# Patient Record
Sex: FEMALE | Race: WHITE | Employment: OTHER | URBAN - NONMETROPOLITAN AREA
[De-identification: names, ages, dates, MRNs, and addresses within clinical notes are randomized per-mention and may not be internally consistent; named-entity substitution may affect disease eponyms.]

---

## 2018-04-20 ENCOUNTER — HOSPITAL ENCOUNTER (OUTPATIENT)
Dept: INTERVENTIONAL RADIOLOGY/VASCULAR | Age: 78
Discharge: HOME | End: 2018-04-20
Payer: MEDICARE

## 2018-04-20 DIAGNOSIS — R09.89 OTHER SPECIFIED SYMPTOMS AND SIGNS INVOLVING THE CIRCULATORY AND RESPIRATORY SYSTEMS: ICD-10-CM

## 2018-04-20 DIAGNOSIS — R09.89 BRUIT: ICD-10-CM

## 2018-04-20 PROCEDURE — 93880 EXTRACRANIAL BILAT STUDY: CPT

## 2018-05-24 RX ORDER — VIT A/VIT C/VIT E/ZINC/COPPER 4296-226
1 CAPSULE ORAL 2 TIMES DAILY
Status: ON HOLD | COMMUNITY
End: 2022-04-22

## 2018-05-24 RX ORDER — FLUTICASONE PROPIONATE 50 MCG
1-2 SPRAY, SUSPENSION (ML) NASAL DAILY
COMMUNITY

## 2018-05-31 ENCOUNTER — HOSPITAL ENCOUNTER (OUTPATIENT)
Dept: INPATIENT UNIT | Age: 78
Discharge: HOME | End: 2018-05-31
Attending: INTERNAL MEDICINE | Admitting: INTERNAL MEDICINE
Payer: MEDICARE

## 2018-05-31 VITALS
HEART RATE: 73 BPM | OXYGEN SATURATION: 96 % | SYSTOLIC BLOOD PRESSURE: 113 MMHG | DIASTOLIC BLOOD PRESSURE: 52 MMHG | BODY MASS INDEX: 35 KG/M2 | WEIGHT: 205 LBS | HEIGHT: 64 IN | TEMPERATURE: 98.5 F | RESPIRATION RATE: 18 BRPM

## 2018-05-31 LAB
ABO: NORMAL
ALBUMIN SERPL-MCNC: 3.8 G/DL (ref 3.5–5.1)
ALP BLD-CCNC: 66 U/L (ref 38–126)
ALT SERPL-CCNC: 11 U/L (ref 11–66)
ANION GAP SERPL CALCULATED.3IONS-SCNC: 11 MEQ/L (ref 8–16)
ANTIBODY SCREEN: NORMAL
AST SERPL-CCNC: 20 U/L (ref 5–40)
BILIRUB SERPL-MCNC: 0.4 MG/DL (ref 0.3–1.2)
BUN BLDV-MCNC: 21 MG/DL (ref 7–22)
CALCIUM SERPL-MCNC: 9 MG/DL (ref 8.5–10.5)
CHLORIDE BLD-SCNC: 103 MEQ/L (ref 98–111)
CHOLESTEROL, TOTAL: 120 MG/DL (ref 100–199)
CO2: 28 MEQ/L (ref 23–33)
COLLECTED BY:: ABNORMAL
COLLECTED BY:: NORMAL
CREAT SERPL-MCNC: 0.7 MG/DL (ref 0.4–1.2)
EKG ATRIAL RATE: 267 BPM
EKG Q-T INTERVAL: 420 MS
EKG QRS DURATION: 90 MS
EKG QTC CALCULATION (BAZETT): 469 MS
EKG R AXIS: 1 DEGREES
EKG T AXIS: 42 DEGREES
EKG VENTRICULAR RATE: 75 BPM
GFR SERPL CREATININE-BSD FRML MDRD: 81 ML/MIN/1.73M2
GLUCOSE BLD-MCNC: 101 MG/DL (ref 70–108)
HCT VFR BLD CALC: 34.5 % (ref 37–47)
HDLC SERPL-MCNC: 47 MG/DL
HEMOGLOBIN: 11.3 GM/DL (ref 12–16)
INR BLD: 1.23 (ref 0.85–1.13)
LDL CHOLESTEROL CALCULATED: 54 MG/DL
MCH RBC QN AUTO: 29.3 PG (ref 27–31)
MCHC RBC AUTO-ENTMCNC: 32.8 GM/DL (ref 33–37)
MCV RBC AUTO: 89.2 FL (ref 81–99)
PDW BLD-RTO: 15.2 % (ref 11.5–14.5)
PLATELET # BLD: 187 THOU/MM3 (ref 130–400)
PMV BLD AUTO: 8.3 FL (ref 7.4–10.4)
POC O2 SATURATION: 66 % (ref 94–97)
POC O2 SATURATION: 67 % (ref 94–97)
POC O2 SATURATION: 76 % (ref 94–97)
POC O2 SATURATION: 97 % (ref 94–97)
POTASSIUM REFLEX MAGNESIUM: 4.6 MEQ/L (ref 3.5–5.2)
RBC # BLD: 3.87 MILL/MM3 (ref 4.2–5.4)
RH FACTOR: NORMAL
SODIUM BLD-SCNC: 142 MEQ/L (ref 135–145)
SOURCE, BLOOD GAS: ABNORMAL
SOURCE, BLOOD GAS: NORMAL
TOTAL PROTEIN: 6.4 G/DL (ref 6.1–8)
TRIGL SERPL-MCNC: 95 MG/DL (ref 0–199)
WBC # BLD: 5 THOU/MM3 (ref 4.8–10.8)

## 2018-05-31 PROCEDURE — C1894 INTRO/SHEATH, NON-LASER: HCPCS

## 2018-05-31 PROCEDURE — 2720000010 HC SURG SUPPLY STERILE

## 2018-05-31 PROCEDURE — 93460 R&L HRT ART/VENTRICLE ANGIO: CPT | Performed by: INTERNAL MEDICINE

## 2018-05-31 PROCEDURE — 93567 NJX CAR CTH SPRVLV AORTGRPHY: CPT | Performed by: INTERNAL MEDICINE

## 2018-05-31 PROCEDURE — 6370000000 HC RX 637 (ALT 250 FOR IP): Performed by: INTERNAL MEDICINE

## 2018-05-31 PROCEDURE — 80061 LIPID PANEL: CPT

## 2018-05-31 PROCEDURE — 86900 BLOOD TYPING SEROLOGIC ABO: CPT

## 2018-05-31 PROCEDURE — 2580000003 HC RX 258: Performed by: INTERNAL MEDICINE

## 2018-05-31 PROCEDURE — 86901 BLOOD TYPING SEROLOGIC RH(D): CPT

## 2018-05-31 PROCEDURE — 86850 RBC ANTIBODY SCREEN: CPT

## 2018-05-31 PROCEDURE — C1760 CLOSURE DEV, VASC: HCPCS

## 2018-05-31 PROCEDURE — 82810 BLOOD GASES O2 SAT ONLY: CPT

## 2018-05-31 PROCEDURE — 85610 PROTHROMBIN TIME: CPT

## 2018-05-31 PROCEDURE — 80053 COMPREHEN METABOLIC PANEL: CPT

## 2018-05-31 PROCEDURE — C1751 CATH, INF, PER/CENT/MIDLINE: HCPCS

## 2018-05-31 PROCEDURE — 6360000002 HC RX W HCPCS

## 2018-05-31 PROCEDURE — 2780000010 HC IMPLANT OTHER

## 2018-05-31 PROCEDURE — 85027 COMPLETE CBC AUTOMATED: CPT

## 2018-05-31 PROCEDURE — C1769 GUIDE WIRE: HCPCS

## 2018-05-31 PROCEDURE — 93005 ELECTROCARDIOGRAM TRACING: CPT | Performed by: INTERNAL MEDICINE

## 2018-05-31 PROCEDURE — C1725 CATH, TRANSLUMIN NON-LASER: HCPCS

## 2018-05-31 PROCEDURE — 2500000003 HC RX 250 WO HCPCS

## 2018-05-31 PROCEDURE — 36415 COLL VENOUS BLD VENIPUNCTURE: CPT

## 2018-05-31 RX ORDER — SODIUM CHLORIDE 9 MG/ML
100 INJECTION, SOLUTION INTRAVENOUS CONTINUOUS
Status: DISCONTINUED | OUTPATIENT
Start: 2018-05-31 | End: 2018-06-01 | Stop reason: HOSPADM

## 2018-05-31 RX ORDER — ONDANSETRON 2 MG/ML
4 INJECTION INTRAMUSCULAR; INTRAVENOUS EVERY 6 HOURS PRN
Status: DISCONTINUED | OUTPATIENT
Start: 2018-05-31 | End: 2018-06-01 | Stop reason: HOSPADM

## 2018-05-31 RX ORDER — SODIUM CHLORIDE 0.9 % (FLUSH) 0.9 %
10 SYRINGE (ML) INJECTION EVERY 12 HOURS SCHEDULED
Status: DISCONTINUED | OUTPATIENT
Start: 2018-05-31 | End: 2018-05-31 | Stop reason: SDUPTHER

## 2018-05-31 RX ORDER — SODIUM CHLORIDE 9 MG/ML
INJECTION, SOLUTION INTRAVENOUS CONTINUOUS
Status: DISCONTINUED | OUTPATIENT
Start: 2018-05-31 | End: 2018-05-31 | Stop reason: SDUPTHER

## 2018-05-31 RX ORDER — SODIUM CHLORIDE 0.9 % (FLUSH) 0.9 %
10 SYRINGE (ML) INJECTION PRN
Status: DISCONTINUED | OUTPATIENT
Start: 2018-05-31 | End: 2018-06-01 | Stop reason: HOSPADM

## 2018-05-31 RX ORDER — ASPIRIN 325 MG
325 TABLET ORAL ONCE
Status: COMPLETED | OUTPATIENT
Start: 2018-05-31 | End: 2018-05-31

## 2018-05-31 RX ORDER — SODIUM CHLORIDE 0.9 % (FLUSH) 0.9 %
10 SYRINGE (ML) INJECTION PRN
Status: DISCONTINUED | OUTPATIENT
Start: 2018-05-31 | End: 2018-05-31 | Stop reason: SDUPTHER

## 2018-05-31 RX ORDER — ATROPINE SULFATE 0.4 MG/ML
0.5 AMPUL (ML) INJECTION
Status: DISCONTINUED | OUTPATIENT
Start: 2018-05-31 | End: 2018-06-01 | Stop reason: HOSPADM

## 2018-05-31 RX ORDER — ACETAMINOPHEN 325 MG/1
650 TABLET ORAL EVERY 4 HOURS PRN
Status: DISCONTINUED | OUTPATIENT
Start: 2018-05-31 | End: 2018-06-01 | Stop reason: HOSPADM

## 2018-05-31 RX ORDER — SODIUM CHLORIDE 0.9 % (FLUSH) 0.9 %
10 SYRINGE (ML) INJECTION EVERY 12 HOURS SCHEDULED
Status: DISCONTINUED | OUTPATIENT
Start: 2018-05-31 | End: 2018-06-01 | Stop reason: HOSPADM

## 2018-05-31 RX ADMIN — SODIUM CHLORIDE: 9 INJECTION, SOLUTION INTRAVENOUS at 13:10

## 2018-05-31 RX ADMIN — ASPIRIN 325 MG: 325 TABLET, COATED ORAL at 13:50

## 2018-06-20 ENCOUNTER — OFFICE VISIT (OUTPATIENT)
Dept: CARDIOTHORACIC SURGERY | Age: 78
End: 2018-06-20
Payer: MEDICARE

## 2018-06-20 ENCOUNTER — PREP FOR PROCEDURE (OUTPATIENT)
Dept: CARDIOTHORACIC SURGERY | Age: 78
End: 2018-06-20

## 2018-06-20 VITALS
DIASTOLIC BLOOD PRESSURE: 79 MMHG | BODY MASS INDEX: 38.85 KG/M2 | WEIGHT: 233.2 LBS | SYSTOLIC BLOOD PRESSURE: 137 MMHG | HEIGHT: 65 IN | HEART RATE: 66 BPM

## 2018-06-20 DIAGNOSIS — R79.1 ABNORMAL COAGULATION PROFILE: ICD-10-CM

## 2018-06-20 DIAGNOSIS — I35.0 NONRHEUMATIC AORTIC VALVE STENOSIS: ICD-10-CM

## 2018-06-20 DIAGNOSIS — R79.9 ABNORMAL FINDING OF BLOOD CHEMISTRY: ICD-10-CM

## 2018-06-20 DIAGNOSIS — Z01.818 PREOP EXAMINATION: Primary | ICD-10-CM

## 2018-06-20 DIAGNOSIS — I48.20 CHRONIC ATRIAL FIBRILLATION (HCC): Primary | ICD-10-CM

## 2018-06-20 PROCEDURE — 99205 OFFICE O/P NEW HI 60 MIN: CPT | Performed by: THORACIC SURGERY (CARDIOTHORACIC VASCULAR SURGERY)

## 2018-06-20 PROCEDURE — G8400 PT W/DXA NO RESULTS DOC: HCPCS | Performed by: THORACIC SURGERY (CARDIOTHORACIC VASCULAR SURGERY)

## 2018-06-20 PROCEDURE — 4040F PNEUMOC VAC/ADMIN/RCVD: CPT | Performed by: THORACIC SURGERY (CARDIOTHORACIC VASCULAR SURGERY)

## 2018-06-20 PROCEDURE — 1123F ACP DISCUSS/DSCN MKR DOCD: CPT | Performed by: THORACIC SURGERY (CARDIOTHORACIC VASCULAR SURGERY)

## 2018-06-20 PROCEDURE — 1036F TOBACCO NON-USER: CPT | Performed by: THORACIC SURGERY (CARDIOTHORACIC VASCULAR SURGERY)

## 2018-06-20 PROCEDURE — G8417 CALC BMI ABV UP PARAM F/U: HCPCS | Performed by: THORACIC SURGERY (CARDIOTHORACIC VASCULAR SURGERY)

## 2018-06-20 PROCEDURE — 1090F PRES/ABSN URINE INCON ASSESS: CPT | Performed by: THORACIC SURGERY (CARDIOTHORACIC VASCULAR SURGERY)

## 2018-06-20 PROCEDURE — G8427 DOCREV CUR MEDS BY ELIG CLIN: HCPCS | Performed by: THORACIC SURGERY (CARDIOTHORACIC VASCULAR SURGERY)

## 2018-06-20 RX ORDER — SODIUM CHLORIDE 0.9 % (FLUSH) 0.9 %
10 SYRINGE (ML) INJECTION PRN
Status: CANCELLED | OUTPATIENT
Start: 2018-06-20

## 2018-06-20 RX ORDER — AMIODARONE HYDROCHLORIDE 200 MG/1
200 TABLET ORAL ONCE
Status: CANCELLED | OUTPATIENT
Start: 2018-06-20 | End: 2018-06-20

## 2018-06-20 RX ORDER — CHLORHEXIDINE GLUCONATE 4 G/100ML
SOLUTION TOPICAL ONCE
Status: CANCELLED | OUTPATIENT
Start: 2018-06-20 | End: 2018-06-20

## 2018-06-20 RX ORDER — SODIUM CHLORIDE 0.9 % (FLUSH) 0.9 %
10 SYRINGE (ML) INJECTION EVERY 12 HOURS SCHEDULED
Status: CANCELLED | OUTPATIENT
Start: 2018-06-20

## 2018-06-20 RX ORDER — CHLORHEXIDINE GLUCONATE 0.12 MG/ML
15 RINSE ORAL ONCE
Status: CANCELLED | OUTPATIENT
Start: 2018-06-20 | End: 2018-06-20

## 2018-06-20 ASSESSMENT — ENCOUNTER SYMPTOMS
ABDOMINAL PAIN: 0
EYE DISCHARGE: 0
SHORTNESS OF BREATH: 1

## 2018-06-21 ENCOUNTER — HOSPITAL ENCOUNTER (OUTPATIENT)
Dept: GENERAL RADIOLOGY | Age: 78
Discharge: HOME | End: 2018-06-21
Payer: MEDICARE

## 2018-06-21 ENCOUNTER — HOSPITAL ENCOUNTER (OUTPATIENT)
Dept: PREADMISSION TESTING | Age: 78
Discharge: HOME | End: 2018-06-25
Payer: MEDICARE

## 2018-06-21 VITALS
DIASTOLIC BLOOD PRESSURE: 82 MMHG | SYSTOLIC BLOOD PRESSURE: 168 MMHG | HEIGHT: 65 IN | OXYGEN SATURATION: 98 % | BODY MASS INDEX: 38.57 KG/M2 | WEIGHT: 231.48 LBS | TEMPERATURE: 96.6 F | HEART RATE: 60 BPM

## 2018-06-21 DIAGNOSIS — R79.9 ABNORMAL FINDING OF BLOOD CHEMISTRY: ICD-10-CM

## 2018-06-21 DIAGNOSIS — Z01.818 PREOP EXAMINATION: ICD-10-CM

## 2018-06-21 DIAGNOSIS — R79.1 ABNORMAL COAGULATION PROFILE: ICD-10-CM

## 2018-06-21 LAB
ABO: NORMAL
ANTIBODY SCREEN: NORMAL
AVERAGE GLUCOSE: 99 MG/DL (ref 70–126)
BACTERIA: ABNORMAL
BILIRUBIN URINE: NEGATIVE
BLOOD, URINE: NEGATIVE
CASTS: ABNORMAL /LPF
CASTS: ABNORMAL /LPF
CHARACTER, URINE: CLEAR
COLOR: YELLOW
CRYSTALS: ABNORMAL
EPITHELIAL CELLS, UA: ABNORMAL /HPF
GLUCOSE, URINE: NEGATIVE MG/DL
HBA1C MFR BLD: 5.3 % (ref 4.4–6.4)
HCT VFR BLD CALC: 36.3 % (ref 37–47)
HEMOGLOBIN: 11.8 GM/DL (ref 12–16)
INR BLD: 1.97 (ref 0.85–1.13)
KETONES, URINE: NEGATIVE
LEUKOCYTE EST, POC: ABNORMAL
MCH RBC QN AUTO: 29.4 PG (ref 27–31)
MCHC RBC AUTO-ENTMCNC: 32.6 GM/DL (ref 33–37)
MCV RBC AUTO: 90.2 FL (ref 81–99)
MISCELLANEOUS LAB TEST RESULT: ABNORMAL
NITRITE, URINE: NEGATIVE
PDW BLD-RTO: 15.6 % (ref 11.5–14.5)
PH UA: 6.5
PLATELET # BLD: 152 THOU/MM3 (ref 130–400)
PMV BLD AUTO: 8.9 FL (ref 7.4–10.4)
PROTEIN UA: NEGATIVE MG/DL
RBC # BLD: 4.02 MILL/MM3 (ref 4.2–5.4)
RBC URINE: ABNORMAL /HPF
RENAL EPITHELIAL, UA: ABNORMAL
RH FACTOR: NORMAL
SPECIFIC GRAVITY UA: 1.02 (ref 1–1.03)
UROBILINOGEN, URINE: 1 EU/DL
WBC # BLD: 4.7 THOU/MM3 (ref 4.8–10.8)
WBC UA: ABNORMAL /HPF
YEAST: ABNORMAL

## 2018-06-21 PROCEDURE — 85027 COMPLETE CBC AUTOMATED: CPT

## 2018-06-21 PROCEDURE — 86900 BLOOD TYPING SEROLOGIC ABO: CPT

## 2018-06-21 PROCEDURE — 36415 COLL VENOUS BLD VENIPUNCTURE: CPT

## 2018-06-21 PROCEDURE — 83036 HEMOGLOBIN GLYCOSYLATED A1C: CPT

## 2018-06-21 PROCEDURE — 86850 RBC ANTIBODY SCREEN: CPT

## 2018-06-21 PROCEDURE — 86923 COMPATIBILITY TEST ELECTRIC: CPT

## 2018-06-21 PROCEDURE — 71046 X-RAY EXAM CHEST 2 VIEWS: CPT

## 2018-06-21 PROCEDURE — 87081 CULTURE SCREEN ONLY: CPT

## 2018-06-21 PROCEDURE — 86901 BLOOD TYPING SEROLOGIC RH(D): CPT

## 2018-06-21 PROCEDURE — 85610 PROTHROMBIN TIME: CPT

## 2018-06-21 PROCEDURE — 81001 URINALYSIS AUTO W/SCOPE: CPT

## 2018-06-22 LAB — MRSA SCREEN: NORMAL

## 2018-06-23 LAB
MRSA SCREEN: NORMAL
VRE CULTURE: NORMAL

## 2018-06-27 ENCOUNTER — ANESTHESIA EVENT (OUTPATIENT)
Dept: OPERATING ROOM | Age: 78
DRG: 220 | End: 2018-06-27
Payer: MEDICARE

## 2018-06-28 ENCOUNTER — APPOINTMENT (OUTPATIENT)
Dept: GENERAL RADIOLOGY | Age: 78
DRG: 220 | End: 2018-06-28
Attending: THORACIC SURGERY (CARDIOTHORACIC VASCULAR SURGERY)
Payer: MEDICARE

## 2018-06-28 ENCOUNTER — HOSPITAL ENCOUNTER (INPATIENT)
Age: 78
LOS: 11 days | Discharge: NURSING, REHAB OR LONG-TERM CARE | DRG: 220 | End: 2018-07-09
Attending: THORACIC SURGERY (CARDIOTHORACIC VASCULAR SURGERY) | Admitting: THORACIC SURGERY (CARDIOTHORACIC VASCULAR SURGERY)
Payer: MEDICARE

## 2018-06-28 ENCOUNTER — ANESTHESIA (OUTPATIENT)
Dept: OPERATING ROOM | Age: 78
DRG: 220 | End: 2018-06-28
Payer: MEDICARE

## 2018-06-28 VITALS — OXYGEN SATURATION: 100 % | RESPIRATION RATE: 14 BRPM | TEMPERATURE: 97.5 F

## 2018-06-28 DIAGNOSIS — I35.0 AORTIC STENOSIS, SEVERE: Primary | ICD-10-CM

## 2018-06-28 LAB
ACTIVATED CLOTTING TIME: 113 SECONDS (ref 99–130)
ACTIVATED CLOTTING TIME: 135 SECONDS (ref 99–130)
ACTIVATED CLOTTING TIME: 493 SECONDS (ref 99–130)
ACTIVATED CLOTTING TIME: 523 SECONDS (ref 99–130)
ACTIVATED CLOTTING TIME: 543 SECONDS (ref 99–130)
ACTIVATED CLOTTING TIME: 683 SECONDS (ref 99–130)
ALLEN TEST: ABNORMAL
ALLEN TEST: NORMAL
ANION GAP SERPL CALCULATED.3IONS-SCNC: 10 MEQ/L (ref 8–16)
BASE EXCESS (CALCULATED): 0 MMOL/L (ref -2.5–2.5)
BASE EXCESS (CALCULATED): 0.5 MMOL/L (ref -2.5–2.5)
BASE EXCESS (CALCULATED): 0.5 MMOL/L (ref -2.5–2.5)
BASE EXCESS (CALCULATED): 1.2 MMOL/L (ref -2.5–2.5)
BASE EXCESS (CALCULATED): 1.9 MMOL/L (ref -2.5–2.5)
BASE EXCESS (CALCULATED): 2.3 MMOL/L (ref -2.5–2.5)
BASE EXCESS (CALCULATED): 5 MMOL/L (ref -2.5–2.5)
BASE EXCESS (CALCULATED): 7.9 MMOL/L (ref -2.5–2.5)
BASE EXCESS (CALCULATED): 8.2 MMOL/L (ref -2.5–2.5)
BASE EXCESS MIXED: 6.1 MMOL/L (ref -2–3)
BASOPHILS # BLD: 0.1 %
BASOPHILS ABSOLUTE: 0 THOU/MM3 (ref 0–0.1)
BUN BLDV-MCNC: 19 MG/DL (ref 7–22)
CALCIUM IONIZED SERUM: 0.99 MMOL/L (ref 1.12–1.32)
CALCIUM IONIZED SERUM: 1.02 MMOL/L (ref 1.12–1.32)
CALCIUM IONIZED SERUM: 1.02 MMOL/L (ref 1.12–1.32)
CALCIUM IONIZED SERUM: 1.08 MMOL/L (ref 1.12–1.32)
CALCIUM IONIZED SERUM: 1.2 MMOL/L (ref 1.12–1.32)
CALCIUM IONIZED: 1.07 MMOL/L (ref 1.12–1.32)
CALCIUM SERPL-MCNC: 7.6 MG/DL (ref 8.5–10.5)
CARTRIDGE COLOR: NORMAL
CHLORIDE BLD-SCNC: 111 MEQ/L (ref 98–111)
CO2: 24 MEQ/L (ref 23–33)
COLLECTED BY:: ABNORMAL
COLLECTED BY:: NORMAL
COMMENT: ABNORMAL
COMMENT: ABNORMAL
CREAT SERPL-MCNC: 0.7 MG/DL (ref 0.4–1.2)
DEVICE: ABNORMAL
DEVICE: NORMAL
EOSINOPHIL # BLD: 0.1 %
EOSINOPHILS ABSOLUTE: 0 THOU/MM3 (ref 0–0.4)
ERYTHROCYTE [DISTWIDTH] IN BLOOD BY AUTOMATED COUNT: 15.5 % (ref 11.5–14.5)
ERYTHROCYTE [DISTWIDTH] IN BLOOD BY AUTOMATED COUNT: 16 % (ref 11.5–14.5)
ERYTHROCYTE [DISTWIDTH] IN BLOOD BY AUTOMATED COUNT: 50.5 FL (ref 35–45)
ERYTHROCYTE [DISTWIDTH] IN BLOOD BY AUTOMATED COUNT: 54.3 FL (ref 35–45)
GFR SERPL CREATININE-BSD FRML MDRD: 81 ML/MIN/1.73M2
GLUCOSE BLD-MCNC: 107 MG/DL (ref 70–108)
GLUCOSE BLD-MCNC: 108 MG/DL (ref 70–108)
GLUCOSE BLD-MCNC: 111 MG/DL (ref 70–108)
GLUCOSE BLD-MCNC: 115 MG/DL (ref 70–108)
GLUCOSE BLD-MCNC: 125 MG/DL (ref 70–108)
GLUCOSE BLD-MCNC: 131 MG/DL (ref 70–108)
GLUCOSE BLD-MCNC: 86 MG/DL (ref 70–108)
GLUCOSE, WHOLE BLOOD: 112 MG/DL (ref 70–108)
GLUCOSE, WHOLE BLOOD: 114 MG/DL (ref 70–108)
GLUCOSE, WHOLE BLOOD: 128 MG/DL (ref 70–108)
GLUCOSE, WHOLE BLOOD: 129 MG/DL (ref 70–108)
GLUCOSE, WHOLE BLOOD: 137 MG/DL (ref 70–108)
GLUCOSE, WHOLE BLOOD: 140 MG/DL (ref 70–108)
GLUCOSE, WHOLE BLOOD: 160 MG/DL (ref 70–108)
GLUCOSE, WHOLE BLOOD: 179 MG/DL (ref 70–108)
GLUCOSE, WHOLE BLOOD: 85 MG/DL (ref 70–108)
HCO3, MIXED: 30 MMOL/L (ref 23–28)
HCO3: 26 MMOL/L (ref 23–28)
HCO3: 26 MMOL/L (ref 23–28)
HCO3: 27 MMOL/L (ref 23–28)
HCO3: 28 MMOL/L (ref 23–28)
HCO3: 31 MMOL/L (ref 23–28)
HCO3: 32 MMOL/L (ref 23–28)
HCO3: 32 MMOL/L (ref 23–28)
HCT VFR BLD CALC: 20.6 % (ref 37–47)
HCT VFR BLD CALC: 30 % (ref 37–47)
HCT VFR BLD CALC: 32.1 % (ref 37–47)
HCT VFR BLD CALC: 32.6 % (ref 37–47)
HEMOGLOBIN FINGERSTICK, POC: 6.6 G/DL (ref 12–16)
HEMOGLOBIN FINGERSTICK, POC: 7.4 G/DL (ref 12–16)
HEMOGLOBIN: 10.4 GM/DL (ref 12–16)
HEMOGLOBIN: 10.6 GM/DL (ref 12–16)
HEMOGLOBIN: 6.8 GM/DL (ref 12–16)
HEMOGLOBIN: 9.6 GM/DL (ref 12–16)
IFIO2: 30
IFIO2: 30
IFIO2: 35
IFIO2: 40
IFIO2: 60
IMMATURE GRANS (ABS): 0.09 THOU/MM3 (ref 0–0.07)
IMMATURE GRANULOCYTES: 0.6 %
LYMPHOCYTES # BLD: 10.8 %
LYMPHOCYTES ABSOLUTE: 1.5 THOU/MM3 (ref 1–4.8)
MAGNESIUM: 2.5 MG/DL (ref 1.6–2.4)
MCH RBC QN AUTO: 29.1 PG (ref 26–33)
MCH RBC QN AUTO: 29.3 PG (ref 26–33)
MCHC RBC AUTO-ENTMCNC: 32 GM/DL (ref 32.2–35.5)
MCHC RBC AUTO-ENTMCNC: 32.4 GM/DL (ref 32.2–35.5)
MCV RBC AUTO: 89.7 FL (ref 81–99)
MCV RBC AUTO: 91.5 FL (ref 81–99)
MODE: ABNORMAL
MODE: NORMAL
MONOCYTES # BLD: 5.4 %
MONOCYTES ABSOLUTE: 0.8 THOU/MM3 (ref 0.4–1.3)
NUCLEATED RED BLOOD CELLS: 0 /100 WBC
O2 SAT, MIXED: 77 %
O2 SATURATION: 100 %
O2 SATURATION: 91 %
O2 SATURATION: 92 %
O2 SATURATION: 93 %
O2 SATURATION: 97 %
O2 SATURATION: 98 %
O2 SATURATION: 99 %
PATIENT BOLUS: NORMAL
PATIENT HEPARIN CONCENTRATION: 0
PATIENT HEPARIN CONCENTRATION: 2
PATIENT HEPARIN CONCENTRATION: 3
PCO2, MIXED VENOUS: 40 MMHG (ref 41–51)
PCO2: 35 MMHG (ref 35–45)
PCO2: 39 MMHG (ref 35–45)
PCO2: 40 MMHG (ref 35–45)
PCO2: 42 MMHG (ref 35–45)
PCO2: 44 MMHG (ref 35–45)
PCO2: 50 MMHG (ref 35–45)
PCO2: 51 MMHG (ref 35–45)
PCO2: 52 MMHG (ref 35–45)
PCO2: 56 MMHG (ref 35–45)
PH BLOOD GAS: 7.29 (ref 7.35–7.45)
PH BLOOD GAS: 7.32 (ref 7.35–7.45)
PH BLOOD GAS: 7.34 (ref 7.35–7.45)
PH BLOOD GAS: 7.39 (ref 7.35–7.45)
PH BLOOD GAS: 7.4 (ref 7.35–7.45)
PH BLOOD GAS: 7.4 (ref 7.35–7.45)
PH BLOOD GAS: 7.47 (ref 7.35–7.45)
PH BLOOD GAS: 7.51 (ref 7.35–7.45)
PH BLOOD GAS: 7.52 (ref 7.35–7.45)
PH, MIXED: 7.48 (ref 7.31–7.41)
PLATELET # BLD: 112 THOU/MM3 (ref 130–400)
PLATELET # BLD: 118 THOU/MM3 (ref 130–400)
PLATELET # BLD: 74 THOU/MM3 (ref 130–400)
PLATELET # BLD: 89 THOU/MM3 (ref 130–400)
PMV BLD AUTO: 10 FL (ref 9.4–12.4)
PMV BLD AUTO: 10.8 FL (ref 9.4–12.4)
PO2 MIXED: 39 MMHG (ref 25–40)
PO2: 106 MMHG (ref 71–104)
PO2: 114 MMHG (ref 71–104)
PO2: 252 MMHG (ref 71–104)
PO2: 290 MMHG (ref 71–104)
PO2: 327 MMHG (ref 71–104)
PO2: 66 MMHG (ref 71–104)
PO2: 72 MMHG (ref 71–104)
PO2: 73 MMHG (ref 71–104)
PO2: 90 MMHG (ref 71–104)
POTASSIUM SERPL-SCNC: 4.1 MEQ/L (ref 3.5–5.2)
POTASSIUM SERPL-SCNC: 4.2 MEQ/L (ref 3.5–5.2)
POTASSIUM, WHOLE BLOOD: 3.4 MEQ/L (ref 3.5–4.9)
POTASSIUM, WHOLE BLOOD: 3.8 MEQ/L (ref 3.5–4.9)
POTASSIUM, WHOLE BLOOD: 4 MEQ/L (ref 3.5–4.9)
POTASSIUM, WHOLE BLOOD: 4.3 MEQ/L (ref 3.5–4.9)
POTASSIUM, WHOLE BLOOD: 4.3 MEQ/L (ref 3.5–4.9)
PROJECTED HEPARIN CONCENTATION: 2
RANGE: NORMAL
RBC # BLD: 3.28 MILL/MM3 (ref 4.2–5.4)
RBC # BLD: 3.58 MILL/MM3 (ref 4.2–5.4)
SEG NEUTROPHILS: 83 %
SEGMENTED NEUTROPHILS ABSOLUTE COUNT: 11.5 THOU/MM3 (ref 1.8–7.7)
SET PEEP: 5 MMHG
SET PRESS SUPP: 10 CMH2O
SET RESPIRATORY RATE: 16 BPM
SET RESPIRATORY RATE: 16 BPM
SET TIDAL VOLUME: 460 ML
SET TIDAL VOLUME: 460 ML
SODIUM BLD-SCNC: 145 MEQ/L (ref 135–145)
SODIUM, WHOLE BLOOD: 140 MEQ/L (ref 138–146)
SODIUM, WHOLE BLOOD: 144 MEQ/L (ref 138–146)
SODIUM, WHOLE BLOOD: 145 MEQ/L (ref 138–146)
SOURCE, BLOOD GAS: ABNORMAL
SOURCE, BLOOD GAS: NORMAL
WBC # BLD: 13.9 THOU/MM3 (ref 4.8–10.8)
WBC # BLD: 8.4 THOU/MM3 (ref 4.8–10.8)

## 2018-06-28 PROCEDURE — 6360000002 HC RX W HCPCS: Performed by: THORACIC SURGERY (CARDIOTHORACIC VASCULAR SURGERY)

## 2018-06-28 PROCEDURE — 85025 COMPLETE CBC W/AUTO DIFF WBC: CPT

## 2018-06-28 PROCEDURE — 02RF08Z REPLACEMENT OF AORTIC VALVE WITH ZOOPLASTIC TISSUE, OPEN APPROACH: ICD-10-PCS | Performed by: THORACIC SURGERY (CARDIOTHORACIC VASCULAR SURGERY)

## 2018-06-28 PROCEDURE — 94002 VENT MGMT INPAT INIT DAY: CPT

## 2018-06-28 PROCEDURE — 2700000000 HC OXYGEN THERAPY PER DAY

## 2018-06-28 PROCEDURE — 88311 DECALCIFY TISSUE: CPT

## 2018-06-28 PROCEDURE — 5A1221Z PERFORMANCE OF CARDIAC OUTPUT, CONTINUOUS: ICD-10-PCS | Performed by: THORACIC SURGERY (CARDIOTHORACIC VASCULAR SURGERY)

## 2018-06-28 PROCEDURE — 85520 HEPARIN ASSAY: CPT

## 2018-06-28 PROCEDURE — P9016 RBC LEUKOCYTES REDUCED: HCPCS

## 2018-06-28 PROCEDURE — 36592 COLLECT BLOOD FROM PICC: CPT

## 2018-06-28 PROCEDURE — 84295 ASSAY OF SERUM SODIUM: CPT

## 2018-06-28 PROCEDURE — 2580000003 HC RX 258: Performed by: NURSE ANESTHETIST, CERTIFIED REGISTERED

## 2018-06-28 PROCEDURE — 3600000008 HC SURGERY OHS BASE: Performed by: THORACIC SURGERY (CARDIOTHORACIC VASCULAR SURGERY)

## 2018-06-28 PROCEDURE — 37799 UNLISTED PX VASCULAR SURGERY: CPT

## 2018-06-28 PROCEDURE — 6360000002 HC RX W HCPCS: Performed by: NURSE ANESTHETIST, CERTIFIED REGISTERED

## 2018-06-28 PROCEDURE — 36415 COLL VENOUS BLD VENIPUNCTURE: CPT

## 2018-06-28 PROCEDURE — 2000000000 HC ICU R&B

## 2018-06-28 PROCEDURE — 83735 ASSAY OF MAGNESIUM: CPT

## 2018-06-28 PROCEDURE — 2780000010 HC IMPLANT OTHER: Performed by: THORACIC SURGERY (CARDIOTHORACIC VASCULAR SURGERY)

## 2018-06-28 PROCEDURE — 33405 REPLACEMENT AORTIC VALVE OPN: CPT | Performed by: THORACIC SURGERY (CARDIOTHORACIC VASCULAR SURGERY)

## 2018-06-28 PROCEDURE — 3600000018 HC SURGERY OHS ADDTL 15MIN: Performed by: THORACIC SURGERY (CARDIOTHORACIC VASCULAR SURGERY)

## 2018-06-28 PROCEDURE — 71045 X-RAY EXAM CHEST 1 VIEW: CPT

## 2018-06-28 PROCEDURE — 33259 ABLATE ATRIA W/BYPASS ADD-ON: CPT | Performed by: THORACIC SURGERY (CARDIOTHORACIC VASCULAR SURGERY)

## 2018-06-28 PROCEDURE — 2580000003 HC RX 258: Performed by: THORACIC SURGERY (CARDIOTHORACIC VASCULAR SURGERY)

## 2018-06-28 PROCEDURE — 84132 ASSAY OF SERUM POTASSIUM: CPT

## 2018-06-28 PROCEDURE — 2780000006 HC MISC HEART VALVE: Performed by: THORACIC SURGERY (CARDIOTHORACIC VASCULAR SURGERY)

## 2018-06-28 PROCEDURE — 82948 REAGENT STRIP/BLOOD GLUCOSE: CPT

## 2018-06-28 PROCEDURE — 6370000000 HC RX 637 (ALT 250 FOR IP): Performed by: THORACIC SURGERY (CARDIOTHORACIC VASCULAR SURGERY)

## 2018-06-28 PROCEDURE — 2500000003 HC RX 250 WO HCPCS: Performed by: NURSE ANESTHETIST, CERTIFIED REGISTERED

## 2018-06-28 PROCEDURE — 6360000002 HC RX W HCPCS: Performed by: NURSE PRACTITIONER

## 2018-06-28 PROCEDURE — 36620 INSERTION CATHETER ARTERY: CPT

## 2018-06-28 PROCEDURE — A4452 WATERPROOF TAPE: HCPCS

## 2018-06-28 PROCEDURE — 02580ZZ DESTRUCTION OF CONDUCTION MECHANISM, OPEN APPROACH: ICD-10-PCS | Performed by: THORACIC SURGERY (CARDIOTHORACIC VASCULAR SURGERY)

## 2018-06-28 PROCEDURE — 88305 TISSUE EXAM BY PATHOLOGIST: CPT

## 2018-06-28 PROCEDURE — P9045 ALBUMIN (HUMAN), 5%, 250 ML: HCPCS | Performed by: THORACIC SURGERY (CARDIOTHORACIC VASCULAR SURGERY)

## 2018-06-28 PROCEDURE — 2720000010 HC SURG SUPPLY STERILE: Performed by: THORACIC SURGERY (CARDIOTHORACIC VASCULAR SURGERY)

## 2018-06-28 PROCEDURE — 6370000000 HC RX 637 (ALT 250 FOR IP): Performed by: NURSE ANESTHETIST, CERTIFIED REGISTERED

## 2018-06-28 PROCEDURE — 02L70CK OCCLUSION OF LEFT ATRIAL APPENDAGE WITH EXTRALUMINAL DEVICE, OPEN APPROACH: ICD-10-PCS | Performed by: THORACIC SURGERY (CARDIOTHORACIC VASCULAR SURGERY)

## 2018-06-28 PROCEDURE — 82803 BLOOD GASES ANY COMBINATION: CPT

## 2018-06-28 PROCEDURE — 80048 BASIC METABOLIC PNL TOTAL CA: CPT

## 2018-06-28 PROCEDURE — 85018 HEMOGLOBIN: CPT

## 2018-06-28 PROCEDURE — 3700000001 HC ADD 15 MINUTES (ANESTHESIA): Performed by: THORACIC SURGERY (CARDIOTHORACIC VASCULAR SURGERY)

## 2018-06-28 PROCEDURE — C1773 RET DEV, INSERTABLE: HCPCS | Performed by: THORACIC SURGERY (CARDIOTHORACIC VASCULAR SURGERY)

## 2018-06-28 PROCEDURE — 3700000000 HC ANESTHESIA ATTENDED CARE: Performed by: THORACIC SURGERY (CARDIOTHORACIC VASCULAR SURGERY)

## 2018-06-28 PROCEDURE — 82330 ASSAY OF CALCIUM: CPT

## 2018-06-28 PROCEDURE — 85027 COMPLETE CBC AUTOMATED: CPT

## 2018-06-28 PROCEDURE — 82947 ASSAY GLUCOSE BLOOD QUANT: CPT

## 2018-06-28 PROCEDURE — 6370000000 HC RX 637 (ALT 250 FOR IP): Performed by: NURSE PRACTITIONER

## 2018-06-28 DEVICE — IMPLANTABLE DEVICE: Type: IMPLANTABLE DEVICE | Site: HEART | Status: FUNCTIONAL

## 2018-06-28 DEVICE — ZINACTIVE USE 2540329 DEVICE OCCL CLP L40MM PLUNG GRP FLX SHFT FOR GILLINOV: Type: IMPLANTABLE DEVICE | Site: HEART | Status: FUNCTIONAL

## 2018-06-28 RX ORDER — CETIRIZINE HYDROCHLORIDE 10 MG/1
5 TABLET ORAL DAILY
Status: DISCONTINUED | OUTPATIENT
Start: 2018-06-29 | End: 2018-07-09 | Stop reason: HOSPADM

## 2018-06-28 RX ORDER — SODIUM CHLORIDE 0.9 % (FLUSH) 0.9 %
10 SYRINGE (ML) INJECTION EVERY 12 HOURS SCHEDULED
Status: DISCONTINUED | OUTPATIENT
Start: 2018-06-28 | End: 2018-06-28 | Stop reason: HOSPADM

## 2018-06-28 RX ORDER — FENTANYL CITRATE 50 UG/ML
INJECTION, SOLUTION INTRAMUSCULAR; INTRAVENOUS PRN
Status: DISCONTINUED | OUTPATIENT
Start: 2018-06-28 | End: 2018-06-28 | Stop reason: SDUPTHER

## 2018-06-28 RX ORDER — OXYCODONE HYDROCHLORIDE 5 MG/1
10 TABLET ORAL EVERY 4 HOURS PRN
Status: DISCONTINUED | OUTPATIENT
Start: 2018-06-28 | End: 2018-07-09 | Stop reason: HOSPADM

## 2018-06-28 RX ORDER — CHLORHEXIDINE GLUCONATE 4 G/100ML
SOLUTION TOPICAL ONCE
Status: DISCONTINUED | OUTPATIENT
Start: 2018-06-28 | End: 2018-06-28 | Stop reason: HOSPADM

## 2018-06-28 RX ORDER — ONDANSETRON 2 MG/ML
4 INJECTION INTRAMUSCULAR; INTRAVENOUS EVERY 6 HOURS PRN
Status: DISCONTINUED | OUTPATIENT
Start: 2018-06-28 | End: 2018-07-09 | Stop reason: HOSPADM

## 2018-06-28 RX ORDER — ALBUMIN, HUMAN INJ 5% 5 %
SOLUTION INTRAVENOUS
Status: DISPENSED
Start: 2018-06-28 | End: 2018-06-29

## 2018-06-28 RX ORDER — SODIUM CHLORIDE 9 MG/ML
INJECTION, SOLUTION INTRAVENOUS CONTINUOUS
Status: DISCONTINUED | OUTPATIENT
Start: 2018-06-28 | End: 2018-06-28

## 2018-06-28 RX ORDER — FLUTICASONE PROPIONATE 50 MCG
1 SPRAY, SUSPENSION (ML) NASAL DAILY
Status: DISCONTINUED | OUTPATIENT
Start: 2018-06-29 | End: 2018-07-09 | Stop reason: HOSPADM

## 2018-06-28 RX ORDER — PAROXETINE 30 MG/1
30 TABLET, FILM COATED ORAL EVERY MORNING
Status: DISCONTINUED | OUTPATIENT
Start: 2018-06-28 | End: 2018-07-09 | Stop reason: HOSPADM

## 2018-06-28 RX ORDER — GLYCOPYRROLATE 1 MG/5 ML
SYRINGE (ML) INTRAVENOUS PRN
Status: DISCONTINUED | OUTPATIENT
Start: 2018-06-28 | End: 2018-06-28 | Stop reason: SDUPTHER

## 2018-06-28 RX ORDER — ENALAPRILAT 2.5 MG/2ML
0.62 INJECTION INTRAVENOUS
Status: DISCONTINUED | OUTPATIENT
Start: 2018-06-28 | End: 2018-07-09 | Stop reason: HOSPADM

## 2018-06-28 RX ORDER — SODIUM CHLORIDE 9 MG/ML
INJECTION, SOLUTION INTRAVENOUS CONTINUOUS
Status: DISCONTINUED | OUTPATIENT
Start: 2018-06-28 | End: 2018-07-02

## 2018-06-28 RX ORDER — FAMOTIDINE 20 MG/1
20 TABLET, FILM COATED ORAL 2 TIMES DAILY
Status: DISCONTINUED | OUTPATIENT
Start: 2018-06-30 | End: 2018-07-09

## 2018-06-28 RX ORDER — METOPROLOL TARTRATE 5 MG/5ML
2.5 INJECTION INTRAVENOUS EVERY 10 MIN PRN
Status: DISCONTINUED | OUTPATIENT
Start: 2018-06-28 | End: 2018-07-01

## 2018-06-28 RX ORDER — DEXTROSE MONOHYDRATE 50 MG/ML
100 INJECTION, SOLUTION INTRAVENOUS PRN
Status: DISCONTINUED | OUTPATIENT
Start: 2018-06-28 | End: 2018-07-09 | Stop reason: HOSPADM

## 2018-06-28 RX ORDER — SENNOSIDES 8.8 MG/5ML
10 LIQUID ORAL DAILY PRN
Status: DISCONTINUED | OUTPATIENT
Start: 2018-06-28 | End: 2018-07-09 | Stop reason: HOSPADM

## 2018-06-28 RX ORDER — OXYCODONE HYDROCHLORIDE 5 MG/1
5 TABLET ORAL EVERY 4 HOURS PRN
Status: DISCONTINUED | OUTPATIENT
Start: 2018-06-28 | End: 2018-07-09 | Stop reason: HOSPADM

## 2018-06-28 RX ORDER — DEXTROSE MONOHYDRATE 25 G/50ML
12.5 INJECTION, SOLUTION INTRAVENOUS PRN
Status: DISCONTINUED | OUTPATIENT
Start: 2018-06-28 | End: 2018-07-09 | Stop reason: HOSPADM

## 2018-06-28 RX ORDER — POTASSIUM CHLORIDE 29.8 MG/ML
20 INJECTION INTRAVENOUS PRN
Status: DISCONTINUED | OUTPATIENT
Start: 2018-06-29 | End: 2018-07-09 | Stop reason: HOSPADM

## 2018-06-28 RX ORDER — KETAMINE HYDROCHLORIDE 50 MG/ML
INJECTION, SOLUTION, CONCENTRATE INTRAMUSCULAR; INTRAVENOUS PRN
Status: DISCONTINUED | OUTPATIENT
Start: 2018-06-28 | End: 2018-06-28 | Stop reason: SDUPTHER

## 2018-06-28 RX ORDER — SODIUM CHLORIDE 0.9 % (FLUSH) 0.9 %
10 SYRINGE (ML) INJECTION PRN
Status: DISCONTINUED | OUTPATIENT
Start: 2018-06-28 | End: 2018-07-09 | Stop reason: HOSPADM

## 2018-06-28 RX ORDER — TRAZODONE HYDROCHLORIDE 50 MG/1
50 TABLET ORAL NIGHTLY
Status: DISCONTINUED | OUTPATIENT
Start: 2018-06-28 | End: 2018-07-09 | Stop reason: HOSPADM

## 2018-06-28 RX ORDER — MIDAZOLAM HYDROCHLORIDE 1 MG/ML
INJECTION INTRAMUSCULAR; INTRAVENOUS PRN
Status: DISCONTINUED | OUTPATIENT
Start: 2018-06-28 | End: 2018-06-28 | Stop reason: SDUPTHER

## 2018-06-28 RX ORDER — DEXTROSE MONOHYDRATE 25 G/50ML
12.5 INJECTION, SOLUTION INTRAVENOUS PRN
Status: DISCONTINUED | OUTPATIENT
Start: 2018-06-28 | End: 2018-06-28 | Stop reason: SDUPTHER

## 2018-06-28 RX ORDER — SODIUM CHLORIDE 9 MG/ML
INJECTION, SOLUTION INTRAVENOUS CONTINUOUS PRN
Status: DISCONTINUED | OUTPATIENT
Start: 2018-06-28 | End: 2018-06-28 | Stop reason: SDUPTHER

## 2018-06-28 RX ORDER — AMIODARONE HYDROCHLORIDE 200 MG/1
200 TABLET ORAL ONCE
Status: COMPLETED | OUTPATIENT
Start: 2018-06-28 | End: 2018-06-28

## 2018-06-28 RX ORDER — MORPHINE SULFATE 2 MG/ML
2 INJECTION, SOLUTION INTRAMUSCULAR; INTRAVENOUS
Status: DISCONTINUED | OUTPATIENT
Start: 2018-06-28 | End: 2018-07-01

## 2018-06-28 RX ORDER — HEPARIN SODIUM 1000 [USP'U]/ML
INJECTION, SOLUTION INTRAVENOUS; SUBCUTANEOUS PRN
Status: DISCONTINUED | OUTPATIENT
Start: 2018-06-28 | End: 2018-06-28 | Stop reason: SDUPTHER

## 2018-06-28 RX ORDER — ATORVASTATIN CALCIUM 20 MG/1
20 TABLET, FILM COATED ORAL NIGHTLY
Status: DISCONTINUED | OUTPATIENT
Start: 2018-06-29 | End: 2018-07-09 | Stop reason: HOSPADM

## 2018-06-28 RX ORDER — AMIODARONE HYDROCHLORIDE 200 MG/1
200 TABLET ORAL 2 TIMES DAILY
Status: DISCONTINUED | OUTPATIENT
Start: 2018-06-28 | End: 2018-06-29

## 2018-06-28 RX ORDER — NICOTINE POLACRILEX 4 MG
15 LOZENGE BUCCAL PRN
Status: DISCONTINUED | OUTPATIENT
Start: 2018-06-28 | End: 2018-07-09 | Stop reason: HOSPADM

## 2018-06-28 RX ORDER — PROTAMINE SULFATE 10 MG/ML
INJECTION, SOLUTION INTRAVENOUS PRN
Status: DISCONTINUED | OUTPATIENT
Start: 2018-06-28 | End: 2018-06-28 | Stop reason: SDUPTHER

## 2018-06-28 RX ORDER — CHLORHEXIDINE GLUCONATE 0.12 MG/ML
15 RINSE ORAL ONCE
Status: COMPLETED | OUTPATIENT
Start: 2018-06-28 | End: 2018-06-28

## 2018-06-28 RX ORDER — M-VIT,TX,IRON,MINS/CALC/FOLIC 27MG-0.4MG
1 TABLET ORAL
Status: DISCONTINUED | OUTPATIENT
Start: 2018-06-29 | End: 2018-07-09 | Stop reason: HOSPADM

## 2018-06-28 RX ORDER — ACETAMINOPHEN 325 MG/1
650 TABLET ORAL EVERY 4 HOURS PRN
Status: DISCONTINUED | OUTPATIENT
Start: 2018-06-28 | End: 2018-07-09 | Stop reason: HOSPADM

## 2018-06-28 RX ORDER — SODIUM CHLORIDE 0.9 % (FLUSH) 0.9 %
10 SYRINGE (ML) INJECTION PRN
Status: DISCONTINUED | OUTPATIENT
Start: 2018-06-28 | End: 2018-06-28 | Stop reason: HOSPADM

## 2018-06-28 RX ORDER — MORPHINE SULFATE 2 MG/ML
INJECTION, SOLUTION INTRAMUSCULAR; INTRAVENOUS
Status: DISPENSED
Start: 2018-06-28 | End: 2018-06-29

## 2018-06-28 RX ORDER — DOCUSATE SODIUM 100 MG/1
100 CAPSULE, LIQUID FILLED ORAL 2 TIMES DAILY
Status: DISCONTINUED | OUTPATIENT
Start: 2018-06-29 | End: 2018-07-09 | Stop reason: HOSPADM

## 2018-06-28 RX ORDER — ALBUTEROL SULFATE 90 UG/1
2 AEROSOL, METERED RESPIRATORY (INHALATION) EVERY 6 HOURS PRN
Status: DISCONTINUED | OUTPATIENT
Start: 2018-06-28 | End: 2018-07-09 | Stop reason: HOSPADM

## 2018-06-28 RX ORDER — ALBUMIN, HUMAN INJ 5% 5 %
25 SOLUTION INTRAVENOUS PRN
Status: DISCONTINUED | OUTPATIENT
Start: 2018-06-28 | End: 2018-07-09 | Stop reason: HOSPADM

## 2018-06-28 RX ORDER — ACETAMINOPHEN 650 MG/1
650 SUPPOSITORY RECTAL EVERY 4 HOURS PRN
Status: DISCONTINUED | OUTPATIENT
Start: 2018-06-28 | End: 2018-07-09 | Stop reason: HOSPADM

## 2018-06-28 RX ORDER — ROCURONIUM BROMIDE 10 MG/ML
INJECTION, SOLUTION INTRAVENOUS PRN
Status: DISCONTINUED | OUTPATIENT
Start: 2018-06-28 | End: 2018-06-28 | Stop reason: SDUPTHER

## 2018-06-28 RX ORDER — BISACODYL 10 MG
10 SUPPOSITORY, RECTAL RECTAL DAILY PRN
Status: DISCONTINUED | OUTPATIENT
Start: 2018-06-28 | End: 2018-07-09 | Stop reason: HOSPADM

## 2018-06-28 RX ORDER — SODIUM CHLORIDE 0.9 % (FLUSH) 0.9 %
10 SYRINGE (ML) INJECTION EVERY 12 HOURS SCHEDULED
Status: DISCONTINUED | OUTPATIENT
Start: 2018-06-28 | End: 2018-07-09 | Stop reason: HOSPADM

## 2018-06-28 RX ORDER — PROTAMINE SULFATE 10 MG/ML
50 INJECTION, SOLUTION INTRAVENOUS
Status: ACTIVE | OUTPATIENT
Start: 2018-06-28 | End: 2018-06-28

## 2018-06-28 RX ADMIN — Medication 15 ML: at 06:53

## 2018-06-28 RX ADMIN — ROCURONIUM BROMIDE 25 MG: 10 INJECTION, SOLUTION INTRAVENOUS at 12:30

## 2018-06-28 RX ADMIN — ACETAMINOPHEN 650 MG: 325 TABLET ORAL at 22:54

## 2018-06-28 RX ADMIN — SODIUM CHLORIDE: 9 INJECTION, SOLUTION INTRAVENOUS at 07:52

## 2018-06-28 RX ADMIN — VANCOMYCIN HYDROCHLORIDE 1500 MG: 5 INJECTION, POWDER, LYOPHILIZED, FOR SOLUTION INTRAVENOUS at 14:03

## 2018-06-28 RX ADMIN — PROTAMINE SULFATE 250 MG: 10 INJECTION, SOLUTION INTRAVENOUS at 11:18

## 2018-06-28 RX ADMIN — Medication 2 UNITS/HR: at 09:20

## 2018-06-28 RX ADMIN — MIDAZOLAM HYDROCHLORIDE 2 MG: 1 INJECTION, SOLUTION INTRAMUSCULAR; INTRAVENOUS at 11:00

## 2018-06-28 RX ADMIN — AMINOCAPROIC ACID 5 G: 250 INJECTION, SOLUTION INTRAVENOUS at 09:41

## 2018-06-28 RX ADMIN — Medication 10 ML: at 20:25

## 2018-06-28 RX ADMIN — Medication 0.2 MG: at 08:55

## 2018-06-28 RX ADMIN — MORPHINE SULFATE 2 MG: 2 INJECTION, SOLUTION INTRAMUSCULAR; INTRAVENOUS at 13:19

## 2018-06-28 RX ADMIN — HEPARIN SODIUM 35000 UNITS: 1000 INJECTION, SOLUTION INTRAVENOUS; SUBCUTANEOUS at 08:44

## 2018-06-28 RX ADMIN — TRAZODONE HYDROCHLORIDE 50 MG: 50 TABLET ORAL at 22:53

## 2018-06-28 RX ADMIN — AMIODARONE HYDROCHLORIDE 200 MG: 200 TABLET ORAL at 06:52

## 2018-06-28 RX ADMIN — FENTANYL CITRATE 100 MCG: 50 INJECTION INTRAMUSCULAR; INTRAVENOUS at 08:55

## 2018-06-28 RX ADMIN — AMIODARONE HYDROCHLORIDE 200 MG: 200 TABLET ORAL at 22:54

## 2018-06-28 RX ADMIN — SODIUM CHLORIDE: 9 INJECTION, SOLUTION INTRAVENOUS at 06:53

## 2018-06-28 RX ADMIN — ROCURONIUM BROMIDE 50 MG: 10 INJECTION, SOLUTION INTRAVENOUS at 11:40

## 2018-06-28 RX ADMIN — EPINEPHRINE 3 MCG/MIN: 1 INJECTION, SOLUTION INTRAMUSCULAR; INTRAVENOUS; SUBCUTANEOUS at 10:50

## 2018-06-28 RX ADMIN — Medication 2 G: at 19:03

## 2018-06-28 RX ADMIN — ALBUMIN (HUMAN) 25 G: 12.5 INJECTION, SOLUTION INTRAVENOUS at 13:38

## 2018-06-28 RX ADMIN — ROCURONIUM BROMIDE 100 MG: 10 INJECTION, SOLUTION INTRAVENOUS at 08:05

## 2018-06-28 RX ADMIN — FENTANYL CITRATE 100 MCG: 50 INJECTION INTRAMUSCULAR; INTRAVENOUS at 09:00

## 2018-06-28 RX ADMIN — Medication 1 G: at 11:42

## 2018-06-28 RX ADMIN — ROCURONIUM BROMIDE 50 MG: 10 INJECTION, SOLUTION INTRAVENOUS at 09:16

## 2018-06-28 RX ADMIN — KETAMINE HYDROCHLORIDE 100 MG: 50 INJECTION, SOLUTION INTRAMUSCULAR; INTRAVENOUS at 07:52

## 2018-06-28 RX ADMIN — AMIODARONE HYDROCHLORIDE 150 MG: 50 INJECTION, SOLUTION INTRAVENOUS at 13:38

## 2018-06-28 RX ADMIN — MIDAZOLAM HYDROCHLORIDE 1 MG: 1 INJECTION, SOLUTION INTRAMUSCULAR; INTRAVENOUS at 08:43

## 2018-06-28 RX ADMIN — AMINOCAPROIC ACID 5 G: 250 INJECTION, SOLUTION INTRAVENOUS at 11:25

## 2018-06-28 RX ADMIN — DESMOPRESSIN ACETATE 24 MCG: 4 INJECTION, SOLUTION INTRAVENOUS; SUBCUTANEOUS at 11:57

## 2018-06-28 RX ADMIN — Medication 2 G: at 08:02

## 2018-06-28 RX ADMIN — MIDAZOLAM HYDROCHLORIDE 2 MG: 1 INJECTION, SOLUTION INTRAMUSCULAR; INTRAVENOUS at 07:52

## 2018-06-28 RX ADMIN — FENTANYL CITRATE 100 MCG: 50 INJECTION INTRAMUSCULAR; INTRAVENOUS at 08:43

## 2018-06-28 RX ADMIN — OXYCODONE HYDROCHLORIDE 10 MG: 5 TABLET ORAL at 20:11

## 2018-06-28 RX ADMIN — SODIUM CHLORIDE 0.12 MCG/KG/MIN: 9 INJECTION, SOLUTION INTRAVENOUS at 13:37

## 2018-06-28 RX ADMIN — FENTANYL CITRATE 50 MCG: 50 INJECTION INTRAMUSCULAR; INTRAVENOUS at 07:52

## 2018-06-28 ASSESSMENT — PULMONARY FUNCTION TESTS
PIF_VALUE: 20
PIF_VALUE: 22
PIF_VALUE: 16
PIF_VALUE: 23
PIF_VALUE: 24
PIF_VALUE: 22
PIF_VALUE: 1
PIF_VALUE: 1
PIF_VALUE: 21
PIF_VALUE: 21
PIF_VALUE: 20
PIF_VALUE: 23
PIF_VALUE: 23
PIF_VALUE: 27
PIF_VALUE: 1
PIF_VALUE: 1
PIF_VALUE: 22
PIF_VALUE: 22
PIF_VALUE: 1
PIF_VALUE: 20
PIF_VALUE: 1
PIF_VALUE: 23
PIF_VALUE: 22
PIF_VALUE: 1
PIF_VALUE: 23
PIF_VALUE: 22
PIF_VALUE: 23
PIF_VALUE: 1
PIF_VALUE: 29
PIF_VALUE: 1
PIF_VALUE: 1
PIF_VALUE: 22
PIF_VALUE: 1
PIF_VALUE: 22
PIF_VALUE: 22
PIF_VALUE: 1
PIF_VALUE: 1
PIF_VALUE: 2
PIF_VALUE: 29
PIF_VALUE: 1
PIF_VALUE: 21
PIF_VALUE: 23
PIF_VALUE: 22
PIF_VALUE: 24
PIF_VALUE: 27
PIF_VALUE: 1
PIF_VALUE: 1
PIF_VALUE: 23
PIF_VALUE: 23
PIF_VALUE: 27
PIF_VALUE: 1
PIF_VALUE: 22
PIF_VALUE: 23
PIF_VALUE: 22
PIF_VALUE: 1
PIF_VALUE: 23
PIF_VALUE: 23
PIF_VALUE: 21
PIF_VALUE: 24
PIF_VALUE: 1
PIF_VALUE: 22
PIF_VALUE: 22
PIF_VALUE: 1
PIF_VALUE: 23
PIF_VALUE: 22
PIF_VALUE: 26
PIF_VALUE: 1
PIF_VALUE: 1
PIF_VALUE: 21
PIF_VALUE: 1
PIF_VALUE: 21
PIF_VALUE: 1
PIF_VALUE: 21
PIF_VALUE: 26
PIF_VALUE: 1
PIF_VALUE: 22
PIF_VALUE: 25
PIF_VALUE: 1
PIF_VALUE: 3
PIF_VALUE: 24
PIF_VALUE: 23
PIF_VALUE: 23
PIF_VALUE: 24
PIF_VALUE: 1
PIF_VALUE: 23
PIF_VALUE: 1
PIF_VALUE: 1
PIF_VALUE: 22
PIF_VALUE: 23
PIF_VALUE: 23
PIF_VALUE: 1
PIF_VALUE: 22
PIF_VALUE: 20
PIF_VALUE: 1
PIF_VALUE: 22
PIF_VALUE: 26
PIF_VALUE: 24
PIF_VALUE: 24
PIF_VALUE: 1
PIF_VALUE: 25
PIF_VALUE: 24
PIF_VALUE: 1
PIF_VALUE: 23
PIF_VALUE: 20
PIF_VALUE: 1
PIF_VALUE: 23
PIF_VALUE: 1
PIF_VALUE: 25
PIF_VALUE: 1
PIF_VALUE: 0
PIF_VALUE: 1
PIF_VALUE: 1
PIF_VALUE: 24
PIF_VALUE: 1
PIF_VALUE: 24
PIF_VALUE: 1
PIF_VALUE: 5
PIF_VALUE: 25
PIF_VALUE: 19
PIF_VALUE: 23
PIF_VALUE: 20
PIF_VALUE: 22
PIF_VALUE: 1
PIF_VALUE: 23
PIF_VALUE: 23
PIF_VALUE: 1
PIF_VALUE: 23
PIF_VALUE: 1
PIF_VALUE: 1
PIF_VALUE: 2
PIF_VALUE: 1
PIF_VALUE: 27
PIF_VALUE: 21
PIF_VALUE: 1
PIF_VALUE: 22
PIF_VALUE: 23
PIF_VALUE: 1
PIF_VALUE: 20
PIF_VALUE: 24
PIF_VALUE: 23
PIF_VALUE: 23
PIF_VALUE: 1
PIF_VALUE: 24
PIF_VALUE: 22
PIF_VALUE: 24
PIF_VALUE: 25
PIF_VALUE: 21
PIF_VALUE: 23
PIF_VALUE: 26
PIF_VALUE: 1
PIF_VALUE: 25
PIF_VALUE: 1
PIF_VALUE: 23
PIF_VALUE: 1
PIF_VALUE: 23
PIF_VALUE: 24
PIF_VALUE: 23
PIF_VALUE: 23
PIF_VALUE: 22
PIF_VALUE: 1
PIF_VALUE: 23
PIF_VALUE: 1
PIF_VALUE: 27
PIF_VALUE: 23
PIF_VALUE: 23
PIF_VALUE: 22
PIF_VALUE: 1
PIF_VALUE: 1
PIF_VALUE: 24
PIF_VALUE: 24
PIF_VALUE: 21
PIF_VALUE: 1
PIF_VALUE: 1
PIF_VALUE: 2
PIF_VALUE: 24
PIF_VALUE: 20
PIF_VALUE: 1
PIF_VALUE: 21
PIF_VALUE: 24
PIF_VALUE: 1
PIF_VALUE: 1
PIF_VALUE: 23
PIF_VALUE: 1
PIF_VALUE: 21
PIF_VALUE: 19
PIF_VALUE: 21
PIF_VALUE: 21
PIF_VALUE: 1
PIF_VALUE: 25
PIF_VALUE: 25
PIF_VALUE: 23
PIF_VALUE: 1
PIF_VALUE: 25
PIF_VALUE: 20
PIF_VALUE: 22
PIF_VALUE: 1
PIF_VALUE: 25
PIF_VALUE: 1
PIF_VALUE: 21
PIF_VALUE: 1
PIF_VALUE: 23
PIF_VALUE: 1
PIF_VALUE: 21
PIF_VALUE: 22
PIF_VALUE: 1
PIF_VALUE: 19
PIF_VALUE: 23
PIF_VALUE: 19
PIF_VALUE: 1
PIF_VALUE: 1
PIF_VALUE: 22
PIF_VALUE: 25
PIF_VALUE: 22
PIF_VALUE: 21
PIF_VALUE: 1
PIF_VALUE: 22
PIF_VALUE: 23
PIF_VALUE: 21
PIF_VALUE: 1
PIF_VALUE: 21
PIF_VALUE: 23
PIF_VALUE: 1
PIF_VALUE: 23
PIF_VALUE: 1
PIF_VALUE: 1
PIF_VALUE: 23
PIF_VALUE: 24
PIF_VALUE: 1
PIF_VALUE: 1
PIF_VALUE: 2
PIF_VALUE: 14
PIF_VALUE: 24
PIF_VALUE: 23
PIF_VALUE: 1
PIF_VALUE: 24
PIF_VALUE: 1
PIF_VALUE: 26
PIF_VALUE: 24
PIF_VALUE: 25
PIF_VALUE: 21
PIF_VALUE: 24
PIF_VALUE: 22
PIF_VALUE: 20
PIF_VALUE: 1
PIF_VALUE: 1
PIF_VALUE: 23
PIF_VALUE: 24
PIF_VALUE: 1
PIF_VALUE: 24
PIF_VALUE: 18
PIF_VALUE: 1
PIF_VALUE: 24
PIF_VALUE: 1
PIF_VALUE: 23
PIF_VALUE: 1
PIF_VALUE: 24
PIF_VALUE: 1
PIF_VALUE: 1
PIF_VALUE: 22
PIF_VALUE: 25
PIF_VALUE: 27
PIF_VALUE: 22
PIF_VALUE: 21
PIF_VALUE: 22
PIF_VALUE: 22
PIF_VALUE: 1
PIF_VALUE: 1

## 2018-06-28 ASSESSMENT — PAIN - FUNCTIONAL ASSESSMENT: PAIN_FUNCTIONAL_ASSESSMENT: 0-10

## 2018-06-28 ASSESSMENT — PAIN SCALES - GENERAL
PAINLEVEL_OUTOF10: 3
PAINLEVEL_OUTOF10: 0
PAINLEVEL_OUTOF10: 10

## 2018-06-28 ASSESSMENT — PAIN DESCRIPTION - LOCATION: LOCATION: BACK

## 2018-06-28 ASSESSMENT — PAIN DESCRIPTION - PAIN TYPE: TYPE: ACUTE PAIN

## 2018-06-28 NOTE — PLAN OF CARE
Problem: Pain:  Goal: Pain level will decrease  Pain level will decrease   Outcome: Ongoing  Pt having post op pain, treated as needed    Problem: Discharge Planning:  Goal: Discharged to appropriate level of care  Discharged to appropriate level of care  Outcome: Ongoing  In icu, post open heart     Problem: Anxiety:  Goal: Level of anxiety will decrease  Level of anxiety will decrease  Outcome: Ongoing  No sign of anxiety     Problem: Cardiac Output - Decreased:  Goal: Cardiac output within specified parameters  Cardiac output within specified parameters  Outcome: Ongoing  Cardiac index increased with albumin and milirinone    Problem: Gas Exchange - Impaired:  Goal: Levels of oxygenation will improve  Levels of oxygenation will improve  Outcome: Ongoing  Pt 95 percent per vent support    Comments: Satinder Gutiérrez Care plan reviewed with patient and family. Family verbalize understanding of the plan of care and contribute to goal setting.

## 2018-06-28 NOTE — PROGRESS NOTES
support needs)    See Adult Nutrition Doc Flowsheet for more detail.      Electronically signed by Yolanda Connor RD, LD on 6/28/18 at 1:52 PM    Contact Number: [phone]

## 2018-06-28 NOTE — PROGRESS NOTES
1245 Patient arrived from [facility], Dr. Rose Cano at bedside. Assessment completed see flowsheet. Amicar @ 50 ml/hr, Insulin 4 units/hr. 1256 1st Albumin admin IV.  1300 Labs drawn and sent stat. 1318 Patient follows commands x 4.  1319 2 mg Morphine admin IV.  1338 Albumin # 2 admin IV.   1340 Labs reviewed. 1345 Epi started at 2 mcg 98/54 (72).  1406 Epi decreased to 1 mcg.

## 2018-06-28 NOTE — PROGRESS NOTES
92%        [] Home oxygen   [] Severe trauma        [] Acute MI / chest pain  [] Significant clinical signs of hypoxemia     [] Post anesthesia short term  [] Hgb disorder        [] No indications    THERAPIES SELECTED BASED ON ALGORITHMS  Bronchial Hygiene  []Vest  []PD&P []Suction or Newberry cough   []Acapella []Metaneb [x]No therapy recommended  Volume expansion  [x]Incentive Spirometry  []EZPAP    []Metaneb  []CPAP   []No therapy recommended  Inhaled Medication  []HHN  []MDI  []Two hour continuous  []Metaneb   [x] No therapy recommended  Oxygenation  [x]Nasal cannula  []Mask    []CPAP  []High flow   [] No therapy recommended      BRONCHODILATOR ASSESSMENT SCORE  Score 0 1 2 3 4   Breath Sounds [x]  Normal or no wheezing with diminished bases []  End expiratory wheeze or slightly diminished []  Pronounced exp. wheeze []  Insp. & Exp. wheeze []  Absent or near absent   Dyspnea and level of distress   [x]  None, respiratory rate   12-20, regular pattern []  Only on exertion or increased respiratory rate 21-25 []  Mild dyspnea at rest, irregular breathing pattern respiratory rate 26-30 []  Moderate  use of accessory muscles, prolonged expiration respiratory rate 31-35 []  Severe    use of accessory muscles, respiratory rate   > 35   Peak flow []  > 80% []  70-80% []  60-69% []  50-59% []  <50%  or unable to perform   Total Score [x]  0-1 []  2-5 []  6-8 []  9-10 []  11-12   Frequency  Every 4 hours PRN for wheezing or increased respiratory distress Three times a day and Q4 PRN for wheezing or increased   respiratory distress Four times a day and Q 4 PRN  for wheezing or increased   respiratory distress Q 4 hours  Contact physician for a continuous treatment and  Iprotropium Bromide if indicated   Reassess Score No need  Every day Every day Every day  After treatment

## 2018-06-28 NOTE — OP NOTE
DATE: 06/28/18    PATIENT: Ilsa Bradley    MRN: [id number]     Acct: [de-identified]    PREOP DIAGNOSIS:   1) Aortic stenosis  2) Longstanding persistent atrial fibrillation    Postop diagnosis:  1) Aortic stenosis  2) Longstanding persistent atrial fibrillation    Procedure:  1) Aortic valve replacement with a 21 mm [employer] bioprosthesis  2) Left atrial maze procedure, with ablations of both the anterior and posterior mitral trigones  3) Ligation of left atrial appendage    SURGEON:  Mindy Jensen MD    ASSISTANT: JUNIOR Tang    INDICATION:  The patient is a 69y.o. year-old female who presents with severe symptomatic aortic stenosis, as well as longstanding persistent atrial fibrillation. FINDNGS: There were no intrapericardial adhesions. Severe tricuspid calcific aortic stenosis was encountered. The mean post-cardiopulmonary bypass prosthetic valve gradient was 11 mmHg. The right heart was normal in size; thus the right atrial maze lesions were omitted. CARDIOPULMONARY BYPASS TIME: 107    CROSSCLAMP TIME: 76    DESCRIPTION:  The patient was prepped and draped in sterile fashion in the supine position. A formal time-out was performed. Heparin was administered. A median sternotomy was performed. A pericardial well was created. The proximal arch was cannulated with a 21 mm arterial cannula. A triple-stage venous cannula was inserted via a pursestring in the superior vena cava. A retrograde cardioplegia cannula was inserted via a right atrial pursestring into the coronary sinus. An antegrade cardioplegia cannula was placed in the mid distal ascending aorta. Cardiopulmonary bypass was initiated. The oblique and transverse sinuses were dissected completely, including mobilization of the entire dome of left atrium, with takedown of the ligament of Mario. The right pulmonary vein isolation was performed with the usual 5 applications of the bipolar radiofrequency clamp.   A placed circumferentially around the annulus. The outflow tract was irrigated copiously. A 5-minute cryoablation was performed from the left atriotomy traversing the P2-P3 interface of the posterior mitral annulus. While this was being performed, the stitches were passed through the sewing ring of the valve, which seated easily. The stitches were tied down. The root vent was turned off to minimize entrainment of air into the left heart. The left atriotomy was closed with running 4-0 Prolene. A 3-minute cryoablation connecting the coronary sinus to the floor line was performed. The aortotomy was closed with running 4-0 Prolene. The patient was placed in steep Trendelenburg position with the aortic root vent turned on full. Retrograde cardioplegia was infused and Valsalva maneuvers were administered with the right heart full to assist in de-airing the left heart. The crossclamp was removed. Atrial and ventricular pacing wires were placed. The patient weaned fairly readily from cardiopulmonary bypass. WESTON confirmed complete de-airing of the left heart, as well as normal prosthetic valve function with no paravalvular leak. Protamine was administered. The patient was de-cannulated. All sites were verified as hemostatic. Chest tubes were placed in the mediastinum. The chest was closed using #7 wire for the sternum, followed by the usual 3-layer soft tissue closure. The patient transferred to the ICU in stable condition.

## 2018-06-28 NOTE — FLOWSHEET NOTE
06/28/18 0725   Encounter Summary   Services provided to: Patient and family together   Referral/Consult From: [address] [facility] No   Continue Visiting Yes  (6/28 open heart)   Complexity of Encounter Moderate   Length of Encounter 15 minutes   Routine   Type Pre-procedure   Assessment Approachable  (trusting God)   Intervention Prayer;Nurtured hope; Active listening   Outcome Encouraged; Hopeful   Pre surgery contact with prayer. Met her son. Pt stated she is ready to go be with the [facility] if her work on this earth is done. Her son disagreed and said she is not done yet. Pt is [employer] and would like to receive anointing and communion. We prayed together before her open heart. She is trusting her life over to God's will.

## 2018-06-28 NOTE — H&P
Cardiovascular Surgery History and Physical    Kusum Browne is a 69y.o. year-old female who presents for bioprosthetic aortic valve replacement and maze procedure. Please refer to the complete history and physical from 6/20/18. No significant changes in history or the physical exam.    Patient has no further questions, and is ready to proceed.

## 2018-06-29 ENCOUNTER — APPOINTMENT (OUTPATIENT)
Dept: GENERAL RADIOLOGY | Age: 78
DRG: 220 | End: 2018-06-29
Attending: THORACIC SURGERY (CARDIOTHORACIC VASCULAR SURGERY)
Payer: MEDICARE

## 2018-06-29 LAB
ANION GAP SERPL CALCULATED.3IONS-SCNC: 11 MEQ/L (ref 8–16)
BUN BLDV-MCNC: 24 MG/DL (ref 7–22)
CALCIUM IONIZED: 1.08 MMOL/L (ref 1.12–1.32)
CALCIUM SERPL-MCNC: 8.1 MG/DL (ref 8.5–10.5)
CHLORIDE BLD-SCNC: 110 MEQ/L (ref 98–111)
CO2: 23 MEQ/L (ref 23–33)
CREAT SERPL-MCNC: 0.8 MG/DL (ref 0.4–1.2)
EKG ATRIAL RATE: 170 BPM
EKG P AXIS: 88 DEGREES
EKG Q-T INTERVAL: 438 MS
EKG QRS DURATION: 94 MS
EKG QTC CALCULATION (BAZETT): 440 MS
EKG R AXIS: -12 DEGREES
EKG T AXIS: -25 DEGREES
EKG VENTRICULAR RATE: 61 BPM
ERYTHROCYTE [DISTWIDTH] IN BLOOD BY AUTOMATED COUNT: 16.3 % (ref 11.5–14.5)
ERYTHROCYTE [DISTWIDTH] IN BLOOD BY AUTOMATED COUNT: 54.4 FL (ref 35–45)
GFR SERPL CREATININE-BSD FRML MDRD: 69 ML/MIN/1.73M2
GLUCOSE BLD-MCNC: 101 MG/DL (ref 70–108)
GLUCOSE BLD-MCNC: 106 MG/DL (ref 70–108)
GLUCOSE BLD-MCNC: 113 MG/DL (ref 70–108)
GLUCOSE BLD-MCNC: 134 MG/DL (ref 70–108)
GLUCOSE BLD-MCNC: 146 MG/DL (ref 70–108)
GLUCOSE BLD-MCNC: 151 MG/DL (ref 70–108)
GLUCOSE BLD-MCNC: 159 MG/DL (ref 70–108)
GLUCOSE BLD-MCNC: 90 MG/DL (ref 70–108)
GLUCOSE BLD-MCNC: 92 MG/DL (ref 70–108)
GLUCOSE BLD-MCNC: 92 MG/DL (ref 70–108)
GLUCOSE BLD-MCNC: 93 MG/DL (ref 70–108)
GLUCOSE BLD-MCNC: 97 MG/DL (ref 70–108)
GLUCOSE BLD-MCNC: 98 MG/DL (ref 70–108)
HCT VFR BLD CALC: 29.3 % (ref 37–47)
HEMOGLOBIN: 9.4 GM/DL (ref 12–16)
MAGNESIUM: 2.4 MG/DL (ref 1.6–2.4)
MCH RBC QN AUTO: 29.1 PG (ref 26–33)
MCHC RBC AUTO-ENTMCNC: 32.1 GM/DL (ref 32.2–35.5)
MCV RBC AUTO: 90.7 FL (ref 81–99)
PLATELET # BLD: 65 THOU/MM3 (ref 130–400)
PMV BLD AUTO: 10.7 FL (ref 9.4–12.4)
POTASSIUM SERPL-SCNC: 4.4 MEQ/L (ref 3.5–5.2)
POTASSIUM SERPL-SCNC: 4.5 MEQ/L (ref 3.5–5.2)
RBC # BLD: 3.23 MILL/MM3 (ref 4.2–5.4)
SODIUM BLD-SCNC: 144 MEQ/L (ref 135–145)
WBC # BLD: 8.2 THOU/MM3 (ref 4.8–10.8)

## 2018-06-29 PROCEDURE — G8979 MOBILITY GOAL STATUS: HCPCS

## 2018-06-29 PROCEDURE — 2500000003 HC RX 250 WO HCPCS: Performed by: THORACIC SURGERY (CARDIOTHORACIC VASCULAR SURGERY)

## 2018-06-29 PROCEDURE — 36415 COLL VENOUS BLD VENIPUNCTURE: CPT

## 2018-06-29 PROCEDURE — G8978 MOBILITY CURRENT STATUS: HCPCS

## 2018-06-29 PROCEDURE — 2580000003 HC RX 258: Performed by: THORACIC SURGERY (CARDIOTHORACIC VASCULAR SURGERY)

## 2018-06-29 PROCEDURE — S0028 INJECTION, FAMOTIDINE, 20 MG: HCPCS | Performed by: THORACIC SURGERY (CARDIOTHORACIC VASCULAR SURGERY)

## 2018-06-29 PROCEDURE — 6360000002 HC RX W HCPCS: Performed by: THORACIC SURGERY (CARDIOTHORACIC VASCULAR SURGERY)

## 2018-06-29 PROCEDURE — G8988 SELF CARE GOAL STATUS: HCPCS

## 2018-06-29 PROCEDURE — 82948 REAGENT STRIP/BLOOD GLUCOSE: CPT

## 2018-06-29 PROCEDURE — G8987 SELF CARE CURRENT STATUS: HCPCS

## 2018-06-29 PROCEDURE — 36592 COLLECT BLOOD FROM PICC: CPT

## 2018-06-29 PROCEDURE — 6370000000 HC RX 637 (ALT 250 FOR IP): Performed by: THORACIC SURGERY (CARDIOTHORACIC VASCULAR SURGERY)

## 2018-06-29 PROCEDURE — 71045 X-RAY EXAM CHEST 1 VIEW: CPT

## 2018-06-29 PROCEDURE — 97530 THERAPEUTIC ACTIVITIES: CPT

## 2018-06-29 PROCEDURE — 6360000002 HC RX W HCPCS: Performed by: NURSE PRACTITIONER

## 2018-06-29 PROCEDURE — 97163 PT EVAL HIGH COMPLEX 45 MIN: CPT

## 2018-06-29 PROCEDURE — 2060000000 HC ICU INTERMEDIATE R&B

## 2018-06-29 PROCEDURE — 82330 ASSAY OF CALCIUM: CPT

## 2018-06-29 PROCEDURE — 83735 ASSAY OF MAGNESIUM: CPT

## 2018-06-29 PROCEDURE — 97165 OT EVAL LOW COMPLEX 30 MIN: CPT

## 2018-06-29 PROCEDURE — 80048 BASIC METABOLIC PNL TOTAL CA: CPT

## 2018-06-29 PROCEDURE — 85027 COMPLETE CBC AUTOMATED: CPT

## 2018-06-29 PROCEDURE — P9045 ALBUMIN (HUMAN), 5%, 250 ML: HCPCS | Performed by: THORACIC SURGERY (CARDIOTHORACIC VASCULAR SURGERY)

## 2018-06-29 PROCEDURE — 84132 ASSAY OF SERUM POTASSIUM: CPT

## 2018-06-29 PROCEDURE — 93005 ELECTROCARDIOGRAM TRACING: CPT | Performed by: THORACIC SURGERY (CARDIOTHORACIC VASCULAR SURGERY)

## 2018-06-29 PROCEDURE — 93010 ELECTROCARDIOGRAM REPORT: CPT | Performed by: NUCLEAR MEDICINE

## 2018-06-29 RX ORDER — FUROSEMIDE 10 MG/ML
20 INJECTION INTRAMUSCULAR; INTRAVENOUS 2 TIMES DAILY
Status: DISCONTINUED | OUTPATIENT
Start: 2018-06-29 | End: 2018-06-30

## 2018-06-29 RX ORDER — FUROSEMIDE 10 MG/ML
20 INJECTION INTRAMUSCULAR; INTRAVENOUS ONCE
Status: COMPLETED | OUTPATIENT
Start: 2018-06-29 | End: 2018-06-29

## 2018-06-29 RX ORDER — AMLODIPINE BESYLATE 2.5 MG/1
2.5 TABLET ORAL DAILY
Status: DISCONTINUED | OUTPATIENT
Start: 2018-06-29 | End: 2018-06-29

## 2018-06-29 RX ORDER — AMIODARONE HYDROCHLORIDE 200 MG/1
200 TABLET ORAL DAILY
Status: DISCONTINUED | OUTPATIENT
Start: 2018-06-29 | End: 2018-06-29

## 2018-06-29 RX ORDER — AMIODARONE HYDROCHLORIDE 200 MG/1
200 TABLET ORAL 2 TIMES DAILY
Status: DISCONTINUED | OUTPATIENT
Start: 2018-06-29 | End: 2018-07-02

## 2018-06-29 RX ADMIN — DOCUSATE SODIUM 100 MG: 100 CAPSULE, LIQUID FILLED ORAL at 10:13

## 2018-06-29 RX ADMIN — Medication 2 G: at 04:20

## 2018-06-29 RX ADMIN — ALBUMIN (HUMAN) 12.5 G: 12.5 INJECTION, SOLUTION INTRAVENOUS at 00:11

## 2018-06-29 RX ADMIN — ACETAMINOPHEN 650 MG: 325 TABLET ORAL at 17:11

## 2018-06-29 RX ADMIN — OXYCODONE HYDROCHLORIDE 5 MG: 5 TABLET ORAL at 10:06

## 2018-06-29 RX ADMIN — MULTIPLE VITAMINS W/ MINERALS TAB 1 TABLET: TAB at 10:09

## 2018-06-29 RX ADMIN — Medication 2 G: at 11:27

## 2018-06-29 RX ADMIN — Medication 2 G: at 21:35

## 2018-06-29 RX ADMIN — AMIODARONE HYDROCHLORIDE 150 MG: 1.5 INJECTION, SOLUTION INTRAVENOUS at 03:28

## 2018-06-29 RX ADMIN — VANCOMYCIN HYDROCHLORIDE 1500 MG: 5 INJECTION, POWDER, LYOPHILIZED, FOR SOLUTION INTRAVENOUS at 17:10

## 2018-06-29 RX ADMIN — MAGNESIUM SULFATE HEPTAHYDRATE 1.5 G: 500 INJECTION, SOLUTION INTRAMUSCULAR; INTRAVENOUS at 22:45

## 2018-06-29 RX ADMIN — PAROXETINE HYDROCHLORIDE 30 MG: 30 TABLET, FILM COATED ORAL at 10:08

## 2018-06-29 RX ADMIN — FAMOTIDINE 20 MG: 10 INJECTION, SOLUTION INTRAVENOUS at 10:13

## 2018-06-29 RX ADMIN — FLUTICASONE PROPIONATE 1 SPRAY: 50 SPRAY, METERED NASAL at 10:08

## 2018-06-29 RX ADMIN — Medication 2 G: at 03:33

## 2018-06-29 RX ADMIN — TRAZODONE HYDROCHLORIDE 50 MG: 50 TABLET ORAL at 21:45

## 2018-06-29 RX ADMIN — CETIRIZINE HYDROCHLORIDE 5 MG: 10 TABLET ORAL at 10:08

## 2018-06-29 RX ADMIN — ENALAPRILAT 0.62 MG: 2.5 INJECTION INTRAVENOUS at 06:04

## 2018-06-29 RX ADMIN — FUROSEMIDE 20 MG: 10 INJECTION, SOLUTION INTRAMUSCULAR; INTRAVENOUS at 17:10

## 2018-06-29 RX ADMIN — AMIODARONE HYDROCHLORIDE 200 MG: 200 TABLET ORAL at 21:35

## 2018-06-29 RX ADMIN — VANCOMYCIN HYDROCHLORIDE 1500 MG: 5 INJECTION, POWDER, LYOPHILIZED, FOR SOLUTION INTRAVENOUS at 03:20

## 2018-06-29 RX ADMIN — Medication 10 ML: at 10:09

## 2018-06-29 RX ADMIN — ONDANSETRON 4 MG: 2 INJECTION INTRAMUSCULAR; INTRAVENOUS at 06:02

## 2018-06-29 RX ADMIN — ALBUMIN (HUMAN) 12.5 G: 12.5 INJECTION, SOLUTION INTRAVENOUS at 01:35

## 2018-06-29 RX ADMIN — FUROSEMIDE 20 MG: 10 INJECTION, SOLUTION INTRAMUSCULAR; INTRAVENOUS at 03:23

## 2018-06-29 RX ADMIN — FAMOTIDINE 20 MG: 10 INJECTION, SOLUTION INTRAVENOUS at 21:34

## 2018-06-29 RX ADMIN — FUROSEMIDE 20 MG: 10 INJECTION, SOLUTION INTRAMUSCULAR; INTRAVENOUS at 10:13

## 2018-06-29 RX ADMIN — OXYCODONE HYDROCHLORIDE 5 MG: 5 TABLET ORAL at 05:21

## 2018-06-29 RX ADMIN — DOCUSATE SODIUM 100 MG: 100 CAPSULE, LIQUID FILLED ORAL at 21:35

## 2018-06-29 RX ADMIN — ATORVASTATIN CALCIUM 20 MG: 20 TABLET, FILM COATED ORAL at 21:35

## 2018-06-29 ASSESSMENT — PAIN SCALES - GENERAL
PAINLEVEL_OUTOF10: 0
PAINLEVEL_OUTOF10: 7
PAINLEVEL_OUTOF10: 8
PAINLEVEL_OUTOF10: 0
PAINLEVEL_OUTOF10: 2
PAINLEVEL_OUTOF10: 8
PAINLEVEL_OUTOF10: 6
PAINLEVEL_OUTOF10: 2

## 2018-06-29 NOTE — PROGRESS NOTES
Nutrition Assessment    Type and Reason for Visit: Reassess (OHS FU)    Nutrition Recommendations: Continue diet as ordered. Will educate as pt. Status allows.      Malnutrition Assessment:  · Malnutrition Status: No malnutrition    Nutrition Diagnosis:   · Problem: Increased nutrient needs  · Etiology: related to Increased demand for energy/nutrients due to     Signs and symptoms:  as evidenced by Presence of wounds (OHS 6/28)    Nutrition Assessment:  · Subjective Assessment: Pt. extubated post OHS; starting diet for lunch; swallowing well; chemstick 90; meds include lasix,ATB,zofran, insulin gtt; added nutrition illeus protocol; BM 0; pt. not up to education today (POD 1)  · Wound Type: Surgical Wound (OHS 6/28 - AVR - MAZE)  · Current Nutrition Therapies:  · Oral Diet Orders: Cardiac, No Added Salt (3-4gm)   · Oral Diet intake: Unable to assess (starting today)  · Anthropometric Measures:  · Ht: 5' 5\" (165.1 cm)   · Current Body Wt: 234 lb (106.1 kg) (6/28 with no edema preop)  · Admission Body Wt: 234 lb (106.1 kg) (6/28 with no edema)  · Usual Body Wt: 222 lb (100.7 kg) (6/11/16 per EMR)  · Ideal Body Wt: 125 lb (56.7 kg),   · Adjusted Body Wt: 152 lb (68.9 kg), body weight adjusted for Obesity  · BMI Classification: BMI 35.0 - 39.9 Obese Class II (39.1)  · Comparative Standards (Estimated Nutrition Needs):  · Estimated Daily Total Kcal: 1908 (18/kgm actual wt. of 106kgm or 28/kgm adj. wt.)  · Estimated Daily Protein (g):  grams (1.5-2/kgm IBW of 57kgm)    Estimated Intake vs Estimated Needs: Intake Improving    Nutrition Risk Level: High    Nutrition Interventions:   Start oral diet (monitoring need for ONS)  Continued Inpatient Monitoring, Education Needed    Nutrition Evaluation:   · Evaluation: Progressing toward goals   · Goals: Pt. to consume greater than 75% of meals during LOS    · Monitoring: Meal Intake, Diet Tolerance, Skin Integrity, Wound Healing, Weight, Pertinent Labs, Patient/Family Education    See Adult Nutrition Doc Flowsheet for more detail.      Electronically signed by Kostas Christianson RD, LD on 6/29/18 at 11:38 AM    Contact Number: [phone]

## 2018-06-29 NOTE — CARE COORDINATION
18, 7:39 AM      [facility] Labs       Admitted from: Procedure 2018/ James Todd 6961 day: 1   [facility] Reason for admit: Aortic stenosis, severe [I35.0] Status:   Admit order signed?: yes  PMH:  has a past medical history of ISIDRA (acute kidney injury) ([address] Atrial fibrillation ([facility] [address] Bleeding stomach ulcer; History of blood transfusion; Hypertension; and PONV (postoperative nausea and vomiting). Procedure:     1) Aortic valve replacement with a 21 mm [employer] Trifecta bioprosthesis  2) Left atrial maze procedure, with ablations of both the anterior and posterior mitral trigones  3) Ligation of left atrial appendage     extubated  Pertinent abnormal Imagin/29 CXR  Interval removal of the endotracheal and nasogastric tubes. New left basilar opacity which may represent a combination of volume loss and pleural effusion. Cannot exclude pneumonia.    Right basilar atelectasis and mid right atelectasis versus a small effusion in an incomplete minor fissure.           Medications:  Scheduled Meds:   magnesium sulfate  1.5 g Intravenous Once    PARoxetine  30 mg Oral QAM    traZODone  50 mg Oral Nightly    fluticasone  1 spray Nasal Daily    cetirizine  5 mg Oral Daily    sodium chloride flush  10 mL Intravenous 2 times per day    calcium replacement protocol   Other RX Placeholder    ceFAZolin (ANCEF) IVPB  2 g Intravenous Q8H    docusate sodium  100 mg Oral BID    [START ON 2018] famotidine  20 mg Oral BID    famotidine (PEPCID) injection  20 mg Intravenous BID    metoprolol tartrate  25 mg Oral BID    amiodarone  200 mg Oral BID    mupirocin   Topical Once    therapeutic multivitamin-minerals  1 tablet Oral Daily with breakfast    atorvastatin  20 mg Oral Nightly    enoxaparin  40 mg Subcutaneous Daily    aspirin  325 mg Oral Daily    vancomycin (VANCOCIN) IV  1,500 mg Intravenous Q12H     Continuous Infusions:   sodium chloride 20 mL/hr at 18

## 2018-06-29 NOTE — PROGRESS NOTES
1930-  Report received from previous nurse, IVs checked, 1 liter albumin given already. 1945-  PM atrial wires sensing appropriately, more p waves than desired rate. PM paused, underlying HR in 60's, wandering atrial pacemaker. PM changed to VVI rate 80, 10 ma output, sensitivity at 2 mv.  0005-  Dr. Kathrine Jhaveri paged, low urine output, CI low, epi gtt. Restarted, milrinone continues. Immediate call back. Discussed current hemodynamic numbers, and the above. Orders to give 250 albumin, if no response give an additional 250 of albumin. No changes to current drips. Hugh Jhaveri paged. Updated on continued low urine output, 17 beats of v tach, electrolytes. New orders.

## 2018-06-29 NOTE — PROGRESS NOTES
[address] [address]  INPATIENT PHYSICAL THERAPY  EVALUATION  [facility] CVICU 4B - 4B-07/007-A    Time In: 36  Time Out: 1343  Timed Code Treatment Minutes: 8 Minutes  Minutes: 23          Date: 2018  Patient Name: Darrion Alfredo,  Gender:  female        MRN: [id number]  : 1940  (69 y.o.)      Referring Practitioner: Dr Christiana Gupta  Diagnosis: Aortic stenosis severe  Additional Pertinent Hx: s/p scheduled Left atrial maze procedure, with ablations of both the anterior and posterior mitral trigones & AV valve replacement on 18. Past Medical History:   Diagnosis Date    ISIDRA (acute kidney injury) ([facility] [address] 2016    Atrial fibrillation (HCC)     Bleeding stomach ulcer     History of blood transfusion     Hypertension     PONV (postoperative nausea and vomiting)      Past Surgical History:   Procedure Laterality Date    DILATION AND CURETTAGE OF UTERUS      JOINT REPLACEMENT Right     knee    NE OFFICE/OUTPT VISIT,PROCEDURE ONLY N/A 2018    BIOPROSTHETIC AORTIC VALVE REPLACEMENT WITH MAZE PROCEDURE performed by Alyssia Culp MD at [facility]       Restrictions/Precautions:  Surgical Protocols, General Precautions, Fall Risk      Sternal Precautions: No Pushing, No Pulling, 5# Lifting Restrictions  Other position/activity restrictions: s/p AVR, MAZE on 18, arterial line in LUE, external pacemaker, 2 chest tubes, trevino, IV lines       Subjective:  Chart Reviewed: Yes  Patient assessed for rehabilitation services?: Yes  Subjective: Pt in bed agreeable to PT     General:  Overall Orientation Status: Within Functional Limits  Follows Commands: Within Functional Limits    Vision: Within Functional Limits    Hearing: Within functional limits         Pain:  No (at rest prior to therapy).           Social/Functional History:    Lives With: Alone  Type of Home: House  Home Layout: One level  Home Access: Stairs to enter with rails  Entrance Stairs - Number of Steps: 3  Entrance Stairs - Rails: Both  Home Equipment: 4 wheeled walker     Bathroom Shower/Tub: Tub/Shower unit  Bathroom Toilet: Standard  Bathroom Equipment: Grab bars in shower, Shower chair       ADL Assistance: [address]: Independent  Homemaking Responsibilities: Yes  Ambulation Assistance: Independent  Transfer Assistance: Independent    Active : Yes     Additional Comments: Pt was fully indep PLOF without AD. Objective:       RLE AROM: WFL         LLE AROM : WFL                 Strength RLE: Exception  Comment: grossly 4-/5    Strength LLE: Exception  Comment: grossly 4-/5    Sensation  Overall Sensation Status: WNL       Balance  Sitting - Static: Good  Sitting - Dynamic: Good, -  Standing - Static: Fair, +  Standing - Dynamic: Fair    Rolling to Right: Moderate assistance (of 1 with sternal precautions)  Supine to Sit: Moderate assistance (of 1 from right sidelying, pt holding bear for sternal precautions)  Scooting: Minimal assistance    Transfers  Sit to Stand: Minimal Assistance (of 1 from bed , cues on rocking technique and sternal precautions)  Stand to sit: Minimal Assistance (of 1 to chair )       Ambulation 1  Surface: level tile  Device: Rolling Walker  Other Apparatus: O2  Assistance: Minimal assistance (of 1)  Quality of Gait: mild unsteadiness, limited heel strike, slow pace, no LOB, cues on technique with the RW  Distance: 3 feet  Comments: pt with chest tubes to  suction , pacer , IV's and catheter so very idfficult to go very far. Exercises:  Comments: none this date, as pts lunch came once up in chair and very groggy           Activity Tolerance:  Activity Tolerance: Patient Tolerated treatment well;Patient limited by endurance    Treatment Initiated:    Pt sat edge of bed  5 minutes in prep for transfer with SBA, static stand with walker CGA. Rolling right with sternal precautions min A second trial      Assessment:   Body structures, Functions, Activity limitations: Decreased functional mobility , Decreased endurance, Decreased balance, Decreased strength  Assessment: Pt with weakness and limited endurance and pain in chest from the open heart sx . Pt needs skilled PT to return pt to prior functional level. Prognosis: Good    Clinical Presentation: High - Unstable with Unpredictable Characteristics: Pt with weakness and limited endurance and pain in chest from the open heart sx . Pt needs skilled PT to return pt to prior functional level.:    Decision Making: High Complexitybased on patient assessment and decision making process of determining plan of care and establishing reasonable expectations for measurable functional outcomes    REQUIRES PT FOLLOW UP: Yes  Discharge Recommendations: Continue to assess pending progress, ECF with PT, Subacute/Skilled Nursing Facility, Patient would benefit from continued therapy after discharge    Patient Education:  Patient Education: POC, sternal precautions     Equipment Recommendations:  Equipment Needed: No    Safety:  Type of devices: Call light within reach, Gait belt, Nurse notified, All fall risk precautions in place, Chair alarm in place, Left in chair  Restraints  Initially in place: No    Plan:  Times per week: 6 X  CABG  Times per day: Daily  Specific instructions for Next Treatment: CABG step II ex, mobility, gait, balance,endurance   Current Treatment Recommendations: Strengthening, Balance Training, Functional Mobility Training, Transfer Training, Gait Training, Endurance Training    Goals:  Patient goals : Go to the [facility] term goals  Time Frame for Short term goals: 1 week  Short term goal 1: supine to sit and return with CGA to get in and out of bed   Short term goal 2: sit to stand with CGA to get on and off various surfaces  Short term goal 3: ambulate 80 feet with RW and CGA to walk household distances   Short term goal 4: car transfer with CGA to leave hospital at discharge  Long term goals  Time Frame for Long

## 2018-06-29 NOTE — PROGRESS NOTES
OUTCOMES   Bronchial Hygiene   []Sputum production > 25 ml/day       [] Improved chest x-ray  []Patients subjective response (easier clearance of secretions)      [] Improved breath sounds  []Improvement in PaO2 or oxygen saturation       [] Return of patient to home regime   []Improvement in ventilator variables    [x]Other: na.    Volume Expansion  []Decrease respiratory rate   [x]Resolution of fever    [x]Normal pulse rate    []Improved breath sounds   []Normal chest x-ray     [] Improved arterial oxygen tension (PaO2) and p(A-a)O2  []Return of IC to 75% of preop/predicted goal or an increase to 15 ml/kg of ideal body weight   []Other: na .    Inhaled Medication  []Improved assessment score   []Return of patient to home regime  [x]Other: na.    Oxygenation  []SpO2 greater than or equal to 92%   []Reversed signs of hypoxemia and improvement in WOB  []Correction of Hgb disorder    []Return of patient to home regime  [x]Other: na .      Action Plan Based on Assessment:   [x]Continue plan as ordered   []Change therapy based on algorithm   []Consult with physician  []Discontinue therapy and RAP (indications no longer present)  []Not enough information available, reassess in 24 hours  []Other: na.    Comments: will assess daily

## 2018-06-30 ENCOUNTER — APPOINTMENT (OUTPATIENT)
Dept: GENERAL RADIOLOGY | Age: 78
DRG: 220 | End: 2018-06-30
Attending: THORACIC SURGERY (CARDIOTHORACIC VASCULAR SURGERY)
Payer: MEDICARE

## 2018-06-30 LAB
ANION GAP SERPL CALCULATED.3IONS-SCNC: 16 MEQ/L (ref 8–16)
BUN BLDV-MCNC: 34 MG/DL (ref 7–22)
CALCIUM SERPL-MCNC: 8.6 MG/DL (ref 8.5–10.5)
CHLORIDE BLD-SCNC: 102 MEQ/L (ref 98–111)
CO2: 21 MEQ/L (ref 23–33)
CREAT SERPL-MCNC: 1.8 MG/DL (ref 0.4–1.2)
EKG ATRIAL RATE: 47 BPM
EKG P AXIS: 45 DEGREES
EKG P-R INTERVAL: 394 MS
EKG Q-T INTERVAL: 434 MS
EKG QRS DURATION: 102 MS
EKG QTC CALCULATION (BAZETT): 384 MS
EKG R AXIS: 1 DEGREES
EKG T AXIS: -30 DEGREES
EKG VENTRICULAR RATE: 47 BPM
ERYTHROCYTE [DISTWIDTH] IN BLOOD BY AUTOMATED COUNT: 15.9 % (ref 11.5–14.5)
ERYTHROCYTE [DISTWIDTH] IN BLOOD BY AUTOMATED COUNT: 50.9 FL (ref 35–45)
GFR SERPL CREATININE-BSD FRML MDRD: 27 ML/MIN/1.73M2
GLUCOSE BLD-MCNC: 103 MG/DL (ref 70–108)
GLUCOSE BLD-MCNC: 105 MG/DL (ref 70–108)
GLUCOSE BLD-MCNC: 111 MG/DL (ref 70–108)
GLUCOSE BLD-MCNC: 112 MG/DL (ref 70–108)
GLUCOSE BLD-MCNC: 123 MG/DL (ref 70–108)
GLUCOSE BLD-MCNC: 127 MG/DL (ref 70–108)
GLUCOSE BLD-MCNC: 173 MG/DL (ref 70–108)
GLUCOSE BLD-MCNC: 173 MG/DL (ref 70–108)
GLUCOSE BLD-MCNC: 224 MG/DL (ref 70–108)
HCT VFR BLD CALC: 29 % (ref 37–47)
HEMOGLOBIN: 9.7 GM/DL (ref 12–16)
MAGNESIUM: 2.6 MG/DL (ref 1.6–2.4)
MCH RBC QN AUTO: 29.2 PG (ref 26–33)
MCHC RBC AUTO-ENTMCNC: 33.4 GM/DL (ref 32.2–35.5)
MCV RBC AUTO: 87.3 FL (ref 81–99)
PLATELET # BLD: 67 THOU/MM3 (ref 130–400)
PMV BLD AUTO: 11.7 FL (ref 9.4–12.4)
POTASSIUM SERPL-SCNC: 4.8 MEQ/L (ref 3.5–5.2)
RBC # BLD: 3.32 MILL/MM3 (ref 4.2–5.4)
SODIUM BLD-SCNC: 139 MEQ/L (ref 135–145)
WBC # BLD: 12.2 THOU/MM3 (ref 4.8–10.8)

## 2018-06-30 PROCEDURE — 97116 GAIT TRAINING THERAPY: CPT

## 2018-06-30 PROCEDURE — 93010 ELECTROCARDIOGRAM REPORT: CPT | Performed by: NUCLEAR MEDICINE

## 2018-06-30 PROCEDURE — 93005 ELECTROCARDIOGRAM TRACING: CPT | Performed by: THORACIC SURGERY (CARDIOTHORACIC VASCULAR SURGERY)

## 2018-06-30 PROCEDURE — 97110 THERAPEUTIC EXERCISES: CPT

## 2018-06-30 PROCEDURE — 80048 BASIC METABOLIC PNL TOTAL CA: CPT

## 2018-06-30 PROCEDURE — 6360000002 HC RX W HCPCS: Performed by: THORACIC SURGERY (CARDIOTHORACIC VASCULAR SURGERY)

## 2018-06-30 PROCEDURE — 6370000000 HC RX 637 (ALT 250 FOR IP): Performed by: THORACIC SURGERY (CARDIOTHORACIC VASCULAR SURGERY)

## 2018-06-30 PROCEDURE — 2700000000 HC OXYGEN THERAPY PER DAY

## 2018-06-30 PROCEDURE — 2580000003 HC RX 258: Performed by: THORACIC SURGERY (CARDIOTHORACIC VASCULAR SURGERY)

## 2018-06-30 PROCEDURE — 83735 ASSAY OF MAGNESIUM: CPT

## 2018-06-30 PROCEDURE — 6360000002 HC RX W HCPCS: Performed by: NURSE PRACTITIONER

## 2018-06-30 PROCEDURE — 36415 COLL VENOUS BLD VENIPUNCTURE: CPT

## 2018-06-30 PROCEDURE — 99024 POSTOP FOLLOW-UP VISIT: CPT | Performed by: THORACIC SURGERY (CARDIOTHORACIC VASCULAR SURGERY)

## 2018-06-30 PROCEDURE — 85027 COMPLETE CBC AUTOMATED: CPT

## 2018-06-30 PROCEDURE — 82948 REAGENT STRIP/BLOOD GLUCOSE: CPT

## 2018-06-30 PROCEDURE — 71045 X-RAY EXAM CHEST 1 VIEW: CPT

## 2018-06-30 PROCEDURE — 2060000000 HC ICU INTERMEDIATE R&B

## 2018-06-30 RX ADMIN — SODIUM CHLORIDE 1.6 UNITS/HR: 9 INJECTION, SOLUTION INTRAVENOUS at 05:12

## 2018-06-30 RX ADMIN — CETIRIZINE HYDROCHLORIDE 5 MG: 10 TABLET ORAL at 09:19

## 2018-06-30 RX ADMIN — TRAZODONE HYDROCHLORIDE 50 MG: 50 TABLET ORAL at 21:37

## 2018-06-30 RX ADMIN — PAROXETINE HYDROCHLORIDE 30 MG: 30 TABLET, FILM COATED ORAL at 09:20

## 2018-06-30 RX ADMIN — AMIODARONE HYDROCHLORIDE 200 MG: 200 TABLET ORAL at 21:37

## 2018-06-30 RX ADMIN — SODIUM CHLORIDE 0.12 MCG/KG/MIN: 9 INJECTION, SOLUTION INTRAVENOUS at 21:37

## 2018-06-30 RX ADMIN — MULTIPLE VITAMINS W/ MINERALS TAB 1 TABLET: TAB at 09:20

## 2018-06-30 RX ADMIN — FUROSEMIDE 20 MG: 10 INJECTION, SOLUTION INTRAMUSCULAR; INTRAVENOUS at 09:16

## 2018-06-30 RX ADMIN — ACETAMINOPHEN 650 MG: 325 TABLET ORAL at 09:32

## 2018-06-30 RX ADMIN — AMIODARONE HYDROCHLORIDE 200 MG: 200 TABLET ORAL at 09:20

## 2018-06-30 RX ADMIN — Medication 1 UNITS: at 12:42

## 2018-06-30 RX ADMIN — OXYCODONE HYDROCHLORIDE 5 MG: 5 TABLET ORAL at 21:37

## 2018-06-30 RX ADMIN — FAMOTIDINE 20 MG: 20 TABLET ORAL at 21:37

## 2018-06-30 RX ADMIN — ATORVASTATIN CALCIUM 20 MG: 20 TABLET, FILM COATED ORAL at 21:37

## 2018-06-30 RX ADMIN — Medication 2 G: at 04:06

## 2018-06-30 RX ADMIN — ASPIRIN 325 MG: 325 TABLET, DELAYED RELEASE ORAL at 09:20

## 2018-06-30 RX ADMIN — ACETAMINOPHEN 650 MG: 325 TABLET ORAL at 04:18

## 2018-06-30 RX ADMIN — DOCUSATE SODIUM 100 MG: 100 CAPSULE, LIQUID FILLED ORAL at 21:46

## 2018-06-30 RX ADMIN — FAMOTIDINE 20 MG: 20 TABLET ORAL at 09:20

## 2018-06-30 RX ADMIN — DOCUSATE SODIUM 100 MG: 100 CAPSULE, LIQUID FILLED ORAL at 09:20

## 2018-06-30 RX ADMIN — Medication 1 UNITS: at 17:01

## 2018-06-30 RX ADMIN — Medication 1 UNITS: at 21:38

## 2018-06-30 ASSESSMENT — PAIN SCALES - GENERAL
PAINLEVEL_OUTOF10: 0
PAINLEVEL_OUTOF10: 6
PAINLEVEL_OUTOF10: 0
PAINLEVEL_OUTOF10: 9
PAINLEVEL_OUTOF10: 2
PAINLEVEL_OUTOF10: 2

## 2018-06-30 ASSESSMENT — PAIN DESCRIPTION - FREQUENCY
FREQUENCY: INTERMITTENT
FREQUENCY: CONTINUOUS

## 2018-06-30 ASSESSMENT — PAIN DESCRIPTION - DESCRIPTORS: DESCRIPTORS: ACHING

## 2018-06-30 ASSESSMENT — PAIN DESCRIPTION - ORIENTATION
ORIENTATION: LOWER
ORIENTATION: MID

## 2018-06-30 ASSESSMENT — PAIN DESCRIPTION - PAIN TYPE
TYPE: SURGICAL PAIN
TYPE: ACUTE PAIN

## 2018-06-30 ASSESSMENT — PAIN DESCRIPTION - LOCATION
LOCATION: CHEST
LOCATION: BACK

## 2018-06-30 NOTE — PLAN OF CARE
Problem: Nutrition  Goal: Optimal nutrition therapy  Outcome: Met This Shift  Patient ate all of breakfast, denies nausea or any other issues. Bowel sounds active. Problem: Pain:  Goal: Pain level will decrease  Pain level will decrease   Outcome: Met This Shift  Patient earlier stated her pain was 2/10 and the discomfort was in her sternum. During hourly rounding patient is asleep and has to be woken. After medication patient states her pain is a 0/10 and falls back asleep. Will continue to monitor. Problem: Discharge Planning:  Goal: Discharged to appropriate level of care  Discharged to appropriate level of care   Outcome: Met This Shift  Patient states that she is going to the [address] [place] [address] at discharge because she has no one to stay with her. Will continue to monitor and will get /case manage involved on Monday when they return. Problem: Risk for Impaired Skin Integrity  Goal: Tissue integrity - skin and mucous membranes  Structural intactness and normal physiological function of skin and  mucous membranes. Outcome: Met This Shift  No new areas of skin breakdown noted. Midsternal incision clean, dry, intact. No drainage noted. Mucous membranes dry, patient given a drink and chapstick. Problem: Falls - Risk of:  Goal: Will remain free from falls  Will remain free from falls   Outcome: Met This Shift  No falls noted this shift. Bed in lowest position with wheels locked in lowest height. Call light in reach. Patient oriented to own ability and calls for assistance before ambulating. Chair alarm on for patient safety. Comments: Care plan reviewed with patient. Patient verbalizes understanding of the plan of care and contribute to goal setting.

## 2018-06-30 NOTE — PROGRESS NOTES
[facility] RESPIRATORY ASSESSMENT PROGRAM (RAP)  30 kg and over      Patient Name: Valeria Salcido Room#: 2W-96/272-K : 1940     Admitting diagnosis: Aortic stenosis, severe [I35.0]      ASSESSMENT     Vitals  Pulse: 79   Resp: 16  BP: (!) 122/57  SpO2: 94 %  Temp: 98 °F (36.7 °C)  Breath Sounds:clear and diminished   Comment:     PEF   Predicted: na  Personal Best: na     Inspiratory Capacity:   Preoperative/predictive value: 1500 ml   33% of value: 495 ml      75% of value: 1125 ml   10 ml/kg of IBW: 570 ml   Patient's Actual Inspiratory Capacity: 500-750 ml    CARE PLAN  All Care Plan selections must be based on the approved algorithms located on line in the Policy and Procedures under Respiratory Assessment Adult Protocol Handbook    INDICATIONS FOR THERAPY BASED ON HISTORY AND ASSESSMENT  Bronchial Hygiene Goal: Improvement in sputum mobilization in patients with ineffective airway clearance. Reverse the atelectasis. [] Atelectasis caused by mucus plugging     [] Chronic mucucillary clearance disorder  [] Improve cough effectiveness. Copious secretions unable to clear with cough or suctioning.    [x] No indications  Volume Expansion Goal: Prevent atelectasis, Absence or improvement in signs of atelectasis, improve respiratory muscle performance  [x] Post Thoracic or upper abdominal surgery    [] Surgery in COPD patients  [] Atelectasis, reduced lung volume on X-ray    [] CPAP indications are seen  [] Restrictive pulmonary or neuromuscular disorder   [] No indications  Inhaled Medications Goal: Improve respiratory functions in patients with airway disease and decrease WOB  [] Wheezing, diminished, or absent breath sounds associated with a pulmonary disorder  [] Known COPD  [] Peak flow < 80% predicted or personal best for known asthma patients  [] Documented obstructive defect on pulmonary function testing which is reversible   [] With a mucolytic to prevent bronchospasm  [] Home regimen  [x] No > 25 ml/day       [] Improved chest x-ray  []Patients subjective response (easier clearance of secretions)      [] Improved breath sounds  []Improvement in PaO2 or oxygen saturation       [] Return of patient to home regime   []Improvement in ventilator variables    []Other: Sadaf Miguel Volume Expansion  []Decrease respiratory rate   []Resolution of fever    []Normal pulse rate    []Improved breath sounds   []Normal chest x-ray     [] Improved arterial oxygen tension (PaO2) and p(A-a)O2  [x]Return of IC to 75% of preop/predicted goal or an increase to 15 ml/kg of ideal body weight   []Other:  . Inhaled Medication  []Improved assessment score   []Return of patient to home regime  []Other: . Oxygenation  []SpO2 greater than or equal to 92%   []Reversed signs of hypoxemia and improvement in WOB  []Correction of Hgb disorder    []Return of patient to home regime  []Other:  . Action Plan Based on Assessment:   []Continue plan as ordered   []Change therapy based on algorithm   []Consult with physician  []Discontinue therapy and RAP (indications no longer present)  []Not enough information available, reassess in 24 hours  []Other: .     Comments:

## 2018-06-30 NOTE — PROGRESS NOTES
Postop day 2    S/P AVR MAZE    Subjective:     Ms. Louis Colvin admits to fatigue, however was able to transfer to the chair this morning. No other major complaints. Pacemaker paused and she is junctional in the 40's below and pacemaker reste at [de-identified].       Scheduled Meds:   insulin lispro  0-6 Units Subcutaneous TID WC    insulin lispro  0-3 Units Subcutaneous Nightly    amiodarone  200 mg Oral BID    PARoxetine  30 mg Oral QAM    traZODone  50 mg Oral Nightly    fluticasone  1 spray Nasal Daily    cetirizine  5 mg Oral Daily    sodium chloride flush  10 mL Intravenous 2 times per day    calcium replacement protocol   Other RX Placeholder    docusate sodium  100 mg Oral BID    famotidine  20 mg Oral BID    mupirocin   Topical Once    therapeutic multivitamin-minerals  1 tablet Oral Daily with breakfast    atorvastatin  20 mg Oral Nightly    enoxaparin  40 mg Subcutaneous Daily    aspirin  325 mg Oral Daily     Continuous Infusions:   sodium chloride 20 mL/hr at 06/28/18 1245    dextrose      milrinone 0.125 mcg/kg/min (06/28/18 1627)     PRN Meds:sodium chloride flush, potassium chloride, magnesium sulfate, acetaminophen, acetaminophen, oxyCODONE **OR** oxyCODONE, morphine, magnesium hydroxide, bisacodyl, senna, ondansetron, enalaprilat, metoprolol, albuterol sulfate HFA, albumin human, glucose, dextrose, glucagon (rDNA), dextrose, insulin regular      Objective:      Physical Exam:   BP (!) 122/57   Pulse 79   Temp 98 °F (36.7 °C) (Oral)   Resp 16   Ht 5' 5\" (1.651 m)   Wt 246 lb 12.8 oz (111.9 kg)   SpO2 94%   BMI 41.07 kg/m²     General Appearance:  Alert, cooperative, no distress, appears stated age   Head:  Normocephalic, without obvious abnormality, atraumatic   Eyes:  PERRL, conjunctiva/corneas clear, EOM's intact, fundi benign, both eyes   Ears:  Normal TM's and external ear canals, both ears   Nose: Nares normal, septum midline,mucosa normal, no drainage or sinus tenderness   Throat:

## 2018-07-01 ENCOUNTER — APPOINTMENT (OUTPATIENT)
Dept: GENERAL RADIOLOGY | Age: 78
DRG: 220 | End: 2018-07-01
Attending: THORACIC SURGERY (CARDIOTHORACIC VASCULAR SURGERY)
Payer: MEDICARE

## 2018-07-01 LAB
ANION GAP SERPL CALCULATED.3IONS-SCNC: 14 MEQ/L (ref 8–16)
BUN BLDV-MCNC: 43 MG/DL (ref 7–22)
CALCIUM SERPL-MCNC: 8.5 MG/DL (ref 8.5–10.5)
CHLORIDE BLD-SCNC: 102 MEQ/L (ref 98–111)
CO2: 22 MEQ/L (ref 23–33)
CREAT SERPL-MCNC: 1.7 MG/DL (ref 0.4–1.2)
ERYTHROCYTE [DISTWIDTH] IN BLOOD BY AUTOMATED COUNT: 15.6 % (ref 11.5–14.5)
ERYTHROCYTE [DISTWIDTH] IN BLOOD BY AUTOMATED COUNT: 15.6 % (ref 11.5–14.5)
ERYTHROCYTE [DISTWIDTH] IN BLOOD BY AUTOMATED COUNT: 52.7 FL (ref 35–45)
ERYTHROCYTE [DISTWIDTH] IN BLOOD BY AUTOMATED COUNT: 53.3 FL (ref 35–45)
GFR SERPL CREATININE-BSD FRML MDRD: 29 ML/MIN/1.73M2
GLUCOSE BLD-MCNC: 131 MG/DL (ref 70–108)
GLUCOSE BLD-MCNC: 135 MG/DL (ref 70–108)
GLUCOSE BLD-MCNC: 139 MG/DL (ref 70–108)
GLUCOSE BLD-MCNC: 182 MG/DL (ref 70–108)
GLUCOSE BLD-MCNC: 211 MG/DL (ref 70–108)
HCT VFR BLD CALC: 27.7 % (ref 37–47)
HCT VFR BLD CALC: 30.5 % (ref 37–47)
HEMOGLOBIN: 8.8 GM/DL (ref 12–16)
HEMOGLOBIN: 9.6 GM/DL (ref 12–16)
INR BLD: 1.08 (ref 0.85–1.13)
MAGNESIUM: 2.5 MG/DL (ref 1.6–2.4)
MCH RBC QN AUTO: 29.3 PG (ref 26–33)
MCH RBC QN AUTO: 29.7 PG (ref 26–33)
MCHC RBC AUTO-ENTMCNC: 31.5 GM/DL (ref 32.2–35.5)
MCHC RBC AUTO-ENTMCNC: 31.8 GM/DL (ref 32.2–35.5)
MCV RBC AUTO: 93 FL (ref 81–99)
MCV RBC AUTO: 93.6 FL (ref 81–99)
PLATELET # BLD: 55 THOU/MM3 (ref 130–400)
PLATELET # BLD: 69 THOU/MM3 (ref 130–400)
PMV BLD AUTO: 10.8 FL (ref 9.4–12.4)
PMV BLD AUTO: 10.8 FL (ref 9.4–12.4)
POTASSIUM SERPL-SCNC: 4.4 MEQ/L (ref 3.5–5.2)
RBC # BLD: 2.96 MILL/MM3 (ref 4.2–5.4)
RBC # BLD: 3.28 MILL/MM3 (ref 4.2–5.4)
SODIUM BLD-SCNC: 138 MEQ/L (ref 135–145)
WBC # BLD: 7.2 THOU/MM3 (ref 4.8–10.8)
WBC # BLD: 8 THOU/MM3 (ref 4.8–10.8)

## 2018-07-01 PROCEDURE — 80048 BASIC METABOLIC PNL TOTAL CA: CPT

## 2018-07-01 PROCEDURE — 83735 ASSAY OF MAGNESIUM: CPT

## 2018-07-01 PROCEDURE — 2060000000 HC ICU INTERMEDIATE R&B

## 2018-07-01 PROCEDURE — 82948 REAGENT STRIP/BLOOD GLUCOSE: CPT

## 2018-07-01 PROCEDURE — 36415 COLL VENOUS BLD VENIPUNCTURE: CPT

## 2018-07-01 PROCEDURE — 97110 THERAPEUTIC EXERCISES: CPT

## 2018-07-01 PROCEDURE — 85027 COMPLETE CBC AUTOMATED: CPT

## 2018-07-01 PROCEDURE — 6370000000 HC RX 637 (ALT 250 FOR IP): Performed by: THORACIC SURGERY (CARDIOTHORACIC VASCULAR SURGERY)

## 2018-07-01 PROCEDURE — 99024 POSTOP FOLLOW-UP VISIT: CPT | Performed by: THORACIC SURGERY (CARDIOTHORACIC VASCULAR SURGERY)

## 2018-07-01 PROCEDURE — 85610 PROTHROMBIN TIME: CPT

## 2018-07-01 PROCEDURE — 71045 X-RAY EXAM CHEST 1 VIEW: CPT

## 2018-07-01 RX ADMIN — ACETAMINOPHEN 650 MG: 325 TABLET ORAL at 08:42

## 2018-07-01 RX ADMIN — AMIODARONE HYDROCHLORIDE 200 MG: 200 TABLET ORAL at 20:04

## 2018-07-01 RX ADMIN — DOCUSATE SODIUM 100 MG: 100 CAPSULE, LIQUID FILLED ORAL at 20:04

## 2018-07-01 RX ADMIN — FAMOTIDINE 20 MG: 20 TABLET ORAL at 08:43

## 2018-07-01 RX ADMIN — FAMOTIDINE 20 MG: 20 TABLET ORAL at 20:04

## 2018-07-01 RX ADMIN — TRAZODONE HYDROCHLORIDE 50 MG: 50 TABLET ORAL at 22:00

## 2018-07-01 RX ADMIN — PAROXETINE HYDROCHLORIDE 30 MG: 30 TABLET, FILM COATED ORAL at 08:43

## 2018-07-01 RX ADMIN — CETIRIZINE HYDROCHLORIDE 5 MG: 10 TABLET ORAL at 08:43

## 2018-07-01 RX ADMIN — Medication 1 UNITS: at 20:07

## 2018-07-01 RX ADMIN — ATORVASTATIN CALCIUM 20 MG: 20 TABLET, FILM COATED ORAL at 20:04

## 2018-07-01 RX ADMIN — ACETAMINOPHEN 650 MG: 325 TABLET ORAL at 20:04

## 2018-07-01 RX ADMIN — MULTIPLE VITAMINS W/ MINERALS TAB 1 TABLET: TAB at 08:43

## 2018-07-01 RX ADMIN — AMIODARONE HYDROCHLORIDE 200 MG: 200 TABLET ORAL at 08:43

## 2018-07-01 RX ADMIN — DOCUSATE SODIUM 100 MG: 100 CAPSULE, LIQUID FILLED ORAL at 08:43

## 2018-07-01 ASSESSMENT — PAIN DESCRIPTION - ORIENTATION: ORIENTATION: LOWER

## 2018-07-01 ASSESSMENT — PAIN SCALES - GENERAL
PAINLEVEL_OUTOF10: 3
PAINLEVEL_OUTOF10: 2

## 2018-07-01 ASSESSMENT — PAIN DESCRIPTION - FREQUENCY: FREQUENCY: CONTINUOUS

## 2018-07-01 ASSESSMENT — PAIN DESCRIPTION - LOCATION: LOCATION: STERNUM

## 2018-07-01 ASSESSMENT — PAIN DESCRIPTION - PAIN TYPE: TYPE: SURGICAL PAIN

## 2018-07-01 ASSESSMENT — PAIN DESCRIPTION - DESCRIPTORS: DESCRIPTORS: ACHING

## 2018-07-01 NOTE — PROGRESS NOTES
sternal precautions  Short term goal 3: Tolerate standing 2-3 min with CGA for increased ease of sinkside grooming  Short term goal 4: Complete mobility to/from bathroom with RW, CGA, & min vcs for walker safety  Short term goal 5: Complete LE dressing with mod A & LH AE prn  Long term goals  Time Frame for Long term goals : No LTG set d/t short ELOS

## 2018-07-01 NOTE — PLAN OF CARE
Problem: Nutrition  Goal: Optimal nutrition therapy  Outcome: Met This Shift  Patient eats a majority of her meals, no nausea or other complications noted. Bowel sounds active, patient passing gas. Problem: Pain:  Goal: Pain level will decrease  Pain level will decrease   Outcome: Met This Shift  Patient denies needing any pain medication this shift, when asked patient rates her pain a 3/10, but just wants repositioned. Pain goal is 3/10-will continue to monitor with hourly rounding. Problem: Discharge Planning:  Goal: Discharged to appropriate level of care  Discharged to appropriate level of care   Outcome: Ongoing  Plan is to get discharged to [facility] when appropriate. Problem: Cardiac Output - Decreased:  Goal: Hemodynamic stability will improve  Hemodynamic stability will improve   Outcome: Met This Shift  Vitals assessed q4 hours and PRN. Patient NSR on continuous telemetry. Problem: Risk for Impaired Skin Integrity  Goal: Tissue integrity - skin and mucous membranes  Structural intactness and normal physiological function of skin and  mucous membranes. Outcome: Met This Shift  No new areas of skin breakdown noted . Roger hose on BLE, assessed for pressure sores. Allevyn patch on patient, no new breakdown on buttocks. Mucous membranes moist and intact. Will continue to monitor. Problem: Falls - Risk of:  Goal: Will remain free from falls  Will remain free from falls   Outcome: Met This Shift  No falls noted this shift. Bed in lowest position with wheels locked in lowest height. Call light in reach. Patient oriented to own ability and calls for assistance before ambulating. Patient up with 2 assist just because of all her tubes/lines. Patient gait strong and steady once standing. Comments: Care plan reviewed with patient. Patient verbalizes understanding of the plan of care and contribute to goal setting.

## 2018-07-01 NOTE — PLAN OF CARE
Problem: Nutrition  Goal: Optimal nutrition therapy  Outcome: Ongoing  Patient is eating well. Continue to encourage well balanced nutrition for healing. Will continue to monitor. Problem: Pain:  Goal: Pain level will decrease  Pain level will decrease   Outcome: Ongoing  Patient complains of pain in back and where chest tubes are. Patients current pain level is a 9/10. Patients pain goal is a 3/10. Patient is receiving PO medications for pain at this time. Will continue to monitor and educate on non pharmalogical methods of pain relief. Problem: Discharge Planning:  Goal: Discharged to appropriate level of care  Discharged to appropriate level of care   Outcome: Ongoing  At discharge patient plans to go to the [facility]. Social work and case management is on the case. Will continue to monitor. Problem: Cardiac Output - Decreased:  Goal: Hemodynamic stability will improve  Hemodynamic stability will improve   Outcome: Ongoing  Vital signs have been within normal limits for me so far this shift. Will continue to assess every 4 hours. Problem: Risk for Impaired Skin Integrity  Goal: Tissue integrity - skin and mucous membranes  Structural intactness and normal physiological function of skin and  mucous membranes. Outcome: Ongoing  Patient is being turned q2hrs. Patient is tolerating well. Will continue to monitor skin integrity. Problem: Falls - Risk of:  Goal: Will remain free from falls  Will remain free from falls   Outcome: Ongoing  Patient alert and oriented x4. Bed alarmed armed. Bed wheels locked. Bedside table in reach. Patient up with assistance when ambulating. Patient verbalizes and demonstrates the use of the call light. Hourly rounding being completed. Comments: Care plan reviewed with patient and son. Patient and son verbalize understanding of the plan of care and contribute to goal setting.

## 2018-07-01 NOTE — PROGRESS NOTES
and gums normal   Neck: Supple, symmetrical, trachea midline, no adenopathy;  thyroid: not enlarged, symmetric, no tenderness/mass/nodules; no carotid bruit or JVD   Back:   Symmetric, no curvature, ROM normal, no CVA tenderness   Lungs:   Clear to auscultation bilaterally, respirations unlabored   Breasts:  No masses or tenderness   Heart:  Regular rate and rhythm, S1 and S2 normal, no murmur, rub, or gallop   Abdomen:   Soft, non-tender, bowel sounds active all four quadrants,  no masses, no organomegaly   Pelvic: Deferred   Extremities: Extremities normal, atraumatic, no cyanosis or edema   Pulses: 2+ and symmetric   Skin: Skin color, texture, turgor normal, no rashes or lesions   Lymph nodes: Cervical, supraclavicular, and axillary nodes normal   Neurologic: Normal         Cardiographics  ECG: paced at 80, junctional underlying in 50's    Imaging  Chest X-Ray: normal     Lab Review   Lab Results   Component Value Date     07/01/2018    K 4.4 07/01/2018    K 4.6 05/31/2018     07/01/2018    CO2 22 07/01/2018    BUN 43 07/01/2018    CREATININE 1.7 07/01/2018    GLUCOSE 131 07/01/2018    GLUCOSE 93 08/22/2011    CALCIUM 8.5 07/01/2018     Lab Results   Component Value Date    WBC 8.0 07/01/2018    HGB 9.6 07/01/2018    HCT 30.5 07/01/2018    MCV 93.0 07/01/2018    PLT 55 07/01/2018       Assessment:      none  Patient Active Problem List    Diagnosis Date Noted    Aortic stenosis, severe 06/28/2018    Neutropenia (HCC)     Chronic atrial fibrillation (HCC) 06/11/2016    Ascending cholangitis, possible 06/11/2016    Elevated LFTs 06/11/2016    Thrombocytopenia ([facility] [address] 06/11/2016    Leukopenia 06/11/2016    ISIDRA (acute kidney injury) ([facility] [address] 06/11/2016    SIRS (systemic inflammatory response syndrome) (HCC)     Cholecystitis, acute        Plan:     1. S/P AVR / MAZE. Stable over night with persistent pleural drainage from Chest tubes. Will remove the mediastinals today    2.  Plan to continue to

## 2018-07-02 ENCOUNTER — APPOINTMENT (OUTPATIENT)
Dept: GENERAL RADIOLOGY | Age: 78
DRG: 220 | End: 2018-07-02
Attending: THORACIC SURGERY (CARDIOTHORACIC VASCULAR SURGERY)
Payer: MEDICARE

## 2018-07-02 LAB
ANION GAP SERPL CALCULATED.3IONS-SCNC: 13 MEQ/L (ref 8–16)
BUN BLDV-MCNC: 40 MG/DL (ref 7–22)
CALCIUM SERPL-MCNC: 9.1 MG/DL (ref 8.5–10.5)
CHLORIDE BLD-SCNC: 104 MEQ/L (ref 98–111)
CO2: 23 MEQ/L (ref 23–33)
CREAT SERPL-MCNC: 1.2 MG/DL (ref 0.4–1.2)
EKG ATRIAL RATE: 40 BPM
EKG Q-T INTERVAL: 454 MS
EKG QRS DURATION: 102 MS
EKG QTC CALCULATION (BAZETT): 523 MS
EKG R AXIS: 11 DEGREES
EKG T AXIS: -63 DEGREES
EKG VENTRICULAR RATE: 80 BPM
ERYTHROCYTE [DISTWIDTH] IN BLOOD BY AUTOMATED COUNT: 15.6 % (ref 11.5–14.5)
ERYTHROCYTE [DISTWIDTH] IN BLOOD BY AUTOMATED COUNT: 50.7 FL (ref 35–45)
GFR SERPL CREATININE-BSD FRML MDRD: 44 ML/MIN/1.73M2
GLUCOSE BLD-MCNC: 112 MG/DL (ref 70–108)
GLUCOSE BLD-MCNC: 120 MG/DL (ref 70–108)
GLUCOSE BLD-MCNC: 126 MG/DL (ref 70–108)
GLUCOSE BLD-MCNC: 135 MG/DL (ref 70–108)
GLUCOSE BLD-MCNC: 148 MG/DL (ref 70–108)
HCT VFR BLD CALC: 30.8 % (ref 37–47)
HEMOGLOBIN: 10.2 GM/DL (ref 12–16)
MAGNESIUM: 2.2 MG/DL (ref 1.6–2.4)
MCH RBC QN AUTO: 29.7 PG (ref 26–33)
MCHC RBC AUTO-ENTMCNC: 33.1 GM/DL (ref 32.2–35.5)
MCV RBC AUTO: 89.8 FL (ref 81–99)
PLATELET # BLD: 82 THOU/MM3 (ref 130–400)
PMV BLD AUTO: 10.9 FL (ref 9.4–12.4)
POTASSIUM SERPL-SCNC: 4.2 MEQ/L (ref 3.5–5.2)
RBC # BLD: 3.43 MILL/MM3 (ref 4.2–5.4)
SODIUM BLD-SCNC: 140 MEQ/L (ref 135–145)
WBC # BLD: 7.2 THOU/MM3 (ref 4.8–10.8)

## 2018-07-02 PROCEDURE — 99024 POSTOP FOLLOW-UP VISIT: CPT | Performed by: PHYSICIAN ASSISTANT

## 2018-07-02 PROCEDURE — 97530 THERAPEUTIC ACTIVITIES: CPT

## 2018-07-02 PROCEDURE — 71045 X-RAY EXAM CHEST 1 VIEW: CPT

## 2018-07-02 PROCEDURE — 85027 COMPLETE CBC AUTOMATED: CPT

## 2018-07-02 PROCEDURE — 82948 REAGENT STRIP/BLOOD GLUCOSE: CPT

## 2018-07-02 PROCEDURE — 83735 ASSAY OF MAGNESIUM: CPT

## 2018-07-02 PROCEDURE — 6370000000 HC RX 637 (ALT 250 FOR IP): Performed by: THORACIC SURGERY (CARDIOTHORACIC VASCULAR SURGERY)

## 2018-07-02 PROCEDURE — 6360000002 HC RX W HCPCS: Performed by: THORACIC SURGERY (CARDIOTHORACIC VASCULAR SURGERY)

## 2018-07-02 PROCEDURE — 97110 THERAPEUTIC EXERCISES: CPT

## 2018-07-02 PROCEDURE — 2700000000 HC OXYGEN THERAPY PER DAY

## 2018-07-02 PROCEDURE — 93005 ELECTROCARDIOGRAM TRACING: CPT | Performed by: THORACIC SURGERY (CARDIOTHORACIC VASCULAR SURGERY)

## 2018-07-02 PROCEDURE — 80048 BASIC METABOLIC PNL TOTAL CA: CPT

## 2018-07-02 PROCEDURE — 93010 ELECTROCARDIOGRAM REPORT: CPT | Performed by: INTERNAL MEDICINE

## 2018-07-02 PROCEDURE — 36415 COLL VENOUS BLD VENIPUNCTURE: CPT

## 2018-07-02 PROCEDURE — 2060000000 HC ICU INTERMEDIATE R&B

## 2018-07-02 PROCEDURE — 2580000003 HC RX 258: Performed by: THORACIC SURGERY (CARDIOTHORACIC VASCULAR SURGERY)

## 2018-07-02 PROCEDURE — 97116 GAIT TRAINING THERAPY: CPT

## 2018-07-02 RX ORDER — AMIODARONE HYDROCHLORIDE 200 MG/1
200 TABLET ORAL DAILY
Status: DISCONTINUED | OUTPATIENT
Start: 2018-07-02 | End: 2018-07-09 | Stop reason: HOSPADM

## 2018-07-02 RX ORDER — FUROSEMIDE 10 MG/ML
20 INJECTION INTRAMUSCULAR; INTRAVENOUS 2 TIMES DAILY
Status: DISCONTINUED | OUTPATIENT
Start: 2018-07-02 | End: 2018-07-03

## 2018-07-02 RX ORDER — POTASSIUM CHLORIDE 20 MEQ/1
20 TABLET, EXTENDED RELEASE ORAL 2 TIMES DAILY WITH MEALS
Status: DISCONTINUED | OUTPATIENT
Start: 2018-07-02 | End: 2018-07-03

## 2018-07-02 RX ORDER — CARVEDILOL 3.12 MG/1
3.12 TABLET ORAL 2 TIMES DAILY WITH MEALS
Status: DISCONTINUED | OUTPATIENT
Start: 2018-07-02 | End: 2018-07-03

## 2018-07-02 RX ADMIN — Medication 10 ML: at 20:29

## 2018-07-02 RX ADMIN — CARVEDILOL 3.12 MG: 3.12 TABLET, FILM COATED ORAL at 09:44

## 2018-07-02 RX ADMIN — CARVEDILOL 3.12 MG: 3.12 TABLET, FILM COATED ORAL at 17:53

## 2018-07-02 RX ADMIN — DOCUSATE SODIUM 100 MG: 100 CAPSULE, LIQUID FILLED ORAL at 08:43

## 2018-07-02 RX ADMIN — ASPIRIN 325 MG: 325 TABLET, DELAYED RELEASE ORAL at 08:43

## 2018-07-02 RX ADMIN — Medication 1 UNITS: at 17:55

## 2018-07-02 RX ADMIN — ATORVASTATIN CALCIUM 20 MG: 20 TABLET, FILM COATED ORAL at 20:29

## 2018-07-02 RX ADMIN — PAROXETINE HYDROCHLORIDE 30 MG: 30 TABLET, FILM COATED ORAL at 08:39

## 2018-07-02 RX ADMIN — POTASSIUM CHLORIDE 20 MEQ: 20 TABLET, EXTENDED RELEASE ORAL at 08:43

## 2018-07-02 RX ADMIN — FAMOTIDINE 20 MG: 20 TABLET ORAL at 20:29

## 2018-07-02 RX ADMIN — ACETAMINOPHEN 650 MG: 325 TABLET ORAL at 05:35

## 2018-07-02 RX ADMIN — FAMOTIDINE 20 MG: 20 TABLET ORAL at 08:43

## 2018-07-02 RX ADMIN — MULTIPLE VITAMINS W/ MINERALS TAB 1 TABLET: TAB at 08:44

## 2018-07-02 RX ADMIN — FUROSEMIDE 20 MG: 10 INJECTION, SOLUTION INTRAMUSCULAR; INTRAVENOUS at 08:45

## 2018-07-02 RX ADMIN — CETIRIZINE HYDROCHLORIDE 5 MG: 10 TABLET ORAL at 08:38

## 2018-07-02 RX ADMIN — FUROSEMIDE 20 MG: 10 INJECTION, SOLUTION INTRAMUSCULAR; INTRAVENOUS at 17:53

## 2018-07-02 RX ADMIN — MAGNESIUM HYDROXIDE 30 ML: 400 SUSPENSION ORAL at 12:41

## 2018-07-02 RX ADMIN — Medication 10 ML: at 08:33

## 2018-07-02 RX ADMIN — ACETAMINOPHEN 650 MG: 325 TABLET ORAL at 09:44

## 2018-07-02 RX ADMIN — DOCUSATE SODIUM 100 MG: 100 CAPSULE, LIQUID FILLED ORAL at 20:29

## 2018-07-02 RX ADMIN — ENOXAPARIN SODIUM 40 MG: 40 INJECTION, SOLUTION INTRAVENOUS; SUBCUTANEOUS at 08:46

## 2018-07-02 RX ADMIN — TRAZODONE HYDROCHLORIDE 50 MG: 50 TABLET ORAL at 21:55

## 2018-07-02 RX ADMIN — POTASSIUM CHLORIDE 20 MEQ: 20 TABLET, EXTENDED RELEASE ORAL at 17:53

## 2018-07-02 RX ADMIN — AMIODARONE HYDROCHLORIDE 200 MG: 200 TABLET ORAL at 08:44

## 2018-07-02 ASSESSMENT — PAIN DESCRIPTION - ORIENTATION
ORIENTATION: LOWER
ORIENTATION: LOWER

## 2018-07-02 ASSESSMENT — PAIN DESCRIPTION - DESCRIPTORS
DESCRIPTORS: SORE
DESCRIPTORS: SORE

## 2018-07-02 ASSESSMENT — PAIN DESCRIPTION - PROGRESSION: CLINICAL_PROGRESSION: NOT CHANGED

## 2018-07-02 ASSESSMENT — PAIN DESCRIPTION - LOCATION
LOCATION: STERNUM
LOCATION: STERNUM

## 2018-07-02 ASSESSMENT — PAIN SCALES - GENERAL
PAINLEVEL_OUTOF10: 3
PAINLEVEL_OUTOF10: 5
PAINLEVEL_OUTOF10: 3
PAINLEVEL_OUTOF10: 2
PAINLEVEL_OUTOF10: 0
PAINLEVEL_OUTOF10: 0
PAINLEVEL_OUTOF10: 2

## 2018-07-02 ASSESSMENT — PAIN DESCRIPTION - PAIN TYPE
TYPE: SURGICAL PAIN
TYPE: SURGICAL PAIN

## 2018-07-02 ASSESSMENT — PAIN DESCRIPTION - FREQUENCY
FREQUENCY: INTERMITTENT
FREQUENCY: INTERMITTENT

## 2018-07-02 NOTE — PROGRESS NOTES
[facility] RESPIRATORY ASSESSMENT PROGRAM (RAP)  30 kg and over      Patient Name: [facility] Room#: [id number]-[id number]-O : 1940     Admitting diagnosis: Aortic stenosis, severe [I35.0]      ASSESSMENT     Vitals  Pulse: 81   Resp: 20  BP: (!) 183/72  SpO2: 92 %  Temp: 98.2 °F (36.8 °C)  Breath Sounds:clear    Inspiratory Capacity:   Preoperative/predictive value: 1500 ml   33% of value: 495 ml      75% of value: 1125 ml   10 ml/kg of IBW: 570 ml   Patient's Actual Inspiratory Capacity: 500-750 ml    CARE PLAN  All Care Plan selections must be based on the approved algorithms located on line in the Policy and Procedures under Respiratory Assessment Adult Protocol Handbook    INDICATIONS FOR THERAPY BASED ON HISTORY AND ASSESSMENT  Bronchial Hygiene Goal: Improvement in sputum mobilization in patients with ineffective airway clearance. Reverse the atelectasis. [] Atelectasis caused by mucus plugging     [] Chronic mucucillary clearance disorder  [] Improve cough effectiveness. Copious secretions unable to clear with cough or suctioning.    [x] No indications  Volume Expansion Goal: Prevent atelectasis, Absence or improvement in signs of atelectasis, improve respiratory muscle performance  [x] Post Thoracic or upper abdominal surgery    [] Surgery in COPD patients  [] Atelectasis, reduced lung volume on X-ray    [] CPAP indications are seen  [] Restrictive pulmonary or neuromuscular disorder   [] No indications  Inhaled Medications Goal: Improve respiratory functions in patients with airway disease and decrease WOB  [] Wheezing, diminished, or absent breath sounds associated with a pulmonary disorder  [] Known COPD  [] Peak flow < 80% predicted or personal best for known asthma patients  [] Documented obstructive defect on pulmonary function testing which is reversible   [] With a mucolytic to prevent bronchospasm  [] Home regimen  [x] No indications  Oxygenation  Goal: Reverse hypoxemia and improve tissue oxygenation  [x] SpO2 < 92%        [] Home oxygen   [] Severe trauma        [] Acute MI / chest pain  [] Significant clinical signs of hypoxemia     [] Post anesthesia short term  [] Hgb disorder        [] No indications    THERAPIES SELECTED BASED ON ALGORITHMS  Bronchial Hygiene  []Vest  []PD&P []Suction or [place] cough   []Acapella []Metaneb [x]No therapy recommended  Volume expansion  [x]Incentive Spirometry  []EZPAP    []Metaneb  []CPAP   []No therapy recommended  Inhaled Medication  []HHN  []MDI  []Two hour continuous  []Metaneb   [x] No therapy recommended  Oxygenation  [x]Nasal cannula  []Mask    []CPAP  []High flow   [] No therapy recommended      BRONCHODILATOR ASSESSMENT SCORE  Score 0 1 2 3 4   Breath Sounds []  Normal or no wheezing with diminished bases []  End expiratory wheeze or slightly diminished []  Pronounced exp. wheeze []  Insp. & Exp. wheeze []  Absent or near absent   Dyspnea and level of distress   []  None, respiratory rate   12-20, regular pattern []  Only on exertion or increased respiratory rate 21-25 []  Mild dyspnea at rest, irregular breathing pattern respiratory rate 26-30 []  Moderate  use of accessory muscles, prolonged expiration respiratory rate 31-35 []  Severe    use of accessory muscles, respiratory rate   > 35   Peak flow []  > 80% []  70-80% []  60-69% []  50-59% []  <50%  or unable to perform   Total Score []  0-1 []  2-5 []  6-8 []  9-10 []  11-12   Frequency  Every 4 hours PRN for wheezing or increased respiratory distress Three times a day and Q4 PRN for wheezing or increased   respiratory distress Four times a day and Q 4 PRN  for wheezing or increased   respiratory distress Q 4 hours  Contact physician for a continuous treatment and  Iprotropium Bromide if indicated   Reassess Score No need  Every day Every day Every day  After treatment     ASSESSMENT OUTCOMES   Bronchial Hygiene   []Sputum production > 25 ml/day       [] Improved chest x-ray  []Patients subjective

## 2018-07-02 NOTE — PROGRESS NOTES
Yamil Aquino 61  INPATIENT OCCUPATIONAL THERAPY  [facility] CVICU 4B  DAILY NOTE    Time:  Time In: 1400  Time Out: 1430  Timed Code Treatment Minutes: 30 Minutes  Minutes: 30          Date: 2018  Patient Name: Edna Nelson,   Gender: female      Room: [facility]07/007-A  MRN: [id number]  : 1940  (69 y.o.)  Referring Practitioner: Dr. Arlene Hernandez  Diagnosis: aotic stenosis  Additional Pertinent Hx: s/p scheduled Left atrial maze procedure, with ablations of both the anterior and posterior mitral trigones & AV valve replacement on 18. Past Medical History:   Diagnosis Date    ISIDRA (acute kidney injury) ([facility] [address] 2016    Atrial fibrillation (HCC)     Bleeding stomach ulcer     History of blood transfusion     Hypertension     PONV (postoperative nausea and vomiting)      Past Surgical History:   Procedure Laterality Date    DILATION AND CURETTAGE OF UTERUS      JOINT REPLACEMENT Right     knee    [place] OFFICE/OUTPT VISIT,PROCEDURE ONLY N/A 2018    BIOPROSTHETIC AORTIC VALVE REPLACEMENT WITH MAZE PROCEDURE performed by Ricky Hogan MD at [place] [facility]       Restrictions/Precautions:  Surgical Protocols, General Precautions, Fall Risk                    Sternal Precautions: No Pushing, No Pulling, 5# Lifting Restrictions  Other position/activity restrictions: s/p AVR, MAZE on 18, external pacemaker, 2 chest tubes to suction, trevino, IV lines       Prior Level of Function:  ADL Assistance: Independent  Homemaking Assistance: Independent  Ambulation Assistance: Independent  Transfer Assistance: Independent  Additional Comments: Pt was fully indep PLOF without AD. Subjective  Chart Reviewed: Yes  Patient assessed for rehabilitation services?: Yes  Response to previous treatment: Patient with no complaints from previous session  Family / Caregiver Present: No    Subjective: Pleasant and cooperative  Comments: Pt was asleep.  She agreed to get up and to go for a walk after she was Activity Tolerance:  Activity Tolerance: Patient limited by fatigue  Activity Tolerance: Pt need to place her O2 back on following the walk. She was SOB with the walk but could resume exercises after a few minutes of rest and having been placed back on 2L of O2. Pt demonstrated 10 reps with the incentive spirometer with no cues for proper technique. Assessment:     Performance deficits / Impairments: Decreased functional mobility , Decreased ADL status, Decreased endurance, Decreased balance  Prognosis: Good  Discharge Recommendations: Continue to assess pending progress, Patient would benefit from continued therapy after discharge, ECF with OT    Patient Education:  Patient Education: Sternal precautions; walking program; importance of doing activities which are a \"happy medium\". Barriers to Learning: none    Equipment Recommendations:   Other: Continue to assess    Safety:  Safety Devices in place: Yes  Type of devices: Gait belt, Call light within reach, Left in bed, Patient at risk for falls, Nurse notified, Bed alarm in place    Plan:  Times per week: 6x  Current Treatment Recommendations: Balance Training, Self-Care / ADL, Safety Education & Training, Functional Mobility Training, Endurance Training  Plan Comment: Pt would benefit from continued OT while at the [facility]  Specific instructions for Next Treatment: Functional mobility; ADLs and adaptations; CABG exercises    Goals:  Patient goals : get back to PLOF    Short term goals  Time Frame for Short term goals: 2 weeks  Short term goal 1: Complete step 2 cardiac exercises x 12 reps with min RBs to increase endurance for BADL  Short term goal 2: Complete various t/fs including toilet with min A & min vcs for sternal precautions  Short term goal 3: Tolerate standing 2-3 min with CGA for increased ease of sinkside grooming  Short term goal 4: Complete mobility to/from bathroom with RW, CGA, & min vcs for walker safety  Short term goal 5: Complete LE dressing with mod A & LH AE prn  Long term goals  Time Frame for Long term goals : No LTG set d/t short ELOS

## 2018-07-02 NOTE — PROGRESS NOTES
[address] CVU 4B - 4B-07/007-A    Time In: 5385  Time Out: 0815  Timed Code Treatment Minutes: 36 Minutes  Minutes: 40          Date: 2018  Patient Name: Charles Ang,  Gender:  female        MRN: [id number]  : 1940  (69 y.o.)     Referring Practitioner: Dr Peggy Dale  Diagnosis: Aortic stenosis severe  Additional Pertinent Hx: s/p scheduled Left atrial maze procedure, with ablations of both the anterior and posterior mitral trigones & AV valve replacement on 18. Past Medical History:   Diagnosis Date    ISIDRA (acute kidney injury) ([address] 2016    Atrial fibrillation (HCC)     Bleeding stomach ulcer     History of blood transfusion     Hypertension     PONV (postoperative nausea and vomiting)      Past Surgical History:   Procedure Laterality Date    DILATION AND CURETTAGE OF UTERUS      JOINT REPLACEMENT Right 2011    knee    [place] OFFICE/OUTPT VISIT,PROCEDURE ONLY N/A 2018    BIOPROSTHETIC AORTIC VALVE REPLACEMENT WITH MAZE PROCEDURE performed by Jyoti Mata MD at [place] [facility]       Restrictions/Precautions:  Surgical Protocols, General Precautions, Fall Risk         Sternal Precautions: No Pushing, No Pulling, 5# Lifting Restrictions  Other position/activity restrictions: s/p AVR, MAZE on 18, external pacemaker, 2 chest tubes to suction, trevino, IV lines       Prior Level of Function:  ADL Assistance: Independent  Homemaking Assistance: Independent  Ambulation Assistance: Independent  Transfer Assistance: Independent  Additional Comments: Pt was fully indep PLOF without AD.      Subjective:     Subjective: pt up in chair and agreeable for therapy, MD came in during session, pt cont to have multi lines and took extra time to arrange lines     Pain:   .  Pain Assessment  Pain Level: 5 (surgical site with activity)       Social/Functional:  Lives With: Alone  Type of Home: House  Home Layout: One level  Home Access:

## 2018-07-02 NOTE — PROGRESS NOTES
CT/CV Surgery Progress Note    2018 8:17 AM  Surgeon:  Dr. Chang Coelho     Subjective:  Ms. Marcie Roper  Is resting comfortably in bedside chair on 2 L/min, alert, and in no acute distress. Pt denies chest pressure, SOB, fever, chills, N/V/D. Vital Signs: BP (!) 146/67   Pulse 80   Temp 98.6 °F (37 °C) (Oral)   Resp 18   Ht 5' 5\" (1.651 m)   Wt 244 lb 4.8 oz (110.8 kg)   SpO2 95%   BMI 40.65 kg/m²    Temp (24hrs), Av.5 °F (36.9 °C), Min:98.1 °F (36.7 °C), Max:98.9 °F (37.2 °C)      Weight: 244 lb 4.8 oz (110.8 kg)       PULSE OXIMETRY RANGE: SpO2  Av.2 %  Min: 93 %  Max: 95 %  SUPPLEMENTAL O2: O2 Flow Rate (L/min): 2 L/min     Labs:   CBC:     Recent Labs      18   0601  18   1500  18   0610   WBC  8.0  7.2  7.2   HGB  9.6*  8.8*  10.2*   HCT  30.5*  27.7*  30.8*   MCV  93.0  93.6  89.8   PLT  55*  69*  82*     BMP: Recent Labs      18   0423  18   0601  18   0610   NA  139  138  140   K  4.8  4.4  4.2   CL  102  102  104   CO2  21*  22*  23   BUN  34*  43*  40*   CREATININE  1.8*  1.7*  1.2   MG  2.6*  2.5*  2.2     Last HgA1C:    Lab Results   Component Value Date    LABA1C 5.3 2018     PT/INR:   Recent Labs      18   1500   INR  1.08     APTT: No results for input(s): APTT in the last 72 hours. Imaging:  CXR: I have reviewed the CXR image. Improved mild interstitial pulmonary edema.    Otherwise stable appearance of the chest.       Intake/Output Summary (Last 24 hours) at 18  Last data filed at 07/02/18 0817   Gross per 24 hour   Intake             1226 ml   Output             1920 ml   Net             -694 ml       Scheduled Meds:    furosemide  20 mg Intravenous BID    potassium chloride  20 mEq Oral BID WC    amiodarone  200 mg Oral Daily    carvedilol  3.125 mg Oral BID WC    insulin lispro  0-6 Units Subcutaneous TID     insulin lispro  0-3 Units Subcutaneous Nightly    PARoxetine  30 mg Oral QAM    traZODone  50 mg

## 2018-07-03 ENCOUNTER — APPOINTMENT (OUTPATIENT)
Dept: GENERAL RADIOLOGY | Age: 78
DRG: 220 | End: 2018-07-03
Attending: THORACIC SURGERY (CARDIOTHORACIC VASCULAR SURGERY)
Payer: MEDICARE

## 2018-07-03 LAB
ANION GAP SERPL CALCULATED.3IONS-SCNC: 13 MEQ/L (ref 8–16)
BUN BLDV-MCNC: 34 MG/DL (ref 7–22)
CALCIUM SERPL-MCNC: 8.7 MG/DL (ref 8.5–10.5)
CHLORIDE BLD-SCNC: 103 MEQ/L (ref 98–111)
CO2: 24 MEQ/L (ref 23–33)
CREAT SERPL-MCNC: 0.9 MG/DL (ref 0.4–1.2)
EKG ATRIAL RATE: 78 BPM
EKG P AXIS: 92 DEGREES
EKG P-R INTERVAL: 366 MS
EKG Q-T INTERVAL: 472 MS
EKG QRS DURATION: 106 MS
EKG QTC CALCULATION (BAZETT): 538 MS
EKG R AXIS: 7 DEGREES
EKG T AXIS: -67 DEGREES
EKG VENTRICULAR RATE: 78 BPM
ERYTHROCYTE [DISTWIDTH] IN BLOOD BY AUTOMATED COUNT: 15.3 % (ref 11.5–14.5)
ERYTHROCYTE [DISTWIDTH] IN BLOOD BY AUTOMATED COUNT: 51.2 FL (ref 35–45)
GFR SERPL CREATININE-BSD FRML MDRD: 61 ML/MIN/1.73M2
GLUCOSE BLD-MCNC: 106 MG/DL (ref 70–108)
GLUCOSE BLD-MCNC: 120 MG/DL (ref 70–108)
GLUCOSE BLD-MCNC: 124 MG/DL (ref 70–108)
GLUCOSE BLD-MCNC: 141 MG/DL (ref 70–108)
HCT VFR BLD CALC: 29.9 % (ref 37–47)
HEMOGLOBIN: 9.8 GM/DL (ref 12–16)
MAGNESIUM: 1.9 MG/DL (ref 1.6–2.4)
MCH RBC QN AUTO: 30.2 PG (ref 26–33)
MCHC RBC AUTO-ENTMCNC: 32.8 GM/DL (ref 32.2–35.5)
MCV RBC AUTO: 92 FL (ref 81–99)
PLATELET # BLD: 114 THOU/MM3 (ref 130–400)
PMV BLD AUTO: 11.3 FL (ref 9.4–12.4)
POTASSIUM SERPL-SCNC: 5.3 MEQ/L (ref 3.5–5.2)
RBC # BLD: 3.25 MILL/MM3 (ref 4.2–5.4)
SODIUM BLD-SCNC: 140 MEQ/L (ref 135–145)
WBC # BLD: 6.8 THOU/MM3 (ref 4.8–10.8)

## 2018-07-03 PROCEDURE — 2580000003 HC RX 258: Performed by: THORACIC SURGERY (CARDIOTHORACIC VASCULAR SURGERY)

## 2018-07-03 PROCEDURE — 93010 ELECTROCARDIOGRAM REPORT: CPT | Performed by: INTERNAL MEDICINE

## 2018-07-03 PROCEDURE — 6360000002 HC RX W HCPCS: Performed by: THORACIC SURGERY (CARDIOTHORACIC VASCULAR SURGERY)

## 2018-07-03 PROCEDURE — 83735 ASSAY OF MAGNESIUM: CPT

## 2018-07-03 PROCEDURE — 97110 THERAPEUTIC EXERCISES: CPT

## 2018-07-03 PROCEDURE — 93005 ELECTROCARDIOGRAM TRACING: CPT | Performed by: THORACIC SURGERY (CARDIOTHORACIC VASCULAR SURGERY)

## 2018-07-03 PROCEDURE — 6370000000 HC RX 637 (ALT 250 FOR IP): Performed by: THORACIC SURGERY (CARDIOTHORACIC VASCULAR SURGERY)

## 2018-07-03 PROCEDURE — 82948 REAGENT STRIP/BLOOD GLUCOSE: CPT

## 2018-07-03 PROCEDURE — APPSS30 APP SPLIT SHARED TIME 16-30 MINUTES: Performed by: PHYSICIAN ASSISTANT

## 2018-07-03 PROCEDURE — 97116 GAIT TRAINING THERAPY: CPT

## 2018-07-03 PROCEDURE — 80048 BASIC METABOLIC PNL TOTAL CA: CPT

## 2018-07-03 PROCEDURE — 36415 COLL VENOUS BLD VENIPUNCTURE: CPT

## 2018-07-03 PROCEDURE — 2060000000 HC ICU INTERMEDIATE R&B

## 2018-07-03 PROCEDURE — 71045 X-RAY EXAM CHEST 1 VIEW: CPT

## 2018-07-03 PROCEDURE — 85027 COMPLETE CBC AUTOMATED: CPT

## 2018-07-03 PROCEDURE — 97535 SELF CARE MNGMENT TRAINING: CPT

## 2018-07-03 RX ORDER — CARVEDILOL 6.25 MG/1
6.25 TABLET ORAL 2 TIMES DAILY WITH MEALS
Status: DISCONTINUED | OUTPATIENT
Start: 2018-07-03 | End: 2018-07-04

## 2018-07-03 RX ORDER — POTASSIUM CHLORIDE 20 MEQ/1
20 TABLET, EXTENDED RELEASE ORAL
Status: DISCONTINUED | OUTPATIENT
Start: 2018-07-04 | End: 2018-07-06

## 2018-07-03 RX ORDER — FUROSEMIDE 10 MG/ML
40 INJECTION INTRAMUSCULAR; INTRAVENOUS 2 TIMES DAILY
Status: DISCONTINUED | OUTPATIENT
Start: 2018-07-03 | End: 2018-07-05

## 2018-07-03 RX ADMIN — Medication 10 ML: at 07:47

## 2018-07-03 RX ADMIN — ENOXAPARIN SODIUM 40 MG: 40 INJECTION, SOLUTION INTRAVENOUS; SUBCUTANEOUS at 07:54

## 2018-07-03 RX ADMIN — AMIODARONE HYDROCHLORIDE 200 MG: 200 TABLET ORAL at 07:48

## 2018-07-03 RX ADMIN — ASPIRIN 325 MG: 325 TABLET, DELAYED RELEASE ORAL at 07:53

## 2018-07-03 RX ADMIN — ATORVASTATIN CALCIUM 20 MG: 20 TABLET, FILM COATED ORAL at 20:09

## 2018-07-03 RX ADMIN — CARVEDILOL 6.25 MG: 6.25 TABLET, FILM COATED ORAL at 17:10

## 2018-07-03 RX ADMIN — MULTIPLE VITAMINS W/ MINERALS TAB 1 TABLET: TAB at 07:48

## 2018-07-03 RX ADMIN — FUROSEMIDE 20 MG: 10 INJECTION, SOLUTION INTRAMUSCULAR; INTRAVENOUS at 07:53

## 2018-07-03 RX ADMIN — CETIRIZINE HYDROCHLORIDE 5 MG: 10 TABLET ORAL at 07:48

## 2018-07-03 RX ADMIN — ACETAMINOPHEN 650 MG: 325 TABLET ORAL at 07:53

## 2018-07-03 RX ADMIN — FLUTICASONE PROPIONATE 1 SPRAY: 50 SPRAY, METERED NASAL at 07:48

## 2018-07-03 RX ADMIN — CARVEDILOL 3.12 MG: 3.12 TABLET, FILM COATED ORAL at 07:48

## 2018-07-03 RX ADMIN — FUROSEMIDE 40 MG: 10 INJECTION, SOLUTION INTRAMUSCULAR; INTRAVENOUS at 17:10

## 2018-07-03 RX ADMIN — PAROXETINE HYDROCHLORIDE 30 MG: 30 TABLET, FILM COATED ORAL at 07:48

## 2018-07-03 RX ADMIN — TRAZODONE HYDROCHLORIDE 50 MG: 50 TABLET ORAL at 23:27

## 2018-07-03 RX ADMIN — Medication 10 ML: at 20:09

## 2018-07-03 RX ADMIN — FAMOTIDINE 20 MG: 20 TABLET ORAL at 20:09

## 2018-07-03 RX ADMIN — FAMOTIDINE 20 MG: 20 TABLET ORAL at 07:53

## 2018-07-03 ASSESSMENT — PAIN DESCRIPTION - DESCRIPTORS: DESCRIPTORS: SORE

## 2018-07-03 ASSESSMENT — PAIN SCALES - GENERAL
PAINLEVEL_OUTOF10: 1
PAINLEVEL_OUTOF10: 0
PAINLEVEL_OUTOF10: 3

## 2018-07-03 ASSESSMENT — PAIN DESCRIPTION - FREQUENCY: FREQUENCY: INTERMITTENT

## 2018-07-03 ASSESSMENT — PAIN DESCRIPTION - LOCATION: LOCATION: STERNUM

## 2018-07-03 ASSESSMENT — PAIN DESCRIPTION - PAIN TYPE: TYPE: SURGICAL PAIN

## 2018-07-03 ASSESSMENT — PAIN DESCRIPTION - ORIENTATION: ORIENTATION: MID

## 2018-07-03 NOTE — PLAN OF CARE
Problem: Nutrition  Goal: Optimal nutrition therapy  Outcome: Ongoing  Nutrition Problem: Increased nutrient needs  Intervention: Food and/or Nutrient Delivery: Continue current diet, Start ONS, Vitamin Supplement (Started Ensure Enlive TID.  Continue Multivitamin w/minerals daily.)  Nutritional Goals: Pt. to consume greater than 75% of meals during LOS

## 2018-07-03 NOTE — PROGRESS NOTES
for hygiene in standing)     Transfers  Sit to stand: Minimal assistance  Stand to sit: Contact guard assistance  Transfer Comments: cues for proper hand placement approx 50% of time  Toilet Transfers  Toilet - Technique: Ambulating  Equipment Used: Standard toilet  Toilet Transfer: Maximum assistance  Toilet Transfers Comments: cues for proper hand placement     Balance  Sitting Balance: Supervision  Standing Balance: Contact guard assistance     Time: 30 seconds  Activity: preparing to walk and toileting hygiene     Functional Mobility  Functional - Mobility Device: Rolling Walker  Activity: To/from bathroom  Assist Level: Contact guard assistance  Functional Mobility Comments: Pt ambulated to/from BR with o2 this date, no LOB and steady pace  Apparatus Needs: O2     Comment: Completed step 2 cardiac exercises x 12 reps while seated in recliner. Pt tolerated fair with RBs between exercises, completed to increase UB endurance needed for ADLs and functional t/f's     Activity Tolerance:  Activity Tolerance: Patient Tolerated treatment well;Patient limited by fatigue    Assessment:     Performance deficits / Impairments: Decreased functional mobility , Decreased ADL status, Decreased endurance, Decreased balance  Prognosis: Good  Discharge Recommendations: Continue to assess pending progress, Patient would benefit from continued therapy after discharge, ECF with OT    Patient Education:  Patient Education: t/f safety, sternal precautions, ADLs  Barriers to Learning: none    Equipment Recommendations: Other: Continue to assess    Safety:  Safety Devices in place: Yes  Type of devices:  All fall risk precautions in place, Call light within reach, Chair alarm in place, Left in chair, Gait belt    Plan:  Times per week: 6x  Current Treatment Recommendations: Balance Training, Self-Care / ADL, Safety Education & Training, Functional Mobility Training, Endurance Training  Plan Comment: Pt would benefit from continued OT while at the [facility]  Specific instructions for Next Treatment: Functional mobility; ADLs and adaptations; CABG exercises    Goals:  Patient goals : get back to PLOF    Short term goals  Time Frame for Short term goals: 2 weeks  Short term goal 1: Complete step 2 cardiac exercises x 12 reps with min RBs to increase endurance for BADL  Short term goal 2: Complete various t/fs including toilet with min A & min vcs for sternal precautions  Short term goal 3: Tolerate standing 2-3 min with CGA for increased ease of sinkside grooming  Short term goal 4: Complete mobility to/from bathroom with RW, CGA, & min vcs for walker safety  Short term goal 5: Complete LE dressing with mod A & LH AE prn  Long term goals  Time Frame for Long term goals : No LTG set d/t short ELOS

## 2018-07-03 NOTE — PROGRESS NOTES
CT/CV Surgery Progress Note    7/3/2018 7:19 AM  Surgeon:  Dr. Davy Chris:  Ms. Rosalina Crow  Is resting comfortably in bedside chair on 1 L/min, alert, and in no acute distress. Pt denies chest pressure, SOB, fever, chills, N/V/D. Vital Signs: BP (!) 166/67   Pulse 80   Temp 97.7 °F (36.5 °C) (Oral)   Resp 16   Ht 5' 5\" (1.651 m)   Wt 227 lb 6.4 oz (103.1 kg)   SpO2 94%   BMI 37.84 kg/m²    Temp (24hrs), Av.9 °F (36.6 °C), Min:97.6 °F (36.4 °C), Max:98.3 °F (36.8 °C)      Weight: 227 lb 6.4 oz (103.1 kg)       PULSE OXIMETRY RANGE: SpO2  Av.8 %  Min: 92 %  Max: 98 %  SUPPLEMENTAL O2: O2 Flow Rate (L/min): 1 L/min     Labs:   CBC:     Recent Labs      18   0601  18   1500  18   0610   WBC  8.0  7.2  7.2   HGB  9.6*  8.8*  10.2*   HCT  30.5*  27.7*  30.8*   MCV  93.0  93.6  89.8   PLT  55*  69*  82*     BMP:   Recent Labs      18   0601  18   0610  18   0611   NA  138  140  140   K  4.4  4.2  5.3*   CL  102  104  103   CO2  22*  23  24   BUN  43*  40*  34*   CREATININE  1.7*  1.2  0.9   MG  2.5*  2.2  1.9     Imaging:  CXR: I have reviewed the CXR image. \"Improving aeration of the lung bases with residual mild congestive failure, small effusions and lower lobe atelectasis. \"    Intake/Output Summary (Last 24 hours) at 18 0719  Last data filed at 18 0432   Gross per 24 hour   Intake              970 ml   Output             4250 ml   Net            -3280 ml       Scheduled Meds:    furosemide  20 mg Intravenous BID    potassium chloride  20 mEq Oral BID WC    amiodarone  200 mg Oral Daily    carvedilol  3.125 mg Oral BID WC    insulin lispro  0-6 Units Subcutaneous TID WC    insulin lispro  0-3 Units Subcutaneous Nightly    PARoxetine  30 mg Oral QAM    traZODone  50 mg Oral Nightly    fluticasone  1 spray Nasal Daily    cetirizine  5 mg Oral Daily    sodium chloride flush  10 mL Intravenous 2 times per day    calcium replacement

## 2018-07-03 NOTE — PROGRESS NOTES
[facility] RESPIRATORY ASSESSMENT PROGRAM (RAP)  30 kg and over      Patient Name: Kusum Browne Room#: 1S-18/[id number] : 1940     Admitting diagnosis: Aortic stenosis, severe [I35.0]      ASSESSMENT     Vitals  Pulse: 65   Resp: 18  BP: 132/63  SpO2: 94 %  Temp: 97.6 °F (36.4 °C)  Breath Sounds:Clear lungs t/o   Comment: sitting in chair during assessment with no complaints     Inspiratory Capacity:   Preoperative/predictive value: 1500 ml   33% of value: 495 ml      75% of value: 1125 ml   10 ml/kg of IBW: 570 ml   Patient's Actual Inspiratory Capacity: 500-600 ml    CARE PLAN  All Care Plan selections must be based on the approved algorithms located on line in the Policy and Procedures under Respiratory Assessment Adult Protocol Handbook    INDICATIONS FOR THERAPY BASED ON HISTORY AND ASSESSMENT  Bronchial Hygiene Goal: Improvement in sputum mobilization in patients with ineffective airway clearance. Reverse the atelectasis. [] Atelectasis caused by mucus plugging     [] Chronic mucucillary clearance disorder  [] Improve cough effectiveness. Copious secretions unable to clear with cough or suctioning.    [x] No indications  Volume Expansion Goal: Prevent atelectasis, Absence or improvement in signs of atelectasis, improve respiratory muscle performance  [x] Post Thoracic or upper abdominal surgery    [] Surgery in COPD patients  [] Atelectasis, reduced lung volume on X-ray    [] CPAP indications are seen  [] Restrictive pulmonary or neuromuscular disorder   [] No indications  Inhaled Medications Goal: Improve respiratory functions in patients with airway disease and decrease WOB  [] Wheezing, diminished, or absent breath sounds associated with a pulmonary disorder  [] Known COPD  [] Peak flow < 80% predicted or personal best for known asthma patients  [] Documented obstructive defect on pulmonary function testing which is reversible   [] With a mucolytic to prevent bronchospasm  [] Home regimen  [x] No

## 2018-07-03 NOTE — PROGRESS NOTES
[address] CVU 4B - 4B-07/007-A    Time In: 1330  Time Out: 1400  Timed Code Treatment Minutes: 30 Minutes  Minutes: 30          Date: 7/3/2018  Patient Name: Genevive Severance,  Gender:  female        MRN: [id number]  : 1940  (69 y.o.)     Referring Practitioner: Dr Herminia Sharp  Diagnosis: Aortic stenosis severe  Additional Pertinent Hx: s/p scheduled Left atrial maze procedure, with ablations of both the anterior and posterior mitral trigones & AV valve replacement on 18. Past Medical History:   Diagnosis Date    ISIDRA (acute kidney injury) ([address] 2016    Atrial fibrillation (HCC)     Bleeding stomach ulcer     History of blood transfusion     Hypertension     PONV (postoperative nausea and vomiting)      Past Surgical History:   Procedure Laterality Date    DILATION AND CURETTAGE OF UTERUS      JOINT REPLACEMENT Right 2011    knee    OR OFFICE/OUTPT VISIT,PROCEDURE ONLY N/A 2018    BIOPROSTHETIC AORTIC VALVE REPLACEMENT WITH MAZE PROCEDURE performed by Gunjan Valenzuela MD at [place] [facility]       Restrictions/Precautions:  Surgical Protocols, General Precautions, Fall Risk      Sternal Precautions: No Pushing, No Pulling, 5# Lifting Restrictions  Other position/activity restrictions: s/p AVR, MAZE on 18, external pacemaker, 2 chest tubes to suction, trevino, IV lines       Prior Level of Function:  ADL Assistance: Independent  Homemaking Assistance: Independent  Ambulation Assistance: Independent  Transfer Assistance: Independent  Additional Comments: Pt was fully indep PLOF without AD.      Subjective:     Subjective: pt up in chair on arrival and agreeable for therapy she was ready to return to bed following session pt no longer had pacer wires or IV still has 2L O2 and 2 chest tubes    Pain:   .  Pain Assessment  Pain Level: 1 (surgical pain)       Social/Functional:  Lives With: Alone  Type of Home: House  Home Layout: One Current Treatment Recommendations: Strengthening, Balance Training, Functional Mobility Training, Transfer Training, Gait Training, Endurance Training    Goals:  [facility]  Time Frame for Short term goals: 1 week  Short term goal 1: supine to sit and return with CGA to get in and out of bed   Short term goal 2: sit to stand with CGA to get on and off various surfaces  Short term goal 3: ambulate 80 feet with RW and CGA to walk household distances   Short term goal 4: car transfer with CGA to leave hospital at discharge    Long term goals  Time Frame for Long term goals : NA due to short ELOS            AM-PAC Inpatient Mobility without Stair Climbing Raw Score : 15  AM-PAC Inpatient without Stair Climbing T-Scale Score : 43.03  Mobility Inpatient CMS 0-100% Score: 47.43  Mobility Inpatient without Stair CMS G-Code Modifier : KARLA

## 2018-07-03 NOTE — PLAN OF CARE
Problem: Nutrition  Goal: Optimal nutrition therapy  Outcome: Ongoing  Patient eating three meals a day. Appetite intact. Patient tolerating PO fluids. Problem: Pain:  Goal: Pain level will decrease  Pain level will decrease   Outcome: Ongoing  Patient has no complaints of pain at this time. Patients current pain level is a 0/10. Patients pain goal is a 0/10. Patient is receiving tylenol for pain at this time. Problem: Discharge Planning:  Goal: Discharged to appropriate level of care  Discharged to appropriate level of care   Outcome: Ongoing  Patient plans to go to [facility] at discharge and no needs voiced at this time. Patient working with social work for discharge planning. Problem: Cardiac Output - Decreased:  Goal: Cardiac output within specified parameters  Cardiac output within specified parameters   Outcome: Ongoing  Blood pressure within normal limits this shift. Patient connected to PM and being Vpaced at [de-identified]. Patient displays no bleeding abnormalities. Monitoring labs frequently for any abnormalities. Capillary refill less than 3 seconds. Skin turgor less than 3 seconds. Pulses palpable. Problem: Risk for Impaired Skin Integrity  Goal: Tissue integrity - skin and mucous membranes  Structural intactness and normal physiological function of skin and  mucous membranes. Outcome: Ongoing  Patient on a turn q2 hours and PRN turning schedule. Patient taught to change position frequently to prevent breakdown. No redness noted on pressure points such as elbows, back of head, heels, shoulder blades, or coccyx at this time. Will continue to monitor. Problem: Falls - Risk of:  Goal: Will remain free from falls  Will remain free from falls   Outcome: Ongoing  Patient alert and oriented x4. Bed alarmed armed. Bed wheels locked. Bedside table in reach. Patient up with assistance when ambulating. Patient verbalizes and demonstrates the use of the call light.  Hourly rounding being completed. Comments: Care plan reviewed with patient. Patient verbalizes understanding of the plan of care and contribute to goal setting.

## 2018-07-04 ENCOUNTER — APPOINTMENT (OUTPATIENT)
Dept: GENERAL RADIOLOGY | Age: 78
DRG: 220 | End: 2018-07-04
Attending: THORACIC SURGERY (CARDIOTHORACIC VASCULAR SURGERY)
Payer: MEDICARE

## 2018-07-04 LAB
ANION GAP SERPL CALCULATED.3IONS-SCNC: 15 MEQ/L (ref 8–16)
BUN BLDV-MCNC: 34 MG/DL (ref 7–22)
CALCIUM SERPL-MCNC: 8.6 MG/DL (ref 8.5–10.5)
CHLORIDE BLD-SCNC: 100 MEQ/L (ref 98–111)
CO2: 27 MEQ/L (ref 23–33)
CREAT SERPL-MCNC: 1.1 MG/DL (ref 0.4–1.2)
ERYTHROCYTE [DISTWIDTH] IN BLOOD BY AUTOMATED COUNT: 15.2 % (ref 11.5–14.5)
ERYTHROCYTE [DISTWIDTH] IN BLOOD BY AUTOMATED COUNT: 52.4 FL (ref 35–45)
GFR SERPL CREATININE-BSD FRML MDRD: 48 ML/MIN/1.73M2
GLUCOSE BLD-MCNC: 108 MG/DL (ref 70–108)
HCT VFR BLD CALC: 31.6 % (ref 37–47)
HEMOGLOBIN: 9.8 GM/DL (ref 12–16)
MAGNESIUM: 1.9 MG/DL (ref 1.6–2.4)
MCH RBC QN AUTO: 29.4 PG (ref 26–33)
MCHC RBC AUTO-ENTMCNC: 31 GM/DL (ref 32.2–35.5)
MCV RBC AUTO: 94.9 FL (ref 81–99)
PLATELET # BLD: 115 THOU/MM3 (ref 130–400)
PMV BLD AUTO: 10.7 FL (ref 9.4–12.4)
POTASSIUM SERPL-SCNC: 4.1 MEQ/L (ref 3.5–5.2)
RBC # BLD: 3.33 MILL/MM3 (ref 4.2–5.4)
SODIUM BLD-SCNC: 142 MEQ/L (ref 135–145)
WBC # BLD: 7.5 THOU/MM3 (ref 4.8–10.8)

## 2018-07-04 PROCEDURE — 6360000002 HC RX W HCPCS: Performed by: THORACIC SURGERY (CARDIOTHORACIC VASCULAR SURGERY)

## 2018-07-04 PROCEDURE — 80048 BASIC METABOLIC PNL TOTAL CA: CPT

## 2018-07-04 PROCEDURE — 2060000000 HC ICU INTERMEDIATE R&B

## 2018-07-04 PROCEDURE — 85027 COMPLETE CBC AUTOMATED: CPT

## 2018-07-04 PROCEDURE — 6370000000 HC RX 637 (ALT 250 FOR IP): Performed by: THORACIC SURGERY (CARDIOTHORACIC VASCULAR SURGERY)

## 2018-07-04 PROCEDURE — 71045 X-RAY EXAM CHEST 1 VIEW: CPT

## 2018-07-04 PROCEDURE — 2580000003 HC RX 258: Performed by: THORACIC SURGERY (CARDIOTHORACIC VASCULAR SURGERY)

## 2018-07-04 PROCEDURE — 36415 COLL VENOUS BLD VENIPUNCTURE: CPT

## 2018-07-04 PROCEDURE — 99024 POSTOP FOLLOW-UP VISIT: CPT | Performed by: THORACIC SURGERY (CARDIOTHORACIC VASCULAR SURGERY)

## 2018-07-04 PROCEDURE — 83735 ASSAY OF MAGNESIUM: CPT

## 2018-07-04 RX ORDER — CARVEDILOL 6.25 MG/1
12.5 TABLET ORAL 2 TIMES DAILY WITH MEALS
Status: DISCONTINUED | OUTPATIENT
Start: 2018-07-04 | End: 2018-07-09 | Stop reason: HOSPADM

## 2018-07-04 RX ADMIN — POTASSIUM CHLORIDE 20 MEQ: 20 TABLET, EXTENDED RELEASE ORAL at 18:25

## 2018-07-04 RX ADMIN — ATORVASTATIN CALCIUM 20 MG: 20 TABLET, FILM COATED ORAL at 19:35

## 2018-07-04 RX ADMIN — Medication 10 ML: at 09:00

## 2018-07-04 RX ADMIN — CARVEDILOL 12.5 MG: 6.25 TABLET, FILM COATED ORAL at 18:24

## 2018-07-04 RX ADMIN — FAMOTIDINE 20 MG: 20 TABLET ORAL at 09:52

## 2018-07-04 RX ADMIN — FAMOTIDINE 20 MG: 20 TABLET ORAL at 19:35

## 2018-07-04 RX ADMIN — PAROXETINE HYDROCHLORIDE 30 MG: 30 TABLET, FILM COATED ORAL at 09:58

## 2018-07-04 RX ADMIN — FUROSEMIDE 40 MG: 10 INJECTION, SOLUTION INTRAMUSCULAR; INTRAVENOUS at 09:53

## 2018-07-04 RX ADMIN — FUROSEMIDE 40 MG: 10 INJECTION, SOLUTION INTRAMUSCULAR; INTRAVENOUS at 18:26

## 2018-07-04 RX ADMIN — Medication 10 ML: at 19:35

## 2018-07-04 RX ADMIN — AMIODARONE HYDROCHLORIDE 200 MG: 200 TABLET ORAL at 09:58

## 2018-07-04 RX ADMIN — DOCUSATE SODIUM 100 MG: 100 CAPSULE, LIQUID FILLED ORAL at 09:53

## 2018-07-04 RX ADMIN — TRAZODONE HYDROCHLORIDE 50 MG: 50 TABLET ORAL at 21:51

## 2018-07-04 RX ADMIN — ENOXAPARIN SODIUM 40 MG: 40 INJECTION, SOLUTION INTRAVENOUS; SUBCUTANEOUS at 09:53

## 2018-07-04 RX ADMIN — MULTIPLE VITAMINS W/ MINERALS TAB 1 TABLET: TAB at 09:58

## 2018-07-04 RX ADMIN — CARVEDILOL 6.25 MG: 6.25 TABLET, FILM COATED ORAL at 09:58

## 2018-07-04 RX ADMIN — ASPIRIN 325 MG: 325 TABLET, DELAYED RELEASE ORAL at 09:52

## 2018-07-04 RX ADMIN — FLUTICASONE PROPIONATE 1 SPRAY: 50 SPRAY, METERED NASAL at 09:59

## 2018-07-04 RX ADMIN — ONDANSETRON 4 MG: 2 INJECTION INTRAMUSCULAR; INTRAVENOUS at 12:53

## 2018-07-04 RX ADMIN — CETIRIZINE HYDROCHLORIDE 5 MG: 10 TABLET ORAL at 09:58

## 2018-07-04 ASSESSMENT — PAIN SCALES - GENERAL
PAINLEVEL_OUTOF10: 0

## 2018-07-04 NOTE — PROGRESS NOTES
Bronchial Hygiene   []Sputum production > 25 ml/day       [] Improved chest x-ray  []Patients subjective response (easier clearance of secretions)      [] Improved breath sounds  []Improvement in PaO2 or oxygen saturation       [] Return of patient to home regime   []Improvement in ventilator variables    [x]Other: no reccommendations. Volume Expansion  []Decrease respiratory rate   []Resolution of fever    []Normal pulse rate    []Improved breath sounds   []Normal chest x-ray     [] Improved arterial oxygen tension (PaO2) and p(A-a)O2  []Return of IC to 75% of preop/predicted goal or an increase to 15 ml/kg of ideal body weight   [x]Other: continue with I.S., pt's volume continues to improve . Inhaled Medication  []Improved assessment score   []Return of patient to home regime  [x]Other: no indications. Oxygenation  []SpO2 greater than or equal to 92%   []Reversed signs of hypoxemia and improvement in WOB  []Correction of Hgb disorder    []Return of patient to home regime  [x]Other: continue with PRN oxygen as needed . Action Plan Based on Assessment:   [x]Continue plan as ordered   []Change therapy based on algorithm   []Consult with physician  []Discontinue therapy and RAP (indications no longer present)  []Not enough information available, reassess in 24 hours  []Other:     Comments: Pt reaching 750ml on I.S. With nares pinched. Doing well, sitting up in chair, will continue with current plan and reassess tomorrow.

## 2018-07-04 NOTE — PLAN OF CARE
Problem: Nutrition  Goal: Optimal nutrition therapy  Outcome: Ongoing      Comments: Patient eating three meals a day. Appetite intact. Patient tolerating PO fluids.         Problem: Pain:  Goal: Pain level will decrease  Pain level will decrease   Outcome: Ongoing  Patient has no complaints of pain at this time. Patients current pain level is a 0/10. Patients pain goal is a 0/10. Patient is receiving tylenol for pain at this time.         Problem: Discharge Planning:  Goal: Discharged to appropriate level of care  Discharged to appropriate level of care   Outcome: Ongoing  Patient plans to go to [facility] at discharge and no needs voiced at this time. Patient working with social work for discharge planning.         Problem: Cardiac Output - Decreased:  Goal: Cardiac output within specified parameters  Cardiac output within specified parameters   Outcome: Ongoing  Blood pressure within normal limits this shift. Patient connected to PM and being Vpaced at [de-identified]. Patient displays no bleeding abnormalities. Monitoring labs frequently for any abnormalities. Capillary refill less than 3 seconds. Skin turgor less than 3 seconds. Pulses palpable.         Problem: Risk for Impaired Skin Integrity  Goal: Tissue integrity - skin and mucous membranes  Structural intactness and normal physiological function of skin and  mucous membranes. Outcome: Ongoing  Patient on a turn q2 hours and PRN turning schedule. Patient taught to change position frequently to prevent breakdown. No redness noted on pressure points such as elbows, back of head, heels, shoulder blades, or coccyx at this time. Will continue to monitor.         Problem: Falls - Risk of:  Goal: Will remain free from falls  Will remain free from falls   Outcome: Ongoing  Patient alert and oriented x4. Bed alarmed armed. Bed wheels locked. Bedside table in reach. Patient up with assistance when ambulating.  Patient verbalizes and demonstrates the use of the call

## 2018-07-05 ENCOUNTER — APPOINTMENT (OUTPATIENT)
Dept: GENERAL RADIOLOGY | Age: 78
DRG: 220 | End: 2018-07-05
Attending: THORACIC SURGERY (CARDIOTHORACIC VASCULAR SURGERY)
Payer: MEDICARE

## 2018-07-05 LAB
ANION GAP SERPL CALCULATED.3IONS-SCNC: 12 MEQ/L (ref 8–16)
BUN BLDV-MCNC: 38 MG/DL (ref 7–22)
CALCIUM SERPL-MCNC: 8.3 MG/DL (ref 8.5–10.5)
CHLORIDE BLD-SCNC: 97 MEQ/L (ref 98–111)
CO2: 30 MEQ/L (ref 23–33)
CREAT SERPL-MCNC: 1.3 MG/DL (ref 0.4–1.2)
ERYTHROCYTE [DISTWIDTH] IN BLOOD BY AUTOMATED COUNT: 15.1 % (ref 11.5–14.5)
ERYTHROCYTE [DISTWIDTH] IN BLOOD BY AUTOMATED COUNT: 49.1 FL (ref 35–45)
GFR SERPL CREATININE-BSD FRML MDRD: 40 ML/MIN/1.73M2
GLUCOSE BLD-MCNC: 109 MG/DL (ref 70–108)
HCT VFR BLD CALC: 28.3 % (ref 37–47)
HEMOGLOBIN: 9.3 GM/DL (ref 12–16)
MAGNESIUM: 1.8 MG/DL (ref 1.6–2.4)
MCH RBC QN AUTO: 29.3 PG (ref 26–33)
MCHC RBC AUTO-ENTMCNC: 32.9 GM/DL (ref 32.2–35.5)
MCV RBC AUTO: 89.3 FL (ref 81–99)
PLATELET # BLD: 115 THOU/MM3 (ref 130–400)
PMV BLD AUTO: 10.3 FL (ref 9.4–12.4)
POTASSIUM SERPL-SCNC: 3.8 MEQ/L (ref 3.5–5.2)
RBC # BLD: 3.17 MILL/MM3 (ref 4.2–5.4)
SODIUM BLD-SCNC: 139 MEQ/L (ref 135–145)
WBC # BLD: 7.3 THOU/MM3 (ref 4.8–10.8)

## 2018-07-05 PROCEDURE — 6360000002 HC RX W HCPCS: Performed by: PHYSICIAN ASSISTANT

## 2018-07-05 PROCEDURE — 6370000000 HC RX 637 (ALT 250 FOR IP): Performed by: THORACIC SURGERY (CARDIOTHORACIC VASCULAR SURGERY)

## 2018-07-05 PROCEDURE — 85027 COMPLETE CBC AUTOMATED: CPT

## 2018-07-05 PROCEDURE — 36415 COLL VENOUS BLD VENIPUNCTURE: CPT

## 2018-07-05 PROCEDURE — 80048 BASIC METABOLIC PNL TOTAL CA: CPT

## 2018-07-05 PROCEDURE — 97116 GAIT TRAINING THERAPY: CPT

## 2018-07-05 PROCEDURE — 6360000002 HC RX W HCPCS: Performed by: THORACIC SURGERY (CARDIOTHORACIC VASCULAR SURGERY)

## 2018-07-05 PROCEDURE — 71045 X-RAY EXAM CHEST 1 VIEW: CPT

## 2018-07-05 PROCEDURE — 2700000000 HC OXYGEN THERAPY PER DAY

## 2018-07-05 PROCEDURE — 97110 THERAPEUTIC EXERCISES: CPT

## 2018-07-05 PROCEDURE — APPSS30 APP SPLIT SHARED TIME 16-30 MINUTES: Performed by: PHYSICIAN ASSISTANT

## 2018-07-05 PROCEDURE — 83735 ASSAY OF MAGNESIUM: CPT

## 2018-07-05 PROCEDURE — 97530 THERAPEUTIC ACTIVITIES: CPT

## 2018-07-05 PROCEDURE — 2060000000 HC ICU INTERMEDIATE R&B

## 2018-07-05 PROCEDURE — 2580000003 HC RX 258: Performed by: THORACIC SURGERY (CARDIOTHORACIC VASCULAR SURGERY)

## 2018-07-05 RX ORDER — FUROSEMIDE 10 MG/ML
20 INJECTION INTRAMUSCULAR; INTRAVENOUS EVERY 12 HOURS
Status: DISCONTINUED | OUTPATIENT
Start: 2018-07-05 | End: 2018-07-09 | Stop reason: HOSPADM

## 2018-07-05 RX ADMIN — Medication 10 ML: at 09:21

## 2018-07-05 RX ADMIN — CARVEDILOL 12.5 MG: 6.25 TABLET, FILM COATED ORAL at 09:21

## 2018-07-05 RX ADMIN — ASPIRIN 325 MG: 325 TABLET, DELAYED RELEASE ORAL at 09:22

## 2018-07-05 RX ADMIN — PAROXETINE HYDROCHLORIDE 30 MG: 30 TABLET, FILM COATED ORAL at 09:21

## 2018-07-05 RX ADMIN — AMIODARONE HYDROCHLORIDE 200 MG: 200 TABLET ORAL at 09:22

## 2018-07-05 RX ADMIN — CETIRIZINE HYDROCHLORIDE 5 MG: 10 TABLET ORAL at 09:21

## 2018-07-05 RX ADMIN — FAMOTIDINE 20 MG: 20 TABLET ORAL at 20:50

## 2018-07-05 RX ADMIN — DOCUSATE SODIUM 100 MG: 100 CAPSULE, LIQUID FILLED ORAL at 20:50

## 2018-07-05 RX ADMIN — Medication 10 ML: at 20:54

## 2018-07-05 RX ADMIN — POTASSIUM CHLORIDE 20 MEQ: 20 TABLET, EXTENDED RELEASE ORAL at 09:22

## 2018-07-05 RX ADMIN — FUROSEMIDE 20 MG: 10 INJECTION, SOLUTION INTRAMUSCULAR; INTRAVENOUS at 20:50

## 2018-07-05 RX ADMIN — FAMOTIDINE 20 MG: 20 TABLET ORAL at 09:22

## 2018-07-05 RX ADMIN — FLUTICASONE PROPIONATE 1 SPRAY: 50 SPRAY, METERED NASAL at 09:21

## 2018-07-05 RX ADMIN — DOCUSATE SODIUM 100 MG: 100 CAPSULE, LIQUID FILLED ORAL at 09:22

## 2018-07-05 RX ADMIN — ATORVASTATIN CALCIUM 20 MG: 20 TABLET, FILM COATED ORAL at 20:50

## 2018-07-05 RX ADMIN — ENOXAPARIN SODIUM 40 MG: 40 INJECTION, SOLUTION INTRAVENOUS; SUBCUTANEOUS at 09:21

## 2018-07-05 RX ADMIN — MULTIPLE VITAMINS W/ MINERALS TAB 1 TABLET: TAB at 09:21

## 2018-07-05 RX ADMIN — CARVEDILOL 12.5 MG: 6.25 TABLET, FILM COATED ORAL at 17:39

## 2018-07-05 RX ADMIN — TRAZODONE HYDROCHLORIDE 50 MG: 50 TABLET ORAL at 20:50

## 2018-07-05 ASSESSMENT — PAIN SCALES - GENERAL
PAINLEVEL_OUTOF10: 0
PAINLEVEL_OUTOF10: 0

## 2018-07-05 NOTE — PROGRESS NOTES
CT/CV Surgery Progress Note    2018 7:47 AM  Surgeon:  Dr. Cullen Benitez     Subjective:  Ms. Azalea Lopez  Is resting comfortably in bedside chair on 1 L/min, alert, and in no acute distress. Mediastinal chest tubes d/c'ed yesterday. Pt denies chest pressure, SOB, fever, chills, N/V/D. Vital Signs: /60   Pulse 63   Temp 97.7 °F (36.5 °C) (Oral)   Resp 16   Ht 5' 5\" (1.651 m)   Wt 229 lb 4.8 oz (104 kg)   SpO2 92%   BMI 38.16 kg/m²    Temp (24hrs), Av.2 °F (36.8 °C), Min:97.7 °F (36.5 °C), Max:98.7 °F (37.1 °C)      Weight: 229 lb 4.8 oz (104 kg)       PULSE OXIMETRY RANGE: SpO2  Av.2 %  Min: 90 %  Max: 94 %  SUPPLEMENTAL O2: O2 Flow Rate (L/min): 1 L/min     Labs:   CBC:     Recent Labs      18   0853  18   0630  18   0606   WBC  6.8  7.5  7.3   HGB  9.8*  9.8*  9.3*   HCT  29.9*  31.6*  28.3*   MCV  92.0  94.9  89.3   PLT  114*  115*  115*     BMP: Recent Labs      18   0611  18   0630  18   0606   NA  140  142  139   K  5.3*  4.1  3.8   CL  103  100  97*   CO2  24  27  30   BUN  34*  34*  38*   CREATININE  0.9  1.1  1.3*   MG  1.9  1.9  1.8     Last HgA1C:    Lab Results   Component Value Date    LABA1C 5.3 2018       Imaging:  Awaiting CXR results.        Intake/Output Summary (Last 24 hours) at 18 0747  Last data filed at 18 4849   Gross per 24 hour   Intake              510 ml   Output              290 ml   Net              220 ml       Scheduled Meds:    carvedilol  12.5 mg Oral BID WC    potassium chloride  20 mEq Oral Daily with breakfast    furosemide  40 mg Intravenous BID    amiodarone  200 mg Oral Daily    insulin lispro  0-6 Units Subcutaneous TID WC    insulin lispro  0-3 Units Subcutaneous Nightly    PARoxetine  30 mg Oral QAM    traZODone  50 mg Oral Nightly    fluticasone  1 spray Nasal Daily    cetirizine  5 mg Oral Daily    sodium chloride flush  10 mL Intravenous 2 times per day    calcium replacement protocol

## 2018-07-05 NOTE — PROGRESS NOTES
[facility] RESPIRATORY ASSESSMENT PROGRAM (RAP)  30 kg and over      Patient Name: Serge Angulo Room#: 9Q-72/781-[id number] : 1940     Admitting diagnosis: Aortic stenosis, severe [I35.0]      ASSESSMENT     Vitals  Pulse: 63   Resp: 16  BP: 134/60  SpO2: 92 %  Temp: 97.7 °F (36.5 °C)  Breath Sounds:clr dim  Comment:     PEF   Predicted:   Personal Best:      Inspiratory Capacity:   Preoperative/predictive value: 1500 ml   33% of value: 495 ml      75% of value: 1125 ml   10 ml/kg of IBW: 570 ml   Patient's Actual Inspiratory Capacity: 500-750 ml    CARE PLAN  All Care Plan selections must be based on the approved algorithms located on line in the Policy and Procedures under Respiratory Assessment Adult Protocol Handbook    INDICATIONS FOR THERAPY BASED ON HISTORY AND ASSESSMENT  Bronchial Hygiene Goal: Improvement in sputum mobilization in patients with ineffective airway clearance. Reverse the atelectasis. [] Atelectasis caused by mucus plugging     [] Chronic mucucillary clearance disorder  [] Improve cough effectiveness. Copious secretions unable to clear with cough or suctioning.    [x] No indications  Volume Expansion Goal: Prevent atelectasis, Absence or improvement in signs of atelectasis, improve respiratory muscle performance  [x] Post Thoracic or upper abdominal surgery    [] Surgery in COPD patients  [x] Atelectasis, reduced lung volume on X-ray    [] CPAP indications are seen  [] Restrictive pulmonary or neuromuscular disorder   [] No indications  Inhaled Medications Goal: Improve respiratory functions in patients with airway disease and decrease WOB  [] Wheezing, diminished, or absent breath sounds associated with a pulmonary disorder  [] Known COPD  [] Peak flow < 80% predicted or personal best for known asthma patients  [] Documented obstructive defect on pulmonary function testing which is reversible   [] With a mucolytic to prevent bronchospasm  [] Home regimen  [x] No indications  Oxygenation  Goal: Reverse hypoxemia and improve tissue oxygenation  [x] SpO2 < 92%        [] Home oxygen   [] Severe trauma        [] Acute MI / chest pain  [] Significant clinical signs of hypoxemia     [] Post anesthesia short term  [] Hgb disorder        [] No indications    THERAPIES SELECTED BASED ON ALGORITHMS  Bronchial Hygiene  []Vest  []PD&P []Suction or [place] cough   []Acapella []Metaneb [x]No therapy recommended  Volume expansion  [x]Incentive Spirometry  []EZPAP    []Metaneb  []CPAP   []No therapy recommended  Inhaled Medication  []HHN  []MDI  []Two hour continuous  []Metaneb   [x] No therapy recommended  Oxygenation  [x]Nasal cannula  []Mask    []CPAP  []High flow   [] No therapy recommended      BRONCHODILATOR ASSESSMENT SCORE  Score 0 1 2 3 4   Breath Sounds []  Normal or no wheezing with diminished bases []  End expiratory wheeze or slightly diminished []  Pronounced exp. wheeze []  Insp. & Exp. wheeze []  Absent or near absent   Dyspnea and level of distress   []  None, respiratory rate   12-20, regular pattern []  Only on exertion or increased respiratory rate 21-25 []  Mild dyspnea at rest, irregular breathing pattern respiratory rate 26-30 []  Moderate  use of accessory muscles, prolonged expiration respiratory rate 31-35 []  Severe    use of accessory muscles, respiratory rate   > 35   Peak flow []  > 80% []  70-80% []  60-69% []  50-59% []  <50%  or unable to perform   Total Score []  0-1 []  2-5 []  6-8 []  9-10 []  11-12   Frequency  Every 4 hours PRN for wheezing or increased respiratory distress Three times a day and Q4 PRN for wheezing or increased   respiratory distress Four times a day and Q 4 PRN  for wheezing or increased   respiratory distress Q 4 hours  Contact physician for a continuous treatment and  Iprotropium Bromide if indicated   Reassess Score No need  Every day Every day Every day  After treatment     ASSESSMENT OUTCOMES   Bronchial Hygiene   []Sputum production

## 2018-07-05 NOTE — PLAN OF CARE
Problem: Nutrition  Goal: Optimal nutrition therapy  Outcome: Ongoing   Patient eating three meals a day. Appetite intact. Patient tolerating PO fluids.         Problem: Pain:  Goal: Pain level will decrease  Pain level will decrease   Outcome: Ongoing  Patient has no complaints of pain at this time. Patients current pain level is a 0/10. Patients pain goal is a 0/10. Patient is receiving tylenol for pain at this time.         Problem: Discharge Planning:  Goal: Discharged to appropriate level of care  Discharged to appropriate level of care   Outcome: Ongoing  Patient plans to go to [facility] at discharge and no needs voiced at this time. Patient working with social work for discharge planning.         Problem: Cardiac Output - Decreased:  Goal: Cardiac output within specified parameters  Cardiac output within specified parameters   Outcome: Ongoing  Blood pressure within normal limits this shift. Patient connected to PM and being Vpaced at [de-identified]. Patient displays no bleeding abnormalities. Monitoring labs frequently for any abnormalities. Capillary refill less than 3 seconds. Skin turgor less than 3 seconds. Pulses palpable.         Problem: Risk for Impaired Skin Integrity  Goal: Tissue integrity - skin and mucous membranes  Structural intactness and normal physiological function of skin and  mucous membranes. Outcome: Ongoing  Patient on a turn q2 hours and PRN turning schedule. Patient taught to change position frequently to prevent breakdown. No redness noted on pressure points such as elbows, back of head, heels, shoulder blades, or coccyx at this time. Will continue to monitor.         Problem: Falls - Risk of:  Goal: Will remain free from falls  Will remain free from falls   Outcome: Ongoing  Patient alert and oriented x4. Bed alarmed armed. Bed wheels locked. Bedside table in reach. Patient up with assistance when ambulating. Patient verbalizes and demonstrates the use of the call light.  Hourly

## 2018-07-05 NOTE — CARE COORDINATION
7/5/18, 9:19 AM      [facility] [facility] day: 7  Location: [facility]07/007-A Reason for admit: Aortic stenosis, severe [I35.0]   Procedure:   6/28 AVR, Left atrial Maze, Ligation of left atrial appendage  6/28 Intubated - 6/28 Extubated  7/3 CXR: Improving aeration of the lung bases with residual mild congestive failure, small effusions and lower lobe atelectasis  7/5 CXR: 5-20% right apical pneumothorax. Bibasilar atelectasis. Small pleural effusion. Treatment Plan of Care: CVS following. Pleural CT remains in place: 210 mls/24hrs, 20mls/8hrs. Hgb 9.3. Creatinine bumped to 1.3 from 1.1. Bun 38 from 34. Coreg. Amiodarone. ASA. Lipitor. IV lasix stopped. PCP: ARTHUR Jenkins CNP  Readmission Risk Score: 16%  Discharge Plan: Plans [address], no precert required.

## 2018-07-05 NOTE — PROGRESS NOTES
[facility]    Time In: 1325  Time Out: 1350  Timed Code Treatment Minutes: 25 Minutes  Minutes: 25          Date: 2018  Patient Name: Khadijah Amador,  Gender:  female        MRN: [id number]  : 1940  (69 y.o.)     Referring Practitioner: Dr Kay Magallanes  Diagnosis: Aortic stenosis severe  Additional Pertinent Hx: s/p scheduled Left atrial maze procedure, with ablations of both the anterior and posterior mitral trigones & AV valve replacement on 18. Past Medical History:   Diagnosis Date    ISIDRA (acute kidney injury) ([facility] [place] [address] 2016    Atrial fibrillation (HCC)     Bleeding stomach ulcer     History of blood transfusion     Hypertension     PONV (postoperative nausea and vomiting)      Past Surgical History:   Procedure Laterality Date    DILATION AND CURETTAGE OF UTERUS      JOINT REPLACEMENT Right 2011    knee    NM OFFICE/OUTPT VISIT,PROCEDURE ONLY N/A 2018    BIOPROSTHETIC AORTIC VALVE REPLACEMENT WITH MAZE PROCEDURE performed by Jillian Urias MD at [place] [facility]       Restrictions/Precautions:  Surgical Protocols, General Precautions, Fall Risk                    Sternal Precautions: No Pushing, No Pulling, 5# Lifting Restrictions  Other position/activity restrictions: s/p AVR, MAZE on 18, external pacemaker, 2 chest tubes to suction, trevino, IV lines       Prior Level of Function:  ADL Assistance: Independent  Homemaking Assistance: Independent  Ambulation Assistance: Independent  Transfer Assistance: Independent  Additional Comments: Pt was fully indep PLOF without AD. Subjective:     Subjective: Pt very pleasant and cooeprative. Motivated. Left in bed for rest during quiet time at end of session.      Pain:   .      denies    Social/Functional:  Lives With: Alone  Type of Home: House  Home Layout: One level  Home Access: Stairs to enter with rails  Entrance Stairs - Number of Steps: 3  Entrance term goal 1: supine to sit and return with CGA to get in and out of bed   Short term goal 2: sit to stand with CGA to get on and off various surfaces  Short term goal 3: ambulate 80 feet with RW and CGA to walk household distances   Short term goal 4: car transfer with CGA to leave hospital at discharge    Long term goals  Time Frame for Long term goals : NA due to short ELOS            AM-PAC Inpatient Mobility without Stair Climbing Raw Score : 15  AM-PAC Inpatient without Stair Climbing T-Scale Score : 43.03  Mobility Inpatient CMS 0-100% Score: 47.43  Mobility Inpatient without Stair CMS G-Code Modifier : CK

## 2018-07-05 NOTE — PROGRESS NOTES
Matt Aquino 61  INPATIENT OCCUPATIONAL THERAPY  [facility] CVICU 4B  DAILY NOTE    Time:  Time In:   Time Out: 828  Timed Code Treatment Minutes: 34 Minutes  Minutes: 29          Date: 2018  Patient Name: Khadijah Amador,   Gender: female      Room: [facility]  MRN: [id number]  : 1940  (69 y.o.)  Referring Practitioner: Dr. Alex Gonzales  Diagnosis: aotic stenosis  Additional Pertinent Hx: s/p scheduled Left atrial maze procedure, with ablations of both the anterior and posterior mitral trigones & AV valve replacement on 18. Past Medical History:   Diagnosis Date    ISIDRA (acute kidney injury) ([address] [address] 2016    Atrial fibrillation (HCC)     Bleeding stomach ulcer     History of blood transfusion     Hypertension     PONV (postoperative nausea and vomiting)      Past Surgical History:   Procedure Laterality Date    DILATION AND CURETTAGE OF UTERUS      JOINT REPLACEMENT Right     knee    OR OFFICE/OUTPT VISIT,PROCEDURE ONLY N/A 2018    BIOPROSTHETIC AORTIC VALVE REPLACEMENT WITH MAZE PROCEDURE performed by Jillian Urias MD at [place] [facility]       Restrictions/Precautions:  Surgical Protocols, General Precautions, Fall Risk                    Sternal Precautions: No Pushing, No Pulling, 5# Lifting Restrictions  Other position/activity restrictions: s/p AVR, MAZE on 18, external pacemaker, 2 chest tubes to suction, trevino, IV lines       Prior Level of Function:  ADL Assistance: Independent  Homemaking Assistance: Independent  Ambulation Assistance: Independent  Transfer Assistance: Independent  Additional Comments: Pt was fully indep PLOF without AD. Subjective       Subjective: RN okayed OT session.  Pt agreeable to OT, sitting up in recliner chair     Overall Orientation Status: Within Normal Limits         Pain:  Pain Assessment  Patient Currently in Pain: Denies       Objective  Overall Cognitive Status: WFL      ADL  LE Dressing: Stand by assistance (kimmy Training, Endurance Training  Plan Comment: Pt would benefit from continued OT while at the [facility]  Specific instructions for Next Treatment: Functional mobility; ADLs and adaptations; CABG exercises    Goals:  Patient goals : get back to PLOF    Short term goals  Time Frame for Short term goals: 2 weeks  Short term goal 1: Complete step 2 cardiac exercises x 12 reps with min RBs to increase endurance for BADL  Short term goal 2: Complete various t/fs including toilet with min A & min vcs for sternal precautions  Short term goal 3: Tolerate standing 2-3 min with CGA for increased ease of sinkside grooming  Short term goal 4: Complete mobility to/from bathroom with RW, CGA, & min vcs for walker safety  Short term goal 5: Complete LE dressing with mod A & LH AE prn  Long term goals  Time Frame for Long term goals : No LTG set d/t short ELOS

## 2018-07-06 ENCOUNTER — APPOINTMENT (OUTPATIENT)
Dept: GENERAL RADIOLOGY | Age: 78
DRG: 220 | End: 2018-07-06
Attending: THORACIC SURGERY (CARDIOTHORACIC VASCULAR SURGERY)
Payer: MEDICARE

## 2018-07-06 LAB
ANION GAP SERPL CALCULATED.3IONS-SCNC: 13 MEQ/L (ref 8–16)
BUN BLDV-MCNC: 38 MG/DL (ref 7–22)
CALCIUM SERPL-MCNC: 8.2 MG/DL (ref 8.5–10.5)
CHLORIDE BLD-SCNC: 99 MEQ/L (ref 98–111)
CO2: 29 MEQ/L (ref 23–33)
CREAT SERPL-MCNC: 1.2 MG/DL (ref 0.4–1.2)
ERYTHROCYTE [DISTWIDTH] IN BLOOD BY AUTOMATED COUNT: 14.9 % (ref 11.5–14.5)
ERYTHROCYTE [DISTWIDTH] IN BLOOD BY AUTOMATED COUNT: 51 FL (ref 35–45)
GFR SERPL CREATININE-BSD FRML MDRD: 44 ML/MIN/1.73M2
GLUCOSE BLD-MCNC: 99 MG/DL (ref 70–108)
HCT VFR BLD CALC: 28.1 % (ref 37–47)
HEMOGLOBIN: 8.8 GM/DL (ref 12–16)
MAGNESIUM: 1.9 MG/DL (ref 1.6–2.4)
MCH RBC QN AUTO: 29.5 PG (ref 26–33)
MCHC RBC AUTO-ENTMCNC: 31.3 GM/DL (ref 32.2–35.5)
MCV RBC AUTO: 94.3 FL (ref 81–99)
PLATELET # BLD: 110 THOU/MM3 (ref 130–400)
PMV BLD AUTO: 10.8 FL (ref 9.4–12.4)
POTASSIUM SERPL-SCNC: 3.7 MEQ/L (ref 3.5–5.2)
RBC # BLD: 2.98 MILL/MM3 (ref 4.2–5.4)
SODIUM BLD-SCNC: 141 MEQ/L (ref 135–145)
WBC # BLD: 6.9 THOU/MM3 (ref 4.8–10.8)

## 2018-07-06 PROCEDURE — 2580000003 HC RX 258: Performed by: THORACIC SURGERY (CARDIOTHORACIC VASCULAR SURGERY)

## 2018-07-06 PROCEDURE — 2700000000 HC OXYGEN THERAPY PER DAY

## 2018-07-06 PROCEDURE — 97110 THERAPEUTIC EXERCISES: CPT

## 2018-07-06 PROCEDURE — 6370000000 HC RX 637 (ALT 250 FOR IP): Performed by: THORACIC SURGERY (CARDIOTHORACIC VASCULAR SURGERY)

## 2018-07-06 PROCEDURE — 80048 BASIC METABOLIC PNL TOTAL CA: CPT

## 2018-07-06 PROCEDURE — 97116 GAIT TRAINING THERAPY: CPT

## 2018-07-06 PROCEDURE — 97530 THERAPEUTIC ACTIVITIES: CPT

## 2018-07-06 PROCEDURE — 36415 COLL VENOUS BLD VENIPUNCTURE: CPT

## 2018-07-06 PROCEDURE — 71046 X-RAY EXAM CHEST 2 VIEWS: CPT

## 2018-07-06 PROCEDURE — 83735 ASSAY OF MAGNESIUM: CPT

## 2018-07-06 PROCEDURE — 85027 COMPLETE CBC AUTOMATED: CPT

## 2018-07-06 PROCEDURE — 6360000002 HC RX W HCPCS: Performed by: THORACIC SURGERY (CARDIOTHORACIC VASCULAR SURGERY)

## 2018-07-06 PROCEDURE — 6360000002 HC RX W HCPCS: Performed by: PHYSICIAN ASSISTANT

## 2018-07-06 PROCEDURE — APPSS30 APP SPLIT SHARED TIME 16-30 MINUTES: Performed by: PHYSICIAN ASSISTANT

## 2018-07-06 PROCEDURE — 2060000000 HC ICU INTERMEDIATE R&B

## 2018-07-06 RX ORDER — POTASSIUM CHLORIDE 20 MEQ/1
20 TABLET, EXTENDED RELEASE ORAL
Status: DISCONTINUED | OUTPATIENT
Start: 2018-07-06 | End: 2018-07-09 | Stop reason: HOSPADM

## 2018-07-06 RX ADMIN — TRAZODONE HYDROCHLORIDE 50 MG: 50 TABLET ORAL at 20:24

## 2018-07-06 RX ADMIN — CARVEDILOL 12.5 MG: 6.25 TABLET, FILM COATED ORAL at 07:50

## 2018-07-06 RX ADMIN — PAROXETINE HYDROCHLORIDE 30 MG: 30 TABLET, FILM COATED ORAL at 07:50

## 2018-07-06 RX ADMIN — POTASSIUM CHLORIDE 20 MEQ: 20 TABLET, EXTENDED RELEASE ORAL at 17:58

## 2018-07-06 RX ADMIN — ASPIRIN 325 MG: 325 TABLET, DELAYED RELEASE ORAL at 07:50

## 2018-07-06 RX ADMIN — CETIRIZINE HYDROCHLORIDE 5 MG: 10 TABLET ORAL at 07:50

## 2018-07-06 RX ADMIN — FLUTICASONE PROPIONATE 1 SPRAY: 50 SPRAY, METERED NASAL at 07:48

## 2018-07-06 RX ADMIN — ATORVASTATIN CALCIUM 20 MG: 20 TABLET, FILM COATED ORAL at 20:24

## 2018-07-06 RX ADMIN — DOCUSATE SODIUM 100 MG: 100 CAPSULE, LIQUID FILLED ORAL at 20:24

## 2018-07-06 RX ADMIN — FUROSEMIDE 20 MG: 10 INJECTION, SOLUTION INTRAMUSCULAR; INTRAVENOUS at 20:24

## 2018-07-06 RX ADMIN — Medication 10 ML: at 07:49

## 2018-07-06 RX ADMIN — POTASSIUM CHLORIDE 20 MEQ: 20 TABLET, EXTENDED RELEASE ORAL at 14:08

## 2018-07-06 RX ADMIN — FAMOTIDINE 20 MG: 20 TABLET ORAL at 20:24

## 2018-07-06 RX ADMIN — POTASSIUM CHLORIDE 20 MEQ: 20 TABLET, EXTENDED RELEASE ORAL at 07:50

## 2018-07-06 RX ADMIN — AMIODARONE HYDROCHLORIDE 200 MG: 200 TABLET ORAL at 07:50

## 2018-07-06 RX ADMIN — FAMOTIDINE 20 MG: 20 TABLET ORAL at 07:49

## 2018-07-06 RX ADMIN — DOCUSATE SODIUM 100 MG: 100 CAPSULE, LIQUID FILLED ORAL at 07:49

## 2018-07-06 RX ADMIN — FUROSEMIDE 20 MG: 10 INJECTION, SOLUTION INTRAMUSCULAR; INTRAVENOUS at 07:48

## 2018-07-06 RX ADMIN — Medication 10 ML: at 20:25

## 2018-07-06 RX ADMIN — CARVEDILOL 12.5 MG: 6.25 TABLET, FILM COATED ORAL at 17:58

## 2018-07-06 RX ADMIN — MULTIPLE VITAMINS W/ MINERALS TAB 1 TABLET: TAB at 07:50

## 2018-07-06 RX ADMIN — ENOXAPARIN SODIUM 40 MG: 40 INJECTION, SOLUTION INTRAVENOUS; SUBCUTANEOUS at 07:49

## 2018-07-06 ASSESSMENT — PAIN SCALES - GENERAL
PAINLEVEL_OUTOF10: 0

## 2018-07-06 NOTE — PROGRESS NOTES
[address] CVICU 4B - 4B-07/007-A    Time In: 1300  Time Out: 1338  Timed Code Treatment Minutes: 45 Minutes  Minutes: 38          Date: 2018  Patient Name: Adelaide Machuca,  Gender:  female        MRN: [id number]  : 1940  (69 y.o.)     Referring Practitioner: Dr Yaneth Daniel  Diagnosis: Aortic stenosis severe  Additional Pertinent Hx: s/p scheduled Left atrial maze procedure, with ablations of both the anterior and posterior mitral trigones & AV valve replacement on 18. Past Medical History:   Diagnosis Date    ISIDRA (acute kidney injury) ([address] 2016    Atrial fibrillation (HCC)     Bleeding stomach ulcer     History of blood transfusion     Hypertension     PONV (postoperative nausea and vomiting)      Past Surgical History:   Procedure Laterality Date    DILATION AND CURETTAGE OF UTERUS      JOINT REPLACEMENT Right     knee    FL OFFICE/OUTPT VISIT,PROCEDURE ONLY N/A 2018    BIOPROSTHETIC AORTIC VALVE REPLACEMENT WITH MAZE PROCEDURE performed by Andres Yee MD at [facility]       Restrictions/Precautions:  Surgical Protocols, General Precautions, Fall Risk         Sternal Precautions: No Pushing, No Pulling, 5# Lifting Restrictions  Other position/activity restrictions: s/p AVR, MAZE on 18, external pacemaker, 2 chest tubes to suction, trevino, IV lines       Prior Level of Function:  ADL Assistance: Independent  Homemaking Assistance: Independent  Ambulation Assistance: Independent  Transfer Assistance: Independent  Additional Comments: Pt was fully indep PLOF without AD.      Subjective:     Subjective: pt up in chair on arrival agreeable for therapy, pt still on 2L O2 and has 1 chest tube in place yet pt requested to use bathroom during session     Pain:   .  Pain Assessment  Pain Level: 0       Social/Functional:  Lives With: Alone  Type of Home: House  Home Layout: One level  Home Access: Stairs to enter with rails  Entrance Stairs - Number of Steps: 3  Entrance Stairs - Rails: Both  Home Equipment: 4 wheeled walker     Objective:  Sit to Supine: Minimal assistance (at left LE and guided at trunk, once in bed she required min assist to reposition shoulder and CGA at hips )    Transfers  Sit to Stand: Minimal Assistance (from bedside chair took 2 attempts to come to standing, from toilet with min assist )  Stand to sit: Contact guard assistance (to bed and chair and to toilet min assist due to lower ht)       Ambulation 1  Device: Rolling Walker  Assistance: Stand by assistance  Quality of Gait: slow robert on 2L O2 cues for posture noted good step length slgight decreased heel strike   Distance: 140x1  Comments: pts gait speed . 823 feet/sec which puts her at a fall risk normal speed for her age is 4.36 feet/sec         Balance  Comments: standing balance in bathroom no UE at support pt reaching below waist and in base of support wtih CGA noted wide base of support        Exercises:  Exercises  Comments: pt completed 12 reps of step 2 CABG ex upper trunk rotations, horz abd/add, shoulder rolls with max verbal and tactile cues, marches, ankle pumps also completed long arc quads and ex reclined in chair heelslides, hip abd/add, straight leg raises with min assist x 10-12 reps          Activity Tolerance:  Activity Tolerance: Patient Tolerated treatment well    Assessment:   Body structures, Functions, Activity limitations: Decreased functional mobility , Decreased endurance, Decreased balance, Decreased strength  Assessment: pt tolerated session fair she cont to require assist with transfers and is a fall risk,  but able to tolerate increased activity this date, she would benefit from cont skilled therapy to work on strength, balance, endurance and independence with functional mobility   Prognosis: Good  REQUIRES PT FOLLOW UP: Yes  Discharge Recommendations: Continue to assess pending progress, Subacute/Skilled Nursing Facility    Patient Education:  Patient Education: exercises and gait with RPE scale     Equipment Recommendations:  Equipment Needed: No    Safety:  Type of devices:  All fall risk precautions in place, Bed alarm in place, Call light within reach, Left in bed  Restraints  Initially in place: No    Plan:  Times per week: 6 X  CABG  Times per day: Daily  Specific instructions for Next Treatment: CABG step II ex, mobility, gait, balance,endurance   Current Treatment Recommendations: Strengthening, Balance Training, Functional Mobility Training, Transfer Training, Gait Training, Endurance Training    Goals:  Patient goals : Go to the [facility] term goals  Time Frame for Short term goals: 1 week  Short term goal 1: supine to sit and return with CGA to get in and out of bed   Short term goal 2: sit to stand with CGA to get on and off various surfaces  Short term goal 3: ambulate 80 feet with RW and CGA to walk household distances   Short term goal 4: car transfer with CGA to leave hospital at discharge    Long term goals  Time Frame for Long term goals : NA due to short ELOS

## 2018-07-06 NOTE — CARE COORDINATION
7/6/18, 1:44 PM      [facility] day: 8  Location: -07/007-A Reason for admit: Aortic stenosis, severe [I35.0]   Procedure:   6/28 AVR, Left atrial Maze, Ligation of left atrial appendage  6/28 Intubated - 6/28 Extubated  7/3 CXR: Improving aeration of the lung bases with residual mild congestive failure, small effusions and lower lobe atelectasis  7/5 CXR: 5-20% right apical pneumothorax. Bibasilar atelectasis. Small pleural effusion. Treatment Plan of Care: CVS following. Pleural chest tubes put out 350 cc's in the last 24 hours (increase of 140mls from yesterday) and 100 cc's in the last 8 hours. hgb 8.8. Hct 28.1. IV lasix bid. PCP: ARTHUR Mejias CNP  Readmission Risk Score: 18%  Discharge Plan: Plans ecf when medically stable. SW following.

## 2018-07-06 NOTE — PROGRESS NOTES
asked      ADL  Additional Comments: pt declined all ADLS stated that they were completed this am. Pt stated she did not have to use restroom upon sitting in recliner       Transfers  Sit to stand: Minimal assistance  Stand to sit: Minimal assistance  Transfer Comments: encouraged to use LE for push off from recliner        Balance  Sitting Balance: Supervision  Standing Balance: Contact guard assistance     Time: 1 min   Activity: preparing to walk     Functional Mobility  Functional - Mobility Device: Rolling Walker  Activity: Other  Assist Level: Contact guard assistance  Functional Mobility Comments: pt ambulated into hallway of unit with RW to nurses station and back to recliner in room. Pt demo no LOB and slow pace. Pt required x1 standing RB at RW during ambulation. Apparatus Needs:  (no O2 needed for amublation at 92%  as RN stated is on 1 L in room due to dropping in to 80's when sitting in recliner )          Comment: Completed step 2 cardiac exercises x 12 reps while seated in recliner. Pt tolerated fair with very brief  RB between exercises, completed to increase UB endurance needed for ADLs and functional t/f's              Activity Tolerance:  Activity Tolerance: Patient Tolerated treatment well;Patient limited by fatigue  Activity Tolerance: pt was able to tolerate ambualtion without O2 this am and was at 91% when returned to chair.  pt encouraged to use incentive spirometer today and did use with therapy this am x5 reps     Assessment:     Performance deficits / Impairments: Decreased functional mobility , Decreased ADL status, Decreased endurance, Decreased balance  Prognosis: Good  Discharge Recommendations: Continue to assess pending progress, Patient would benefit from continued therapy after discharge, ECF with OT    Patient Education:  Patient Education: t/f safety, sternal precaution and need to continue to increase daily with walking and breathing exercises  Barriers to Learning: none    Equipment Recommendations: Other: Continue to assess    Safety:  Safety Devices in place: Yes  Type of devices:  All fall risk precautions in place, Gait belt (left with transport to take to xray )    Plan:  Times per week: 6x  Current Treatment Recommendations: Balance Training, Self-Care / ADL, Safety Education & Training, Functional Mobility Training, Endurance Training  Plan Comment: Pt would benefit from continued OT while at the [facility]  Specific instructions for Next Treatment: Functional mobility; ADLs and adaptations; CABG exercises    Goals:  Patient goals : get back to PLOF    Short term goals  Time Frame for Short term goals: 2 weeks  Short term goal 1: Complete step 2 cardiac exercises x 12 reps with min RBs to increase endurance for BADL  Short term goal 2: Complete various t/fs including toilet with min A & min vcs for sternal precautions  Short term goal 3: Tolerate standing 2-3 min with CGA for increased ease of sinkside grooming  Short term goal 4: Complete mobility to/from bathroom with RW, CGA, & min vcs for walker safety  Short term goal 5: Complete LE dressing with mod A & LH AE prn  Long term goals  Time Frame for Long term goals : No LTG set d/t short ELOS

## 2018-07-06 NOTE — PROGRESS NOTES
CT/CV Surgery Progress Note    2018 7:32 AM  Surgeon:  Dr. Toya Shrestha     Subjective:  Ms. Stevie Rodriguez  Is resting comfortably in bedside chair on RA, alert, and in no acute distress. Pt denies chest pressure, SOB, fever, chills, N/V/D. Vital Signs: /65   Pulse 54   Temp 98.2 °F (36.8 °C)   Resp 18   Ht 5' 5\" (1.651 m)   Wt 228 lb 14.4 oz (103.8 kg)   SpO2 97%   BMI 38.09 kg/m²    Temp (24hrs), Av.1 °F (36.7 °C), Min:97.9 °F (36.6 °C), Max:98.2 °F (36.8 °C)      Weight: 228 lb 14.4 oz (103.8 kg)       PULSE OXIMETRY RANGE: SpO2  Av.8 %  Min: 5 %  Max: 97 %     Labs:   CBC:   Recent Labs      18   0618   0606  18   0541   WBC  7.5  7.3  6.9   HGB  9.8*  9.3*  8.8*   HCT  31.6*  28.3*  28.1*   MCV  94.9  89.3  94.3   PLT  115*  115*  110*     BMP:   Recent Labs      18   0606  18   0541   NA  142  139  141   K  4.1  3.8  3.7   CL  100  97*  99   CO2  27  30  29   BUN  34*  38*  38*   CREATININE  1.1  1.3*  1.2   MG  1.9  1.8  1.9     Last HgA1C:    Lab Results   Component Value Date    LABA1C 5.3 2018       Imaging:  Awaiting CXR results.        Intake/Output Summary (Last 24 hours) at 18 0732  Last data filed at 18 0631   Gross per 24 hour   Intake                0 ml   Output              270 ml   Net             -270 ml       Scheduled Meds:    furosemide  20 mg Intravenous Q12H    carvedilol  12.5 mg Oral BID WC    potassium chloride  20 mEq Oral Daily with breakfast    amiodarone  200 mg Oral Daily    PARoxetine  30 mg Oral QAM    traZODone  50 mg Oral Nightly    fluticasone  1 spray Nasal Daily    cetirizine  5 mg Oral Daily    sodium chloride flush  10 mL Intravenous 2 times per day    calcium replacement protocol   Other RX Placeholder    docusate sodium  100 mg Oral BID    famotidine  20 mg Oral BID    mupirocin   Topical Once    therapeutic multivitamin-minerals  1 tablet Oral Daily with breakfast    atorvastatin  20 mg Oral Nightly    enoxaparin  40 mg Subcutaneous Daily    aspirin  325 mg Oral Daily       ROS: All neg unless specifically mentioned in subjective section. Assessment:     Patient Active Problem List   Diagnosis    Chronic atrial fibrillation (HCC)    Ascending cholangitis, possible    Elevated LFTs    Thrombocytopenia (HCC)    Leukopenia    [facility] [address]    SIRS (systemic inflammatory response syndrome) ([facility] [address]    Cholecystitis, acute    Neutropenia ([facility] [address]    Aortic stenosis, severe         Healthcare Directive: Yes, patient has an advance directive for healthcare treatment       Plan: 7/5/18  1. Clinically stable- VSS/NSR  2. Pleural chest tubes put out 350 cc's in the last 24 hours and 100 cc's in the last 8 hours. 3. Cr down to 1.2  4. Will obtain CXR 2 view this AM.     The plan of care was discussed in detail with Dr. Demetrius Lal.     Solo Lozano PA-C

## 2018-07-06 NOTE — PROGRESS NOTES
[facility] RESPIRATORY ASSESSMENT PROGRAM (RAP)  30 kg and over      Patient Name: Khadijah Amador Room#: 5W-91/487-C : 1940     Admitting diagnosis: Aortic stenosis, severe [I35.0]      ASSESSMENT     Vitals  Pulse: 63   Resp: 16  BP: (!) 132/57  SpO2: 97 %  Temp: 97.6 °F (36.4 °C)  Breath Sounds:clear, diminished with fine crackles in both lower lobes  Comment: 1 lpm nc, pt sitting up in chair, encouraged pt to work on I.s. On her own        Inspiratory Capacity:   Preoperative/predictive value: 1500 ml   33% of value: 495 ml      75% of value: 1125 ml   10 ml/kg of IBW: 570 ml   Patient's Actual Inspiratory Capacity: 500-750 ml    CARE PLAN  All Care Plan selections must be based on the approved algorithms located on line in the Policy and Procedures under Respiratory Assessment Adult Protocol Handbook    INDICATIONS FOR THERAPY BASED ON HISTORY AND ASSESSMENT  Bronchial Hygiene Goal: Improvement in sputum mobilization in patients with ineffective airway clearance. Reverse the atelectasis. [] Atelectasis caused by mucus plugging     [] Chronic mucucillary clearance disorder  [] Improve cough effectiveness. Copious secretions unable to clear with cough or suctioning. [x] No indications  Volume Expansion Goal: Prevent atelectasis, Absence or improvement in signs of atelectasis, improve respiratory muscle performance  [x] Post Thoracic or upper abdominal surgery    [] Surgery in COPD patients  [x] Atelectasis, reduced lung volume on X-ray--continue I.s.    [] CPAP indications are seen  [] Restrictive pulmonary or neuromuscular disorder   [] No indications  Inhaled Medications Goal: Improve respiratory functions in patients with airway disease and decrease WOB  [] Wheezing, diminished, or absent breath sounds associated with a pulmonary disorder  [] Known COPD  [] Peak flow < 80% predicted or personal best for known asthma patients  [] Documented obstructive defect on pulmonary function testing which is reversible   [] With a mucolytic to prevent bronchospasm  [] Home regimen  [x] No indications  Oxygenation  Goal: Reverse hypoxemia and improve tissue oxygenation  [x] SpO2 < 92%        [] Home oxygen   [] Severe trauma        [] Acute MI / chest pain  [] Significant clinical signs of hypoxemia     [] Post anesthesia short term  [] Hgb disorder        [] No indications    THERAPIES SELECTED BASED ON ALGORITHMS  Bronchial Hygiene  []Vest  []PD&P []Suction or [place] cough   []Acapella []Metaneb [x]No therapy recommended  Volume expansion  [x]Incentive Spirometry  []EZPAP    []Metaneb  []CPAP   []No therapy recommended  Inhaled Medication  []HHN  []MDI  []Two hour continuous  []Metaneb   [x] No therapy recommended  Oxygenation  [x]Nasal cannula  []Mask    []CPAP  []High flow   [] No therapy recommended      BRONCHODILATOR ASSESSMENT SCORE  Score 0 1 2 3 4   Breath Sounds []  Normal or no wheezing with diminished bases []  End expiratory wheeze or slightly diminished []  Pronounced exp. wheeze []  Insp. & Exp. wheeze []  Absent or near absent   Dyspnea and level of distress   []  None, respiratory rate   12-20, regular pattern []  Only on exertion or increased respiratory rate 21-25 []  Mild dyspnea at rest, irregular breathing pattern respiratory rate 26-30 []  Moderate  use of accessory muscles, prolonged expiration respiratory rate 31-35 []  Severe    use of accessory muscles, respiratory rate   > 35   Peak flow []  > 80% []  70-80% []  60-69% []  50-59% []  <50%  or unable to perform   Total Score []  0-1 []  2-5 []  6-8 []  9-10 []  11-12   Frequency  Every 4 hours PRN for wheezing or increased respiratory distress Three times a day and Q4 PRN for wheezing or increased   respiratory distress Four times a day and Q 4 PRN  for wheezing or increased   respiratory distress Q 4 hours  Contact physician for a continuous treatment and  Iprotropium Bromide if indicated   Reassess Score No need  Every day Every day Every day

## 2018-07-06 NOTE — CARE COORDINATION
7/6/18, 10:48 AM    Discharge plan discussed by  and . Discharge plan reviewed with patient/ family. Patient/ family verbalize understanding of discharge plan and are in agreement with plan. Understanding was demonstrated using the teach back method. IMM Letter  IMM Letter given to Patient/Family/Significant other/Guardian/POA/by[de-identified] CM  IMM Letter date given[de-identified] 07/06/18  IMM Letter time given[de-identified] 6385    Сергей Shaw is a potential discharge to the [address] [place] [address] over the weekend. She will be skilled at the facility under her Medicare benefit. ANNAMARIA called Nilam Brown at the [facility] and they are able to accept at anytime. ANNAMARIA will leave discharge instructions and the blue discharge envelope with patients chart.

## 2018-07-07 LAB
ANION GAP SERPL CALCULATED.3IONS-SCNC: 12 MEQ/L (ref 8–16)
BUN BLDV-MCNC: 37 MG/DL (ref 7–22)
CALCIUM SERPL-MCNC: 8.5 MG/DL (ref 8.5–10.5)
CHLORIDE BLD-SCNC: 99 MEQ/L (ref 98–111)
CO2: 30 MEQ/L (ref 23–33)
CREAT SERPL-MCNC: 1.1 MG/DL (ref 0.4–1.2)
ERYTHROCYTE [DISTWIDTH] IN BLOOD BY AUTOMATED COUNT: 14.7 % (ref 11.5–14.5)
ERYTHROCYTE [DISTWIDTH] IN BLOOD BY AUTOMATED COUNT: 49.1 FL (ref 35–45)
GFR SERPL CREATININE-BSD FRML MDRD: 48 ML/MIN/1.73M2
GLUCOSE BLD-MCNC: 107 MG/DL (ref 70–108)
HCT VFR BLD CALC: 30.1 % (ref 37–47)
HEMOGLOBIN: 9.6 GM/DL (ref 12–16)
MAGNESIUM: 1.9 MG/DL (ref 1.6–2.4)
MCH RBC QN AUTO: 29.2 PG (ref 26–33)
MCHC RBC AUTO-ENTMCNC: 31.9 GM/DL (ref 32.2–35.5)
MCV RBC AUTO: 91.5 FL (ref 81–99)
PLATELET # BLD: 117 THOU/MM3 (ref 130–400)
PMV BLD AUTO: 10.4 FL (ref 9.4–12.4)
POTASSIUM SERPL-SCNC: 4.1 MEQ/L (ref 3.5–5.2)
RBC # BLD: 3.29 MILL/MM3 (ref 4.2–5.4)
SODIUM BLD-SCNC: 141 MEQ/L (ref 135–145)
WBC # BLD: 6.9 THOU/MM3 (ref 4.8–10.8)

## 2018-07-07 PROCEDURE — 97110 THERAPEUTIC EXERCISES: CPT

## 2018-07-07 PROCEDURE — 2060000000 HC ICU INTERMEDIATE R&B

## 2018-07-07 PROCEDURE — 6360000002 HC RX W HCPCS: Performed by: THORACIC SURGERY (CARDIOTHORACIC VASCULAR SURGERY)

## 2018-07-07 PROCEDURE — 6370000000 HC RX 637 (ALT 250 FOR IP): Performed by: THORACIC SURGERY (CARDIOTHORACIC VASCULAR SURGERY)

## 2018-07-07 PROCEDURE — 83735 ASSAY OF MAGNESIUM: CPT

## 2018-07-07 PROCEDURE — 36415 COLL VENOUS BLD VENIPUNCTURE: CPT

## 2018-07-07 PROCEDURE — 6360000002 HC RX W HCPCS: Performed by: PHYSICIAN ASSISTANT

## 2018-07-07 PROCEDURE — 2580000003 HC RX 258: Performed by: THORACIC SURGERY (CARDIOTHORACIC VASCULAR SURGERY)

## 2018-07-07 PROCEDURE — 80048 BASIC METABOLIC PNL TOTAL CA: CPT

## 2018-07-07 PROCEDURE — 97535 SELF CARE MNGMENT TRAINING: CPT

## 2018-07-07 PROCEDURE — 85027 COMPLETE CBC AUTOMATED: CPT

## 2018-07-07 RX ADMIN — ASPIRIN 325 MG: 325 TABLET, DELAYED RELEASE ORAL at 08:10

## 2018-07-07 RX ADMIN — FLUTICASONE PROPIONATE 1 SPRAY: 50 SPRAY, METERED NASAL at 08:11

## 2018-07-07 RX ADMIN — CETIRIZINE HYDROCHLORIDE 5 MG: 10 TABLET ORAL at 08:09

## 2018-07-07 RX ADMIN — POTASSIUM CHLORIDE 20 MEQ: 20 TABLET, EXTENDED RELEASE ORAL at 08:18

## 2018-07-07 RX ADMIN — PAROXETINE HYDROCHLORIDE 30 MG: 30 TABLET, FILM COATED ORAL at 08:09

## 2018-07-07 RX ADMIN — ATORVASTATIN CALCIUM 20 MG: 20 TABLET, FILM COATED ORAL at 20:59

## 2018-07-07 RX ADMIN — DOCUSATE SODIUM 100 MG: 100 CAPSULE, LIQUID FILLED ORAL at 08:10

## 2018-07-07 RX ADMIN — ENOXAPARIN SODIUM 40 MG: 40 INJECTION, SOLUTION INTRAVENOUS; SUBCUTANEOUS at 08:10

## 2018-07-07 RX ADMIN — FUROSEMIDE 20 MG: 10 INJECTION, SOLUTION INTRAMUSCULAR; INTRAVENOUS at 21:03

## 2018-07-07 RX ADMIN — TRAZODONE HYDROCHLORIDE 50 MG: 50 TABLET ORAL at 20:59

## 2018-07-07 RX ADMIN — CARVEDILOL 12.5 MG: 6.25 TABLET, FILM COATED ORAL at 17:34

## 2018-07-07 RX ADMIN — POTASSIUM CHLORIDE 20 MEQ: 20 TABLET, EXTENDED RELEASE ORAL at 17:34

## 2018-07-07 RX ADMIN — AMIODARONE HYDROCHLORIDE 200 MG: 200 TABLET ORAL at 08:10

## 2018-07-07 RX ADMIN — FAMOTIDINE 20 MG: 20 TABLET ORAL at 08:10

## 2018-07-07 RX ADMIN — CARVEDILOL 12.5 MG: 6.25 TABLET, FILM COATED ORAL at 08:10

## 2018-07-07 RX ADMIN — Medication 10 ML: at 20:59

## 2018-07-07 RX ADMIN — MAGNESIUM HYDROXIDE 30 ML: 400 SUSPENSION ORAL at 08:10

## 2018-07-07 RX ADMIN — FAMOTIDINE 20 MG: 20 TABLET ORAL at 20:59

## 2018-07-07 RX ADMIN — POTASSIUM CHLORIDE 20 MEQ: 20 TABLET, EXTENDED RELEASE ORAL at 12:02

## 2018-07-07 RX ADMIN — DOCUSATE SODIUM 100 MG: 100 CAPSULE, LIQUID FILLED ORAL at 20:59

## 2018-07-07 RX ADMIN — MULTIPLE VITAMINS W/ MINERALS TAB 1 TABLET: TAB at 08:09

## 2018-07-07 RX ADMIN — FUROSEMIDE 20 MG: 10 INJECTION, SOLUTION INTRAMUSCULAR; INTRAVENOUS at 08:10

## 2018-07-07 RX ADMIN — Medication 10 ML: at 08:11

## 2018-07-07 ASSESSMENT — PAIN SCALES - GENERAL
PAINLEVEL_OUTOF10: 0

## 2018-07-07 NOTE — PROGRESS NOTES
[facility] RESPIRATORY ASSESSMENT PROGRAM (RAP)  30 kg and over      Patient Name: Jill Severance Room#: 5I-51/199-G : 1940     Admitting diagnosis: Aortic stenosis, severe [I35.0]      ASSESSMENT     Vitals  Pulse: 63   Resp: 16  BP: 138/66  SpO2: 95 %  Temp: 98.2 °F (36.8 °C)  Breath Sounds:clear/diminished   Comment: pt encouraged to do IS on her own q1wa      Inspiratory Capacity:   Preoperative/predictive value: 1500 ml   33% of value: 495 ml      75% of value: 1125 ml   10 ml/kg of IBW: 570 ml   Patient's Actual Inspiratory Capacity: 500-700 ml    CARE PLAN  All Care Plan selections must be based on the approved algorithms located on line in the Policy and Procedures under Respiratory Assessment Adult Protocol Handbook    INDICATIONS FOR THERAPY BASED ON HISTORY AND ASSESSMENT  Bronchial Hygiene Goal: Improvement in sputum mobilization in patients with ineffective airway clearance. Reverse the atelectasis. [] Atelectasis caused by mucus plugging     [] Chronic mucucillary clearance disorder  [] Improve cough effectiveness. Copious secretions unable to clear with cough or suctioning.    [x] No indications  Volume Expansion Goal: Prevent atelectasis, Absence or improvement in signs of atelectasis, improve respiratory muscle performance  [x] Post Thoracic or upper abdominal surgery    [] Surgery in COPD patients  [] Atelectasis, reduced lung volume on X-ray    [] CPAP indications are seen  [] Restrictive pulmonary or neuromuscular disorder   [] No indications  Inhaled Medications Goal: Improve respiratory functions in patients with airway disease and decrease WOB  [] Wheezing, diminished, or absent breath sounds associated with a pulmonary disorder  [] Known COPD  [] Peak flow < 80% predicted or personal best for known asthma patients  [] Documented obstructive defect on pulmonary function testing which is reversible   [] With a mucolytic to prevent bronchospasm  [] Home regimen  [x] No > 25 ml/day       [] Improved chest x-ray  []Patients subjective response (easier clearance of secretions)      [] Improved breath sounds  []Improvement in PaO2 or oxygen saturation       [] Return of patient to home regime   []Improvement in ventilator variables    [x]Other: na.    Volume Expansion  []Decrease respiratory rate   []Resolution of fever    []Normal pulse rate    []Improved breath sounds   []Normal chest x-ray     [] Improved arterial oxygen tension (PaO2) and p(A-a)O2  [x]Return of IC to 75% of preop/predicted goal or an increase to 15 ml/kg of ideal body weight   []Other:     Inhaled Medication  []Improved assessment score   []Return of patient to home regime  [x]Other: na.    Oxygenation  []SpO2 greater than or equal to 92%   []Reversed signs of hypoxemia and improvement in WOB  []Correction of Hgb disorder    []Return of patient to home regime  [x]Other: na .      Action Plan Based on Assessment:   [x]Continue plan as ordered   []Change therapy based on algorithm   []Consult with physician  []Discontinue therapy and RAP (indications no longer present)  []Not enough information available, reassess in 24 hours  []Other:

## 2018-07-07 NOTE — PROGRESS NOTES
Yamil Aquino 61  INPATIENT OCCUPATIONAL THERAPY  [facility] CVICU 4B  DAILY NOTE    Time:  Time In: 215  Time Out: 1409  Timed Code Treatment Minutes: 26 Minutes  Minutes: 26        Date: 2018  Patient Name: Ziyad Santillan,   Gender: female      Room: -07/007-A  MRN: [id number]  : 1940  (69 y.o.)  Referring Practitioner: Dr. Patsy Thompson  Diagnosis: aotic stenosis  Additional Pertinent Hx: s/p scheduled Left atrial maze procedure, with ablations of both the anterior and posterior mitral trigones & AV valve replacement on 18. Past Medical History:   Diagnosis Date    ISIDRA (acute kidney injury) ([facility] [address] 2016    Atrial fibrillation (HCC)     Bleeding stomach ulcer     History of blood transfusion     Hypertension     PONV (postoperative nausea and vomiting)      Past Surgical History:   Procedure Laterality Date    DILATION AND CURETTAGE OF UTERUS      JOINT REPLACEMENT Right     knee    MI OFFICE/OUTPT VISIT,PROCEDURE ONLY N/A 2018    BIOPROSTHETIC AORTIC VALVE REPLACEMENT WITH MAZE PROCEDURE performed by Lita Brady MD at [facility]       Restrictions/Precautions:  Surgical Protocols, General Precautions, Fall Risk                    Sternal Precautions: No Pushing, No Pulling, 5# Lifting Restrictions  Other position/activity restrictions: s/p AVR, MAZE on 18, external pacemaker, 2 chest tubes to suction, trevino, IV lines       Prior Level of Function:  ADL Assistance: Independent  Homemaking Assistance: Independent  Ambulation Assistance: Independent  Transfer Assistance: Independent  Additional Comments: Pt was fully indep PLOF without AD. Subjective       Subjective: RN ok'd session. Pt seated in bedside chair upon arrival and agreeable to OT session stating 'I don't know if I can do much but I'll try. I'm really tired. \"     Overall Orientation Status: Within Normal Limits         Pain:  Pain Assessment  Patient Currently in Pain: Denies       Objective  Overall Cognitive Status: Exceptions  Safety Judgement: Decreased awareness of need for safety  Cognition Comment: Unable to recall sternal precautions. Decreased safety noted      ADL  Toileting: Minimal assistance (for thoroughness of hygiene; clothing management with CGA (BUE release from walker))        Bed mobility  Sit to Supine: Minimal assistance  Comment: Cueing provided to follow sternal precautions during bed mobility    Transfers  Sit to stand: Minimal assistance (from bedside chair, STS)  Stand to sit: Contact guard assistance (to EOB, STS)  Transfer Comments: Constant cueing to maintain sternal precautions during transfers  Toilet Transfers  Equipment Used: Standard toilet  Toilet Transfer: Minimal assistance  Toilet Transfers Comments: cues for proper hand placement     Balance  Sitting Balance: Supervision  Standing Balance: Contact guard assistance           Functional Mobility  Functional - Mobility Device: Rolling Walker  Activity: Other; To/from bathroom  Assist Level: Contact guard assistance  Functional Mobility Comments: Pt refusing mobility in hallway d/t increased level of fatigue however agreeable to mobility within room. Completed functional mobility to/from doorway x3 trials at fair pace. No LOB noted. Seated rest break required after mobility       Type of ROM/Therapeutic Exercise: AROM  Comment: Completed CABG step II exercises x12 reps x1 set seated EOB. Required brief rest break after each exercise.  Completed to help increase activity tolerance required for ADLs        Activity Tolerance:  Activity Tolerance: Patient limited by fatigue    Assessment:     Performance deficits / Impairments: Decreased functional mobility , Decreased ADL status, Decreased endurance, Decreased balance  Prognosis: Good  Discharge Recommendations: Continue to assess pending progress, Patient would benefit from continued therapy after discharge, ECF with OT    Patient Education:  Patient Education: safety with transfers while maintaining sternal precautions, CABG exercises, ADL strategies  Barriers to Learning: none    Equipment Recommendations:   Other: Continue to assess    Safety:  Safety Devices in place: Yes  Type of devices: Call light within reach, Chair alarm in place, Left in chair, Gait belt    Plan:  Times per week: 6x  Current Treatment Recommendations: Balance Training, Self-Care / ADL, Safety Education & Training, Functional Mobility Training, Endurance Training  Plan Comment: Pt would benefit from continued OT while at the [facility]  Specific instructions for Next Treatment: Functional mobility; ADLs and adaptations; CABG exercises    Goals:  Patient goals : get back to PLOF    Short term goals  Time Frame for Short term goals: 2 weeks  Short term goal 1: Complete step 2 cardiac exercises x 12 reps with min RBs to increase endurance for BADL  Short term goal 2: Complete various t/fs including toilet with min A & min vcs for sternal precautions  Short term goal 3: Tolerate standing 2-3 min with CGA for increased ease of sinkside grooming  Short term goal 4: Complete mobility to/from bathroom with RW, CGA, & min vcs for walker safety  Short term goal 5: Complete LE dressing with mod A & LH AE prn  Long term goals  Time Frame for Long term goals : No LTG set d/t short ELOS

## 2018-07-08 LAB
ANION GAP SERPL CALCULATED.3IONS-SCNC: 12 MEQ/L (ref 8–16)
BUN BLDV-MCNC: 36 MG/DL (ref 7–22)
CALCIUM SERPL-MCNC: 8.6 MG/DL (ref 8.5–10.5)
CHLORIDE BLD-SCNC: 98 MEQ/L (ref 98–111)
CO2: 29 MEQ/L (ref 23–33)
CREAT SERPL-MCNC: 1.3 MG/DL (ref 0.4–1.2)
ERYTHROCYTE [DISTWIDTH] IN BLOOD BY AUTOMATED COUNT: 15.1 % (ref 11.5–14.5)
ERYTHROCYTE [DISTWIDTH] IN BLOOD BY AUTOMATED COUNT: 50.6 FL (ref 35–45)
GFR SERPL CREATININE-BSD FRML MDRD: 40 ML/MIN/1.73M2
GLUCOSE BLD-MCNC: 105 MG/DL (ref 70–108)
HCT VFR BLD CALC: 32.4 % (ref 37–47)
HEMOGLOBIN: 10.3 GM/DL (ref 12–16)
MAGNESIUM: 2.1 MG/DL (ref 1.6–2.4)
MCH RBC QN AUTO: 29.8 PG (ref 26–33)
MCHC RBC AUTO-ENTMCNC: 31.8 GM/DL (ref 32.2–35.5)
MCV RBC AUTO: 93.6 FL (ref 81–99)
PLATELET # BLD: 125 THOU/MM3 (ref 130–400)
PMV BLD AUTO: 10.2 FL (ref 9.4–12.4)
POTASSIUM SERPL-SCNC: 4.4 MEQ/L (ref 3.5–5.2)
RBC # BLD: 3.46 MILL/MM3 (ref 4.2–5.4)
SODIUM BLD-SCNC: 139 MEQ/L (ref 135–145)
WBC # BLD: 7.3 THOU/MM3 (ref 4.8–10.8)

## 2018-07-08 PROCEDURE — 36415 COLL VENOUS BLD VENIPUNCTURE: CPT

## 2018-07-08 PROCEDURE — 6360000002 HC RX W HCPCS: Performed by: PHYSICIAN ASSISTANT

## 2018-07-08 PROCEDURE — 6370000000 HC RX 637 (ALT 250 FOR IP): Performed by: THORACIC SURGERY (CARDIOTHORACIC VASCULAR SURGERY)

## 2018-07-08 PROCEDURE — 2580000003 HC RX 258: Performed by: THORACIC SURGERY (CARDIOTHORACIC VASCULAR SURGERY)

## 2018-07-08 PROCEDURE — 97116 GAIT TRAINING THERAPY: CPT

## 2018-07-08 PROCEDURE — 6360000002 HC RX W HCPCS: Performed by: THORACIC SURGERY (CARDIOTHORACIC VASCULAR SURGERY)

## 2018-07-08 PROCEDURE — 80048 BASIC METABOLIC PNL TOTAL CA: CPT

## 2018-07-08 PROCEDURE — 85027 COMPLETE CBC AUTOMATED: CPT

## 2018-07-08 PROCEDURE — 83735 ASSAY OF MAGNESIUM: CPT

## 2018-07-08 PROCEDURE — 2060000000 HC ICU INTERMEDIATE R&B

## 2018-07-08 RX ADMIN — POTASSIUM CHLORIDE 20 MEQ: 20 TABLET, EXTENDED RELEASE ORAL at 17:14

## 2018-07-08 RX ADMIN — FAMOTIDINE 20 MG: 20 TABLET ORAL at 09:48

## 2018-07-08 RX ADMIN — CARVEDILOL 12.5 MG: 6.25 TABLET, FILM COATED ORAL at 09:43

## 2018-07-08 RX ADMIN — DOCUSATE SODIUM 100 MG: 100 CAPSULE, LIQUID FILLED ORAL at 09:43

## 2018-07-08 RX ADMIN — POTASSIUM CHLORIDE 20 MEQ: 20 TABLET, EXTENDED RELEASE ORAL at 09:49

## 2018-07-08 RX ADMIN — ATORVASTATIN CALCIUM 20 MG: 20 TABLET, FILM COATED ORAL at 20:26

## 2018-07-08 RX ADMIN — Medication 10 ML: at 20:29

## 2018-07-08 RX ADMIN — ASPIRIN 325 MG: 325 TABLET, DELAYED RELEASE ORAL at 09:43

## 2018-07-08 RX ADMIN — POTASSIUM CHLORIDE 20 MEQ: 20 TABLET, EXTENDED RELEASE ORAL at 12:51

## 2018-07-08 RX ADMIN — FUROSEMIDE 20 MG: 10 INJECTION, SOLUTION INTRAMUSCULAR; INTRAVENOUS at 20:26

## 2018-07-08 RX ADMIN — PAROXETINE HYDROCHLORIDE 30 MG: 30 TABLET, FILM COATED ORAL at 09:44

## 2018-07-08 RX ADMIN — Medication 10 ML: at 09:41

## 2018-07-08 RX ADMIN — FAMOTIDINE 20 MG: 20 TABLET ORAL at 20:26

## 2018-07-08 RX ADMIN — AMIODARONE HYDROCHLORIDE 200 MG: 200 TABLET ORAL at 09:44

## 2018-07-08 RX ADMIN — DOCUSATE SODIUM 100 MG: 100 CAPSULE, LIQUID FILLED ORAL at 20:26

## 2018-07-08 RX ADMIN — TRAZODONE HYDROCHLORIDE 50 MG: 50 TABLET ORAL at 22:52

## 2018-07-08 RX ADMIN — FLUTICASONE PROPIONATE 1 SPRAY: 50 SPRAY, METERED NASAL at 09:46

## 2018-07-08 RX ADMIN — ENOXAPARIN SODIUM 40 MG: 40 INJECTION, SOLUTION INTRAVENOUS; SUBCUTANEOUS at 09:41

## 2018-07-08 RX ADMIN — MULTIPLE VITAMINS W/ MINERALS TAB 1 TABLET: TAB at 09:44

## 2018-07-08 RX ADMIN — FUROSEMIDE 20 MG: 10 INJECTION, SOLUTION INTRAMUSCULAR; INTRAVENOUS at 09:42

## 2018-07-08 RX ADMIN — CETIRIZINE HYDROCHLORIDE 5 MG: 10 TABLET ORAL at 09:44

## 2018-07-08 RX ADMIN — CARVEDILOL 12.5 MG: 6.25 TABLET, FILM COATED ORAL at 17:14

## 2018-07-08 ASSESSMENT — PAIN SCALES - GENERAL
PAINLEVEL_OUTOF10: 0

## 2018-07-08 NOTE — PROGRESS NOTES
[address] CVICU 4B - 4B-07/007-A    Time In: 4514  Time Out: 8674  Timed Code Treatment Minutes: 18 Minutes  Minutes: 18          Date: 2018  Patient Name: Farzana Pope,  Gender:  female        MRN: [id number]  : 1940  (69 y.o.)     Referring Practitioner: Dr Morena Marvin  Diagnosis: Aortic stenosis severe  Additional Pertinent Hx: s/p scheduled Left atrial maze procedure, with ablations of both the anterior and posterior mitral trigones & AV valve replacement on 18. Past Medical History:   Diagnosis Date    ISIDRA (acute kidney injury) ([address] 2016    Atrial fibrillation (HCC)     Bleeding stomach ulcer     History of blood transfusion     Hypertension     PONV (postoperative nausea and vomiting)      Past Surgical History:   Procedure Laterality Date    DILATION AND CURETTAGE OF UTERUS      JOINT REPLACEMENT Right     knee    ME OFFICE/OUTPT VISIT,PROCEDURE ONLY N/A 2018    BIOPROSTHETIC AORTIC VALVE REPLACEMENT WITH MAZE PROCEDURE performed by Teodora Frye MD at [place] [facility]       Restrictions/Precautions:  Surgical Protocols, General Precautions, Fall Risk                    Sternal Precautions: No Pushing, No Pulling, 5# Lifting Restrictions  Other position/activity restrictions: s/p AVR, MAZE on 18, 1 chest tubes to suction       Prior Level of Function:  ADL Assistance: Independent  Homemaking Assistance: Independent  Ambulation Assistance: Independent  Transfer Assistance: Independent  Additional Comments: Pt was fully indep PLOF without AD. Subjective:  Chart Reviewed: Yes  Family / Caregiver Present: No  Subjective: pt up in chair on arrival agreeable for therapy, pt still on 2L O2 and has 1 chest tube in place yet pt requested to use bathroom during session     Pain:  Denies.           Social/Functional:  Lives With: Alone  Type of Home: House  Home Layout: One level  Home Access: Stairs to enter with

## 2018-07-08 NOTE — PROGRESS NOTES
Patient's family member requested patient not be woken up in the middle of the night for vitals. Family member and patient informed of the reason we do vitals every 4 hours and both family and patient still requested she not be woken up for vitals. Will assess as patient wakes up throughout the night.

## 2018-07-09 ENCOUNTER — APPOINTMENT (OUTPATIENT)
Dept: GENERAL RADIOLOGY | Age: 78
DRG: 220 | End: 2018-07-09
Attending: THORACIC SURGERY (CARDIOTHORACIC VASCULAR SURGERY)
Payer: MEDICARE

## 2018-07-09 VITALS
TEMPERATURE: 97.4 F | HEART RATE: 64 BPM | WEIGHT: 223.4 LBS | OXYGEN SATURATION: 92 % | DIASTOLIC BLOOD PRESSURE: 60 MMHG | HEIGHT: 65 IN | BODY MASS INDEX: 37.22 KG/M2 | RESPIRATION RATE: 16 BRPM | SYSTOLIC BLOOD PRESSURE: 132 MMHG

## 2018-07-09 LAB
ANION GAP SERPL CALCULATED.3IONS-SCNC: 11 MEQ/L (ref 8–16)
BUN BLDV-MCNC: 36 MG/DL (ref 7–22)
CALCIUM SERPL-MCNC: 8.5 MG/DL (ref 8.5–10.5)
CHLORIDE BLD-SCNC: 99 MEQ/L (ref 98–111)
CO2: 30 MEQ/L (ref 23–33)
CREAT SERPL-MCNC: 1.4 MG/DL (ref 0.4–1.2)
ERYTHROCYTE [DISTWIDTH] IN BLOOD BY AUTOMATED COUNT: 15.1 % (ref 11.5–14.5)
ERYTHROCYTE [DISTWIDTH] IN BLOOD BY AUTOMATED COUNT: 50.8 FL (ref 35–45)
GFR SERPL CREATININE-BSD FRML MDRD: 36 ML/MIN/1.73M2
GLUCOSE BLD-MCNC: 97 MG/DL (ref 70–108)
HCT VFR BLD CALC: 31.3 % (ref 37–47)
HEMOGLOBIN: 9.9 GM/DL (ref 12–16)
MAGNESIUM: 2.1 MG/DL (ref 1.6–2.4)
MCH RBC QN AUTO: 29.6 PG (ref 26–33)
MCHC RBC AUTO-ENTMCNC: 31.6 GM/DL (ref 32.2–35.5)
MCV RBC AUTO: 93.4 FL (ref 81–99)
PLATELET # BLD: 123 THOU/MM3 (ref 130–400)
PMV BLD AUTO: 10.3 FL (ref 9.4–12.4)
POTASSIUM SERPL-SCNC: 4.3 MEQ/L (ref 3.5–5.2)
RBC # BLD: 3.35 MILL/MM3 (ref 4.2–5.4)
SODIUM BLD-SCNC: 140 MEQ/L (ref 135–145)
WBC # BLD: 7.6 THOU/MM3 (ref 4.8–10.8)

## 2018-07-09 PROCEDURE — 6360000002 HC RX W HCPCS: Performed by: THORACIC SURGERY (CARDIOTHORACIC VASCULAR SURGERY)

## 2018-07-09 PROCEDURE — 85027 COMPLETE CBC AUTOMATED: CPT

## 2018-07-09 PROCEDURE — 71046 X-RAY EXAM CHEST 2 VIEWS: CPT

## 2018-07-09 PROCEDURE — 6360000002 HC RX W HCPCS: Performed by: PHYSICIAN ASSISTANT

## 2018-07-09 PROCEDURE — 36415 COLL VENOUS BLD VENIPUNCTURE: CPT

## 2018-07-09 PROCEDURE — 97110 THERAPEUTIC EXERCISES: CPT

## 2018-07-09 PROCEDURE — 83735 ASSAY OF MAGNESIUM: CPT

## 2018-07-09 PROCEDURE — 2580000003 HC RX 258: Performed by: THORACIC SURGERY (CARDIOTHORACIC VASCULAR SURGERY)

## 2018-07-09 PROCEDURE — 80048 BASIC METABOLIC PNL TOTAL CA: CPT

## 2018-07-09 PROCEDURE — 6370000000 HC RX 637 (ALT 250 FOR IP): Performed by: THORACIC SURGERY (CARDIOTHORACIC VASCULAR SURGERY)

## 2018-07-09 PROCEDURE — 97535 SELF CARE MNGMENT TRAINING: CPT

## 2018-07-09 PROCEDURE — APPSS60 APP SPLIT SHARED TIME 46-60 MINUTES: Performed by: PHYSICIAN ASSISTANT

## 2018-07-09 RX ORDER — POTASSIUM CHLORIDE 20 MEQ/1
10 TABLET, EXTENDED RELEASE ORAL DAILY
Qty: 60 TABLET | Refills: 3 | Status: ON HOLD | OUTPATIENT
Start: 2018-07-09 | End: 2019-03-15 | Stop reason: HOSPADM

## 2018-07-09 RX ORDER — AMIODARONE HYDROCHLORIDE 200 MG/1
200 TABLET ORAL DAILY
Qty: 30 TABLET | Refills: 3 | Status: SHIPPED | OUTPATIENT
Start: 2018-07-10 | End: 2018-08-01 | Stop reason: ALTCHOICE

## 2018-07-09 RX ORDER — FUROSEMIDE 20 MG/1
20 TABLET ORAL DAILY
Qty: 60 TABLET | Refills: 3 | Status: ON HOLD | OUTPATIENT
Start: 2018-07-09 | End: 2019-03-15 | Stop reason: HOSPADM

## 2018-07-09 RX ORDER — ATORVASTATIN CALCIUM 20 MG/1
20 TABLET, FILM COATED ORAL NIGHTLY
Qty: 30 TABLET | Refills: 3 | Status: ON HOLD | OUTPATIENT
Start: 2018-07-09 | End: 2019-02-28 | Stop reason: CLARIF

## 2018-07-09 RX ORDER — CARVEDILOL 12.5 MG/1
12.5 TABLET ORAL 2 TIMES DAILY WITH MEALS
Qty: 60 TABLET | Refills: 3 | Status: ON HOLD | OUTPATIENT
Start: 2018-07-09 | End: 2018-08-26 | Stop reason: HOSPADM

## 2018-07-09 RX ORDER — FAMOTIDINE 20 MG/1
20 TABLET, FILM COATED ORAL DAILY
Status: DISCONTINUED | OUTPATIENT
Start: 2018-07-09 | End: 2018-07-09 | Stop reason: HOSPADM

## 2018-07-09 RX ORDER — AMLODIPINE BESYLATE 5 MG/1
2.5 TABLET ORAL NIGHTLY
Qty: 30 TABLET | Refills: 3 | Status: SHIPPED | OUTPATIENT
Start: 2018-07-09 | End: 2018-08-01 | Stop reason: SINTOL

## 2018-07-09 RX ADMIN — CETIRIZINE HYDROCHLORIDE 5 MG: 10 TABLET ORAL at 08:25

## 2018-07-09 RX ADMIN — POTASSIUM CHLORIDE 20 MEQ: 20 TABLET, EXTENDED RELEASE ORAL at 08:24

## 2018-07-09 RX ADMIN — ASPIRIN 325 MG: 325 TABLET, DELAYED RELEASE ORAL at 08:24

## 2018-07-09 RX ADMIN — DOCUSATE SODIUM 100 MG: 100 CAPSULE, LIQUID FILLED ORAL at 08:24

## 2018-07-09 RX ADMIN — CARVEDILOL 12.5 MG: 6.25 TABLET, FILM COATED ORAL at 08:25

## 2018-07-09 RX ADMIN — ENOXAPARIN SODIUM 40 MG: 40 INJECTION, SOLUTION INTRAVENOUS; SUBCUTANEOUS at 08:25

## 2018-07-09 RX ADMIN — PAROXETINE HYDROCHLORIDE 30 MG: 30 TABLET, FILM COATED ORAL at 08:24

## 2018-07-09 RX ADMIN — Medication 10 ML: at 08:25

## 2018-07-09 RX ADMIN — AMIODARONE HYDROCHLORIDE 200 MG: 200 TABLET ORAL at 08:25

## 2018-07-09 RX ADMIN — MULTIPLE VITAMINS W/ MINERALS TAB 1 TABLET: TAB at 08:25

## 2018-07-09 RX ADMIN — FUROSEMIDE 20 MG: 10 INJECTION, SOLUTION INTRAMUSCULAR; INTRAVENOUS at 08:30

## 2018-07-09 RX ADMIN — FAMOTIDINE 20 MG: 20 TABLET ORAL at 08:30

## 2018-07-09 ASSESSMENT — PAIN SCALES - GENERAL: PAINLEVEL_OUTOF10: 0

## 2018-07-09 NOTE — PROGRESS NOTES
stood X 5 minutes)  Toileting: Minimal assistance (to pull up depends)               Transfers  Sit to stand: Minimal assistance (from lower surface, CGA from recliner)  Stand to sit: Contact guard assistance (to recliner)  Toilet Transfers  Equipment Used: Standard toilet  Toilet Transfer: Minimal assistance  Toilet Transfers Comments: cues for proper hand placement     Balance  Sitting Balance: Supervision  Standing Balance: Stand by assistance     Time: 5 minutes during ADLs     Functional Mobility  Functional - Mobility Device: Rolling Walker  Activity: To/from bathroom; Other  Assist Level: Contact guard assistance (into hallway, slow steady pace)  Functional Mobility Comments: slightly unsteady with increased distance, vc for safety and taking standing rest breaks. Type of ROM/Therapeutic Exercise: AROM  Comment: Completed CABG step II exercises x12 reps x1 set seated EOB. Required brief rest break after each exercise. Completed to help increase activity tolerance required for ADLs                       Activity Tolerance:  Activity Tolerance: Patient Tolerated treatment well    Assessment:     Performance deficits / Impairments: Decreased functional mobility , Decreased ADL status, Decreased endurance, Decreased balance  Prognosis: Good  Discharge Recommendations: Continue to assess pending progress, Patient would benefit from continued therapy after discharge, ECF with OT    Patient Education:  Patient Education: safety with transfers while maintaining sternal precautions, CABG exercises, ADL strategies  Barriers to Learning: none    Equipment Recommendations:   Other: Continue to assess    Safety:  Safety Devices in place: Yes  Type of devices: Call light within reach, Chair alarm in place, Left in chair, Gait belt    Plan:  Times per week: 6x  Current Treatment Recommendations: Balance Training, Self-Care / ADL, Safety Education & Training, Functional Mobility Training, Endurance Training  Plan Comment: Pt would benefit from continued OT while at the [facility]  Specific instructions for Next Treatment: Functional mobility; ADLs and adaptations; CABG exercises    Goals:  Patient goals : get back to PLOF    Short term goals  Time Frame for Short term goals: 2 weeks  Short term goal 1: Complete step 2 cardiac exercises x 12 reps with min RBs to increase endurance for BADL  Short term goal 2: Complete various t/fs including toilet with min A & min vcs for sternal precautions  Short term goal 3: Tolerate standing 2-3 min with CGA for increased ease of sinkside grooming  Short term goal 4: Complete mobility to/from bathroom with RW, CGA, & min vcs for walker safety  Short term goal 5: Complete LE dressing with mod A & LH AE prn  Long term goals  Time Frame for Long term goals : No LTG set d/t short ELOS

## 2018-07-09 NOTE — FLOWSHEET NOTE
Patient is a 69year old female. Patient was visited during rounding. Patient said she ad Afib for a long time. Stated that she felt funny and went to see her cardiologist who discovered several blockages and recommended her for open heart. Patent also said she is coming along well and feeling much better since her surgery. Patient also told me she is a member of [employer] in [place]. Patient asked for prayer for continue healing. Prayer was offered in respond. I also offered emotional support and nurtured hope as patient continue to heal.     SC will continue with attentive listening, and provide emotional and spiritual support including prayer. SC service remain available to this patient at anytime.

## 2018-07-09 NOTE — CARE COORDINATION
7/9/18, 8:30 AM    Discharge plan discussed by  and . Discharge plan reviewed with patient/ family. Patient/ family verbalize understanding of discharge plan and are in agreement with plan. Understanding was demonstrated using the teach back method. IMM Letter  IMM Letter given to Patient/Family/Significant other/Guardian/POA/by[de-identified] CM  IMM Letter date given[de-identified] 07/06/18  IMM Letter time given[de-identified] 0528     Nurse states discharge today, rosamaria called [facility] and spoke with darby Pineda for transfer today. Nurse to call report [phone] and fax AVS [phone]. ROSAMARIA spoke with patient, states friend Vero De Jesuskamla will transport to [facility]. Patient going under skilled Medicare benefits.

## 2018-07-09 NOTE — PLAN OF CARE
Problem: Nutrition  Goal: Optimal nutrition therapy  Outcome: Ongoing  Patient eating food brought in from outside by friends and family. Patient states she has a good appetite    Problem: Pain:  Goal: Pain level will decrease  Pain level will decrease   Outcome: Ongoing  No complaints of pain this shift. No medications were given for pain relief. Will continue to monitor    Problem: Discharge Planning:  Goal: Discharged to appropriate level of care  Discharged to appropriate level of care   Outcome: Ongoing  Patient plans to be discharged to [facility] for further care. Problem: Cardiac Output - Decreased:  Goal: Cardiac output within specified parameters  Cardiac output within specified parameters   Outcome: Ongoing  All vitals within normal limits this shift. Will continue to asses. Problem: Risk for Impaired Skin Integrity  Goal: Tissue integrity - skin and mucous membranes  Structural intactness and normal physiological function of skin and  mucous membranes. Outcome: Ongoing  Patient educated on importance of turning every two hours. Patient verbalized understanding of education. Skin assessed every four hours and documented. Will continue to assess    Problem: Falls - Risk of:  Goal: Will remain free from falls  Will remain free from falls   Outcome: Ongoing  Patient remains free from falls this shift. Patient bed in lowest position, call light within reach, purposeful hourly rounding; use of assistive devices to ambulate and with proper assistance; use of non slip socks when ambulating. Will continue to monitor    Problem: ABCDS Injury Assessment  Goal: Absence of physical injury  Outcome: Ongoing  Patient remains free from injury this shift. Will continue to monitor. Comments: Careplan reviewed with patient.  Patient verbalizes understanding of the plan of care and contributes to goal setting

## 2018-07-09 NOTE — CARE COORDINATION
7/9/18, 12:17 PM   IMM Letter  IMM Letter given to Patient/Family/Significant other/Guardian/POA/by[de-identified] CM  IMM Letter date given[de-identified] 07/09/18  IMM Letter time given[de-identified] 0622   Patient does not wish to appeal discharge

## 2018-07-09 NOTE — PROGRESS NOTES
0780 Patient friend Susan Kin here to take patient to nursing home. AVS/last dose MAR faxed to [facility]. IV taken out by Yazmin Whitney, all belongings packed, patient dressed. Home care instructions regarding incision care and sternal precautions discussed. Signs of infections discussed with patient and RN at [facility]. Called report to Deven Bejarano RN who was updated that patient will be on her way shortly. No other questions at this time.

## 2018-07-09 NOTE — PROGRESS NOTES
CT/CV Surgery Progress Note    2018 7:34 AM  Surgeon:  Dr. Chang Coelho     Subjective:  Ms. Marcie Roper  Is resting comfortably in bedside chair on RA, alert, and in no acute distress. No new events over the weekend. Pt denies chest pressure, SOB, fever, chills, N/V/D. Vital Signs: /68   Pulse 82   Temp 98.5 °F (36.9 °C) (Oral)   Resp 20   Ht 5' 5\" (1.651 m)   Wt 223 lb 6.4 oz (101.3 kg)   SpO2 95%   BMI 37.18 kg/m²     Temp (24hrs), Av °F (36.7 °C), Min:97.6 °F (36.4 °C), Max:98.5 °F (36.9 °C)      Weight: 223 lb 6.4 oz (101.3 kg)       PULSE OXIMETRY RANGE: SpO2  Av.8 %  Min: 93 %  Max: 96 %     Labs:   CBC:   Recent Labs      18   0610  18   0552  18   0540   WBC  6.9  7.3  7.6   HGB  9.6*  10.3*  9.9*   HCT  30.1*  32.4*  31.3*   MCV  91.5  93.6  93.4   PLT  117*  125*  123*     BMP:   Recent Labs      18   0610  18   0552  18   0540   NA  141  139  140   K  4.1  4.4  4.3   CL  99  98  99   CO2  30  29  30   BUN  37*  36*  36*   CREATININE  1.1  1.3*  1.4*   MG  1.9  2.1  2.1     Last HgA1C:    Lab Results   Component Value Date    LABA1C 5.3 2018       Imaging:  CXR: Reviewed CXR  1. Stable 5% right apical pneumothorax. 2. Small bilateral pleural effusions with mild bibasilar atelectasis.        Intake/Output Summary (Last 24 hours) at 18 0734  Last data filed at 18 1357   Gross per 24 hour   Intake              300 ml   Output              110 ml   Net              190 ml       Scheduled Meds:    potassium chloride  20 mEq Oral TID WC    potassium (CARDIAC) replacement protocol   Other RX Placeholder    furosemide  20 mg Intravenous Q12H    carvedilol  12.5 mg Oral BID WC    amiodarone  200 mg Oral Daily    PARoxetine  30 mg Oral QAM    traZODone  50 mg Oral Nightly    fluticasone  1 spray Nasal Daily    cetirizine  5 mg Oral Daily    sodium chloride flush  10 mL Intravenous 2 times per day    calcium replacement protocol Other RX Placeholder    docusate sodium  100 mg Oral BID    famotidine  20 mg Oral BID    mupirocin   Topical Once    therapeutic multivitamin-minerals  1 tablet Oral Daily with breakfast    atorvastatin  20 mg Oral Nightly    enoxaparin  40 mg Subcutaneous Daily    aspirin  325 mg Oral Daily       ROS: All neg unless specifically mentioned in subjective section. Assessment:     Patient Active Problem List   Diagnosis    Chronic atrial fibrillation (HCC)    Ascending cholangitis, possible    Elevated LFTs    Thrombocytopenia (HCC)    Leukopenia    [facility] [address]    SIRS (systemic inflammatory response syndrome) ([facility] [address]    Cholecystitis, acute    Neutropenia ([facility] [address]    Aortic stenosis, severe         Healthcare Directive: Yes, patient has an advance directive for healthcare treatment       Plan: 7/5/18  1. Clinically stable- VSS/NSR  2. Cr 1.4  3. Discuss d/c to nursing home today. The plan of care was discussed in detail with Dr. Neil Rausch.     Anatoliy Browne PA-C

## 2018-07-09 NOTE — DISCHARGE SUMMARY
alert, tolerating a regular diet, having bowel movements,ambulating on her own accord and had adequate analgesia on oral pain medications, and had no signs of symptoms of complications.     DISCHARGE INSTRUCTIONS:  Discharge Medications:         Medication List      START taking these medications    amiodarone 200 MG tablet  Commonly known as:  CORDARONE  Take 1 tablet by mouth daily     aspirin 325 MG EC tablet  Take 1 tablet by mouth daily     atorvastatin 20 MG tablet  Commonly known as:  LIPITOR  Take 1 tablet by mouth nightly     carvedilol 12.5 MG tablet  Commonly known as:  COREG  Take 1 tablet by mouth 2 times daily (with meals)     furosemide 20 MG tablet  Commonly known as:  LASIX  Take 1 tablet by mouth daily     potassium chloride 20 MEQ extended release tablet  Commonly known as:  KLOR-CON M  Take 0.5 tablets by mouth daily        CHANGE how you take these medications    amLODIPine 5 MG tablet  Commonly known as:  NORVASC  Take 0.5 tablets by mouth nightly  What changed:  how much to take        CONTINUE taking these medications    Acetaminophen 650 MG Tabs  Take 650 mg by mouth every 4 hours as needed     CALTRATE PLUS PO     fluticasone 50 MCG/ACT nasal spray  Commonly known as:  FLONASE     loratadine 10 MG tablet  Commonly known as:  CLARITIN     omeprazole 20 MG delayed release capsule  Commonly known as:  PRILOSEC     OSTEO BI-FLEX TRIPLE STRENGTH PO     PARoxetine 30 MG tablet  Commonly known as:  PAXIL     PRESERVISION AREDS Caps     traZODone 50 MG tablet  Commonly known as:  DESYREL     vitamin B-6 100 MG tablet  Commonly known as:  PYRIDOXINE     vitamin C 250 MG tablet        STOP taking these medications    metoprolol tartrate 25 MG tablet  Commonly known as:  LOPRESSOR     warfarin 5 MG tablet  Commonly known as:  COUMADIN           Where to Get Your Medications      These medications were sent to [address] [phone] - F [phone] Eliseo Fortune, [address]    Phone:  [phone]   · amiodarone 200 MG tablet  · amLODIPine 5 MG tablet  · aspirin 325 MG EC tablet  · atorvastatin 20 MG tablet  · carvedilol 12.5 MG tablet  · furosemide 20 MG tablet  · potassium chloride 20 MEQ extended release tablet       Diet: AHA diet as tolerated  Activity: As instructed on discharge, no lifting more than 10 lbs for one month, no driving while on narcotics. Aerobic activity (walking, climbing stairs, etc.) is encouraged. Wound Care: Clean wounds as instructed on discharge    Discharge Medications for AVR (performed or attempted):    ASA:                            Yes                      Statin:                          Yes  ACE/ARB:                    No, CCB        P2Y12 Inhibitor:            No      Beta Blocker:               Yes  Nitro SL:                       No, no CAD        Cardiac Rehab:            Yes  Dietary Consult: Yes            Healthcare Directive: Yes, patient has an advance directive for healthcare treatment       Follow-up:    1   Follow up with ARTHUR Flores CNP 2-3 weeks  2. Follow up with Dr. Lorelei Riley  in 3-4 weeks, with PA and Lateral CXR  3. Pt fully agreeable to d/c plans. All questions were thoroughly answered.        Camelia Snider   Electronincally signed 7/9/2018 at 10:39 AM    CC: ARTHUR Flores CNP

## 2018-07-09 NOTE — PROGRESS NOTES
[facility] RESPIRATORY ASSESSMENT PROGRAM (RAP)  30 kg and over      Patient Name: Cory Godwin Room#: [id number] : 1940     Admitting diagnosis: Aortic stenosis, severe [I35.0]      ASSESSMENT     Vitals  Pulse: 67   Resp: 18  BP: 137/63  SpO2: 93 %  Temp: 98.9 °F (37.2 °C)  Breath Sounds:clear    Inspiratory Capacity:   Preoperative/predictive value: 1500   33% of value: 495 ml      75% of value: 1125 ml   10 ml/kg of IBW: 570 ml   Patient's Actual Inspiratory Capacity: 500-600 ml    CARE PLAN  All Care Plan selections must be based on the approved algorithms located on line in the Policy and Procedures under Respiratory Assessment Adult Protocol Handbook    INDICATIONS FOR THERAPY BASED ON HISTORY AND ASSESSMENT  Bronchial Hygiene Goal: Improvement in sputum mobilization in patients with ineffective airway clearance. Reverse the atelectasis. [] Atelectasis caused by mucus plugging     [] Chronic mucucillary clearance disorder  [] Improve cough effectiveness. Copious secretions unable to clear with cough or suctioning.    [x] No indications  Volume Expansion Goal: Prevent atelectasis, Absence or improvement in signs of atelectasis, improve respiratory muscle performance  [x] Post Thoracic or upper abdominal surgery    [] Surgery in COPD patients  [] Atelectasis, reduced lung volume on X-ray    [] CPAP indications are seen  [] Restrictive pulmonary or neuromuscular disorder   [] No indications  Inhaled Medications Goal: Improve respiratory functions in patients with airway disease and decrease WOB  [] Wheezing, diminished, or absent breath sounds associated with a pulmonary disorder  [] Known COPD  [] Peak flow < 80% predicted or personal best for known asthma patients  [] Documented obstructive defect on pulmonary function testing which is reversible   [] With a mucolytic to prevent bronchospasm  [] Home regimen  [x] No indications  Oxygenation  Goal: Reverse hypoxemia and improve tissue oxygenation  []

## 2018-07-17 ENCOUNTER — TELEPHONE (OUTPATIENT)
Dept: CARDIOTHORACIC SURGERY | Age: 78
End: 2018-07-17

## 2018-07-17 NOTE — TELEPHONE ENCOUNTER
Mack Kang from the [address] [place] [address] called with a concern of a transparent dressing that was left on the patient. Wants to know if that could come of? Please advise. Thank You.

## 2018-07-17 NOTE — TELEPHONE ENCOUNTER
Called and spoke with Joan Crook at the [facility] about removing the transparent dressing.      Nurse voiced understanding

## 2018-08-01 ENCOUNTER — OFFICE VISIT (OUTPATIENT)
Dept: CARDIOTHORACIC SURGERY | Age: 78
End: 2018-08-01

## 2018-08-01 ENCOUNTER — HOSPITAL ENCOUNTER (OUTPATIENT)
Age: 78
Discharge: HOME | End: 2018-08-01
Payer: MEDICARE

## 2018-08-01 VITALS
BODY MASS INDEX: 38.69 KG/M2 | DIASTOLIC BLOOD PRESSURE: 70 MMHG | HEIGHT: 65 IN | WEIGHT: 232.2 LBS | SYSTOLIC BLOOD PRESSURE: 138 MMHG | HEART RATE: 75 BPM

## 2018-08-01 DIAGNOSIS — Z86.79 S/P MAZE OPERATION FOR ATRIAL FIBRILLATION: ICD-10-CM

## 2018-08-01 DIAGNOSIS — Z98.890 S/P MAZE OPERATION FOR ATRIAL FIBRILLATION: ICD-10-CM

## 2018-08-01 DIAGNOSIS — Z95.2 S/P AVR: ICD-10-CM

## 2018-08-01 DIAGNOSIS — I48.20 CHRONIC ATRIAL FIBRILLATION (HCC): Primary | ICD-10-CM

## 2018-08-01 LAB
EKG ATRIAL RATE: 76 BPM
EKG P AXIS: 77 DEGREES
EKG P-R INTERVAL: 180 MS
EKG Q-T INTERVAL: 404 MS
EKG QRS DURATION: 108 MS
EKG QTC CALCULATION (BAZETT): 454 MS
EKG R AXIS: 42 DEGREES
EKG T AXIS: 164 DEGREES
EKG VENTRICULAR RATE: 76 BPM

## 2018-08-01 PROCEDURE — 93005 ELECTROCARDIOGRAM TRACING: CPT | Performed by: THORACIC SURGERY (CARDIOTHORACIC VASCULAR SURGERY)

## 2018-08-01 PROCEDURE — 93010 ELECTROCARDIOGRAM REPORT: CPT | Performed by: INTERNAL MEDICINE

## 2018-08-01 PROCEDURE — 99024 POSTOP FOLLOW-UP VISIT: CPT | Performed by: THORACIC SURGERY (CARDIOTHORACIC VASCULAR SURGERY)

## 2018-08-01 RX ORDER — LISINOPRIL 2.5 MG/1
2.5 TABLET ORAL DAILY
Qty: 30 TABLET | Refills: 3
Start: 2018-08-01 | End: 2018-12-05

## 2018-08-21 ENCOUNTER — HOSPITAL ENCOUNTER (EMERGENCY)
Age: 78
Discharge: HOME | DRG: 432 | End: 2018-08-21
Attending: EMERGENCY MEDICINE
Payer: MEDICARE

## 2018-08-21 VITALS
RESPIRATION RATE: 16 BRPM | WEIGHT: 230 LBS | OXYGEN SATURATION: 95 % | HEART RATE: 80 BPM | HEIGHT: 64 IN | DIASTOLIC BLOOD PRESSURE: 74 MMHG | BODY MASS INDEX: 39.27 KG/M2 | SYSTOLIC BLOOD PRESSURE: 115 MMHG | TEMPERATURE: 97.6 F

## 2018-08-21 DIAGNOSIS — K52.9 ENTERITIS: Primary | ICD-10-CM

## 2018-08-21 LAB
ALBUMIN SERPL-MCNC: 2.8 GM/DL (ref 3.4–5)
ALP BLD-CCNC: 79 U/L (ref 46–116)
ALT SERPL-CCNC: 13 U/L (ref 14–63)
ANION GAP: 6 MEQ/L (ref 8–16)
AST SERPL-CCNC: 22 U/L (ref 15–37)
BASOPHILS # BLD: 0.3 % (ref 0–3)
BILIRUB SERPL-MCNC: 0.8 MG/DL (ref 0.2–1)
BUN BLDV-MCNC: 21 MG/DL (ref 7–18)
CHLORIDE BLD-SCNC: 102 MEQ/L (ref 98–107)
CO2: 33 MEQ/L (ref 21–32)
CREAT SERPL-MCNC: 1.3 MG/DL (ref 0.6–1.3)
EOSINOPHILS RELATIVE PERCENT: 1.8 % (ref 0–4)
GFR, ESTIMATED: 42 ML/MIN/1.73M2
GLUCOSE BLD-MCNC: 91 MG/DL (ref 74–106)
HCT VFR BLD CALC: 33.2 % (ref 37–47)
HEMOGLOBIN: 10.8 GM/DL (ref 12–16)
INR BLD: 1.14 (ref 0.85–1.13)
LIPASE: 77 U/L (ref 73–393)
LYMPHOCYTES # BLD: 19.3 % (ref 15–47)
MCH RBC QN AUTO: 29.1 PG (ref 27–31)
MCHC RBC AUTO-ENTMCNC: 32.5 GM/DL (ref 33–37)
MCV RBC AUTO: 89.3 FL (ref 81–99)
MONOCYTES: 7.4 % (ref 0–12)
PDW BLD-RTO: 15.8 % (ref 11.5–14.5)
PLATELET # BLD: 149 THOU/MM3 (ref 130–400)
PMV BLD AUTO: 7.7 FL (ref 7.4–10.4)
POC CALCIUM: 7.9 MG/DL (ref 8.5–10.1)
POTASSIUM SERPL-SCNC: 3.7 MEQ/L (ref 3.5–5.1)
RBC # BLD: 3.72 MILL/MM3 (ref 4.2–5.4)
SEGS: 71.2 % (ref 43–75)
SODIUM BLD-SCNC: 141 MEQ/L (ref 136–145)
TOTAL PROTEIN: 5.9 GM/DL (ref 6.4–8.2)
WBC # BLD: 4.9 THOU/MM3 (ref 4.8–10.8)

## 2018-08-21 PROCEDURE — 2580000003 HC RX 258: Performed by: EMERGENCY MEDICINE

## 2018-08-21 PROCEDURE — 99284 EMERGENCY DEPT VISIT MOD MDM: CPT

## 2018-08-21 PROCEDURE — 36415 COLL VENOUS BLD VENIPUNCTURE: CPT

## 2018-08-21 PROCEDURE — 80053 COMPREHEN METABOLIC PANEL: CPT

## 2018-08-21 PROCEDURE — 85025 COMPLETE CBC W/AUTO DIFF WBC: CPT

## 2018-08-21 PROCEDURE — 2709999900 HC NON-CHARGEABLE SUPPLY

## 2018-08-21 PROCEDURE — 85610 PROTHROMBIN TIME: CPT

## 2018-08-21 PROCEDURE — 83690 ASSAY OF LIPASE: CPT

## 2018-08-21 RX ORDER — DIPHENOXYLATE HYDROCHLORIDE AND ATROPINE SULFATE 2.5; .025 MG/1; MG/1
1 TABLET ORAL 4 TIMES DAILY PRN
Qty: 20 TABLET | Refills: 0 | Status: ON HOLD | OUTPATIENT
Start: 2018-08-21 | End: 2018-08-26 | Stop reason: HOSPADM

## 2018-08-21 RX ORDER — 0.9 % SODIUM CHLORIDE 0.9 %
1000 INTRAVENOUS SOLUTION INTRAVENOUS ONCE
Status: COMPLETED | OUTPATIENT
Start: 2018-08-21 | End: 2018-08-21

## 2018-08-21 RX ORDER — ONDANSETRON 4 MG/1
4 TABLET, FILM COATED ORAL EVERY 8 HOURS PRN
Qty: 20 TABLET | Refills: 0 | Status: ON HOLD | OUTPATIENT
Start: 2018-08-21 | End: 2022-02-09

## 2018-08-21 RX ORDER — ONDANSETRON 4 MG/1
4 TABLET, FILM COATED ORAL EVERY 8 HOURS PRN
COMMUNITY
End: 2018-08-21

## 2018-08-21 RX ADMIN — SODIUM CHLORIDE 1000 ML: 9 INJECTION, SOLUTION INTRAVENOUS at 13:59

## 2018-08-21 NOTE — ED PROVIDER NOTES
Thania Ginnarocio  [address] [address] [place] [address]  Phone: [phone]    Emergency Department encounter      CHIEF COMPLAINT    Chief Complaint   Patient presents with    Diarrhea    Nausea       HPI    Judge Amaya is a 69 y.o. female who presents  Above-noted complaint. Patient presents with diarrhea. She hasn't been feeling good. She is recently been hospitalized in nursing home and now has been home for about 2 and half weeks. She is developed some diarrhea during this period. Mostly water at times. Denies melena or hematochezia. PAST MEDICAL HISTORY    Past Medical History:   Diagnosis Date    ISIDRA (acute kidney injury) ([facility] [address] 6/11/2016    Atrial fibrillation (HCC)     Bleeding stomach ulcer 2011    History of blood transfusion     Hypertension     PONV (postoperative nausea and vomiting)        SURGICAL HISTORY    Past Surgical History:   Procedure Laterality Date    DILATION AND CURETTAGE OF UTERUS      JOINT REPLACEMENT Right 2011    knee    OK OFFICE/OUTPT VISIT,PROCEDURE ONLY N/A 6/28/2018    BIOPROSTHETIC AORTIC VALVE REPLACEMENT WITH MAZE PROCEDURE performed by Cheryle Rhein, MD at [address] [facility]    Current Outpatient Rx   Medication Sig Dispense Refill    diphenoxylate-atropine (LOMOTIL) 2.5-0.025 MG per tablet Take 1 tablet by mouth 4 times daily as needed for Diarrhea for up to 10 days. . 20 tablet 0    ondansetron (ZOFRAN) 4 MG tablet Take 1 tablet by mouth every 8 hours as needed for Nausea or Vomiting 20 tablet 0    lisinopril (PRINIVIL;ZESTRIL) 2.5 MG tablet Take 1 tablet by mouth daily 30 tablet 3    atorvastatin (LIPITOR) 20 MG tablet Take 1 tablet by mouth nightly 30 tablet 3    carvedilol (COREG) 12.5 MG tablet Take 1 tablet by mouth 2 times daily (with meals) 60 tablet 3    furosemide (LASIX) 20 MG tablet Take 1 tablet by mouth daily 60 tablet 3    fluticasone (FLONASE) 50 MCG/ACT nasal spray 1-2 sprays by Nasal route

## 2018-08-21 NOTE — ED NOTES
PT. Presents ambulatory to [facility] with walker, pt. Declines w/c. C/o watery diarrhea since she has been home from [facility] home approx. 2 week, c/o nausea, denies any abdominal pain at this time. Pt. Taken to exam 2 and placed on continuous cardiac monitor. Pt. Pale and wam, states \"last Thursday in June had open heart surgery for valve replacement\",  \"haven't felt good since I've been home\" . Abdomen soft with hyperactive BS x 4, denies tenderness with palpation.   Carolyn Chase, RN  08/21/18 5380 Marivel Whiteside RN  08/21/18 2460

## 2018-08-21 NOTE — PROGRESS NOTES
Pt. Resting quietly, denies any needs at this time, friend at bedside, conversing with pt. IV infusing without signs or symptoms of redness or infiltration.

## 2018-08-23 ENCOUNTER — APPOINTMENT (OUTPATIENT)
Dept: CT IMAGING | Age: 78
DRG: 432 | End: 2018-08-23
Payer: MEDICARE

## 2018-08-23 ENCOUNTER — HOSPITAL ENCOUNTER (INPATIENT)
Age: 78
LOS: 2 days | Discharge: HOME HEALTH | DRG: 432 | End: 2018-08-26
Attending: EMERGENCY MEDICINE | Admitting: FAMILY MEDICINE
Payer: MEDICARE

## 2018-08-23 DIAGNOSIS — R77.8 ELEVATED TROPONIN: Primary | ICD-10-CM

## 2018-08-23 DIAGNOSIS — K52.9 COLITIS: ICD-10-CM

## 2018-08-23 LAB
ALBUMIN SERPL-MCNC: 3.2 G/DL (ref 3.5–5.1)
ALP BLD-CCNC: 85 U/L (ref 38–126)
ALT SERPL-CCNC: 9 U/L (ref 11–66)
ANION GAP SERPL CALCULATED.3IONS-SCNC: 11 MEQ/L (ref 8–16)
AST SERPL-CCNC: 21 U/L (ref 5–40)
BASOPHILS # BLD: 0.8 %
BASOPHILS ABSOLUTE: 0 THOU/MM3 (ref 0–0.1)
BILIRUB SERPL-MCNC: 0.7 MG/DL (ref 0.3–1.2)
BUN BLDV-MCNC: 18 MG/DL (ref 7–22)
CALCIUM SERPL-MCNC: 8.4 MG/DL (ref 8.5–10.5)
CHLORIDE BLD-SCNC: 102 MEQ/L (ref 98–111)
CO2: 28 MEQ/L (ref 23–33)
CREAT SERPL-MCNC: 1.2 MG/DL (ref 0.4–1.2)
EOSINOPHIL # BLD: 1.6 %
EOSINOPHILS ABSOLUTE: 0.1 THOU/MM3 (ref 0–0.4)
ERYTHROCYTE [DISTWIDTH] IN BLOOD BY AUTOMATED COUNT: 15.1 % (ref 11.5–14.5)
ERYTHROCYTE [DISTWIDTH] IN BLOOD BY AUTOMATED COUNT: 52.3 FL (ref 35–45)
GFR SERPL CREATININE-BSD FRML MDRD: 43 ML/MIN/1.73M2
GLUCOSE BLD-MCNC: 110 MG/DL (ref 70–108)
HCT VFR BLD CALC: 34.6 % (ref 37–47)
HEMOGLOBIN: 10.5 GM/DL (ref 12–16)
IMMATURE GRANS (ABS): 0.02 THOU/MM3 (ref 0–0.07)
IMMATURE GRANULOCYTES: 0.4 %
LIPASE: 14.2 U/L (ref 5.6–51.3)
LYMPHOCYTES # BLD: 16.9 %
LYMPHOCYTES ABSOLUTE: 0.9 THOU/MM3 (ref 1–4.8)
MCH RBC QN AUTO: 28.7 PG (ref 26–33)
MCHC RBC AUTO-ENTMCNC: 30.3 GM/DL (ref 32.2–35.5)
MCV RBC AUTO: 94.5 FL (ref 81–99)
MONOCYTES # BLD: 10.5 %
MONOCYTES ABSOLUTE: 0.5 THOU/MM3 (ref 0.4–1.3)
NUCLEATED RED BLOOD CELLS: 0 /100 WBC
OSMOLALITY CALCULATION: 283.8 MOSMOL/KG (ref 275–300)
PLATELET # BLD: 147 THOU/MM3 (ref 130–400)
PMV BLD AUTO: 9.7 FL (ref 9.4–12.4)
POTASSIUM SERPL-SCNC: 3.9 MEQ/L (ref 3.5–5.2)
RBC # BLD: 3.66 MILL/MM3 (ref 4.2–5.4)
SEG NEUTROPHILS: 69.8 %
SEGMENTED NEUTROPHILS ABSOLUTE COUNT: 3.6 THOU/MM3 (ref 1.8–7.7)
SODIUM BLD-SCNC: 141 MEQ/L (ref 135–145)
TOTAL PROTEIN: 6.1 G/DL (ref 6.1–8)
TROPONIN T: 0.01 NG/ML
WBC # BLD: 5.1 THOU/MM3 (ref 4.8–10.8)

## 2018-08-23 PROCEDURE — 6360000002 HC RX W HCPCS: Performed by: EMERGENCY MEDICINE

## 2018-08-23 PROCEDURE — 80053 COMPREHEN METABOLIC PANEL: CPT

## 2018-08-23 PROCEDURE — 74176 CT ABD & PELVIS W/O CONTRAST: CPT

## 2018-08-23 PROCEDURE — 84484 ASSAY OF TROPONIN QUANT: CPT

## 2018-08-23 PROCEDURE — 96374 THER/PROPH/DIAG INJ IV PUSH: CPT

## 2018-08-23 PROCEDURE — 36415 COLL VENOUS BLD VENIPUNCTURE: CPT

## 2018-08-23 PROCEDURE — 2580000003 HC RX 258: Performed by: EMERGENCY MEDICINE

## 2018-08-23 PROCEDURE — 83690 ASSAY OF LIPASE: CPT

## 2018-08-23 PROCEDURE — 99285 EMERGENCY DEPT VISIT HI MDM: CPT

## 2018-08-23 PROCEDURE — 85025 COMPLETE CBC W/AUTO DIFF WBC: CPT

## 2018-08-23 RX ORDER — ASPIRIN 81 MG/1
324 TABLET, CHEWABLE ORAL ONCE
Status: COMPLETED | OUTPATIENT
Start: 2018-08-24 | End: 2018-08-24

## 2018-08-23 RX ORDER — METOCLOPRAMIDE HYDROCHLORIDE 5 MG/ML
10 INJECTION INTRAMUSCULAR; INTRAVENOUS ONCE
Status: COMPLETED | OUTPATIENT
Start: 2018-08-23 | End: 2018-08-23

## 2018-08-23 RX ORDER — 0.9 % SODIUM CHLORIDE 0.9 %
1000 INTRAVENOUS SOLUTION INTRAVENOUS ONCE
Status: COMPLETED | OUTPATIENT
Start: 2018-08-23 | End: 2018-08-24

## 2018-08-23 RX ORDER — ONDANSETRON 2 MG/ML
4 INJECTION INTRAMUSCULAR; INTRAVENOUS ONCE
Status: DISCONTINUED | OUTPATIENT
Start: 2018-08-23 | End: 2018-08-23

## 2018-08-23 RX ADMIN — SODIUM CHLORIDE 1000 ML: 9 INJECTION, SOLUTION INTRAVENOUS at 23:24

## 2018-08-23 RX ADMIN — METOCLOPRAMIDE 10 MG: 5 INJECTION, SOLUTION INTRAMUSCULAR; INTRAVENOUS at 23:24

## 2018-08-23 ASSESSMENT — ENCOUNTER SYMPTOMS
NAUSEA: 1
SORE THROAT: 0
EYE PAIN: 0
BACK PAIN: 0
COUGH: 0
DIARRHEA: 1
VOMITING: 0
EYE DISCHARGE: 0
SHORTNESS OF BREATH: 0
RHINORRHEA: 0
WHEEZING: 0
ABDOMINAL PAIN: 0

## 2018-08-24 ENCOUNTER — APPOINTMENT (OUTPATIENT)
Dept: ULTRASOUND IMAGING | Age: 78
DRG: 432 | End: 2018-08-24
Payer: MEDICARE

## 2018-08-24 ENCOUNTER — APPOINTMENT (OUTPATIENT)
Dept: GENERAL RADIOLOGY | Age: 78
DRG: 432 | End: 2018-08-24
Payer: MEDICARE

## 2018-08-24 ENCOUNTER — APPOINTMENT (OUTPATIENT)
Dept: MRI IMAGING | Age: 78
DRG: 432 | End: 2018-08-24
Payer: MEDICARE

## 2018-08-24 PROBLEM — R11.0 NAUSEA: Status: ACTIVE | Noted: 2018-08-24

## 2018-08-24 PROBLEM — D64.9 ANEMIA, NORMOCYTIC NORMOCHROMIC: Status: ACTIVE | Noted: 2018-08-24

## 2018-08-24 PROBLEM — Z95.2 S/P AVR (AORTIC VALVE REPLACEMENT): Status: ACTIVE | Noted: 2018-08-24

## 2018-08-24 PROBLEM — I10 ESSENTIAL HYPERTENSION: Status: ACTIVE | Noted: 2018-08-24

## 2018-08-24 PROBLEM — R19.7 DIARRHEA: Status: ACTIVE | Noted: 2018-08-24

## 2018-08-24 PROBLEM — K52.9 COLITIS: Status: ACTIVE | Noted: 2018-08-24

## 2018-08-24 PROBLEM — E83.51 HYPOCALCEMIA: Status: ACTIVE | Noted: 2018-08-24

## 2018-08-24 PROBLEM — R77.8 ELEVATED TROPONIN: Status: ACTIVE | Noted: 2018-08-24

## 2018-08-24 PROBLEM — K74.60 CIRRHOSIS (HCC): Status: ACTIVE | Noted: 2018-08-24

## 2018-08-24 LAB
ABSOLUTE RETIC #: 79 THOU/MM3 (ref 20–115)
BACTERIA: ABNORMAL /HPF
BILIRUBIN URINE: NEGATIVE
BLOOD, URINE: NEGATIVE
CALCIUM IONIZED: 1.09 MMOL/L (ref 1.12–1.32)
CASTS 2: ABNORMAL /LPF
CASTS UA: ABNORMAL /LPF
CHARACTER, URINE: CLEAR
COLOR: YELLOW
CRYSTALS, UA: ABNORMAL
D-DIMER QUANTITATIVE: 3901 NG/ML FEU (ref 0–500)
EKG ATRIAL RATE: 416 BPM
EKG ATRIAL RATE: 87 BPM
EKG Q-T INTERVAL: 402 MS
EKG Q-T INTERVAL: 416 MS
EKG QRS DURATION: 108 MS
EKG QRS DURATION: 98 MS
EKG QTC CALCULATION (BAZETT): 466 MS
EKG QTC CALCULATION (BAZETT): 479 MS
EKG R AXIS: 26 DEGREES
EKG R AXIS: 27 DEGREES
EKG T AXIS: 106 DEGREES
EKG T AXIS: 91 DEGREES
EKG VENTRICULAR RATE: 80 BPM
EKG VENTRICULAR RATE: 81 BPM
EPITHELIAL CELLS, UA: ABNORMAL /HPF
FERRITIN: 56 NG/ML (ref 10–291)
GLUCOSE URINE: NEGATIVE MG/DL
HBV SURFACE AB TITR SER: POSITIVE {TITER}
HEPATITIS B SURFACE ANTIGEN: NEGATIVE
HEPATITIS C ANTIBODY: NEGATIVE
IMMATURE RETIC FRACT: 23.2 % (ref 3–15.9)
INR BLD: 1.2 (ref 0.85–1.13)
IRON SATURATION: 11 % (ref 20–50)
IRON: 29 UG/DL (ref 50–170)
KETONES, URINE: NEGATIVE
LEUKOCYTE ESTERASE, URINE: ABNORMAL
LV EF: 55 %
LVEF MODALITY: NORMAL
MISCELLANEOUS 2: ABNORMAL
NITRITE, URINE: NEGATIVE
PH UA: 5.5
PRO-BNP: 3804 PG/ML (ref 0–1800)
PROCALCITONIN: 0.05 NG/ML (ref 0.01–0.09)
PROTEIN UA: NEGATIVE
RBC URINE: ABNORMAL /HPF
RENAL EPITHELIAL, UA: ABNORMAL
RETIC HEMOGLOBIN: 29.6 PG (ref 28.2–35.7)
RETICULOCYTE ABSOLUTE COUNT: 2.3 % (ref 0.5–2)
SPECIFIC GRAVITY, URINE: 1.01 (ref 1–1.03)
TOTAL IRON BINDING CAPACITY: 255 UG/DL (ref 171–450)
TROPONIN T: 0.01 NG/ML
UROBILINOGEN, URINE: 0.2 EU/DL
VITAMIN B-12: 1019 PG/ML (ref 211–911)
WBC UA: ABNORMAL /HPF
YEAST: ABNORMAL

## 2018-08-24 PROCEDURE — 87449 NOS EACH ORGANISM AG IA: CPT

## 2018-08-24 PROCEDURE — 87340 HEPATITIS B SURFACE AG IA: CPT

## 2018-08-24 PROCEDURE — 86706 HEP B SURFACE ANTIBODY: CPT

## 2018-08-24 PROCEDURE — 71045 X-RAY EXAM CHEST 1 VIEW: CPT

## 2018-08-24 PROCEDURE — 2500000003 HC RX 250 WO HCPCS: Performed by: FAMILY MEDICINE

## 2018-08-24 PROCEDURE — 84145 PROCALCITONIN (PCT): CPT

## 2018-08-24 PROCEDURE — 99223 1ST HOSP IP/OBS HIGH 75: CPT | Performed by: FAMILY MEDICINE

## 2018-08-24 PROCEDURE — 83516 IMMUNOASSAY NONANTIBODY: CPT

## 2018-08-24 PROCEDURE — 93306 TTE W/DOPPLER COMPLETE: CPT

## 2018-08-24 PROCEDURE — 85046 RETICYTE/HGB CONCENTRATE: CPT

## 2018-08-24 PROCEDURE — 87899 AGENT NOS ASSAY W/OPTIC: CPT

## 2018-08-24 PROCEDURE — 83550 IRON BINDING TEST: CPT

## 2018-08-24 PROCEDURE — 2700000000 HC OXYGEN THERAPY PER DAY

## 2018-08-24 PROCEDURE — A9579 GAD-BASE MR CONTRAST NOS,1ML: HCPCS | Performed by: FAMILY MEDICINE

## 2018-08-24 PROCEDURE — 83540 ASSAY OF IRON: CPT

## 2018-08-24 PROCEDURE — 86039 ANTINUCLEAR ANTIBODIES (ANA): CPT

## 2018-08-24 PROCEDURE — 86038 ANTINUCLEAR ANTIBODIES: CPT

## 2018-08-24 PROCEDURE — 85610 PROTHROMBIN TIME: CPT

## 2018-08-24 PROCEDURE — 87086 URINE CULTURE/COLONY COUNT: CPT

## 2018-08-24 PROCEDURE — 93005 ELECTROCARDIOGRAM TRACING: CPT | Performed by: EMERGENCY MEDICINE

## 2018-08-24 PROCEDURE — 82728 ASSAY OF FERRITIN: CPT

## 2018-08-24 PROCEDURE — 6370000000 HC RX 637 (ALT 250 FOR IP): Performed by: EMERGENCY MEDICINE

## 2018-08-24 PROCEDURE — 93010 ELECTROCARDIOGRAM REPORT: CPT | Performed by: INTERNAL MEDICINE

## 2018-08-24 PROCEDURE — 36415 COLL VENOUS BLD VENIPUNCTURE: CPT

## 2018-08-24 PROCEDURE — 76705 ECHO EXAM OF ABDOMEN: CPT

## 2018-08-24 PROCEDURE — S0028 INJECTION, FAMOTIDINE, 20 MG: HCPCS | Performed by: FAMILY MEDICINE

## 2018-08-24 PROCEDURE — 87040 BLOOD CULTURE FOR BACTERIA: CPT

## 2018-08-24 PROCEDURE — 86708 HEPATITIS A ANTIBODY: CPT

## 2018-08-24 PROCEDURE — 81001 URINALYSIS AUTO W/SCOPE: CPT

## 2018-08-24 PROCEDURE — 6360000002 HC RX W HCPCS: Performed by: FAMILY MEDICINE

## 2018-08-24 PROCEDURE — 2140000000 HC CCU INTERMEDIATE R&B

## 2018-08-24 PROCEDURE — 86803 HEPATITIS C AB TEST: CPT

## 2018-08-24 PROCEDURE — 74183 MRI ABD W/O CNTR FLWD CNTR: CPT

## 2018-08-24 PROCEDURE — 2709999900 HC NON-CHARGEABLE SUPPLY

## 2018-08-24 PROCEDURE — 82747 ASSAY OF FOLIC ACID RBC: CPT

## 2018-08-24 PROCEDURE — 6370000000 HC RX 637 (ALT 250 FOR IP): Performed by: FAMILY MEDICINE

## 2018-08-24 PROCEDURE — 82607 VITAMIN B-12: CPT

## 2018-08-24 PROCEDURE — 82330 ASSAY OF CALCIUM: CPT

## 2018-08-24 PROCEDURE — 82103 ALPHA-1-ANTITRYPSIN TOTAL: CPT

## 2018-08-24 PROCEDURE — 6360000004 HC RX CONTRAST MEDICATION: Performed by: FAMILY MEDICINE

## 2018-08-24 PROCEDURE — 84484 ASSAY OF TROPONIN QUANT: CPT

## 2018-08-24 PROCEDURE — 85379 FIBRIN DEGRADATION QUANT: CPT

## 2018-08-24 PROCEDURE — 2580000003 HC RX 258: Performed by: FAMILY MEDICINE

## 2018-08-24 PROCEDURE — 83880 ASSAY OF NATRIURETIC PEPTIDE: CPT

## 2018-08-24 PROCEDURE — 86256 FLUORESCENT ANTIBODY TITER: CPT

## 2018-08-24 PROCEDURE — 93005 ELECTROCARDIOGRAM TRACING: CPT | Performed by: FAMILY MEDICINE

## 2018-08-24 PROCEDURE — 86704 HEP B CORE ANTIBODY TOTAL: CPT

## 2018-08-24 PROCEDURE — 82105 ALPHA-FETOPROTEIN SERUM: CPT

## 2018-08-24 PROCEDURE — 6370000000 HC RX 637 (ALT 250 FOR IP): Performed by: NURSE PRACTITIONER

## 2018-08-24 RX ORDER — PANTOPRAZOLE SODIUM 40 MG/1
40 TABLET, DELAYED RELEASE ORAL
Status: DISCONTINUED | OUTPATIENT
Start: 2018-08-24 | End: 2018-08-26 | Stop reason: HOSPADM

## 2018-08-24 RX ORDER — NITROGLYCERIN 0.4 MG/1
0.4 TABLET SUBLINGUAL EVERY 5 MIN PRN
Status: DISCONTINUED | OUTPATIENT
Start: 2018-08-24 | End: 2018-08-26 | Stop reason: HOSPADM

## 2018-08-24 RX ORDER — PAROXETINE 30 MG/1
30 TABLET, FILM COATED ORAL EVERY MORNING
Status: DISCONTINUED | OUTPATIENT
Start: 2018-08-24 | End: 2018-08-26 | Stop reason: HOSPADM

## 2018-08-24 RX ORDER — METRONIDAZOLE 500 MG/1
500 TABLET ORAL EVERY 8 HOURS SCHEDULED
Status: DISCONTINUED | OUTPATIENT
Start: 2018-08-24 | End: 2018-08-26 | Stop reason: HOSPADM

## 2018-08-24 RX ORDER — LEVOFLOXACIN 5 MG/ML
500 INJECTION, SOLUTION INTRAVENOUS EVERY 24 HOURS
Status: DISCONTINUED | OUTPATIENT
Start: 2018-08-24 | End: 2018-08-26 | Stop reason: HOSPADM

## 2018-08-24 RX ORDER — LISINOPRIL 2.5 MG/1
2.5 TABLET ORAL DAILY
Status: DISCONTINUED | OUTPATIENT
Start: 2018-08-24 | End: 2018-08-26 | Stop reason: HOSPADM

## 2018-08-24 RX ORDER — SODIUM CHLORIDE 9 MG/ML
INJECTION, SOLUTION INTRAVENOUS CONTINUOUS
Status: ACTIVE | OUTPATIENT
Start: 2018-08-24 | End: 2018-08-24

## 2018-08-24 RX ORDER — FUROSEMIDE 20 MG/1
20 TABLET ORAL DAILY
Status: DISCONTINUED | OUTPATIENT
Start: 2018-08-24 | End: 2018-08-26 | Stop reason: HOSPADM

## 2018-08-24 RX ORDER — SODIUM CHLORIDE 0.9 % (FLUSH) 0.9 %
10 SYRINGE (ML) INJECTION EVERY 12 HOURS SCHEDULED
Status: DISCONTINUED | OUTPATIENT
Start: 2018-08-24 | End: 2018-08-26 | Stop reason: HOSPADM

## 2018-08-24 RX ORDER — ONDANSETRON 2 MG/ML
4 INJECTION INTRAMUSCULAR; INTRAVENOUS EVERY 6 HOURS PRN
Status: DISCONTINUED | OUTPATIENT
Start: 2018-08-24 | End: 2018-08-26 | Stop reason: HOSPADM

## 2018-08-24 RX ORDER — POTASSIUM CHLORIDE 750 MG/1
10 TABLET, FILM COATED, EXTENDED RELEASE ORAL DAILY
Status: DISCONTINUED | OUTPATIENT
Start: 2018-08-24 | End: 2018-08-26

## 2018-08-24 RX ORDER — LANOLIN ALCOHOL/MO/W.PET/CERES
100 CREAM (GRAM) TOPICAL DAILY
Status: DISCONTINUED | OUTPATIENT
Start: 2018-08-24 | End: 2018-08-26 | Stop reason: HOSPADM

## 2018-08-24 RX ORDER — TRAZODONE HYDROCHLORIDE 50 MG/1
50 TABLET ORAL NIGHTLY
Status: DISCONTINUED | OUTPATIENT
Start: 2018-08-24 | End: 2018-08-26 | Stop reason: HOSPADM

## 2018-08-24 RX ORDER — SODIUM CHLORIDE 0.9 % (FLUSH) 0.9 %
10 SYRINGE (ML) INJECTION PRN
Status: DISCONTINUED | OUTPATIENT
Start: 2018-08-24 | End: 2018-08-26 | Stop reason: HOSPADM

## 2018-08-24 RX ORDER — ASCORBIC ACID 500 MG
250 TABLET ORAL DAILY
Status: DISCONTINUED | OUTPATIENT
Start: 2018-08-24 | End: 2018-08-26 | Stop reason: HOSPADM

## 2018-08-24 RX ORDER — ACETAMINOPHEN 325 MG/1
650 TABLET ORAL EVERY 4 HOURS PRN
Status: DISCONTINUED | OUTPATIENT
Start: 2018-08-24 | End: 2018-08-26 | Stop reason: HOSPADM

## 2018-08-24 RX ORDER — SUCRALFATE 1 G/1
1 TABLET ORAL
Status: DISCONTINUED | OUTPATIENT
Start: 2018-08-24 | End: 2018-08-26 | Stop reason: HOSPADM

## 2018-08-24 RX ORDER — FLUTICASONE PROPIONATE 50 MCG
1 SPRAY, SUSPENSION (ML) NASAL DAILY
Status: DISCONTINUED | OUTPATIENT
Start: 2018-08-24 | End: 2018-08-26 | Stop reason: HOSPADM

## 2018-08-24 RX ORDER — ATORVASTATIN CALCIUM 20 MG/1
20 TABLET, FILM COATED ORAL NIGHTLY
Status: DISCONTINUED | OUTPATIENT
Start: 2018-08-24 | End: 2018-08-26 | Stop reason: HOSPADM

## 2018-08-24 RX ORDER — M-VIT,TX,IRON,MINS/CALC/FOLIC 27MG-0.4MG
1 TABLET ORAL 2 TIMES DAILY
Status: DISCONTINUED | OUTPATIENT
Start: 2018-08-24 | End: 2018-08-26 | Stop reason: HOSPADM

## 2018-08-24 RX ORDER — CETIRIZINE HYDROCHLORIDE 10 MG/1
10 TABLET ORAL NIGHTLY
Status: DISCONTINUED | OUTPATIENT
Start: 2018-08-24 | End: 2018-08-26 | Stop reason: HOSPADM

## 2018-08-24 RX ORDER — CARVEDILOL 6.25 MG/1
12.5 TABLET ORAL 2 TIMES DAILY WITH MEALS
Status: DISCONTINUED | OUTPATIENT
Start: 2018-08-24 | End: 2018-08-25

## 2018-08-24 RX ORDER — LORATADINE 10 MG/1
10 TABLET ORAL NIGHTLY
Status: DISCONTINUED | OUTPATIENT
Start: 2018-08-24 | End: 2018-08-24 | Stop reason: CLARIF

## 2018-08-24 RX ADMIN — SUCRALFATE 1 G: 1 TABLET ORAL at 16:45

## 2018-08-24 RX ADMIN — MULTIPLE VITAMINS W/ MINERALS TAB 1 TABLET: TAB at 20:30

## 2018-08-24 RX ADMIN — METRONIDAZOLE 500 MG: 500 TABLET, FILM COATED ORAL at 20:30

## 2018-08-24 RX ADMIN — METRONIDAZOLE 500 MG: 500 TABLET, FILM COATED ORAL at 15:26

## 2018-08-24 RX ADMIN — LEVOFLOXACIN 500 MG: 5 INJECTION, SOLUTION INTRAVENOUS at 06:04

## 2018-08-24 RX ADMIN — PAROXETINE HYDROCHLORIDE 30 MG: 30 TABLET, FILM COATED ORAL at 11:36

## 2018-08-24 RX ADMIN — PANTOPRAZOLE SODIUM 40 MG: 40 TABLET, DELAYED RELEASE ORAL at 16:45

## 2018-08-24 RX ADMIN — Medication 10 ML: at 08:43

## 2018-08-24 RX ADMIN — CETIRIZINE HYDROCHLORIDE 10 MG: 10 TABLET, FILM COATED ORAL at 20:30

## 2018-08-24 RX ADMIN — TRAZODONE HYDROCHLORIDE 50 MG: 50 TABLET ORAL at 22:06

## 2018-08-24 RX ADMIN — GADOTERIDOL 20 ML: 279.3 INJECTION, SOLUTION INTRAVENOUS at 07:50

## 2018-08-24 RX ADMIN — SODIUM CHLORIDE: 9 INJECTION, SOLUTION INTRAVENOUS at 03:41

## 2018-08-24 RX ADMIN — CARVEDILOL 12.5 MG: 6.25 TABLET, FILM COATED ORAL at 16:43

## 2018-08-24 RX ADMIN — SUCRALFATE 1 G: 1 TABLET ORAL at 20:30

## 2018-08-24 RX ADMIN — LISINOPRIL 2.5 MG: 2.5 TABLET ORAL at 11:37

## 2018-08-24 RX ADMIN — ASPIRIN 81 MG CHEWABLE TABLET 324 MG: 81 TABLET CHEWABLE at 00:11

## 2018-08-24 RX ADMIN — ATORVASTATIN CALCIUM 20 MG: 20 TABLET, FILM COATED ORAL at 20:30

## 2018-08-24 RX ADMIN — FAMOTIDINE 20 MG: 10 INJECTION, SOLUTION INTRAVENOUS at 08:42

## 2018-08-24 RX ADMIN — METRONIDAZOLE 500 MG: 500 TABLET, FILM COATED ORAL at 08:41

## 2018-08-24 RX ADMIN — FUROSEMIDE 20 MG: 20 TABLET ORAL at 11:37

## 2018-08-24 RX ADMIN — CARVEDILOL 12.5 MG: 6.25 TABLET, FILM COATED ORAL at 11:37

## 2018-08-24 ASSESSMENT — PAIN SCALES - GENERAL
PAINLEVEL_OUTOF10: 0

## 2018-08-24 NOTE — ED PROVIDER NOTES
accelerated junctional rhythm. Compared to old EKG on 1-Aug-2018. RADIOLOGY: non-plain film images(s) such as CT, Ultrasound and MRI are read by the radiologist.    MRI Eldon Bee   Final Result   1. The hepatic parenchymal echogenicity is within normal limits however there is nodularity of the hepatic contour consistent with the clinical history of cirrhosis. There is no hepatic mass. 2. The gallbladder is dilated with mild wall thickening. There are no filling defects. No gallstones are identified. There is no biliary dilatation. 3. The pancreas is mildly atrophic and somewhat irregular. No pancreatic mass is identified. There is normal pancreatic enhancement. The pancreatic duct is normal size measuring 0.2 cm. 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 There is no peripancreatic    inflammation. 4. There are several scattered Bosniak 1 cysts measuring up to 2.9 cm on the right and 1.1 cm on the left. 5. There is a 0.9 cm nodule which is either within the right adrenal gland or adjacent to the right adrenal gland (series 13 images 21 and 22). 6. The bowel gas pattern is nonobstructive. There are colonic diverticula. Please refer to the CT abdomen/pelvis examination dated 8/23/2018 for further characterization of the colon and colonic wall thickening. 7. There are bilateral small pleural effusions. The right pleural effusion may be partially loculated. 8. There is a 1.0 cm nodule at the right lung base. **This report has been created using voice recognition software. It may contain minor errors which are inherent in voice recognition technology. **      Final report electronically signed by Dr. Yolanda Lopez on 8/24/2018 8:24 AM      US GALLBLADDER RUQ   Final Result      Enlarged echogenic cirrhotic liver. Bidirectional flow in the main portal vein. Distended gallbladder with findings suggestive of acute acalculous cholecystitis. No biliary dilatation.    Minimal components within normal limits   CBC WITH AUTO DIFFERENTIAL - Abnormal; Notable for the following:     RBC 3.66 (*)     Hemoglobin 10.5 (*)     Hematocrit 34.6 (*)     MCHC 30.3 (*)     RDW-CV 15.1 (*)     RDW-SD 52.3 (*)     Lymphocytes # 0.9 (*)     All other components within normal limits   TROPONIN - Abnormal; Notable for the following:     Troponin T 0.012 (*)     All other components within normal limits   GLOMERULAR FILTRATION RATE, ESTIMATED - Abnormal; Notable for the following:     Est, Glom Filt Rate 43 (*)     All other components within normal limits   TROPONIN - Abnormal; Notable for the following:     Troponin T 0.012 (*)     All other components within normal limits   TROPONIN - Abnormal; Notable for the following:     Troponin T 0.014 (*)     All other components within normal limits   PROTIME-INR - Abnormal; Notable for the following:     INR 1.20 (*)     All other components within normal limits   BRAIN NATRIURETIC PEPTIDE - Abnormal; Notable for the following:     Pro-BNP 3804.0 (*)     All other components within normal limits   CALCIUM, IONIZED - Abnormal; Notable for the following:     Calcium, Ion 1.09 (*)     All other components within normal limits   D-DIMER, QUANTITATIVE - Abnormal; Notable for the following:     D-Dimer, Quant 3901.00 (*)     All other components within normal limits   TROPONIN - Abnormal; Notable for the following:     Troponin T 0.015 (*)     All other components within normal limits   IRON - Abnormal; Notable for the following:     Iron 29 (*)     All other components within normal limits   VITAMIN B12 - Abnormal; Notable for the following:     Vitamin B-12 1019 (*)     All other components within normal limits   RETICULOCYTES - Abnormal; Notable for the following:     Retic Ct Abs 2.3 (*)     Immature Retic Fract 23.2 (*)     All other components within normal limits   IRON SATURATION - Abnormal; Notable for the following:     Iron Saturation 11 (*)     All other REFLEXED    Narrative:     Source: urine, clean catch       Site:           Current Antibiotics: not stated   LIPASE   ANION GAP   OSMOLALITY   PROCALCITONIN   BLOOD OCCULT STOOL SCREEN #1   IRON BINDING CAPACITY   FERRITIN   HEPATITIS C ANTIBODY   HEPATITIS B SURFACE ANTIGEN   HEPATITIS B SURFACE ANTIBODY   PHOSPHORUS   ANION GAP   URINE RT REFLEX TO CULTURE   FOLATE RBC   SAW SCREEN WITH REFLEX   F-ACTIN (SMOOTH MUSCLE) ANTIBODY W/ REFLEX TO TITER   MITOCHONDRIAL ANTIBODIES, M2   HEPATITIS B CORE ANTIBODY, TOTAL   HEPATITIS A ANTIBODY, TOTAL   ALPHA-1-ANTITRYPSIN   AFP TUMOR MARKER   BODY FLUID CELL COUNT WITH DIFFERENTIAL   ALBUMIN, FLUID   PROTEIN, BODY FLUID   CYTOLOGY, NON-GYN       EMERGENCY DEPARTMENT COURSE:   Vitals:    Vitals:    08/24/18 2000 08/24/18 2315 08/25/18 0430 08/25/18 0630   BP: (!) 117/58 (!) 109/53 107/60    Pulse: 70 78 80    Resp: 16 16 16    Temp: 98.3 °F (36.8 °C) 98.6 °F (37 °C) 98.5 °F (36.9 °C)    TempSrc: Oral Oral Oral    SpO2: 96% 98% 92%    Weight:    237 lb 9.6 oz (107.8 kg)   Height:           10:54 PM: The patient was seen and evaluated. Labs and imaging were ordered and completed. The patient was given IV fluids and Zofran while in the ED. MDM:  Patient presented with complaint of nausea, persistent over 4 weeks. Patient abdomen is nonacute, nontender. She is afebrile. Patient also reports diarrhea, nonbloody. She has no history of gastroparesis, not a diabetic. Patient symptoms seemed to improve with Reglan. However, patient has a troponin leak, denies chest pain. Patient given aspirin, admitted to the hospitalist for further treatment. CRITICAL CARE:   None     CONSULTS:  None    PROCEDURES:  None     FINAL IMPRESSION      1. Elevated troponin    2.  Colitis          DISPOSITION/PLAN       PATIENT REFERRED TO:  Nyasia Vieira, APRN - CNP  [address] [facility] [address]  [address]

## 2018-08-24 NOTE — ED NOTES
Pt resting quietly in room no needs expressed. Side rails up x2 with call light in reach. Will continue to monitor. Pt denies any further dry heaves.        Vernell Hernandez, [address] [facility]  08/24/18 2274

## 2018-08-24 NOTE — PLAN OF CARE
Problem: Falls - Risk of:  Goal: Will remain free from falls  Will remain free from falls   Outcome: Met This Shift  Continue fall precautions, up with assist.  Goal: Absence of physical injury  Absence of physical injury   Outcome: Met This Shift      Problem: DISCHARGE BARRIERS  Goal: Patient's continuum of care needs are met  Outcome: Ongoing  Continue discharge planning. Comments: Care plan reviewed with patient . Patient  verbalize understanding of the plan of care and contribute to goal setting.

## 2018-08-24 NOTE — H&P
History & Physical    Patient:  Samra Simmons  YOB: 1940  Date of Service: 8/24/2018  MRN: [id number]   Acct:  [de-identified]   Primary Care Physician: ARTHUR Rodriguez CNP    Chief Complaint: Nausea, Diarrhea    History of Present Illness:   History obtained from chart review and the patient. The patient is a 69 y.o. female with h/o Atrial Fibrillation, s/p AVR, HTN, who presents to the Emergency Department for the evaluation of nausea. Patient states that she had AVR in June 31, 2018 and went to the NH for rehab. She states that nausea started with rehab, about 30 mins into rehab and lasts all day. She states she was being given anti emetics in the NH to be able to tolerate rehab but nausea never completely went away. She states she always woke up without nausea. She denies association with food. Patient returned home from NH 2 weeks ago and has continued with nausea. Additionally, she notes diarrhea, dry heaves for the past 1.5 weeks. Patient denies abdominal pain, bloody stools or mucus in stool. She denies cough, runny nose, sore throat, fever, chills, emesis, dizziness, headache, constipation, dysuria, paresthesias, chest pain, shortness of breath, recent falls or head injury. Patient has had sick contacts in the NH. She denies prior CVA, MI, bleeding or clotting disorders. She denies smoking or alcohol use. Lab tests were normal except for slightly elevated troponin. CT abdomen pelvis shows Probable colitis from the distal ascending colon to the proximal sigmoid colon. Colonic diverticulosis without diverticulitis. Mild ascites. Hepatomegaly with possibly cirrhotic liver. Ultrasound of gallbladder is pending. Patient is being admitted for further care.       Past Medical History:        Diagnosis Date    ISIDRA (acute kidney injury) ([facility] [address] 6/11/2016    Atrial fibrillation (HCC)     Bleeding stomach ulcer 2011    History of blood transfusion     Hypertension     PONV daily       Pyridoxine HCl (VITAMIN B-6) 100 MG tablet Take 100 mg by mouth daily      loratadine (CLARITIN) 10 MG tablet Take 10 mg by mouth nightly       PARoxetine (PAXIL) 30 MG tablet Take 30 mg by mouth every morning      omeprazole (PRILOSEC) 20 MG capsule Take 20 mg by mouth daily      traZODone (DESYREL) 50 MG tablet Take 50 mg by mouth nightly         Allergies:  Patient has no known allergies. Social History:    reports that she has never smoked. She has never used smokeless tobacco. She reports that she does not drink alcohol or use drugs. Family History:   Family History   Problem Relation Age of Onset    Diabetes Father     Stroke Father     High Blood Pressure Mother     Cancer Sister         blood    Diabetes Brother     Cancer Brother         lymphoma    High Blood Pressure Maternal Grandmother     High Blood Pressure Maternal Grandfather     Diabetes Paternal Grandmother     Diabetes Paternal Grandfather     Diabetes Brother     Heart Disease Neg Hx        Review of systems:  Constitutional: no fever, no night sweats, no fatigue  Head: no headache, no head injury, no migranes. Eye: no blurring of vision, no double vision.   Ears: no hearing difficulty, no tinnitus  Mouth/throat: no ulceration, dental caries, dysphagia  Lungs: no cough, no shortness of breath, no wheeze  CVS: no palpitation, no chest pain, no shortness of breath  GI: no abdominal pain, + nausea , no vomiting, no constipation  SHARONA: no dysuria, frequency and urgency, no hematuria, no kidney stones  Musculoskeletal: no joint pain, swelling , stiffness  Endocrine: no polyuria, polydypsia, no cold or heat intolerence  Hematology: no anemia, no easy brusing or bleeding, no hx of clotting disorder  Dermatology: no skin rash, no eczema, no prurities,  Psychiatry: no depression, no anxiety,no panic attacks, no suicide ideation  Neurology: no syncope, no seizures, no numbness or tingling of hands, no numbness or tingling of feet, no paresis    10 point review of systems completed, all other than noted above are negative. Vitals:   Vitals:    08/24/18 0129   BP: (!) 149/67   Pulse: 81   Resp:    Temp:    SpO2: 99%      BMI: Body mass index is 34.33 kg/m².                 Exam:  Physical Examination: General appearance - alert, well appearing, and in no distress  Mental status - alert, oriented to person, place, and time  Neck - supple, no significant adenopathy, no JVD, or carotid bruits  Chest - clear to auscultation, no wheezes, rales or rhonchi, symmetric air entry  Heart - normal rate, regular rhythm, normal S1, S2, no murmurs, rubs, clicks or gallops  Abdomen - soft, nontender, nondistended, obese abdomen, no masses or organomegaly  Neurological - alert, oriented, normal speech, no focal findings or movement disorder noted  Musculoskeletal - no joint tenderness, deformity or swelling  Extremities - peripheral pulses normal, + bilat LE edema, no clubbing or cyanosis  Skin - normal coloration and turgor, no rashes, no suspicious skin lesions noted      Review of Labs and Diagnostic Testing:    Recent Results (from the past 24 hour(s))   Comprehensive Metabolic Panel    Collection Time: 08/23/18 10:42 PM   Result Value Ref Range    Glucose 110 (H) 70 - 108 mg/dL    CREATININE 1.2 0.4 - 1.2 mg/dL    BUN 18 7 - 22 mg/dL    Sodium 141 135 - 145 meq/L    Potassium 3.9 3.5 - 5.2 meq/L    Chloride 102 98 - 111 meq/L    CO2 28 23 - 33 meq/L    Calcium 8.4 (L) 8.5 - 10.5 mg/dL    AST 21 5 - 40 U/L    Alkaline Phosphatase 85 38 - 126 U/L    Total Protein 6.1 6.1 - 8.0 g/dL    Alb 3.2 (L) 3.5 - 5.1 g/dL    Total Bilirubin 0.7 0.3 - 1.2 mg/dL    ALT 9 (L) 11 - 66 U/L   Lipase    Collection Time: 08/23/18 10:42 PM   Result Value Ref Range    Lipase 14.2 5.6 - 51.3 U/L   CBC Auto Differential    Collection Time: 08/23/18 10:42 PM   Result Value Ref Range    WBC 5.1 4.8 - 10.8 thou/mm3    RBC 3.66 (L) 4.20 - 5.40 mill/mm3    Hemoglobin 10.5 leads are noted. Liver: No obvious mass or intrahepatic biliary dilatation. There is hepatomegaly. Liver surface appears mildly lobulated suggestive of cirrhosis. Correlate clinically. Gallbladder/biliary tree: The gallbladder is mildly distended with questionable wall thickening. There are no calcified gallstones. Spleen: Unremarkable Adrenals: Unremarkable. No obvious mass. Kidneys/ureters: No hydroureteronephrosis. No renal or ureteral calculi. There are again bilateral renal cysts in the upper poles and right lower pole. Pancreas: There are tiny calcifications in the posterior pancreatic head and uncinate process which may represent diastases of chronic pancreatitis. Cannot exclude a tiny distal CBD calculus. If there is concern for this, recommend MRCP correlation. There is no intra or extrahepatic biliary dilatation. GI tract: There is thickening of the wall the stomach which may be due to incomplete distention but cannot exclude gastritis, neoplasm felt to be less likely. Correlate clinically. There is colonic diverticulosis without diverticulitis. There is thickening of the wall of the colon from the distal ascending colon to the proximal sigmoid colon very suspicious for acute colitis. Similar wall  Small bowel is unremarkable. No bowel obstruction. Appendix: appendix is visualized and appears normal without evidence of acute appendicitis. Intraperitoneal/retroperitoneal Space: There is mild ascites in the abdomen and pelvis. No obvious abscess, pathologically enlarged lymph nodes, or mass. No pneumoperitoneum. Vascular: No abdominal aortic aneurysm. No significant vascular abnormality. Reproductive: Unremarkable. Bladder: Unremarkable. No stones. Body wall soft tissues: There is mild body wall edema consistent with anasarca. There is a tiny fat-containing umbilical hernia. Musculoskeletal: There is multilevel lumbar degenerative disc disease and facet joint DJD with endplate spondylosis.  There is mild lumbar dextroscoliosis. There is a sclerotic lesion in the left posterior lateral L3 vertebral body, stable compared to the prior study. Given the stability this is likely a benign lesion. Probable colitis from the distal ascending colon to the proximal sigmoid colon. Colonic diverticulosis without diverticulitis. Mild ascites. Hepatomegaly with possibly cirrhotic liver. Mildly distended gallbladder without calcified stones. Possible gallbladder wall thickening. Consider ultrasound for further evaluation. Punctate calcifications in the head of the pancreas likely sequela of prior pancreatitis. No acute pancreatitis. Cannot exclude a tiny calculus in the distal CBD. Correlate clinically and if necessary recommend MRCP Gastric wall thickening, see above. **This report has been created using voice recognition software. It may contain minor errors which are inherent in voice recognition technology. ** Final report electronically signed by Dr. Marybel Aguilar on 8/23/2018 11:55 PM    Xr Chest Portable    Result Date: 8/24/2018  PROCEDURE: XR CHEST PORTABLE CLINICAL INFORMATION: positive trop, . COMPARISON: Chest x-ray dated 7/9/2018 TECHNIQUE: AP Portable upright chest xray FINDINGS: Lungs/pleura: There are bibasilar infiltrates and/or atelectasis. There are small bilateral pleural effusions, larger on the left. No pneumothorax. Heart: There is again mild to moderate cardiomegaly. Patient status post sternotomy for valve replacement. An additional medical device projects caudal to the left mainstem bronchus which may represent a left atrial appendage closure device. Mediastinum/janice: Unremarkable. No obvious mass or adenopathy. Skeleton: Unremarkable. No significant bone or joint abnormality. Bibasilar infiltrates versus atelectasis with small bilateral pleural effusions. **This report has been created using voice recognition software.  It may contain minor errors which are inherent in voice recognition technology. ** Final report electronically signed by Dr. Celena Pillai on 8/24/2018 12:47 AM        EKG: pending    Assessment / Plan:    1. Colitis- gentle hydration, Flagyl, Levaquin, blood cultures, UA,lipase, lactic acid, CT bdomen pelvis  2. S/p AVR 6/2018  3. Nausea, r/o GI vs acs- trend troponin, EKG, 2 d echo, CXR, BNP, D Dimer, LFTs, UA, CT abdomen pelvis, RUQ abd ultrasound, NTG sl prn  4. Diarrhea, poss d/t #1 vs antibiotics induced - stool culture for GI pathogens, Flagyl  5. Elevated troponin, r/o acs- trend troponin, EKG, 2 d echo, BNP, D Dimer, CXR  6. Anemia, normocytic- stool for occult blood, monitor CBC  7. Hypocalcemia- ionized calcium, replete per protocol  8. Essential hypertension- resume home med with parameters to hold  9. Bilat pleural effusion, poss d/t post op cardiac surg. Vs cirrhosis, r/o pneumonia- blood cultures, procalcitonin, Lactic acid, urine legionella and strep antigens, UA        Dispo: Admit, gentle hydration, await pending labs.  Hospitalist service will follow        DVT prophylaxis: [x] Lovenox                                 [] SCDs                                 [] SQ Heparin                                 [] Encourage ambulation, low risk for DVT, no chemical or mechanical prophylaxis necessary              [] Already on Anticoagulation                Anticipated Disposition upon discharge: [x] Home                                                                         [] Home with Home Health                                                                         [] [facility]                                                                         [] [address]          Electronically signed by Dee Dee Erickson DO on 8/24/2018 at 2:13 AM

## 2018-08-24 NOTE — CONSULTS
Pt Name: Roland Maria  MRN: [id number]  [id number]  YOB: 1940  Admit Date: 8/23/2018 10:04 PM  Date of evaluation: 8/24/2018  Primary Care Physician: ARTHUR Lopez - CNP   [id number]   Dictating for Dr Thompson Him    Requesting Provider:   Brian Cespedes MD    GREG Consult    Indication:  Acute colitis    Pt has upcoming appointment with Dayron Steiner -55. She wants to follow with GREG not [facility] Ok per her request as this was clarified before I came to floor. Louis Jones History:  The patient is a 69 y.o. female admitted 8-23-18 for colitis. She had AVR June 31, 2018. After that she went to [facility] for rehab. She went home from [facility] about 2 weeks ago. She states for past 3 weeks or so she has had nausea, poor appetite, diarrhea. She has more recently started to have dry heaves without vomiting. She has SOB with laying flat only. She denies abdominal pain, vomiting, melena, hematochezia, constipation, weight loss, dysphagia, GERD, fevers, chills, chest pain, and lightheadedness. She states she uses tylenol 500 mg 2 tablets daily for pain. She denies NSAIDS and even denies asa but home med list has it listed. She denies PPI use but home med list also has Prilosec 20 mg daily. She has hx gastric bleeding ulcer in 2017. She denies known liver disease, hepatitis, alcohol use more than occasional drink. She denies following up with [facility] Ok after liver enzyme elevation 2016. PROCEDURE: CT ABDOMEN PELVIS WO CONTRAST 8-24-18       CLINICAL INFORMATION: persistent nausea .       COMPARISON: Abdomen pelvis CT with contrast dated 6/11/2016       TECHNIQUE: 5 mm images were obtained through the abdomen and pelvis. Sagittal and coronal reconstructions were obtained and reviewed.  No oral or IV contrast was administered.       All CT scans at this facility use dose modulation, iterative reconstruction, and/or weight-based dosing when appropriate to reduce radiation dose to as low as using voice recognition software.  It may contain minor errors which are inherent in voice recognition technology. **       Final report electronically signed by Dr. Zakia Bone on 8/24/2018 8:24 AM         PROCEDURE: US GALLBLADDER RUQ 8-24-18       CLINICAL INFORMATION: persistent nausea.       COMPARISON: Gallbladder ultrasound dated 6/11/2016 and the abdomen pelvis CT without contrast dated 8/23/2018       TECHNIQUE:Real-time transabdominal ultrasound was performed.       FINDINGS:       Pancreas: Normal size and echogenicity. Lance Annawan is an 8 x 7 x 6 mm cystic structure in the anterior pancreatic head. No solid mass. No ductal dilatation. Liver: The liver surface is mildly nodular/lobulated consistent with cirrhosis. Liver is mildly echogenic consistent with fatty infiltration versus nonspecific hepatocellular disease. No mass or intrahepatic biliary dilatation. Size: Hepatomegaly, right lobe measures 18.9 cm   There is bidirectional flow within the main portal vein. Gallbladder: The gallbladder is distended and there is wall thickening with possible mild gallbladder wall edema suggestive of acute acalculous cholecystitis. There is a small amount of sludge in the gallbladder. There are no gallstones. Sonographic Cifuentes's Sign: Negative   CBD: normal, 3.6 mm   Right Kidney: Normal size, shape, and echogenicity. No hydronephrosis. There is a 3 x 2.3 x 2.3 cm simple cyst of the lower pole of the right kidney. Ascites: There is minimal ascites adjacent to the liver.           Impression       Enlarged echogenic cirrhotic liver. Bidirectional flow in the main portal vein. Distended gallbladder with findings suggestive of acute acalculous cholecystitis. No biliary dilatation. Minimal ascites adjacent to the liver. 8 x 7 x 6 mm cystic structure in the inferior pancreatic head.  Recommend follow-up triple phase abdomen CT versus MRI without and with gadolinium.               **This report has been daily. Will stop this and add protonix 40 mg daily  8. UA  9. Paracentesis U/S guided. Labs ordered. Limit 1 liter. There may not be enough fluid for this. 10. Follow.  Dr Gilberto Kent is rounding this weekend      Assessment and Plan discussed with Dr Stuart Sierra, APRN, CNP, 8/24/2018, 1:59 PM

## 2018-08-24 NOTE — ED NOTES
Pt resting quietly in room no needs expressed. Side rails up x2 with call light in reach. Will continue to monitor. Pt informed of decision to admit. No questions or concerns at this point.         [facility]  08/24/18 [id number]

## 2018-08-24 NOTE — ED NOTES
Pt resting quietly in room no needs expressed. Side rails up x2 with call light in reach. Will continue to monitor.        [facility]  08/23/18 0146

## 2018-08-25 ENCOUNTER — APPOINTMENT (OUTPATIENT)
Dept: ULTRASOUND IMAGING | Age: 78
DRG: 432 | End: 2018-08-25
Payer: MEDICARE

## 2018-08-25 LAB
ADENOVIRUS F 40 41 PCR: NOT DETECTED
AFP-TUMOR MARKER: 4.2 UG/L
ANION GAP SERPL CALCULATED.3IONS-SCNC: 12 MEQ/L (ref 8–16)
ASTROVIRUS PCR: NOT DETECTED
BASOPHILS # BLD: 0.5 %
BASOPHILS ABSOLUTE: 0 THOU/MM3 (ref 0–0.1)
BUN BLDV-MCNC: 14 MG/DL (ref 7–22)
CALCIUM IONIZED: 0.98 MMOL/L (ref 1.12–1.32)
CALCIUM SERPL-MCNC: 7.9 MG/DL (ref 8.5–10.5)
CAMPYLOBACTER PCR: NOT DETECTED
CHLORIDE BLD-SCNC: 106 MEQ/L (ref 98–111)
CLOSTRIDIUM DIFFICILE, PCR: NOT DETECTED
CO2: 28 MEQ/L (ref 23–33)
CREAT SERPL-MCNC: 1 MG/DL (ref 0.4–1.2)
CRYPTOSPORIDIUM PCR: NOT DETECTED
CYCLOSPORA CAYETANENSIS PCR: NOT DETECTED
E COLI 0157 PCR: NORMAL
E COLI ENTEROAGGREGATIVE PCR: NOT DETECTED
E COLI ENTEROPATHOGENIC PCR: NOT DETECTED
E COLI ENTEROTOXIGENIC PCR: NOT DETECTED
E COLI SHIGA LIKE TOXIN PCR: NOT DETECTED
E COLI SHIGELLA/ENTEROINVASIVE PCR: NOT DETECTED
E HISTOLYTICA GI FILM ARRAY: NOT DETECTED
EKG ATRIAL RATE: 441 BPM
EKG Q-T INTERVAL: 412 MS
EKG QRS DURATION: 102 MS
EKG QTC CALCULATION (BAZETT): 469 MS
EKG R AXIS: 41 DEGREES
EKG T AXIS: 80 DEGREES
EKG VENTRICULAR RATE: 78 BPM
EOSINOPHIL # BLD: 3.3 %
EOSINOPHILS ABSOLUTE: 0.1 THOU/MM3 (ref 0–0.4)
ERYTHROCYTE [DISTWIDTH] IN BLOOD BY AUTOMATED COUNT: 15.2 % (ref 11.5–14.5)
ERYTHROCYTE [DISTWIDTH] IN BLOOD BY AUTOMATED COUNT: 52.3 FL (ref 35–45)
GFR SERPL CREATININE-BSD FRML MDRD: 54 ML/MIN/1.73M2
GIARDIA LAMBLIA PCR: NOT DETECTED
GLUCOSE BLD-MCNC: 89 MG/DL (ref 70–108)
HCT VFR BLD CALC: 31.8 % (ref 37–47)
HEMOCCULT STL QL: NEGATIVE
HEMOGLOBIN: 9.4 GM/DL (ref 12–16)
IMMATURE GRANS (ABS): 0.02 THOU/MM3 (ref 0–0.07)
IMMATURE GRANULOCYTES: 0.5 %
INR BLD: 1.24 (ref 0.85–1.13)
LYMPHOCYTES # BLD: 19.2 %
LYMPHOCYTES ABSOLUTE: 0.7 THOU/MM3 (ref 1–4.8)
MAGNESIUM: 1.4 MG/DL (ref 1.6–2.4)
MAGNESIUM: 1.5 MG/DL (ref 1.6–2.4)
MCH RBC QN AUTO: 28.1 PG (ref 26–33)
MCHC RBC AUTO-ENTMCNC: 29.6 GM/DL (ref 32.2–35.5)
MCV RBC AUTO: 94.9 FL (ref 81–99)
MONOCYTES # BLD: 10.4 %
MONOCYTES ABSOLUTE: 0.4 THOU/MM3 (ref 0.4–1.3)
NOROVIRUS GI GII PCR: NOT DETECTED
NUCLEATED RED BLOOD CELLS: 0 /100 WBC
ORGANISM: ABNORMAL
PHOSPHORUS: 3.3 MG/DL (ref 2.4–4.7)
PLATELET # BLD: 114 THOU/MM3 (ref 130–400)
PLESIOMONAS SHIGELLOIDES PCR: NOT DETECTED
PMV BLD AUTO: 11 FL (ref 9.4–12.4)
POTASSIUM REFLEX MAGNESIUM: 3.3 MEQ/L (ref 3.5–5.2)
RBC # BLD: 3.35 MILL/MM3 (ref 4.2–5.4)
ROTAVIRUS A PCR: NOT DETECTED
SALMONELLA PCR: NOT DETECTED
SAPOVIRUS PCR: NOT DETECTED
SEG NEUTROPHILS: 66.1 %
SEGMENTED NEUTROPHILS ABSOLUTE COUNT: 2.4 THOU/MM3 (ref 1.8–7.7)
SODIUM BLD-SCNC: 146 MEQ/L (ref 135–145)
TROPONIN T: 0.01 NG/ML
URINE CULTURE REFLEX: ABNORMAL
VIBRIO CHOLERAE PCR: NOT DETECTED
VIBRIO PCR: NOT DETECTED
WBC # BLD: 3.7 THOU/MM3 (ref 4.8–10.8)
YERSINIA ENTEROCOLITICA PCR: NOT DETECTED

## 2018-08-25 PROCEDURE — 2140000000 HC CCU INTERMEDIATE R&B

## 2018-08-25 PROCEDURE — 6360000002 HC RX W HCPCS: Performed by: FAMILY MEDICINE

## 2018-08-25 PROCEDURE — 2709999900 HC NON-CHARGEABLE SUPPLY

## 2018-08-25 PROCEDURE — 87507 IADNA-DNA/RNA PROBE TQ 12-25: CPT

## 2018-08-25 PROCEDURE — 85025 COMPLETE CBC W/AUTO DIFF WBC: CPT

## 2018-08-25 PROCEDURE — 99233 SBSQ HOSP IP/OBS HIGH 50: CPT | Performed by: INTERNAL MEDICINE

## 2018-08-25 PROCEDURE — 85610 PROTHROMBIN TIME: CPT

## 2018-08-25 PROCEDURE — 36415 COLL VENOUS BLD VENIPUNCTURE: CPT

## 2018-08-25 PROCEDURE — 83735 ASSAY OF MAGNESIUM: CPT

## 2018-08-25 PROCEDURE — 6370000000 HC RX 637 (ALT 250 FOR IP): Performed by: NURSE PRACTITIONER

## 2018-08-25 PROCEDURE — 82330 ASSAY OF CALCIUM: CPT

## 2018-08-25 PROCEDURE — 84484 ASSAY OF TROPONIN QUANT: CPT

## 2018-08-25 PROCEDURE — 84100 ASSAY OF PHOSPHORUS: CPT

## 2018-08-25 PROCEDURE — 83520 IMMUNOASSAY QUANT NOS NONAB: CPT

## 2018-08-25 PROCEDURE — 80048 BASIC METABOLIC PNL TOTAL CA: CPT

## 2018-08-25 PROCEDURE — 82272 OCCULT BLD FECES 1-3 TESTS: CPT

## 2018-08-25 PROCEDURE — 6370000000 HC RX 637 (ALT 250 FOR IP): Performed by: INTERNAL MEDICINE

## 2018-08-25 PROCEDURE — 2580000003 HC RX 258: Performed by: FAMILY MEDICINE

## 2018-08-25 PROCEDURE — 2580000003 HC RX 258: Performed by: INTERNAL MEDICINE

## 2018-08-25 PROCEDURE — 6370000000 HC RX 637 (ALT 250 FOR IP): Performed by: FAMILY MEDICINE

## 2018-08-25 PROCEDURE — 93010 ELECTROCARDIOGRAM REPORT: CPT | Performed by: INTERNAL MEDICINE

## 2018-08-25 RX ORDER — POTASSIUM CHLORIDE 750 MG/1
40 TABLET, FILM COATED, EXTENDED RELEASE ORAL ONCE
Status: COMPLETED | OUTPATIENT
Start: 2018-08-25 | End: 2018-08-25

## 2018-08-25 RX ORDER — MAGNESIUM SULFATE IN WATER 40 MG/ML
2 INJECTION, SOLUTION INTRAVENOUS ONCE
Status: COMPLETED | OUTPATIENT
Start: 2018-08-25 | End: 2018-08-25

## 2018-08-25 RX ORDER — SODIUM CHLORIDE 450 MG/100ML
INJECTION, SOLUTION INTRAVENOUS CONTINUOUS
Status: DISCONTINUED | OUTPATIENT
Start: 2018-08-25 | End: 2018-08-26 | Stop reason: HOSPADM

## 2018-08-25 RX ORDER — CARVEDILOL 6.25 MG/1
6.25 TABLET ORAL 2 TIMES DAILY WITH MEALS
Status: DISCONTINUED | OUTPATIENT
Start: 2018-08-25 | End: 2018-08-26 | Stop reason: HOSPADM

## 2018-08-25 RX ADMIN — FUROSEMIDE 20 MG: 20 TABLET ORAL at 08:36

## 2018-08-25 RX ADMIN — METRONIDAZOLE 500 MG: 500 TABLET, FILM COATED ORAL at 13:38

## 2018-08-25 RX ADMIN — SUCRALFATE 1 G: 1 TABLET ORAL at 16:54

## 2018-08-25 RX ADMIN — TRAZODONE HYDROCHLORIDE 50 MG: 50 TABLET ORAL at 22:24

## 2018-08-25 RX ADMIN — CARVEDILOL 12.5 MG: 6.25 TABLET, FILM COATED ORAL at 08:36

## 2018-08-25 RX ADMIN — POTASSIUM CHLORIDE 40 MEQ: 750 TABLET, EXTENDED RELEASE ORAL at 15:42

## 2018-08-25 RX ADMIN — CETIRIZINE HYDROCHLORIDE 10 MG: 10 TABLET, FILM COATED ORAL at 22:24

## 2018-08-25 RX ADMIN — PYRIDOXINE HCL TAB 50 MG 100 MG: 50 TAB at 08:36

## 2018-08-25 RX ADMIN — MAGNESIUM SULFATE HEPTAHYDRATE 2 G: 40 INJECTION, SOLUTION INTRAVENOUS at 06:39

## 2018-08-25 RX ADMIN — SUCRALFATE 1 G: 1 TABLET ORAL at 22:23

## 2018-08-25 RX ADMIN — METRONIDAZOLE 500 MG: 500 TABLET, FILM COATED ORAL at 06:30

## 2018-08-25 RX ADMIN — SODIUM CHLORIDE: 4.5 INJECTION, SOLUTION INTRAVENOUS at 15:42

## 2018-08-25 RX ADMIN — Medication 250 MG: at 08:36

## 2018-08-25 RX ADMIN — ATORVASTATIN CALCIUM 20 MG: 20 TABLET, FILM COATED ORAL at 22:24

## 2018-08-25 RX ADMIN — Medication 10 ML: at 08:37

## 2018-08-25 RX ADMIN — MULTIPLE VITAMINS W/ MINERALS TAB 1 TABLET: TAB at 08:36

## 2018-08-25 RX ADMIN — CARVEDILOL 6.25 MG: 6.25 TABLET, FILM COATED ORAL at 16:54

## 2018-08-25 RX ADMIN — LEVOFLOXACIN 500 MG: 5 INJECTION, SOLUTION INTRAVENOUS at 11:07

## 2018-08-25 RX ADMIN — PAROXETINE HYDROCHLORIDE 30 MG: 30 TABLET, FILM COATED ORAL at 08:36

## 2018-08-25 RX ADMIN — PANTOPRAZOLE SODIUM 40 MG: 40 TABLET, DELAYED RELEASE ORAL at 06:30

## 2018-08-25 RX ADMIN — METRONIDAZOLE 500 MG: 500 TABLET, FILM COATED ORAL at 22:24

## 2018-08-25 RX ADMIN — MULTIPLE VITAMINS W/ MINERALS TAB 1 TABLET: TAB at 22:23

## 2018-08-25 RX ADMIN — SUCRALFATE 1 G: 1 TABLET ORAL at 13:38

## 2018-08-25 RX ADMIN — SUCRALFATE 1 G: 1 TABLET ORAL at 06:30

## 2018-08-25 RX ADMIN — POTASSIUM CHLORIDE 10 MEQ: 750 TABLET, EXTENDED RELEASE ORAL at 08:36

## 2018-08-25 ASSESSMENT — PAIN SCALES - GENERAL
PAINLEVEL_OUTOF10: 0

## 2018-08-25 NOTE — PLAN OF CARE
Problem: Falls - Risk of:  Goal: Will remain free from falls  Will remain free from falls   Outcome: Ongoing  Assessment & interventions provided throughout shift. Bed locked & in low position, call light in reach, side-rails up x2, non-slip socks on when ambulating, reminded patient to use call light to call for assistance. Goal: Absence of physical injury  Absence of physical injury   Outcome: Ongoing  Assessment & interventions provided throughout shift. Bed locked & in low position, call light in reach, side-rails up x2, non-slip socks on when ambulating, reminded patient to use call light to call for assistance. Problem: DISCHARGE BARRIERS  Goal: Patient's continuum of care needs are met  Outcome: Ongoing  Patient plans to be discharged home    Problem: Nausea/Vomiting:  Goal: Absence of nausea/vomiting  Absence of nausea/vomiting   Outcome: Ongoing  Patient denies nausea at this time  Goal: Able to drink  Able to drink   Outcome: Ongoing  Patient able to drink at this time  Goal: Able to eat  Able to eat   Outcome: Ongoing  Patient continues on clear liquid diet at this time. Goal: Ability to achieve adequate nutritional intake will improve  Ability to achieve adequate nutritional intake will improve   Outcome: Ongoing      Problem: Bowel Function - Altered:  Goal: Bowel elimination is within specified parameters  Bowel elimination is within specified parameters   Outcome: Ongoing  Patient has not had a bowel movement this shift at this time, bowel sounds present    Problem: Fluid Volume - Imbalance:  Goal: Absence of imbalanced fluid volume signs and symptoms  Absence of imbalanced fluid volume signs and symptoms   Outcome: Ongoing  Ongoing assessment & interventions provided throughout shift. Patient on continuous telemetry monitoring, vitals & pulses checked at least 3 times per shift & PRN. Vitals within acceptable limits. Peripheral pulses palpable.       Problem: Gas Exchange - Impaired:  Goal: Levels of oxygenation will improve  Levels of oxygenation will improve   Outcome: Ongoing  Patient o2 stat 96     Comments: Care plan reviewed with patient. Patient verbalized understanding of the care plan and contributed to goal setting.

## 2018-08-25 NOTE — PROGRESS NOTES
Gastroenterology Progress Note:   Patient: Ilsa Bradley  : 1940  Acct#: [de-identified]    Patient Name:  Ilsa Bradley , 69 y.o. , female with acute colitis    ASSESSMENT     1. Diarrhea, 5 to 6 times a day. Improving. GI PCR negative. 2. Nausea with dry heaves, lipase normal. No more nausea. 3. Poor appetite  4. Colitis. Ct shows colitis from the distal ascending colon to the proximal sigmoid colon  5. Cirrhosis of liver per imaging. No known hx per pt. Liver enzymes normal.    6. Mild ascites- Paracentesis ordered yesterday. 7. Hepatomegaly  8. Anemia, normocytic. Hgb 9.4.   9. Elevated BNP 3804  10. Elevate INR 1.20  11. Hypoalbuminemia  12. A-fib   13. HTN  14. Bilateral pleural effusions  15. S/p AVR  - Bovine tissue valve. 16. Pancreatic divisum, calcifications on MRI/MRCP. Pt denies hx pancreatitis. 17. Gastric wall thickening on CT scan - Had EGD one year ago, gastric ulcer. 18.  mg daily  19. Hx bleeding gastric ulcer in 2017 in [address] [place] [address] and  8 years ago  21. Imaging suggest possible acalculous cholecystitis, but changes noted are likely due to cirrhosis and this is unlikely as pt has normal WBC, no abd pain, and no fevers. Principal Problem:    Colitis  Active Problems:    Nausea    Diarrhea    Elevated troponin    Essential hypertension    Anemia, normocytic normochromic    Hypocalcemia    Cirrhosis (HCC)    S/P AVR (aortic valve replacement)  Resolved Problems:    * No resolved hospital problems.  *     Patient Active Problem List   Diagnosis    Chronic atrial fibrillation (HCC)    Ascending cholangitis, possible    Elevated LFTs    Thrombocytopenia (HCC)    Leukopenia    [facility]RA [facility]) ([facility] [address]    SIRS (systemic inflammatory response syndrome) (HCC)    Cholecystitis, acute    Neutropenia (HCC)    Aortic stenosis, severe    Colitis    Nausea    Diarrhea    Elevated troponin    Essential hypertension    Anemia, normocytic normochromic    Hypocalcemia    Cirrhosis (HCC)    S/P AVR (aortic valve replacement)       PLAN       1. GI panel - negative. 2. Anemia labs  3. Hepatopathy labs and AFP in progress. 4. Continue Carafate 1 gm 4 times daily  5. Levaquin and Flagyl TID  6. Zofran as needed   7. Continue  Protonix 40 mg daily  8. UA - completed. 9. Paracentesis U/S guided. Labs ordered. Limit 1 liter. There may not be enough fluid for this. 10. Advance to GI soft diet  11. Pancreatic elastase, fecal ordered. SUBJECTIVE      Patient seen and examined. 24 hours events and chart reviewed. (  x )Medications Reviewed ;(   )Old records reviewed    Day 1 of stay. Feeling: ( []  )Better  ([]   ) Worse      ([x]   )Same       Diarrhea has let up a bit. Abdominal pain has improved. Tolerating clear liquid diet. ROS:    Skin: No rashes, jaundice, no psoriasis  Endocrine:  No polyuria, polydipsia, No Diabetes. PHYSICAL EXAMINATION:    In: 521.6   Out: 550 [Urine:550]  I/O last 3 completed shifts: In: 1109.1 [P.O.:420;  I.V.:689.1]  Out: 850 [Urine:850]   DIET LOW FIBER;    Vital Signs  BP (!) 114/55   Pulse 77   Temp 98.3 °F (36.8 °C) (Oral)   Resp 18   Ht 5' 4\" (1.626 m)   Wt 237 lb 9.6 oz (107.8 kg)   SpO2 94%   BMI 40.78 kg/m²     General:   ([x]   )Comfortable      ([x]   )Obese          (  [x] )chronically sick looking      other:    HEENT: ( [x]  )NoPalour(  [x] )No Icterus (   )ET tube in place                      Mucosa: ( x  )moist(   )dry : Other:    CV: ( []  )RRR(   [x])Irregular/arrhythmias : Murmur: ([x]  ) No   ([]  ) Yes:    Resp: ([x]  )CTA Bilaterally,[x] No added sounds ( []  )Rhonci([]   )Wheeze ([]   )Rales    Abd: ([x]   )Soft([x]   )Non tender (  [] )No palpable masses    Ext: ([x]   )No edema ( [x]  )Edema ( [x]  )No cyanosis    Skin: ( [x]  )Warm  ( []  )Cold   (   )Dry   ( x  )No rash    Neuro:    ( [x]  )Oriented X 3      ([]  )Confused      ( X  )  Other:  Moves all 4

## 2018-08-25 NOTE — FLOWSHEET NOTE
08/24/18 2234   Provider Notification   Reason for Communication Evaluate   Provider Name Dr Sagar Taylor    Provider Notification Physician   Method of Communication Secure Message   Response See orders   Notification Time 21    patient had aortic valve replacement 2 months ago. patient is currently having episodes of 2nd degree av block wenckebach hr 80 bp 117/58. cardiology has not been consulted. ORDER received to consult cardiology and for ekg.

## 2018-08-25 NOTE — PROGRESS NOTES
Progress    Patient:  Tara Muse  YOB: 1940  Date of Service: 8/25/2018  MRN: [id number]   Acct:  [de-identified]   Primary Care Physician: ARTHUR Mancera CNP    Chief Complaint: Nausea, Diarrhea    History of Present Illness:   History obtained from chart review and the patient. The patient is a 69 y.o. female with h/o Atrial Fibrillation, s/p AVR, HTN, who presents to the Emergency Department for the evaluation of nausea. Patient states that she had AVR in June 31, 2018 and went to the NH for rehab. She states that nausea started with rehab, about 30 mins into rehab and lasts all day. She states she was being given anti emetics in the NH to be able to tolerate rehab but nausea never completely went away. She states she always woke up without nausea. She denies association with food. Patient returned home from NH 2 weeks ago and has continued with nausea. Additionally, she notes diarrhea, dry heaves for the past 1.5 weeks. Patient denies abdominal pain, bloody stools or mucus in stool. She denies cough, runny nose, sore throat, fever, chills, emesis, dizziness, headache, constipation, dysuria, paresthesias, chest pain, shortness of breath, recent falls or head injury. Patient has had sick contacts in the NH. She denies prior CVA, MI, bleeding or clotting disorders. She denies smoking or alcohol use. Lab tests were normal except for slightly elevated troponin. CT abdomen pelvis shows Probable colitis from the distal ascending colon to the proximal sigmoid colon. Colonic diverticulosis without diverticulitis. Mild ascites. Hepatomegaly with possibly cirrhotic liver. Ultrasound of gallbladder is pending. Patient is being admitted for further care.       Subjective-    Nausea is better   Trying to eat   Hungry     Scheduled Meds:   magnesium replacement protocol   Other RX Placeholder    potassium (CARDIAC) replacement protocol   Other RX Placeholder    carvedilol  6.25 mg Oral BID WC    metroNIDAZOLE  500 mg Oral 3 times per day    levofloxacin  500 mg Intravenous Q24H    calcium replacement protocol   Other RX Placeholder    sodium chloride flush  10 mL Intravenous 2 times per day    vitamin C  250 mg Oral Daily    atorvastatin  20 mg Oral Nightly    fluticasone  1 spray Nasal Daily    furosemide  20 mg Oral Daily    lisinopril  2.5 mg Oral Daily    therapeutic multivitamin-minerals  1 tablet Oral BID    PARoxetine  30 mg Oral QAM    potassium chloride  10 mEq Oral Daily    vitamin B-6  100 mg Oral Daily    traZODone  50 mg Oral Nightly    cetirizine  10 mg Oral Nightly    pantoprazole  40 mg Oral QAM AC    sucralfate  1 g Oral 4x Daily AC & HS     Continuous Infusions:   sodium chloride 50 mL/hr at 08/25/18 1542     PRN Meds:.sodium chloride flush, magnesium hydroxide, ondansetron, nitroGLYCERIN, acetaminophen  10 point review of systems completed, all other than noted above are negative. Vitals:   Vitals:    08/25/18 1554   BP: 128/60   Pulse: 73   Resp: 18   Temp: 98.4 °F (36.9 °C)   SpO2: 94%      BMI: Body mass index is 40.78 kg/m².                 Exam:  Physical Examination: General appearance - alert, well appearing, and in no distress  Mental status - alert, oriented to person, place, and time  Neck - supple, no significant adenopathy, no JVD, or carotid bruits  Chest - clear to auscultation, no wheezes, rales or rhonchi, symmetric air entry  Heart - normal rate, regular rhythm, normal S1, S2, no murmurs, rubs, clicks or gallops  Abdomen - soft, nontender, nondistended, obese abdomen, no masses or organomegaly  Neurological - alert, oriented, normal speech, no focal findings or movement disorder noted  Musculoskeletal - no joint tenderness, deformity or swelling  Extremities - peripheral pulses normal, + bilat LE edema, no clubbing or cyanosis  Skin - normal coloration and turgor, no rashes, no suspicious skin lesions noted      Review of Labs and Detected    Basic Metabolic Panel w/ Reflex to MG    Collection Time: 08/25/18  5:54 AM   Result Value Ref Range    Sodium 146 (H) 135 - 145 meq/L    Potassium reflex Magnesium 3.3 (L) 3.5 - 5.2 meq/L    Chloride 106 98 - 111 meq/L    CO2 28 23 - 33 meq/L    Glucose 89 70 - 108 mg/dL    BUN 14 7 - 22 mg/dL    CREATININE 1.0 0.4 - 1.2 mg/dL    Calcium 7.9 (L) 8.5 - 10.5 mg/dL   Anion Gap    Collection Time: 08/25/18  5:54 AM   Result Value Ref Range    Anion Gap 12.0 8.0 - 16.0 meq/L   Glomerular Filtration Rate, Estimated    Collection Time: 08/25/18  5:54 AM   Result Value Ref Range    Est, Glom Filt Rate 54 (A) ml/min/1.73m2   Magnesium    Collection Time: 08/25/18  5:54 AM   Result Value Ref Range    Magnesium 1.4 (L) 1.6 - 2.4 mg/dL   Protime-INR    Collection Time: 08/25/18  5:55 AM   Result Value Ref Range    INR 1.24 (H) 0.85 - 1.13   CBC auto differential    Collection Time: 08/25/18  5:55 AM   Result Value Ref Range    WBC 3.7 (L) 4.8 - 10.8 thou/mm3    RBC 3.35 (L) 4.20 - 5.40 mill/mm3    Hemoglobin 9.4 (L) 12.0 - 16.0 gm/dl    Hematocrit 31.8 (L) 37.0 - 47.0 %    MCV 94.9 81.0 - 99.0 fL    MCH 28.1 26.0 - 33.0 pg    MCHC 29.6 (L) 32.2 - 35.5 gm/dl    RDW-CV 15.2 (H) 11.5 - 14.5 %    RDW-SD 52.3 (H) 35.0 - 45.0 fL    Platelets 710 (L) 422 - 400 thou/mm3    MPV 11.0 9.4 - 12.4 fL    Seg Neutrophils 66.1 %    Lymphocytes 19.2 %    Monocytes 10.4 %    Eosinophils 3.3 %    Basophils 0.5 %    Immature Granulocytes 0.5 %    Segs Absolute 2.4 1.8 - 7.7 thou/mm3    Lymphocytes # 0.7 (L) 1.0 - 4.8 thou/mm3    Monocytes # 0.4 0.4 - 1.3 thou/mm3    Eosinophils # 0.1 0.0 - 0.4 thou/mm3    Basophils # 0.0 0.0 - 0.1 thou/mm3    Immature Grans (Abs) 0.02 0.00 - 0.07 thou/mm3    nRBC 0 /100 wbc       Radiology:     Ct Abdomen Pelvis Wo Contrast Additional Contrast? None    Result Date: 8/23/2018  PROCEDURE: CT ABDOMEN PELVIS WO CONTRAST CLINICAL INFORMATION: persistent nausea .  COMPARISON: Abdomen pelvis CT with contrast dated 6/11/2016 TECHNIQUE: 5 mm images were obtained through the abdomen and pelvis. Sagittal and coronal reconstructions were obtained and reviewed. No oral or IV contrast was administered. All CT scans at this facility use dose modulation, iterative reconstruction, and/or weight-based dosing when appropriate to reduce radiation dose to as low as reasonably achievable. FINDINGS: Limitations: Evaluation of the solid and hollow viscera is limited due to the lack of IV contrast. Lower chest: There is mild cardiomegaly. Detached epicardial pacer leads are noted. Liver: No obvious mass or intrahepatic biliary dilatation. There is hepatomegaly. Liver surface appears mildly lobulated suggestive of cirrhosis. Correlate clinically. Gallbladder/biliary tree: The gallbladder is mildly distended with questionable wall thickening. There are no calcified gallstones. Spleen: Unremarkable Adrenals: Unremarkable. No obvious mass. Kidneys/ureters: No hydroureteronephrosis. No renal or ureteral calculi. There are again bilateral renal cysts in the upper poles and right lower pole. Pancreas: There are tiny calcifications in the posterior pancreatic head and uncinate process which may represent diastases of chronic pancreatitis. Cannot exclude a tiny distal CBD calculus. If there is concern for this, recommend MRCP correlation. There is no intra or extrahepatic biliary dilatation. GI tract: There is thickening of the wall the stomach which may be due to incomplete distention but cannot exclude gastritis, neoplasm felt to be less likely. Correlate clinically. There is colonic diverticulosis without diverticulitis. There is thickening of the wall of the colon from the distal ascending colon to the proximal sigmoid colon very suspicious for acute colitis. Similar wall  Small bowel is unremarkable. No bowel obstruction. Appendix: appendix is visualized and appears normal without evidence of acute appendicitis.

## 2018-08-26 ENCOUNTER — APPOINTMENT (OUTPATIENT)
Dept: ULTRASOUND IMAGING | Age: 78
DRG: 432 | End: 2018-08-26
Payer: MEDICARE

## 2018-08-26 VITALS
HEIGHT: 64 IN | HEART RATE: 81 BPM | BODY MASS INDEX: 40.9 KG/M2 | RESPIRATION RATE: 18 BRPM | DIASTOLIC BLOOD PRESSURE: 63 MMHG | TEMPERATURE: 98.3 F | SYSTOLIC BLOOD PRESSURE: 140 MMHG | OXYGEN SATURATION: 91 % | WEIGHT: 239.6 LBS

## 2018-08-26 LAB
ANION GAP SERPL CALCULATED.3IONS-SCNC: 12 MEQ/L (ref 8–16)
BUN BLDV-MCNC: 12 MG/DL (ref 7–22)
CALCIUM SERPL-MCNC: 7.8 MG/DL (ref 8.5–10.5)
CHLORIDE BLD-SCNC: 102 MEQ/L (ref 98–111)
CO2: 26 MEQ/L (ref 23–33)
CREAT SERPL-MCNC: 1.2 MG/DL (ref 0.4–1.2)
GFR SERPL CREATININE-BSD FRML MDRD: 43 ML/MIN/1.73M2
GLUCOSE BLD-MCNC: 118 MG/DL (ref 70–108)
INR BLD: 1.33 (ref 0.85–1.13)
MAGNESIUM: 1.7 MG/DL (ref 1.6–2.4)
POTASSIUM SERPL-SCNC: 3.1 MEQ/L (ref 3.5–5.2)
POTASSIUM SERPL-SCNC: 3.8 MEQ/L (ref 3.5–5.2)
POTASSIUM SERPL-SCNC: 3.9 MEQ/L (ref 3.5–5.2)
SODIUM BLD-SCNC: 140 MEQ/L (ref 135–145)

## 2018-08-26 PROCEDURE — 94760 N-INVAS EAR/PLS OXIMETRY 1: CPT

## 2018-08-26 PROCEDURE — 6360000002 HC RX W HCPCS: Performed by: FAMILY MEDICINE

## 2018-08-26 PROCEDURE — 6370000000 HC RX 637 (ALT 250 FOR IP): Performed by: FAMILY MEDICINE

## 2018-08-26 PROCEDURE — 36415 COLL VENOUS BLD VENIPUNCTURE: CPT

## 2018-08-26 PROCEDURE — 80048 BASIC METABOLIC PNL TOTAL CA: CPT

## 2018-08-26 PROCEDURE — 76705 ECHO EXAM OF ABDOMEN: CPT

## 2018-08-26 PROCEDURE — 94761 N-INVAS EAR/PLS OXIMETRY MLT: CPT

## 2018-08-26 PROCEDURE — 6370000000 HC RX 637 (ALT 250 FOR IP): Performed by: NURSE PRACTITIONER

## 2018-08-26 PROCEDURE — 85610 PROTHROMBIN TIME: CPT

## 2018-08-26 PROCEDURE — 6370000000 HC RX 637 (ALT 250 FOR IP): Performed by: INTERNAL MEDICINE

## 2018-08-26 PROCEDURE — 2580000003 HC RX 258: Performed by: FAMILY MEDICINE

## 2018-08-26 PROCEDURE — 2709999900 HC NON-CHARGEABLE SUPPLY

## 2018-08-26 PROCEDURE — 84132 ASSAY OF SERUM POTASSIUM: CPT

## 2018-08-26 PROCEDURE — 99239 HOSP IP/OBS DSCHRG MGMT >30: CPT | Performed by: INTERNAL MEDICINE

## 2018-08-26 PROCEDURE — 83735 ASSAY OF MAGNESIUM: CPT

## 2018-08-26 RX ORDER — POTASSIUM CHLORIDE 750 MG/1
10 TABLET, FILM COATED, EXTENDED RELEASE ORAL 2 TIMES DAILY
Status: DISCONTINUED | OUTPATIENT
Start: 2018-08-26 | End: 2018-08-26 | Stop reason: HOSPADM

## 2018-08-26 RX ORDER — CARVEDILOL 6.25 MG/1
6.25 TABLET ORAL 2 TIMES DAILY WITH MEALS
Qty: 60 TABLET | Refills: 3 | Status: SHIPPED | OUTPATIENT
Start: 2018-08-26 | End: 2018-08-26

## 2018-08-26 RX ORDER — CIPROFLOXACIN 500 MG/1
500 TABLET, FILM COATED ORAL 2 TIMES DAILY
Qty: 20 TABLET | Refills: 0 | Status: SHIPPED | OUTPATIENT
Start: 2018-08-26 | End: 2018-08-26

## 2018-08-26 RX ORDER — SUCRALFATE 1 G/1
1 TABLET ORAL
Qty: 120 TABLET | Refills: 3 | Status: SHIPPED | OUTPATIENT
Start: 2018-08-26

## 2018-08-26 RX ORDER — POTASSIUM CHLORIDE 20 MEQ/1
20 TABLET, EXTENDED RELEASE ORAL ONCE
Status: COMPLETED | OUTPATIENT
Start: 2018-08-26 | End: 2018-08-26

## 2018-08-26 RX ORDER — POTASSIUM CHLORIDE 20 MEQ/1
40 TABLET, EXTENDED RELEASE ORAL ONCE
Status: COMPLETED | OUTPATIENT
Start: 2018-08-26 | End: 2018-08-26

## 2018-08-26 RX ORDER — CARVEDILOL 6.25 MG/1
6.25 TABLET ORAL 2 TIMES DAILY WITH MEALS
Qty: 60 TABLET | Refills: 3 | Status: ON HOLD | OUTPATIENT
Start: 2018-08-26 | End: 2022-04-05 | Stop reason: HOSPADM

## 2018-08-26 RX ORDER — GREEN TEA/HOODIA GORDONII 315-12.5MG
1 CAPSULE ORAL DAILY
Qty: 30 TABLET | Refills: 0 | Status: ON HOLD | OUTPATIENT
Start: 2018-08-26 | End: 2022-02-09

## 2018-08-26 RX ORDER — GREEN TEA/HOODIA GORDONII 315-12.5MG
1 CAPSULE ORAL DAILY
Qty: 30 TABLET | Refills: 0 | Status: SHIPPED | OUTPATIENT
Start: 2018-08-26 | End: 2018-08-26

## 2018-08-26 RX ORDER — METRONIDAZOLE 500 MG/1
500 TABLET ORAL EVERY 8 HOURS SCHEDULED
Qty: 30 TABLET | Refills: 0 | Status: ON HOLD | OUTPATIENT
Start: 2018-08-26 | End: 2018-09-03 | Stop reason: HOSPADM

## 2018-08-26 RX ORDER — CIPROFLOXACIN 500 MG/1
500 TABLET, FILM COATED ORAL 2 TIMES DAILY
Qty: 20 TABLET | Refills: 0 | Status: ON HOLD | OUTPATIENT
Start: 2018-08-26 | End: 2018-09-03 | Stop reason: HOSPADM

## 2018-08-26 RX ORDER — METRONIDAZOLE 500 MG/1
500 TABLET ORAL EVERY 8 HOURS SCHEDULED
Qty: 30 TABLET | Refills: 0 | Status: SHIPPED | OUTPATIENT
Start: 2018-08-26 | End: 2018-08-26

## 2018-08-26 RX ADMIN — POTASSIUM CHLORIDE 10 MEQ: 750 TABLET, EXTENDED RELEASE ORAL at 08:32

## 2018-08-26 RX ADMIN — FUROSEMIDE 20 MG: 20 TABLET ORAL at 08:32

## 2018-08-26 RX ADMIN — PAROXETINE HYDROCHLORIDE 30 MG: 30 TABLET, FILM COATED ORAL at 08:32

## 2018-08-26 RX ADMIN — LISINOPRIL 2.5 MG: 2.5 TABLET ORAL at 12:24

## 2018-08-26 RX ADMIN — METRONIDAZOLE 500 MG: 500 TABLET, FILM COATED ORAL at 14:25

## 2018-08-26 RX ADMIN — POTASSIUM CHLORIDE 40 MEQ: 20 TABLET, EXTENDED RELEASE ORAL at 05:50

## 2018-08-26 RX ADMIN — SUCRALFATE 1 G: 1 TABLET ORAL at 17:42

## 2018-08-26 RX ADMIN — CARVEDILOL 6.25 MG: 6.25 TABLET, FILM COATED ORAL at 08:32

## 2018-08-26 RX ADMIN — PYRIDOXINE HCL TAB 50 MG 100 MG: 50 TAB at 08:32

## 2018-08-26 RX ADMIN — LEVOFLOXACIN 500 MG: 5 INJECTION, SOLUTION INTRAVENOUS at 05:50

## 2018-08-26 RX ADMIN — METRONIDAZOLE 500 MG: 500 TABLET, FILM COATED ORAL at 05:50

## 2018-08-26 RX ADMIN — SUCRALFATE 1 G: 1 TABLET ORAL at 05:50

## 2018-08-26 RX ADMIN — SUCRALFATE 1 G: 1 TABLET ORAL at 11:27

## 2018-08-26 RX ADMIN — MULTIPLE VITAMINS W/ MINERALS TAB 1 TABLET: TAB at 08:32

## 2018-08-26 RX ADMIN — Medication 250 MG: at 08:32

## 2018-08-26 RX ADMIN — PANTOPRAZOLE SODIUM 40 MG: 40 TABLET, DELAYED RELEASE ORAL at 05:50

## 2018-08-26 RX ADMIN — POTASSIUM CHLORIDE 20 MEQ: 20 TABLET, EXTENDED RELEASE ORAL at 17:42

## 2018-08-26 RX ADMIN — Medication 10 ML: at 08:32

## 2018-08-26 RX ADMIN — CARVEDILOL 6.25 MG: 6.25 TABLET, FILM COATED ORAL at 17:42

## 2018-08-26 ASSESSMENT — PAIN SCALES - GENERAL: PAINLEVEL_OUTOF10: 0

## 2018-08-26 NOTE — PLAN OF CARE
oxygenation will improve   Outcome: Ongoing  Patient is stating wnl on 1liter 02 nasal canula    Comments: Care plan reviewed with patient. Patient verbalizes understanding of the care plan and contributed to goal setting.

## 2018-08-26 NOTE — PROGRESS NOTES
Dr. Bruno Bingham in to see patient, he stated to continue the antibiotics for 10days and okay for the patient to go home with a follow up with Dr. Leyla Haley in 3 weeks.

## 2018-08-26 NOTE — PROGRESS NOTES
Patient and son given discharge instructions via teachback method. Patient verbalizes understanding of new medications and side effects. Patient verbalizes understanding office will be calling them with an appointment. Patient taken to the discharge lobby in stable condition. AVS and new PT/OT order faxed to [facility] and nurse notified of d/c.

## 2018-08-26 NOTE — PROGRESS NOTES
 Aortic stenosis, severe    Colitis    Nausea    Diarrhea    Elevated troponin    Essential hypertension    Anemia, normocytic normochromic    Hypocalcemia    Cirrhosis (HCC)    S/P AVR (aortic valve replacement)       PLAN       1. GI panel - negative. 2. Anemia labs  3. Hepatopathy labs in progress. and AFP (normal)    4. Levaquin and Flagyl TID for a total of 10 day course. 5. Zofran as needed   6. Continue  Protonix 40 mg daily    7. Paracentesis U/S guided. Labs ordered. Limit 1 liter. (Unable to be completed 8/25/18 due to emergent case). Done today, not enough fluid to tap. 8. Tolerating GI soft diet  9. Pancreatic elastase, fecal- collected; in process   10. Follow up with Dr. Rl Dale in 3 weeks    SUBJECTIVE      Patient seen and examined. 24 hours events and chart reviewed. (  x )Medications Reviewed ;(   )Old records reviewed    Day 2 of stay. Feeling: ( [x]  )Better  ([]   ) Worse      ([x]   )Same       2 BM's yesterday. Tolerating soft diet. No abdominal pain. Wants to go home. ROS:    Skin: No rashes, jaundice, no psoriasis  Endocrine:  No polyuria, polydipsia, No Diabetes. PHYSICAL EXAMINATION:    In: 1030.2   Out: 200 [Urine:200]  I/O last 3 completed shifts: In: 2419.1 [P.O.:830;  I.V.:1589.1]  Out: 900 [Urine:900]   DIET LOW FIBER;    Vital Signs  BP (!) 119/53   Pulse 79   Temp 98.2 °F (36.8 °C) (Oral)   Resp 18   Ht 5' 4\" (1.626 m)   Wt 239 lb 9.6 oz (108.7 kg)   SpO2 92%   BMI 41.13 kg/m²     General:   ([x]   )Comfortable      ([x]   )Obese          (  [x] )chronically sick looking      other:    HEENT: ( [x]  )NoPalour(  [x] )No Icterus (   )ET tube in place                      Mucosa: ( x  )moist(   )dry : Other:    CV: ( []  )RRR(   [x])Irregular/arrhythmias : Murmur: ([x]  ) No   ([]  ) Yes:    Resp: ([x]  )CTA Bilaterally,[x] No added sounds ( []  )Rhonci([]   )Wheeze ([]   )Rales    Abd: ([x]   )Soft([x]   )Non tender (  [] )No palpable masses    Ext: ([x]   )No edema ( [x]  )Edema ( [x]  )No cyanosis    Skin: ( [x]  )Warm  ( []  )Cold   (   )Dry   ( x  )No rash    Neuro:    ( [x]  )Oriented X 3      ([]  )Confused      ( X  )  Other:  Moves all 4 extremities. REVIEW OF DIAGNOSTIC TESTING AND LABS:      Significant Diagnostic Studies:     RADIOLOGY:      CT abdomen/ pelvis 8/24/18-   Colonic diverticulosis without diverticulitis. Mild ascites. Hepatomegaly with possibly cirrhotic liver. Mildly distended gallbladder without calcified stones. Possible gallbladder wall thickening. Consider ultrasound for further evaluation. Punctate calcifications in the head of the pancreas likely sequela of prior pancreatitis. No acute pancreatitis. Cannot exclude a tiny calculus in the distal CBD. Correlate clinically and if necessary recommend MRCP   Gastric wall thickening, see above. MRI abdomen W WO contrast 8/24/18:    1. The hepatic parenchymal echogenicity is within normal limits however there is nodularity of the hepatic contour consistent with the clinical history of cirrhosis. There is no hepatic mass. 2. The gallbladder is dilated with mild wall thickening. There are no filling defects. No gallstones are identified. There is no biliary dilatation. 3. The pancreas is mildly atrophic and somewhat irregular. No pancreatic mass is identified. There is normal pancreatic enhancement. The pancreatic duct is normal size measuring 0.2 cm. 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 There is no peripancreatic    inflammation. 4. There are several scattered Bosniak 1 cysts measuring up to 2.9 cm on the right and 1.1 cm on the left. 5. There is a 0.9 cm nodule which is either within the right adrenal gland or adjacent to the right adrenal gland (series 13 images 21 and 22). 6. The bowel gas pattern is nonobstructive. There are colonic diverticula.  Please refer to the CT abdomen/pelvis examination dated 8/23/2018 for further characterization of the colon and colonic wall thickening. 7. There are bilateral small pleural effusions. The right pleural effusion may be partially loculated. 8. There is a 1.0 cm nodule at the right lung base. US GB RUQ 8/24/18:    Enlarged echogenic cirrhotic liver. Bidirectional flow in the main portal vein. Distended gallbladder with findings suggestive of acute acalculous cholecystitis. No biliary dilatation. Minimal ascites adjacent to the liver. 8 x 7 x 6 mm cystic structure in the inferior pancreatic head. Recommend follow-up triple phase abdomen CT versus MRI without and with gadolinium         LABS:      CBC:   Recent Labs      08/23/18 2242 08/25/18   0555   WBC  5.1  3.7*   HGB  10.5*  9.4*   PLT  147  114*       BMP:    Recent Labs      08/23/18 2242 08/25/18   0554  08/26/18   0356   NA  141  146*   --    K  3.9  3.3*  3.1*   CL  102  106   --    CO2  28  28   --    BUN  18  14   --    CREATININE  1.2  1.0   --    GLUCOSE  110*  89   --        Hepatic Function Panel:    Recent Labs      08/23/18 2242   ALKPHOS  85   ALT  9*   AST  21   PROT  6.1   BILITOT  0.7   LABALBU  3.2*     Amylase and Lipase:No results for input(s): LACTA, AMYLASE in the last 72 hours. INR:   Recent Labs      08/24/18   0817  08/25/18   0555  08/26/18   0356   INR  1.20*  1.24*  1.33*     Lactic Acid: No results for input(s): LACTA in the last 72 hours. Calcium:  Recent Labs      08/25/18   0554   CALCIUM  7.9*     Magnesium:  Recent Labs      08/26/18   0356   MG  1.7     Phosphorus:  Recent Labs      08/25/18   0007   PHOS  3.3     Glucose:No results for input(s): POCGLU in the last 72 hours. Troponin: No results for input(s): CKTOTAL, CKMB, TROPONINI in the last 72 hours.           MEDICATIONS    Scheduled Meds:   magnesium replacement protocol   Other RX Placeholder    potassium (CARDIAC) replacement protocol   Other RX Placeholder    carvedilol  6.25 mg Oral BID WC    metroNIDAZOLE  500 mg Oral 3 times per day    levofloxacin  500 mg Intravenous Q24H    calcium replacement protocol   Other RX Placeholder    sodium chloride flush  10 mL Intravenous 2 times per day    vitamin C  250 mg Oral Daily    atorvastatin  20 mg Oral Nightly    fluticasone  1 spray Nasal Daily    furosemide  20 mg Oral Daily    lisinopril  2.5 mg Oral Daily    therapeutic multivitamin-minerals  1 tablet Oral BID    PARoxetine  30 mg Oral QAM    potassium chloride  10 mEq Oral Daily    vitamin B-6  100 mg Oral Daily    traZODone  50 mg Oral Nightly    cetirizine  10 mg Oral Nightly    pantoprazole  40 mg Oral QAM AC    sucralfate  1 g Oral 4x Daily AC & HS     Continuous Infusions:   sodium chloride 50 mL/hr at 08/25/18 1542     PRN Meds:.sodium chloride flush, magnesium hydroxide, ondansetron, nitroGLYCERIN, acetaminophen        Prepared by Jalyn Hansen RN  Patient seen and examined by Jennifer Gallagher MD   Note reviewed, updated, and signed by Jennifer Gallagher MD  8/26/2018   9:02 AM

## 2018-08-26 NOTE — DISCHARGE SUMMARY
Progress    Patient:  Reed Rodriguez  YOB: 1940  Date of Service: 8/26/2018  MRN: [id number]   Acct:  [de-identified]   Primary Care Physician: ARTHUR Walter CNP    Chief Complaint: Nausea, Diarrhea    History of Present Illness:   History obtained from chart review and the patient. The patient is a 69 y.o. female with h/o Atrial Fibrillation, s/p AVR, HTN, who presents to the Emergency Department for the evaluation of nausea. Patient states that she had AVR in June 31, 2018 and went to the NH for rehab. She states that nausea started with rehab, about 30 mins into rehab and lasts all day. She states she was being given anti emetics in the NH to be able to tolerate rehab but nausea never completely went away. She states she always woke up without nausea. She denies association with food. Patient returned home from NH 2 weeks ago and has continued with nausea. Additionally, she notes diarrhea, dry heaves for the past 1.5 weeks. Patient denies abdominal pain, bloody stools or mucus in stool. She denies cough, runny nose, sore throat, fever, chills, emesis, dizziness, headache, constipation, dysuria, paresthesias, chest pain, shortness of breath, recent falls or head injury. Patient has had sick contacts in the NH. She denies prior CVA, MI, bleeding or clotting disorders. She denies smoking or alcohol use. Lab tests were normal except for slightly elevated troponin. CT abdomen pelvis shows Probable colitis from the distal ascending colon to the proximal sigmoid colon. Colonic diverticulosis without diverticulitis. Mild ascites. Hepatomegaly with possibly cirrhotic liver. Ultrasound of gallbladder is pending. Patient is being admitted for further care.       Subjective-    Doing well  Eating full diet       Medication List      START taking these medications    ciprofloxacin 500 MG tablet  Commonly known as:  CIPRO  Take 1 tablet by mouth 2 times daily for 10 days     metroNIDAZOLE 500 MG tablet  Commonly known as:  FLAGYL  Take 1 tablet by mouth every 8 hours for 10 days     PROBIOTIC ACIDOPHILUS Tabs  Take 1 tablet by mouth daily     sucralfate 1 GM tablet  Commonly known as:  CARAFATE  Take 1 tablet by mouth 4 times daily (before meals and nightly)        CHANGE how you take these medications    carvedilol 6.25 MG tablet  Commonly known as:  COREG  Take 1 tablet by mouth 2 times daily (with meals)  What changed:  · medication strength  · how much to take        CONTINUE taking these medications    Acetaminophen 650 MG Tabs  Take 650 mg by mouth every 4 hours as needed     atorvastatin 20 MG tablet  Commonly known as:  LIPITOR  Take 1 tablet by mouth nightly     CALTRATE PLUS PO     fluticasone 50 MCG/ACT nasal spray  Commonly known as:  FLONASE     furosemide 20 MG tablet  Commonly known as:  LASIX  Take 1 tablet by mouth daily     lisinopril 2.5 MG tablet  Commonly known as:  PRINIVIL;ZESTRIL  Take 1 tablet by mouth daily     loratadine 10 MG tablet  Commonly known as:  CLARITIN     omeprazole 20 MG delayed release capsule  Commonly known as:  PRILOSEC     ondansetron 4 MG tablet  Commonly known as:  ZOFRAN  Take 1 tablet by mouth every 8 hours as needed for Nausea or Vomiting     * OSTEO BI-FLEX TRIPLE STRENGTH PO     * OSTEO BI-FLEX TRIPLE STRENGTH PO     PARoxetine 30 MG tablet  Commonly known as:  PAXIL     potassium chloride 20 MEQ extended release tablet  Commonly known as:  KLOR-CON M  Take 0.5 tablets by mouth daily     PRESERVISION AREDS Caps     traZODone 50 MG tablet  Commonly known as:  DESYREL     vitamin B-6 100 MG tablet  Commonly known as:  PYRIDOXINE     vitamin C 250 MG tablet        * This list has 2 medication(s) that are the same as other medications prescribed for you. Read the directions carefully, and ask your doctor or other care provider to review them with you.             STOP taking these medications    aspirin 325 MG EC tablet     diphenoxylate-atropine 2.5-0.025 MG per tablet  Commonly known as:  LOMOTIL           Where to Get Your Medications      These medications were sent to [facility], [address]  [address] [place] [address] [address] [place] [address]    Phone:  [phone]   · carvedilol 6.25 MG tablet  · ciprofloxacin 500 MG tablet  · metroNIDAZOLE 500 MG tablet  · PROBIOTIC ACIDOPHILUS Tabs  · sucralfate 1 GM tablet       10 point review of systems completed, all other than noted above are negative. Vitals:   Vitals:    08/26/18 1141   BP: 133/62   Pulse: 81   Resp: 18   Temp: 97.3 °F (36.3 °C)   SpO2: 90%      BMI: Body mass index is 41.13 kg/m².                 Exam:  Physical Examination: General appearance - alert, well appearing, and in no distress  Mental status - alert, oriented to person, place, and time  Neck - supple, no significant adenopathy, no JVD, or carotid bruits  Chest - clear to auscultation, no wheezes, rales or rhonchi, symmetric air entry  Heart - normal rate, regular rhythm, normal S1, S2, no murmurs, rubs, clicks or gallops  Abdomen - soft, nontender, nondistended, obese abdomen, no masses or organomegaly  Neurological - alert, oriented, normal speech, no focal findings or movement disorder noted  Musculoskeletal - no joint tenderness, deformity or swelling  Extremities - peripheral pulses normal, + bilat LE edema, no clubbing or cyanosis  Skin - normal coloration and turgor, no rashes, no suspicious skin lesions noted      Review of Labs and Diagnostic Testing:    Recent Results (from the past 24 hour(s))   Protime-INR    Collection Time: 08/26/18  3:56 AM   Result Value Ref Range    INR 1.33 (H) 0.85 - 1.13   Potassium    Collection Time: 08/26/18  3:56 AM   Result Value Ref Range    Potassium 3.1 (L) 3.5 - 5.2 meq/L   Magnesium    Collection Time: 08/26/18  3:56 AM   Result Value Ref Range    Magnesium 1.7 1.6 - 2.4 mg/dL       Radiology:     Ct Abdomen Lungs/pleura: There are bibasilar infiltrates and/or atelectasis. There are small bilateral pleural effusions, larger on the left. No pneumothorax. Heart: There is again mild to moderate cardiomegaly. Patient status post sternotomy for valve replacement. An additional medical device projects caudal to the left mainstem bronchus which may represent a left atrial appendage closure device. Mediastinum/janice: Unremarkable. No obvious mass or adenopathy. Skeleton: Unremarkable. No significant bone or joint abnormality. Bibasilar infiltrates versus atelectasis with small bilateral pleural effusions. **This report has been created using voice recognition software. It may contain minor errors which are inherent in voice recognition technology. ** Final report electronically signed by Dr. Keanu Gray on 8/24/2018 12:47 AM        EKG: pending    Assessment / Plan:    1. Acute Colitis/ gastroenteritis- likely viral- empirically on antibiotics- GI following  2. Severe aortic stenosis-  S/p AVR 6/2018  3. Nausea, r/o GI vs acs- trend troponin, EKG, 2 d echo, CXR, BNP, D Dimer, LFTs, UA, CT abdomen pelvis, RUQ abd ultrasound, NTG sl prn  4. Diarrhea, poss d/t #1 vs antibiotics induced - stool cultures so far negative  5. ? Cirrhosis on radiology- likely cardiac related- paracentesis per GI, liver enzymes normal  6. Elevated troponin, had normal coronaries in 2018 cardiac Had severe aortic stenosis and had an aortic valve replacement after that  7. Anemia, normocytic- stool for occult blood, monitor CBC  8. Hypocalcemia- ionized calcium, replete per protocol  9. Essential hypertension- resume home med with parameters to hold  10. Bilat pleural effusion, poss d/t post op cardiac surg.  Vs cirrhosis- is not look like and fluid overload  Acute hypoxic respiratory failure-resolved , likely secondary to pleural effusions from ascites likely related to cardiac cirrhosis she did have moderate pulmonary hypertension in her latest cardiac And also severe aortic valve disease  Advanced diet- tolerated    Okay for home with Cipro and Flagyl oral tablets for 10 days will follow GI in 2 weeks  PCP in one week  Cardiologist as scheduled    Did not qualify for home oxygen    Stable for home with home health  Needs PTOT at discharge  Incentive spirometer    Discharge time 35 minutes              DVT prophylaxis: [x] Lovenox                                 [] SCDs                                 [] SQ Heparin                                 [] Encourage ambulation, low risk for DVT, no chemical or mechanical prophylaxis necessary              [] Already on Anticoagulation                Anticipated Disposition upon discharge: [x] Home                                                                         [] Home with Home Health                                                                         [] [facility]                                                                         [] [address]          Electronically signed by Shelby Alba MD on 8/26/2018 at 1:58 PM

## 2018-08-26 NOTE — CONSULTS
[address] [id number]                                   CONSULTATION    PATIENT NAME: Hang Esposito                    :        1940  MED REC NO:   [id number]                           ROOM:       [id number]  ACCOUNT NO:   [de-identified]                           ADMIT DATE: 2018  PROVIDER:     CIARA Chrisa:  2018    REASON FOR CONSULTATION:  Bradycardia. HISTORY OF PRESENT ILLNESS:  This the patient who is a 25-year-old lady  with known history of aortic valve replacement. This patient has a TAVR. The patient has no coronary artery disease. She had a preserved systolic  function of the left ventricle. Initially, the patient was admitted to the  hospital with the diarrhea and abdominal pain. The patient denies chest pain. The patient denied dizziness. The  patient's EKG showed sinus bradycardia with marked RI interval and has  anterior hemiblock. The patient's EKG was read as junctional rhythm which  is not the case. REVIEW OF SYSTEMS:  She had no history of CVA. The patient had a history  of aortic valve replacement, and she had a history of _____ cholelithiasis. She had a history of anemia, history of blood transfusion, history of  paroxysmal atrial fib in the past.  She had a history of peptic ulcer  disease, GI bleed. This patient had a history of joint replacement. The  patient is somewhat overweight. No change in her weight. She denied  fever, chills or rigors. The patient had a history of hypoalbuminemia, history of hypertension. The  patient does have history of right pleural effusion. The patient had  history of acalculous cholecystitis. SOCIAL HISTORY:  She denies smoking. Denied alcohol abuse. ALLERGIES:  She has no known allergy. CODE STATUS:  She is a full code. PHYSICAL EXAMINATION:  VITAL SIGNS:  Showed her blood pressure is 120/80.   She is

## 2018-08-26 NOTE — PROGRESS NOTES
[facility]  Daily Progress note  Valeria Salcido  [id number]  Admit Date: 8/23/2018  Day: Hospital Day: 4    Subjective:     Patient no physical complaints. .   Medication side effects: none    Scheduled Meds:   magnesium replacement protocol   Other RX Placeholder    potassium (CARDIAC) replacement protocol   Other RX Placeholder    carvedilol  6.25 mg Oral BID WC    metroNIDAZOLE  500 mg Oral 3 times per day    levofloxacin  500 mg Intravenous Q24H    calcium replacement protocol   Other RX Placeholder    sodium chloride flush  10 mL Intravenous 2 times per day    vitamin C  250 mg Oral Daily    atorvastatin  20 mg Oral Nightly    fluticasone  1 spray Nasal Daily    furosemide  20 mg Oral Daily    lisinopril  2.5 mg Oral Daily    therapeutic multivitamin-minerals  1 tablet Oral BID    PARoxetine  30 mg Oral QAM    potassium chloride  10 mEq Oral Daily    vitamin B-6  100 mg Oral Daily    traZODone  50 mg Oral Nightly    cetirizine  10 mg Oral Nightly    pantoprazole  40 mg Oral QAM AC    sucralfate  1 g Oral 4x Daily AC & HS     Continuous Infusions:   sodium chloride 50 mL/hr at 08/25/18 1542     PRN Meds:sodium chloride flush, magnesium hydroxide, ondansetron, nitroGLYCERIN, acetaminophen    Review of Systems  Ears, nose, mouth, throat, and face: negative    Objective:     Patient Vitals for the past 8 hrs:   BP Temp Temp src Pulse Resp SpO2   08/26/18 1141 133/62 97.3 °F (36.3 °C) Oral 81 18 90 %   08/26/18 1030 - - - - - 94 %   08/26/18 0805 (!) 119/53 98.2 °F (36.8 °C) Oral 79 18 92 %     I/O last 3 completed shifts: In: 2419.1 [P.O.:830;  I.V.:1589.1]  Out: 900 [Urine:900]    No change     ECG: sr.   Data Review:   CBC:  Lab Results   Component Value Date    WBC 3.7 08/25/2018    RBC 3.35 08/25/2018    RBC 2.95 08/22/2011    HGB 9.4 08/25/2018    HCT 31.8 08/25/2018    MCV 94.9 08/25/2018    MCH 28.1 08/25/2018    MCHC 29.6 08/25/2018    RDW 15.8 08/21/2018

## 2018-08-27 LAB
ALPHA-1 ANTITRYPSIN: 183 MG/DL (ref 90–200)
ANA SCREEN: DETECTED
F-ACTIN AB IGG: 35 UNITS (ref 0–19)
FOLATE: NORMAL
HAV AB SERPL IA-ACNC: NEGATIVE
HEPATITIS B CORE TOTAL ANTIBODY: NEGATIVE
LEGIONELLA URINARY AG: NEGATIVE
MITOCHONDRIAL ANTIBODY: 5.3 UNITS (ref 0–20)
STREP PNEUMO AG, UR: NEGATIVE

## 2018-08-28 LAB
ANA SCREEN, REFLEXED: ABNORMAL
SMOOTH MUSCLE AB IGG TITER: < 1

## 2018-08-29 LAB
BLOOD CULTURE, ROUTINE: NORMAL
BLOOD CULTURE, ROUTINE: NORMAL

## 2018-08-30 LAB — PANCREATIC ELASTASE, FECAL: > 500 UG/G

## 2018-08-31 ENCOUNTER — HOSPITAL ENCOUNTER (INPATIENT)
Age: 78
LOS: 7 days | Discharge: NURSING, REHAB OR LONG-TERM CARE | DRG: 392 | End: 2018-09-07
Attending: FAMILY MEDICINE | Admitting: INTERNAL MEDICINE
Payer: MEDICARE

## 2018-08-31 DIAGNOSIS — E86.0 DEHYDRATION: ICD-10-CM

## 2018-08-31 DIAGNOSIS — K52.9 COLITIS: ICD-10-CM

## 2018-08-31 DIAGNOSIS — R11.2 INTRACTABLE VOMITING WITH NAUSEA, UNSPECIFIED VOMITING TYPE: Primary | ICD-10-CM

## 2018-08-31 PROBLEM — E87.6 HYPOKALEMIA: Status: ACTIVE | Noted: 2018-08-31

## 2018-08-31 LAB
ALBUMIN SERPL-MCNC: 3.1 G/DL (ref 3.5–5.1)
ALP BLD-CCNC: 62 U/L (ref 38–126)
ALT SERPL-CCNC: 8 U/L (ref 11–66)
ANION GAP SERPL CALCULATED.3IONS-SCNC: 14 MEQ/L (ref 8–16)
AST SERPL-CCNC: 20 U/L (ref 5–40)
BASOPHILS # BLD: 0.6 %
BASOPHILS ABSOLUTE: 0 THOU/MM3 (ref 0–0.1)
BILIRUB SERPL-MCNC: 0.6 MG/DL (ref 0.3–1.2)
BILIRUBIN DIRECT: 0.3 MG/DL (ref 0–0.3)
BUN BLDV-MCNC: 11 MG/DL (ref 7–22)
CALCIUM SERPL-MCNC: 8 MG/DL (ref 8.5–10.5)
CHLORIDE BLD-SCNC: 103 MEQ/L (ref 98–111)
CO2: 24 MEQ/L (ref 23–33)
CREAT SERPL-MCNC: 1 MG/DL (ref 0.4–1.2)
EKG ATRIAL RATE: 91 BPM
EKG P AXIS: 71 DEGREES
EKG Q-T INTERVAL: 392 MS
EKG QRS DURATION: 102 MS
EKG QTC CALCULATION (BAZETT): 482 MS
EKG R AXIS: 7 DEGREES
EKG T AXIS: 118 DEGREES
EKG VENTRICULAR RATE: 91 BPM
EOSINOPHIL # BLD: 0.4 %
EOSINOPHILS ABSOLUTE: 0 THOU/MM3 (ref 0–0.4)
ERYTHROCYTE [DISTWIDTH] IN BLOOD BY AUTOMATED COUNT: 15.8 % (ref 11.5–14.5)
ERYTHROCYTE [DISTWIDTH] IN BLOOD BY AUTOMATED COUNT: 51.4 FL (ref 35–45)
FOLATE: > 20 NG/ML (ref 4.8–24.2)
GFR SERPL CREATININE-BSD FRML MDRD: 54 ML/MIN/1.73M2
GLUCOSE BLD-MCNC: 111 MG/DL (ref 70–108)
HCT VFR BLD CALC: 35 % (ref 37–47)
HEMOGLOBIN: 11 GM/DL (ref 12–16)
IMMATURE GRANS (ABS): 0.03 THOU/MM3 (ref 0–0.07)
IMMATURE GRANULOCYTES: 0.6 %
IRON: 47 UG/DL (ref 50–170)
LACTIC ACID: 0.8 MMOL/L (ref 0.5–2.2)
LIPASE: 16.3 U/L (ref 5.6–51.3)
LYMPHOCYTES # BLD: 13.4 %
LYMPHOCYTES ABSOLUTE: 0.6 THOU/MM3 (ref 1–4.8)
MCH RBC QN AUTO: 28 PG (ref 26–33)
MCHC RBC AUTO-ENTMCNC: 31.4 GM/DL (ref 32.2–35.5)
MCV RBC AUTO: 89.1 FL (ref 81–99)
MONOCYTES # BLD: 8.3 %
MONOCYTES ABSOLUTE: 0.4 THOU/MM3 (ref 0.4–1.3)
NUCLEATED RED BLOOD CELLS: 0 /100 WBC
OSMOLALITY CALCULATION: 281.4 MOSMOL/KG (ref 275–300)
PLATELET # BLD: 127 THOU/MM3 (ref 130–400)
PMV BLD AUTO: 10.5 FL (ref 9.4–12.4)
POTASSIUM SERPL-SCNC: 3.1 MEQ/L (ref 3.5–5.2)
RBC # BLD: 3.93 MILL/MM3 (ref 4.2–5.4)
SEG NEUTROPHILS: 76.7 %
SEGMENTED NEUTROPHILS ABSOLUTE COUNT: 3.6 THOU/MM3 (ref 1.8–7.7)
SODIUM BLD-SCNC: 141 MEQ/L (ref 135–145)
TOTAL PROTEIN: 5.5 G/DL (ref 6.1–8)
TROPONIN T: < 0.01 NG/ML
VITAMIN B-12: 877 PG/ML (ref 211–911)
WBC # BLD: 4.7 THOU/MM3 (ref 4.8–10.8)

## 2018-08-31 PROCEDURE — 93005 ELECTROCARDIOGRAM TRACING: CPT | Performed by: FAMILY MEDICINE

## 2018-08-31 PROCEDURE — 2580000003 HC RX 258: Performed by: INTERNAL MEDICINE

## 2018-08-31 PROCEDURE — 83605 ASSAY OF LACTIC ACID: CPT

## 2018-08-31 PROCEDURE — 83690 ASSAY OF LIPASE: CPT

## 2018-08-31 PROCEDURE — 82607 VITAMIN B-12: CPT

## 2018-08-31 PROCEDURE — 1200000003 HC TELEMETRY R&B

## 2018-08-31 PROCEDURE — 6370000000 HC RX 637 (ALT 250 FOR IP): Performed by: FAMILY MEDICINE

## 2018-08-31 PROCEDURE — 2709999900 HC NON-CHARGEABLE SUPPLY

## 2018-08-31 PROCEDURE — 2500000003 HC RX 250 WO HCPCS: Performed by: INTERNAL MEDICINE

## 2018-08-31 PROCEDURE — 99285 EMERGENCY DEPT VISIT HI MDM: CPT

## 2018-08-31 PROCEDURE — 93010 ELECTROCARDIOGRAM REPORT: CPT | Performed by: INTERNAL MEDICINE

## 2018-08-31 PROCEDURE — 6370000000 HC RX 637 (ALT 250 FOR IP): Performed by: INTERNAL MEDICINE

## 2018-08-31 PROCEDURE — 6360000002 HC RX W HCPCS: Performed by: FAMILY MEDICINE

## 2018-08-31 PROCEDURE — 2580000003 HC RX 258: Performed by: FAMILY MEDICINE

## 2018-08-31 PROCEDURE — 87186 SC STD MICRODIL/AGAR DIL: CPT

## 2018-08-31 PROCEDURE — 99223 1ST HOSP IP/OBS HIGH 75: CPT | Performed by: INTERNAL MEDICINE

## 2018-08-31 PROCEDURE — 36415 COLL VENOUS BLD VENIPUNCTURE: CPT

## 2018-08-31 PROCEDURE — 82248 BILIRUBIN DIRECT: CPT

## 2018-08-31 PROCEDURE — 82746 ASSAY OF FOLIC ACID SERUM: CPT

## 2018-08-31 PROCEDURE — 85025 COMPLETE CBC W/AUTO DIFF WBC: CPT

## 2018-08-31 PROCEDURE — 83540 ASSAY OF IRON: CPT

## 2018-08-31 PROCEDURE — 87086 URINE CULTURE/COLONY COUNT: CPT

## 2018-08-31 PROCEDURE — 6360000002 HC RX W HCPCS: Performed by: INTERNAL MEDICINE

## 2018-08-31 PROCEDURE — 84484 ASSAY OF TROPONIN QUANT: CPT

## 2018-08-31 PROCEDURE — 80053 COMPREHEN METABOLIC PANEL: CPT

## 2018-08-31 PROCEDURE — 96361 HYDRATE IV INFUSION ADD-ON: CPT

## 2018-08-31 PROCEDURE — A6250 SKIN SEAL PROTECT MOISTURIZR: HCPCS

## 2018-08-31 PROCEDURE — 87077 CULTURE AEROBIC IDENTIFY: CPT

## 2018-08-31 PROCEDURE — 96374 THER/PROPH/DIAG INJ IV PUSH: CPT

## 2018-08-31 RX ORDER — POTASSIUM CHLORIDE 20 MEQ/1
10 TABLET, EXTENDED RELEASE ORAL DAILY
Status: DISCONTINUED | OUTPATIENT
Start: 2018-08-31 | End: 2018-09-03

## 2018-08-31 RX ORDER — POTASSIUM CHLORIDE 7.45 MG/ML
10 INJECTION INTRAVENOUS ONCE
Status: COMPLETED | OUTPATIENT
Start: 2018-08-31 | End: 2018-08-31

## 2018-08-31 RX ORDER — POTASSIUM CHLORIDE 20 MEQ/1
40 TABLET, EXTENDED RELEASE ORAL ONCE
Status: COMPLETED | OUTPATIENT
Start: 2018-08-31 | End: 2018-08-31

## 2018-08-31 RX ORDER — ONDANSETRON 2 MG/ML
4 INJECTION INTRAMUSCULAR; INTRAVENOUS EVERY 6 HOURS PRN
Status: DISCONTINUED | OUTPATIENT
Start: 2018-08-31 | End: 2018-09-07 | Stop reason: HOSPADM

## 2018-08-31 RX ORDER — CIPROFLOXACIN 2 MG/ML
400 INJECTION, SOLUTION INTRAVENOUS EVERY 12 HOURS
Status: DISCONTINUED | OUTPATIENT
Start: 2018-08-31 | End: 2018-09-01

## 2018-08-31 RX ORDER — FUROSEMIDE 20 MG/1
20 TABLET ORAL DAILY
Status: DISCONTINUED | OUTPATIENT
Start: 2018-08-31 | End: 2018-09-03

## 2018-08-31 RX ORDER — LACTOBACILLUS RHAMNOSUS GG 10B CELL
1 CAPSULE ORAL DAILY
Status: DISCONTINUED | OUTPATIENT
Start: 2018-08-31 | End: 2018-09-07 | Stop reason: HOSPADM

## 2018-08-31 RX ORDER — SODIUM CHLORIDE 9 MG/ML
INJECTION, SOLUTION INTRAVENOUS CONTINUOUS
Status: DISCONTINUED | OUTPATIENT
Start: 2018-09-01 | End: 2018-09-02

## 2018-08-31 RX ORDER — ACETAMINOPHEN 325 MG/1
650 TABLET ORAL EVERY 4 HOURS PRN
Status: DISCONTINUED | OUTPATIENT
Start: 2018-08-31 | End: 2018-09-07 | Stop reason: HOSPADM

## 2018-08-31 RX ORDER — TRAZODONE HYDROCHLORIDE 50 MG/1
50 TABLET ORAL NIGHTLY
Status: DISCONTINUED | OUTPATIENT
Start: 2018-08-31 | End: 2018-09-07 | Stop reason: HOSPADM

## 2018-08-31 RX ORDER — PANTOPRAZOLE SODIUM 40 MG/1
40 TABLET, DELAYED RELEASE ORAL
Status: DISCONTINUED | OUTPATIENT
Start: 2018-09-01 | End: 2018-09-07 | Stop reason: HOSPADM

## 2018-08-31 RX ORDER — 0.9 % SODIUM CHLORIDE 0.9 %
1000 INTRAVENOUS SOLUTION INTRAVENOUS ONCE
Status: COMPLETED | OUTPATIENT
Start: 2018-08-31 | End: 2018-08-31

## 2018-08-31 RX ORDER — LISINOPRIL 2.5 MG/1
2.5 TABLET ORAL DAILY
Status: DISCONTINUED | OUTPATIENT
Start: 2018-08-31 | End: 2018-09-03

## 2018-08-31 RX ORDER — CARVEDILOL 6.25 MG/1
6.25 TABLET ORAL 2 TIMES DAILY WITH MEALS
Status: DISCONTINUED | OUTPATIENT
Start: 2018-08-31 | End: 2018-09-01

## 2018-08-31 RX ORDER — METOCLOPRAMIDE HYDROCHLORIDE 5 MG/ML
10 INJECTION INTRAMUSCULAR; INTRAVENOUS ONCE
Status: COMPLETED | OUTPATIENT
Start: 2018-08-31 | End: 2018-08-31

## 2018-08-31 RX ORDER — PAROXETINE 30 MG/1
30 TABLET, FILM COATED ORAL EVERY MORNING
Status: DISCONTINUED | OUTPATIENT
Start: 2018-09-01 | End: 2018-09-07 | Stop reason: HOSPADM

## 2018-08-31 RX ORDER — ATORVASTATIN CALCIUM 20 MG/1
20 TABLET, FILM COATED ORAL NIGHTLY
Status: DISCONTINUED | OUTPATIENT
Start: 2018-08-31 | End: 2018-09-07 | Stop reason: HOSPADM

## 2018-08-31 RX ORDER — CETIRIZINE HYDROCHLORIDE 10 MG/1
5 TABLET ORAL DAILY
Status: DISCONTINUED | OUTPATIENT
Start: 2018-08-31 | End: 2018-09-07 | Stop reason: HOSPADM

## 2018-08-31 RX ORDER — LANOLIN ALCOHOL/MO/W.PET/CERES
100 CREAM (GRAM) TOPICAL DAILY
Status: DISCONTINUED | OUTPATIENT
Start: 2018-08-31 | End: 2018-09-07 | Stop reason: HOSPADM

## 2018-08-31 RX ADMIN — METOCLOPRAMIDE 10 MG: 5 INJECTION, SOLUTION INTRAMUSCULAR; INTRAVENOUS at 14:27

## 2018-08-31 RX ADMIN — POTASSIUM CHLORIDE 10 MEQ: 10 INJECTION, SOLUTION INTRAVENOUS at 16:56

## 2018-08-31 RX ADMIN — METRONIDAZOLE 500 MG: 500 INJECTION, SOLUTION INTRAVENOUS at 23:53

## 2018-08-31 RX ADMIN — TRAZODONE HYDROCHLORIDE 50 MG: 50 TABLET ORAL at 22:23

## 2018-08-31 RX ADMIN — LISINOPRIL 2.5 MG: 2.5 TABLET ORAL at 21:31

## 2018-08-31 RX ADMIN — CIPROFLOXACIN 400 MG: 2 INJECTION, SOLUTION INTRAVENOUS at 22:23

## 2018-08-31 RX ADMIN — SODIUM CHLORIDE: 9 INJECTION, SOLUTION INTRAVENOUS at 23:51

## 2018-08-31 RX ADMIN — CARVEDILOL 6.25 MG: 6.25 TABLET, FILM COATED ORAL at 21:31

## 2018-08-31 RX ADMIN — SODIUM CHLORIDE 1000 ML: 9 INJECTION, SOLUTION INTRAVENOUS at 13:53

## 2018-08-31 RX ADMIN — POTASSIUM CHLORIDE 40 MEQ: 1500 TABLET, EXTENDED RELEASE ORAL at 16:56

## 2018-08-31 ASSESSMENT — ENCOUNTER SYMPTOMS
NAUSEA: 1
WHEEZING: 0
BACK PAIN: 0
ABDOMINAL PAIN: 0
COUGH: 0
EYE DISCHARGE: 0
SHORTNESS OF BREATH: 0
DIARRHEA: 1
EYE PAIN: 0
SORE THROAT: 0
RHINORRHEA: 0
VOMITING: 0

## 2018-08-31 NOTE — ED PROVIDER NOTES
[facility]  eMERGENCY dEPARTMENT eNCOUnter          [address]       Chief Complaint   Patient presents with    Emesis    Nausea    Diarrhea       Nurses Notes reviewed and I agree except as noted in the HPI. HISTORY OF PRESENT ILLNESS    Alexi Rowell is a 69 y.o. female who presents to the Emergency Department for the evaluation of intractble nausea, vomiting and diarrhea. The patient states that she was admitted last week for colitis and was discharged home with cipro and flagyl on Sunday. The patient states that she has continued to have nausea and diarrhea since her discharge. The patient admits a decreased appetite and poor oral intake. The patient denies any abdominal pain, fever, chills, urinary complaints, or any other signs or symptoms at this time. The patient reports that her previous colitis was infectious. The HPI was provided by the patient. REVIEW OF SYSTEMS     Review of Systems   Constitutional: Positive for appetite change (decreased). Negative for chills, fatigue and fever. HENT: Negative for congestion, ear pain, rhinorrhea and sore throat. Eyes: Negative for pain, discharge and visual disturbance. Respiratory: Negative for cough, shortness of breath and wheezing. Cardiovascular: Negative for chest pain, palpitations and leg swelling. Gastrointestinal: Positive for diarrhea and nausea. Negative for abdominal pain and vomiting. Genitourinary: Negative for difficulty urinating, dysuria, hematuria and vaginal discharge. Musculoskeletal: Negative for arthralgias, back pain, joint swelling and neck pain. Skin: Negative for pallor and rash. Neurological: Negative for dizziness, syncope, weakness, light-headedness, numbness and headaches. Hematological: Negative for adenopathy. Psychiatric/Behavioral: Negative for confusion and suicidal ideas. The patient is not nervous/anxious.         PAST MEDICAL HISTORY    has a past medical history of Pyridoxine HCl (VITAMIN B-6) 100 MG tablet Take 100 mg by mouth daily      loratadine (CLARITIN) 10 MG tablet Take 10 mg by mouth nightly       PARoxetine (PAXIL) 30 MG tablet Take 30 mg by mouth every morning      omeprazole (PRILOSEC) 20 MG capsule Take 20 mg by mouth daily      traZODone (DESYREL) 50 MG tablet Take 50 mg by mouth nightly      ondansetron (ZOFRAN) 4 MG tablet Take 1 tablet by mouth every 8 hours as needed for Nausea or Vomiting  Qty: 20 tablet, Refills: 0      acetaminophen 650 MG TABS Take 650 mg by mouth every 4 hours as needed  Qty: 120 tablet, Refills: 3             ALLERGIES     has No Known Allergies. FAMILY HISTORY     indicated that her mother is . She indicated that her father is . She indicated that her sister is alive. She indicated that both of her brothers are . She indicated that the status of her maternal grandmother is unknown. She indicated that the status of her maternal grandfather is unknown. She indicated that the status of her paternal grandmother is unknown. She indicated that the status of her paternal grandfather is unknown. She indicated that the status of her neg hx is unknown.    family history includes Cancer in her brother and sister; Diabetes in her brother, brother, father, paternal grandfather, and paternal grandmother; High Blood Pressure in her maternal grandfather, maternal grandmother, and mother; Stroke in her father. SOCIAL HISTORY      reports that she has never smoked. She has never used smokeless tobacco. She reports that she does not drink alcohol or use drugs. PHYSICAL EXAM     INITIAL VITALS:  height is 5' 4\" (1.626 m) and weight is 230 lb (104.3 kg). Her oral temperature is 97.9 °F (36.6 °C). Her blood pressure is 154/77 (abnormal) and her pulse is 100. Her respiration is 16 and oxygen saturation is 95%. Physical Exam   Constitutional: She is oriented to person, place, and time.  She appears well-developed and anterior infarct (cited on or before 01-Aug-2018)  No STEMI  Compared to old EKG on 08-  Sinus rhythm has replaced junctional rhythm    RADIOLOGY: non-plain film images(s) such as CT, Ultrasound and MRI are read by the radiologist.    No orders to display       LABS:   Labs Reviewed   CBC WITH AUTO DIFFERENTIAL - Abnormal; Notable for the following:        Result Value    WBC 4.7 (*)     RBC 3.93 (*)     Hemoglobin 11.0 (*)     Hematocrit 35.0 (*)     MCHC 31.4 (*)     RDW-CV 15.8 (*)     RDW-SD 51.4 (*)     Platelets 852 (*)     Lymphocytes # 0.6 (*)     All other components within normal limits   BASIC METABOLIC PANEL - Abnormal; Notable for the following:     Potassium 3.1 (*)     Glucose 111 (*)     Calcium 8.0 (*)     All other components within normal limits   HEPATIC FUNCTION PANEL - Abnormal; Notable for the following:     Alb 3.1 (*)     ALT 8 (*)     Total Protein 5.5 (*)     All other components within normal limits   GLOMERULAR FILTRATION RATE, ESTIMATED - Abnormal; Notable for the following:     Est, Glom Filt Rate 54 (*)     All other components within normal limits   IRON - Abnormal; Notable for the following:     Iron 47 (*)     All other components within normal limits   GASTROINTESTINAL PANEL BY DNA   C DIFF TOXIN B BY RT PCR   LIPASE   TROPONIN   LACTIC ACID, PLASMA   ANION GAP   OSMOLALITY   VITAMIN B12 & FOLATE   URINE RT REFLEX TO CULTURE       EMERGENCY DEPARTMENT COURSE:   Vitals:    Vitals:    08/31/18 1551 08/31/18 1652 08/31/18 1822 08/31/18 2015   BP: (!) 168/105 (!) 164/81 (!) 181/79 (!) 154/77   Pulse: 104 94 103 100   Resp: 24 24 16 16   Temp:   97.9 °F (36.6 °C) 97.9 °F (36.6 °C)   TempSrc:   Oral Oral   SpO2: 92% 92% 93% 95%   Weight:       Height:           1:40 PM: The patient was seen and evaluated. 4:17 PM: I discussed the case with Dr. Eduardo Fregoso, hospitalist, who graciously agreed to admit the patient.      MDM:  The patient was seen and evaluated in a timely manner for continued nausea and diarrhea. Her vital signs were stable with some hypertension noted. The patient's blood pressure was elevated while in the ED and at the time of discharge. The patient has a medical history of hypertension and is compliant with their antihypertensive medications. The patient was informed of their elevated blood pressure while in the ED and was advised to follow up with their PCP for blood pressure recheck and further management of their antihypertensive medications. During the physical exam I noted a soft non-tender abdomen and dry oropharyngeal mucosa. I ordered appropriate labs. The patient's potassium, calcium, albumin, ALT, protein, and WBC levels were decreased. Laboratory results were reviewed and discussed with the patient. Within the department, the patient was treated with potassium chloride, IV fluids, and reglan. Although the patient responded well to treatment, I decided that the patient could benefit from admission to the hospital for furhter evaluation and observation. I discussed the case with Dr. Precious Hunt, hospitalist, who graciously agreed to accept the patient. I explained my proposed course of admission to the patient, and she verbalized understanding and agreement with my proposed plan. All her questions were addressed at bedside. The patient was admitted in stable condition under Dr. Precious Hunt, hospitalist.      CRITICAL CARE:   None     CONSULTS:  I discussed the case with Dr. Precious Hunt, hospitalist, who graciously agreed to admit the patient. PROCEDURES:  None     FINAL IMPRESSION      1. Intractable vomiting with nausea, unspecified vomiting type    2. Dehydration    3. Colitis          DISPOSITION/PLAN   Discharge home in stable condition.      PATIENT REFERRED TO:  ARTHUR Martinez - RAINA  [address] [facility]  [phone]            DISCHARGE MEDICATIONS:  Current Discharge Medication List          (Please note that portions of this note

## 2018-08-31 NOTE — ED NOTES
Pt transported to [facility] by cart in stable condition. Called [facility] and told Reese Cabral that the patient was on their way to the unit.        Del Washington, RUPA  22/85/06 [id number]

## 2018-08-31 NOTE — ED TRIAGE NOTES
Pt to room with complaints of N/V/D for the last three weeks. Pt was hospitalized last week with colitis. Pt sent home with Cipro and Flagyl. Pt has continued to be nauseated and have diarrhea. Pt appears very dry. Reports increased weakness. Denies pain. Respirations easy and even. Skin warm and dry. Son at bedside.

## 2018-08-31 NOTE — H&P
History & Physical        Patient:  Nanci Cortez  YOB: 1940    MRN: [id number]     Acct: [de-identified]    PCP: ARTHUR Post CNP    Date of Admission: 8/31/2018    Date of Service: Pt seen/examined on 8/31/2018   and Admitted to Inpatient with expected LOS greater than two midnights due to medical therapy. Chief Complaint:   Chief Complaint   Patient presents with    Emesis    Nausea    Diarrhea           History Of Present Illness:      69 y.o. female who presented to [address] [place] [address] with complaints of nausea, vomiting and diarrhea. Patient is historian and son is at the bedside. Patient does not understand why she feels so weak. Son states that she has continued diarrhea and would like to know why she isn't getting better. Patient was hospitalized last week and discharged 6 days ago on oral flagyl and cipro. Patient has not shown improvement. She states her appetite is poor. She denies CP or shortness of breath. Denies abdominal pain. Son states she is supposed to have colonoscopy in 3 weeks after she \"gets over infection\". Patient to be admitted at this time for colitis. Past Medical History:          Diagnosis Date    ISIDRA (acute kidney injury) ([facility] [address] 6/11/2016    Atrial fibrillation (HCC)     Bleeding stomach ulcer 2011    History of blood transfusion     Hypertension     PONV (postoperative nausea and vomiting)        Past Surgical History:          Procedure Laterality Date    AORTIC VALVE REPLACEMENT      DILATION AND CURETTAGE OF UTERUS      JOINT REPLACEMENT Right 2011    knee    [facility] OFFICE/OUTPT VISIT,PROCEDURE ONLY N/A 6/28/2018    BIOPROSTHETIC AORTIC VALVE REPLACEMENT WITH MAZE PROCEDURE performed by Donald Thomas MD at [facility]       Medications Prior to Admission:      Prior to Admission medications    Medication Sig Start Date End Date Taking?  Authorizing Provider   sucralfate (CARAFATE) 1 GM tablet Take 1 tablet by mouth 4 times daily (before meals and nightly) 8/26/18  Yes Татьяна Hanson MD   Lactobacillus (PROBIOTIC ACIDOPHILUS) TABS Take 1 tablet by mouth daily 8/26/18  Yes Татьяна Hanson MD   carvedilol (COREG) 6.25 MG tablet Take 1 tablet by mouth 2 times daily (with meals) 8/26/18  Yes Татьяна Hanson MD   ciprofloxacin (CIPRO) 500 MG tablet Take 1 tablet by mouth 2 times daily for 10 days 8/26/18 9/5/18 Yes Татьяна Mc MD   metroNIDAZOLE (FLAGYL) 500 MG tablet Take 1 tablet by mouth every 8 hours for 10 days 8/26/18 9/5/18 Yes Татьяна Mc MD   Misc Natural Products (OSTEO BI-FLEX TRIPLE STRENGTH PO) Take 750 mg by mouth daily   Yes Historical Provider, MD   lisinopril (PRINIVIL;ZESTRIL) 2.5 MG tablet Take 1 tablet by mouth daily 8/1/18  Yes Bianka Valentine MD   atorvastatin (LIPITOR) 20 MG tablet Take 1 tablet by mouth nightly 7/9/18  Yes Molly Alcocer PA-C   furosemide (LASIX) 20 MG tablet Take 1 tablet by mouth daily 7/9/18  Yes Molly Alcocer PA-C   potassium chloride (KLOR-CON M) 20 MEQ extended release tablet Take 0.5 tablets by mouth daily 7/9/18  Yes Molly Alcocer PA-C   Multiple Vitamins-Minerals (PRESERVISION AREDS) CAPS Take by mouth 2 times daily   Yes Historical Provider, MD   fluticasone (FLONASE) 50 MCG/ACT nasal spray 1-2 sprays by Nasal route daily   Yes Historical Provider, MD   Ascorbic Acid (VITAMIN C) 250 MG tablet Take 250 mg by mouth daily    Yes Historical Provider, MD   Calcium Carbonate-Vit D-Min (CALTRATE PLUS PO) Take 1 tablet by mouth daily    Yes Historical Provider, MD   Pyridoxine HCl (VITAMIN B-6) 100 MG tablet Take 100 mg by mouth daily   Yes Historical Provider, MD   loratadine (CLARITIN) 10 MG tablet Take 10 mg by mouth nightly    Yes Historical Provider, MD   PARoxetine (PAXIL) 30 MG tablet Take 30 mg by mouth every morning   Yes Historical Provider, MD   omeprazole (PRILOSEC) 20 MG capsule Take 20 mg by mouth daily   Yes Historical Provider, MD   traZODone (DESYREL) 50 MG tablet Take 50 mg by mouth nightly   Yes Historical Provider, MD   ondansetron (ZOFRAN) 4 MG tablet Take 1 tablet by mouth every 8 hours as needed for Nausea or Vomiting 8/21/18   Nadir Lee MD   acetaminophen 650 MG TABS Take 650 mg by mouth every 4 hours as needed 6/13/16   Herrera Phan MD       Allergies:  Patient has no known allergies. Social History:      The patient currently lives at home    TOBACCO:   reports that she has never smoked. She has never used smokeless tobacco.  ETOH:   reports that she does not drink alcohol. Family History:      Positive as follows:    Family History   Problem Relation Age of Onset    Diabetes Father     Stroke Father     High Blood Pressure Mother     Cancer Sister         blood    Diabetes Brother     Cancer Brother         lymphoma    High Blood Pressure Maternal Grandmother     High Blood Pressure Maternal Grandfather     Diabetes Paternal Grandmother     Diabetes Paternal Grandfather     Diabetes Brother     Heart Disease Neg Hx        Diet: NPO after midnight  Diet NPO, After Midnight    REVIEW OF SYSTEMS:   Pertinent positives as noted in the HPI. All other systems reviewed and negative. PHYSICAL EXAM:    BP (!) 154/77   Pulse 100   Temp 97.9 °F (36.6 °C) (Oral)   Resp 16   Ht 5' 4\" (1.626 m)   Wt 230 lb (104.3 kg)   SpO2 95%   BMI 39.48 kg/m²     General appearance:  No apparent distress, appears stated age and cooperative. Generalized weakness  HEENT:  Normal cephalic, atraumatic without obvious deformity. Pupils equal, round, and reactive to light. Extra ocular muscles intact. Conjunctivae/corneas clear. Neck: Supple, with full range of motion. No jugular venous distention. Trachea midline. Respiratory:  Normal respiratory effort. Clear to auscultation, bilaterally without Rales/Wheezes/Rhonchi.   Cardiovascular: Regular rate and rhythm with normal S1/S2 without rubs or gallops. Abdomen: Soft, non-tender, non-distended with normal bowel sounds. Musculoskeletal:  No clubbing, cyanosis Full range of motion without deformity. Noted edema bilateral extremities  Skin: Skin color, texture, turgor normal.  No rashes or lesions. Neurologic:  Neurovascularly intact without any focal sensory/motor deficits. Cranial nerves: II-XII intact, grossly non-focal.  Psychiatric:  Alert and oriented, thought content appropriate, normal insight  Capillary Refill: Brisk,< 3 seconds   Peripheral Pulses: +2 palpable, equal bilaterally       Labs:     Recent Labs      08/31/18   1342   WBC  4.7*   HGB  11.0*   HCT  35.0*   PLT  127*     Recent Labs      08/31/18   1418   NA  141   K  3.1*   CL  103   CO2  24   BUN  11   CREATININE  1.0   CALCIUM  8.0*     Recent Labs      08/31/18   1418   AST  20   ALT  8*   BILIDIR  0.3   BILITOT  0.6   ALKPHOS  62     No results for input(s): INR in the last 72 hours. No results for input(s): Lindsey Bibber in the last 72 hours.     Urinalysis:      Lab Results   Component Value Date    NITRU NEGATIVE 08/24/2018    WBCUA 0-2 08/24/2018    BACTERIA NONE 08/24/2018    RBCUA NONE SEEN 08/24/2018    BLOODU NEGATIVE 08/24/2018    SPECGRAV 1.021 06/21/2018    GLUCOSEU NEGATIVE 08/24/2018       Radiology:         No orders to display            DVT prophylaxis: [] Lovenox                                 [] SCDs                                 [] SQ Heparin                                 [] Encourage ambulation           [] Already on Anticoagulation    Code Status: DNR-CCA      PT/OT Eval Status: Pending    Disposition:    [x] Home       [] TCU       [] Rehab       [] Psych       [x] SNF       [] [place]       [] Other-    ASSESSMENT:    Active Hospital Problems    Diagnosis Date Noted    Hypokalemia [E87.6] 08/31/2018    Colitis [K52.9] 08/24/2018    Diarrhea [R19.7] 08/24/2018    Essential hypertension [I10] 08/24/2018    [facility] [I48.2] 06/11/2016    Thrombocytopenia ([facility] [D69.6] 06/11/2016       PLAN:    Colitis   Admit to Telemetry Unit   Empiric antibiotics   GI consult, apprec chart recs    Diarrhea   Replace fluids conservatively   History of c diff in past, stool PCR pending   Monitoring closely    Thrombocytopenia   Will trend    Anemia    B12/folate and iron studies pending    Hypokalemia   Replaced potassium          Thank you ARTHUR Perdomo - RAINA for the opportunity to be involved in this patient's care.     Electronically signed by Radha Simmons DO on 8/31/2018 at 11:53 PM

## 2018-09-01 PROBLEM — Z86.79 HISTORY OF ATRIAL FIBRILLATION: Status: ACTIVE | Noted: 2018-09-01

## 2018-09-01 PROBLEM — E66.9 OBESITY (BMI 30-39.9): Status: ACTIVE | Noted: 2018-09-01

## 2018-09-01 LAB
ADENOVIRUS F 40 41 PCR: NOT DETECTED
ALBUMIN SERPL-MCNC: 3.2 G/DL (ref 3.5–5.1)
ANION GAP SERPL CALCULATED.3IONS-SCNC: 16 MEQ/L (ref 8–16)
ASTROVIRUS PCR: NOT DETECTED
BACTERIA: ABNORMAL /HPF
BILIRUBIN URINE: NEGATIVE
BLOOD, URINE: NEGATIVE
BUN BLDV-MCNC: 11 MG/DL (ref 7–22)
CALCIUM SERPL-MCNC: 8.3 MG/DL (ref 8.5–10.5)
CAMPYLOBACTER PCR: NOT DETECTED
CASTS 2: ABNORMAL /LPF
CASTS UA: ABNORMAL /LPF
CHARACTER, URINE: CLEAR
CHLORIDE BLD-SCNC: 104 MEQ/L (ref 98–111)
CLOSTRIDIUM DIFFICILE, PCR: NOT DETECTED
CO2: 24 MEQ/L (ref 23–33)
COLOR: YELLOW
CREAT SERPL-MCNC: 0.9 MG/DL (ref 0.4–1.2)
CRYPTOSPORIDIUM PCR: NOT DETECTED
CRYSTALS, UA: ABNORMAL
CYCLOSPORA CAYETANENSIS PCR: NOT DETECTED
E COLI 0157 PCR: NORMAL
E COLI ENTEROAGGREGATIVE PCR: NOT DETECTED
E COLI ENTEROPATHOGENIC PCR: NOT DETECTED
E COLI ENTEROTOXIGENIC PCR: NOT DETECTED
E COLI SHIGA LIKE TOXIN PCR: NOT DETECTED
E COLI SHIGELLA/ENTEROINVASIVE PCR: NOT DETECTED
E HISTOLYTICA GI FILM ARRAY: NOT DETECTED
EPITHELIAL CELLS, UA: ABNORMAL /HPF
ERYTHROCYTE [DISTWIDTH] IN BLOOD BY AUTOMATED COUNT: 16.1 % (ref 11.5–14.5)
ERYTHROCYTE [DISTWIDTH] IN BLOOD BY AUTOMATED COUNT: 52.9 FL (ref 35–45)
GFR SERPL CREATININE-BSD FRML MDRD: 61 ML/MIN/1.73M2
GIARDIA LAMBLIA PCR: NOT DETECTED
GLUCOSE BLD-MCNC: 82 MG/DL (ref 70–108)
GLUCOSE URINE: NEGATIVE MG/DL
HCT VFR BLD CALC: 35 % (ref 37–47)
HEMOCCULT STL QL: NEGATIVE
HEMOGLOBIN: 10.8 GM/DL (ref 12–16)
KETONES, URINE: NEGATIVE
LEUKOCYTE ESTERASE, URINE: ABNORMAL
MCH RBC QN AUTO: 28.1 PG (ref 26–33)
MCHC RBC AUTO-ENTMCNC: 30.9 GM/DL (ref 32.2–35.5)
MCV RBC AUTO: 91.1 FL (ref 81–99)
MISCELLANEOUS 2: ABNORMAL
NITRITE, URINE: NEGATIVE
NOROVIRUS GI GII PCR: NOT DETECTED
PH UA: 5.5
PHOSPHORUS: 3.2 MG/DL (ref 2.4–4.7)
PLATELET # BLD: 124 THOU/MM3 (ref 130–400)
PLESIOMONAS SHIGELLOIDES PCR: NOT DETECTED
PMV BLD AUTO: 10 FL (ref 9.4–12.4)
POTASSIUM SERPL-SCNC: 3.3 MEQ/L (ref 3.5–5.2)
PROTEIN UA: NEGATIVE
RBC # BLD: 3.84 MILL/MM3 (ref 4.2–5.4)
RBC URINE: ABNORMAL /HPF
RENAL EPITHELIAL, UA: ABNORMAL
ROTAVIRUS A PCR: NOT DETECTED
SALMONELLA PCR: NOT DETECTED
SAPOVIRUS PCR: NOT DETECTED
SODIUM BLD-SCNC: 144 MEQ/L (ref 135–145)
SPECIFIC GRAVITY, URINE: 1.01 (ref 1–1.03)
UROBILINOGEN, URINE: 0.2 EU/DL
VIBRIO CHOLERAE PCR: NOT DETECTED
VIBRIO PCR: NOT DETECTED
WBC # BLD: 5.7 THOU/MM3 (ref 4.8–10.8)
WBC UA: ABNORMAL /HPF
YEAST: ABNORMAL
YERSINIA ENTEROCOLITICA PCR: NOT DETECTED

## 2018-09-01 PROCEDURE — 6360000002 HC RX W HCPCS: Performed by: INTERNAL MEDICINE

## 2018-09-01 PROCEDURE — 87507 IADNA-DNA/RNA PROBE TQ 12-25: CPT

## 2018-09-01 PROCEDURE — 6370000000 HC RX 637 (ALT 250 FOR IP): Performed by: INTERNAL MEDICINE

## 2018-09-01 PROCEDURE — 99232 SBSQ HOSP IP/OBS MODERATE 35: CPT | Performed by: INTERNAL MEDICINE

## 2018-09-01 PROCEDURE — 2580000003 HC RX 258: Performed by: INTERNAL MEDICINE

## 2018-09-01 PROCEDURE — 2500000003 HC RX 250 WO HCPCS: Performed by: INTERNAL MEDICINE

## 2018-09-01 PROCEDURE — 82272 OCCULT BLD FECES 1-3 TESTS: CPT

## 2018-09-01 PROCEDURE — 85027 COMPLETE CBC AUTOMATED: CPT

## 2018-09-01 PROCEDURE — 1200000000 HC SEMI PRIVATE

## 2018-09-01 PROCEDURE — 81001 URINALYSIS AUTO W/SCOPE: CPT

## 2018-09-01 PROCEDURE — 36415 COLL VENOUS BLD VENIPUNCTURE: CPT

## 2018-09-01 PROCEDURE — 80069 RENAL FUNCTION PANEL: CPT

## 2018-09-01 PROCEDURE — 2709999900 HC NON-CHARGEABLE SUPPLY

## 2018-09-01 RX ORDER — CLONIDINE HYDROCHLORIDE 0.1 MG/1
0.1 TABLET ORAL EVERY 4 HOURS PRN
Status: DISCONTINUED | OUTPATIENT
Start: 2018-09-01 | End: 2018-09-07 | Stop reason: HOSPADM

## 2018-09-01 RX ORDER — LOPERAMIDE HYDROCHLORIDE 2 MG/1
2 CAPSULE ORAL 4 TIMES DAILY PRN
Status: DISCONTINUED | OUTPATIENT
Start: 2018-09-01 | End: 2018-09-07 | Stop reason: HOSPADM

## 2018-09-01 RX ORDER — CARVEDILOL 6.25 MG/1
12.5 TABLET ORAL 2 TIMES DAILY WITH MEALS
Status: DISCONTINUED | OUTPATIENT
Start: 2018-09-01 | End: 2018-09-07 | Stop reason: HOSPADM

## 2018-09-01 RX ORDER — ONDANSETRON 4 MG/1
4 TABLET, ORALLY DISINTEGRATING ORAL EVERY 8 HOURS PRN
Status: DISCONTINUED | OUTPATIENT
Start: 2018-09-01 | End: 2018-09-07 | Stop reason: HOSPADM

## 2018-09-01 RX ORDER — CIPROFLOXACIN 500 MG/1
500 TABLET, FILM COATED ORAL EVERY 12 HOURS SCHEDULED
Status: COMPLETED | OUTPATIENT
Start: 2018-09-01 | End: 2018-09-03

## 2018-09-01 RX ORDER — POTASSIUM CHLORIDE 20 MEQ/1
40 TABLET, EXTENDED RELEASE ORAL ONCE
Status: COMPLETED | OUTPATIENT
Start: 2018-09-01 | End: 2018-09-01

## 2018-09-01 RX ORDER — METRONIDAZOLE 500 MG/1
500 TABLET ORAL EVERY 8 HOURS SCHEDULED
Status: COMPLETED | OUTPATIENT
Start: 2018-09-01 | End: 2018-09-03

## 2018-09-01 RX ORDER — PROMETHAZINE HYDROCHLORIDE 25 MG/1
25 SUPPOSITORY RECTAL EVERY 6 HOURS PRN
Status: DISCONTINUED | OUTPATIENT
Start: 2018-09-01 | End: 2018-09-07 | Stop reason: HOSPADM

## 2018-09-01 RX ADMIN — CIPROFLOXACIN 400 MG: 2 INJECTION, SOLUTION INTRAVENOUS at 10:11

## 2018-09-01 RX ADMIN — CARVEDILOL 12.5 MG: 6.25 TABLET, FILM COATED ORAL at 17:47

## 2018-09-01 RX ADMIN — POTASSIUM CHLORIDE 40 MEQ: 1500 TABLET, EXTENDED RELEASE ORAL at 15:15

## 2018-09-01 RX ADMIN — Medication 1 CAPSULE: at 08:39

## 2018-09-01 RX ADMIN — MAGNESIUM SULFATE HEPTAHYDRATE 1 G: 500 INJECTION, SOLUTION INTRAMUSCULAR; INTRAVENOUS at 15:10

## 2018-09-01 RX ADMIN — FUROSEMIDE 20 MG: 20 TABLET ORAL at 08:39

## 2018-09-01 RX ADMIN — PANTOPRAZOLE SODIUM 40 MG: 40 TABLET, DELAYED RELEASE ORAL at 08:42

## 2018-09-01 RX ADMIN — CETIRIZINE HYDROCHLORIDE 5 MG: 10 TABLET, FILM COATED ORAL at 08:39

## 2018-09-01 RX ADMIN — CIPROFLOXACIN 500 MG: 500 TABLET, FILM COATED ORAL at 22:07

## 2018-09-01 RX ADMIN — POTASSIUM CHLORIDE 10 MEQ: 20 TABLET, EXTENDED RELEASE ORAL at 08:42

## 2018-09-01 RX ADMIN — LISINOPRIL 2.5 MG: 2.5 TABLET ORAL at 08:39

## 2018-09-01 RX ADMIN — ATORVASTATIN CALCIUM 20 MG: 20 TABLET, FILM COATED ORAL at 22:07

## 2018-09-01 RX ADMIN — PYRIDOXINE HCL TAB 50 MG 100 MG: 50 TAB at 08:41

## 2018-09-01 RX ADMIN — TRAZODONE HYDROCHLORIDE 50 MG: 50 TABLET ORAL at 22:06

## 2018-09-01 RX ADMIN — METRONIDAZOLE 500 MG: 500 INJECTION, SOLUTION INTRAVENOUS at 08:38

## 2018-09-01 RX ADMIN — PAROXETINE HYDROCHLORIDE 30 MG: 30 TABLET, FILM COATED ORAL at 08:39

## 2018-09-01 RX ADMIN — METRONIDAZOLE 500 MG: 500 TABLET, FILM COATED ORAL at 22:07

## 2018-09-01 RX ADMIN — ONDANSETRON 4 MG: 2 INJECTION INTRAMUSCULAR; INTRAVENOUS at 11:37

## 2018-09-01 RX ADMIN — SODIUM CHLORIDE: 9 INJECTION, SOLUTION INTRAVENOUS at 13:53

## 2018-09-01 RX ADMIN — METRONIDAZOLE 500 MG: 500 TABLET, FILM COATED ORAL at 15:13

## 2018-09-01 ASSESSMENT — PAIN SCALES - GENERAL
PAINLEVEL_OUTOF10: 0
PAINLEVEL_OUTOF10: 0

## 2018-09-01 ASSESSMENT — PAIN DESCRIPTION - PAIN TYPE: TYPE: ACUTE PAIN

## 2018-09-01 NOTE — PROGRESS NOTES
Hospital Medicine Progress Note     Date:  9/1/2018    PCP: ARTHUR Knight CNP (Tel: [phone])    Date of Admission: 8/31/2018    Chief complaint:   Chief Complaint   Patient presents with    Emesis    Nausea    Diarrhea       Brief hospital course: 78F with hx of atrial fibrillation (not on anticoagulation - per Dr. Nick Villanueva note from 8/26, has had anemia; to follow up with Dr. Lindsey Kendall as outpatient), who was recently discharged on 8/26 following admission with colitis, had unremarkable stool study and discharged on cipro and flagyl. She presents to ER with complaints of still having nausea, vomiting and diarrhea. Stool happens to be semi-formed. She does not have fever. On presentation, she was continued on cipro and flagyl. She denies abdominal pain. PCR stool study obtained and it was unremarkable for infectious processes. Patient was started on PRN imodium and antiemetics. She was hypokalemic on presentation, replaced. Assessment:  Principal Problem:    Colitis  Active Problems:    Diarrhea    Hypokalemia    Chronic atrial fibrillation (HCC)    Thrombocytopenia (HCC)    Essential hypertension    Obesity (BMI 30-39. 9)    History of atrial fibrillation  Resolved Problems:    * No resolved hospital problems. *        Plan:  1. Acute colitis. Stool study negative for infectious process. Continue cipro and flagyl. Continue antiemetics. Gentle fluid OK. PRN imodium ordered. If tolerates full liquid diet, we'll advance to regular and plan discharge in the morning. 2. Hypokalemia. Replacement ordered. Will give mag sulfate 1 gram as well. Recheck levels in AM.      Diet: DIET FULL LIQUID;    Code status: DNR-CCA     ----------  Subjective  Reports diarrhea, nausea, vomiting. Objective  Physical exam:  Vitals: /81   Pulse 93   Temp 98.2 °F (36.8 °C) (Oral)   Resp 16   Ht 5' 4\" (1.626 m)   Wt 230 lb (104.3 kg)   SpO2 93%   BMI 39.48 kg/m²   Gen: Not in distress. CREATININE  1.0  0.9   GLUCOSE  111*  82         Hepatic: Recent Labs      08/31/18   1418   AST  20   ALT  8*   BILITOT  0.6   ALKPHOS  62       Dayton Lambert MD  -------------------------------  Rounding hospitalist

## 2018-09-02 PROBLEM — E83.42 HYPOMAGNESEMIA: Status: ACTIVE | Noted: 2018-09-02

## 2018-09-02 PROBLEM — R82.71 ASYMPTOMATIC BACTERIURIA: Status: ACTIVE | Noted: 2018-09-02

## 2018-09-02 LAB
ALBUMIN SERPL-MCNC: 3 G/DL (ref 3.5–5.1)
ANION GAP SERPL CALCULATED.3IONS-SCNC: 12 MEQ/L (ref 8–16)
BASOPHILS # BLD: 0.7 %
BASOPHILS ABSOLUTE: 0 THOU/MM3 (ref 0–0.1)
BUN BLDV-MCNC: 12 MG/DL (ref 7–22)
CALCIUM SERPL-MCNC: 8 MG/DL (ref 8.5–10.5)
CHLORIDE BLD-SCNC: 106 MEQ/L (ref 98–111)
CO2: 26 MEQ/L (ref 23–33)
CREAT SERPL-MCNC: 1.2 MG/DL (ref 0.4–1.2)
EOSINOPHIL # BLD: 3.1 %
EOSINOPHILS ABSOLUTE: 0.1 THOU/MM3 (ref 0–0.4)
ERYTHROCYTE [DISTWIDTH] IN BLOOD BY AUTOMATED COUNT: 16.3 % (ref 11.5–14.5)
ERYTHROCYTE [DISTWIDTH] IN BLOOD BY AUTOMATED COUNT: 53.9 FL (ref 35–45)
GFR SERPL CREATININE-BSD FRML MDRD: 43 ML/MIN/1.73M2
GLUCOSE BLD-MCNC: 93 MG/DL (ref 70–108)
HCT VFR BLD CALC: 34 % (ref 37–47)
HEMOGLOBIN: 10.4 GM/DL (ref 12–16)
IMMATURE GRANS (ABS): 0.03 THOU/MM3 (ref 0–0.07)
IMMATURE GRANULOCYTES: 0.7 %
LYMPHOCYTES # BLD: 19.2 %
LYMPHOCYTES ABSOLUTE: 0.9 THOU/MM3 (ref 1–4.8)
MAGNESIUM: 1.5 MG/DL (ref 1.6–2.4)
MCH RBC QN AUTO: 28 PG (ref 26–33)
MCHC RBC AUTO-ENTMCNC: 30.6 GM/DL (ref 32.2–35.5)
MCV RBC AUTO: 91.4 FL (ref 81–99)
MONOCYTES # BLD: 16.1 %
MONOCYTES ABSOLUTE: 0.7 THOU/MM3 (ref 0.4–1.3)
NUCLEATED RED BLOOD CELLS: 0 /100 WBC
PHOSPHORUS: 3.5 MG/DL (ref 2.4–4.7)
PLATELET # BLD: 116 THOU/MM3 (ref 130–400)
PMV BLD AUTO: 10.3 FL (ref 9.4–12.4)
POTASSIUM SERPL-SCNC: 3.4 MEQ/L (ref 3.5–5.2)
RBC # BLD: 3.72 MILL/MM3 (ref 4.2–5.4)
SEG NEUTROPHILS: 60.2 %
SEGMENTED NEUTROPHILS ABSOLUTE COUNT: 2.8 THOU/MM3 (ref 1.8–7.7)
SODIUM BLD-SCNC: 144 MEQ/L (ref 135–145)
WBC # BLD: 4.6 THOU/MM3 (ref 4.8–10.8)

## 2018-09-02 PROCEDURE — 85025 COMPLETE CBC W/AUTO DIFF WBC: CPT

## 2018-09-02 PROCEDURE — 80069 RENAL FUNCTION PANEL: CPT

## 2018-09-02 PROCEDURE — 6370000000 HC RX 637 (ALT 250 FOR IP): Performed by: INTERNAL MEDICINE

## 2018-09-02 PROCEDURE — 99232 SBSQ HOSP IP/OBS MODERATE 35: CPT | Performed by: INTERNAL MEDICINE

## 2018-09-02 PROCEDURE — 83735 ASSAY OF MAGNESIUM: CPT

## 2018-09-02 PROCEDURE — 36415 COLL VENOUS BLD VENIPUNCTURE: CPT

## 2018-09-02 PROCEDURE — 1200000000 HC SEMI PRIVATE

## 2018-09-02 PROCEDURE — 2709999900 HC NON-CHARGEABLE SUPPLY

## 2018-09-02 PROCEDURE — 6360000002 HC RX W HCPCS: Performed by: INTERNAL MEDICINE

## 2018-09-02 RX ORDER — POTASSIUM CHLORIDE 20 MEQ/1
40 TABLET, EXTENDED RELEASE ORAL PRN
Status: DISCONTINUED | OUTPATIENT
Start: 2018-09-02 | End: 2018-09-07 | Stop reason: HOSPADM

## 2018-09-02 RX ORDER — FUROSEMIDE 10 MG/ML
20 INJECTION INTRAMUSCULAR; INTRAVENOUS ONCE
Status: COMPLETED | OUTPATIENT
Start: 2018-09-02 | End: 2018-09-02

## 2018-09-02 RX ORDER — MAGNESIUM SULFATE IN WATER 40 MG/ML
2 INJECTION, SOLUTION INTRAVENOUS ONCE
Status: COMPLETED | OUTPATIENT
Start: 2018-09-02 | End: 2018-09-02

## 2018-09-02 RX ORDER — POTASSIUM CHLORIDE 20 MEQ/1
40 TABLET, EXTENDED RELEASE ORAL ONCE
Status: COMPLETED | OUTPATIENT
Start: 2018-09-02 | End: 2018-09-02

## 2018-09-02 RX ORDER — POTASSIUM CHLORIDE 20MEQ/15ML
40 LIQUID (ML) ORAL PRN
Status: DISCONTINUED | OUTPATIENT
Start: 2018-09-02 | End: 2018-09-07 | Stop reason: HOSPADM

## 2018-09-02 RX ORDER — POTASSIUM CHLORIDE 7.45 MG/ML
10 INJECTION INTRAVENOUS PRN
Status: DISCONTINUED | OUTPATIENT
Start: 2018-09-02 | End: 2018-09-07 | Stop reason: HOSPADM

## 2018-09-02 RX ADMIN — TRAZODONE HYDROCHLORIDE 50 MG: 50 TABLET ORAL at 22:02

## 2018-09-02 RX ADMIN — CARVEDILOL 12.5 MG: 6.25 TABLET, FILM COATED ORAL at 08:53

## 2018-09-02 RX ADMIN — FUROSEMIDE 20 MG: 10 INJECTION, SOLUTION INTRAMUSCULAR; INTRAVENOUS at 12:54

## 2018-09-02 RX ADMIN — CIPROFLOXACIN 500 MG: 500 TABLET, FILM COATED ORAL at 20:42

## 2018-09-02 RX ADMIN — ATORVASTATIN CALCIUM 20 MG: 20 TABLET, FILM COATED ORAL at 20:42

## 2018-09-02 RX ADMIN — CARVEDILOL 12.5 MG: 6.25 TABLET, FILM COATED ORAL at 16:32

## 2018-09-02 RX ADMIN — METRONIDAZOLE 500 MG: 500 TABLET, FILM COATED ORAL at 22:02

## 2018-09-02 RX ADMIN — PYRIDOXINE HCL TAB 50 MG 100 MG: 50 TAB at 08:52

## 2018-09-02 RX ADMIN — METRONIDAZOLE 500 MG: 500 TABLET, FILM COATED ORAL at 14:46

## 2018-09-02 RX ADMIN — CIPROFLOXACIN 500 MG: 500 TABLET, FILM COATED ORAL at 08:52

## 2018-09-02 RX ADMIN — LOPERAMIDE HYDROCHLORIDE 2 MG: 2 CAPSULE ORAL at 12:55

## 2018-09-02 RX ADMIN — FUROSEMIDE 20 MG: 20 TABLET ORAL at 08:52

## 2018-09-02 RX ADMIN — MAGNESIUM SULFATE HEPTAHYDRATE 2 G: 40 INJECTION, SOLUTION INTRAVENOUS at 12:32

## 2018-09-02 RX ADMIN — LISINOPRIL 2.5 MG: 2.5 TABLET ORAL at 08:52

## 2018-09-02 RX ADMIN — CETIRIZINE HYDROCHLORIDE 10 MG: 10 TABLET, FILM COATED ORAL at 08:52

## 2018-09-02 RX ADMIN — POTASSIUM CHLORIDE 40 MEQ: 20 TABLET, EXTENDED RELEASE ORAL at 12:57

## 2018-09-02 RX ADMIN — METRONIDAZOLE 500 MG: 500 TABLET, FILM COATED ORAL at 06:32

## 2018-09-02 RX ADMIN — PAROXETINE HYDROCHLORIDE 30 MG: 30 TABLET, FILM COATED ORAL at 08:52

## 2018-09-02 RX ADMIN — PANTOPRAZOLE SODIUM 40 MG: 40 TABLET, DELAYED RELEASE ORAL at 06:32

## 2018-09-02 RX ADMIN — Medication 1 CAPSULE: at 08:52

## 2018-09-02 RX ADMIN — POTASSIUM CHLORIDE 20 MEQ: 20 TABLET, EXTENDED RELEASE ORAL at 08:58

## 2018-09-02 ASSESSMENT — PAIN SCALES - GENERAL
PAINLEVEL_OUTOF10: 0
PAINLEVEL_OUTOF10: 0

## 2018-09-02 NOTE — PROGRESS NOTES
Recent Labs      08/31/18   1418  09/01/18   0653  09/02/18   0730   NA  141  144  144   K  3.1*  3.3*  3.4*   CL  103  104  106   CO2  24  24  26   BUN  11  11  12   CREATININE  1.0  0.9  1.2   GLUCOSE  111*  82  93         Hepatic:   Recent Labs      08/31/18   1418   AST  20   ALT  8*   BILITOT  0.6   ALKPHOS  62       Jayme Combs MD  -------------------------------  [facility]

## 2018-09-03 PROBLEM — N18.30 ACUTE RENAL FAILURE SUPERIMPOSED ON STAGE 3 CHRONIC KIDNEY DISEASE (HCC): Status: ACTIVE | Noted: 2018-09-03

## 2018-09-03 PROBLEM — E87.6 HYPOKALEMIA: Status: RESOLVED | Noted: 2018-08-31 | Resolved: 2018-09-03

## 2018-09-03 PROBLEM — N17.9 ACUTE RENAL FAILURE SUPERIMPOSED ON STAGE 3 CHRONIC KIDNEY DISEASE (HCC): Status: ACTIVE | Noted: 2018-09-03

## 2018-09-03 PROBLEM — N18.30 CKD (CHRONIC KIDNEY DISEASE) STAGE 3, GFR 30-59 ML/MIN (HCC): Status: ACTIVE | Noted: 2018-09-03

## 2018-09-03 LAB
ALBUMIN SERPL-MCNC: 3.1 G/DL (ref 3.5–5.1)
ANION GAP SERPL CALCULATED.3IONS-SCNC: 13 MEQ/L (ref 8–16)
BUN BLDV-MCNC: 17 MG/DL (ref 7–22)
CALCIUM SERPL-MCNC: 7.9 MG/DL (ref 8.5–10.5)
CHLORIDE BLD-SCNC: 104 MEQ/L (ref 98–111)
CO2: 25 MEQ/L (ref 23–33)
CREAT SERPL-MCNC: 1.6 MG/DL (ref 0.4–1.2)
GFR SERPL CREATININE-BSD FRML MDRD: 31 ML/MIN/1.73M2
GLUCOSE BLD-MCNC: 104 MG/DL (ref 70–108)
MAGNESIUM: 1.6 MG/DL (ref 1.6–2.4)
PHOSPHORUS: 3.7 MG/DL (ref 2.4–4.7)
POTASSIUM SERPL-SCNC: 4 MEQ/L (ref 3.5–5.2)
SODIUM BLD-SCNC: 142 MEQ/L (ref 135–145)

## 2018-09-03 PROCEDURE — 83735 ASSAY OF MAGNESIUM: CPT

## 2018-09-03 PROCEDURE — 6370000000 HC RX 637 (ALT 250 FOR IP): Performed by: INTERNAL MEDICINE

## 2018-09-03 PROCEDURE — 2709999900 HC NON-CHARGEABLE SUPPLY

## 2018-09-03 PROCEDURE — 6360000002 HC RX W HCPCS: Performed by: INTERNAL MEDICINE

## 2018-09-03 PROCEDURE — 1200000000 HC SEMI PRIVATE

## 2018-09-03 PROCEDURE — 36415 COLL VENOUS BLD VENIPUNCTURE: CPT

## 2018-09-03 PROCEDURE — 99232 SBSQ HOSP IP/OBS MODERATE 35: CPT | Performed by: INTERNAL MEDICINE

## 2018-09-03 PROCEDURE — 2580000003 HC RX 258: Performed by: INTERNAL MEDICINE

## 2018-09-03 PROCEDURE — 80069 RENAL FUNCTION PANEL: CPT

## 2018-09-03 RX ORDER — SODIUM CHLORIDE 450 MG/100ML
INJECTION, SOLUTION INTRAVENOUS CONTINUOUS
Status: DISCONTINUED | OUTPATIENT
Start: 2018-09-03 | End: 2018-09-04

## 2018-09-03 RX ADMIN — PAROXETINE HYDROCHLORIDE 30 MG: 30 TABLET, FILM COATED ORAL at 09:14

## 2018-09-03 RX ADMIN — Medication 1 CAPSULE: at 09:14

## 2018-09-03 RX ADMIN — METRONIDAZOLE 500 MG: 500 TABLET, FILM COATED ORAL at 06:00

## 2018-09-03 RX ADMIN — TRAZODONE HYDROCHLORIDE 50 MG: 50 TABLET ORAL at 22:21

## 2018-09-03 RX ADMIN — SODIUM CHLORIDE: 4.5 INJECTION, SOLUTION INTRAVENOUS at 10:10

## 2018-09-03 RX ADMIN — METRONIDAZOLE 500 MG: 500 TABLET, FILM COATED ORAL at 14:10

## 2018-09-03 RX ADMIN — PYRIDOXINE HCL TAB 50 MG 100 MG: 50 TAB at 09:14

## 2018-09-03 RX ADMIN — ATORVASTATIN CALCIUM 20 MG: 20 TABLET, FILM COATED ORAL at 21:06

## 2018-09-03 RX ADMIN — CETIRIZINE HYDROCHLORIDE 5 MG: 10 TABLET, FILM COATED ORAL at 09:14

## 2018-09-03 RX ADMIN — MAGNESIUM SULFATE HEPTAHYDRATE 1 G: 500 INJECTION, SOLUTION INTRAMUSCULAR; INTRAVENOUS at 10:18

## 2018-09-03 RX ADMIN — METRONIDAZOLE 500 MG: 500 TABLET, FILM COATED ORAL at 22:21

## 2018-09-03 RX ADMIN — PANTOPRAZOLE SODIUM 40 MG: 40 TABLET, DELAYED RELEASE ORAL at 06:00

## 2018-09-03 RX ADMIN — CIPROFLOXACIN 500 MG: 500 TABLET, FILM COATED ORAL at 09:15

## 2018-09-03 RX ADMIN — CARVEDILOL 12.5 MG: 6.25 TABLET, FILM COATED ORAL at 17:07

## 2018-09-03 RX ADMIN — CIPROFLOXACIN 500 MG: 500 TABLET, FILM COATED ORAL at 21:06

## 2018-09-03 ASSESSMENT — PAIN SCALES - GENERAL
PAINLEVEL_OUTOF10: 0

## 2018-09-03 NOTE — PROGRESS NOTES
Hospital Medicine Progress Note     Date:  9/3/2018    PCP: ARTHUR Kelley CNP (Tel: [phone])    Date of Admission: 8/31/2018    Chief complaint:   Chief Complaint   Patient presents with    Emesis    Nausea    Diarrhea       Brief hospital course: 78F with hx of atrial fibrillation (not on anticoagulation - per Dr. Elian Diggs note from 8/26, has had anemia; to follow up with Dr. Sean Tillman as outpatient), who was recently discharged on 8/26 following admission with colitis, had unremarkable stool study and discharged on cipro and flagyl. She presents to ER with complaints of still having nausea, vomiting and diarrhea. Stool happens to be semi-formed. She does not have fever. On presentation, she was continued on cipro and flagyl. She denies abdominal pain. PCR stool study obtained and it was unremarkable for infectious processes. Patient was started on PRN imodium and antiemetics. She was hypokalemic on presentation, replaced. Assessment:  Principal Problem:    Colitis  Active Problems:    Diarrhea    Acute renal failure superimposed on stage 3 chronic kidney disease (HCC)    Chronic atrial fibrillation (HCC)    Thrombocytopenia (HCC)    Essential hypertension    Obesity (BMI 30-39. 9)    History of atrial fibrillation    Hypomagnesemia    Asymptomatic bacteriuria - no clinical indication for antimicrobial therapy    CKD (chronic kidney disease) stage 3, GFR 30-59 ml/min  Resolved Problems:    Hypokalemia        Plan:  1. Acute colitis, improving. Stool study negative for infectious process. Will complete 10-day course of cipro and flagyl (from recent discharge) today. OK to use imodium PRN. 2. ISIDRA on CKD3. Secondary to GI losses. Hold ACEi, diuretics. Gentle IV fluid overnight. Recheck lab in AM.  3. Asymptomatic bacteriuria. Denies urinary symptoms. Despite growth, no indication for antimicrobial therapy in this clinical setting. 4. Electrolyte disorders.  Mag replacement ordered. 5. Dispo. Discharge in AM, provided renal function improved. Diet: DIET GENERAL;    Code status: DNR-CCA     ----------  Subjective  Some dry heaves; overall, much improved. No abdominal pain. Tolerating diet. 3 BMs yesterday. Objective  Physical exam:  Vitals: BP (!) 109/52   Pulse 84   Temp 97.6 °F (36.4 °C) (Oral)   Resp 16   Ht 5' 4\" (1.626 m)   Wt 230 lb (104.3 kg)   SpO2 92%   BMI 39.48 kg/m²   Gen: Not in distress. Alert. Oriented. Head: Normocephalic. Atraumatic. Eyes: EOMI. Good acuity. ENT: Oral mucosa moist  Neck: No JVD. No obvious thyromegaly. CVS: Nml S1S2, no MRG. Irregular. Pulmomary: Clear bilaterally. No crackles. No wheezes. Gastrointestinal: Soft, NT/ND. Positive bowel sounds. Musculoskeletal: Bilateral LE edema. Warm  Neuro: No focal deficit. Moves extremity spontaneously. Psychiatry: Appropriate affect. Not agitated. Skin: Warm, dry with normal turgor. No rash  Capillary Refill: Brisk,< 3 seconds   Peripheral Pulses: +2 palpable, equal bilaterally    24HR INTAKE/OUTPUT:      Intake/Output Summary (Last 24 hours) at 09/03/18 0832  Last data filed at 09/03/18 0405   Gross per 24 hour   Intake             1981 ml   Output                0 ml   Net             1981 ml     I/O last 3 completed shifts: In: 1981 [P.O.:1800; I.V.:181]  Out: -   No intake/output data recorded.       Meds:    magnesium sulfate  1 g Intravenous Once    carvedilol  12.5 mg Oral BID WC    ciprofloxacin  500 mg Oral 2 times per day    metroNIDAZOLE  500 mg Oral 3 times per day    atorvastatin  20 mg Oral Nightly    lactobacillus  1 capsule Oral Daily    cetirizine  5 mg Oral Daily    pantoprazole  40 mg Oral QAM AC    PARoxetine  30 mg Oral QAM    vitamin B-6  100 mg Oral Daily    traZODone  50 mg Oral Nightly       Infusions:    sodium chloride           PRN Meds: potassium chloride **OR** potassium chloride **OR** potassium chloride, magnesium sulfate, sodium phosphate IVPB **OR** sodium phosphate IVPB, cloNIDine, loperamide, ondansetron, promethazine, prochlorperazine, acetaminophen, ondansetron    Labs/imaging:  CBC:   Recent Labs      08/31/18   1342  09/01/18   0653  09/02/18   0730   WBC  4.7*  5.7  4.6*   HGB  11.0*  10.8*  10.4*   PLT  127*  124*  116*         BMP:    Recent Labs      09/01/18   0653  09/02/18   0730  09/03/18   0603   NA  144  144  142   K  3.3*  3.4*  4.0   CL  104  106  104   CO2  24  26  25   BUN  11  12  17   CREATININE  0.9  1.2  1.6*   GLUCOSE  82  93  104         Hepatic:   Recent Labs      08/31/18   1418   AST  20   ALT  8*   BILITOT  0.6   ALKPHOS  62       Italo Wheeler MD  -------------------------------  Rounding hospitalist

## 2018-09-03 NOTE — PLAN OF CARE
Problem: Falls - Risk of:  Goal: Will remain free from falls  Will remain free from falls   Outcome: Met This Shift  Patient is currently free of falls, hourly rounding completed and bed alarm and chair alarm utilized. Goal: Absence of physical injury  Absence of physical injury   Outcome: Met This Shift  She is free of physical injury at this time. Comments: Care plan reviewed with patient. Patient verbalizes understanding of the care plan and contributed to goal setting.

## 2018-09-04 PROBLEM — Z22.39 VRE (VANCOMYCIN RESISTANT ENTEROCOCCUS) CULTURE POSITIVE: Status: ACTIVE | Noted: 2018-09-04

## 2018-09-04 LAB
ALBUMIN SERPL-MCNC: 2.9 G/DL (ref 3.5–5.1)
ANION GAP SERPL CALCULATED.3IONS-SCNC: 12 MEQ/L (ref 8–16)
BASOPHILS # BLD: 0.4 %
BASOPHILS ABSOLUTE: 0 THOU/MM3 (ref 0–0.1)
BUN BLDV-MCNC: 21 MG/DL (ref 7–22)
CALCIUM SERPL-MCNC: 8.1 MG/DL (ref 8.5–10.5)
CHLORIDE BLD-SCNC: 104 MEQ/L (ref 98–111)
CO2: 24 MEQ/L (ref 23–33)
CREAT SERPL-MCNC: 1.9 MG/DL (ref 0.4–1.2)
EOSINOPHIL # BLD: 3.7 %
EOSINOPHILS ABSOLUTE: 0.2 THOU/MM3 (ref 0–0.4)
ERYTHROCYTE [DISTWIDTH] IN BLOOD BY AUTOMATED COUNT: 16.1 % (ref 11.5–14.5)
ERYTHROCYTE [DISTWIDTH] IN BLOOD BY AUTOMATED COUNT: 53.7 FL (ref 35–45)
GFR SERPL CREATININE-BSD FRML MDRD: 26 ML/MIN/1.73M2
GLUCOSE BLD-MCNC: 106 MG/DL (ref 70–108)
HCT VFR BLD CALC: 34.6 % (ref 37–47)
HEMOGLOBIN: 10.6 GM/DL (ref 12–16)
IMMATURE GRANS (ABS): 0.03 THOU/MM3 (ref 0–0.07)
IMMATURE GRANULOCYTES: 0.6 %
LYMPHOCYTES # BLD: 16.3 %
LYMPHOCYTES ABSOLUTE: 0.8 THOU/MM3 (ref 1–4.8)
MAGNESIUM: 1.6 MG/DL (ref 1.6–2.4)
MCH RBC QN AUTO: 28.3 PG (ref 26–33)
MCHC RBC AUTO-ENTMCNC: 30.6 GM/DL (ref 32.2–35.5)
MCV RBC AUTO: 92.5 FL (ref 81–99)
MONOCYTES # BLD: 14.3 %
MONOCYTES ABSOLUTE: 0.7 THOU/MM3 (ref 0.4–1.3)
NUCLEATED RED BLOOD CELLS: 0 /100 WBC
ORGANISM: ABNORMAL
PHOSPHORUS: 3.3 MG/DL (ref 2.4–4.7)
PLATELET # BLD: 113 THOU/MM3 (ref 130–400)
PMV BLD AUTO: 10.9 FL (ref 9.4–12.4)
POTASSIUM SERPL-SCNC: 3.9 MEQ/L (ref 3.5–5.2)
RBC # BLD: 3.74 MILL/MM3 (ref 4.2–5.4)
SEG NEUTROPHILS: 64.7 %
SEGMENTED NEUTROPHILS ABSOLUTE COUNT: 3.1 THOU/MM3 (ref 1.8–7.7)
SODIUM BLD-SCNC: 140 MEQ/L (ref 135–145)
URINE CULTURE REFLEX: ABNORMAL
WBC # BLD: 4.8 THOU/MM3 (ref 4.8–10.8)

## 2018-09-04 PROCEDURE — 36415 COLL VENOUS BLD VENIPUNCTURE: CPT

## 2018-09-04 PROCEDURE — 85025 COMPLETE CBC W/AUTO DIFF WBC: CPT

## 2018-09-04 PROCEDURE — 6370000000 HC RX 637 (ALT 250 FOR IP): Performed by: INTERNAL MEDICINE

## 2018-09-04 PROCEDURE — 2580000003 HC RX 258: Performed by: INTERNAL MEDICINE

## 2018-09-04 PROCEDURE — G8979 MOBILITY GOAL STATUS: HCPCS

## 2018-09-04 PROCEDURE — G8978 MOBILITY CURRENT STATUS: HCPCS

## 2018-09-04 PROCEDURE — G8987 SELF CARE CURRENT STATUS: HCPCS

## 2018-09-04 PROCEDURE — 97166 OT EVAL MOD COMPLEX 45 MIN: CPT

## 2018-09-04 PROCEDURE — 97530 THERAPEUTIC ACTIVITIES: CPT

## 2018-09-04 PROCEDURE — 99233 SBSQ HOSP IP/OBS HIGH 50: CPT | Performed by: INTERNAL MEDICINE

## 2018-09-04 PROCEDURE — 2709999900 HC NON-CHARGEABLE SUPPLY

## 2018-09-04 PROCEDURE — 80069 RENAL FUNCTION PANEL: CPT

## 2018-09-04 PROCEDURE — 83735 ASSAY OF MAGNESIUM: CPT

## 2018-09-04 PROCEDURE — G8988 SELF CARE GOAL STATUS: HCPCS

## 2018-09-04 PROCEDURE — 1200000000 HC SEMI PRIVATE

## 2018-09-04 PROCEDURE — 97535 SELF CARE MNGMENT TRAINING: CPT

## 2018-09-04 PROCEDURE — 97163 PT EVAL HIGH COMPLEX 45 MIN: CPT

## 2018-09-04 RX ORDER — SODIUM CHLORIDE 9 MG/ML
INJECTION, SOLUTION INTRAVENOUS CONTINUOUS
Status: ACTIVE | OUTPATIENT
Start: 2018-09-04 | End: 2018-09-05

## 2018-09-04 RX ADMIN — SODIUM CHLORIDE: 9 INJECTION, SOLUTION INTRAVENOUS at 10:10

## 2018-09-04 RX ADMIN — ATORVASTATIN CALCIUM 20 MG: 20 TABLET, FILM COATED ORAL at 22:07

## 2018-09-04 RX ADMIN — CARVEDILOL 12.5 MG: 6.25 TABLET, FILM COATED ORAL at 16:44

## 2018-09-04 RX ADMIN — SODIUM CHLORIDE: 9 INJECTION, SOLUTION INTRAVENOUS at 20:18

## 2018-09-04 RX ADMIN — SODIUM CHLORIDE: 4.5 INJECTION, SOLUTION INTRAVENOUS at 03:33

## 2018-09-04 RX ADMIN — PAROXETINE HYDROCHLORIDE 30 MG: 30 TABLET, FILM COATED ORAL at 07:55

## 2018-09-04 RX ADMIN — Medication 1 CAPSULE: at 07:55

## 2018-09-04 RX ADMIN — PYRIDOXINE HCL TAB 50 MG 100 MG: 50 TAB at 07:56

## 2018-09-04 RX ADMIN — CETIRIZINE HYDROCHLORIDE 5 MG: 10 TABLET, FILM COATED ORAL at 07:56

## 2018-09-04 RX ADMIN — TRAZODONE HYDROCHLORIDE 50 MG: 50 TABLET ORAL at 22:07

## 2018-09-04 RX ADMIN — PANTOPRAZOLE SODIUM 40 MG: 40 TABLET, DELAYED RELEASE ORAL at 06:05

## 2018-09-04 RX ADMIN — CARVEDILOL 12.5 MG: 6.25 TABLET, FILM COATED ORAL at 07:56

## 2018-09-04 ASSESSMENT — PAIN SCALES - GENERAL
PAINLEVEL_OUTOF10: 0

## 2018-09-04 NOTE — PROGRESS NOTES
Yamil Aquino 61  INPATIENT OCCUPATIONAL THERAPY  [facility] ONC MED 5K  EVALUATION    Time:  Time In: 2854  Time Out: 1218  Timed Code Treatment Minutes: 16 Minutes  Minutes: 32          Date: 2018  Patient Name: Beau Lopez,   Gender: female      MRN: [id number]  : 1940  (69 y.o.)  Referring Practitioner: Dr. Eveline Mac  Diagnosis: colitis  Additional Pertinent Hx: per ED note: Beau Lopez is a 69 y.o. female who presents to the Emergency Department for the evaluation of intractble nausea, vomiting and diarrhea. The patient states that she was admitted last week for colitis and was discharged home with cipro and flagyl on . The patient states that she has continued to have nausea and diarrhea since her discharge. The patient admits a decreased appetite and poor oral intake. The patient denies any abdominal pain, fever, chills, urinary complaints, or any other signs or symptoms at this time. The patient reports that her previous colitis was infectious. Restrictions/Precautions:  General Precautions, Fall Risk           Past Medical History:   Diagnosis Date    ISIDRA [facility]acute [facility]) ([facility] [address] [facility] 2016    Atrial fibrillation (HCC)     Bleeding stomach ulcer     History of blood transfusion     Hypertension     PONV (postoperative nausea and vomiting)      Past Surgical History:   Procedure Laterality Date    AORTIC VALVE REPLACEMENT      DILATION AND CURETTAGE OF UTERUS      JOINT REPLACEMENT Right 2011    knee    PA OFFICE/OUTPT VISIT,PROCEDURE ONLY N/A 2018    BIOPROSTHETIC AORTIC VALVE REPLACEMENT WITH MAZE PROCEDURE performed by Florinda Keyes MD at [facility]  Chart Reviewed: Yes (reviewed H and P. physician notes, RN notes)  Patient assessed for rehabilitation services?: Yes    Subjective: cooperative with min encouragement.  Reports not feeling as well today    General:       Vision: Impaired  Vision Exceptions: Wears glasses for to bed at EOS. Declined additional moblity due to fatigue. Assisted pt to reposition in the chair at EOS>     Assessment:  Assessment: PT presents with a decrease in strength and endurance , requiring up to min A for ADL routines due to recent illness and hospitalization. She was prior independent and active with ADLs and homemakiung tasks. SHe woule benefit from additional skilled OT services to address increasing indepenendence and safety with mobility to retun home as independently as possible. Performance deficits / Impairments: Decreased functional mobility , Decreased strength, Decreased endurance, Decreased ADL status, Decreased safe awareness, Decreased balance  Prognosis: Good  Discharge Recommendations: Continue to assess pending progress, Patient would benefit from continued therapy after discharge, Home with Home health OT    Clinical Decision Making: Clinical Decision making was of Moderate Complexity as the result of analysis of data from a detailed assessment, a consideration of several treatment options, the presence of comorbidities affecting the plan of care and the need for minimal to moderate modifications or assistance required to complete the evaluation. Patient Education:  Patient Education: OT POC, purpose of OT    Equipment Recommendations:       Safety:  Safety Devices in place: Yes  Type of devices:  All fall risk precautions in place, Left in chair, Gait belt, Call light within reach, Chair alarm in place    Plan:  Times per week: 3-5   Current Treatment Recommendations: Strengthening, Functional Mobility Training, Patient/Caregiver Education & Training, Home Management Training, Endurance Training, Equipment Evaluation, Education, & procurement, Balance Training, Safety Education & Training, Self-Care / ADL    Goals:  Patient goals : Feel better so I can go home    Short term goals  Time Frame for Short term goals: 2 weeks  Short term goal 1: PT will complete BADL routine with MI and

## 2018-09-04 NOTE — PROGRESS NOTES
[address] [address]  INPATIENT PHYSICAL THERAPY  EVALUATION  [facility] ONC MED 5K - [id number]-27/018-Q    Time In: 0805  Time Out: 0263  Timed Code Treatment Minutes: 9 Minutes  Minutes: 24          Date: 2018  Patient Name: Nanci Cortez,  Gender:  female        MRN: [id number]  : 1940  (69 y.o.)  Referral Date : 18   Referring Practitioner: ARLENE Genao MD  Diagnosis: colitis  Additional Pertinent Hx: per ED note: Nanci Cortez is a 69 y.o. female who presents to the Emergency Department for the evaluation of intractble nausea, vomiting and diarrhea. The patient states that she was admitted last week for colitis and was discharged home with cipro and flagyl on . The patient states that she has continued to have nausea and diarrhea since her discharge. The patient admits a decreased appetite and poor oral intake. The patient denies any abdominal pain, fever, chills, urinary complaints, or any other signs or symptoms at this time. The patient reports that her previous colitis was infectious. Past Medical History:   Diagnosis Date    ISIDRA (acute kidney injury) ([facility] [address] 2016    Atrial fibrillation (HCC)     Bleeding stomach ulcer     History of blood transfusion     Hypertension     PONV (postoperative nausea and vomiting)      Past Surgical History:   Procedure Laterality Date    AORTIC VALVE REPLACEMENT      DILATION AND CURETTAGE OF UTERUS      JOINT REPLACEMENT Right     knee    [place] OFFICE/OUTPT VISIT,PROCEDURE ONLY N/A 2018    BIOPROSTHETIC AORTIC VALVE REPLACEMENT WITH MAZE PROCEDURE performed by Donald Thomas MD at [facility]       Restrictions/Precautions:  General Precautions, Fall Risk                            Subjective:  Chart Reviewed: Yes  Patient assessed for rehabilitation services?: Yes  Family / Caregiver Present: No  Subjective: RN approved eval. patient in chair on arrival, pleasant and agrees to therapy.  Reports HH were scheduled to see patient this Score: 20                   Activity Tolerance:  Activity Tolerance: Patient Tolerated treatment well    Treatment Initiated: eval complete, see mobility and exercises above    Assessment: Body structures, Functions, Activity limitations: Decreased functional mobility , Decreased strength, Decreased endurance, Decreased balance, Decreased ADL status  Assessment: patient presents with B LE weakness as well as decreased standing balance and gait mechanics. patient scored 20 on Tinetti balance test yielding moderate fall risk. requires skilled PT to improve mobility and safety prior to returning home to maximize independence  Prognosis: Good    Clinical Presentation: High - Unstable with Unpredictable Characteristics: decreased strength, decreased balance, decreased endurance, lives alone:    Decision Making: High Complexitybased on patient assessment and decision making process of determining plan of care and establishing reasonable expectations for measurable functional outcomes    REQUIRES PT FOLLOW UP: Yes  Discharge Recommendations: Continue to assess pending progress, Patient would benefit from continued therapy after discharge    Patient Education:  Patient Education: POC, gait, therex, HEP, HH services    Equipment Recommendations:  Equipment Needed: No  Other: has RW and 4WW    Safety:  Type of devices:  All fall risk precautions in place, Call light within reach, Gait belt, Patient at risk for falls, Chair alarm in place, Left in chair, Nurse notified    Plan:  Times per week: 3-5x GM  Current Treatment Recommendations: Strengthening, Balance Training, Functional Mobility Training, Transfer Training, Gait Training, Stair training, Endurance Training, Home Exercise Program, Safety Education & Training, Patient/Caregiver Education & Training, Equipment Evaluation, Education, & procurement    Goals:  Patient goals : go home  Short term goals  Time Frame for Short term goals:  1 week  Short term goal 1: patient to perform bed mobility at S to get in and out of bed  Short term goal 2: patient to perform transfers to and from various surfaces at S to rise from bed or chair  Short term goal 3: patient to ambulate 100ft with RW at S for household mobility  Short term goal 4: patient to negotiate 3 steps with B rails at S for home access  Long term goals  Time Frame for Long term goals : defer d/t short length of stay    Evaluation Complexity: Based on the findings of patient history, examination, clinical presentation, and decision making during this evaluation, the evaluation of Monet Henley  is of high complexity. PT G-Codes  Functional Limitation: Mobility: Walking and moving around  Mobility: Walking and Moving Around Current Status (): At least 40 percent but less than 60 percent impaired, limited or restricted  Mobility: Walking and Moving Around Goal Status ():  At least 20 percent but less than 40 percent impaired, limited or restricted       AM-PAC Inpatient Mobility without Stair Climbing Raw Score : 15  AM-PAC Inpatient without Stair Climbing T-Scale Score : 43.03  Mobility Inpatient CMS 0-100% Score: 47.43  Mobility Inpatient without Stair CMS G-Code Modifier : CK

## 2018-09-04 NOTE — PROGRESS NOTES
213 ml   Output                0 ml   Net              213 ml       Diet:  DIET FULL LIQUID;    Exam:  /61   Pulse 83   Temp 97.8 °F (36.6 °C) (Oral)   Resp 16   Ht 5' 4\" (1.626 m)   Wt 230 lb (104.3 kg)   SpO2 91%   BMI 39.48 kg/m²     General appearance: No apparent distress, appears stated age and cooperative. HEENT: Pupils equal, round, and reactive to light. Conjunctivae/corneas clear. Neck: Supple, with full range of motion. No jugular venous distention. Trachea midline. Respiratory:  Normal respiratory effort. Clear to auscultation, bilaterally without Rales/Wheezes/Rhonchi. Cardiovascular: Regular rate and rhythm with normal S1/S2 without murmurs, rubs or gallops. Abdomen: Soft, non-tender, non-distended with normal bowel sounds. Musculoskeletal: passive and active ROM x 4 extremities . Bilateral LE edema. Warm   Skin: Skin color, texture, turgor normal.  No rashes or lesions. Neurologic:  Neurovascularly intact without any focal sensory/motor deficits. Cranial nerves: II-XII intact, grossly non-focal.  Psychiatric: Alert and oriented, thought content appropriate, normal insight  Capillary Refill: Brisk,< 3 seconds   Peripheral Pulses: +2 palpable, equal bilaterally       Labs:   Recent Labs      09/02/18   0730  09/04/18   0624   WBC  4.6*  4.8   HGB  10.4*  10.6*   HCT  34.0*  34.6*   PLT  116*  113*     Recent Labs      09/02/18   0730  09/03/18   0603  09/04/18   0624   NA  144  142  140   K  3.4*  4.0  3.9   CL  106  104  104   CO2  26  25  24   BUN  12  17  21   CREATININE  1.2  1.6*  1.9*   CALCIUM  8.0*  7.9*  8.1*   PHOS  3.5  3.7  3.3     No results for input(s): AST, ALT, BILIDIR, BILITOT, ALKPHOS in the last 72 hours. No results for input(s): INR in the last 72 hours. No results for input(s): [place] in the last 72 hours.     Urinalysis:    Lab Results   Component Value Date    NITRU NEGATIVE 09/01/2018    WBCUA 2-4 09/01/2018    BACTERIA NONE 09/01/2018 RBCUA 0-2 09/01/2018    BLOODU NEGATIVE 09/01/2018    SPECGRAV 1.021 06/21/2018    GLUCOSEU NEGATIVE 09/01/2018       Diet: DIET FULL LIQUID;    DVT prophylaxis: [] Lovenox                                 [] SCDs                                 [] SQ Heparin                                 [x] Encourage ambulation           [] Already on Anticoagulation     Disposition:    [x] Home       [] TCU       [] Rehab       [] Psych       [] SNF       [] [place]       [] Other-    Code Status: DNR-CCA    PT/OT Eval Status: home     Assessment/Plan:    Anticipated Discharge in : 1-2 days     Active Hospital Problems    Diagnosis Date Noted    VRE (vancomycin resistant enterococcus) culture positive [Z22.39] 09/04/2018    CKD (chronic kidney disease) stage 3, GFR 30-59 ml/min [N18.3] 09/03/2018    Acute renal failure superimposed on stage 3 chronic kidney disease ([address] [N17.9, N18.3] 09/03/2018    Hypomagnesemia [E83.42] 09/02/2018    Asymptomatic bacteriuria - no clinical indication for antimicrobial therapy [R82.71] 09/02/2018    Obesity (BMI 30-39. 9) [E66.9] 09/01/2018    History of atrial fibrillation [Z86.79] 09/01/2018    Colitis [K52.9] 08/24/2018    Diarrhea [R19.7] 08/24/2018    Essential hypertension [I10] 08/24/2018    Chronic atrial fibrillation ([facility] [address] [I48.2] 06/11/2016    Thrombocytopenia ([facility] [address] [D69.6] 06/11/2016       1. Acute colitis, improving. Stool study negative for infectious process. Will complete 10-day course of cipro and flagyl (from recent discharge) today. OK to use imodium PRN. 2. VRE: Urine Cx growing VRE, no symptoms of dysuria, frequency, hesitancy. Consult ID. 3. Nausea, decrease PO intake: decrease PO intake, nausea and retching with meals. Change diet to full liquid, advance as tolerated. 4. ISIDRA on CKD3. Secondary to GI losses. Hold ACEi, diuretics. Gentle IV fluid overnight. Recheck lab in AM. Creatine up-trending, will need to continue to monitor. 5. Electrolyte disorders. Mag replacement ordered. 6. Dispo.  Monitor for another 24 hours as pt continues to be nauseous, have retching, and worsening kidney function.              Electronically signed by Farhan Silverman MD on 9/4/2018 at 9:54 AM

## 2018-09-04 NOTE — CONSULTS
4\" (1.626 m)   Wt 230 lb (104.3 kg)   SpO2 90%   BMI 39.48 kg/m²   General:  Awake, alert, not in distress. HEENT: slighlty pale  conjunctiva, unicteric sclera, moist oral mucosa. Chest:  Bilateral air entry  Cardiovascular:  RRR ,S1S2, audible aortic valve  Abdomen:  Soft, non tender to palpation. Extremities: no rash  Skin:  Warm and dry. CNS:oriented        LABS:     CBC:   Recent Labs      09/02/18   0730  09/04/18   0624   WBC  4.6*  4.8   HGB  10.4*  10.6*   PLT  116*  113*     BMP:    Recent Labs      09/02/18   0730  09/03/18   0603  09/04/18   0624   NA  144  142  140   K  3.4*  4.0  3.9   CL  106  104  104   CO2  26  25  24   BUN  12  17  21   CREATININE  1.2  1.6*  1.9*   GLUCOSE  93  104  106     Calcium:  Recent Labs      09/04/18 0624   CALCIUM  8.1*     Ionized Calcium:No results for input(s): IONCA in the last 72 hours. Magnesium:  Recent Labs      09/04/18   0624   MG  1.6     Phosphorus:  Recent Labs      09/04/18   0624   PHOS  3.3     BNP:No results for input(s): BNP in the last 72 hours. Glucose:No results for input(s): POCGLU in the last 72 hours. HgbA1C: No results for input(s): LABA1C in the last 72 hours. INR: No results for input(s): INR in the last 72 hours.   Hepatic:   Recent Labs      09/04/18 0624   LABALBU  2.9*     Micro:   Lab Results   Component Value Date    BC No growth-preliminary  No growth   08/24/2018       Problem list of patient      Patient Active Problem List   Diagnosis Code    Chronic atrial fibrillation ([facility] I48.2    Ascending cholangitis, possible K83.0    Elevated LFTs R79.89    Thrombocytopenia (HCC) D69.6    Leukopenia D72.819    ISIDRA (acute kidney injury) ([facility] N17.9    SIRS (systemic inflammatory response syndrome) ([facility]) R65.10    Cholecystitis, acute K81.0    Neutropenia (HCC) D70.9    Aortic stenosis, severe I35.0    Colitis K52.9    Nausea R11.0    Diarrhea R19.7    Elevated troponin R74.8    Essential hypertension I10    Anemia, normocytic normochromic D64.9    Hypocalcemia E83.51    Cirrhosis (HCC) K74.60    S/P AVR (aortic valve replacement) Z95.2    Obesity (BMI 30-39. 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 Has been stopped. At this time she doesn't need antibiotics. Will follow her symptoms, if she continues to have persistent symptoms, will need upper endoscopy  Abnormal scan liver for cirrhosis: will continue to monitor patient: work up for hep B and C was negative    Thank you Maryse Awad MD for allowing me to participate in this patient's care.     Matt Mejia MD,FACP 9/4/2018 3:46 PM

## 2018-09-04 NOTE — PLAN OF CARE
Problem: Falls - Risk of:  Goal: Will remain free from falls  Will remain free from falls   Outcome: Ongoing  All fall precautions in place. No falls this shift. Personal belongings at bedside. Bed alarm on. Goal: Absence of physical injury  Absence of physical injury   Outcome: Met This Shift      Problem: Nausea/Vomiting:  Goal: Absence of nausea/vomiting  Absence of nausea/vomiting   Outcome: Ongoing  Zofran for nausea. Patient satisfied with nausea medications. Goal: Able to drink  Able to drink   Outcome: Ongoing    Goal: Able to eat  Able to eat   Outcome: Ongoing    Goal: Ability to achieve adequate nutritional intake will improve  Ability to achieve adequate nutritional intake will improve   Outcome: Ongoing      Problem: Diarrhea:  Goal: Bowel elimination is within specified parameters  Bowel elimination is within specified parameters   Outcome: Ongoing  Two loose stools throughout the night, imodium given for loose stools. Comments: Care plan reviewed with patient. Patient verbalize understanding of the plan of care and contribute to goal setting.

## 2018-09-05 LAB
ANION GAP SERPL CALCULATED.3IONS-SCNC: 11 MEQ/L (ref 8–16)
BASOPHILS # BLD: 0.6 %
BASOPHILS ABSOLUTE: 0 THOU/MM3 (ref 0–0.1)
BUN BLDV-MCNC: 20 MG/DL (ref 7–22)
CALCIUM SERPL-MCNC: 8 MG/DL (ref 8.5–10.5)
CHLORIDE BLD-SCNC: 107 MEQ/L (ref 98–111)
CO2: 24 MEQ/L (ref 23–33)
CREAT SERPL-MCNC: 1.7 MG/DL (ref 0.4–1.2)
EOSINOPHIL # BLD: 4.3 %
EOSINOPHILS ABSOLUTE: 0.2 THOU/MM3 (ref 0–0.4)
ERYTHROCYTE [DISTWIDTH] IN BLOOD BY AUTOMATED COUNT: 16.2 % (ref 11.5–14.5)
ERYTHROCYTE [DISTWIDTH] IN BLOOD BY AUTOMATED COUNT: 54 FL (ref 35–45)
GFR SERPL CREATININE-BSD FRML MDRD: 29 ML/MIN/1.73M2
GLUCOSE BLD-MCNC: 105 MG/DL (ref 70–108)
HCT VFR BLD CALC: 32.7 % (ref 37–47)
HEMOGLOBIN: 10.2 GM/DL (ref 12–16)
IMMATURE GRANS (ABS): 0 THOU/MM3 (ref 0–0.07)
IMMATURE GRANULOCYTES: 0 %
LYMPHOCYTES # BLD: 24.3 %
LYMPHOCYTES ABSOLUTE: 0.9 THOU/MM3 (ref 1–4.8)
MAGNESIUM: 1.7 MG/DL (ref 1.6–2.4)
MCH RBC QN AUTO: 28.4 PG (ref 26–33)
MCHC RBC AUTO-ENTMCNC: 31.2 GM/DL (ref 32.2–35.5)
MCV RBC AUTO: 91.1 FL (ref 81–99)
MONOCYTES # BLD: 15.9 %
MONOCYTES ABSOLUTE: 0.6 THOU/MM3 (ref 0.4–1.3)
NUCLEATED RED BLOOD CELLS: 0 /100 WBC
PHOSPHORUS: 3.4 MG/DL (ref 2.4–4.7)
PLATELET # BLD: 108 THOU/MM3 (ref 130–400)
PMV BLD AUTO: 10.9 FL (ref 9.4–12.4)
POTASSIUM SERPL-SCNC: 3.7 MEQ/L (ref 3.5–5.2)
RBC # BLD: 3.59 MILL/MM3 (ref 4.2–5.4)
SEG NEUTROPHILS: 54.9 %
SEGMENTED NEUTROPHILS ABSOLUTE COUNT: 1.9 THOU/MM3 (ref 1.8–7.7)
SODIUM BLD-SCNC: 142 MEQ/L (ref 135–145)
WBC # BLD: 3.5 THOU/MM3 (ref 4.8–10.8)

## 2018-09-05 PROCEDURE — 83735 ASSAY OF MAGNESIUM: CPT

## 2018-09-05 PROCEDURE — 2580000003 HC RX 258: Performed by: INTERNAL MEDICINE

## 2018-09-05 PROCEDURE — 1200000000 HC SEMI PRIVATE

## 2018-09-05 PROCEDURE — 84100 ASSAY OF PHOSPHORUS: CPT

## 2018-09-05 PROCEDURE — 6370000000 HC RX 637 (ALT 250 FOR IP): Performed by: INTERNAL MEDICINE

## 2018-09-05 PROCEDURE — 97110 THERAPEUTIC EXERCISES: CPT

## 2018-09-05 PROCEDURE — 85025 COMPLETE CBC W/AUTO DIFF WBC: CPT

## 2018-09-05 PROCEDURE — 36415 COLL VENOUS BLD VENIPUNCTURE: CPT

## 2018-09-05 PROCEDURE — 6360000002 HC RX W HCPCS: Performed by: INTERNAL MEDICINE

## 2018-09-05 PROCEDURE — 99233 SBSQ HOSP IP/OBS HIGH 50: CPT | Performed by: INTERNAL MEDICINE

## 2018-09-05 PROCEDURE — 2709999900 HC NON-CHARGEABLE SUPPLY

## 2018-09-05 PROCEDURE — 80048 BASIC METABOLIC PNL TOTAL CA: CPT

## 2018-09-05 RX ORDER — SODIUM CHLORIDE 9 MG/ML
INJECTION, SOLUTION INTRAVENOUS CONTINUOUS
Status: DISCONTINUED | OUTPATIENT
Start: 2018-09-05 | End: 2018-09-06

## 2018-09-05 RX ADMIN — Medication 1 CAPSULE: at 09:35

## 2018-09-05 RX ADMIN — CETIRIZINE HYDROCHLORIDE 5 MG: 10 TABLET, FILM COATED ORAL at 09:35

## 2018-09-05 RX ADMIN — PYRIDOXINE HCL TAB 50 MG 100 MG: 50 TAB at 09:35

## 2018-09-05 RX ADMIN — PANTOPRAZOLE SODIUM 40 MG: 40 TABLET, DELAYED RELEASE ORAL at 06:10

## 2018-09-05 RX ADMIN — CARVEDILOL 12.5 MG: 6.25 TABLET, FILM COATED ORAL at 16:28

## 2018-09-05 RX ADMIN — SODIUM CHLORIDE: 9 INJECTION, SOLUTION INTRAVENOUS at 20:55

## 2018-09-05 RX ADMIN — ATORVASTATIN CALCIUM 20 MG: 20 TABLET, FILM COATED ORAL at 22:47

## 2018-09-05 RX ADMIN — PAROXETINE HYDROCHLORIDE 30 MG: 30 TABLET, FILM COATED ORAL at 09:36

## 2018-09-05 RX ADMIN — CARVEDILOL 12.5 MG: 6.25 TABLET, FILM COATED ORAL at 09:34

## 2018-09-05 RX ADMIN — SODIUM CHLORIDE: 9 INJECTION, SOLUTION INTRAVENOUS at 06:14

## 2018-09-05 RX ADMIN — ENOXAPARIN SODIUM 40 MG: 40 INJECTION SUBCUTANEOUS at 18:53

## 2018-09-05 RX ADMIN — TRAZODONE HYDROCHLORIDE 50 MG: 50 TABLET ORAL at 22:47

## 2018-09-05 ASSESSMENT — PAIN SCALES - GENERAL
PAINLEVEL_OUTOF10: 0

## 2018-09-05 NOTE — PROGRESS NOTES
Short term goals:  1 week  Short term goal 1: patient to perform bed mobility at S to get in and out of bed  Short term goal 2: patient to perform transfers to and from various surfaces at S to rise from bed or chair  Short term goal 3: patient to ambulate 100ft with RW at S for household mobility  Short term goal 4: patient to negotiate 3 steps with B rails at S for home access    Long term goals  Time Frame for Long term goals : defer d/t short length of stay

## 2018-09-05 NOTE — PROGRESS NOTES
Hospitalist Progress Note    Patient:  Adelaide Machuca      Unit/Bed:5K-26/026-A    YOB: 1940    MRN: [id number]       Acct: [de-identified]     PCP: ARTHUR Blakely CNP    Date of Admission: 8/31/2018    Chief Complaint: nausea, diarrhea     Hospital Course: 79F with hx of atrial fibrillation (not on anticoagulation - per Dr. Gagandeep Blanca note from 8/26, has had anemia; to follow up with Dr. Kayode Major as outpatient), who was recently discharged on 8/26 following admission with colitis, had unremarkable stool study and discharged on cipro and flagyl. She presents to ER with complaints of still having nausea, vomiting and diarrhea. Stool happens to be semi-formed. She does not have fever. On presentation, she was continued on cipro and flagyl. She denies abdominal pain. PCR stool study obtained and it was unremarkable for infectious processes. Patient was started on PRN imodium and antiemetics. She was hypokalemic on presentation, replaced.          Subjective: pt reports feeling better than yesterday, however still feels slightly nasueaus. No vomiting or diarrhea. No abdominal pain, fevers, or chills.         Medications:  Reviewed    Infusion Medications    sodium chloride       Scheduled Medications    enoxaparin  40 mg Subcutaneous Daily    carvedilol  12.5 mg Oral BID WC    atorvastatin  20 mg Oral Nightly    lactobacillus  1 capsule Oral Daily    cetirizine  5 mg Oral Daily    pantoprazole  40 mg Oral QAM AC    PARoxetine  30 mg Oral QAM    vitamin B-6  100 mg Oral Daily    traZODone  50 mg Oral Nightly     PRN Meds: potassium chloride **OR** potassium chloride **OR** potassium chloride, magnesium sulfate, sodium phosphate IVPB **OR** sodium phosphate IVPB, cloNIDine, loperamide, ondansetron, promethazine, prochlorperazine, acetaminophen, ondansetron      Intake/Output Summary (Last 24 hours) at 09/05/18 1551  Last data filed at 09/05/18 1352   Gross per 24 hour   Intake Creatine up-trending, will need to continue to monitor. 5. Electrolyte disorders. Mag replacement ordered. 6. Dispo. Monitor for another 24 hours as pt continues to be nauseous, have retching.  Likely discharge tomorrow.              Electronically signed by Benja Moy MD on 9/5/2018 at 3:51 PM

## 2018-09-05 NOTE — FLOWSHEET NOTE
Pt in chair with call light in reach. Report was given off to Kvng Roy.     Electronically signed by Azeem Bennett on 9/5/2018 at 1:57 PM

## 2018-09-05 NOTE — PLAN OF CARE
Problem: Falls - Risk of:  Goal: Will remain free from falls  Will remain free from falls   Outcome: Ongoing  Fall assessment completed. Patient using call light appropriately to call for assistance with ambulation to bathroom. Personal items within reach. Patient is also compliant with use of non-skid slippers. Use of chair and bed alarm. Goal: Absence of physical injury  Absence of physical injury   Outcome: Ongoing  No falls noted this shift. Patient ambulates with x1 staff assistance without difficulty. Bed kept in low position. Safe environment maintained. Bedside table & call light in reach. Uses call light appropriately when needing assistance. Problem: Nausea/Vomiting:  Goal: Absence of nausea/vomiting  Absence of nausea/vomiting   Outcome: Ongoing  No reported nausea/vomiting at this time. Taking full liquids without complaints of N/V. May advance diet today. Problem: Diarrhea:  Goal: Bowel elimination is within specified parameters  Bowel elimination is within specified parameters   Outcome: Met This Shift  No stools this shift. Assisted to bathroom. Problem: Musculor/Skeletal Functional Status  Intervention: PT Evaluation/treatment  Ambulated to bathroom using front wheeled walker and SBA. Tolerated well. Comments: Care plan reviewed with patient. Patient verbalize understanding of the plan of care and contribute to goal setting.

## 2018-09-06 ENCOUNTER — APPOINTMENT (OUTPATIENT)
Dept: GENERAL RADIOLOGY | Age: 78
DRG: 392 | End: 2018-09-06
Payer: MEDICARE

## 2018-09-06 LAB
ANION GAP SERPL CALCULATED.3IONS-SCNC: 13 MEQ/L (ref 8–16)
BASOPHILS # BLD: 1.2 %
BASOPHILS ABSOLUTE: 0 THOU/MM3 (ref 0–0.1)
BUN BLDV-MCNC: 17 MG/DL (ref 7–22)
CALCIUM SERPL-MCNC: 7.7 MG/DL (ref 8.5–10.5)
CHLORIDE BLD-SCNC: 110 MEQ/L (ref 98–111)
CO2: 20 MEQ/L (ref 23–33)
CREAT SERPL-MCNC: 1.6 MG/DL (ref 0.4–1.2)
EOSINOPHIL # BLD: 5.2 %
EOSINOPHILS ABSOLUTE: 0.2 THOU/MM3 (ref 0–0.4)
ERYTHROCYTE [DISTWIDTH] IN BLOOD BY AUTOMATED COUNT: 16.3 % (ref 11.5–14.5)
ERYTHROCYTE [DISTWIDTH] IN BLOOD BY AUTOMATED COUNT: 53.2 FL (ref 35–45)
GFR SERPL CREATININE-BSD FRML MDRD: 31 ML/MIN/1.73M2
GLUCOSE BLD-MCNC: 99 MG/DL (ref 70–108)
HCT VFR BLD CALC: 31.3 % (ref 37–47)
HEMOGLOBIN: 9.8 GM/DL (ref 12–16)
IMMATURE GRANS (ABS): 0.02 THOU/MM3 (ref 0–0.07)
IMMATURE GRANULOCYTES: 0.6 %
LYMPHOCYTES # BLD: 21.9 %
LYMPHOCYTES ABSOLUTE: 0.7 THOU/MM3 (ref 1–4.8)
MAGNESIUM: 1.7 MG/DL (ref 1.6–2.4)
MCH RBC QN AUTO: 28.6 PG (ref 26–33)
MCHC RBC AUTO-ENTMCNC: 31.3 GM/DL (ref 32.2–35.5)
MCV RBC AUTO: 91.3 FL (ref 81–99)
MONOCYTES # BLD: 14.3 %
MONOCYTES ABSOLUTE: 0.5 THOU/MM3 (ref 0.4–1.3)
NUCLEATED RED BLOOD CELLS: 0 /100 WBC
PHOSPHORUS: 3.6 MG/DL (ref 2.4–4.7)
PLATELET # BLD: 124 THOU/MM3 (ref 130–400)
PMV BLD AUTO: 10.3 FL (ref 9.4–12.4)
POTASSIUM SERPL-SCNC: 3.7 MEQ/L (ref 3.5–5.2)
PRO-BNP: 3831 PG/ML (ref 0–1800)
RBC # BLD: 3.43 MILL/MM3 (ref 4.2–5.4)
SEG NEUTROPHILS: 56.8 %
SEGMENTED NEUTROPHILS ABSOLUTE COUNT: 1.9 THOU/MM3 (ref 1.8–7.7)
SODIUM BLD-SCNC: 143 MEQ/L (ref 135–145)
WBC # BLD: 3.4 THOU/MM3 (ref 4.8–10.8)

## 2018-09-06 PROCEDURE — 6360000002 HC RX W HCPCS: Performed by: INTERNAL MEDICINE

## 2018-09-06 PROCEDURE — 6370000000 HC RX 637 (ALT 250 FOR IP): Performed by: INTERNAL MEDICINE

## 2018-09-06 PROCEDURE — 83735 ASSAY OF MAGNESIUM: CPT

## 2018-09-06 PROCEDURE — 36415 COLL VENOUS BLD VENIPUNCTURE: CPT

## 2018-09-06 PROCEDURE — 80048 BASIC METABOLIC PNL TOTAL CA: CPT

## 2018-09-06 PROCEDURE — 97110 THERAPEUTIC EXERCISES: CPT

## 2018-09-06 PROCEDURE — 84100 ASSAY OF PHOSPHORUS: CPT

## 2018-09-06 PROCEDURE — 1200000000 HC SEMI PRIVATE

## 2018-09-06 PROCEDURE — 83880 ASSAY OF NATRIURETIC PEPTIDE: CPT

## 2018-09-06 PROCEDURE — 85025 COMPLETE CBC W/AUTO DIFF WBC: CPT

## 2018-09-06 PROCEDURE — 2709999900 HC NON-CHARGEABLE SUPPLY

## 2018-09-06 PROCEDURE — 97530 THERAPEUTIC ACTIVITIES: CPT

## 2018-09-06 PROCEDURE — 2580000003 HC RX 258: Performed by: INTERNAL MEDICINE

## 2018-09-06 PROCEDURE — 71046 X-RAY EXAM CHEST 2 VIEWS: CPT

## 2018-09-06 PROCEDURE — 99233 SBSQ HOSP IP/OBS HIGH 50: CPT | Performed by: INTERNAL MEDICINE

## 2018-09-06 RX ORDER — FUROSEMIDE 10 MG/ML
40 INJECTION INTRAMUSCULAR; INTRAVENOUS ONCE
Status: DISCONTINUED | OUTPATIENT
Start: 2018-09-06 | End: 2018-09-06

## 2018-09-06 RX ORDER — FUROSEMIDE 40 MG/1
80 TABLET ORAL DAILY
Status: DISCONTINUED | OUTPATIENT
Start: 2018-09-06 | End: 2018-09-06

## 2018-09-06 RX ORDER — FUROSEMIDE 40 MG/1
40 TABLET ORAL DAILY
Status: DISCONTINUED | OUTPATIENT
Start: 2018-09-06 | End: 2018-09-07 | Stop reason: HOSPADM

## 2018-09-06 RX ADMIN — ENOXAPARIN SODIUM 40 MG: 40 INJECTION SUBCUTANEOUS at 19:57

## 2018-09-06 RX ADMIN — CARVEDILOL 12.5 MG: 6.25 TABLET, FILM COATED ORAL at 19:35

## 2018-09-06 RX ADMIN — PAROXETINE HYDROCHLORIDE 30 MG: 30 TABLET, FILM COATED ORAL at 10:38

## 2018-09-06 RX ADMIN — PANTOPRAZOLE SODIUM 40 MG: 40 TABLET, DELAYED RELEASE ORAL at 06:07

## 2018-09-06 RX ADMIN — FUROSEMIDE 40 MG: 40 TABLET ORAL at 15:41

## 2018-09-06 RX ADMIN — ONDANSETRON 4 MG: 4 TABLET, ORALLY DISINTEGRATING ORAL at 04:23

## 2018-09-06 RX ADMIN — SODIUM CHLORIDE: 9 INJECTION, SOLUTION INTRAVENOUS at 06:07

## 2018-09-06 RX ADMIN — ATORVASTATIN CALCIUM 20 MG: 20 TABLET, FILM COATED ORAL at 23:29

## 2018-09-06 RX ADMIN — TRAZODONE HYDROCHLORIDE 50 MG: 50 TABLET ORAL at 23:29

## 2018-09-06 RX ADMIN — ONDANSETRON 4 MG: 2 INJECTION INTRAMUSCULAR; INTRAVENOUS at 09:36

## 2018-09-06 RX ADMIN — CLONIDINE HYDROCHLORIDE 0.1 MG: 0.1 TABLET ORAL at 04:08

## 2018-09-06 RX ADMIN — CETIRIZINE HYDROCHLORIDE 5 MG: 10 TABLET, FILM COATED ORAL at 10:37

## 2018-09-06 RX ADMIN — PYRIDOXINE HCL TAB 50 MG 100 MG: 50 TAB at 10:37

## 2018-09-06 RX ADMIN — CARVEDILOL 12.5 MG: 6.25 TABLET, FILM COATED ORAL at 10:34

## 2018-09-06 RX ADMIN — Medication 1 CAPSULE: at 10:38

## 2018-09-06 ASSESSMENT — PAIN SCALES - GENERAL
PAINLEVEL_OUTOF10: 0

## 2018-09-06 NOTE — PROGRESS NOTES
course of cipro and flagyl. OK to use imodium PRN. 2. VRE: Urine Cx growing VRE, no symptoms of dysuria, frequency, hesitancy. Per ID, no need for Abx. 3. Nausea, decrease PO intake: decrease PO intake, improvement in nausea. Will start zofran prior to meals prn. Tolerating regular diet. 4. ISIDRA on CKD3. Secondary to GI losses. Hold ACEi, diuretics. Recheck lab in AM. Creatine at baseline. 5. Electrolyte disorders. Replace as needed. Monitor. 6. Dispo. Pt changed her mind, will like to go to nursing rehab facility. Consulted TCU placement.  Likely discharge tomorrow.                Electronically signed by Patty Wagner MD on 9/6/2018 at 3:56 PM

## 2018-09-06 NOTE — PLAN OF CARE
Problem: Falls - Risk of:  Goal: Will remain free from falls  Will remain free from falls   Outcome: Ongoing  Up to chair with chair alarm, bed alarm also. She does not attempt to get up unassisted but waits for help. Problem: Nausea/Vomiting:  Goal: Absence of nausea/vomiting  Absence of nausea/vomiting   Outcome: Ongoing  Had IV Zofran once this shift for C/O nausea with effectiveness. Goal: Able to drink  Able to drink   Outcome: Ongoing  Encouraged her to take po fluids since her IV has been discontinued. Problem: Diarrhea:  Goal: Passage of soft, formed stool  Passage of soft, formed stool   Outcome: Ongoing  Has had no BM today. Problem: Discharge Planning:  Goal: Patients continuum of care needs are met  Patients continuum of care needs are met   Outcome: Ongoing  Planning home on discharge. Is current with her plan of care.

## 2018-09-07 VITALS
WEIGHT: 250.5 LBS | TEMPERATURE: 97.1 F | HEART RATE: 86 BPM | OXYGEN SATURATION: 91 % | HEIGHT: 64 IN | SYSTOLIC BLOOD PRESSURE: 129 MMHG | RESPIRATION RATE: 18 BRPM | DIASTOLIC BLOOD PRESSURE: 63 MMHG | BODY MASS INDEX: 42.76 KG/M2

## 2018-09-07 LAB
ANION GAP SERPL CALCULATED.3IONS-SCNC: 11 MEQ/L (ref 8–16)
BASOPHILS # BLD: 0.6 %
BASOPHILS ABSOLUTE: 0 THOU/MM3 (ref 0–0.1)
BUN BLDV-MCNC: 18 MG/DL (ref 7–22)
CALCIUM SERPL-MCNC: 8.1 MG/DL (ref 8.5–10.5)
CHLORIDE BLD-SCNC: 108 MEQ/L (ref 98–111)
CO2: 24 MEQ/L (ref 23–33)
CREAT SERPL-MCNC: 1.7 MG/DL (ref 0.4–1.2)
EOSINOPHIL # BLD: 6 %
EOSINOPHILS ABSOLUTE: 0.2 THOU/MM3 (ref 0–0.4)
ERYTHROCYTE [DISTWIDTH] IN BLOOD BY AUTOMATED COUNT: 16.3 % (ref 11.5–14.5)
ERYTHROCYTE [DISTWIDTH] IN BLOOD BY AUTOMATED COUNT: 52.2 FL (ref 35–45)
GFR SERPL CREATININE-BSD FRML MDRD: 29 ML/MIN/1.73M2
GLUCOSE BLD-MCNC: 104 MG/DL (ref 70–108)
HCT VFR BLD CALC: 32 % (ref 37–47)
HEMOGLOBIN: 10.2 GM/DL (ref 12–16)
IMMATURE GRANS (ABS): 0.01 THOU/MM3 (ref 0–0.07)
IMMATURE GRANULOCYTES: 0.3 %
LYMPHOCYTES # BLD: 32.4 %
LYMPHOCYTES ABSOLUTE: 1 THOU/MM3 (ref 1–4.8)
MAGNESIUM: 1.6 MG/DL (ref 1.6–2.4)
MCH RBC QN AUTO: 28 PG (ref 26–33)
MCHC RBC AUTO-ENTMCNC: 31.9 GM/DL (ref 32.2–35.5)
MCV RBC AUTO: 87.9 FL (ref 81–99)
MONOCYTES # BLD: 14.9 %
MONOCYTES ABSOLUTE: 0.5 THOU/MM3 (ref 0.4–1.3)
NUCLEATED RED BLOOD CELLS: 0 /100 WBC
PHOSPHORUS: 4.2 MG/DL (ref 2.4–4.7)
PLATELET # BLD: 113 THOU/MM3 (ref 130–400)
PMV BLD AUTO: 10.8 FL (ref 9.4–12.4)
POTASSIUM SERPL-SCNC: 3.7 MEQ/L (ref 3.5–5.2)
RBC # BLD: 3.64 MILL/MM3 (ref 4.2–5.4)
SEG NEUTROPHILS: 45.8 %
SEGMENTED NEUTROPHILS ABSOLUTE COUNT: 1.5 THOU/MM3 (ref 1.8–7.7)
SODIUM BLD-SCNC: 143 MEQ/L (ref 135–145)
WBC # BLD: 3.2 THOU/MM3 (ref 4.8–10.8)

## 2018-09-07 PROCEDURE — 6370000000 HC RX 637 (ALT 250 FOR IP): Performed by: INTERNAL MEDICINE

## 2018-09-07 PROCEDURE — 80048 BASIC METABOLIC PNL TOTAL CA: CPT

## 2018-09-07 PROCEDURE — 99239 HOSP IP/OBS DSCHRG MGMT >30: CPT | Performed by: INTERNAL MEDICINE

## 2018-09-07 PROCEDURE — 84100 ASSAY OF PHOSPHORUS: CPT

## 2018-09-07 PROCEDURE — 85025 COMPLETE CBC W/AUTO DIFF WBC: CPT

## 2018-09-07 PROCEDURE — 83735 ASSAY OF MAGNESIUM: CPT

## 2018-09-07 PROCEDURE — 97116 GAIT TRAINING THERAPY: CPT

## 2018-09-07 PROCEDURE — 36415 COLL VENOUS BLD VENIPUNCTURE: CPT

## 2018-09-07 PROCEDURE — 97535 SELF CARE MNGMENT TRAINING: CPT

## 2018-09-07 PROCEDURE — 97110 THERAPEUTIC EXERCISES: CPT

## 2018-09-07 RX ORDER — LOPERAMIDE HYDROCHLORIDE 2 MG/1
2 CAPSULE ORAL 4 TIMES DAILY PRN
DISCHARGE
Start: 2018-09-07 | End: 2018-09-17

## 2018-09-07 RX ADMIN — PYRIDOXINE HCL TAB 50 MG 100 MG: 50 TAB at 09:34

## 2018-09-07 RX ADMIN — PAROXETINE HYDROCHLORIDE 30 MG: 30 TABLET, FILM COATED ORAL at 09:34

## 2018-09-07 RX ADMIN — Medication 1 CAPSULE: at 09:34

## 2018-09-07 RX ADMIN — FUROSEMIDE 40 MG: 40 TABLET ORAL at 09:34

## 2018-09-07 RX ADMIN — CARVEDILOL 12.5 MG: 6.25 TABLET, FILM COATED ORAL at 09:34

## 2018-09-07 RX ADMIN — CETIRIZINE HYDROCHLORIDE 5 MG: 10 TABLET, FILM COATED ORAL at 09:34

## 2018-09-07 RX ADMIN — PANTOPRAZOLE SODIUM 40 MG: 40 TABLET, DELAYED RELEASE ORAL at 05:21

## 2018-09-07 NOTE — PLAN OF CARE
Problem: Physical Regulation:  Goal: Prevent transmision of infection  Prevent transmision of infection  Outcome: Ongoing  Contact isolation implemented this shift. Comments: Care plan reviewed with patient. Patient verbalize understanding of the plan of care and contribute to goal setting.

## 2018-09-07 NOTE — CARE COORDINATION
9/7/18, 12:32 PM    DISCHARGE BARRIERS    CM has advised that TCU is not an option due to lack of bed availability. Spoke with patient and advised her of this and discussed alternate plans: ECF or home with family staying with until she is able to be on her own. Patient will contact family to discuss options and SW to follow up. Later: Dr. Pearl Olvera advised that he spoke with patient and she is agreeing to [facility].     1:05 PM Spoke with patient-she confirmed that she has decided on ECF and would like to return to [facility]. Patient had been at facility recently (states she has been home about 3 weeks). Call placed to [facility] at [facility], [facility] are familiar with patient, have a bed available and will accept. SW advised her that discharge is planned for today. They will accept patient under her skilled medciare benefit. Spoke with patient-advised her of discharge for today. Family is not able to transport due to work schedule and she does not have the funds to pay for [employer] transport (does not need squad). Arrangements made for [employer] transport by [employer] for 4 pm with LACP. Call to [facility]-([place]) advised her that patient will be going to [facility] at discharge. Spoke with patient-she has found someone to transport her to facility. [facility] transport cancelled. Directions left for RN for completion of discharge. 9/7/18, 3:06 PM    Discharge plan discussed by  and . Discharge plan reviewed with patient/ family. Patient/ family verbalize understanding of discharge plan and are in agreement with plan. Understanding was demonstrated using the teach back method.    Services After Discharge  Services At/After Discharge: Skilled Therapy, Nursing Services [place]   [place] Letter  IMM Letter given to Patient/Family/Significant other/Guardian/POA/by[de-identified] Updated  IMM Letter date given[de-identified] 09/07/18  IMM Letter time given[de-identified] 1440

## 2018-09-07 NOTE — PLAN OF CARE
Problem: Falls - Risk of:  Goal: Will remain free from falls  Will remain free from falls   Outcome: Ongoing  Patient remains fall free this shift. Patient agreeable to use call light and use of bed and chair alarms for added safety. Goal: Absence of physical injury  Absence of physical injury   Outcome: Met This Shift      Problem: Nausea/Vomiting:  Goal: Absence of nausea/vomiting  Absence of nausea/vomiting   Outcome: Met This Shift    Goal: Able to drink  Able to drink   Outcome: Met This Shift    Goal: Able to eat  Able to eat   Outcome: Met This Shift    Goal: Ability to achieve adequate nutritional intake will improve  Ability to achieve adequate nutritional intake will improve   Outcome: Met This Shift      Problem: Diarrhea:  Goal: Bowel elimination is within specified parameters  Bowel elimination is within specified parameters   Outcome: Met This Shift    Goal: Passage of soft, formed stool  Passage of soft, formed stool   Outcome: Met This Shift    Goal: Establishment of normal bowel function will improve to within specified parameters  Establishment of normal bowel function will improve to within specified parameters   Outcome: Met This Shift      Problem: Discharge Planning:  Goal: Patients continuum of care needs are met  Patients continuum of care needs are met   Outcome: Ongoing  Patient reports plan is to go to TCU. Patient voiced understanding that bed availability can be limited and may need to choose an outpatient rehab center (ECF). Comments: Care plan reviewed with patient. Patient verbalize understanding of the plan of care and contribute to goal setting.

## 2018-09-07 NOTE — PROGRESS NOTES
Progress note: Infectious diseases    Patient - Albert Canales,  Age - 69 y.o.    - 1940      Room Number - 5J-78/595-Q   MRN -  [id number]   Acct # - [de-identified]  Date of Admission -  2018  1:23 PM    SUBJECTIVE:   She feels a little better  No vomiting no nausea. OBJECTIVE   VITALS    height is 5' 4\" (1.626 m) and weight is 250 lb 8 oz (113.6 kg). Her oral temperature is 97.1 °F (36.2 °C). Her blood pressure is 129/63 and her pulse is 86. Her respiration is 18 and oxygen saturation is 91%.        Wt Readings from Last 3 Encounters:   18 250 lb 8 oz (113.6 kg)   18 239 lb 9.6 oz (108.7 kg)   18 230 lb (104.3 kg)       I/O (24 Hours)    Intake/Output Summary (Last 24 hours) at 18 0996  Last data filed at 18 0523   Gross per 24 hour   Intake             1090 ml   Output              500 ml   Net              590 ml       General Appearance  Awake, alert, oriented,  not  In acute distress  HEENT - normocephalic, atraumatic, slighlty pale conjunctiva,  anicteric sclera  Neck - Supple, no mass  Lungs -  Bilateral good air entry, no rhonchi, no wheeze  Cardiovascular - Heart sounds are normal.  Abdomen - soft, not distended, nontender,   Neurologic -oriented  Skin - No bruising or bleeding  Extremities - No edema, no cyanosis, clubbing     MEDICATIONS:      furosemide  40 mg Oral Daily    enoxaparin  40 mg Subcutaneous Q24H    carvedilol  12.5 mg Oral BID WC    atorvastatin  20 mg Oral Nightly    lactobacillus  1 capsule Oral Daily    cetirizine  5 mg Oral Daily    pantoprazole  40 mg Oral QAM AC    PARoxetine  30 mg Oral QAM    vitamin B-6  100 mg Oral Daily    traZODone  50 mg Oral Nightly       potassium chloride **OR** potassium chloride **OR** potassium chloride, magnesium sulfate, sodium phosphate IVPB **OR** sodium phosphate IVPB, cloNIDine, loperamide, ondansetron, promethazine, prochlorperazine, acetaminophen, ondansetron      LABS:     CBC:   Recent Labs      09/05/18   0647  09/06/18   0654  09/07/18   0650   WBC  3.5*  3.4*  3.2*   HGB  10.2*  9.8*  10.2*   PLT  108*  124*  113*     BMP:    Recent Labs      09/05/18   0647  09/06/18   0654  09/07/18   0650   NA  142  143  143   K  3.7  3.7  3.7   CL  107  110  108   CO2  24  20*  24   BUN  20  17  18   CREATININE  1.7*  1.6*  1.7*   GLUCOSE  105  99  104     Calcium:  Recent Labs      09/07/18   0650   CALCIUM  8.1*     Ionized Calcium:No results for input(s): IONCA in the last 72 hours. Magnesium:  Recent Labs      09/07/18   0650   MG  1.6     Phosphorus:  Recent Labs      09/07/18   0650   PHOS  4.2       IMAGING:         Problem list of patient:     Patient Active Problem List   Diagnosis Code    Chronic atrial fibrillation ([facility] I48.2    Ascending cholangitis, possible K83.0    Elevated LFTs R79.89    Thrombocytopenia ([facility]) D69.6    Leukopenia D72.819    ISIDRA (acute kidney injury) ([facility] N17.9    SIRS (systemic inflammatory response syndrome) ([facility]) R65.10    Cholecystitis, acute K81.0    Neutropenia ([facility]) D70.9    Aortic stenosis, severe I35.0    Colitis K52.9    Nausea R11.0    Diarrhea R19.7    Elevated troponin R74.8    Essential hypertension I10    Anemia, normocytic normochromic D64.9    Hypocalcemia E83.51    Cirrhosis ([facility]) K74.60    S/P AVR (aortic valve replacement) Z95.2    Obesity (BMI 30-39. 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

## 2018-09-07 NOTE — DISCHARGE SUMMARY
Studies    Radiology:   XR CHEST STANDARD (2 VW)   Final Result   1. Mild cardiomegaly. Metallic sternotomy sutures. A small metallic medical device projects over the left side the cardiac silhouette, unchanged from prior study. 2. Small pleural effusion right side. Moderate-sized pleural effusion left side. Mild bibasilar atelectasis/pneumonia. 3. Overall appearance of chest slightly worse than prior            **This report has been created using voice recognition software. It may contain minor errors which are inherent in voice recognition technology. **      Final report electronically signed by Dr. Maya Campos on 9/6/2018 11:04 AM             Consults:     IP CONSULT TO [address] TO INFECTIOUS DISEASES  IP CONSULT TO SOCIAL WORK  IP CONSULT TO REHAB/TCU ADMISSION COORDINATOR    Disposition:    [] Home       [] TCU       [] Rehab       [] Psych       [x] SNF       [] [place]       [] Other-    Condition at Discharge: Stable    Code Status:  DNR-CCA     Patient Instructions:     Activity: activity as tolerated  Diet: DIET GENERAL;      Follow-up visits:   ARTHUR Osman - [facility]  [address] [address]  [phone]    Schedule an appointment as soon as possible for a visit in 1 week  PCP to order BMP within 1 week    MD Luiz Dinero. [address]  [facility]    Schedule an appointment as soon as possible for a visit in 2 weeks  Follow up for recent hospitalization    [address], [place]  [address] [address]    Schedule an appointment as soon as possible for a visit in 1 week  to discuss anticoagulation for atrial fibrillation    [facility]  [facility] [phone]             Discharge Medications:      Nir Luu   [place] Medication Instructions CSD:[id number]    Printed on:09/07/18 7253   Medication Information acetaminophen 650 MG TABS  Take 650 mg by mouth every 4 hours as needed             Ascorbic Acid (VITAMIN C) 250 MG tablet  Take 250 mg by mouth daily              atorvastatin (LIPITOR) 20 MG tablet  Take 1 tablet by mouth nightly             Calcium Carbonate-Vit D-Min (CALTRATE PLUS PO)  Take 1 tablet by mouth daily              carvedilol (COREG) 6.25 MG tablet  Take 1 tablet by mouth 2 times daily (with meals)             fluticasone (FLONASE) 50 MCG/ACT nasal spray  1-2 sprays by Nasal route daily             furosemide (LASIX) 20 MG tablet  Take 1 tablet by mouth daily             Lactobacillus (PROBIOTIC ACIDOPHILUS) TABS  Take 1 tablet by mouth daily             lisinopril (PRINIVIL;ZESTRIL) 2.5 MG tablet  Take 1 tablet by mouth daily             loperamide (IMODIUM) 2 MG capsule  Take 1 capsule by mouth 4 times daily as needed for Diarrhea             loratadine (CLARITIN) 10 MG tablet  Take 10 mg by mouth nightly              Misc Natural Products (OSTEO BI-FLEX TRIPLE STRENGTH PO)  Take 750 mg by mouth daily             Multiple Vitamins-Minerals (PRESERVISION AREDS) CAPS  Take by mouth 2 times daily             omeprazole (PRILOSEC) 20 MG capsule  Take 20 mg by mouth daily             ondansetron (ZOFRAN) 4 MG tablet  Take 1 tablet by mouth every 8 hours as needed for Nausea or Vomiting             PARoxetine (PAXIL) 30 MG tablet  Take 30 mg by mouth every morning             potassium chloride (KLOR-CON M) 20 MEQ extended release tablet  Take 0.5 tablets by mouth daily             Pyridoxine HCl (VITAMIN B-6) 100 MG tablet  Take 100 mg by mouth daily             sucralfate (CARAFATE) 1 GM tablet  Take 1 tablet by mouth 4 times daily (before meals and nightly)             traZODone (DESYREL) 50 MG tablet  Take 50 mg by mouth nightly                 Time Spent on discharge is more than 30 minutes in the examination, evaluation, counseling and review of medications and discharge plan.           Signed: Thank you ARTHUR Flores CNP for the opportunity to be involved in this patient's care.     Electronically signed by Alphonse Suarez MD on 9/7/2018 at 2:31 PM

## 2018-09-07 NOTE — PROGRESS NOTES
[address] [address]  INPATIENT PHYSICAL THERAPY  DAILY NOTE  [facility] ONC MED 5K - 2E-34/196-F    Time In: 9461  Time Out: 1112  Timed Code Treatment Minutes: 25 Minutes  Minutes: 25          Date: 2018  Patient Name: Valeria Salcido,  Gender:  female        MRN: [id number]  : 1940  (69 y.o.)  Referral Date : 18  Referring Practitioner: ARLENE Carter MD  Diagnosis: colitis  Additional Pertinent Hx: per ED note: Valeria Salcido is a 69 y.o. female who presents to the Emergency Department for the evaluation of intractble nausea, vomiting and diarrhea. The patient states that she was admitted last week for colitis and was discharged home with cipro and flagyl on . The patient states that she has continued to have nausea and diarrhea since her discharge. The patient admits a decreased appetite and poor oral intake. The patient denies any abdominal pain, fever, chills, urinary complaints, or any other signs or symptoms at this time. The patient reports that her previous colitis was infectious. Past Medical History:   Diagnosis Date    [facility]RA [facility]) [facility] 2016    Atrial fibrillation (HCC)     Bleeding stomach ulcer     History of blood transfusion     Hypertension     PONV (postoperative nausea and vomiting)      Past Surgical History:   Procedure Laterality Date    AORTIC VALVE REPLACEMENT      DILATION AND CURETTAGE OF UTERUS      JOINT REPLACEMENT Right     knee    [place] OFFICE/OUTPT VISIT,PROCEDURE ONLY N/A 2018    BIOPROSTHETIC AORTIC VALVE REPLACEMENT WITH MAZE PROCEDURE performed by Bianka Valentine MD at [facility]       Restrictions/Precautions:  General Precautions, Fall Risk          Prior Level of Function:  ADL Assistance: [address] [place] [address] Avenue: Independent  Ambulation Assistance: Independent  Transfer Assistance: Independent  Additional Comments: reports 5 week ECF stay following AVR end of , recent admission then sent home.  use of discharge    Patient Education:  Patient Education: POC, transfers, ambulation, LE therex    Equipment Recommendations:  Equipment Needed: No  Other: has RW and 4WW    Safety:  Type of devices:  All fall risk precautions in place, Left in chair, Call light within reach, Chair alarm in place    Plan:  Times per week: 3-5x GM  Current Treatment Recommendations: Strengthening, Balance Training, Functional Mobility Training, Transfer Training, Gait Training, Stair training, Endurance Training, Home Exercise Program, Safety Education & Training, Patient/Caregiver Education & Training, Equipment Evaluation, Education, & procurement    Goals:  Patient goals : go home    Short term goals  Time Frame for Short term goals:  1 week  Short term goal 1: patient to perform bed mobility at S to get in and out of bed  Short term goal 2: patient to perform transfers to and from various surfaces at S to rise from bed or chair  Short term goal 3: patient to ambulate 100ft with RW at S for household mobility  Short term goal 4: patient to negotiate 3 steps with B rails at S for home access    Long term goals  Time Frame for Long term goals : defer d/t short length of stay            AM-PAC Inpatient Mobility without Stair Climbing Raw Score : 15  AM-PAC Inpatient without Stair Climbing T-Scale Score : 43.03  Mobility Inpatient CMS 0-100% Score: 47.43  Mobility Inpatient without Stair CMS G-Code Modifier : KARLA Cheema PT, DPT

## 2018-09-07 NOTE — CARE COORDINATION
Discharge orders on chart. Met with Tinnie Duane and she is in agreement for discharge to The [address] [place] [address] today, [employer] updated.  Electronically signed by Otilia Padilla RN on 9/7/18 at 2:44 PM

## 2018-09-07 NOTE — CARE COORDINATION
There are no TCU beds available. Patient will need to return to a SNF. SS updated.  Electronically signed by Trey Aguilar RN on 9/7/18 at 11:57 AM

## 2018-09-07 NOTE — PROGRESS NOTES
Consult received. Chart reviewed. At this time there are no TCU beds. Recommend patient going to a ECF. Jessi HELLER made aware.

## 2018-09-30 PROBLEM — R77.8 ELEVATED TROPONIN: Status: RESOLVED | Noted: 2018-08-24 | Resolved: 2018-09-30

## 2018-12-05 ENCOUNTER — HOSPITAL ENCOUNTER (EMERGENCY)
Age: 78
Discharge: HOME | End: 2018-12-05
Attending: EMERGENCY MEDICINE
Payer: MEDICARE

## 2018-12-05 ENCOUNTER — APPOINTMENT (OUTPATIENT)
Dept: CT IMAGING | Age: 78
End: 2018-12-05
Payer: MEDICARE

## 2018-12-05 VITALS
WEIGHT: 205 LBS | OXYGEN SATURATION: 95 % | DIASTOLIC BLOOD PRESSURE: 64 MMHG | RESPIRATION RATE: 16 BRPM | BODY MASS INDEX: 34.16 KG/M2 | SYSTOLIC BLOOD PRESSURE: 139 MMHG | TEMPERATURE: 98 F | HEIGHT: 65 IN | HEART RATE: 92 BPM

## 2018-12-05 DIAGNOSIS — R60.9 EDEMA, UNSPECIFIED TYPE: ICD-10-CM

## 2018-12-05 DIAGNOSIS — R18.8 OTHER ASCITES: ICD-10-CM

## 2018-12-05 DIAGNOSIS — R53.1 GENERALIZED WEAKNESS: Primary | ICD-10-CM

## 2018-12-05 LAB
ALBUMIN SERPL-MCNC: 2.9 GM/DL (ref 3.4–5)
ALP BLD-CCNC: 64 U/L (ref 46–116)
ALT SERPL-CCNC: 13 U/L (ref 14–63)
ANALYZED BY:: NORMAL
ANION GAP: 8 MEQ/L (ref 8–16)
AST SERPL-CCNC: 22 U/L (ref 15–37)
BASOPHILS # BLD: 0.2 % (ref 0–3)
BILIRUB SERPL-MCNC: 0.8 MG/DL (ref 0.2–1)
BUN BLDV-MCNC: 18 MG/DL (ref 7–18)
CHLORIDE BLD-SCNC: 105 MEQ/L (ref 98–107)
CO2: 31 MEQ/L (ref 21–32)
CREAT SERPL-MCNC: 1.3 MG/DL (ref 0.6–1.3)
DATE OF COLLECTION: NORMAL
DRAWN BY: NORMAL
EOSINOPHILS RELATIVE PERCENT: 1.6 % (ref 0–4)
ETHYL ALCOHOL: < 0.01 % (GM/DL)
GFR, ESTIMATED: 42 ML/MIN/1.73M2
GLUCOSE BLD-MCNC: 96 MG/DL (ref 74–106)
HCT VFR BLD CALC: 34.5 % (ref 37–47)
HEMOGLOBIN: 11.1 GM/DL (ref 12–16)
INR BLD: 3.17 (ref 0.85–1.13)
LIPASE: 84 U/L (ref 73–393)
LYMPHOCYTES # BLD: 16.8 % (ref 15–47)
Lab: 1652
Lab: NORMAL
MAGNESIUM: 1.6 MG/DL (ref 1.8–2.4)
MCH RBC QN AUTO: 28.4 PG (ref 27–31)
MCHC RBC AUTO-ENTMCNC: 32.2 GM/DL (ref 33–37)
MCV RBC AUTO: 88.3 FL (ref 81–99)
MONOCYTES: 7.3 % (ref 0–12)
PDW BLD-RTO: 16.8 % (ref 11.5–14.5)
PHOSPHORUS: 3 MG/DL (ref 2.4–4.7)
PLATELET # BLD: 141 THOU/MM3 (ref 130–400)
PMV BLD AUTO: 8.1 FL (ref 7.4–10.4)
POC CALCIUM: 8.3 MG/DL (ref 8.5–10.1)
POTASSIUM SERPL-SCNC: 3.4 MEQ/L (ref 3.5–5.1)
RBC # BLD: 3.91 MILL/MM3 (ref 4.2–5.4)
SEGS: 74.1 % (ref 43–75)
SODIUM BLD-SCNC: 144 MEQ/L (ref 136–145)
TOTAL PROTEIN: 6.2 GM/DL (ref 6.4–8.2)
WBC # BLD: 4.7 THOU/MM3 (ref 4.8–10.8)

## 2018-12-05 PROCEDURE — 74176 CT ABD & PELVIS W/O CONTRAST: CPT

## 2018-12-05 PROCEDURE — 36415 COLL VENOUS BLD VENIPUNCTURE: CPT

## 2018-12-05 PROCEDURE — 84100 ASSAY OF PHOSPHORUS: CPT

## 2018-12-05 PROCEDURE — 85610 PROTHROMBIN TIME: CPT

## 2018-12-05 PROCEDURE — 83690 ASSAY OF LIPASE: CPT

## 2018-12-05 PROCEDURE — 85025 COMPLETE CBC W/AUTO DIFF WBC: CPT

## 2018-12-05 PROCEDURE — 99285 EMERGENCY DEPT VISIT HI MDM: CPT

## 2018-12-05 PROCEDURE — 83735 ASSAY OF MAGNESIUM: CPT

## 2018-12-05 PROCEDURE — 2709999900 HC NON-CHARGEABLE SUPPLY

## 2018-12-05 PROCEDURE — G0480 DRUG TEST DEF 1-7 CLASSES: HCPCS

## 2018-12-05 PROCEDURE — 80053 COMPREHEN METABOLIC PANEL: CPT

## 2018-12-05 RX ORDER — WARFARIN SODIUM 5 MG/1
5 TABLET ORAL
Status: ON HOLD | COMMUNITY
End: 2019-02-28 | Stop reason: ALTCHOICE

## 2018-12-05 NOTE — ED NOTES
Pt presents w/ c/o fatigue. She has had diarrhea, nausea and vomiting since July. States that she was seen by her PCP on Monday and had abdominal x-rays completed, she was told by her PCP that she was \"concerned about a blockage. \" Pt denies pain or nausea presently, states that she had dry heaves on and off. States that she took Soosalu little blue pill\" over the weekend to help the diarrhea, and has not had diarrhea since Sunday. She states that she is just fatigued now. Respirations regular and easy.       Deepika Mckeon RN  12/05/18 8185

## 2018-12-05 NOTE — ED PROVIDER NOTES
Ally Mckenzieon  [address] [address] [facility] [address]  Phone: [phone]    Emergency Department encounter      CHIEF COMPLAINT    Chief Complaint   Patient presents with    Fatigue       HPI    Rajani Adair is a 69 y.o. female who presents  Above-noted complaint. Patient is very poor story in although sounds like from the sun most history comes from the son. She was sleeping more today than usual.  She's had some generalized weakness. He was concerned called the [facility] and they told her to go to the hospital.  She denies chest pain headache neck pain high fever chills or other symptoms. She's had some persistent dry heaving for months she's had persistent diarrhea for months. The diarrhea actually this past week has stopped.     PAST MEDICAL HISTORY    Past Medical History:   Diagnosis Date    ISIDRA (acute kidney injury) ([facility] 6/11/2016    Atrial fibrillation (HCC)     Bleeding stomach ulcer 2011    Clostridium difficile infection     History of blood transfusion     Hypertension     PONV (postoperative nausea and vomiting)        SURGICAL HISTORY    Past Surgical History:   Procedure Laterality Date    AORTIC VALVE REPLACEMENT      DILATION AND CURETTAGE OF UTERUS      JOINT REPLACEMENT Right 2011    knee    [place] OFFICE/OUTPT VISIT,PROCEDURE ONLY N/A 6/28/2018    BIOPROSTHETIC AORTIC VALVE REPLACEMENT WITH MAZE PROCEDURE performed by Jess Richards MD at [facility]    Current Outpatient Rx   Medication Sig Dispense Refill    warfarin (COUMADIN) 5 MG tablet Take 5 mg by mouth      sucralfate (CARAFATE) 1 GM tablet Take 1 tablet by mouth 4 times daily (before meals and nightly) 120 tablet 3    Lactobacillus (PROBIOTIC ACIDOPHILUS) TABS Take 1 tablet by mouth daily 30 tablet 0    carvedilol (COREG) 6.25 MG tablet Take 1 tablet by mouth 2 times daily (with meals) 60 tablet 3    ondansetron (ZOFRAN) 4 MG tablet Take 1 tablet by mouth every 8 hours as needed for Nausea or Vomiting 20 tablet 0    atorvastatin (LIPITOR) 20 MG tablet Take 1 tablet by mouth nightly 30 tablet 3    furosemide (LASIX) 20 MG tablet Take 1 tablet by mouth daily 60 tablet 3    potassium chloride (KLOR-CON M) 20 MEQ extended release tablet Take 0.5 tablets by mouth daily 60 tablet 3    Multiple Vitamins-Minerals (PRESERVISION AREDS) CAPS Take by mouth 2 times daily      fluticasone (FLONASE) 50 MCG/ACT nasal spray 1-2 sprays by Nasal route daily      Ascorbic Acid (VITAMIN C) 250 MG tablet Take 250 mg by mouth daily       Calcium Carbonate-Vit D-Min (CALTRATE PLUS PO) Take 1 tablet by mouth daily       Pyridoxine HCl (VITAMIN B-6) 100 MG tablet Take 100 mg by mouth daily      loratadine (CLARITIN) 10 MG tablet Take 10 mg by mouth nightly       PARoxetine (PAXIL) 30 MG tablet Take 30 mg by mouth every morning      omeprazole (PRILOSEC) 20 MG capsule Take 20 mg by mouth daily      traZODone (DESYREL) 50 MG tablet Take 50 mg by mouth nightly      Misc Natural Products (OSTEO BI-FLEX TRIPLE STRENGTH PO) Take 750 mg by mouth daily      acetaminophen 650 MG TABS Take 650 mg by mouth every 4 hours as needed 120 tablet 3       ALLERGIES    No Known Allergies    FAMILY HISTORY    Family History   Problem Relation Age of Onset    Diabetes Father     Stroke Father     High Blood Pressure Mother     Cancer Sister         blood    Diabetes Brother     Cancer Brother         lymphoma    High Blood Pressure Maternal Grandmother     High Blood Pressure Maternal Grandfather     Diabetes Paternal Grandmother     Diabetes Paternal Grandfather     Diabetes Brother     Heart Disease Neg Hx        SOCIAL HISTORY    Social History     Social History    Marital status:      Spouse name: N/A    Number of children: N/A    Years of education: N/A     Social History Main Topics    Smoking status: Never Smoker    Smokeless tobacco: Never Used    Alcohol use No    Drug

## 2019-01-25 NOTE — CARE COORDINATION
18, 8:23 AM      Beau Lopez       Admitted from: ED 2018/ Aditya day: 0   Location: 3B--A Reason for admit: Colitis [K52.9] Status: IP  Admit order signed?: yes  PMH:  has a past medical history of ISIDRA (acute kidney injury) ([address] Atrial fibrillation ([address] Bleeding stomach ulcer; History of blood transfusion; Hypertension; and PONV (postoperative nausea and vomiting). Procedure: none  Pertinent abnormal Imagin/24 MRI abdomen  1. The hepatic parenchymal echogenicity is within normal limits however there is nodularity of the hepatic contour consistent with the clinical history of cirrhosis. There is no hepatic mass. 2. The gallbladder is dilated with mild wall thickening. There are no filling defects. No gallstones are identified. There is no biliary dilatation. 3. The pancreas is mildly atrophic and somewhat irregular. No pancreatic mass is identified. There is normal pancreatic enhancement. The pancreatic duct is normal size measuring 0.2 cm. 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 There is no peripancreatic    inflammation. 4. There are several scattered Bosniak 1 cysts measuring up to 2.9 cm on the right and 1.1 cm on the left. 5. There is a 0.9 cm nodule which is either within the right adrenal gland or adjacent to the right adrenal gland (series 13 images 21 and 22). 6. The bowel gas pattern is nonobstructive. There are colonic diverticula. Please refer to the CT abdomen/pelvis examination dated 2018 for further characterization of the colon and colonic wall thickening. 7. There are bilateral small pleural effusions. The right pleural effusion may be partially loculated. 8. There is a 1.0 cm nodule at the right lung base.  US gallbladder  Enlarged echogenic cirrhotic liver. Bidirectional flow in the main portal vein. Distended gallbladder with findings suggestive of acute acalculous cholecystitis. No biliary dilatation.    Minimal ascites
error
normal cephalic

## 2019-02-28 ENCOUNTER — ANESTHESIA EVENT (OUTPATIENT)
Dept: ENDOSCOPY | Age: 79
End: 2019-02-28
Payer: MEDICARE

## 2019-02-28 ENCOUNTER — HOSPITAL ENCOUNTER (OUTPATIENT)
Age: 79
Setting detail: OUTPATIENT SURGERY
Discharge: NURSING, REHAB OR LONG-TERM CARE | End: 2019-02-28
Attending: INTERNAL MEDICINE | Admitting: INTERNAL MEDICINE
Payer: MEDICARE

## 2019-02-28 ENCOUNTER — ANESTHESIA (OUTPATIENT)
Dept: ENDOSCOPY | Age: 79
End: 2019-02-28
Payer: MEDICARE

## 2019-02-28 VITALS
RESPIRATION RATE: 12 BRPM | OXYGEN SATURATION: 97 % | SYSTOLIC BLOOD PRESSURE: 96 MMHG | DIASTOLIC BLOOD PRESSURE: 55 MMHG

## 2019-02-28 VITALS
HEIGHT: 64 IN | BODY MASS INDEX: 40.97 KG/M2 | TEMPERATURE: 98.6 F | SYSTOLIC BLOOD PRESSURE: 106 MMHG | HEART RATE: 77 BPM | RESPIRATION RATE: 14 BRPM | WEIGHT: 240 LBS | OXYGEN SATURATION: 96 % | DIASTOLIC BLOOD PRESSURE: 53 MMHG

## 2019-02-28 DIAGNOSIS — R11.0 NAUSEA: Primary | ICD-10-CM

## 2019-02-28 PROCEDURE — 3700000001 HC ADD 15 MINUTES (ANESTHESIA): Performed by: INTERNAL MEDICINE

## 2019-02-28 PROCEDURE — 3609017100 HC EGD: Performed by: INTERNAL MEDICINE

## 2019-02-28 PROCEDURE — 7100000000 HC PACU RECOVERY - FIRST 15 MIN: Performed by: INTERNAL MEDICINE

## 2019-02-28 PROCEDURE — 2580000003 HC RX 258: Performed by: INTERNAL MEDICINE

## 2019-02-28 PROCEDURE — 6360000002 HC RX W HCPCS: Performed by: REGISTERED NURSE

## 2019-02-28 PROCEDURE — 3700000000 HC ANESTHESIA ATTENDED CARE: Performed by: INTERNAL MEDICINE

## 2019-02-28 PROCEDURE — 7100000001 HC PACU RECOVERY - ADDTL 15 MIN: Performed by: INTERNAL MEDICINE

## 2019-02-28 PROCEDURE — 2709999900 HC NON-CHARGEABLE SUPPLY: Performed by: INTERNAL MEDICINE

## 2019-02-28 PROCEDURE — 2500000003 HC RX 250 WO HCPCS: Performed by: REGISTERED NURSE

## 2019-02-28 RX ORDER — FENTANYL CITRATE 50 UG/ML
INJECTION, SOLUTION INTRAMUSCULAR; INTRAVENOUS PRN
Status: DISCONTINUED | OUTPATIENT
Start: 2019-02-28 | End: 2019-02-28 | Stop reason: SDUPTHER

## 2019-02-28 RX ORDER — SPIRONOLACTONE 25 MG/1
25 TABLET ORAL 2 TIMES DAILY
Status: ON HOLD | COMMUNITY
End: 2022-02-09 | Stop reason: DRUGHIGH

## 2019-02-28 RX ORDER — PANTOPRAZOLE SODIUM 40 MG/1
40 TABLET, DELAYED RELEASE ORAL 2 TIMES DAILY
Status: ON HOLD | COMMUNITY
End: 2022-02-09

## 2019-02-28 RX ORDER — APIXABAN 5 MG/1
TABLET, FILM COATED ORAL 2 TIMES DAILY
Status: ON HOLD | COMMUNITY
End: 2019-03-15 | Stop reason: SDUPTHER

## 2019-02-28 RX ORDER — SODIUM CHLORIDE 450 MG/100ML
INJECTION, SOLUTION INTRAVENOUS CONTINUOUS
Status: DISCONTINUED | OUTPATIENT
Start: 2019-02-28 | End: 2019-02-28 | Stop reason: HOSPADM

## 2019-02-28 RX ORDER — ROSUVASTATIN CALCIUM 5 MG/1
5 TABLET, COATED ORAL DAILY
Status: ON HOLD | COMMUNITY
End: 2022-02-09

## 2019-02-28 RX ORDER — LIDOCAINE HYDROCHLORIDE 20 MG/ML
INJECTION, SOLUTION INFILTRATION; PERINEURAL PRN
Status: DISCONTINUED | OUTPATIENT
Start: 2019-02-28 | End: 2019-02-28 | Stop reason: SDUPTHER

## 2019-02-28 RX ADMIN — PHENYLEPHRINE HYDROCHLORIDE 100 MCG: 10 INJECTION INTRAVENOUS at 11:12

## 2019-02-28 RX ADMIN — PHENYLEPHRINE HYDROCHLORIDE 100 MCG: 10 INJECTION INTRAVENOUS at 11:14

## 2019-02-28 RX ADMIN — LIDOCAINE HYDROCHLORIDE 200 MG: 20 INJECTION, SOLUTION INFILTRATION; PERINEURAL at 11:06

## 2019-02-28 RX ADMIN — PHENYLEPHRINE HYDROCHLORIDE 100 MCG: 10 INJECTION INTRAVENOUS at 11:15

## 2019-02-28 RX ADMIN — SODIUM CHLORIDE: 4.5 INJECTION, SOLUTION INTRAVENOUS at 10:04

## 2019-02-28 RX ADMIN — PROPOFOL 30 MG: 10 INJECTION, EMULSION INTRAVENOUS at 11:06

## 2019-02-28 RX ADMIN — FENTANYL CITRATE 100 MCG: 50 INJECTION INTRAMUSCULAR; INTRAVENOUS at 11:06

## 2019-02-28 RX ADMIN — PHENYLEPHRINE HYDROCHLORIDE 100 MCG: 10 INJECTION INTRAVENOUS at 11:13

## 2019-02-28 ASSESSMENT — PAIN SCALES - GENERAL: PAINLEVEL_OUTOF10: 0

## 2019-02-28 ASSESSMENT — PAIN - FUNCTIONAL ASSESSMENT: PAIN_FUNCTIONAL_ASSESSMENT: 0-10

## 2019-03-06 ENCOUNTER — APPOINTMENT (OUTPATIENT)
Dept: GENERAL RADIOLOGY | Age: 79
DRG: 291 | End: 2019-03-06
Attending: INTERNAL MEDICINE
Payer: MEDICARE

## 2019-03-06 ENCOUNTER — HOSPITAL ENCOUNTER (INPATIENT)
Age: 79
LOS: 8 days | Discharge: NURSING, REHAB OR LONG-TERM CARE | DRG: 291 | End: 2019-03-15
Attending: INTERNAL MEDICINE | Admitting: INTERNAL MEDICINE
Payer: MEDICARE

## 2019-03-06 PROBLEM — I50.43 CHF (CONGESTIVE HEART FAILURE), NYHA CLASS I, ACUTE ON CHRONIC, COMBINED (HCC): Status: ACTIVE | Noted: 2019-03-06

## 2019-03-06 LAB
ANION GAP SERPL CALCULATED.3IONS-SCNC: 12 MEQ/L (ref 8–16)
BUN BLDV-MCNC: 21 MG/DL (ref 7–22)
CALCIUM SERPL-MCNC: 7.9 MG/DL (ref 8.5–10.5)
CHLORIDE BLD-SCNC: 98 MEQ/L (ref 98–111)
CO2: 27 MEQ/L (ref 23–33)
CREAT SERPL-MCNC: 1.7 MG/DL (ref 0.4–1.2)
ERYTHROCYTE [DISTWIDTH] IN BLOOD BY AUTOMATED COUNT: 17.9 % (ref 11.5–14.5)
ERYTHROCYTE [DISTWIDTH] IN BLOOD BY AUTOMATED COUNT: 61.1 FL (ref 35–45)
GFR SERPL CREATININE-BSD FRML MDRD: 29 ML/MIN/1.73M2
GLUCOSE BLD-MCNC: 112 MG/DL (ref 70–108)
HCT VFR BLD CALC: 33.8 % (ref 37–47)
HEMOGLOBIN: 10.9 GM/DL (ref 12–16)
MCH RBC QN AUTO: 30 PG (ref 26–33)
MCHC RBC AUTO-ENTMCNC: 32.2 GM/DL (ref 32.2–35.5)
MCV RBC AUTO: 93.1 FL (ref 81–99)
PLATELET # BLD: 105 THOU/MM3 (ref 130–400)
PMV BLD AUTO: 10.7 FL (ref 9.4–12.4)
POTASSIUM SERPL-SCNC: 4.1 MEQ/L (ref 3.5–5.2)
RBC # BLD: 3.63 MILL/MM3 (ref 4.2–5.4)
SODIUM BLD-SCNC: 137 MEQ/L (ref 135–145)
T4 FREE: 1.09 NG/DL (ref 0.93–1.76)
TROPONIN T: < 0.01 NG/ML
TSH SERPL DL<=0.05 MIU/L-ACNC: 7.09 UIU/ML (ref 0.4–4.2)
WBC # BLD: 5.4 THOU/MM3 (ref 4.8–10.8)

## 2019-03-06 PROCEDURE — G0379 DIRECT REFER HOSPITAL OBSERV: HCPCS

## 2019-03-06 PROCEDURE — 36415 COLL VENOUS BLD VENIPUNCTURE: CPT

## 2019-03-06 PROCEDURE — 80048 BASIC METABOLIC PNL TOTAL CA: CPT

## 2019-03-06 PROCEDURE — 84484 ASSAY OF TROPONIN QUANT: CPT

## 2019-03-06 PROCEDURE — 96365 THER/PROPH/DIAG IV INF INIT: CPT

## 2019-03-06 PROCEDURE — 2709999900 HC NON-CHARGEABLE SUPPLY

## 2019-03-06 PROCEDURE — G0378 HOSPITAL OBSERVATION PER HR: HCPCS

## 2019-03-06 PROCEDURE — 2580000003 HC RX 258: Performed by: INTERNAL MEDICINE

## 2019-03-06 PROCEDURE — 71045 X-RAY EXAM CHEST 1 VIEW: CPT

## 2019-03-06 PROCEDURE — 2500000003 HC RX 250 WO HCPCS: Performed by: INTERNAL MEDICINE

## 2019-03-06 PROCEDURE — 85027 COMPLETE CBC AUTOMATED: CPT

## 2019-03-06 PROCEDURE — 6370000000 HC RX 637 (ALT 250 FOR IP): Performed by: INTERNAL MEDICINE

## 2019-03-06 PROCEDURE — 84443 ASSAY THYROID STIM HORMONE: CPT

## 2019-03-06 PROCEDURE — 96366 THER/PROPH/DIAG IV INF ADDON: CPT

## 2019-03-06 PROCEDURE — 84439 ASSAY OF FREE THYROXINE: CPT

## 2019-03-06 RX ORDER — CETIRIZINE HYDROCHLORIDE 10 MG/1
10 TABLET ORAL DAILY
Status: DISCONTINUED | OUTPATIENT
Start: 2019-03-07 | End: 2019-03-15 | Stop reason: HOSPADM

## 2019-03-06 RX ORDER — ONDANSETRON 2 MG/ML
4 INJECTION INTRAMUSCULAR; INTRAVENOUS EVERY 6 HOURS PRN
Status: DISCONTINUED | OUTPATIENT
Start: 2019-03-06 | End: 2019-03-15 | Stop reason: HOSPADM

## 2019-03-06 RX ORDER — PANTOPRAZOLE SODIUM 40 MG/1
40 TABLET, DELAYED RELEASE ORAL 2 TIMES DAILY
Status: DISCONTINUED | OUTPATIENT
Start: 2019-03-06 | End: 2019-03-15 | Stop reason: HOSPADM

## 2019-03-06 RX ORDER — LANOLIN ALCOHOL/MO/W.PET/CERES
100 CREAM (GRAM) TOPICAL DAILY
Status: DISCONTINUED | OUTPATIENT
Start: 2019-03-07 | End: 2019-03-15 | Stop reason: HOSPADM

## 2019-03-06 RX ORDER — ATORVASTATIN CALCIUM 40 MG/1
40 TABLET, FILM COATED ORAL NIGHTLY
Status: DISCONTINUED | OUTPATIENT
Start: 2019-03-06 | End: 2019-03-07 | Stop reason: ALTCHOICE

## 2019-03-06 RX ORDER — POTASSIUM CHLORIDE 20 MEQ/1
40 TABLET, EXTENDED RELEASE ORAL PRN
Status: DISCONTINUED | OUTPATIENT
Start: 2019-03-06 | End: 2019-03-15 | Stop reason: HOSPADM

## 2019-03-06 RX ORDER — ROSUVASTATIN CALCIUM 10 MG/1
5 TABLET, COATED ORAL DAILY
Status: DISCONTINUED | OUTPATIENT
Start: 2019-03-07 | End: 2019-03-07 | Stop reason: SDUPTHER

## 2019-03-06 RX ORDER — ASCORBIC ACID 500 MG
250 TABLET ORAL DAILY
Status: DISCONTINUED | OUTPATIENT
Start: 2019-03-07 | End: 2019-03-15 | Stop reason: HOSPADM

## 2019-03-06 RX ORDER — PAROXETINE 30 MG/1
30 TABLET, FILM COATED ORAL EVERY MORNING
Status: DISCONTINUED | OUTPATIENT
Start: 2019-03-07 | End: 2019-03-15 | Stop reason: HOSPADM

## 2019-03-06 RX ORDER — CARVEDILOL 6.25 MG/1
12.5 TABLET ORAL 2 TIMES DAILY WITH MEALS
Status: DISCONTINUED | OUTPATIENT
Start: 2019-03-07 | End: 2019-03-15 | Stop reason: HOSPADM

## 2019-03-06 RX ORDER — POTASSIUM CHLORIDE 7.45 MG/ML
10 INJECTION INTRAVENOUS PRN
Status: DISCONTINUED | OUTPATIENT
Start: 2019-03-06 | End: 2019-03-15 | Stop reason: HOSPADM

## 2019-03-06 RX ORDER — SPIRONOLACTONE 25 MG/1
25 TABLET ORAL 2 TIMES DAILY
Status: DISCONTINUED | OUTPATIENT
Start: 2019-03-06 | End: 2019-03-06

## 2019-03-06 RX ORDER — ACETAMINOPHEN 325 MG/1
650 TABLET ORAL EVERY 4 HOURS PRN
Status: DISCONTINUED | OUTPATIENT
Start: 2019-03-06 | End: 2019-03-15 | Stop reason: HOSPADM

## 2019-03-06 RX ORDER — ASPIRIN 81 MG/1
81 TABLET, CHEWABLE ORAL DAILY
Status: DISCONTINUED | OUTPATIENT
Start: 2019-03-07 | End: 2019-03-15 | Stop reason: HOSPADM

## 2019-03-06 RX ORDER — METOLAZONE 2.5 MG/1
2.5 TABLET ORAL DAILY
Status: ON HOLD | COMMUNITY
End: 2019-03-15 | Stop reason: HOSPADM

## 2019-03-06 RX ORDER — SODIUM CHLORIDE 0.9 % (FLUSH) 0.9 %
10 SYRINGE (ML) INJECTION PRN
Status: DISCONTINUED | OUTPATIENT
Start: 2019-03-06 | End: 2019-03-15 | Stop reason: HOSPADM

## 2019-03-06 RX ORDER — TRAZODONE HYDROCHLORIDE 50 MG/1
50 TABLET ORAL NIGHTLY
Status: DISCONTINUED | OUTPATIENT
Start: 2019-03-06 | End: 2019-03-15 | Stop reason: HOSPADM

## 2019-03-06 RX ORDER — SUCRALFATE 1 G/1
1 TABLET ORAL
Status: DISCONTINUED | OUTPATIENT
Start: 2019-03-06 | End: 2019-03-15 | Stop reason: HOSPADM

## 2019-03-06 RX ORDER — ONDANSETRON 4 MG/1
4 TABLET, FILM COATED ORAL EVERY 8 HOURS PRN
Status: DISCONTINUED | OUTPATIENT
Start: 2019-03-06 | End: 2019-03-15 | Stop reason: HOSPADM

## 2019-03-06 RX ORDER — SODIUM CHLORIDE 0.9 % (FLUSH) 0.9 %
10 SYRINGE (ML) INJECTION EVERY 12 HOURS SCHEDULED
Status: DISCONTINUED | OUTPATIENT
Start: 2019-03-06 | End: 2019-03-15 | Stop reason: HOSPADM

## 2019-03-06 RX ORDER — POTASSIUM CHLORIDE 750 MG/1
10 TABLET, FILM COATED, EXTENDED RELEASE ORAL DAILY
Status: DISCONTINUED | OUTPATIENT
Start: 2019-03-07 | End: 2019-03-15 | Stop reason: HOSPADM

## 2019-03-06 RX ORDER — M-VIT,TX,IRON,MINS/CALC/FOLIC 27MG-0.4MG
1 TABLET ORAL 2 TIMES DAILY
Status: DISCONTINUED | OUTPATIENT
Start: 2019-03-06 | End: 2019-03-15 | Stop reason: HOSPADM

## 2019-03-06 RX ORDER — NITROGLYCERIN 0.4 MG/1
0.4 TABLET SUBLINGUAL EVERY 5 MIN PRN
Status: DISCONTINUED | OUTPATIENT
Start: 2019-03-06 | End: 2019-03-15 | Stop reason: HOSPADM

## 2019-03-06 RX ORDER — POTASSIUM CHLORIDE 20 MEQ/1
10 TABLET, EXTENDED RELEASE ORAL 2 TIMES DAILY
Status: DISCONTINUED | OUTPATIENT
Start: 2019-03-06 | End: 2019-03-06 | Stop reason: SDUPTHER

## 2019-03-06 RX ORDER — METOLAZONE 2.5 MG/1
2.5 TABLET ORAL DAILY
Status: DISCONTINUED | OUTPATIENT
Start: 2019-03-07 | End: 2019-03-15 | Stop reason: HOSPADM

## 2019-03-06 RX ORDER — FLUTICASONE PROPIONATE 50 MCG
1 SPRAY, SUSPENSION (ML) NASAL DAILY
Status: DISCONTINUED | OUTPATIENT
Start: 2019-03-07 | End: 2019-03-15 | Stop reason: HOSPADM

## 2019-03-06 RX ORDER — LACTOBACILLUS RHAMNOSUS GG 10B CELL
1 CAPSULE ORAL DAILY
Status: DISCONTINUED | OUTPATIENT
Start: 2019-03-07 | End: 2019-03-15 | Stop reason: HOSPADM

## 2019-03-06 RX ADMIN — TRAZODONE HYDROCHLORIDE 50 MG: 50 TABLET ORAL at 22:57

## 2019-03-06 RX ADMIN — POTASSIUM CHLORIDE 10 MEQ: 20 TABLET, EXTENDED RELEASE ORAL at 18:03

## 2019-03-06 RX ADMIN — ATORVASTATIN CALCIUM 40 MG: 40 TABLET, FILM COATED ORAL at 22:57

## 2019-03-06 RX ADMIN — PANTOPRAZOLE SODIUM 40 MG: 40 TABLET, DELAYED RELEASE ORAL at 22:57

## 2019-03-06 RX ADMIN — SUCRALFATE 1 G: 1 TABLET ORAL at 22:57

## 2019-03-06 RX ADMIN — APIXABAN 5 MG: 5 TABLET, FILM COATED ORAL at 22:57

## 2019-03-06 RX ADMIN — BUMETANIDE 1 MG/HR: 0.25 INJECTION INTRAMUSCULAR; INTRAVENOUS at 18:03

## 2019-03-06 ASSESSMENT — PAIN SCALES - GENERAL: PAINLEVEL_OUTOF10: 0

## 2019-03-07 LAB
ALBUMIN SERPL-MCNC: 2.7 G/DL (ref 3.5–5.1)
ALP BLD-CCNC: 85 U/L (ref 38–126)
ALT SERPL-CCNC: 6 U/L (ref 11–66)
ANION GAP SERPL CALCULATED.3IONS-SCNC: 13 MEQ/L (ref 8–16)
AST SERPL-CCNC: 21 U/L (ref 5–40)
BILIRUB SERPL-MCNC: 1 MG/DL (ref 0.3–1.2)
BILIRUBIN DIRECT: 0.5 MG/DL (ref 0–0.3)
BUN BLDV-MCNC: 23 MG/DL (ref 7–22)
CALCIUM SERPL-MCNC: 8 MG/DL (ref 8.5–10.5)
CHLORIDE BLD-SCNC: 95 MEQ/L (ref 98–111)
CHOLESTEROL, TOTAL: 70 MG/DL (ref 100–199)
CO2: 28 MEQ/L (ref 23–33)
CREAT SERPL-MCNC: 1.7 MG/DL (ref 0.4–1.2)
EKG ATRIAL RATE: 97 BPM
EKG Q-T INTERVAL: 366 MS
EKG QRS DURATION: 86 MS
EKG QTC CALCULATION (BAZETT): 462 MS
EKG R AXIS: 172 DEGREES
EKG T AXIS: 49 DEGREES
EKG VENTRICULAR RATE: 96 BPM
ERYTHROCYTE [DISTWIDTH] IN BLOOD BY AUTOMATED COUNT: 17.9 % (ref 11.5–14.5)
ERYTHROCYTE [DISTWIDTH] IN BLOOD BY AUTOMATED COUNT: 63.4 FL (ref 35–45)
GFR SERPL CREATININE-BSD FRML MDRD: 29 ML/MIN/1.73M2
GLUCOSE BLD-MCNC: 100 MG/DL (ref 70–108)
HCT VFR BLD CALC: 34.5 % (ref 37–47)
HDLC SERPL-MCNC: 44 MG/DL
HEMOGLOBIN: 10.9 GM/DL (ref 12–16)
LDL CHOLESTEROL CALCULATED: 15 MG/DL
MCH RBC QN AUTO: 30.4 PG (ref 26–33)
MCHC RBC AUTO-ENTMCNC: 31.6 GM/DL (ref 32.2–35.5)
MCV RBC AUTO: 96.1 FL (ref 81–99)
PLATELET # BLD: 114 THOU/MM3 (ref 130–400)
PMV BLD AUTO: 10.2 FL (ref 9.4–12.4)
POTASSIUM SERPL-SCNC: 3.6 MEQ/L (ref 3.5–5.2)
RBC # BLD: 3.59 MILL/MM3 (ref 4.2–5.4)
SODIUM BLD-SCNC: 136 MEQ/L (ref 135–145)
TOTAL PROTEIN: 5.9 G/DL (ref 6.1–8)
TRIGL SERPL-MCNC: 57 MG/DL (ref 0–199)
TROPONIN T: < 0.01 NG/ML
WBC # BLD: 5.8 THOU/MM3 (ref 4.8–10.8)

## 2019-03-07 PROCEDURE — 1200000000 HC SEMI PRIVATE

## 2019-03-07 PROCEDURE — 93005 ELECTROCARDIOGRAM TRACING: CPT | Performed by: INTERNAL MEDICINE

## 2019-03-07 PROCEDURE — 2580000003 HC RX 258: Performed by: INTERNAL MEDICINE

## 2019-03-07 PROCEDURE — 51702 INSERT TEMP BLADDER CATH: CPT

## 2019-03-07 PROCEDURE — 6370000000 HC RX 637 (ALT 250 FOR IP): Performed by: INTERNAL MEDICINE

## 2019-03-07 PROCEDURE — 85027 COMPLETE CBC AUTOMATED: CPT

## 2019-03-07 PROCEDURE — 80053 COMPREHEN METABOLIC PANEL: CPT

## 2019-03-07 PROCEDURE — 96366 THER/PROPH/DIAG IV INF ADDON: CPT

## 2019-03-07 PROCEDURE — 82248 BILIRUBIN DIRECT: CPT

## 2019-03-07 PROCEDURE — 2500000003 HC RX 250 WO HCPCS: Performed by: INTERNAL MEDICINE

## 2019-03-07 PROCEDURE — 1200000003 HC TELEMETRY R&B

## 2019-03-07 PROCEDURE — 2709999900 HC NON-CHARGEABLE SUPPLY

## 2019-03-07 PROCEDURE — 84484 ASSAY OF TROPONIN QUANT: CPT

## 2019-03-07 PROCEDURE — 80061 LIPID PANEL: CPT

## 2019-03-07 PROCEDURE — 36415 COLL VENOUS BLD VENIPUNCTURE: CPT

## 2019-03-07 RX ORDER — ROSUVASTATIN CALCIUM 10 MG/1
5 TABLET, COATED ORAL NIGHTLY
Status: DISCONTINUED | OUTPATIENT
Start: 2019-03-07 | End: 2019-03-15 | Stop reason: HOSPADM

## 2019-03-07 RX ADMIN — CARVEDILOL 12.5 MG: 6.25 TABLET, FILM COATED ORAL at 17:14

## 2019-03-07 RX ADMIN — Medication 1 CAPSULE: at 10:41

## 2019-03-07 RX ADMIN — MULTIPLE VITAMINS W/ MINERALS TAB 1 TABLET: TAB at 21:24

## 2019-03-07 RX ADMIN — FLUTICASONE PROPIONATE 1 SPRAY: 50 SPRAY, METERED NASAL at 10:41

## 2019-03-07 RX ADMIN — SUCRALFATE 1 G: 1 TABLET ORAL at 12:19

## 2019-03-07 RX ADMIN — PANTOPRAZOLE SODIUM 40 MG: 40 TABLET, DELAYED RELEASE ORAL at 21:24

## 2019-03-07 RX ADMIN — TRAZODONE HYDROCHLORIDE 50 MG: 50 TABLET ORAL at 21:24

## 2019-03-07 RX ADMIN — POTASSIUM CHLORIDE 10 MEQ: 750 TABLET, EXTENDED RELEASE ORAL at 08:53

## 2019-03-07 RX ADMIN — SUCRALFATE 1 G: 1 TABLET ORAL at 21:24

## 2019-03-07 RX ADMIN — PANTOPRAZOLE SODIUM 40 MG: 40 TABLET, DELAYED RELEASE ORAL at 08:53

## 2019-03-07 RX ADMIN — Medication 250 MG: at 08:56

## 2019-03-07 RX ADMIN — CETIRIZINE HYDROCHLORIDE 10 MG: 10 TABLET, FILM COATED ORAL at 10:41

## 2019-03-07 RX ADMIN — ASPIRIN 81 MG 81 MG: 81 TABLET ORAL at 08:53

## 2019-03-07 RX ADMIN — ROSUVASTATIN CALCIUM 5 MG: 10 TABLET, FILM COATED ORAL at 22:16

## 2019-03-07 RX ADMIN — APIXABAN 5 MG: 5 TABLET, FILM COATED ORAL at 08:56

## 2019-03-07 RX ADMIN — SUCRALFATE 1 G: 1 TABLET ORAL at 17:14

## 2019-03-07 RX ADMIN — BUMETANIDE 1 MG/HR: 0.25 INJECTION INTRAMUSCULAR; INTRAVENOUS at 03:17

## 2019-03-07 RX ADMIN — SUCRALFATE 1 G: 1 TABLET ORAL at 08:55

## 2019-03-07 RX ADMIN — BUMETANIDE 1 MG/HR: 0.25 INJECTION INTRAMUSCULAR; INTRAVENOUS at 17:58

## 2019-03-07 RX ADMIN — PAROXETINE HYDROCHLORIDE 30 MG: 30 TABLET, FILM COATED ORAL at 08:56

## 2019-03-07 RX ADMIN — PYRIDOXINE HCL TAB 50 MG 100 MG: 50 TAB at 08:54

## 2019-03-07 RX ADMIN — MULTIPLE VITAMINS W/ MINERALS TAB 1 TABLET: TAB at 08:54

## 2019-03-07 RX ADMIN — METOLAZONE 2.5 MG: 2.5 TABLET ORAL at 08:56

## 2019-03-07 RX ADMIN — APIXABAN 5 MG: 5 TABLET, FILM COATED ORAL at 21:24

## 2019-03-07 RX ADMIN — CARVEDILOL 12.5 MG: 6.25 TABLET, FILM COATED ORAL at 08:54

## 2019-03-07 ASSESSMENT — PAIN SCALES - GENERAL
PAINLEVEL_OUTOF10: 0

## 2019-03-08 LAB
EKG ATRIAL RATE: 208 BPM
EKG Q-T INTERVAL: 392 MS
EKG QRS DURATION: 94 MS
EKG QTC CALCULATION (BAZETT): 479 MS
EKG R AXIS: -30 DEGREES
EKG T AXIS: 133 DEGREES
EKG VENTRICULAR RATE: 90 BPM

## 2019-03-08 PROCEDURE — 2580000003 HC RX 258: Performed by: INTERNAL MEDICINE

## 2019-03-08 PROCEDURE — 6370000000 HC RX 637 (ALT 250 FOR IP): Performed by: INTERNAL MEDICINE

## 2019-03-08 PROCEDURE — 94761 N-INVAS EAR/PLS OXIMETRY MLT: CPT

## 2019-03-08 PROCEDURE — 2709999900 HC NON-CHARGEABLE SUPPLY

## 2019-03-08 PROCEDURE — 2500000003 HC RX 250 WO HCPCS: Performed by: INTERNAL MEDICINE

## 2019-03-08 PROCEDURE — 2700000000 HC OXYGEN THERAPY PER DAY

## 2019-03-08 PROCEDURE — 93005 ELECTROCARDIOGRAM TRACING: CPT | Performed by: INTERNAL MEDICINE

## 2019-03-08 PROCEDURE — 93010 ELECTROCARDIOGRAM REPORT: CPT | Performed by: INTERNAL MEDICINE

## 2019-03-08 PROCEDURE — 1200000000 HC SEMI PRIVATE

## 2019-03-08 PROCEDURE — 1200000003 HC TELEMETRY R&B

## 2019-03-08 RX ADMIN — APIXABAN 5 MG: 5 TABLET, FILM COATED ORAL at 20:53

## 2019-03-08 RX ADMIN — SUCRALFATE 1 G: 1 TABLET ORAL at 17:12

## 2019-03-08 RX ADMIN — PAROXETINE HYDROCHLORIDE 30 MG: 30 TABLET, FILM COATED ORAL at 08:52

## 2019-03-08 RX ADMIN — METOLAZONE 2.5 MG: 2.5 TABLET ORAL at 08:52

## 2019-03-08 RX ADMIN — SUCRALFATE 1 G: 1 TABLET ORAL at 20:53

## 2019-03-08 RX ADMIN — ROSUVASTATIN CALCIUM 5 MG: 10 TABLET, FILM COATED ORAL at 20:53

## 2019-03-08 RX ADMIN — CETIRIZINE HYDROCHLORIDE 10 MG: 10 TABLET, FILM COATED ORAL at 08:52

## 2019-03-08 RX ADMIN — PANTOPRAZOLE SODIUM 40 MG: 40 TABLET, DELAYED RELEASE ORAL at 20:53

## 2019-03-08 RX ADMIN — CARVEDILOL 12.5 MG: 6.25 TABLET, FILM COATED ORAL at 17:12

## 2019-03-08 RX ADMIN — SUCRALFATE 1 G: 1 TABLET ORAL at 06:19

## 2019-03-08 RX ADMIN — SUCRALFATE 1 G: 1 TABLET ORAL at 11:26

## 2019-03-08 RX ADMIN — BUMETANIDE 1 MG/HR: 0.25 INJECTION INTRAMUSCULAR; INTRAVENOUS at 05:14

## 2019-03-08 RX ADMIN — MULTIPLE VITAMINS W/ MINERALS TAB 1 TABLET: TAB at 08:52

## 2019-03-08 RX ADMIN — BUMETANIDE 1 MG/HR: 0.25 INJECTION INTRAMUSCULAR; INTRAVENOUS at 14:00

## 2019-03-08 RX ADMIN — Medication 250 MG: at 08:52

## 2019-03-08 RX ADMIN — POTASSIUM CHLORIDE 10 MEQ: 750 TABLET, EXTENDED RELEASE ORAL at 08:52

## 2019-03-08 RX ADMIN — FLUTICASONE PROPIONATE 1 SPRAY: 50 SPRAY, METERED NASAL at 08:52

## 2019-03-08 RX ADMIN — PANTOPRAZOLE SODIUM 40 MG: 40 TABLET, DELAYED RELEASE ORAL at 08:52

## 2019-03-08 RX ADMIN — PYRIDOXINE HCL TAB 50 MG 100 MG: 50 TAB at 08:52

## 2019-03-08 RX ADMIN — Medication 1 CAPSULE: at 08:52

## 2019-03-08 RX ADMIN — APIXABAN 5 MG: 5 TABLET, FILM COATED ORAL at 08:52

## 2019-03-08 RX ADMIN — ASPIRIN 81 MG 81 MG: 81 TABLET ORAL at 08:52

## 2019-03-08 RX ADMIN — CARVEDILOL 12.5 MG: 6.25 TABLET, FILM COATED ORAL at 08:52

## 2019-03-08 RX ADMIN — MULTIPLE VITAMINS W/ MINERALS TAB 1 TABLET: TAB at 20:53

## 2019-03-08 RX ADMIN — TRAZODONE HYDROCHLORIDE 50 MG: 50 TABLET ORAL at 23:24

## 2019-03-08 ASSESSMENT — PAIN SCALES - GENERAL
PAINLEVEL_OUTOF10: 0

## 2019-03-09 ENCOUNTER — APPOINTMENT (OUTPATIENT)
Dept: GENERAL RADIOLOGY | Age: 79
DRG: 291 | End: 2019-03-09
Attending: INTERNAL MEDICINE
Payer: MEDICARE

## 2019-03-09 LAB
ANION GAP SERPL CALCULATED.3IONS-SCNC: 12 MEQ/L (ref 8–16)
BUN BLDV-MCNC: 26 MG/DL (ref 7–22)
CALCIUM SERPL-MCNC: 7.9 MG/DL (ref 8.5–10.5)
CHLORIDE BLD-SCNC: 90 MEQ/L (ref 98–111)
CO2: 37 MEQ/L (ref 23–33)
CREAT SERPL-MCNC: 1.5 MG/DL (ref 0.4–1.2)
ERYTHROCYTE [DISTWIDTH] IN BLOOD BY AUTOMATED COUNT: 17 % (ref 11.5–14.5)
ERYTHROCYTE [DISTWIDTH] IN BLOOD BY AUTOMATED COUNT: 60.8 FL (ref 35–45)
GFR SERPL CREATININE-BSD FRML MDRD: 34 ML/MIN/1.73M2
GLUCOSE BLD-MCNC: 116 MG/DL (ref 70–108)
HCT VFR BLD CALC: 31.6 % (ref 37–47)
HEMOGLOBIN: 9.9 GM/DL (ref 12–16)
MCH RBC QN AUTO: 30.4 PG (ref 26–33)
MCHC RBC AUTO-ENTMCNC: 31.3 GM/DL (ref 32.2–35.5)
MCV RBC AUTO: 96.9 FL (ref 81–99)
PLATELET # BLD: 106 THOU/MM3 (ref 130–400)
PMV BLD AUTO: 10.5 FL (ref 9.4–12.4)
POTASSIUM SERPL-SCNC: 2.8 MEQ/L (ref 3.5–5.2)
RBC # BLD: 3.26 MILL/MM3 (ref 4.2–5.4)
SODIUM BLD-SCNC: 139 MEQ/L (ref 135–145)
WBC # BLD: 5.2 THOU/MM3 (ref 4.8–10.8)

## 2019-03-09 PROCEDURE — 2580000003 HC RX 258: Performed by: INTERNAL MEDICINE

## 2019-03-09 PROCEDURE — 2709999900 HC NON-CHARGEABLE SUPPLY

## 2019-03-09 PROCEDURE — 1200000003 HC TELEMETRY R&B

## 2019-03-09 PROCEDURE — 85027 COMPLETE CBC AUTOMATED: CPT

## 2019-03-09 PROCEDURE — 71045 X-RAY EXAM CHEST 1 VIEW: CPT

## 2019-03-09 PROCEDURE — 6360000002 HC RX W HCPCS: Performed by: INTERNAL MEDICINE

## 2019-03-09 PROCEDURE — 6370000000 HC RX 637 (ALT 250 FOR IP): Performed by: INTERNAL MEDICINE

## 2019-03-09 PROCEDURE — 1200000000 HC SEMI PRIVATE

## 2019-03-09 PROCEDURE — 80048 BASIC METABOLIC PNL TOTAL CA: CPT

## 2019-03-09 PROCEDURE — 36415 COLL VENOUS BLD VENIPUNCTURE: CPT

## 2019-03-09 RX ORDER — POTASSIUM CHLORIDE 7.45 MG/ML
10 INJECTION INTRAVENOUS
Status: COMPLETED | OUTPATIENT
Start: 2019-03-09 | End: 2019-03-10

## 2019-03-09 RX ADMIN — SUCRALFATE 1 G: 1 TABLET ORAL at 06:50

## 2019-03-09 RX ADMIN — CARVEDILOL 12.5 MG: 6.25 TABLET, FILM COATED ORAL at 08:36

## 2019-03-09 RX ADMIN — POTASSIUM CHLORIDE 10 MEQ: 7.46 INJECTION, SOLUTION INTRAVENOUS at 18:36

## 2019-03-09 RX ADMIN — FLUTICASONE PROPIONATE 1 SPRAY: 50 SPRAY, METERED NASAL at 08:37

## 2019-03-09 RX ADMIN — ASPIRIN 81 MG 81 MG: 81 TABLET ORAL at 08:36

## 2019-03-09 RX ADMIN — Medication 250 MG: at 08:36

## 2019-03-09 RX ADMIN — POTASSIUM CHLORIDE 10 MEQ: 7.46 INJECTION, SOLUTION INTRAVENOUS at 21:11

## 2019-03-09 RX ADMIN — PAROXETINE HYDROCHLORIDE 30 MG: 30 TABLET, FILM COATED ORAL at 08:36

## 2019-03-09 RX ADMIN — TRAZODONE HYDROCHLORIDE 50 MG: 50 TABLET ORAL at 20:42

## 2019-03-09 RX ADMIN — Medication 1 CAPSULE: at 08:36

## 2019-03-09 RX ADMIN — Medication 10 ML: at 08:37

## 2019-03-09 RX ADMIN — MULTIPLE VITAMINS W/ MINERALS TAB 1 TABLET: TAB at 20:42

## 2019-03-09 RX ADMIN — SUCRALFATE 1 G: 1 TABLET ORAL at 20:42

## 2019-03-09 RX ADMIN — POTASSIUM CHLORIDE 10 MEQ: 750 TABLET, EXTENDED RELEASE ORAL at 08:36

## 2019-03-09 RX ADMIN — CARVEDILOL 12.5 MG: 6.25 TABLET, FILM COATED ORAL at 16:52

## 2019-03-09 RX ADMIN — CETIRIZINE HYDROCHLORIDE 10 MG: 10 TABLET, FILM COATED ORAL at 08:36

## 2019-03-09 RX ADMIN — MULTIPLE VITAMINS W/ MINERALS TAB 1 TABLET: TAB at 08:36

## 2019-03-09 RX ADMIN — POTASSIUM CHLORIDE 10 MEQ: 7.46 INJECTION, SOLUTION INTRAVENOUS at 16:40

## 2019-03-09 RX ADMIN — APIXABAN 5 MG: 5 TABLET, FILM COATED ORAL at 08:36

## 2019-03-09 RX ADMIN — APIXABAN 5 MG: 5 TABLET, FILM COATED ORAL at 20:42

## 2019-03-09 RX ADMIN — PANTOPRAZOLE SODIUM 40 MG: 40 TABLET, DELAYED RELEASE ORAL at 20:42

## 2019-03-09 RX ADMIN — SUCRALFATE 1 G: 1 TABLET ORAL at 16:52

## 2019-03-09 RX ADMIN — PYRIDOXINE HCL TAB 50 MG 100 MG: 50 TAB at 08:36

## 2019-03-09 RX ADMIN — ROSUVASTATIN CALCIUM 5 MG: 10 TABLET, FILM COATED ORAL at 20:43

## 2019-03-09 RX ADMIN — SUCRALFATE 1 G: 1 TABLET ORAL at 11:19

## 2019-03-09 RX ADMIN — PANTOPRAZOLE SODIUM 40 MG: 40 TABLET, DELAYED RELEASE ORAL at 08:37

## 2019-03-09 RX ADMIN — METOLAZONE 2.5 MG: 2.5 TABLET ORAL at 08:36

## 2019-03-09 ASSESSMENT — PAIN SCALES - GENERAL
PAINLEVEL_OUTOF10: 0

## 2019-03-10 LAB — POTASSIUM SERPL-SCNC: 3.3 MEQ/L (ref 3.5–5.2)

## 2019-03-10 PROCEDURE — 97162 PT EVAL MOD COMPLEX 30 MIN: CPT

## 2019-03-10 PROCEDURE — 2700000000 HC OXYGEN THERAPY PER DAY

## 2019-03-10 PROCEDURE — 84132 ASSAY OF SERUM POTASSIUM: CPT

## 2019-03-10 PROCEDURE — 2500000003 HC RX 250 WO HCPCS: Performed by: INTERNAL MEDICINE

## 2019-03-10 PROCEDURE — 6360000002 HC RX W HCPCS: Performed by: INTERNAL MEDICINE

## 2019-03-10 PROCEDURE — 2580000003 HC RX 258: Performed by: INTERNAL MEDICINE

## 2019-03-10 PROCEDURE — 6370000000 HC RX 637 (ALT 250 FOR IP): Performed by: INTERNAL MEDICINE

## 2019-03-10 PROCEDURE — 36415 COLL VENOUS BLD VENIPUNCTURE: CPT

## 2019-03-10 PROCEDURE — 94760 N-INVAS EAR/PLS OXIMETRY 1: CPT

## 2019-03-10 PROCEDURE — 1200000000 HC SEMI PRIVATE

## 2019-03-10 PROCEDURE — 97110 THERAPEUTIC EXERCISES: CPT

## 2019-03-10 PROCEDURE — 1200000003 HC TELEMETRY R&B

## 2019-03-10 RX ORDER — BUMETANIDE 0.25 MG/ML
2 INJECTION, SOLUTION INTRAMUSCULAR; INTRAVENOUS 2 TIMES DAILY
Status: DISCONTINUED | OUTPATIENT
Start: 2019-03-10 | End: 2019-03-11

## 2019-03-10 RX ORDER — BUMETANIDE 1 MG/1
2 TABLET ORAL 2 TIMES DAILY
Status: DISCONTINUED | OUTPATIENT
Start: 2019-03-10 | End: 2019-03-10

## 2019-03-10 RX ORDER — POTASSIUM CHLORIDE 20 MEQ/1
40 TABLET, EXTENDED RELEASE ORAL ONCE
Status: COMPLETED | OUTPATIENT
Start: 2019-03-10 | End: 2019-03-10

## 2019-03-10 RX ADMIN — PAROXETINE HYDROCHLORIDE 30 MG: 30 TABLET, FILM COATED ORAL at 08:06

## 2019-03-10 RX ADMIN — POTASSIUM CHLORIDE 10 MEQ: 7.46 INJECTION, SOLUTION INTRAVENOUS at 05:12

## 2019-03-10 RX ADMIN — SUCRALFATE 1 G: 1 TABLET ORAL at 05:15

## 2019-03-10 RX ADMIN — TRAZODONE HYDROCHLORIDE 50 MG: 50 TABLET ORAL at 20:07

## 2019-03-10 RX ADMIN — ASPIRIN 81 MG 81 MG: 81 TABLET ORAL at 08:06

## 2019-03-10 RX ADMIN — MULTIPLE VITAMINS W/ MINERALS TAB 1 TABLET: TAB at 20:07

## 2019-03-10 RX ADMIN — POTASSIUM CHLORIDE 10 MEQ: 750 TABLET, EXTENDED RELEASE ORAL at 08:06

## 2019-03-10 RX ADMIN — APIXABAN 5 MG: 5 TABLET, FILM COATED ORAL at 20:07

## 2019-03-10 RX ADMIN — MULTIPLE VITAMINS W/ MINERALS TAB 1 TABLET: TAB at 08:06

## 2019-03-10 RX ADMIN — POTASSIUM CHLORIDE 10 MEQ: 7.46 INJECTION, SOLUTION INTRAVENOUS at 03:06

## 2019-03-10 RX ADMIN — SUCRALFATE 1 G: 1 TABLET ORAL at 18:47

## 2019-03-10 RX ADMIN — POTASSIUM CHLORIDE 10 MEQ: 7.46 INJECTION, SOLUTION INTRAVENOUS at 00:03

## 2019-03-10 RX ADMIN — PYRIDOXINE HCL TAB 50 MG 100 MG: 50 TAB at 08:06

## 2019-03-10 RX ADMIN — Medication 250 MG: at 08:06

## 2019-03-10 RX ADMIN — FLUTICASONE PROPIONATE 1 SPRAY: 50 SPRAY, METERED NASAL at 08:06

## 2019-03-10 RX ADMIN — ROSUVASTATIN CALCIUM 5 MG: 10 TABLET, FILM COATED ORAL at 20:07

## 2019-03-10 RX ADMIN — BUMETANIDE 2 MG: 1 TABLET ORAL at 14:14

## 2019-03-10 RX ADMIN — BUMETANIDE 2 MG: 1 TABLET ORAL at 20:07

## 2019-03-10 RX ADMIN — POTASSIUM CHLORIDE 40 MEQ: 1500 TABLET, EXTENDED RELEASE ORAL at 10:20

## 2019-03-10 RX ADMIN — METOLAZONE 2.5 MG: 2.5 TABLET ORAL at 08:06

## 2019-03-10 RX ADMIN — APIXABAN 5 MG: 5 TABLET, FILM COATED ORAL at 08:06

## 2019-03-10 RX ADMIN — Medication 1 CAPSULE: at 08:06

## 2019-03-10 RX ADMIN — SUCRALFATE 1 G: 1 TABLET ORAL at 20:07

## 2019-03-10 RX ADMIN — CETIRIZINE HYDROCHLORIDE 10 MG: 10 TABLET, FILM COATED ORAL at 08:06

## 2019-03-10 RX ADMIN — Medication 10 ML: at 20:07

## 2019-03-10 RX ADMIN — BUMETANIDE 2 MG: 0.25 INJECTION INTRAMUSCULAR; INTRAVENOUS at 22:33

## 2019-03-10 RX ADMIN — CARVEDILOL 12.5 MG: 6.25 TABLET, FILM COATED ORAL at 18:47

## 2019-03-10 RX ADMIN — PANTOPRAZOLE SODIUM 40 MG: 40 TABLET, DELAYED RELEASE ORAL at 20:07

## 2019-03-10 RX ADMIN — CARVEDILOL 12.5 MG: 6.25 TABLET, FILM COATED ORAL at 08:06

## 2019-03-10 RX ADMIN — PANTOPRAZOLE SODIUM 40 MG: 40 TABLET, DELAYED RELEASE ORAL at 08:06

## 2019-03-10 RX ADMIN — SUCRALFATE 1 G: 1 TABLET ORAL at 10:20

## 2019-03-10 ASSESSMENT — PAIN SCALES - GENERAL
PAINLEVEL_OUTOF10: 0

## 2019-03-11 ENCOUNTER — APPOINTMENT (OUTPATIENT)
Dept: GENERAL RADIOLOGY | Age: 79
DRG: 291 | End: 2019-03-11
Attending: INTERNAL MEDICINE
Payer: MEDICARE

## 2019-03-11 ENCOUNTER — APPOINTMENT (OUTPATIENT)
Dept: ULTRASOUND IMAGING | Age: 79
DRG: 291 | End: 2019-03-11
Attending: INTERNAL MEDICINE
Payer: MEDICARE

## 2019-03-11 LAB
ALBUMIN FLUID: 0.9 GM/DL
BODY FLUID RBC: ABNORMAL /CUMM
CHARACTER, BODY FLUID: ABNORMAL
COLOR: ABNORMAL
MONONUCLEAR CELLS BODY FLUID: 85.9 %
PATHOLOGIST REVIEW: ABNORMAL
PH FLUID: 7.68
POLYMORPHONUCLEAR CELLS BODY FLUID: 14.1 %
POTASSIUM SERPL-SCNC: 2.9 MEQ/L (ref 3.5–5.2)
POTASSIUM SERPL-SCNC: 3.6 MEQ/L (ref 3.5–5.2)
PROTEIN FLUID: 1.7 GM/DL
SPECIMEN: ABNORMAL
TOTAL NUCLEATED CELLS BODY FLUID: 514 /CUMM (ref 0–500)
TOTAL VOLUME RECEIVED BODY FLUID: 83 ML

## 2019-03-11 PROCEDURE — 71045 X-RAY EXAM CHEST 1 VIEW: CPT

## 2019-03-11 PROCEDURE — 88112 CYTOPATH CELL ENHANCE TECH: CPT

## 2019-03-11 PROCEDURE — 2500000003 HC RX 250 WO HCPCS: Performed by: INTERNAL MEDICINE

## 2019-03-11 PROCEDURE — 84132 ASSAY OF SERUM POTASSIUM: CPT

## 2019-03-11 PROCEDURE — 2580000003 HC RX 258: Performed by: INTERNAL MEDICINE

## 2019-03-11 PROCEDURE — 89050 BODY FLUID CELL COUNT: CPT

## 2019-03-11 PROCEDURE — 1200000000 HC SEMI PRIVATE

## 2019-03-11 PROCEDURE — 1200000003 HC TELEMETRY R&B

## 2019-03-11 PROCEDURE — 88305 TISSUE EXAM BY PATHOLOGIST: CPT

## 2019-03-11 PROCEDURE — 87075 CULTR BACTERIA EXCEPT BLOOD: CPT

## 2019-03-11 PROCEDURE — 0W993ZZ DRAINAGE OF RIGHT PLEURAL CAVITY, PERCUTANEOUS APPROACH: ICD-10-PCS | Performed by: RADIOLOGY

## 2019-03-11 PROCEDURE — 84157 ASSAY OF PROTEIN OTHER: CPT

## 2019-03-11 PROCEDURE — 32555 ASPIRATE PLEURA W/ IMAGING: CPT

## 2019-03-11 PROCEDURE — 36415 COLL VENOUS BLD VENIPUNCTURE: CPT

## 2019-03-11 PROCEDURE — 6370000000 HC RX 637 (ALT 250 FOR IP): Performed by: INTERNAL MEDICINE

## 2019-03-11 PROCEDURE — 6360000002 HC RX W HCPCS: Performed by: INTERNAL MEDICINE

## 2019-03-11 PROCEDURE — 83986 ASSAY PH BODY FLUID NOS: CPT

## 2019-03-11 PROCEDURE — 87070 CULTURE OTHR SPECIMN AEROBIC: CPT

## 2019-03-11 PROCEDURE — 82042 OTHER SOURCE ALBUMIN QUAN EA: CPT

## 2019-03-11 PROCEDURE — 87205 SMEAR GRAM STAIN: CPT

## 2019-03-11 RX ORDER — SPIRONOLACTONE 25 MG/1
25 TABLET ORAL DAILY
Status: DISCONTINUED | OUTPATIENT
Start: 2019-03-11 | End: 2019-03-15 | Stop reason: HOSPADM

## 2019-03-11 RX ORDER — BUMETANIDE 1 MG/1
2 TABLET ORAL 2 TIMES DAILY
Status: DISCONTINUED | OUTPATIENT
Start: 2019-03-11 | End: 2019-03-15 | Stop reason: HOSPADM

## 2019-03-11 RX ADMIN — POTASSIUM CHLORIDE 10 MEQ: 750 TABLET, EXTENDED RELEASE ORAL at 09:30

## 2019-03-11 RX ADMIN — ONDANSETRON 4 MG: 2 INJECTION INTRAMUSCULAR; INTRAVENOUS at 14:58

## 2019-03-11 RX ADMIN — BUMETANIDE 2 MG: 0.25 INJECTION INTRAMUSCULAR; INTRAVENOUS at 09:30

## 2019-03-11 RX ADMIN — Medication 250 MG: at 09:31

## 2019-03-11 RX ADMIN — CARVEDILOL 12.5 MG: 6.25 TABLET, FILM COATED ORAL at 09:31

## 2019-03-11 RX ADMIN — PANTOPRAZOLE SODIUM 40 MG: 40 TABLET, DELAYED RELEASE ORAL at 09:30

## 2019-03-11 RX ADMIN — PAROXETINE HYDROCHLORIDE 30 MG: 30 TABLET, FILM COATED ORAL at 09:31

## 2019-03-11 RX ADMIN — SUCRALFATE 1 G: 1 TABLET ORAL at 16:54

## 2019-03-11 RX ADMIN — SPIRONOLACTONE 25 MG: 25 TABLET ORAL at 20:23

## 2019-03-11 RX ADMIN — PYRIDOXINE HCL TAB 50 MG 100 MG: 50 TAB at 09:30

## 2019-03-11 RX ADMIN — ROSUVASTATIN CALCIUM 5 MG: 10 TABLET, FILM COATED ORAL at 20:24

## 2019-03-11 RX ADMIN — POTASSIUM BICARBONATE 60 MEQ: 782 TABLET, EFFERVESCENT ORAL at 11:02

## 2019-03-11 RX ADMIN — MULTIPLE VITAMINS W/ MINERALS TAB 1 TABLET: TAB at 20:23

## 2019-03-11 RX ADMIN — CETIRIZINE HYDROCHLORIDE 10 MG: 10 TABLET, FILM COATED ORAL at 09:31

## 2019-03-11 RX ADMIN — Medication 1 CAPSULE: at 09:31

## 2019-03-11 RX ADMIN — SUCRALFATE 1 G: 1 TABLET ORAL at 20:23

## 2019-03-11 RX ADMIN — TRAZODONE HYDROCHLORIDE 50 MG: 50 TABLET ORAL at 20:24

## 2019-03-11 RX ADMIN — BUMETANIDE 2 MG: 1 TABLET ORAL at 20:23

## 2019-03-11 RX ADMIN — Medication 10 ML: at 20:24

## 2019-03-11 RX ADMIN — Medication 10 ML: at 09:31

## 2019-03-11 RX ADMIN — CARVEDILOL 12.5 MG: 6.25 TABLET, FILM COATED ORAL at 16:54

## 2019-03-11 RX ADMIN — SUCRALFATE 1 G: 1 TABLET ORAL at 06:10

## 2019-03-11 RX ADMIN — FLUTICASONE PROPIONATE 1 SPRAY: 50 SPRAY, METERED NASAL at 09:30

## 2019-03-11 RX ADMIN — SUCRALFATE 1 G: 1 TABLET ORAL at 11:03

## 2019-03-11 RX ADMIN — MULTIPLE VITAMINS W/ MINERALS TAB 1 TABLET: TAB at 09:31

## 2019-03-11 RX ADMIN — PANTOPRAZOLE SODIUM 40 MG: 40 TABLET, DELAYED RELEASE ORAL at 20:24

## 2019-03-11 RX ADMIN — METOLAZONE 2.5 MG: 2.5 TABLET ORAL at 09:31

## 2019-03-11 RX ADMIN — APIXABAN 5 MG: 5 TABLET, FILM COATED ORAL at 20:23

## 2019-03-11 ASSESSMENT — PAIN SCALES - GENERAL
PAINLEVEL_OUTOF10: 0

## 2019-03-12 LAB — POTASSIUM SERPL-SCNC: 3.1 MEQ/L (ref 3.5–5.2)

## 2019-03-12 PROCEDURE — 2580000003 HC RX 258: Performed by: INTERNAL MEDICINE

## 2019-03-12 PROCEDURE — 6370000000 HC RX 637 (ALT 250 FOR IP): Performed by: INTERNAL MEDICINE

## 2019-03-12 PROCEDURE — 84132 ASSAY OF SERUM POTASSIUM: CPT

## 2019-03-12 PROCEDURE — 1200000003 HC TELEMETRY R&B

## 2019-03-12 PROCEDURE — 2709999900 HC NON-CHARGEABLE SUPPLY

## 2019-03-12 PROCEDURE — 36415 COLL VENOUS BLD VENIPUNCTURE: CPT

## 2019-03-12 PROCEDURE — 97110 THERAPEUTIC EXERCISES: CPT

## 2019-03-12 PROCEDURE — 1200000000 HC SEMI PRIVATE

## 2019-03-12 PROCEDURE — 97166 OT EVAL MOD COMPLEX 45 MIN: CPT

## 2019-03-12 PROCEDURE — 97535 SELF CARE MNGMENT TRAINING: CPT

## 2019-03-12 PROCEDURE — 97116 GAIT TRAINING THERAPY: CPT

## 2019-03-12 RX ORDER — POTASSIUM CHLORIDE 750 MG/1
40 TABLET, FILM COATED, EXTENDED RELEASE ORAL ONCE
Status: COMPLETED | OUTPATIENT
Start: 2019-03-12 | End: 2019-03-12

## 2019-03-12 RX ADMIN — SUCRALFATE 1 G: 1 TABLET ORAL at 20:01

## 2019-03-12 RX ADMIN — SUCRALFATE 1 G: 1 TABLET ORAL at 06:14

## 2019-03-12 RX ADMIN — ROSUVASTATIN CALCIUM 5 MG: 10 TABLET, FILM COATED ORAL at 20:01

## 2019-03-12 RX ADMIN — APIXABAN 5 MG: 5 TABLET, FILM COATED ORAL at 20:01

## 2019-03-12 RX ADMIN — Medication 10 ML: at 10:38

## 2019-03-12 RX ADMIN — SPIRONOLACTONE 25 MG: 25 TABLET ORAL at 09:30

## 2019-03-12 RX ADMIN — POTASSIUM CHLORIDE 40 MEQ: 750 TABLET, EXTENDED RELEASE ORAL at 10:37

## 2019-03-12 RX ADMIN — Medication 10 ML: at 20:01

## 2019-03-12 RX ADMIN — MULTIPLE VITAMINS W/ MINERALS TAB 1 TABLET: TAB at 20:00

## 2019-03-12 RX ADMIN — POTASSIUM CHLORIDE 10 MEQ: 750 TABLET, EXTENDED RELEASE ORAL at 09:30

## 2019-03-12 RX ADMIN — CARVEDILOL 12.5 MG: 6.25 TABLET, FILM COATED ORAL at 09:30

## 2019-03-12 RX ADMIN — BUMETANIDE 2 MG: 1 TABLET ORAL at 20:01

## 2019-03-12 RX ADMIN — PANTOPRAZOLE SODIUM 40 MG: 40 TABLET, DELAYED RELEASE ORAL at 20:01

## 2019-03-12 RX ADMIN — TRAZODONE HYDROCHLORIDE 50 MG: 50 TABLET ORAL at 21:36

## 2019-03-12 RX ADMIN — PANTOPRAZOLE SODIUM 40 MG: 40 TABLET, DELAYED RELEASE ORAL at 09:30

## 2019-03-12 RX ADMIN — APIXABAN 5 MG: 5 TABLET, FILM COATED ORAL at 09:30

## 2019-03-12 RX ADMIN — Medication 1 CAPSULE: at 09:30

## 2019-03-12 RX ADMIN — ASPIRIN 81 MG 81 MG: 81 TABLET ORAL at 09:30

## 2019-03-12 RX ADMIN — METOLAZONE 2.5 MG: 2.5 TABLET ORAL at 09:30

## 2019-03-12 RX ADMIN — Medication 250 MG: at 09:30

## 2019-03-12 RX ADMIN — PYRIDOXINE HCL TAB 50 MG 100 MG: 50 TAB at 09:30

## 2019-03-12 RX ADMIN — PAROXETINE HYDROCHLORIDE 30 MG: 30 TABLET, FILM COATED ORAL at 09:30

## 2019-03-12 RX ADMIN — MULTIPLE VITAMINS W/ MINERALS TAB 1 TABLET: TAB at 09:30

## 2019-03-12 RX ADMIN — SUCRALFATE 1 G: 1 TABLET ORAL at 16:12

## 2019-03-12 RX ADMIN — CARVEDILOL 12.5 MG: 6.25 TABLET, FILM COATED ORAL at 16:12

## 2019-03-12 RX ADMIN — SUCRALFATE 1 G: 1 TABLET ORAL at 10:38

## 2019-03-12 RX ADMIN — BUMETANIDE 2 MG: 1 TABLET ORAL at 09:30

## 2019-03-12 RX ADMIN — CETIRIZINE HYDROCHLORIDE 10 MG: 10 TABLET, FILM COATED ORAL at 09:30

## 2019-03-12 RX ADMIN — FLUTICASONE PROPIONATE 1 SPRAY: 50 SPRAY, METERED NASAL at 09:30

## 2019-03-12 ASSESSMENT — PAIN SCALES - GENERAL
PAINLEVEL_OUTOF10: 0

## 2019-03-13 ENCOUNTER — APPOINTMENT (OUTPATIENT)
Dept: ULTRASOUND IMAGING | Age: 79
DRG: 291 | End: 2019-03-13
Attending: INTERNAL MEDICINE
Payer: MEDICARE

## 2019-03-13 ENCOUNTER — APPOINTMENT (OUTPATIENT)
Dept: NUCLEAR MEDICINE | Age: 79
DRG: 291 | End: 2019-03-13
Attending: INTERNAL MEDICINE
Payer: MEDICARE

## 2019-03-13 LAB — POTASSIUM SERPL-SCNC: 3 MEQ/L (ref 3.5–5.2)

## 2019-03-13 PROCEDURE — 3430000000 HC RX DIAGNOSTIC RADIOPHARMACEUTICAL: Performed by: INTERNAL MEDICINE

## 2019-03-13 PROCEDURE — 6360000004 HC RX CONTRAST MEDICATION: Performed by: INTERNAL MEDICINE

## 2019-03-13 PROCEDURE — 2709999900 HC NON-CHARGEABLE SUPPLY

## 2019-03-13 PROCEDURE — 2580000003 HC RX 258: Performed by: INTERNAL MEDICINE

## 2019-03-13 PROCEDURE — A9537 TC99M MEBROFENIN: HCPCS | Performed by: INTERNAL MEDICINE

## 2019-03-13 PROCEDURE — 84132 ASSAY OF SERUM POTASSIUM: CPT

## 2019-03-13 PROCEDURE — 1200000003 HC TELEMETRY R&B

## 2019-03-13 PROCEDURE — 76705 ECHO EXAM OF ABDOMEN: CPT

## 2019-03-13 PROCEDURE — 78227 HEPATOBIL SYST IMAGE W/DRUG: CPT

## 2019-03-13 PROCEDURE — 6370000000 HC RX 637 (ALT 250 FOR IP): Performed by: INTERNAL MEDICINE

## 2019-03-13 PROCEDURE — 36415 COLL VENOUS BLD VENIPUNCTURE: CPT

## 2019-03-13 PROCEDURE — 1200000000 HC SEMI PRIVATE

## 2019-03-13 RX ORDER — POTASSIUM CHLORIDE 750 MG/1
40 TABLET, FILM COATED, EXTENDED RELEASE ORAL ONCE
Status: COMPLETED | OUTPATIENT
Start: 2019-03-13 | End: 2019-03-13

## 2019-03-13 RX ADMIN — POTASSIUM CHLORIDE 40 MEQ: 750 TABLET, EXTENDED RELEASE ORAL at 12:28

## 2019-03-13 RX ADMIN — ASPIRIN 81 MG 81 MG: 81 TABLET ORAL at 12:29

## 2019-03-13 RX ADMIN — SODIUM CHLORIDE 1.76 MCG: 9 INJECTION, SOLUTION INTRAVENOUS at 10:20

## 2019-03-13 RX ADMIN — PANTOPRAZOLE SODIUM 40 MG: 40 TABLET, DELAYED RELEASE ORAL at 21:50

## 2019-03-13 RX ADMIN — APIXABAN 5 MG: 5 TABLET, FILM COATED ORAL at 12:29

## 2019-03-13 RX ADMIN — CETIRIZINE HYDROCHLORIDE 10 MG: 10 TABLET, FILM COATED ORAL at 12:29

## 2019-03-13 RX ADMIN — ROSUVASTATIN CALCIUM 5 MG: 10 TABLET, FILM COATED ORAL at 21:50

## 2019-03-13 RX ADMIN — SUCRALFATE 1 G: 1 TABLET ORAL at 21:50

## 2019-03-13 RX ADMIN — BUMETANIDE 2 MG: 1 TABLET ORAL at 12:30

## 2019-03-13 RX ADMIN — Medication 10 ML: at 12:30

## 2019-03-13 RX ADMIN — SUCRALFATE 1 G: 1 TABLET ORAL at 18:55

## 2019-03-13 RX ADMIN — METOLAZONE 2.5 MG: 2.5 TABLET ORAL at 12:29

## 2019-03-13 RX ADMIN — BUMETANIDE 2 MG: 1 TABLET ORAL at 21:50

## 2019-03-13 RX ADMIN — TRAZODONE HYDROCHLORIDE 50 MG: 50 TABLET ORAL at 21:50

## 2019-03-13 RX ADMIN — Medication 10 ML: at 21:49

## 2019-03-13 RX ADMIN — Medication 9.8 MILLICURIE: at 09:00

## 2019-03-13 RX ADMIN — FLUTICASONE PROPIONATE 1 SPRAY: 50 SPRAY, METERED NASAL at 12:30

## 2019-03-13 RX ADMIN — MULTIPLE VITAMINS W/ MINERALS TAB 1 TABLET: TAB at 21:50

## 2019-03-13 RX ADMIN — SPIRONOLACTONE 25 MG: 25 TABLET ORAL at 12:29

## 2019-03-13 RX ADMIN — Medication 1 CAPSULE: at 12:29

## 2019-03-13 RX ADMIN — PAROXETINE HYDROCHLORIDE 30 MG: 30 TABLET, FILM COATED ORAL at 12:29

## 2019-03-13 RX ADMIN — SUCRALFATE 1 G: 1 TABLET ORAL at 04:41

## 2019-03-13 RX ADMIN — APIXABAN 5 MG: 5 TABLET, FILM COATED ORAL at 21:50

## 2019-03-13 RX ADMIN — PANTOPRAZOLE SODIUM 40 MG: 40 TABLET, DELAYED RELEASE ORAL at 12:32

## 2019-03-13 RX ADMIN — POTASSIUM CHLORIDE 10 MEQ: 750 TABLET, EXTENDED RELEASE ORAL at 12:28

## 2019-03-13 ASSESSMENT — PAIN SCALES - GENERAL
PAINLEVEL_OUTOF10: 0
PAINLEVEL_OUTOF10: 0

## 2019-03-14 LAB
ANION GAP SERPL CALCULATED.3IONS-SCNC: 12 MEQ/L (ref 8–16)
BUN BLDV-MCNC: 34 MG/DL (ref 7–22)
CALCIUM SERPL-MCNC: 8.6 MG/DL (ref 8.5–10.5)
CHLORIDE BLD-SCNC: 81 MEQ/L (ref 98–111)
CO2: 44 MEQ/L (ref 23–33)
CREAT SERPL-MCNC: 1.5 MG/DL (ref 0.4–1.2)
ERYTHROCYTE [DISTWIDTH] IN BLOOD BY AUTOMATED COUNT: 16 % (ref 11.5–14.5)
ERYTHROCYTE [DISTWIDTH] IN BLOOD BY AUTOMATED COUNT: 57.7 FL (ref 35–45)
GFR SERPL CREATININE-BSD FRML MDRD: 34 ML/MIN/1.73M2
GLUCOSE BLD-MCNC: 100 MG/DL (ref 70–108)
HCT VFR BLD CALC: 32.3 % (ref 37–47)
HEMOGLOBIN: 10.1 GM/DL (ref 12–16)
MCH RBC QN AUTO: 30.2 PG (ref 26–33)
MCHC RBC AUTO-ENTMCNC: 31.3 GM/DL (ref 32.2–35.5)
MCV RBC AUTO: 96.7 FL (ref 81–99)
PLATELET # BLD: 152 THOU/MM3 (ref 130–400)
PMV BLD AUTO: 10.2 FL (ref 9.4–12.4)
POTASSIUM SERPL-SCNC: 3 MEQ/L (ref 3.5–5.2)
RBC # BLD: 3.34 MILL/MM3 (ref 4.2–5.4)
SODIUM BLD-SCNC: 137 MEQ/L (ref 135–145)
WBC # BLD: 6.3 THOU/MM3 (ref 4.8–10.8)

## 2019-03-14 PROCEDURE — 6370000000 HC RX 637 (ALT 250 FOR IP): Performed by: INTERNAL MEDICINE

## 2019-03-14 PROCEDURE — 94761 N-INVAS EAR/PLS OXIMETRY MLT: CPT

## 2019-03-14 PROCEDURE — 97110 THERAPEUTIC EXERCISES: CPT

## 2019-03-14 PROCEDURE — 36415 COLL VENOUS BLD VENIPUNCTURE: CPT

## 2019-03-14 PROCEDURE — 1200000003 HC TELEMETRY R&B

## 2019-03-14 PROCEDURE — 2709999900 HC NON-CHARGEABLE SUPPLY

## 2019-03-14 PROCEDURE — 2700000000 HC OXYGEN THERAPY PER DAY

## 2019-03-14 PROCEDURE — 85027 COMPLETE CBC AUTOMATED: CPT

## 2019-03-14 PROCEDURE — 2580000003 HC RX 258: Performed by: INTERNAL MEDICINE

## 2019-03-14 PROCEDURE — 97530 THERAPEUTIC ACTIVITIES: CPT

## 2019-03-14 PROCEDURE — 80048 BASIC METABOLIC PNL TOTAL CA: CPT

## 2019-03-14 RX ADMIN — BUMETANIDE 2 MG: 1 TABLET ORAL at 20:18

## 2019-03-14 RX ADMIN — SUCRALFATE 1 G: 1 TABLET ORAL at 18:56

## 2019-03-14 RX ADMIN — SUCRALFATE 1 G: 1 TABLET ORAL at 13:29

## 2019-03-14 RX ADMIN — APIXABAN 5 MG: 5 TABLET, FILM COATED ORAL at 20:18

## 2019-03-14 RX ADMIN — MULTIPLE VITAMINS W/ MINERALS TAB 1 TABLET: TAB at 09:22

## 2019-03-14 RX ADMIN — SUCRALFATE 1 G: 1 TABLET ORAL at 20:17

## 2019-03-14 RX ADMIN — ROSUVASTATIN CALCIUM 5 MG: 10 TABLET, FILM COATED ORAL at 20:18

## 2019-03-14 RX ADMIN — Medication 10 ML: at 20:19

## 2019-03-14 RX ADMIN — CETIRIZINE HYDROCHLORIDE 10 MG: 10 TABLET, FILM COATED ORAL at 09:25

## 2019-03-14 RX ADMIN — APIXABAN 5 MG: 5 TABLET, FILM COATED ORAL at 09:26

## 2019-03-14 RX ADMIN — PYRIDOXINE HCL TAB 50 MG 100 MG: 50 TAB at 09:19

## 2019-03-14 RX ADMIN — TRAZODONE HYDROCHLORIDE 50 MG: 50 TABLET ORAL at 20:17

## 2019-03-14 RX ADMIN — PANTOPRAZOLE SODIUM 40 MG: 40 TABLET, DELAYED RELEASE ORAL at 20:17

## 2019-03-14 RX ADMIN — POTASSIUM BICARBONATE 60 MEQ: 782 TABLET, EFFERVESCENT ORAL at 11:03

## 2019-03-14 RX ADMIN — Medication 10 ML: at 09:28

## 2019-03-14 RX ADMIN — MULTIPLE VITAMINS W/ MINERALS TAB 1 TABLET: TAB at 20:17

## 2019-03-14 RX ADMIN — BUMETANIDE 2 MG: 1 TABLET ORAL at 09:25

## 2019-03-14 RX ADMIN — POTASSIUM CHLORIDE 10 MEQ: 750 TABLET, EXTENDED RELEASE ORAL at 09:19

## 2019-03-14 RX ADMIN — ASPIRIN 81 MG 81 MG: 81 TABLET ORAL at 09:19

## 2019-03-14 RX ADMIN — Medication 250 MG: at 09:22

## 2019-03-14 RX ADMIN — SUCRALFATE 1 G: 1 TABLET ORAL at 05:27

## 2019-03-14 RX ADMIN — FLUTICASONE PROPIONATE 1 SPRAY: 50 SPRAY, METERED NASAL at 09:25

## 2019-03-14 RX ADMIN — PAROXETINE HYDROCHLORIDE 30 MG: 30 TABLET, FILM COATED ORAL at 09:25

## 2019-03-14 RX ADMIN — Medication 1 CAPSULE: at 09:25

## 2019-03-14 RX ADMIN — SPIRONOLACTONE 25 MG: 25 TABLET ORAL at 09:26

## 2019-03-14 RX ADMIN — PANTOPRAZOLE SODIUM 40 MG: 40 TABLET, DELAYED RELEASE ORAL at 09:19

## 2019-03-14 ASSESSMENT — PAIN SCALES - GENERAL
PAINLEVEL_OUTOF10: 0

## 2019-03-15 VITALS
SYSTOLIC BLOOD PRESSURE: 118 MMHG | HEIGHT: 64 IN | HEART RATE: 90 BPM | WEIGHT: 186.8 LBS | BODY MASS INDEX: 31.89 KG/M2 | OXYGEN SATURATION: 95 % | RESPIRATION RATE: 20 BRPM | DIASTOLIC BLOOD PRESSURE: 62 MMHG | TEMPERATURE: 97.8 F

## 2019-03-15 PROCEDURE — 2709999900 HC NON-CHARGEABLE SUPPLY

## 2019-03-15 PROCEDURE — 6370000000 HC RX 637 (ALT 250 FOR IP): Performed by: INTERNAL MEDICINE

## 2019-03-15 PROCEDURE — 97110 THERAPEUTIC EXERCISES: CPT

## 2019-03-15 PROCEDURE — 2580000003 HC RX 258: Performed by: INTERNAL MEDICINE

## 2019-03-15 PROCEDURE — 97535 SELF CARE MNGMENT TRAINING: CPT

## 2019-03-15 RX ORDER — POTASSIUM CHLORIDE 20 MEQ/1
10 TABLET, EXTENDED RELEASE ORAL ONCE
Status: COMPLETED | OUTPATIENT
Start: 2019-03-15 | End: 2019-03-15

## 2019-03-15 RX ORDER — POTASSIUM CHLORIDE 750 MG/1
10 TABLET, EXTENDED RELEASE ORAL 2 TIMES DAILY
Qty: 180 TABLET | Refills: 1 | Status: ON HOLD | OUTPATIENT
Start: 2019-03-15 | End: 2022-02-09

## 2019-03-15 RX ORDER — BUMETANIDE 2 MG/1
2 TABLET ORAL 2 TIMES DAILY
Qty: 30 TABLET | Refills: 3 | Status: ON HOLD | OUTPATIENT
Start: 2019-03-15 | End: 2022-02-09

## 2019-03-15 RX ADMIN — MULTIPLE VITAMINS W/ MINERALS TAB 1 TABLET: TAB at 08:53

## 2019-03-15 RX ADMIN — SPIRONOLACTONE 25 MG: 25 TABLET ORAL at 12:03

## 2019-03-15 RX ADMIN — Medication 250 MG: at 08:52

## 2019-03-15 RX ADMIN — APIXABAN 5 MG: 5 TABLET, FILM COATED ORAL at 08:53

## 2019-03-15 RX ADMIN — Medication 10 ML: at 08:53

## 2019-03-15 RX ADMIN — SUCRALFATE 1 G: 1 TABLET ORAL at 12:04

## 2019-03-15 RX ADMIN — ASPIRIN 81 MG 81 MG: 81 TABLET ORAL at 09:07

## 2019-03-15 RX ADMIN — CARVEDILOL 12.5 MG: 6.25 TABLET, FILM COATED ORAL at 17:10

## 2019-03-15 RX ADMIN — POTASSIUM CHLORIDE 10 MEQ: 20 TABLET, EXTENDED RELEASE ORAL at 17:07

## 2019-03-15 RX ADMIN — SUCRALFATE 1 G: 1 TABLET ORAL at 05:41

## 2019-03-15 RX ADMIN — PANTOPRAZOLE SODIUM 40 MG: 40 TABLET, DELAYED RELEASE ORAL at 08:52

## 2019-03-15 RX ADMIN — CETIRIZINE HYDROCHLORIDE 10 MG: 10 TABLET, FILM COATED ORAL at 08:53

## 2019-03-15 RX ADMIN — Medication 1 CAPSULE: at 08:53

## 2019-03-15 RX ADMIN — SUCRALFATE 1 G: 1 TABLET ORAL at 17:09

## 2019-03-15 RX ADMIN — FLUTICASONE PROPIONATE 1 SPRAY: 50 SPRAY, METERED NASAL at 08:53

## 2019-03-15 RX ADMIN — BUMETANIDE 2 MG: 1 TABLET ORAL at 09:07

## 2019-03-15 RX ADMIN — PAROXETINE HYDROCHLORIDE 30 MG: 30 TABLET, FILM COATED ORAL at 12:03

## 2019-03-15 RX ADMIN — POTASSIUM CHLORIDE 10 MEQ: 750 TABLET, EXTENDED RELEASE ORAL at 08:53

## 2019-03-15 RX ADMIN — PYRIDOXINE HCL TAB 50 MG 100 MG: 50 TAB at 08:53

## 2019-03-15 ASSESSMENT — PAIN SCALES - GENERAL
PAINLEVEL_OUTOF10: 0

## 2019-03-16 LAB
ANAEROBIC CULTURE: NORMAL
BODY FLUID CULTURE, STERILE: NORMAL
GRAM STAIN RESULT: NORMAL

## 2019-04-11 ENCOUNTER — HOSPITAL ENCOUNTER (OUTPATIENT)
Age: 79
Setting detail: SPECIMEN
Discharge: HOME | End: 2019-04-11
Payer: MEDICARE

## 2019-04-11 LAB
ALBUMIN SERPL-MCNC: 3.7 G/DL (ref 3.5–5.1)
ANION GAP SERPL CALCULATED.3IONS-SCNC: 15 MEQ/L (ref 8–16)
BUN BLDV-MCNC: 25 MG/DL (ref 7–22)
CALCIUM SERPL-MCNC: 8.8 MG/DL (ref 8.5–10.5)
CHLORIDE BLD-SCNC: 93 MEQ/L (ref 98–111)
CO2: 29 MEQ/L (ref 23–33)
CREAT SERPL-MCNC: 1.2 MG/DL (ref 0.4–1.2)
GFR SERPL CREATININE-BSD FRML MDRD: 43 ML/MIN/1.73M2
GLUCOSE BLD-MCNC: 82 MG/DL (ref 70–108)
PHOSPHORUS: 4.4 MG/DL (ref 2.4–4.7)
POTASSIUM SERPL-SCNC: 3.7 MEQ/L (ref 3.5–5.2)
SODIUM BLD-SCNC: 137 MEQ/L (ref 135–145)

## 2019-04-11 PROCEDURE — 36415 COLL VENOUS BLD VENIPUNCTURE: CPT

## 2019-04-11 PROCEDURE — 80069 RENAL FUNCTION PANEL: CPT

## 2019-05-14 LAB
BUN / CREAT RATIO: NORMAL
BUN BLDV-MCNC: 36 MG/DL
CREAT SERPL-MCNC: 1.3 MG/DL
POTASSIUM (K+): 3.8

## 2019-07-19 ENCOUNTER — HOSPITAL ENCOUNTER (OUTPATIENT)
Age: 79
Discharge: HOME | End: 2019-07-19
Payer: MEDICARE

## 2019-07-19 LAB
ALBUMIN SERPL-MCNC: 4.1 G/DL (ref 3.5–5.1)
ALP BLD-CCNC: 89 U/L (ref 38–126)
ALT SERPL-CCNC: 7 U/L (ref 11–66)
AMMONIA: 17 UMOL/L (ref 11–60)
AMYLASE: 67 U/L (ref 20–104)
ANION GAP SERPL CALCULATED.3IONS-SCNC: 13 MEQ/L (ref 8–16)
AST SERPL-CCNC: 19 U/L (ref 5–40)
BASOPHILS # BLD: 0.5 %
BASOPHILS ABSOLUTE: 0 THOU/MM3 (ref 0–0.1)
BILIRUB SERPL-MCNC: 0.4 MG/DL (ref 0.3–1.2)
BUN BLDV-MCNC: 40 MG/DL (ref 7–22)
CALCIUM SERPL-MCNC: 9.6 MG/DL (ref 8.5–10.5)
CHLORIDE BLD-SCNC: 94 MEQ/L (ref 98–111)
CO2: 33 MEQ/L (ref 23–33)
CREAT SERPL-MCNC: 1.1 MG/DL (ref 0.4–1.2)
EOSINOPHIL # BLD: 6.9 %
EOSINOPHILS ABSOLUTE: 0.4 THOU/MM3 (ref 0–0.4)
ERYTHROCYTE [DISTWIDTH] IN BLOOD BY AUTOMATED COUNT: 13.8 % (ref 11.5–14.5)
ERYTHROCYTE [DISTWIDTH] IN BLOOD BY AUTOMATED COUNT: 49.8 FL (ref 35–45)
GFR SERPL CREATININE-BSD FRML MDRD: 48 ML/MIN/1.73M2
GLUCOSE BLD-MCNC: 102 MG/DL (ref 70–108)
HCT VFR BLD CALC: 36.5 % (ref 37–47)
HEMOGLOBIN: 11 GM/DL (ref 12–16)
IMMATURE GRANS (ABS): 0.01 THOU/MM3 (ref 0–0.07)
IMMATURE GRANULOCYTES: 0.2 %
INR BLD: 1.17 (ref 0.85–1.13)
LIPASE: 49.7 U/L (ref 5.6–51.3)
LYMPHOCYTES # BLD: 13.5 %
LYMPHOCYTES ABSOLUTE: 0.8 THOU/MM3 (ref 1–4.8)
MCH RBC QN AUTO: 29.8 PG (ref 26–33)
MCHC RBC AUTO-ENTMCNC: 30.1 GM/DL (ref 32.2–35.5)
MCV RBC AUTO: 98.9 FL (ref 81–99)
MONOCYTES # BLD: 6.1 %
MONOCYTES ABSOLUTE: 0.4 THOU/MM3 (ref 0.4–1.3)
NUCLEATED RED BLOOD CELLS: 0 /100 WBC
PLATELET # BLD: 222 THOU/MM3 (ref 130–400)
PMV BLD AUTO: 10 FL (ref 9.4–12.4)
POTASSIUM SERPL-SCNC: 4 MEQ/L (ref 3.5–5.2)
RBC # BLD: 3.69 MILL/MM3 (ref 4.2–5.4)
SEG NEUTROPHILS: 72.8 %
SEGMENTED NEUTROPHILS ABSOLUTE COUNT: 4.2 THOU/MM3 (ref 1.8–7.7)
SODIUM BLD-SCNC: 140 MEQ/L (ref 135–145)
TOTAL PROTEIN: 7.7 G/DL (ref 6.1–8)
WBC # BLD: 5.8 THOU/MM3 (ref 4.8–10.8)

## 2019-07-19 PROCEDURE — 83516 IMMUNOASSAY NONANTIBODY: CPT

## 2019-07-19 PROCEDURE — 36415 COLL VENOUS BLD VENIPUNCTURE: CPT

## 2019-07-19 PROCEDURE — 85610 PROTHROMBIN TIME: CPT

## 2019-07-19 PROCEDURE — 82105 ALPHA-FETOPROTEIN SERUM: CPT

## 2019-07-19 PROCEDURE — 80053 COMPREHEN METABOLIC PANEL: CPT

## 2019-07-19 PROCEDURE — 83690 ASSAY OF LIPASE: CPT

## 2019-07-19 PROCEDURE — 86038 ANTINUCLEAR ANTIBODIES: CPT

## 2019-07-19 PROCEDURE — 82140 ASSAY OF AMMONIA: CPT

## 2019-07-19 PROCEDURE — 86039 ANTINUCLEAR ANTIBODIES (ANA): CPT

## 2019-07-19 PROCEDURE — 82150 ASSAY OF AMYLASE: CPT

## 2019-07-19 PROCEDURE — 85025 COMPLETE CBC W/AUTO DIFF WBC: CPT

## 2019-07-19 PROCEDURE — 86256 FLUORESCENT ANTIBODY TITER: CPT

## 2019-07-19 PROCEDURE — 86301 IMMUNOASSAY TUMOR CA 19-9: CPT

## 2019-07-20 LAB
AFP-TUMOR MARKER: 4.7 UG/L
CA 19-9: 5 U/ML (ref 0–35)

## 2019-07-21 LAB
ANA SCREEN: DETECTED
ENDOMYSIAL IGA ANTIBODY: NORMAL
F-ACTIN AB IGG: 31 UNITS (ref 0–19)
GLIADIN PEPTIDE IGG, IGA: NORMAL
TISSUE TRANSGLUTAMINASE ANTIBODY: 10 U/ML (ref 0–5)

## 2019-07-22 ENCOUNTER — HOSPITAL ENCOUNTER (OUTPATIENT)
Dept: MRI IMAGING | Age: 79
Discharge: HOME | End: 2019-07-22
Payer: MEDICARE

## 2019-07-22 ENCOUNTER — HOSPITAL ENCOUNTER (OUTPATIENT)
Age: 79
Discharge: HOME | End: 2019-07-22
Payer: MEDICARE

## 2019-07-22 DIAGNOSIS — K86.2 PANCREATIC CYST: ICD-10-CM

## 2019-07-22 PROCEDURE — 6360000004 HC RX CONTRAST MEDICATION: Performed by: NURSE PRACTITIONER

## 2019-07-22 PROCEDURE — A9579 GAD-BASE MR CONTRAST NOS,1ML: HCPCS | Performed by: NURSE PRACTITIONER

## 2019-07-22 PROCEDURE — 74183 MRI ABD W/O CNTR FLWD CNTR: CPT

## 2019-07-22 RX ADMIN — GADOTERIDOL 13 ML: 279.3 INJECTION, SOLUTION INTRAVENOUS at 15:36

## 2019-07-23 LAB
ANA PATTERN: ABNORMAL
ANA TITER: ABNORMAL
ANTINUCLEAR AB INTERPRETIVE COMMENT: ABNORMAL
ANTINUCLEAR ANTIBODY, HEP-2, IGG: DETECTED
CYTOPLASMIC PATTERN TITER: ABNORMAL
SMOOTH MUSCLE AB IGG TITER: ABNORMAL

## 2020-10-07 ENCOUNTER — HOSPITAL ENCOUNTER (OUTPATIENT)
Dept: ULTRASOUND IMAGING | Age: 80
Discharge: HOME | End: 2020-10-07
Payer: MEDICARE

## 2020-10-07 LAB
AFP-TUMOR MARKER: 4 UG/L
ALBUMIN SERPL-MCNC: 3.9 G/DL (ref 3.5–5.1)
ALP BLD-CCNC: 83 U/L (ref 38–126)
ALT SERPL-CCNC: 11 U/L (ref 11–66)
AMMONIA: 18 UMOL/L (ref 11–60)
ANION GAP SERPL CALCULATED.3IONS-SCNC: 8 MEQ/L (ref 8–16)
AST SERPL-CCNC: 18 U/L (ref 5–40)
BILIRUB SERPL-MCNC: 0.5 MG/DL (ref 0.3–1.2)
BUN BLDV-MCNC: 31 MG/DL (ref 7–22)
CALCIUM SERPL-MCNC: 9.4 MG/DL (ref 8.5–10.5)
CHLORIDE BLD-SCNC: 104 MEQ/L (ref 98–111)
CO2: 30 MEQ/L (ref 23–33)
CREAT SERPL-MCNC: 0.9 MG/DL (ref 0.4–1.2)
ERYTHROCYTE [DISTWIDTH] IN BLOOD BY AUTOMATED COUNT: 13.7 % (ref 11.5–14.5)
ERYTHROCYTE [DISTWIDTH] IN BLOOD BY AUTOMATED COUNT: 51.2 FL (ref 35–45)
GFR SERPL CREATININE-BSD FRML MDRD: 60 ML/MIN/1.73M2
GLUCOSE BLD-MCNC: 99 MG/DL (ref 70–108)
HCT VFR BLD CALC: 39.9 % (ref 37–47)
HEMOGLOBIN: 12.4 GM/DL (ref 12–16)
INR BLD: 1.23 (ref 0.85–1.13)
MCH RBC QN AUTO: 31.4 PG (ref 26–33)
MCHC RBC AUTO-ENTMCNC: 31.1 GM/DL (ref 32.2–35.5)
MCV RBC AUTO: 101 FL (ref 81–99)
PLATELET # BLD: 140 THOU/MM3 (ref 130–400)
PMV BLD AUTO: 10.4 FL (ref 9.4–12.4)
POTASSIUM SERPL-SCNC: 4 MEQ/L (ref 3.5–5.2)
RBC # BLD: 3.95 MILL/MM3 (ref 4.2–5.4)
SODIUM BLD-SCNC: 142 MEQ/L (ref 135–145)
TOTAL PROTEIN: 7 G/DL (ref 6.1–8)
WBC # BLD: 6.2 THOU/MM3 (ref 4.8–10.8)

## 2020-10-07 PROCEDURE — 83516 IMMUNOASSAY NONANTIBODY: CPT

## 2020-10-07 PROCEDURE — 85027 COMPLETE CBC AUTOMATED: CPT

## 2020-10-07 PROCEDURE — 82140 ASSAY OF AMMONIA: CPT

## 2020-10-07 PROCEDURE — 76705 ECHO EXAM OF ABDOMEN: CPT

## 2020-10-07 PROCEDURE — 86256 FLUORESCENT ANTIBODY TITER: CPT

## 2020-10-07 PROCEDURE — 93975 VASCULAR STUDY: CPT

## 2020-10-07 PROCEDURE — 36415 COLL VENOUS BLD VENIPUNCTURE: CPT

## 2020-10-07 PROCEDURE — 85610 PROTHROMBIN TIME: CPT

## 2020-10-07 PROCEDURE — 82105 ALPHA-FETOPROTEIN SERUM: CPT

## 2020-10-07 PROCEDURE — 80053 COMPREHEN METABOLIC PANEL: CPT

## 2020-10-10 LAB
F-ACTIN AB IGG: 21 UNITS (ref 0–19)
MITOCHONDRIAL ANTIBODY: 9.9 UNITS (ref 0–24.9)

## 2020-10-11 LAB — SMOOTH MUSCLE AB IGG TITER: NORMAL

## 2020-11-17 ENCOUNTER — HOSPITAL ENCOUNTER (OUTPATIENT)
Age: 80
Discharge: HOME | End: 2020-11-17
Payer: MEDICARE

## 2020-11-17 ENCOUNTER — HOSPITAL ENCOUNTER (OUTPATIENT)
Dept: MRI IMAGING | Age: 80
Discharge: HOME | End: 2020-11-17
Payer: MEDICARE

## 2020-11-17 PROCEDURE — 86038 ANTINUCLEAR ANTIBODIES: CPT

## 2020-11-17 PROCEDURE — 36415 COLL VENOUS BLD VENIPUNCTURE: CPT

## 2020-11-17 PROCEDURE — 6360000004 HC RX CONTRAST MEDICATION: Performed by: NURSE PRACTITIONER

## 2020-11-17 PROCEDURE — 74183 MRI ABD W/O CNTR FLWD CNTR: CPT

## 2020-11-17 PROCEDURE — 82784 ASSAY IGA/IGD/IGG/IGM EACH: CPT

## 2020-11-17 PROCEDURE — 86256 FLUORESCENT ANTIBODY TITER: CPT

## 2020-11-17 PROCEDURE — 83516 IMMUNOASSAY NONANTIBODY: CPT

## 2020-11-17 PROCEDURE — A9579 GAD-BASE MR CONTRAST NOS,1ML: HCPCS | Performed by: NURSE PRACTITIONER

## 2020-11-17 RX ADMIN — GADOTERIDOL 20 ML: 279.3 INJECTION, SOLUTION INTRAVENOUS at 08:49

## 2020-11-18 LAB — IGA: 263 MG/DL (ref 70–400)

## 2020-11-19 LAB
GLIADIN PEPTIDE IGA: 2.2 U/ML
TISSUE TRANSGLUTAMINASE IGA: 0.5 U/ML
TTG, IGG: 0.8 U/ML

## 2020-11-20 LAB
F-ACTIN AB IGG: 21 UNITS (ref 0–19)
GLIADIN PEPTIDE IGG: < 0.4 U/ML
MITOCHONDRIAL ANTIBODY: 7.2 UNITS (ref 0–24.9)

## 2020-11-21 LAB
ANA SCREEN: DETECTED
SMOOTH MUSCLE AB IGG TITER: NORMAL

## 2020-11-22 LAB
ANA PATTERN: ABNORMAL
ANA TITER: ABNORMAL
ANTINUCLEAR ANTIBODY, HEP-2, IGG: DETECTED

## 2020-11-24 ENCOUNTER — HOSPITAL ENCOUNTER (OUTPATIENT)
Age: 80
Discharge: HOME | End: 2020-11-24
Payer: MEDICARE

## 2020-11-24 ENCOUNTER — HOSPITAL ENCOUNTER (OUTPATIENT)
Dept: CT IMAGING | Age: 80
Discharge: HOME | End: 2020-11-24
Payer: MEDICARE

## 2020-11-24 LAB
CREATININE, WHOLE BLOOD: 1 MG/DL (ref 0.5–1.2)
ESTIMATED GFR, PCACC: 57 ML/MIN/1.73M2

## 2020-11-24 PROCEDURE — 82565 ASSAY OF CREATININE: CPT

## 2020-11-24 PROCEDURE — 71260 CT THORAX DX C+: CPT

## 2020-11-24 PROCEDURE — 6360000004 HC RX CONTRAST MEDICATION: Performed by: NURSE PRACTITIONER

## 2020-11-24 RX ADMIN — IOPAMIDOL 100 ML: 755 INJECTION, SOLUTION INTRAVENOUS at 10:20

## 2021-02-26 ENCOUNTER — HOSPITAL ENCOUNTER (OUTPATIENT)
Dept: GENERAL RADIOLOGY | Age: 81
Discharge: HOME | End: 2021-02-26
Payer: MEDICARE

## 2021-02-26 ENCOUNTER — HOSPITAL ENCOUNTER (OUTPATIENT)
Age: 81
Discharge: HOME | End: 2021-02-26
Payer: MEDICARE

## 2021-02-26 DIAGNOSIS — J18.8 OTHER PNEUMONIA, UNSPECIFIED ORGANISM: ICD-10-CM

## 2021-02-26 PROCEDURE — 71046 X-RAY EXAM CHEST 2 VIEWS: CPT

## 2021-03-22 ENCOUNTER — HOSPITAL ENCOUNTER (OUTPATIENT)
Age: 81
Discharge: HOME | End: 2021-03-22
Payer: MEDICARE

## 2021-03-22 ENCOUNTER — HOSPITAL ENCOUNTER (OUTPATIENT)
Dept: ULTRASOUND IMAGING | Age: 81
End: 2021-03-22
Payer: MEDICARE

## 2021-03-22 LAB
AMMONIA: 25 UMOL/L (ref 11–60)
ANION GAP SERPL CALCULATED.3IONS-SCNC: 8 MEQ/L (ref 8–16)
BUN BLDV-MCNC: 35 MG/DL (ref 7–22)
CALCIUM SERPL-MCNC: 9.2 MG/DL (ref 8.5–10.5)
CHLORIDE BLD-SCNC: 105 MEQ/L (ref 98–111)
CO2: 29 MEQ/L (ref 23–33)
CREAT SERPL-MCNC: 0.9 MG/DL (ref 0.4–1.2)
GFR SERPL CREATININE-BSD FRML MDRD: 60 ML/MIN/1.73M2
GLUCOSE BLD-MCNC: 95 MG/DL (ref 70–108)
INR BLD: 1.03 (ref 0.85–1.13)
POTASSIUM SERPL-SCNC: 4.5 MEQ/L (ref 3.5–5.2)
SODIUM BLD-SCNC: 142 MEQ/L (ref 135–145)

## 2021-03-22 PROCEDURE — 82140 ASSAY OF AMMONIA: CPT

## 2021-03-22 PROCEDURE — 85610 PROTHROMBIN TIME: CPT

## 2021-03-22 PROCEDURE — 36415 COLL VENOUS BLD VENIPUNCTURE: CPT

## 2021-03-22 PROCEDURE — 80048 BASIC METABOLIC PNL TOTAL CA: CPT

## 2021-03-22 PROCEDURE — 80053 COMPREHEN METABOLIC PANEL: CPT

## 2021-03-22 PROCEDURE — 82105 ALPHA-FETOPROTEIN SERUM: CPT

## 2021-03-23 LAB
ALBUMIN SERPL-MCNC: 3.8 G/DL (ref 3.5–5.1)
ALP BLD-CCNC: 64 U/L (ref 38–126)
ALT SERPL-CCNC: 10 U/L (ref 11–66)
AST SERPL-CCNC: 18 U/L (ref 5–40)
BILIRUB SERPL-MCNC: 0.6 MG/DL (ref 0.3–1.2)
BUN BLDV-MCNC: 35 MG/DL (ref 7–22)
CALCIUM SERPL-MCNC: 9.2 MG/DL (ref 8.5–10.5)
CHLORIDE BLD-SCNC: 105 MEQ/L (ref 98–111)
CO2: 29 MEQ/L (ref 23–33)
CREAT SERPL-MCNC: 0.9 MG/DL (ref 0.4–1.2)
GLUCOSE BLD-MCNC: 95 MG/DL (ref 70–108)
POTASSIUM SERPL-SCNC: 4.5 MEQ/L (ref 3.5–5.2)
SODIUM BLD-SCNC: 142 MEQ/L (ref 135–145)
TOTAL PROTEIN: 7 G/DL (ref 6.1–8)

## 2021-03-24 LAB — AFP-TUMOR MARKER: 3.8 UG/L

## 2021-10-06 ENCOUNTER — HOSPITAL ENCOUNTER (OUTPATIENT)
Age: 81
Discharge: HOME | End: 2021-10-06
Payer: MEDICARE

## 2021-10-06 LAB — INR BLD: 1.06 (ref 0.85–1.13)

## 2021-10-06 PROCEDURE — 85610 PROTHROMBIN TIME: CPT

## 2021-10-06 PROCEDURE — 82140 ASSAY OF AMMONIA: CPT

## 2021-10-06 PROCEDURE — 36415 COLL VENOUS BLD VENIPUNCTURE: CPT

## 2021-10-06 PROCEDURE — 80053 COMPREHEN METABOLIC PANEL: CPT

## 2021-10-06 PROCEDURE — 82105 ALPHA-FETOPROTEIN SERUM: CPT

## 2021-10-07 LAB
AFP-TUMOR MARKER: 4 UG/L
ALBUMIN SERPL-MCNC: 4 G/DL (ref 3.5–5.1)
ALP BLD-CCNC: 70 U/L (ref 38–126)
ALT SERPL-CCNC: 9 U/L (ref 11–66)
AMMONIA: 17 UMOL/L (ref 11–60)
ANION GAP SERPL CALCULATED.3IONS-SCNC: 12 MEQ/L (ref 8–16)
AST SERPL-CCNC: 20 U/L (ref 5–40)
BILIRUB SERPL-MCNC: 0.8 MG/DL (ref 0.3–1.2)
BUN BLDV-MCNC: 26 MG/DL (ref 7–22)
CALCIUM SERPL-MCNC: 9.3 MG/DL (ref 8.5–10.5)
CHLORIDE BLD-SCNC: 102 MEQ/L (ref 98–111)
CO2: 28 MEQ/L (ref 23–33)
CREAT SERPL-MCNC: 0.9 MG/DL (ref 0.4–1.2)
GFR SERPL CREATININE-BSD FRML MDRD: 60 ML/MIN/1.73M2
GLUCOSE BLD-MCNC: 89 MG/DL (ref 70–108)
POTASSIUM SERPL-SCNC: 3.8 MEQ/L (ref 3.5–5.2)
SODIUM BLD-SCNC: 142 MEQ/L (ref 135–145)
TOTAL PROTEIN: 6.4 G/DL (ref 6.1–8)

## 2021-12-04 NOTE — PROGRESS NOTES
[address] CVICU 4B - 4B-07/007-A    Time In: 0740  Time Out: 0805  Timed Code Treatment Minutes: 25 Minutes  Minutes: 25          Date: 2018  Patient Name: Cory Davila,  Gender:  female        MRN: [id number]  : 1940  (69 y.o.)     Referring Practitioner: Dr Marta Sheldon  Diagnosis: Aortic stenosis severe  Additional Pertinent Hx: s/p scheduled Left atrial maze procedure, with ablations of both the anterior and posterior mitral trigones & AV valve replacement on 18. Past Medical History:   Diagnosis Date    ISIDRA (acute kidney injury) ([address] 2016    Atrial fibrillation (HCC)     Bleeding stomach ulcer     History of blood transfusion     Hypertension     PONV (postoperative nausea and vomiting)      Past Surgical History:   Procedure Laterality Date    DILATION AND CURETTAGE OF UTERUS      JOINT REPLACEMENT Right     knee    [place] OFFICE/OUTPT VISIT,PROCEDURE ONLY N/A 2018    BIOPROSTHETIC AORTIC VALVE REPLACEMENT WITH MAZE PROCEDURE performed by Allegra Phelps MD at [facility]       Restrictions/Precautions:  Surgical Protocols, General Precautions, Fall Risk                    Sternal Precautions: No Pushing, No Pulling, 5# Lifting Restrictions  Other position/activity restrictions: s/p AVR, MAZE on 18, arterial line in LUE, external pacemaker, 2 chest tubes, trevino, IV lines       Prior Level of Function:  ADL Assistance: Independent  Homemaking Assistance: Independent  Ambulation Assistance: Independent  Transfer Assistance: Independent  Additional Comments: Pt was fully indep PLOF without AD. Subjective:     Subjective: Pt pleasant and cooeprative. Motivated but drowsy. Pt continues to have MANY lines limiting long range mobility also quite decreased endurance. Second person necessary for line management with ambulating and for sit to stands. Pain:   .       \"sore\" incisional area Patient would benefit from continued therapy after discharge    Patient Education:  Patient Education: CABG book     Equipment Recommendations:  Equipment Needed: No    Safety:  Type of devices:  All fall risk precautions in place, Left in chair, Call light within reach, Chair alarm in place, Nurse notified  Restraints  Initially in place: No    Plan:  Times per week: 6 X  CABG  Times per day: Daily  Specific instructions for Next Treatment: CABG step II ex, mobility, gait, balance,endurance   Current Treatment Recommendations: Strengthening, Balance Training, Functional Mobility Training, Transfer Training, Gait Training, Endurance Training    Goals:  Patient goals : Go to the [facility] term goals  Time Frame for Short term goals: 1 week  Short term goal 1: supine to sit and return with CGA to get in and out of bed   Short term goal 2: sit to stand with CGA to get on and off various surfaces  Short term goal 3: ambulate 80 feet with RW and CGA to walk household distances   Short term goal 4: car transfer with CGA to leave hospital at discharge    Long term goals  Time Frame for Long term goals : NA due to short ELOS            AM-PAC Inpatient Mobility without Stair Climbing Raw Score : 14  AM-PAC Inpatient without Stair Climbing T-Scale Score : 40.85  Mobility Inpatient CMS 0-100% Score: 53.33  Mobility Inpatient without Stair CMS G-Code Modifier : CK 97.9

## 2022-01-18 ENCOUNTER — HOSPITAL ENCOUNTER (OUTPATIENT)
Age: 82
Discharge: HOME | End: 2022-01-18
Payer: MEDICARE

## 2022-01-18 ENCOUNTER — HOSPITAL ENCOUNTER (OUTPATIENT)
Dept: GENERAL RADIOLOGY | Age: 82
Discharge: HOME | End: 2022-01-18
Payer: MEDICARE

## 2022-01-18 DIAGNOSIS — R06.02 SOB (SHORTNESS OF BREATH): ICD-10-CM

## 2022-01-18 PROCEDURE — 71046 X-RAY EXAM CHEST 2 VIEWS: CPT

## 2022-01-24 ENCOUNTER — HOSPITAL ENCOUNTER (OUTPATIENT)
Dept: CT IMAGING | Age: 82
Discharge: HOME | End: 2022-01-24
Payer: MEDICARE

## 2022-01-24 ENCOUNTER — HOSPITAL ENCOUNTER (OUTPATIENT)
Age: 82
Discharge: HOME | End: 2022-01-24
Payer: MEDICARE

## 2022-01-24 ENCOUNTER — HOSPITAL ENCOUNTER (OUTPATIENT)
Dept: ULTRASOUND IMAGING | Age: 82
Discharge: HOME | End: 2022-01-24
Payer: MEDICARE

## 2022-01-24 DIAGNOSIS — R06.02 SHORTNESS OF BREATH: ICD-10-CM

## 2022-01-24 DIAGNOSIS — R19.00 ABDOMINAL SWELLING: ICD-10-CM

## 2022-01-24 DIAGNOSIS — I27.20 PORTOPULMONARY HYPERTENSION (HCC): ICD-10-CM

## 2022-01-24 DIAGNOSIS — K76.6 PORTOPULMONARY HYPERTENSION (HCC): ICD-10-CM

## 2022-01-24 LAB
CREATININE, WHOLE BLOOD: 1.4 MG/DL (ref 0.5–1.2)
ESTIMATED GFR, PCACC: 38 ML/MIN/1.73M2

## 2022-01-24 PROCEDURE — 76705 ECHO EXAM OF ABDOMEN: CPT

## 2022-01-24 PROCEDURE — 6360000004 HC RX CONTRAST MEDICATION: Performed by: INTERNAL MEDICINE

## 2022-01-24 PROCEDURE — 71270 CT THORAX DX C-/C+: CPT

## 2022-01-24 PROCEDURE — 82565 ASSAY OF CREATININE: CPT

## 2022-01-24 RX ADMIN — IOPAMIDOL 100 ML: 755 INJECTION, SOLUTION INTRAVENOUS at 12:00

## 2022-02-08 RX ORDER — DIPHENHYDRAMINE HCL 25 MG
50 TABLET ORAL ONCE
Status: CANCELLED | OUTPATIENT
Start: 2022-02-08 | End: 2022-02-08

## 2022-02-09 ENCOUNTER — HOSPITAL ENCOUNTER (OUTPATIENT)
Dept: INPATIENT UNIT | Age: 82
Discharge: HOME | End: 2022-02-09
Attending: INTERNAL MEDICINE | Admitting: INTERNAL MEDICINE
Payer: MEDICARE

## 2022-02-09 VITALS
SYSTOLIC BLOOD PRESSURE: 90 MMHG | HEIGHT: 65 IN | WEIGHT: 166 LBS | OXYGEN SATURATION: 91 % | DIASTOLIC BLOOD PRESSURE: 43 MMHG | TEMPERATURE: 97.7 F | HEART RATE: 100 BPM | BODY MASS INDEX: 27.66 KG/M2 | RESPIRATION RATE: 24 BRPM

## 2022-02-09 LAB
ABO: NORMAL
ALBUMIN SERPL-MCNC: 3.8 G/DL (ref 3.5–5.1)
ALP BLD-CCNC: 79 U/L (ref 38–126)
ALT SERPL-CCNC: 8 U/L (ref 11–66)
ANION GAP SERPL CALCULATED.3IONS-SCNC: 16 MEQ/L (ref 8–16)
ANTIBODY SCREEN: NORMAL
AST SERPL-CCNC: 21 U/L (ref 5–40)
BILIRUB SERPL-MCNC: 1.1 MG/DL (ref 0.3–1.2)
BUN BLDV-MCNC: 34 MG/DL (ref 7–22)
CALCIUM SERPL-MCNC: 8.8 MG/DL (ref 8.5–10.5)
CHLORIDE BLD-SCNC: 93 MEQ/L (ref 98–111)
CHOLESTEROL, TOTAL: 97 MG/DL (ref 100–199)
CO2: 28 MEQ/L (ref 23–33)
COLLECTED BY:: ABNORMAL
COLLECTED BY:: NORMAL
CREAT SERPL-MCNC: 1.2 MG/DL (ref 0.4–1.2)
EKG Q-T INTERVAL: 362 MS
EKG QRS DURATION: 92 MS
EKG QTC CALCULATION (BAZETT): 474 MS
EKG R AXIS: 55 DEGREES
EKG T AXIS: 82 DEGREES
EKG VENTRICULAR RATE: 103 BPM
ERYTHROCYTE [DISTWIDTH] IN BLOOD BY AUTOMATED COUNT: 17.9 % (ref 11.5–14.5)
ERYTHROCYTE [DISTWIDTH] IN BLOOD BY AUTOMATED COUNT: 62 FL (ref 35–45)
GFR SERPL CREATININE-BSD FRML MDRD: 43 ML/MIN/1.73M2
GLUCOSE BLD-MCNC: 89 MG/DL (ref 70–108)
HCT VFR BLD CALC: 37.7 % (ref 37–47)
HDLC SERPL-MCNC: 53 MG/DL
HEMOGLOBIN: 11.6 GM/DL (ref 12–16)
INR BLD: 1.47 (ref 0.85–1.13)
LDL CHOLESTEROL CALCULATED: 32 MG/DL
MAGNESIUM: 1.4 MG/DL (ref 1.6–2.4)
MCH RBC QN AUTO: 29.5 PG (ref 26–33)
MCHC RBC AUTO-ENTMCNC: 30.8 GM/DL (ref 32.2–35.5)
MCV RBC AUTO: 95.9 FL (ref 81–99)
PLATELET # BLD: 175 THOU/MM3 (ref 130–400)
PMV BLD AUTO: 10.5 FL (ref 9.4–12.4)
POC O2 SATURATION: 52 % (ref 94–97)
POC O2 SATURATION: 53 % (ref 94–97)
POC O2 SATURATION: 54 % (ref 94–97)
POC O2 SATURATION: 55 % (ref 94–97)
POC O2 SATURATION: 94 % (ref 94–97)
POTASSIUM REFLEX MAGNESIUM: 3.1 MEQ/L (ref 3.5–5.2)
RBC # BLD: 3.93 MILL/MM3 (ref 4.2–5.4)
RH FACTOR: NORMAL
SODIUM BLD-SCNC: 137 MEQ/L (ref 135–145)
SOURCE, BLOOD GAS: ABNORMAL
SOURCE, BLOOD GAS: NORMAL
TOTAL PROTEIN: 6.8 G/DL (ref 6.1–8)
TRIGL SERPL-MCNC: 62 MG/DL (ref 0–199)
WBC # BLD: 5.5 THOU/MM3 (ref 4.8–10.8)

## 2022-02-09 PROCEDURE — 36415 COLL VENOUS BLD VENIPUNCTURE: CPT

## 2022-02-09 PROCEDURE — 86900 BLOOD TYPING SEROLOGIC ABO: CPT

## 2022-02-09 PROCEDURE — 93005 ELECTROCARDIOGRAM TRACING: CPT | Performed by: INTERNAL MEDICINE

## 2022-02-09 PROCEDURE — 83735 ASSAY OF MAGNESIUM: CPT

## 2022-02-09 PROCEDURE — 82810 BLOOD GASES O2 SAT ONLY: CPT

## 2022-02-09 PROCEDURE — C1887 CATHETER, GUIDING: HCPCS

## 2022-02-09 PROCEDURE — 93460 R&L HRT ART/VENTRICLE ANGIO: CPT

## 2022-02-09 PROCEDURE — 2500000003 HC RX 250 WO HCPCS

## 2022-02-09 PROCEDURE — 6370000000 HC RX 637 (ALT 250 FOR IP): Performed by: INTERNAL MEDICINE

## 2022-02-09 PROCEDURE — 93567 NJX CAR CTH SPRVLV AORTGRPHY: CPT

## 2022-02-09 PROCEDURE — 2580000003 HC RX 258: Performed by: INTERNAL MEDICINE

## 2022-02-09 PROCEDURE — C1769 GUIDE WIRE: HCPCS

## 2022-02-09 PROCEDURE — 86901 BLOOD TYPING SEROLOGIC RH(D): CPT

## 2022-02-09 PROCEDURE — 80053 COMPREHEN METABOLIC PANEL: CPT

## 2022-02-09 PROCEDURE — 86850 RBC ANTIBODY SCREEN: CPT

## 2022-02-09 PROCEDURE — C1894 INTRO/SHEATH, NON-LASER: HCPCS

## 2022-02-09 PROCEDURE — 85610 PROTHROMBIN TIME: CPT

## 2022-02-09 PROCEDURE — 6360000004 HC RX CONTRAST MEDICATION: Performed by: INTERNAL MEDICINE

## 2022-02-09 PROCEDURE — 85027 COMPLETE CBC AUTOMATED: CPT

## 2022-02-09 PROCEDURE — 99152 MOD SED SAME PHYS/QHP 5/>YRS: CPT

## 2022-02-09 PROCEDURE — 80061 LIPID PANEL: CPT

## 2022-02-09 PROCEDURE — 6360000002 HC RX W HCPCS

## 2022-02-09 PROCEDURE — 93010 ELECTROCARDIOGRAM REPORT: CPT | Performed by: NUCLEAR MEDICINE

## 2022-02-09 RX ORDER — SODIUM CHLORIDE 9 MG/ML
INJECTION, SOLUTION INTRAVENOUS CONTINUOUS
Status: DISCONTINUED | OUTPATIENT
Start: 2022-02-09 | End: 2022-02-09 | Stop reason: HOSPADM

## 2022-02-09 RX ORDER — METOLAZONE 5 MG/1
5 TABLET ORAL DAILY
Status: ON HOLD | COMMUNITY
End: 2022-04-05 | Stop reason: HOSPADM

## 2022-02-09 RX ORDER — OMEPRAZOLE 20 MG/1
20 CAPSULE, DELAYED RELEASE ORAL 2 TIMES DAILY
COMMUNITY

## 2022-02-09 RX ORDER — SPIRONOLACTONE 50 MG/1
50 TABLET, FILM COATED ORAL DAILY
Status: ON HOLD | COMMUNITY
End: 2022-04-05 | Stop reason: HOSPADM

## 2022-02-09 RX ORDER — ONDANSETRON 2 MG/ML
4 INJECTION INTRAMUSCULAR; INTRAVENOUS EVERY 6 HOURS PRN
Status: DISCONTINUED | OUTPATIENT
Start: 2022-02-09 | End: 2022-02-09 | Stop reason: HOSPADM

## 2022-02-09 RX ORDER — POTASSIUM CHLORIDE 20 MEQ/1
40 TABLET, EXTENDED RELEASE ORAL ONCE
Status: COMPLETED | OUTPATIENT
Start: 2022-02-09 | End: 2022-02-09

## 2022-02-09 RX ORDER — SODIUM CHLORIDE 0.9 % (FLUSH) 0.9 %
5-40 SYRINGE (ML) INJECTION PRN
Status: CANCELLED | OUTPATIENT
Start: 2022-02-09

## 2022-02-09 RX ORDER — ASCORBIC ACID 500 MG
500 TABLET ORAL DAILY
COMMUNITY

## 2022-02-09 RX ORDER — LOSARTAN POTASSIUM 25 MG/1
25 TABLET ORAL DAILY
Status: ON HOLD | COMMUNITY
End: 2022-04-05 | Stop reason: HOSPADM

## 2022-02-09 RX ORDER — FUROSEMIDE 40 MG/1
40 TABLET ORAL DAILY
Status: ON HOLD | COMMUNITY
End: 2022-03-25 | Stop reason: DRUGHIGH

## 2022-02-09 RX ORDER — ASPIRIN 325 MG
325 TABLET ORAL ONCE
Status: COMPLETED | OUTPATIENT
Start: 2022-02-09 | End: 2022-02-09

## 2022-02-09 RX ORDER — ACETAMINOPHEN 325 MG/1
650 TABLET ORAL EVERY 4 HOURS PRN
Status: DISCONTINUED | OUTPATIENT
Start: 2022-02-09 | End: 2022-02-09 | Stop reason: HOSPADM

## 2022-02-09 RX ORDER — SODIUM CHLORIDE 9 MG/ML
25 INJECTION, SOLUTION INTRAVENOUS PRN
Status: CANCELLED | OUTPATIENT
Start: 2022-02-09

## 2022-02-09 RX ORDER — SODIUM CHLORIDE 9 MG/ML
100 INJECTION, SOLUTION INTRAVENOUS CONTINUOUS
Status: DISCONTINUED | OUTPATIENT
Start: 2022-02-09 | End: 2022-02-09 | Stop reason: HOSPADM

## 2022-02-09 RX ORDER — ATROPINE SULFATE 0.4 MG/ML
0.5 AMPUL (ML) INJECTION
Status: DISCONTINUED | OUTPATIENT
Start: 2022-02-09 | End: 2022-02-09 | Stop reason: HOSPADM

## 2022-02-09 RX ORDER — SODIUM CHLORIDE 0.9 % (FLUSH) 0.9 %
5-40 SYRINGE (ML) INJECTION EVERY 12 HOURS SCHEDULED
Status: CANCELLED | OUTPATIENT
Start: 2022-02-09

## 2022-02-09 RX ADMIN — POTASSIUM CHLORIDE 40 MEQ: 20 TABLET, EXTENDED RELEASE ORAL at 07:15

## 2022-02-09 RX ADMIN — ASPIRIN 325 MG: 325 TABLET ORAL at 06:23

## 2022-02-09 RX ADMIN — IOPAMIDOL 125 ML: 755 INJECTION, SOLUTION INTRAVENOUS at 08:25

## 2022-02-09 RX ADMIN — SODIUM CHLORIDE: 9 INJECTION, SOLUTION INTRAVENOUS at 05:59

## 2022-02-09 NOTE — PROCEDURES
[address] [place] [address]                            CARDIAC CATHETERIZATION    PATIENT NAME: Arnaud Hernandez                    :        1940  MED REC NO:   [id number]                           ROOM:       0005  ACCOUNT NO:   [de-identified]                           ADMIT DATE: 2022  PROVIDER:     CIARA Newman Gasmen:  2022    INDICATION FOR PROCEDURE:  This is an 52-year-old lady with history of  valvular heart disease, history of aortic valve replacement, had been  complaining of progressive shortness of breath and chest pain. She had  an echocardiogram that showed pulmonary hypertension, tricuspid regurg. She had a stress test, was abnormal.  She was treated medically,  continued to be symptomatic; referred for a heart cath, possible  intervention. The patient understands the procedure, the benefits, the  risks, the alternative methods of treatment, possible complications, and  agrees to have it done. DESCRIPTION OF PROCEDURE:  1.  IV conscious sedation:  The patient was given IV conscious sedation  in incremental dosage by the circulating cath lab RN, monitored by the  cath lab monitor tech under my supervision. Procedure started at 08:25  and finished at 08:40. No acute complication from the procedure. Estimated blood loss about 15 mL. 2.  Right heart cath:  Right brachial vein was cannulated with a  5-Monegasque sheath. A Emmanuel catheter was utilized. The hemodynamics of  the right side of the heart showed no step-up in the oxygen saturation  from different chambers of the right side of the heart. Hemodynamics of the right side of the heart showed the SVC pressure was  29/45. The RA pressure was 41/44 with a large V-wave consistent with  severe tricuspid regurg. RV pressure is 79/9 and the PA pressure was  77/30 with a mean of 45 and the wedge pressure was 31.   These findings  are consistent with moderately severe pulmonary hypertension with the  evidence of significant tricuspid regurgitation. 3.  Left heart cath:  Right radial artery cannulated with a 6-Persian  radial sheath. The coronary angiogram showed the RCA is a codominant artery and it was  patent. The left main was patent bifurcates to the LAD and circumflex. Circumflex is a dominant artery. It was patent. The LAD and its major  branches were patent. The left ventricular end-diastolic pressure was 25 and the gradient  across the aortic valve is about _____ mmHg. The left ventriculogram  showed LVH, ejection fraction about 55% to 60%, mild mitral  regurgitation. An aortic root injection showed at least 2+ AI, central  jet with mild dilatation in the ascending aorta. No aneurysmal  formation. SUMMARY:  1.  Preserved systolic function. Ejection fraction about 60%. Hypertrophic cardiomyopathy. 2.  Gradient across the aortic valve is _____ mmHg with the valve area  1.16.  3.  Coronary angiogram showed the RCA is a codominant artery, patent. 4.  Patent short left main. 5.  Dominant circumflex, patent. 6.  Patent LAD and its major branches. 7.  2+ AI. 8.  Moderately severe pulmonary hypertension with severe tricuspid  regurg and no step-up in oxygen saturation from different chambers of  the right side of the heart. The patient tolerated the procedure well. RECOMMENDATIONS:  We will continue with medical treatment. We will try  to have the patient seen in the Pulmonary Hypertension Clinic in  [facility]. See if there is anything that can be done, check her  room air O2. The patient has no acute complications from the procedure.         Wally Guthrie M.D.    D: 02/09/2022 8:52:59       T: 02/09/2022 9:30:18     [email or web address]  Job#: [id number]     Doc#: [id number]    CC:

## 2022-02-09 NOTE — H&P
[address] [address]   Sedation/Analgesia History and Physical    Pt Name: Rachel Craig  MRN: [id number]  YOB: 1940  Provider Performing Procedure: Sebastián Boswell MD, MD  Primary Care Physician: Lala Fabry , APRN - CNP      Pre-Procedure: Chest pain, possible coronary artery disease, abnormal stress test    Consent: I have discussed with the patient and/or the patient representative the indication, alternatives, and the possible risks and/or complications of the planned procedure and the anesthesia methods. The patient and/or representative appear to understand and agree to proceed. Medical History:   has a past medical history of ISIDRA (acute kidney injury) ([facility] [address] Arthritis, Atrial fibrillation ([facility] [address] Bleeding stomach ulcer, CAD (coronary artery disease), Clostridium difficile infection, History of blood transfusion, Hyperlipidemia, Hypertension, and PONV (postoperative nausea and vomiting). .    Surgical History:     has a past surgical history that includes joint replacement (Right, 2011); Dilation and curettage of uterus; pr office/outpt visit,procedure only (N/A, 6/28/2018); Aortic valve replacement; Cardiac surgery; Tonsillectomy; and Upper gastrointestinal endoscopy (N/A, 2/28/2019). Allergies: Allergies as of 02/09/2022    (No Known Allergies)       Medications:   Coumadin use last 5 days:  No  Antiplatelet drug therapy use last 5 days:  Yes  Other anticoagulant use last 5 days:  No   hydrocortisone sodium succinate PF  200 mg IntraVENous Once     Prior to Admission medications    Medication Sig Start Date End Date Taking?  Authorizing Provider   losartan (COZAAR) 25 MG tablet Take 25 mg by mouth daily At noon   Yes Historical Provider, MD   furosemide (LASIX) 20 MG tablet Take 20 mg by mouth daily   Yes Historical Provider, MD   metOLazone (ZAROXOLYN) 2.5 MG tablet Take 2.5 mg by mouth daily   Yes Historical Provider, MD   vitamin C (ASCORBIC ACID) 500 MG tablet Take 500 mg by mouth daily   Yes Historical Provider, MD   spironolactone (ALDACTONE) 50 MG tablet Take 50 mg by mouth daily @@ noon   Yes Historical Provider, MD   omeprazole (PRILOSEC) 20 MG delayed release capsule Take 20 mg by mouth Daily    Yes Historical Provider, MD   Multiple Vitamins-Minerals (CENTRUM SILVER ADULT 50+ PO) Take 1 tablet by mouth daily   Yes Historical Provider, MD   Calcium Carb-Cholecalciferol (CALTRATE 600+D3 PO) Take 1 tablet by mouth 2 times daily   Yes Historical Provider, MD   sucralfate (CARAFATE) 1 GM tablet Take 1 tablet by mouth 4 times daily (before meals and nightly) 8/26/18  Yes Bartolo Stevenson MD   carvedilol (COREG) 6.25 MG tablet Take 1 tablet by mouth 2 times daily (with meals) 8/26/18  Yes Bartolo Stevenson MD   Misc Natural Products (OSTEO BI-FLEX TRIPLE STRENGTH PO) Take 750 mg by mouth daily   Yes Historical Provider, MD   Multiple Vitamins-Minerals (PRESERVISION AREDS) CAPS Take by mouth 2 times daily   Yes Historical Provider, MD   Pyridoxine HCl (VITAMIN B-6) 100 MG tablet Take 100 mg by mouth daily   Yes Historical Provider, MD   loratadine (CLARITIN) 10 MG tablet Take 10 mg by mouth daily    Yes Historical Provider, MD   PARoxetine (PAXIL) 30 MG tablet Take 30 mg by mouth every morning   Yes Historical Provider, MD   traZODone (DESYREL) 50 MG tablet Take 50 mg by mouth nightly   Yes Historical Provider, MD   apixaban (ELIQUIS) 5 MG TABS tablet Take 0.5 tablets by mouth 2 times daily 3/15/19   Nigel De Anda MD   fluticasone (FLONASE) 50 MCG/ACT nasal spray 1-2 sprays by Nasal route daily    Historical Provider, MD   acetaminophen 650 MG TABS Take 650 mg by mouth every 4 hours as needed 6/13/16   Oleg Hawley MD       Vital Signs  Vitals:    02/09/22 0600   BP: 120/63   Pulse: 95   Resp: 21   Temp: 98.3 °F (36.8 °C)   SpO2: 94%       Physical:  Heart:  regular rate and rhythm  Lungs:  Clear  Abdomen:  Soft  Mental Status:  Alert and Oriented    Planned Procedure:  Left Heart Cath and Possible Percutaneous Coronary Intervention    Sedation/ Anesthesia Plan: Midazolam and Sublimaze    ASA Classification: Class 3 - A patient with severe systemic disease that limits activity but is not incapacitating    Mallampati Airway Classification: II (soft palate, uvula, fauces visible)    · Pre-procedure diagnostic studies complete and results available. · Previous sedation/anesthesia experiences assessed. · The patient is an appropriate candidate to undergo the planned procedure sedation and anesthesia. (Refer to nursing sedation/analgesia documentation record)  · Formulation and discussion of sedation/procedure plan, risks, and expectations with patient and/or responsible adult completed. · Patient examined immediately prior to the procedure.  (Refer to nursing sedation/analgesia documentation record)    Quin Payton MD, MD  Electronically signed 2/9/2022 at 7:06 AM  Patient Name: Arelis Aaron Record Number: [id number]  Date: 2/9/2022   Time: 7:06 AM   Room/Bed: 2E-05/005-A

## 2022-02-09 NOTE — PROGRESS NOTES
Pt admitted to  2E05 per w/c for cardiac cath. Pt NPO. Patient accompanied by son. Vital signs obtained. Assessment and data collection initiated. Oriented to room. Policies and procedures for 2E explained   All questions answered with no further questions at this time. Fall prevention and safety precautions discussed with patient.

## 2022-02-09 NOTE — OP NOTE
Operative Note      Patient: [facility]  Sedation/Analgesia Post Sedation Record      Pt Name: Renita Karimi  MRN: [id number]  YOB: 1940  Procedure Performed By: Juliane Lomeli MD, MD  Primary Care Physician: ARTHUR Parrish - CNP    POST-PROCEDURE    Physician: Juliane Lomeli MD, MD    Procedure Performed:  Right Heart Catheterization, aortic  Root  injection and Left Heart Cath    Sedation/Anesthesia:  Local Anesthesia and IV Conscious Sedation with continuous O2 monitoring    Estimated Blood Loss:  Minimal    Specimens Removed:  None    Complications:  None     Post Procedure Diagnosis/Findings:  Coronary Artery Disease    Recommendations:  Medical treatment and review films.        Juliane Lomeli MD, MD  Electronically signed 2/9/2022 at 8:32 AM

## 2022-02-09 NOTE — PLAN OF CARE
Problem: Discharge Planning:  Goal: Participates in care planning  Description: Participates in care planning  Outcome: Ongoing   Care plan reviewed with patient and son. Patient and son verbalize understanding of the plan of care and contribute to goal setting.

## 2022-02-09 NOTE — PLAN OF CARE
Problem: Discharge Planning:  Goal: Participates in care planning  Description: Participates in care planning  2/9/2022 1353 by Gerardo Jung, RN  Outcome: Met This Shift  2/9/2022 0754 by Gerardo Jung RN  Outcome: Ongoing   Pt discharged home    Problem: Tissue Perfusion - Peripheral, Altered:  Goal: Absence of hematoma at arterial access site  Description: Absence of hematoma at arterial access site  Outcome: Met This Shift  Right radial artery site stable.   Right brachial vein site stable

## 2022-03-07 ENCOUNTER — HOSPITAL ENCOUNTER (OUTPATIENT)
Age: 82
Discharge: HOME | End: 2022-03-07
Payer: MEDICARE

## 2022-03-07 LAB
ALBUMIN SERPL-MCNC: 3.5 G/DL (ref 3.5–5.1)
ALP BLD-CCNC: 77 U/L (ref 38–126)
ALT SERPL-CCNC: 7 U/L (ref 11–66)
AMMONIA: 35 UMOL/L (ref 11–60)
ANION GAP SERPL CALCULATED.3IONS-SCNC: 14 MEQ/L (ref 8–16)
AST SERPL-CCNC: 23 U/L (ref 5–40)
BILIRUB SERPL-MCNC: 1 MG/DL (ref 0.3–1.2)
BILIRUBIN DIRECT: 0.5 MG/DL (ref 0–0.3)
BUN BLDV-MCNC: 51 MG/DL (ref 7–22)
CALCIUM SERPL-MCNC: 9 MG/DL (ref 8.5–10.5)
CHLORIDE BLD-SCNC: 102 MEQ/L (ref 98–111)
CO2: 23 MEQ/L (ref 23–33)
CREAT SERPL-MCNC: 1.5 MG/DL (ref 0.4–1.2)
GFR SERPL CREATININE-BSD FRML MDRD: 33 ML/MIN/1.73M2
GLUCOSE BLD-MCNC: 88 MG/DL (ref 70–108)
INR BLD: 1.43 (ref 0.85–1.13)
POTASSIUM SERPL-SCNC: 4.3 MEQ/L (ref 3.5–5.2)
SODIUM BLD-SCNC: 139 MEQ/L (ref 135–145)
TOTAL PROTEIN: 6.6 G/DL (ref 6.1–8)

## 2022-03-07 PROCEDURE — 82140 ASSAY OF AMMONIA: CPT

## 2022-03-07 PROCEDURE — 36415 COLL VENOUS BLD VENIPUNCTURE: CPT

## 2022-03-07 PROCEDURE — 82248 BILIRUBIN DIRECT: CPT

## 2022-03-07 PROCEDURE — 80053 COMPREHEN METABOLIC PANEL: CPT

## 2022-03-07 PROCEDURE — 85610 PROTHROMBIN TIME: CPT

## 2022-03-07 PROCEDURE — 85027 COMPLETE CBC AUTOMATED: CPT

## 2022-03-08 LAB
ERYTHROCYTE [DISTWIDTH] IN BLOOD BY AUTOMATED COUNT: 18.9 % (ref 11.5–14.5)
ERYTHROCYTE [DISTWIDTH] IN BLOOD BY AUTOMATED COUNT: 66.7 FL (ref 35–45)
HCT VFR BLD CALC: 38.4 % (ref 37–47)
HEMOGLOBIN: 11.4 GM/DL (ref 12–16)
MCH RBC QN AUTO: 28.8 PG (ref 26–33)
MCHC RBC AUTO-ENTMCNC: 29.7 GM/DL (ref 32.2–35.5)
MCV RBC AUTO: 97 FL (ref 81–99)
PLATELET # BLD: 128 THOU/MM3 (ref 130–400)
PMV BLD AUTO: 11 FL (ref 9.4–12.4)
RBC # BLD: 3.96 MILL/MM3 (ref 4.2–5.4)
SCAN OF BLOOD SMEAR: NORMAL
WBC # BLD: 4.8 THOU/MM3 (ref 4.8–10.8)

## 2022-03-11 ENCOUNTER — APPOINTMENT (OUTPATIENT)
Dept: ULTRASOUND IMAGING | Age: 82
DRG: 432 | End: 2022-03-11
Payer: MEDICARE

## 2022-03-11 ENCOUNTER — APPOINTMENT (OUTPATIENT)
Dept: GENERAL RADIOLOGY | Age: 82
DRG: 432 | End: 2022-03-11
Payer: MEDICARE

## 2022-03-11 ENCOUNTER — HOSPITAL ENCOUNTER (INPATIENT)
Age: 82
LOS: 5 days | Discharge: HOME | DRG: 432 | End: 2022-03-16
Attending: INTERNAL MEDICINE | Admitting: NURSE PRACTITIONER
Payer: MEDICARE

## 2022-03-11 ENCOUNTER — APPOINTMENT (OUTPATIENT)
Dept: INTERVENTIONAL RADIOLOGY/VASCULAR | Age: 82
DRG: 432 | End: 2022-03-11
Payer: MEDICARE

## 2022-03-11 DIAGNOSIS — I50.43 CHF (CONGESTIVE HEART FAILURE), NYHA CLASS I, ACUTE ON CHRONIC, COMBINED (HCC): ICD-10-CM

## 2022-03-11 DIAGNOSIS — J90 PLEURAL EFFUSION: ICD-10-CM

## 2022-03-11 DIAGNOSIS — M79.89 RIGHT LEG SWELLING: ICD-10-CM

## 2022-03-11 DIAGNOSIS — R18.8 OTHER ASCITES: ICD-10-CM

## 2022-03-11 DIAGNOSIS — R06.02 SHORTNESS OF BREATH: Primary | ICD-10-CM

## 2022-03-11 LAB
ALBUMIN FLUID: 1.6 GM/DL
ALBUMIN SERPL-MCNC: 3.2 G/DL (ref 3.5–5.1)
ALP BLD-CCNC: 86 U/L (ref 38–126)
ALT SERPL-CCNC: 8 U/L (ref 11–66)
AMYLASE FLUID: 15 U/L
ANION GAP SERPL CALCULATED.3IONS-SCNC: 15 MEQ/L (ref 8–16)
ANISOCYTOSIS: PRESENT
AST SERPL-CCNC: 23 U/L (ref 5–40)
BACTERIA: ABNORMAL
BASOPHILS # BLD: 0.7 %
BASOPHILS ABSOLUTE: 0 THOU/MM3 (ref 0–0.1)
BILIRUB SERPL-MCNC: 1 MG/DL (ref 0.3–1.2)
BILIRUBIN DIRECT: 0.5 MG/DL (ref 0–0.3)
BILIRUBIN URINE: NEGATIVE
BLOOD, URINE: NEGATIVE
BUN BLDV-MCNC: 50 MG/DL (ref 7–22)
CALCIUM SERPL-MCNC: 8.6 MG/DL (ref 8.5–10.5)
CASTS: ABNORMAL /LPF
CASTS: ABNORMAL /LPF
CHARACTER, URINE: CLEAR
CHLORIDE BLD-SCNC: 100 MEQ/L (ref 98–111)
CO2: 19 MEQ/L (ref 23–33)
COLOR: YELLOW
CREAT SERPL-MCNC: 1.4 MG/DL (ref 0.4–1.2)
CRYSTALS: ABNORMAL
EKG ATRIAL RATE: 87 BPM
EKG Q-T INTERVAL: 346 MS
EKG QRS DURATION: 88 MS
EKG QTC CALCULATION (BAZETT): 454 MS
EKG R AXIS: -167 DEGREES
EKG T AXIS: 82 DEGREES
EKG VENTRICULAR RATE: 104 BPM
ELLIPTOCYTES: ABNORMAL
EOSINOPHIL # BLD: 0.5 %
EOSINOPHILS ABSOLUTE: 0 THOU/MM3 (ref 0–0.4)
EPITHELIAL CELLS, UA: ABNORMAL /HPF
ERYTHROCYTE [DISTWIDTH] IN BLOOD BY AUTOMATED COUNT: 18.8 % (ref 11.5–14.5)
ERYTHROCYTE [DISTWIDTH] IN BLOOD BY AUTOMATED COUNT: 66.3 FL (ref 35–45)
GFR SERPL CREATININE-BSD FRML MDRD: 36 ML/MIN/1.73M2
GLUCOSE BLD-MCNC: 81 MG/DL (ref 70–108)
GLUCOSE, FLUID: 91 MG/DL
GLUCOSE, URINE: NEGATIVE MG/DL
HCT VFR BLD CALC: 36.9 % (ref 37–47)
HEMOGLOBIN: 11.4 GM/DL (ref 12–16)
IMMATURE GRANS (ABS): 0.01 THOU/MM3 (ref 0–0.07)
IMMATURE GRANULOCYTES: 0.2 %
INR BLD: 1.27 (ref 0.85–1.13)
KETONES, URINE: NEGATIVE
LACTIC ACID: 1.9 MMOL/L (ref 0.5–2)
LD, FLUID: 140 U/L
LEUKOCYTE ESTERASE, URINE: NEGATIVE
LIPASE: 19.3 U/L (ref 5.6–51.3)
LYMPHOCYTES # BLD: 16.8 %
LYMPHOCYTES ABSOLUTE: 0.9 THOU/MM3 (ref 1–4.8)
MAGNESIUM: 1.5 MG/DL (ref 1.6–2.4)
MCH RBC QN AUTO: 29.6 PG (ref 26–33)
MCHC RBC AUTO-ENTMCNC: 30.9 GM/DL (ref 32.2–35.5)
MCV RBC AUTO: 95.8 FL (ref 81–99)
MISCELLANEOUS LAB TEST RESULT: ABNORMAL
MONOCYTES # BLD: 7.2 %
MONOCYTES ABSOLUTE: 0.4 THOU/MM3 (ref 0.4–1.3)
NITRITE, URINE: NEGATIVE
NUCLEATED RED BLOOD CELLS: 0 /100 WBC
OSMOLALITY CALCULATION: 280.6 MOSMOL/KG (ref 275–300)
PH UA: 5 (ref 5–9)
PLATELET # BLD: 141 THOU/MM3 (ref 130–400)
PLATELET ESTIMATE: ADEQUATE
PMV BLD AUTO: 10.1 FL (ref 9.4–12.4)
POTASSIUM SERPL-SCNC: 3.6 MEQ/L (ref 3.5–5.2)
PRO-BNP: 3772 PG/ML (ref 0–1800)
PROTEIN FLUID: 3 GM/DL
PROTEIN UA: 30 MG/DL
RBC # BLD: 3.85 MILL/MM3 (ref 4.2–5.4)
RBC URINE: ABNORMAL /HPF
REASON FOR REJECTION: NORMAL
REASON FOR REJECTION: NORMAL
REJECTED TEST: NORMAL
REJECTED TEST: NORMAL
RENAL EPITHELIAL, UA: ABNORMAL
SCAN OF BLOOD SMEAR: NORMAL
SEG NEUTROPHILS: 74.6 %
SEGMENTED NEUTROPHILS ABSOLUTE COUNT: 4.1 THOU/MM3 (ref 1.8–7.7)
SODIUM BLD-SCNC: 134 MEQ/L (ref 135–145)
SPECIFIC GRAVITY UA: 1.02 (ref 1–1.03)
TOTAL PROTEIN: 6.6 G/DL (ref 6.1–8)
TROPONIN T: 0.01 NG/ML
TROPONIN T: 0.01 NG/ML
TSH SERPL DL<=0.05 MIU/L-ACNC: 7.24 UIU/ML (ref 0.4–4.2)
UROBILINOGEN, URINE: 1 EU/DL (ref 0–1)
WBC # BLD: 5.5 THOU/MM3 (ref 4.8–10.8)
WBC UA: ABNORMAL /HPF
YEAST: ABNORMAL

## 2022-03-11 PROCEDURE — 83735 ASSAY OF MAGNESIUM: CPT

## 2022-03-11 PROCEDURE — 6360000002 HC RX W HCPCS

## 2022-03-11 PROCEDURE — 36415 COLL VENOUS BLD VENIPUNCTURE: CPT

## 2022-03-11 PROCEDURE — 84484 ASSAY OF TROPONIN QUANT: CPT

## 2022-03-11 PROCEDURE — 83615 LACTATE (LD) (LDH) ENZYME: CPT

## 2022-03-11 PROCEDURE — 89050 BODY FLUID CELL COUNT: CPT

## 2022-03-11 PROCEDURE — 80053 COMPREHEN METABOLIC PANEL: CPT

## 2022-03-11 PROCEDURE — 82248 BILIRUBIN DIRECT: CPT

## 2022-03-11 PROCEDURE — 76705 ECHO EXAM OF ABDOMEN: CPT

## 2022-03-11 PROCEDURE — 81001 URINALYSIS AUTO W/SCOPE: CPT

## 2022-03-11 PROCEDURE — 93005 ELECTROCARDIOGRAM TRACING: CPT

## 2022-03-11 PROCEDURE — 87070 CULTURE OTHR SPECIMN AEROBIC: CPT

## 2022-03-11 PROCEDURE — 85025 COMPLETE CBC W/AUTO DIFF WBC: CPT

## 2022-03-11 PROCEDURE — 99223 1ST HOSP IP/OBS HIGH 75: CPT

## 2022-03-11 PROCEDURE — 6370000000 HC RX 637 (ALT 250 FOR IP)

## 2022-03-11 PROCEDURE — 82945 GLUCOSE OTHER FLUID: CPT

## 2022-03-11 PROCEDURE — 6360000002 HC RX W HCPCS: Performed by: NURSE PRACTITIONER

## 2022-03-11 PROCEDURE — 49083 ABD PARACENTESIS W/IMAGING: CPT

## 2022-03-11 PROCEDURE — 84439 ASSAY OF FREE THYROXINE: CPT

## 2022-03-11 PROCEDURE — 88112 CYTOPATH CELL ENHANCE TECH: CPT

## 2022-03-11 PROCEDURE — 87075 CULTR BACTERIA EXCEPT BLOOD: CPT

## 2022-03-11 PROCEDURE — 83690 ASSAY OF LIPASE: CPT

## 2022-03-11 PROCEDURE — 83605 ASSAY OF LACTIC ACID: CPT

## 2022-03-11 PROCEDURE — 99283 EMERGENCY DEPT VISIT LOW MDM: CPT

## 2022-03-11 PROCEDURE — 87205 SMEAR GRAM STAIN: CPT

## 2022-03-11 PROCEDURE — 82042 OTHER SOURCE ALBUMIN QUAN EA: CPT

## 2022-03-11 PROCEDURE — P9047 ALBUMIN (HUMAN), 25%, 50ML: HCPCS | Performed by: NURSE PRACTITIONER

## 2022-03-11 PROCEDURE — 93010 ELECTROCARDIOGRAM REPORT: CPT | Performed by: NUCLEAR MEDICINE

## 2022-03-11 PROCEDURE — 84443 ASSAY THYROID STIM HORMONE: CPT

## 2022-03-11 PROCEDURE — 93971 EXTREMITY STUDY: CPT

## 2022-03-11 PROCEDURE — 88305 TISSUE EXAM BY PATHOLOGIST: CPT

## 2022-03-11 PROCEDURE — 1200000003 HC TELEMETRY R&B

## 2022-03-11 PROCEDURE — 85610 PROTHROMBIN TIME: CPT

## 2022-03-11 PROCEDURE — 2580000003 HC RX 258

## 2022-03-11 PROCEDURE — 71046 X-RAY EXAM CHEST 2 VIEWS: CPT

## 2022-03-11 PROCEDURE — 83880 ASSAY OF NATRIURETIC PEPTIDE: CPT

## 2022-03-11 PROCEDURE — 0W9G3ZZ DRAINAGE OF PERITONEAL CAVITY, PERCUTANEOUS APPROACH: ICD-10-PCS | Performed by: RADIOLOGY

## 2022-03-11 PROCEDURE — 84157 ASSAY OF PROTEIN OTHER: CPT

## 2022-03-11 PROCEDURE — 1200000000 HC SEMI PRIVATE

## 2022-03-11 PROCEDURE — 93005 ELECTROCARDIOGRAM TRACING: CPT | Performed by: INTERNAL MEDICINE

## 2022-03-11 PROCEDURE — 82150 ASSAY OF AMYLASE: CPT

## 2022-03-11 RX ORDER — ACETAMINOPHEN 325 MG/1
650 TABLET ORAL EVERY 6 HOURS PRN
Status: DISCONTINUED | OUTPATIENT
Start: 2022-03-11 | End: 2022-03-16 | Stop reason: HOSPADM

## 2022-03-11 RX ORDER — LOSARTAN POTASSIUM 25 MG/1
25 TABLET ORAL DAILY
Status: DISCONTINUED | OUTPATIENT
Start: 2022-03-11 | End: 2022-03-15

## 2022-03-11 RX ORDER — PAROXETINE 30 MG/1
30 TABLET, FILM COATED ORAL EVERY MORNING
Status: DISCONTINUED | OUTPATIENT
Start: 2022-03-12 | End: 2022-03-16 | Stop reason: HOSPADM

## 2022-03-11 RX ORDER — POLYETHYLENE GLYCOL 3350 17 G/17G
17 POWDER, FOR SOLUTION ORAL DAILY PRN
Status: DISCONTINUED | OUTPATIENT
Start: 2022-03-11 | End: 2022-03-16 | Stop reason: HOSPADM

## 2022-03-11 RX ORDER — ASPIRIN 325 MG
325 TABLET ORAL DAILY
Status: ON HOLD | COMMUNITY
End: 2022-04-27 | Stop reason: HOSPADM

## 2022-03-11 RX ORDER — CARVEDILOL 6.25 MG/1
6.25 TABLET ORAL 2 TIMES DAILY WITH MEALS
Status: DISCONTINUED | OUTPATIENT
Start: 2022-03-11 | End: 2022-03-16 | Stop reason: HOSPADM

## 2022-03-11 RX ORDER — SPIRONOLACTONE 25 MG/1
50 TABLET ORAL DAILY
Status: DISCONTINUED | OUTPATIENT
Start: 2022-03-11 | End: 2022-03-16 | Stop reason: HOSPADM

## 2022-03-11 RX ORDER — CEPHALEXIN 250 MG/1
250 CAPSULE ORAL EVERY 6 HOURS SCHEDULED
Status: DISCONTINUED | OUTPATIENT
Start: 2022-03-12 | End: 2022-03-11

## 2022-03-11 RX ORDER — ACETAMINOPHEN 650 MG/1
650 SUPPOSITORY RECTAL EVERY 6 HOURS PRN
Status: DISCONTINUED | OUTPATIENT
Start: 2022-03-11 | End: 2022-03-16 | Stop reason: HOSPADM

## 2022-03-11 RX ORDER — SODIUM CHLORIDE 9 MG/ML
25 INJECTION, SOLUTION INTRAVENOUS PRN
Status: DISCONTINUED | OUTPATIENT
Start: 2022-03-11 | End: 2022-03-16 | Stop reason: HOSPADM

## 2022-03-11 RX ORDER — ASCORBIC ACID 500 MG
500 TABLET ORAL DAILY
Status: DISCONTINUED | OUTPATIENT
Start: 2022-03-11 | End: 2022-03-16 | Stop reason: HOSPADM

## 2022-03-11 RX ORDER — ASPIRIN 325 MG
325 TABLET ORAL DAILY
Status: DISCONTINUED | OUTPATIENT
Start: 2022-03-11 | End: 2022-03-16 | Stop reason: HOSPADM

## 2022-03-11 RX ORDER — SODIUM CHLORIDE 0.9 % (FLUSH) 0.9 %
10 SYRINGE (ML) INJECTION EVERY 12 HOURS SCHEDULED
Status: DISCONTINUED | OUTPATIENT
Start: 2022-03-11 | End: 2022-03-16 | Stop reason: HOSPADM

## 2022-03-11 RX ORDER — ONDANSETRON 2 MG/ML
4 INJECTION INTRAMUSCULAR; INTRAVENOUS EVERY 6 HOURS PRN
Status: DISCONTINUED | OUTPATIENT
Start: 2022-03-11 | End: 2022-03-16 | Stop reason: HOSPADM

## 2022-03-11 RX ORDER — SUCRALFATE 1 G/1
1 TABLET ORAL
Status: DISCONTINUED | OUTPATIENT
Start: 2022-03-11 | End: 2022-03-16 | Stop reason: HOSPADM

## 2022-03-11 RX ORDER — CETIRIZINE HYDROCHLORIDE 5 MG/1
5 TABLET ORAL DAILY
Status: DISCONTINUED | OUTPATIENT
Start: 2022-03-11 | End: 2022-03-16 | Stop reason: HOSPADM

## 2022-03-11 RX ORDER — LANOLIN ALCOHOL/MO/W.PET/CERES
100 CREAM (GRAM) TOPICAL DAILY
Status: DISCONTINUED | OUTPATIENT
Start: 2022-03-11 | End: 2022-03-16 | Stop reason: HOSPADM

## 2022-03-11 RX ORDER — MAGNESIUM SULFATE IN WATER 40 MG/ML
2000 INJECTION, SOLUTION INTRAVENOUS ONCE
Status: COMPLETED | OUTPATIENT
Start: 2022-03-11 | End: 2022-03-12

## 2022-03-11 RX ORDER — FUROSEMIDE 20 MG/1
20 TABLET ORAL DAILY
Status: DISCONTINUED | OUTPATIENT
Start: 2022-03-11 | End: 2022-03-14

## 2022-03-11 RX ORDER — SODIUM CHLORIDE 0.9 % (FLUSH) 0.9 %
10 SYRINGE (ML) INJECTION PRN
Status: DISCONTINUED | OUTPATIENT
Start: 2022-03-11 | End: 2022-03-16 | Stop reason: HOSPADM

## 2022-03-11 RX ORDER — MULTIVITAMIN WITH IRON
1 TABLET ORAL DAILY
Status: DISCONTINUED | OUTPATIENT
Start: 2022-03-11 | End: 2022-03-16 | Stop reason: HOSPADM

## 2022-03-11 RX ORDER — ALBUMIN (HUMAN) 12.5 G/50ML
25 SOLUTION INTRAVENOUS
Status: COMPLETED | OUTPATIENT
Start: 2022-03-11 | End: 2022-03-11

## 2022-03-11 RX ORDER — PANTOPRAZOLE SODIUM 40 MG/1
40 TABLET, DELAYED RELEASE ORAL
Status: DISCONTINUED | OUTPATIENT
Start: 2022-03-12 | End: 2022-03-16 | Stop reason: HOSPADM

## 2022-03-11 RX ORDER — ONDANSETRON 4 MG/1
4 TABLET, ORALLY DISINTEGRATING ORAL EVERY 8 HOURS PRN
Status: DISCONTINUED | OUTPATIENT
Start: 2022-03-11 | End: 2022-03-16 | Stop reason: HOSPADM

## 2022-03-11 RX ORDER — TRAZODONE HYDROCHLORIDE 50 MG/1
50 TABLET ORAL NIGHTLY
Status: DISCONTINUED | OUTPATIENT
Start: 2022-03-11 | End: 2022-03-16 | Stop reason: HOSPADM

## 2022-03-11 RX ORDER — METOLAZONE 2.5 MG/1
2.5 TABLET ORAL DAILY
Status: DISCONTINUED | OUTPATIENT
Start: 2022-03-11 | End: 2022-03-14

## 2022-03-11 RX ADMIN — ALBUMIN (HUMAN) 25 G: 0.25 INJECTION, SOLUTION INTRAVENOUS at 18:22

## 2022-03-11 RX ADMIN — ASPIRIN 325 MG: 325 TABLET ORAL at 22:31

## 2022-03-11 RX ADMIN — MAGNESIUM SULFATE HEPTAHYDRATE 2000 MG: 40 INJECTION, SOLUTION INTRAVENOUS at 22:38

## 2022-03-11 RX ADMIN — SODIUM CHLORIDE, PRESERVATIVE FREE 10 ML: 5 INJECTION INTRAVENOUS at 20:20

## 2022-03-11 RX ADMIN — ALBUMIN (HUMAN) 25 G: 0.25 INJECTION, SOLUTION INTRAVENOUS at 19:26

## 2022-03-11 ASSESSMENT — ENCOUNTER SYMPTOMS
BACK PAIN: 0
VOMITING: 0
SINUS PRESSURE: 0
DIARRHEA: 0
CHEST TIGHTNESS: 0
COLOR CHANGE: 0
SHORTNESS OF BREATH: 1
BLOOD IN STOOL: 0
RHINORRHEA: 0
DIARRHEA: 1
COLOR CHANGE: 1
COUGH: 0
WHEEZING: 0
ABDOMINAL PAIN: 0
NAUSEA: 1
SORE THROAT: 0
ABDOMINAL DISTENTION: 1
CONSTIPATION: 0

## 2022-03-11 NOTE — PROGRESS NOTES
Medication changes to home medication list sent to [facility] for consideration for changes.

## 2022-03-11 NOTE — ED PROVIDER NOTES
and vomiting. Genitourinary: Negative for difficulty urinating, dysuria, flank pain and frequency. Musculoskeletal: Negative for arthralgias and myalgias. Skin: Negative for color change and rash. Allergic/Immunologic: Negative for immunocompromised state. Neurological: Negative for dizziness, weakness, light-headedness, numbness and headaches. Hematological: Does not bruise/bleed easily. Psychiatric/Behavioral: Negative for agitation, behavioral problems and confusion. PAST MEDICAL HISTORY     Past Medical History:   Diagnosis Date    ISIDRA (acute kidney injury) ([facility] [address] 6/11/2016    Arthritis     Atrial fibrillation ([facility] [address]     Bleeding stomach ulcer 2011    CAD (coronary artery disease)     Clostridium difficile infection     History of blood transfusion     Hyperlipidemia     Hypertension     PONV (postoperative nausea and vomiting)        SURGICALHISTORY      has a past surgical history that includes joint replacement (Right, 2011); Dilation and curettage of uterus; pr office/outpt visit,procedure only (N/A, 6/28/2018); Aortic valve replacement; Cardiac surgery; Tonsillectomy; and Upper gastrointestinal endoscopy (N/A, 2/28/2019).     CURRENT MEDICATIONS       Previous Medications    ACETAMINOPHEN 650 MG TABS    Take 650 mg by mouth every 4 hours as needed    APIXABAN (ELIQUIS) 5 MG TABS TABLET    Take 0.5 tablets by mouth 2 times daily    CALCIUM CARB-CHOLECALCIFEROL (CALTRATE 600+D3 PO)    Take 1 tablet by mouth 2 times daily    CARVEDILOL (COREG) 6.25 MG TABLET    Take 1 tablet by mouth 2 times daily (with meals)    FLUTICASONE (FLONASE) 50 MCG/ACT NASAL SPRAY    1-2 sprays by Nasal route daily    FUROSEMIDE (LASIX) 20 MG TABLET    Take 20 mg by mouth daily    LORATADINE (CLARITIN) 10 MG TABLET    Take 10 mg by mouth daily     LOSARTAN (COZAAR) 25 MG TABLET    Take 25 mg by mouth daily At noon    METOLAZONE (ZAROXOLYN) 2.5 MG TABLET    Take 2.5 mg by mouth daily    MISC NATURAL Never Smoker    Smokeless tobacco: Never Used   Vaping Use    Vaping Use: Never used   Substance and Sexual Activity    Alcohol use: No    Drug use: No    Sexual activity: Not on file   Other Topics Concern    Not on file   Social History Narrative    Not on file     Social Determinants of Health     Financial Resource Strain:     Difficulty of Paying Living Expenses: Not on file   Food Insecurity:     Worried About Running Out of Food in the Last Year: Not on file    Krystle of Food in the Last Year: Not on file   Transportation Needs:     Lack of Transportation (Medical): Not on file    Lack of Transportation (Non-Medical): Not on file   Physical Activity:     Days of Exercise per Week: Not on file    Minutes of Exercise per Session: Not on file   Stress:     Feeling of Stress : Not on file   Social Connections:     Frequency of Communication with Friends and Family: Not on file    Frequency of Social Gatherings with Friends and Family: Not on file    Attends [employer] Services: Not on file    Active Member of [address] [place] [address] [place] or Organizations: Not on file    Attends Club or Organization Meetings: Not on file    Marital Status: Not on file   Intimate Partner Violence:     Fear of Current or Ex-Partner: Not on file    Emotionally Abused: Not on file    Physically Abused: Not on file    Sexually Abused: Not on file   Housing Stability:     Unable to Pay for Housing in the Last Year: Not on file    Number of [place] in the Last Year: Not on file    Unstable Housing in the Last Year: Not on file       PHYSICAL EXAM     INITIAL VITALS:  height is 5' 5\" (1.651 m) and weight is 166 lb (75.3 kg). Her oral temperature is 98 °F (36.7 °C). Her blood pressure is 103/62 and her pulse is 97. Her respiration is 20 and oxygen saturation is 95%. Physical Exam  Vitals and nursing note reviewed. Constitutional:       Appearance: Normal appearance. She is well-developed and normal weight.    HENT:      Head: Normocephalic. Mouth/Throat:      Pharynx: Uvula midline. Eyes:      Conjunctiva/sclera: Conjunctivae normal.      Pupils: Pupils are equal, round, and reactive to light. Cardiovascular:      Rate and Rhythm: Normal rate and regular rhythm. Heart sounds: S1 normal and S2 normal. Murmur heard. Pulmonary:      Effort: Pulmonary effort is normal. No respiratory distress. Breath sounds: Examination of the right-lower field reveals decreased breath sounds. Examination of the left-lower field reveals decreased breath sounds. Decreased breath sounds present. Chest:      Chest wall: No tenderness. Abdominal:      General: Bowel sounds are normal. There is no distension. Palpations: Abdomen is soft. There is fluid wave. Tenderness: There is no abdominal tenderness. Musculoskeletal:         General: Normal range of motion. Cervical back: Normal range of motion and neck supple. Right lower leg: 3+ Pitting Edema present. Left lower leg: 3+ Pitting Edema present. Lymphadenopathy:      Cervical: No cervical adenopathy. Skin:     General: Skin is warm and dry. Capillary Refill: Capillary refill takes less than 2 seconds. Neurological:      General: No focal deficit present. Mental Status: She is alert and oriented to person, place, and time. Psychiatric:         Mood and Affect: Mood normal.         Speech: Speech normal.         Behavior: Behavior normal.         Thought Content: Thought content normal.         DIFFERENTIAL DIAGNOSIS:   CHF exacerbation, right-sided heart failure, portal hypertension, cirrhosis, electrolyte abnormality, pleural effusion  DIAGNOSTIC RESULTS       RADIOLOGY: non-plainfilm images(s) such as CT, Ultrasound and MRI are read by the radiologist.  Plain radiographic images are visualized and preliminarily interpreted by the emergency physician unless otherwise stated below.   US GUIDED PARACENTESIS   Final Result   Successful ultrasound-guided paracentesis. **This report has been created using voice recognition software. It may contain minor errors which are inherent in voice recognition technology. **      Final report electronically signed by Dr. Ramirez Jernigan on 3/11/2022 4:09 PM      XR CHEST (2 VW)   Final Result   1. Small bilateral pleural effusions with associated compressive atelectasis. 2. Moderate cardiomegaly. **This report has been created using voice recognition software. It may contain minor errors which are inherent in voice recognition technology. **      Final report electronically signed by Dr Rm Luis on 3/11/2022 2:49 PM            LABS:   Labs Reviewed   CBC WITH AUTO DIFFERENTIAL - Abnormal; Notable for the following components:       Result Value    RBC 3.85 (*)     Hemoglobin 11.4 (*)     Hematocrit 36.9 (*)     MCHC 30.9 (*)     RDW-CV 18.8 (*)     RDW-SD 66.3 (*)     Lymphocytes Absolute 0.9 (*)     All other components within normal limits   BASIC METABOLIC PANEL - Abnormal; Notable for the following components:    Sodium 134 (*)     CO2 19 (*)     BUN 50 (*)     CREATININE 1.4 (*)     All other components within normal limits   HEPATIC FUNCTION PANEL - Abnormal; Notable for the following components:    Albumin 3.2 (*)     Bilirubin, Direct 0.5 (*)     ALT 8 (*)     All other components within normal limits   TSH - Abnormal; Notable for the following components:    TSH 7.240 (*)     All other components within normal limits   TROPONIN - Abnormal; Notable for the following components:    Troponin T 0.014 (*)     All other components within normal limits   BRAIN NATRIURETIC PEPTIDE - Abnormal; Notable for the following components:    Pro-BNP 3772.0 (*)     All other components within normal limits   PROTIME-INR - Abnormal; Notable for the following components:    INR 1.27 (*)     All other components within normal limits   GLOMERULAR FILTRATION RATE, ESTIMATED - Abnormal; Notable for the following components:    Est, Glom Filt Rate 36 (*)     All other components within normal limits   CULTURE, BODY FLUID   LIPASE   CELL COUNT WITH DIFFERENTIAL, BODY FLUID   SPECIMEN REJECTION   ANION GAP   OSMOLALITY   SCAN OF BLOOD SMEAR   URINALYSIS WITH MICROSCOPIC   CYTOLOGY, NON-GYN   AMYLASE, BODY FLUID   ALBUMIN, BODY FLUID   PROTEIN, BODY FLUID   LACTATE DEHYDROGENASE, BODY FLUID   GLUCOSE, BODY FLUID       EMERGENCY DEPARTMENT COURSE:   Vitals:    Vitals:    03/11/22 1342 03/11/22 1549 03/11/22 1550 03/11/22 1655   BP: 127/66 131/80 131/80 103/62   Pulse: 105 91 96 97   Resp: 15 18 15 20   Temp: 98 °F (36.7 °C)      TempSrc: Oral      SpO2: 98% 98% 97% 95%   Weight:       Height:           MDM    Patient was seen and evaluated in the emergency department, patient appeared to be in no acute distress, vital signs reviewed, no significant findings noted. Physical exam was completed, significant distention of abdomen, active bowel sounds noted, fluid wave noted on exam.  Significant lower extremity edema noted as well. Labs and imaging were ordered, bilateral small pleural effusions noted. Lab work shows a slightly elevated troponin. Discussed the case with on-call interventional radiologist Dr. Alexia Hdz who agrees to do perform paracentesis. Discussed the case with Elissa Zapata PA-C who accepts the patient for admission for further treatment. Patient had 3800 mL of fluid removed from paracentesis, 50 g of albumin were ordered. While here in the emergency department patient maintained stable course and appropriate for admission. Medications   albumin human 25 % IV solution 25 g (has no administration in time range)     Case was discussed with Dr. Qi Randhawa who agrees with my plan of care. CRITICAL CARE:   None    CONSULTS:  Hospitalist  Interventional radiologist    PROCEDURES:  None    FINAL IMPRESSION     1. Shortness of breath    2. Other ascites    3.  Pleural effusion DISPOSITION/PLAN   Patient admitted to hospitalist service  PATIENT REFERREDTO:  No follow-up provider specified. DISCHARGE MEDICATIONS:  New Prescriptions    No medications on file       (Please note that portions of this note were completed with a voice recognition program.  Efforts were made to edit the dictations but occasionally words are mis-transcribed.)    Provider:  I personally performed the services described in the documentation,reviewed and edited the documentation which was dictated to the scribe in my presence, and it accurately records my words and actions.     Matthieu Cline CNP 03/11/22 4:57 PM    Juanita Cline, APRN - CNP        [employer], APRN - CNP  03/11/22 7143

## 2022-03-11 NOTE — ED NOTES
Pt back from ultrasound at this time. Pt in stable condition. Pt states she feels like she can breathe better. Denies any pain.       Jeremiah Salmon RN  03/11/22 6698

## 2022-03-11 NOTE — ED NOTES
Pt to ED from [facility] c/o SOB and fluid build up in trunk and LE. Pt was at her PCP this morning for a chest hernia appt. Dr. Doretta Runner how much fluid she was retaining and told her to come to ER. Pt was dx with cirrhosis a couple years ago. EKG complete. VSS.  Will monitor      Carlitos Guerrero RN  03/11/22 6812

## 2022-03-11 NOTE — H&P
Hospitalist - History & Physical      Patient: Ramiro Ball    Unit/Bed:19/019A  YOB: 1940  MRN: [id number]   Acct: [de-identified]   PCP: ARTHUR Tsai CNP    Date of Service: Pt seen/examined on 03/11/22  and Admitted to Inpatient with expected LOS greater than two midnights due to medical therapy. Chief Complaint: Shortness of breath and concern for fluid overload. Assessment and Plan:  1. Moderate to severe pulmonary HTN and severe tricuspid regurgiation, with possible Cardiorenal syndrome:    Follows with Dr. Jez Simmons. Cardiac cath 2/9/2022 reveals preserved systolic function with EF 60%, with noted hypertrophic cardiomyopathy. Severe pulmonary hypertension and severe tricuspid regurgitation noted. Patient presents short of breath today, with significant abdominal ascites. proBNP 3772, troponin 0 0.014, WBC 5.5. CXR today reveals small bilateral pleural effusions. Shortness of breath improved with paracentesis and drainage of 3.8 L of hardeep-colored fluid in ED. At baseline on room air. Pt reports dx of liver cirrhosis. Fluid cytology with differential, culture ordered. Liver US ordered. ECHO ordered. Plan to hold diuretics today. Continue daily MELD labs. Nephrology, cardiology consulted. Cardiac diet and 2L fluid restriction. Repeat Troponin. Lactic Acid ordered. 2. Suspect orthostatic hypotension:   PT reports lightheadedness dizziness with standing from sitting position. Recent 35 pound weight gain in 6 weeks while on diuretics. Suspect secondary to #1. Obtain orthostatic blood pressures Q shift. Nephrology, cardiology consulted. ECHO ordered. 3. ISIDRA:    Cr 1.4 today. Appears elevated from baseline. Hold diuretics today. Nephrology consulted. UA ordered. Repeat BMP in the AM.     4. Elevated TSH:   TSH 7.24 today. Free T4 ordered. No history of thyroid disease per patient. Pt reports increased fatigue and decreased energy levels, nausea, diarrhea. 5. Right lower extremity erythema, with concern for cellulitis:   Patient reports she was unaware of right lower extremity redness. Denies pain, fever, chills. Associated edema in lower extremities bilaterally. Right lower extremity venous Doppler ordered. No Hx of MRSA, per chart review. ID consulted to further evaluation. 6. Paroxsymal A.fib   EKG today reveals Atrial fib. Pt denies palpitations, but does endorse LH and dizziness with standing from sitting position. Mag 1.5. Replace per protocol. Repeat Mag in the AM. Telemetry. Continue ASA 324mg, BB.     7. CAD   Stable. Continue home medications. 8. Essential HTN:    Controlled. Continue home medications. Telemetry. Continue to monitor BP. History Of Present Illness:    Kody Chase is an 81-year-old  female non-smoker with a PMHx of pulmonary hypertension, hypertrophic cardiomyopathy with preserved EF, hypertension, CAD, A. fib, severe valvular disease, S/P TAVR who presents to [facility] ED for the evaluation of shortness of breath and concern for fluid overload. Patient states she was sent from PCP office. She states that she has associated nausea, with dry heaving, diarrhea, decreased urine output, lower extremity swelling, and lightheaded and dizziness with standing from a seated position. She states that with the fluid in her stomach, she felt that she appeared 9 months pregnant. She states that prior to the paracentesis that was performed in the ED it was difficult to take deep breaths. Patient reports improved breathing effort after paracentesis in the ED. The patient states that she recently saw Dr. Chris Noriega last month for worsening shortness of breath and underwent a heart cath. Patient reports a 35 pound increase in weight in the past 6 months. Reports recent increase in water pill dosage, as recommended by PCP and cardiologist.  Patient denies chest pain, wheezing, cough, fever, chills, back pain, abdominal pain. .       Past Medical History:        Diagnosis Date    ISIDRA (acute kidney injury) ([facility] [address] 6/11/2016    Arthritis     Atrial fibrillation (HCC)     Bleeding stomach ulcer 2011    CAD (coronary artery disease)     Clostridium difficile infection     History of blood transfusion     Hyperlipidemia     Hypertension     PONV (postoperative nausea and vomiting)        Past Surgical History:        Procedure Laterality Date    AORTIC VALVE REPLACEMENT      CARDIAC SURGERY      DILATION AND CURETTAGE OF UTERUS      JOINT REPLACEMENT Right 2011    knee    [place] OFFICE/OUTPT VISIT,PROCEDURE ONLY N/A 6/28/2018    BIOPROSTHETIC AORTIC VALVE REPLACEMENT WITH MAZE PROCEDURE performed by Terrie Cotter MD at [address] N/A 2/28/2019    EGD ESOPHAGOGASTRODUODENOSCOPY performed by Claudean Biles, MD at [facility]       Home Medications:   No current facility-administered medications on file prior to encounter.      Current Outpatient Medications on File Prior to Encounter   Medication Sig Dispense Refill    losartan (COZAAR) 25 MG tablet Take 25 mg by mouth daily At noon      furosemide (LASIX) 20 MG tablet Take 20 mg by mouth daily      metOLazone (ZAROXOLYN) 2.5 MG tablet Take 2.5 mg by mouth daily      vitamin C (ASCORBIC ACID) 500 MG tablet Take 500 mg by mouth daily      spironolactone (ALDACTONE) 50 MG tablet Take 50 mg by mouth daily @@ noon      omeprazole (PRILOSEC) 20 MG delayed release capsule Take 20 mg by mouth Daily       Multiple Vitamins-Minerals (CENTRUM SILVER ADULT 50+ PO) Take 1 tablet by mouth daily      Calcium Carb-Cholecalciferol (CALTRATE 600+D3 PO) Take 1 tablet by mouth 2 times daily      apixaban (ELIQUIS) 5 MG TABS tablet Take 0.5 tablets by mouth 2 times daily 60 tablet 3    sucralfate (CARAFATE) 1 GM tablet Take 1 tablet by mouth 4 times daily (before meals and nightly) 120 tablet 3    carvedilol (COREG) 6.25 MG tablet Take 1 tablet by mouth 2 times daily (with meals) 60 tablet 3    Misc Natural Products (OSTEO BI-FLEX TRIPLE STRENGTH PO) Take 750 mg by mouth daily      Multiple Vitamins-Minerals (PRESERVISION AREDS) CAPS Take by mouth 2 times daily      fluticasone (FLONASE) 50 MCG/ACT nasal spray 1-2 sprays by Nasal route daily      acetaminophen 650 MG TABS Take 650 mg by mouth every 4 hours as needed 120 tablet 3    Pyridoxine HCl (VITAMIN B-6) 100 MG tablet Take 100 mg by mouth daily      loratadine (CLARITIN) 10 MG tablet Take 10 mg by mouth daily       PARoxetine (PAXIL) 30 MG tablet Take 30 mg by mouth every morning      traZODone (DESYREL) 50 MG tablet Take 50 mg by mouth nightly         Allergies:    Patient has no known allergies. Social History:    reports that she has never smoked. She has never used smokeless tobacco. She reports that she does not drink alcohol and does not use drugs. Family History:       Problem Relation Age of Onset    Diabetes Father     Stroke Father     High Blood Pressure Mother     Cancer Sister         blood    Diabetes Brother     Cancer Brother         lymphoma    High Blood Pressure Maternal Grandmother     High Blood Pressure Maternal Grandfather     Diabetes Paternal Grandmother     Diabetes Paternal Grandfather     Diabetes Brother     Heart Disease Neg Hx        Diet:  No diet orders on file    Review of systems:     Review of Systems   Constitutional: Positive for activity change and fatigue. Negative for appetite change, chills and fever. HENT: Negative for congestion, sinus pressure and sore throat. Eyes: Positive for visual disturbance (Chronic left eye blindness from macular degeneration). Respiratory: Positive for shortness of breath. Negative for cough, chest tightness and wheezing. Cardiovascular: Positive for leg swelling. Negative for chest pain and palpitations. Gastrointestinal: Positive for abdominal distention, diarrhea and nausea.  Negative for abdominal pain, blood in stool, constipation and vomiting (Dry heaves). Musculoskeletal: Negative for arthralgias, back pain, gait problem and myalgias. Skin: Positive for color change (Noted on right lower extremity). Negative for rash. PHYSICAL EXAM:  /66   Pulse 105   Temp 98 °F (36.7 °C) (Oral)   Resp 15   Ht 5' 5\" (1.651 m)   Wt 166 lb (75.3 kg)   SpO2 98%   BMI 27.62 kg/m²   General appearance: Chronically-ill appearing. No apparent distress. Appears stated age and cooperative. Skin: Overlying skin changes of RLE as noted in MSK. Skin color, texture, turgor normal.  No rashes or lesions. HEENT: Normal cephalic, atraumatic without obvious deformity. Pupils equal, round, and reactive to light. Extra-ocular muscles intact. Conjunctivae/corneas clear. Neck: Trachea midline. Supple, with full range of motion. No jugular venous distention. Cardiovascular: Regular rate and rhythm with normal S1/S2. No murmurs, rubs or gallops. Respiratory:  Diminished breath sounds. Expiratory wheeze. Normal respiratory effort. Abdomen: Epigastric abdominal hernia noted inferior to sternal border. Soft, non-tender, non-distended. Normal bowel sounds. Musculoskeletal:  Erythema and rubor noted on anterior aspect of RLE. Mild edema of LE bilaterally. No weakness or instability noted. No gross deformity noted. Vascular: Capillary refill brisk,< 3 seconds. Pulses +2 palpable, equal bilaterally. Neurologic:  Neurovascularly intact without any focal sensory/motor deficits. Cranial nerves: II-XII grossly intact.    Psychiatric: Alert and oriented, thought content appropriate, normal insight      Labs:   Recent Labs     03/11/22  1417   WBC 5.5   HGB 11.4*   HCT 36.9*        Recent Labs     03/11/22  1417   *   K 3.6      CO2 19*   BUN 50*   CREATININE 1.4*   CALCIUM 8.6     Recent Labs     03/11/22  1417   AST 23   ALT 8*   BILIDIR 0.5*   BILITOT 1.0   ALKPHOS 86     Recent Labs 03/11/22  1446   INR 1.27*     No results for input(s): CKTOTAL, TROPONINI in the last 72 hours. Urinalysis:    Lab Results   Component Value Date    NITRU NEGATIVE 09/01/2018    WBCUA 2-4 09/01/2018    BACTERIA NONE 09/01/2018    RBCUA 0-2 09/01/2018    BLOODU NEGATIVE 09/01/2018    SPECGRAV 1.021 06/21/2018    GLUCOSEU NEGATIVE 09/01/2018       Radiology:   XR CHEST (2 VW)   Final Result   1. Small bilateral pleural effusions with associated compressive atelectasis. 2. Moderate cardiomegaly. **This report has been created using voice recognition software. It may contain minor errors which are inherent in voice recognition technology. **      Final report electronically signed by Dr Vitor Cabrera on 3/11/2022 2:49 PM      [facility]    (Results Pending)     XR CHEST (2 VW)    Result Date: 3/11/2022  PROCEDURE: XR CHEST (2 VW) CLINICAL INFORMATION: Shortness of Breath COMPARISON: Chest radiograph 1/18/2022, 2/26/2021. TECHNIQUE: 2 views of the chest FINDINGS: Small bilateral pleural effusions are seen. Bibasilar opacities likely relate to compressive atelectasis. Cardiac silhouette is moderately enlarged. A left atrial appendage clip and aortic valve are seen. No pneumothorax. No acute bony abnormality. Median sternotomy has been performed. 1. Small bilateral pleural effusions with associated compressive atelectasis. 2. Moderate cardiomegaly. **This report has been created using voice recognition software. It may contain minor errors which are inherent in voice recognition technology. ** Final report electronically signed by Dr Vitor Cabrera on 3/11/2022 2:49 PM        EKG: Atrial fibrillation, left axis deviation, low voltage QRS    Electronically signed by Etienne Blanchard PA-C on 3/11/2022 at 3:34 PM

## 2022-03-12 LAB
ALBUMIN SERPL-MCNC: 3.1 G/DL (ref 3.5–5.1)
ALP BLD-CCNC: 69 U/L (ref 38–126)
ALT SERPL-CCNC: 6 U/L (ref 11–66)
ANION GAP SERPL CALCULATED.3IONS-SCNC: 13 MEQ/L (ref 8–16)
AST SERPL-CCNC: 17 U/L (ref 5–40)
BASOPHILS # BLD: 0.9 %
BASOPHILS ABSOLUTE: 0 THOU/MM3 (ref 0–0.1)
BILIRUB SERPL-MCNC: 1 MG/DL (ref 0.3–1.2)
BODY FLUID RBC: ABNORMAL /CUMM
BUN BLDV-MCNC: 49 MG/DL (ref 7–22)
CALCIUM SERPL-MCNC: 8.4 MG/DL (ref 8.5–10.5)
CHARACTER, BODY FLUID: ABNORMAL
CHLORIDE BLD-SCNC: 101 MEQ/L (ref 98–111)
CO2: 22 MEQ/L (ref 23–33)
COLOR: ABNORMAL
CREAT SERPL-MCNC: 1.4 MG/DL (ref 0.4–1.2)
EKG Q-T INTERVAL: 372 MS
EKG QRS DURATION: 82 MS
EKG QTC CALCULATION (BAZETT): 455 MS
EKG R AXIS: -45 DEGREES
EKG T AXIS: -34 DEGREES
EKG VENTRICULAR RATE: 90 BPM
EOSINOPHIL # BLD: 1.4 %
EOSINOPHILS ABSOLUTE: 0.1 THOU/MM3 (ref 0–0.4)
ERYTHROCYTE [DISTWIDTH] IN BLOOD BY AUTOMATED COUNT: 18.9 % (ref 11.5–14.5)
ERYTHROCYTE [DISTWIDTH] IN BLOOD BY AUTOMATED COUNT: 65 FL (ref 35–45)
GFR SERPL CREATININE-BSD FRML MDRD: 36 ML/MIN/1.73M2
GLUCOSE BLD-MCNC: 92 MG/DL (ref 70–108)
HCT VFR BLD CALC: 35.2 % (ref 37–47)
HEMOGLOBIN: 10.8 GM/DL (ref 12–16)
IMMATURE GRANS (ABS): 0.02 THOU/MM3 (ref 0–0.07)
IMMATURE GRANULOCYTES: 0.5 %
INR BLD: 1.41 (ref 0.85–1.13)
LV EF: 55 %
LVEF MODALITY: NORMAL
LYMPHOCYTES # BLD: 22.9 %
LYMPHOCYTES ABSOLUTE: 1 THOU/MM3 (ref 1–4.8)
MAGNESIUM: 1.8 MG/DL (ref 1.6–2.4)
MCH RBC QN AUTO: 28.9 PG (ref 26–33)
MCHC RBC AUTO-ENTMCNC: 30.7 GM/DL (ref 32.2–35.5)
MCV RBC AUTO: 94.1 FL (ref 81–99)
MESOTHELIAL CELLS BODY FLUID: ABNORMAL
MONOCYTES # BLD: 9.6 %
MONOCYTES ABSOLUTE: 0.4 THOU/MM3 (ref 0.4–1.3)
MONONUCLEAR CELLS BODY FLUID: 86.4 %
NUCLEATED RED BLOOD CELLS: 0 /100 WBC
PATHOLOGIST REVIEW: ABNORMAL
PLATELET # BLD: 119 THOU/MM3 (ref 130–400)
PMV BLD AUTO: 10.2 FL (ref 9.4–12.4)
POLYMORPHONUCLEAR CELLS BODY FLUID: 13.6 %
POTASSIUM REFLEX MAGNESIUM: 3.5 MEQ/L (ref 3.5–5.2)
RBC # BLD: 3.74 MILL/MM3 (ref 4.2–5.4)
SEG NEUTROPHILS: 64.7 %
SEGMENTED NEUTROPHILS ABSOLUTE COUNT: 2.8 THOU/MM3 (ref 1.8–7.7)
SODIUM BLD-SCNC: 136 MEQ/L (ref 135–145)
SPECIMEN: ABNORMAL
TOTAL NUCLEATED CELLS BODY FLUID: 564 /CUMM (ref 0–500)
TOTAL PROTEIN: 5.5 G/DL (ref 6.1–8)
TOTAL VOLUME RECEIVED BODY FLUID: 70 ML
WBC # BLD: 4.4 THOU/MM3 (ref 4.8–10.8)

## 2022-03-12 PROCEDURE — 97165 OT EVAL LOW COMPLEX 30 MIN: CPT

## 2022-03-12 PROCEDURE — 36415 COLL VENOUS BLD VENIPUNCTURE: CPT

## 2022-03-12 PROCEDURE — 93306 TTE W/DOPPLER COMPLETE: CPT

## 2022-03-12 PROCEDURE — 93010 ELECTROCARDIOGRAM REPORT: CPT | Performed by: NUCLEAR MEDICINE

## 2022-03-12 PROCEDURE — 94760 N-INVAS EAR/PLS OXIMETRY 1: CPT

## 2022-03-12 PROCEDURE — 1200000000 HC SEMI PRIVATE

## 2022-03-12 PROCEDURE — 1200000003 HC TELEMETRY R&B

## 2022-03-12 PROCEDURE — 99232 SBSQ HOSP IP/OBS MODERATE 35: CPT | Performed by: NURSE PRACTITIONER

## 2022-03-12 PROCEDURE — 80053 COMPREHEN METABOLIC PANEL: CPT

## 2022-03-12 PROCEDURE — 85025 COMPLETE CBC W/AUTO DIFF WBC: CPT

## 2022-03-12 PROCEDURE — 85610 PROTHROMBIN TIME: CPT

## 2022-03-12 PROCEDURE — 6370000000 HC RX 637 (ALT 250 FOR IP): Performed by: NURSE PRACTITIONER

## 2022-03-12 PROCEDURE — 93005 ELECTROCARDIOGRAM TRACING: CPT

## 2022-03-12 PROCEDURE — 83735 ASSAY OF MAGNESIUM: CPT

## 2022-03-12 PROCEDURE — 2580000003 HC RX 258

## 2022-03-12 PROCEDURE — 6370000000 HC RX 637 (ALT 250 FOR IP)

## 2022-03-12 PROCEDURE — 6360000002 HC RX W HCPCS: Performed by: NURSE PRACTITIONER

## 2022-03-12 PROCEDURE — 97535 SELF CARE MNGMENT TRAINING: CPT

## 2022-03-12 RX ORDER — CEFAZOLIN SODIUM 1 G/50ML
1000 INJECTION, SOLUTION INTRAVENOUS EVERY 8 HOURS
Status: DISCONTINUED | OUTPATIENT
Start: 2022-03-12 | End: 2022-03-15

## 2022-03-12 RX ADMIN — ASPIRIN 325 MG: 325 TABLET ORAL at 08:52

## 2022-03-12 RX ADMIN — PAROXETINE HYDROCHLORIDE 30 MG: 30 TABLET, FILM COATED ORAL at 08:54

## 2022-03-12 RX ADMIN — Medication 1 TABLET: at 08:54

## 2022-03-12 RX ADMIN — OXYCODONE HYDROCHLORIDE AND ACETAMINOPHEN 500 MG: 500 TABLET ORAL at 08:52

## 2022-03-12 RX ADMIN — TRAZODONE HYDROCHLORIDE 50 MG: 50 TABLET ORAL at 01:05

## 2022-03-12 RX ADMIN — CEFAZOLIN SODIUM 1000 MG: 1 INJECTION, SOLUTION INTRAVENOUS at 17:21

## 2022-03-12 RX ADMIN — SUCRALFATE 1 G: 1 TABLET ORAL at 17:33

## 2022-03-12 RX ADMIN — SUCRALFATE 1 G: 1 TABLET ORAL at 11:07

## 2022-03-12 RX ADMIN — Medication 1 TABLET: at 22:17

## 2022-03-12 RX ADMIN — SODIUM CHLORIDE, PRESERVATIVE FREE 10 ML: 5 INJECTION INTRAVENOUS at 20:15

## 2022-03-12 RX ADMIN — CARVEDILOL 6.25 MG: 6.25 TABLET, FILM COATED ORAL at 08:53

## 2022-03-12 RX ADMIN — CEFAZOLIN SODIUM 1000 MG: 1 INJECTION, SOLUTION INTRAVENOUS at 11:07

## 2022-03-12 RX ADMIN — CETIRIZINE HYDROCHLORIDE 5 MG: 5 TABLET ORAL at 08:53

## 2022-03-12 RX ADMIN — PANTOPRAZOLE SODIUM 40 MG: 40 TABLET, DELAYED RELEASE ORAL at 05:07

## 2022-03-12 RX ADMIN — TRAZODONE HYDROCHLORIDE 50 MG: 50 TABLET ORAL at 23:52

## 2022-03-12 RX ADMIN — LOSARTAN POTASSIUM 25 MG: 25 TABLET, FILM COATED ORAL at 09:02

## 2022-03-12 RX ADMIN — SUCRALFATE 1 G: 1 TABLET ORAL at 22:17

## 2022-03-12 RX ADMIN — Medication 100 MG: at 08:55

## 2022-03-12 RX ADMIN — Medication 1 TABLET: at 01:08

## 2022-03-12 RX ADMIN — SODIUM CHLORIDE, PRESERVATIVE FREE 10 ML: 5 INJECTION INTRAVENOUS at 08:54

## 2022-03-12 RX ADMIN — Medication 1 TABLET: at 08:52

## 2022-03-12 NOTE — CONSULTS
PATIENT HAD SEVERE PULMONARY HTN , SEVERE TR  WILL NEED TO BE TRANSFERRED TO [facility]  FOR PULMONARY HTN    CONTINUE CURRENT RX    FULL CONSULT TO FOLLOW

## 2022-03-12 NOTE — PLAN OF CARE
Problem: SAFETY  Goal: Free from accidental physical injury  3/12/2022 1438 by Anuja Velasquez RN  Outcome: Met This Shift  Note: Free of injury this shift     Problem: DAILY CARE  Goal: Daily care needs are met  3/12/2022 1438 by Anuja Velasquez RN  Outcome: Met This Shift     Problem: PAIN  Goal: Patient's pain/discomfort is manageable  3/12/2022 1438 by Anuja Velasquez RN  Outcome: Met This Shift  Note: Free of pain this shift. Denies any discomfort     Problem: SKIN INTEGRITY  Goal: Skin integrity is maintained or improved  3/12/2022 1438 by Anuja Velasquez RN  Outcome: Met This Shift     Problem: KNOWLEDGE DEFICIT  Goal: Patient/S.O. demonstrates understanding of disease process, treatment plan, medications, and discharge instructions. 3/12/2022 1438 by Anuja Velasquez RN  Outcome: Met This Shift     Problem: KNOWLEDGE DEFICIT  Goal: Patient/S.O. demonstrates understanding of disease process, treatment plan, medications, and discharge instructions. Intervention: ASSESS BASELINE KNOWLEDGE  Note: Verbalized understanding of treatment plan     Problem: Falls - Risk of:  Goal: Will remain free from falls  Description: Will remain free from falls  3/12/2022 1438 by Anuja Velasquez RN  Outcome: Ongoing  Note: Free from falls this shift with hourly rounding, room clutter free. Wearing gripper socks when up. Call light with in reach     Problem: Falls - Risk of:  Goal: Absence of physical injury  Description: Absence of physical injury  3/12/2022 1438 by Anuja Velasquez RN  Outcome: Ongoing  Note: Free from falls this shift with hourly rounding, room clutter free. Wearing gripper socks when up. Call light with in reach     Problem: DISCHARGE BARRIERS  Goal: Patient's continuum of care needs are met  3/12/2022 1438 by Anuja Velasquez RN  Outcome: Ongoing  Note: Wants to return homewith possible home health. Care plan reviewed with patient . Patient verbalize understanding of the plan of care and contribute to goal setting.

## 2022-03-12 NOTE — PROGRESS NOTES
Hospitalist Progress Note    Patient:  Juan C Benitez      Unit/Bed:7K-03/003-A    YOB: 1940    MRN: [id number]       Acct: [de-identified]     PCP: ARTHUR Garcia - CNP    Date of Admission: 3/11/2022    Assessment/Plan:    1. Dyspnea--likely secondary to #2, improved, on room air  2. Abdominal ascites--had paracentesis on 3/11 with 3.8 L drained; will resume Lasix/Aldactone, Zaroxolyn is on hold; follows with ALEXANDER Herndon; ascitic fluid showing no growth at this time  3. ISIDRA on CKD stage III--creatinine at 1.4, monitor  4. Possible cellulitis right lower extremity (POA)--venous Doppler does not reveal DVT; will add Ancef 3/12, marked area redness and monitor response  5. Acute on chronic diastolic heart failure--echo from August 24, 2018 revealed EF 55% along with grade 2 diastolic dysfunction; repeat echo ordered for today; on Aldactone and Lasix, Zaroxolyn on hold at this time; chest x-ray reveals small bilateral pleural effusions  6. Essential hypertension, controlled--on Coreg and Cozaar, place hold parameters to hold for systolic blood pressure less than 110; monitor  7. Paroxysmal atrial fibrillation--on aspirin, Coreg; follows with Dr. Zeferino Knight, on telemetry  8. Moderately severe pulmonary hypertension--follows with Dr. Zeferino Knight, heart cath report noted from February 9, 2022; plan is to get her in the pulmonary hypertension clinic at [facility]  9. Severe tricuspid regurgitation  10. Liver cirrhosis--follows with ALEXANDER Herndon  11. Portal venous hypertension  12.  History of elevated troponin in the past    Expected discharge date: Pending clinical course    Disposition:    [x] Home       [] TCU       [] Rehab       [] Psych       [] SNF       [] [place]       [] Other-    Chief Complaint: Shortness of breath    Hospital Course: Per H&P done 3/11: \"Norma is an 81-year-old  female non-smoker with a PMHx of pulmonary hypertension, hypertrophic cardiomyopathy with preserved EF, hypertension, CAD, A. fib, severe valvular disease, S/P TAVR who presents to [facility] ED for the evaluation of shortness of breath and concern for fluid overload. Patient states she was sent from PCP office. She states that she has associated nausea, with dry heaving, diarrhea, decreased urine output, lower extremity swelling, and lightheaded and dizziness with standing from a seated position. She states that with the fluid in her stomach, she felt that she appeared 9 months pregnant. She states that prior to the paracentesis that was performed in the ED it was difficult to take deep breaths. Patient reports improved breathing effort after paracentesis in the ED. The patient states that she recently saw Dr. Zeferino Knight last month for worsening shortness of breath and underwent a heart cath. Patient reports a 35 pound increase in weight in the past 6 months. Reports recent increase in water pill dosage, as recommended by PCP and cardiologist.  Patient denies chest pain, wheezing, cough, fever, chills, back pain, abdominal pain. \"    3/12--> hemodynamically stable, she follows with [facility]. [facility]ef[facility] her cirrhosis however unknown etiology at this time, she is to follow-up with [facility] for pulmonary hypertension per cardiac cath report      Subjective (past 24 hours): Denies pain, relates to a large weight gain over the last 6 months, states she did not know anything about her right lower extremity being red until she came here yesterday; sitting up in the chair and she ate; states she lives alone and she gets along well    Medications:  Reviewed    Infusion Medications    sodium chloride       Scheduled Medications    calcium-cholecalciferol  1 tablet Oral BID    carvedilol  6.25 mg Oral BID WC    [Held by provider] furosemide  20 mg Oral Daily    cetirizine  5 mg Oral Daily    losartan  25 mg Oral Daily    [Held by provider] metOLazone  2.5 mg Oral Daily    multivitamin  1 tablet Oral Daily    pantoprazole  40 mg Oral QAM AC    PARoxetine  30 mg Oral QAM    vitamin B-6  100 mg Oral Daily    [Held by provider] spironolactone  50 mg Oral Daily    sucralfate  1 g Oral 4x Daily AC & HS    traZODone  50 mg Oral Nightly    vitamin C  500 mg Oral Daily    sodium chloride flush  10 mL IntraVENous 2 times per day    aspirin  325 mg Oral Daily     PRN Meds: sodium chloride flush, sodium chloride, ondansetron **OR** ondansetron, polyethylene glycol, acetaminophen **OR** acetaminophen      Intake/Output Summary (Last 24 hours) at 3/12/2022 0701  Last data filed at 3/12/2022 0347  Gross per 24 hour   Intake 400 ml   Output --   Net 400 ml       Diet:  ADULT DIET; Regular; 4 carb choices (60 gm/meal); Low Fat/Low Chol/High Fiber/NAN; 2000 ml    Exam:  BP (!) 101/54   Pulse 94   Temp 97.9 °F (36.6 °C) (Oral)   Resp 18   Ht 5' 5\" (1.651 m)   Wt 166 lb (75.3 kg)   SpO2 92%   BMI 27.62 kg/m²     General appearance: No apparent distress, appears stated age and cooperative. HEENT: Pupils equal, round, and reactive to light. Conjunctivae/corneas clear. Neck: Supple, with full range of motion. No jugular venous distention. Trachea midline. Respiratory:  Normal respiratory effort. Clear to auscultation, bilaterally without Rales/Wheezes/Rhonchi. Cardiovascular: Regular rate and rhythm with normal S1/S2 without murmurs, rubs or gallops. Abdomen: Soft, non-tender, non-distended with normal bowel sounds. Musculoskeletal: passive and active ROM x 4 extremities. Right lower extremity with redness to the anterior aspect~RN in the room and I asked her to patrick it  Skin: Skin color, texture, turgor normal.    Neurologic:  Neurovascularly intact without any focal sensory/motor deficits.  Cranial nerves: II-XII intact, grossly non-focal.  Psychiatric: Alert and oriented x 4, thought content appropriate  Capillary Refill: Brisk,< 3 seconds Peripheral Pulses: +2 palpable, equal bilaterally     Labs:   Recent Labs     03/11/22  1417 03/12/22  0449   WBC 5.5 4.4*   HGB 11.4* 10.8*   HCT 36.9* 35.2*    119*     Recent Labs     03/11/22  1417 03/12/22  0449   * 136   K 3.6 3.5    101   CO2 19* 22*   BUN 50* 49*   CREATININE 1.4* 1.4*   CALCIUM 8.6 8.4*     Recent Labs     03/11/22  1417 03/12/22  0449   AST 23 17   ALT 8* 6*   BILIDIR 0.5*  --    BILITOT 1.0 1.0   ALKPHOS 86 69     Recent Labs     03/11/22  1446 03/12/22  0449   INR 1.27* 1.41*     Microbiology:    Ascitic fluid showing rare segmented neutrophils, no organisms, no growth preliminary    Urinalysis:      Lab Results   Component Value Date    NITRU NEGATIVE 03/11/2022    WBCUA 0-2 03/11/2022    BACTERIA NONE SEEN 03/11/2022    RBCUA 0-2 03/11/2022    BLOODU NEGATIVE 03/11/2022    SPECGRAV 1.017 03/11/2022    GLUCOSEU NEGATIVE 09/01/2018       Radiology:  XR CHEST (2 VW)    Result Date: 3/11/2022  PROCEDURE: XR CHEST (2 VW) CLINICAL INFORMATION: Shortness of Breath COMPARISON: Chest radiograph 1/18/2022, 2/26/2021. TECHNIQUE: 2 views of the chest FINDINGS: Small bilateral pleural effusions are seen. Bibasilar opacities likely relate to compressive atelectasis. Cardiac silhouette is moderately enlarged. A left atrial appendage clip and aortic valve are seen. No pneumothorax. No acute bony abnormality. Median sternotomy has been performed. 1. Small bilateral pleural effusions with associated compressive atelectasis. 2. Moderate cardiomegaly. **This report has been created using voice recognition software. It may contain minor errors which are inherent in voice recognition technology. ** Final report electronically signed by Dr Corwin Rollins on 3/11/2022 2:49 PM    US LIVER    Result Date: 3/12/2022  Exam: Limited abdominal ultrasound: Comparison: None Findings: Echogenic pancreas without mass. Normal caliber pancreatic duct.  Enlarged heterogeneous liver with nodular surface. No discrete liver mass. Bidirectional flow within the portal veins. Dilated hepatic veins with appropriate flow direction. Diffuse gallbladder wall thickening to 9 mm. Sludge or small gallstones in the gallbladder neck region. Negative sonographic Cifuentes sign. Possible 2 mm gallbladder wall polyp. 4 mm CBD. Moderate ascites. Fluid surrounds the gallbladder. Echogenic right kidney with lower pole 3.6 x 3 x 0 cm cortical cyst.     Impression: 1. Chronic liver disease with portal venous hypertension and ascites. 2. Gallbladder wall thickening may reflect cholecystitis or adjacent liver disease. Possible gallstones and sludge in the region of the gallbladder neck. Negative sonographic Cifuentes sign. 3. Echogenic right kidney with lower pole cyst. This document has been electronically signed by: Julian Torres MD on 03/12/2022 02:58 AM    US GUIDED PARACENTESIS    Result Date: 3/11/2022  PROCEDURE: US GUIDED PARACENTESIS CLINICAL INFORMATION: Ascites COMPARISON: 1/24/2022 TECHNIQUE: Ultrasound-guided paracentesis FINDINGS: The procedure was explained the patient. All risks were discussed. Written informed consent was obtained. Limited ultrasound was performed revealing the ascites. An acceptable location was identified. The skin was marked. The overlying area was sterilely prepped and draped. 2 percent lidocaine was given for local anesthesia. Next a 5 [place]kin[place] one-step catheter  was introduced into the ascites. Clear yellow fluid was removed. The catheter was removed. The patient tolerated the procedure. No immediate complications. Physician performing procedure: Dr Mike Kaur Informed consent signed: Yes Local Anesthetic: 2 % Lidocaine Specimen volume: 70 ml Catheter: 19 ga 5 [place] Aspirated ascites volume: 3.8 liters Aspirated ascites color: Old slightly heme tinged hardeep-colored fluid Site of Puncture:RLQ Complications: None observed     Successful ultrasound-guided paracentesis.  **This report has been created using voice recognition software. It may contain minor errors which are inherent in voice recognition technology. ** Final report electronically signed by Dr. Shannon Valentino on 3/11/2022 4:09 PM    VL DUP LOWER EXTREMITY VENOUS RIGHT    Result Date: 3/11/2022  Venous duplex ultrasound right lower extremity Comparison: None Findings: The visualized deep veins are fully compressible with normal Doppler color flow and spectral tracings. Mild subcutaneous edema in the right leg. No popliteal cyst.     Negative for right lower extremity deep vein thrombosis. This document has been electronically signed by: Whitney Abdi MD on 03/11/2022 09:08 PM Technique Used: Duplex examination performed utilizing grayscale, color and spectral analysis.       DVT prophylaxis: [] Lovenox                                 [x] SCDs                                 [] SQ Heparin                                 [] Encourage ambulation           [] Already on Anticoagulation     Code Status: Full Code    PT/OT Eval Status: Monitor    Tele:   [x] Sinus rhythm heart rate 97             [] no    Active Hospital Problems    Diagnosis Date Noted    Shortness of breath [R06.02] 03/11/2022       Electronically signed by ARTHUR De La Rosa CNP on 3/12/2022 at 7:01 AM

## 2022-03-12 NOTE — CONSULTS
Nephrology Consult Note    Patient - Marii Mott   MRN -  [id number]      - 1940   81 y.o. Date of Admission -  3/11/2022  1:33 PM        Date of evaluation -  3/12/2022 9:07 AM    Reason for Consult  Possible CardioRenal syndrome  Requesting Physician:  ARTHUR Hayes *  History Information Obtained From: Nursing staff, Electronic medical records, Patient,     [address] / HPI:   The patient is a 81 y.o. female with past medical history of DM II, HTN, CAD, Pulm artery HTN, CKD Stage IIIA who presented with to Emergency Department with c/o of gradually worsening shortness of breath with concern for fluid overload. . Symptoms onset 1 month ago. Associated with nausea and dry heaves. She had paracentesis 3.8 L on day of admission. Reports breathing is much easier now. She follows with Dr Fabby Gutierrez, Reports gradual increase edema with subsequent increased diuretic dose per Cardiology. Home meds include Lasix 40 mg daily, Zaroxolyn 5 mg daily, Spironolactone 50 mg daily and Cozaar 25 mg daily. Reports that she drinks a little less than 1.2 per day. Pt took several sips of water during visit. BP running 101/-120 last 24h   She had cardiac cath 22 which revealed preserved systolic function with EF 60%, with noted hypertrophic cardiomyopathy. Severe pulmonary hypertension and severe tricuspid regurgitation. Her baseline creatinine was 0.9-1.2 however has been running 1.4-1.5 since 2022  She reports that she has never seen a nephrologist in the past    REVIEW OF SYSTEMS:   GENERAL: Pleasant,  Denies fever  HEENT: Denies Upper respiratory complaints  CARDIOVASCULAR: Denies chest pain/angina.   RESPIRATORY:Denies cough, wheezing  ABDOMEN: Denies diarrhea  CNS:Denies headache,  MUSCULOSKELETAL: Reports joint arthralgia  SKIN: Denies rash  HEMATOLOGIC: Denies bruising     EXAM:  VITALS:  /67   Pulse 90   Temp 98.5 °F (36.9 °C) (Oral)   Resp 18   Ht 5' 5\" (1.651 m)   Wt 166 lb (75.3 kg)   SpO2 91%   BMI 27.62 kg/m²      Constitutional:  No acute distress. Skin: No rash on exposed surfaces, No significant bruises  Heent:  Head is normocephalic, Extraocular movement intact, Voice is clear. Neck: no jugular venous distention noted, Neck is supple  Cardiovascular:  S1, S2  Systolic murmur, regular rate and rhythm. Respiratory: Clear to ausculation bilaterally. Equal breath sounds, No crackles, No wheezes. No shortness of breath. Abdomen: Soft, non tender  Ext: Distal lower extremity temp is warm, Right LE erythema. 1+ lower extremity edema up into thighs. Musculoskeletal: Movement is coordinated. Moves all extremities. Psychiatric: mood and affect appropriate  Neurological: Patient is alert and oriented to person, place, and time. Recent and remote   memory intact, Thought is coherant.     Home Medications:  Medications Prior to Admission: aspirin 325 MG tablet, Take 325 mg by mouth daily  losartan (COZAAR) 25 MG tablet, Take 25 mg by mouth daily At noon  furosemide (LASIX) 40 MG tablet, Take 40 mg by mouth daily   metOLazone (ZAROXOLYN) 2.5 MG tablet, Take 5 mg by mouth daily   vitamin C (ASCORBIC ACID) 500 MG tablet, Take 500 mg by mouth daily  spironolactone (ALDACTONE) 50 MG tablet, Take 50 mg by mouth daily @@ noon  omeprazole (PRILOSEC) 20 MG delayed release capsule, Take 20 mg by mouth in the morning and at bedtime   Multiple Vitamins-Minerals (CENTRUM SILVER ADULT 50+ PO), Take 1 tablet by mouth daily  Calcium Carb-Cholecalciferol (CALTRATE 600+D3 PO), Take 1 tablet by mouth 2 times daily  sucralfate (CARAFATE) 1 GM tablet, Take 1 tablet by mouth 4 times daily (before meals and nightly) (Patient taking differently: Take 1 g by mouth 4 times daily as needed )  carvedilol (COREG) 6.25 MG tablet, Take 1 tablet by mouth 2 times daily (with meals)  Misc Natural Products (OSTEO BI-FLEX TRIPLE STRENGTH PO), Take 750 mg by mouth daily  Multiple Vitamins-Minerals (PRESERVISION AREDS) CAPS, Take by mouth 2 times daily  Pyridoxine HCl (VITAMIN B-6) 100 MG tablet, Take 100 mg by mouth daily  loratadine (CLARITIN) 10 MG tablet, Take 10 mg by mouth daily   PARoxetine (PAXIL) 30 MG tablet, Take 30 mg by mouth every morning  traZODone (DESYREL) 50 MG tablet, Take 50 mg by mouth nightly as needed   [DISCONTINUED] apixaban (ELIQUIS) 5 MG TABS tablet, Take 0.5 tablets by mouth 2 times daily  fluticasone (FLONASE) 50 MCG/ACT nasal spray, 1-2 sprays by Nasal route daily  [DISCONTINUED] acetaminophen 650 MG TABS, Take 650 mg by mouth every 4 hours as needed  Current Medications:     calcium-cholecalciferol  1 tablet Oral BID    carvedilol  6.25 mg Oral BID WC    furosemide  20 mg Oral Daily    cetirizine  5 mg Oral Daily    losartan  25 mg Oral Daily    [Held by provider] metOLazone  2.5 mg Oral Daily    multivitamin  1 tablet Oral Daily    pantoprazole  40 mg Oral QAM AC    PARoxetine  30 mg Oral QAM    vitamin B-6  100 mg Oral Daily    spironolactone  50 mg Oral Daily    sucralfate  1 g Oral 4x Daily AC & HS    traZODone  50 mg Oral Nightly    vitamin C  500 mg Oral Daily    sodium chloride flush  10 mL IntraVENous 2 times per day    aspirin  325 mg Oral Daily     Med and Labs reviewed  24HR INTAKE/OUTPUT:      Intake/Output Summary (Last 24 hours) at 3/12/2022 0907  Last data filed at 3/12/2022 0347  Gross per 24 hour   Intake 400 ml   Output --   Net 400 ml       I/O last 3 completed shifts: In: 400 [P.O.:400]  Out: -   Patient Vitals for the past 96 hrs (Last 3 readings):   Weight   03/11/22 1340 166 lb (75.3 kg)     Body mass index is 27.62 kg/m². BP Readings from Last 5 Encounters:   03/12/22 108/67   02/09/22 (!) 90/43   03/15/19 118/62   02/28/19 (!) 106/53   02/28/19 (!) 96/55       Allergies:  Ace inhibitors    Allergies/Intolerances:   ALLERGIES: Ace inhibitors    Past Medical, Family, Social History:   has a past medical history of ISIDRA (acute kidney injury) ([facility] Arthritis, Atrial fibrillation ([facility] [address] [facility] Bleeding stomach ulcer, CAD (coronary artery disease), Clostridium difficile infection, History of blood transfusion, Hyperlipidemia, Hypertension, and PONV (postoperative nausea and vomiting). has a past surgical history that includes joint replacement (Right, 2011); Dilation and curettage of uterus; pr office/outpt visit,procedure only (N/A, 6/28/2018); Aortic valve replacement; Cardiac surgery; Tonsillectomy; and Upper gastrointestinal endoscopy (N/A, 2/28/2019). family history includes Cancer in her brother and sister; Diabetes in her brother, brother, father, paternal grandfather, and paternal grandmother; High Blood Pressure in her maternal grandfather, maternal grandmother, and mother; Stroke in her father. reports that she has never smoked. She has never used smokeless tobacco. She reports that she does not drink alcohol and does not use drugs. Diagnostic Data:  Recent Labs     03/11/22  1417 03/11/22  1417 03/12/22  0449   *  --  136   K 3.6  --  3.5     --  101   CO2 19*  --  22*   BUN 50*  --  49*   CREATININE 1.4*  --  1.4*   LABGLOM 36*   < > 36*   GLUCOSE 81  --  92   CALCIUM 8.6  --  8.4*    < > = values in this interval not displayed. Recent Labs     03/11/22  1410 03/12/22  0449   MG 1.5* 1.8     XR CHEST (2 VW)  Result Date: 3/11/2022  1. Small bilateral pleural effusions with associated compressive atelectasis. 2. Moderate cardiomegaly. US LIVER  Result Date: 3/12/2022  Impression: 1. Chronic liver disease with portal venous hypertension and ascites. 2. Gallbladder wall thickening may reflect cholecystitis or adjacent liver disease. Possible gallstones and sludge in the region of the gallbladder neck. Negative sonographic Cifuentes sign. 3. Echogenic right kidney with lower pole cyst.     ASSESSMENT  1. Mild Acute Kidney Injury likely 2nd to renal hypoperfusion 2nd to Hypotension and diuresis. Creatinine near baseline.  Continue Cozaar, Spironolactone and Lasix. Monitor renal status closely  2. Chronic Kidney Disease Stage IIIB  3. Moderate-severe pulm HTN, severe tricuspid regurg, EF 60%  4. Chronic liver disease, portal venous hypertension, S/P paracentesis 3.8 L 3/11/22  5. Elevated TSH, T4 pending  6. Essential Hypertension with nephrosclerosis overcorrected. Principal Problem:    Shortness of breath  Resolved Problems:    * No resolved hospital problems. *    Discussed with Dr. Dieter Florentino. Patient was advised about impression and plan. Patient verbalizes understanding and agrees with plan of care.      Thank you ARTHUR Posadas *  for allowing us to participate in care of Sean JayARTHUR santacruz CNP 3/12/2022 9:07 AM    CC: ARTHUR Montgomery - RAINA

## 2022-03-12 NOTE — PROGRESS NOTES
Pt admitted to  7K3 from ED. Complaints: Shortness of breath. IV IV push only, no IV fluids. IV site free of s/s of infection or infiltration. Vital signs obtained. Assessment and data collection initiated. Two nurse skin assessment performed by Noe Conley and Rickey Jewell RN. Oriented to room. Explained patients right to have family, representative or physician notified of their admission. Patient has Declined for physician to be notified. Patient has Declined for family/representative to be notified. The patient is interested in [facility]. Lists of hospitals in the [place] meds to beds program?:  No    Policies and procedures for  explained. All questions answered with no further questions at this time. Fall prevention and safety brochure discussed with patient. Bed alarm on. Call light in reach.

## 2022-03-12 NOTE — PROGRESS NOTES
Yamil Aquino 61  INPATIENT OCCUPATIONAL THERAPY  [facility] ORTHOPEDICS 7K  EVALUATION    Time:   Time In: 1000  Time Out: 1030  Timed Code Treatment Minutes: 15 Minutes  Minutes: 30          Date: 3/12/2022  Patient Name: Maximilian Minaya,   Gender: female      MRN: [id number]  : 1940  (81 y.o.)  Referring Practitioner: Hayde Mendez PA-C  Diagnosis: Shortness of Breath  Additional Pertinent Hx: Pt with a PMHx of pulmonary hypertension, hypertrophic cardiomyopathy with preserved EF, hypertension, CAD, A. fib, severe valvular disease, S/P TAVR who presents to [facility] ED for the evaluation of shortness of breath and concern for fluid overload. Patient states she was sent from PCP office. She states that she has associated nausea, with dry heaving, diarrhea, decreased urine output, lower extremity swelling, and lightheaded and dizziness with standing from a seated position. She states that with the fluid in her stomach, she felt that she appeared 9 months pregnant. She states that prior to the paracentesis that was performed in the ED it was difficult to take deep breaths. Patient reports improved breathing effort after paracentesis in the ED. The patient states that she recently saw Dr. Cyndee Perez last month for worsening shortness of breath and underwent a heart cath. Patient reports a 35 pound increase in weight in the past 6 months. Reports recent increase in water pill dosage, as recommended by PCP and cardiologist    Restrictions/Precautions:  Restrictions/Precautions: Fall Risk,Isolation    Subjective  Chart Reviewed: Priscila Stuart and Physical    Subjective: Pleasant and cooperative  Comments: RN approved session. Pt agreed to do some walking in her room. She indicated that she was not having pain. Redness noted in B ankles/calves with R worse than L.     Pain:  Pain Assessment  Patient Currently in Pain: Denies    Vitals: Vitals not assessed per clinical judgement, see nursing flowsheet    Social/Functional History:  Lives With: Alone  Type of Home: House  Home Layout: One level  Home Access: Stairs to enter with rails  Entrance Stairs - Number of Steps: 3 steps  Entrance Stairs - Rails: Both  Home Equipment: 4 wheeled walker,Reacher   Bathroom Shower/Tub: Walk-in shower,Shower chair with back  Bathroom Toilet: Standard  Bathroom Equipment: Grab bars in shower  Bathroom Accessibility: Accessible    Receives Help From: Family  ADL Assistance: [facility]: Independent  Homemaking Responsibilities: Yes  Ambulation Assistance: Independent  Transfer Assistance: Independent    Active : Yes  Occupation: Retired  Leisure & Hobbies: Doing puzzles, reading, keeping house clean  Additional Comments: Pt had to  use the walker during the days prior to admission. She had not been going to the store or getting outside during the days prior to admission. She has been sleeping in the sofa seat for a few weeks when it became to difficult to get into and out of the bed. She had help with donning her socks but she does not usually wear socks in her home. VISION:Corrected    HEARING:  WFL    COGNITION: WFL    RANGE OF MOTION:  Bilateral Upper Extremity:  WFL    STRENGTH:  Bilateral Upper Extremity:  Impaired - 3+/5 deltoid; 3+/5 pectoral; 4/5 biceps/triceps    SENSATION:   WFL    ADL:   Lower Extremity Dressing: Minimal Assistance. help needed for donning her slipper sock on her L foot; she used the sock aid for her R sock with verbal cues and help needed for set up. BALANCE:  Sitting Balance:  Stand By Assistance. Doing upper body exercises  Standing Balance: Stand By Assistance. preparing to walk    BED MOBILITY:  Not Tested    TRANSFERS:  Sit to Stand:  Stand By Assistance. from the recliner chair  Stand to Sit: Stand By Assistance. to the chair    FUNCTIONAL MOBILITY:  Assistive Device: Rolling Walker  Assist Level:  Stand By Assistance.    Distance: 15 ft in the room  Pt walked with a steady and even gait. No LOB noted. Exercise:  Pt completed BUE AROM exercises x 6 reps x 3 set/s in all joints/planes. Pt completed BLE toe and heel raises while in sitting and having her feet on the floor. Activity Tolerance:  Patient tolerance of  treatment: fair. Pt had mild fatigue with walking. Rest breaks taken between activities. Assessment:  Assessment: Patient would benefit from continued skilled OT services to address above deficits. She presents with shortness of breath. Pt has hx of AV valve replacement with MAZE procedure. She was doing her own self care prior to admission. She had started to use a 4 wheeled walker for ambulation in her home. She demonstrated walking in the room with SBA using a rolling walker. She practiced using a sock aid for donning her slipper socks with MIN A provided. Fatigue noted while walking. She sat down for doing the exercises after having completed the short walk. Performance deficits / Impairments: Decreased functional mobility ,Decreased ADL status,Decreased endurance,Decreased high-level IADLs  Prognosis: Good  REQUIRES OT FOLLOW UP: Yes  Decision Making: Low Complexity    Treatment Initiated: Treatment and education initiated within context of evaluation. Evaluation time included review of current medical information, gathering information related to past medical, social and functional history, completion of standardized testing, formal and informal observation of tasks, assessment of data and development of plan of care and goals. Treatment time included skilled education and facilitation of tasks to increase safety and independence with ADL's for improved functional independence and quality of life. Discussed edema control of her lower body and encouraged pt to keep her feet elevated and for her to do ankle pumps. Pt verbalized understanding.     Discharge Recommendations:  Continue to assess pending progress    Patient Education:  OT Education: [facility]  Patient Education: Management of lower body swelling    Equipment Recommendations:  Equipment Needed: Yes  Other: sock aid    Plan:  Times per week: 5x  Current Treatment Recommendations: Functional Mobility Training,Endurance Training,Self-Care / ADL,Patient/Caregiver Education & Training  Plan Comment: Pt would be able to return home with help from [address] and family as needed when medically stable and discharged from Acute. No follow up OT recommended after discharge. Specific instructions for Next Treatment: Functional mobility; ADLs and adaptations; endurance training. See long-term goal time frame for expected duration of plan of care. If no long-term goals established, a short length of stay is anticipated. Goals:  Patient goals : \"I want to get rid of the fluid so that I can do things without getting so tired. \" pt states. Short term goals  Time Frame for Short term goals: By discharge  Short term goal 1: Pt will demonstrate functional mobility walking to/from the bathroom or in the hallway around obstacles while using a rolling walker with Supervision to prepare for doing ADLs and homemaking activities. Short term goal 2: Pt will complete lower body dressing and bathing while using any AE needed with SBA to increase her independence with self care. Short term goal 3: Pt will complete toileting routine including transfers to/from the standard toilet seat with a grabbar and SBA to increase her independence with self care. Short term goal 4: Pt will complete upper body ROM and light resistance exercises while following any handout needed to increase her endurance and strength for ease of doing ADLs and functional mobility. Following session, patient left in safe position with all fall risk precautions in place.

## 2022-03-13 ENCOUNTER — APPOINTMENT (OUTPATIENT)
Dept: ULTRASOUND IMAGING | Age: 82
DRG: 432 | End: 2022-03-13
Payer: MEDICARE

## 2022-03-13 LAB
ANION GAP SERPL CALCULATED.3IONS-SCNC: 12 MEQ/L (ref 8–16)
BUN BLDV-MCNC: 46 MG/DL (ref 7–22)
CALCIUM SERPL-MCNC: 8.5 MG/DL (ref 8.5–10.5)
CHLORIDE BLD-SCNC: 102 MEQ/L (ref 98–111)
CO2: 23 MEQ/L (ref 23–33)
CREAT SERPL-MCNC: 1.4 MG/DL (ref 0.4–1.2)
EKG Q-T INTERVAL: 380 MS
EKG QRS DURATION: 90 MS
EKG QTC CALCULATION (BAZETT): 472 MS
EKG R AXIS: 9 DEGREES
EKG T AXIS: 106 DEGREES
EKG VENTRICULAR RATE: 93 BPM
ERYTHROCYTE [DISTWIDTH] IN BLOOD BY AUTOMATED COUNT: 18.7 % (ref 11.5–14.5)
ERYTHROCYTE [DISTWIDTH] IN BLOOD BY AUTOMATED COUNT: 64.3 FL (ref 35–45)
GFR SERPL CREATININE-BSD FRML MDRD: 36 ML/MIN/1.73M2
GLUCOSE BLD-MCNC: 100 MG/DL (ref 70–108)
HAV IGM SER IA-ACNC: NEGATIVE
HCT VFR BLD CALC: 34.7 % (ref 37–47)
HEMOGLOBIN: 10.7 GM/DL (ref 12–16)
HEPATITIS B CORE IGM ANTIBODY: NEGATIVE
HEPATITIS B SURFACE ANTIGEN: NEGATIVE
HEPATITIS C ANTIBODY: NEGATIVE
INR BLD: 1.29 (ref 0.85–1.13)
MAGNESIUM: 1.8 MG/DL (ref 1.6–2.4)
MCH RBC QN AUTO: 28.8 PG (ref 26–33)
MCHC RBC AUTO-ENTMCNC: 30.8 GM/DL (ref 32.2–35.5)
MCV RBC AUTO: 93.5 FL (ref 81–99)
PLATELET # BLD: 132 THOU/MM3 (ref 130–400)
PMV BLD AUTO: 10.6 FL (ref 9.4–12.4)
POTASSIUM REFLEX MAGNESIUM: 3.2 MEQ/L (ref 3.5–5.2)
RBC # BLD: 3.71 MILL/MM3 (ref 4.2–5.4)
SODIUM BLD-SCNC: 137 MEQ/L (ref 135–145)
WBC # BLD: 5.9 THOU/MM3 (ref 4.8–10.8)

## 2022-03-13 PROCEDURE — 99233 SBSQ HOSP IP/OBS HIGH 50: CPT | Performed by: NURSE PRACTITIONER

## 2022-03-13 PROCEDURE — 85027 COMPLETE CBC AUTOMATED: CPT

## 2022-03-13 PROCEDURE — 49083 ABD PARACENTESIS W/IMAGING: CPT

## 2022-03-13 PROCEDURE — 6370000000 HC RX 637 (ALT 250 FOR IP)

## 2022-03-13 PROCEDURE — 6370000000 HC RX 637 (ALT 250 FOR IP): Performed by: NURSE PRACTITIONER

## 2022-03-13 PROCEDURE — 83735 ASSAY OF MAGNESIUM: CPT

## 2022-03-13 PROCEDURE — 80048 BASIC METABOLIC PNL TOTAL CA: CPT

## 2022-03-13 PROCEDURE — 82105 ALPHA-FETOPROTEIN SERUM: CPT

## 2022-03-13 PROCEDURE — 83516 IMMUNOASSAY NONANTIBODY: CPT

## 2022-03-13 PROCEDURE — 99232 SBSQ HOSP IP/OBS MODERATE 35: CPT | Performed by: INTERNAL MEDICINE

## 2022-03-13 PROCEDURE — 2580000003 HC RX 258

## 2022-03-13 PROCEDURE — 86038 ANTINUCLEAR ANTIBODIES: CPT

## 2022-03-13 PROCEDURE — 85610 PROTHROMBIN TIME: CPT

## 2022-03-13 PROCEDURE — 6360000002 HC RX W HCPCS: Performed by: NURSE PRACTITIONER

## 2022-03-13 PROCEDURE — 36415 COLL VENOUS BLD VENIPUNCTURE: CPT

## 2022-03-13 PROCEDURE — 1200000000 HC SEMI PRIVATE

## 2022-03-13 PROCEDURE — 1200000003 HC TELEMETRY R&B

## 2022-03-13 PROCEDURE — 93010 ELECTROCARDIOGRAM REPORT: CPT | Performed by: NUCLEAR MEDICINE

## 2022-03-13 PROCEDURE — 86256 FLUORESCENT ANTIBODY TITER: CPT

## 2022-03-13 PROCEDURE — 80074 ACUTE HEPATITIS PANEL: CPT

## 2022-03-13 RX ORDER — POTASSIUM CHLORIDE 7.45 MG/ML
10 INJECTION INTRAVENOUS PRN
Status: DISCONTINUED | OUTPATIENT
Start: 2022-03-13 | End: 2022-03-16 | Stop reason: HOSPADM

## 2022-03-13 RX ORDER — POTASSIUM CHLORIDE 20 MEQ/1
40 TABLET, EXTENDED RELEASE ORAL PRN
Status: DISCONTINUED | OUTPATIENT
Start: 2022-03-13 | End: 2022-03-16 | Stop reason: HOSPADM

## 2022-03-13 RX ADMIN — OXYCODONE HYDROCHLORIDE AND ACETAMINOPHEN 500 MG: 500 TABLET ORAL at 09:57

## 2022-03-13 RX ADMIN — SUCRALFATE 1 G: 1 TABLET ORAL at 05:53

## 2022-03-13 RX ADMIN — CEFAZOLIN SODIUM 1000 MG: 1 INJECTION, SOLUTION INTRAVENOUS at 03:36

## 2022-03-13 RX ADMIN — Medication 1 TABLET: at 21:09

## 2022-03-13 RX ADMIN — SODIUM CHLORIDE, PRESERVATIVE FREE 10 ML: 5 INJECTION INTRAVENOUS at 17:21

## 2022-03-13 RX ADMIN — SPIRONOLACTONE 50 MG: 25 TABLET ORAL at 09:57

## 2022-03-13 RX ADMIN — Medication 1 TABLET: at 09:57

## 2022-03-13 RX ADMIN — PAROXETINE HYDROCHLORIDE 30 MG: 30 TABLET, FILM COATED ORAL at 09:57

## 2022-03-13 RX ADMIN — FUROSEMIDE 20 MG: 20 TABLET ORAL at 09:57

## 2022-03-13 RX ADMIN — CEFAZOLIN SODIUM 1000 MG: 1 INJECTION, SOLUTION INTRAVENOUS at 10:03

## 2022-03-13 RX ADMIN — PANTOPRAZOLE SODIUM 40 MG: 40 TABLET, DELAYED RELEASE ORAL at 05:53

## 2022-03-13 RX ADMIN — SODIUM CHLORIDE, PRESERVATIVE FREE 10 ML: 5 INJECTION INTRAVENOUS at 10:04

## 2022-03-13 RX ADMIN — ASPIRIN 325 MG: 325 TABLET ORAL at 09:57

## 2022-03-13 RX ADMIN — CETIRIZINE HYDROCHLORIDE 5 MG: 5 TABLET ORAL at 09:57

## 2022-03-13 RX ADMIN — TRAZODONE HYDROCHLORIDE 50 MG: 50 TABLET ORAL at 23:17

## 2022-03-13 RX ADMIN — SUCRALFATE 1 G: 1 TABLET ORAL at 17:20

## 2022-03-13 RX ADMIN — Medication 100 MG: at 09:57

## 2022-03-13 RX ADMIN — SUCRALFATE 1 G: 1 TABLET ORAL at 21:09

## 2022-03-13 RX ADMIN — METOLAZONE 2.5 MG: 2.5 TABLET ORAL at 12:32

## 2022-03-13 RX ADMIN — SUCRALFATE 1 G: 1 TABLET ORAL at 10:45

## 2022-03-13 RX ADMIN — POTASSIUM CHLORIDE 40 MEQ: 1500 TABLET, EXTENDED RELEASE ORAL at 12:32

## 2022-03-13 RX ADMIN — SODIUM CHLORIDE, PRESERVATIVE FREE 10 ML: 5 INJECTION INTRAVENOUS at 10:45

## 2022-03-13 NOTE — PROGRESS NOTES
Returned from ultrasound per wheelchair. Up to chair. Denies dizziness/lightheadedness when up. Abdomen less distended, bandaid to paracentesis site dry and intact. Denies pain.

## 2022-03-13 NOTE — PLAN OF CARE
Problem: Falls - Risk of:  Goal: Will remain free from falls  Description: Will remain free from falls  3/12/2022 2320 by Latonia Judge RN  Outcome: Ongoing  3/12/2022 1438 by Wolf Cazares RN  Outcome: Ongoing  Note: Free from falls this shift with hourly rounding, room clutter free. Wearing gripper socks when up. Call light with in reach  Goal: Absence of physical injury  Description: Absence of physical injury  3/12/2022 2320 by Latonia Judge RN  Outcome: Ongoing  3/12/2022 1438 by Wolf Cazares RN  Outcome: Ongoing  Note: Free from falls this shift with hourly rounding, room clutter free. Wearing gripper socks when up. Call light with in reach     Problem: SAFETY  Goal: Free from accidental physical injury  3/12/2022 2320 by Latonia Judge RN  Outcome: Ongoing  3/12/2022 1438 by Wolf Cazares RN  Outcome: Met This Shift  Note: Free of injury this shift     Problem: DAILY CARE  Goal: Daily care needs are met  3/12/2022 2320 by Latonia Judge RN  Outcome: Ongoing  3/12/2022 1438 by Wolf Cazares RN  Outcome: Met This Shift     Problem: PAIN  Goal: Patient's pain/discomfort is manageable  3/12/2022 2320 by Latonia Judge RN  Outcome: Ongoing  3/12/2022 1438 by Wolf Cazares RN  Outcome: Met This Shift  Note: Free of pain this shift. Denies any discomfort     Problem: SKIN INTEGRITY  Goal: Skin integrity is maintained or improved  3/12/2022 2320 by Latonia Judge RN  Outcome: Ongoing  3/12/2022 1438 by Wolf Cazares RN  Outcome: Met This Shift     Problem: KNOWLEDGE DEFICIT  Goal: Patient/S.O. demonstrates understanding of disease process, treatment plan, medications, and discharge instructions.   3/12/2022 2320 by Latonia Judge RN  Outcome: Ongoing  3/12/2022 1438 by Wolf Cazares RN  Outcome: Met This Shift  Intervention: ASSESS BASELINE KNOWLEDGE  3/12/2022 1438 by Wolf Cazares RN  Note: Verbalized understanding of treatment plan     Problem: DISCHARGE BARRIERS  Goal: Patient's continuum of care needs are met  3/12/2022 2320 by Tanya Fontana RN  Outcome: Ongoing  3/12/2022 1438 by Khushbu Carrington RN  Outcome: Ongoing  Note: Wants to return homewith possible home health.

## 2022-03-13 NOTE — FLOWSHEET NOTE
Consult request/SABINO Messnia CNP. Reason: ascites, paracentesis 3/11, increased fluid. Paracentesis ordered for today, patient will be going to ultrasound soon.

## 2022-03-13 NOTE — PROGRESS NOTES
Attempt IV start in left hand with 24 gauge needle, unsuccessful; small amount of purple bruising. Dressing applied.

## 2022-03-13 NOTE — PROGRESS NOTES
Renal Progress Note    Assessment and Plan:   1. Acute kidney injury due to renal hypoperfusion from hypotension stable and mostly prerenal azotemia  2. Hypokalemia  3. Atrial fibrillation with controlled ventricular rate  4. Normocytic anemia  5. Hepatic cirrhosis by history  6. Thrombocytopenia improved  7. Moderate to severe pulmonary hypertension  8. Hypotension stable    PLAN:  1. Labs reviewed  2. Serum creatinine stable  3. Medications reviewed  4. No changes  5. Labs in the morning  6. We will follow    Patient Active Problem List:     Chronic atrial fibrillation (HCC)     Ascending cholangitis, possible     Elevated LFTs     Thrombocytopenia (HCC)     Leukopenia     ISIDRA (acute kidney injury) ([facility] [address]     SIRS (systemic inflammatory response syndrome) ([facility])     Cholecystitis, acute     Neutropenia ([facility])     Aortic stenosis, severe     Colitis     Nausea     Diarrhea     Essential hypertension     Anemia, normocytic normochromic     Hypocalcemia     Cirrhosis (HCC)     S/P AVR (aortic valve replacement)     Obesity (BMI 30-39. 9)     History of atrial fibrillation     Hypomagnesemia     Asymptomatic bacteriuria - no clinical indication for antimicrobial therapy     CKD (chronic kidney disease) stage 3, GFR 30-59 ml/min ([facility])     Acute renal failure superimposed on stage 3 chronic kidney disease ([facility])     VRE (vancomycin resistant enterococcus) culture positive     CHF (congestive heart failure), NYHA class I, acute on chronic, combined ([facility])     Shortness of breath      Subjective:   Admit Date: 3/11/2022    Interval History:  Seen and examined  Seen for acute kidney injury  Sleeping during round  Updated by the staff  No new issues  Blood pressure remains low but stable  Urine output is not documented    Medications:   Scheduled Meds:   ceFAZolin  1,000 mg IntraVENous Q8H    calcium-cholecalciferol  1 tablet Oral BID    carvedilol  6.25 mg Oral BID WC    furosemide  20 mg Oral Daily    cetirizine  5 mg Oral Daily    losartan  25 mg Oral Daily    metOLazone  2.5 mg Oral Daily    multivitamin  1 tablet Oral Daily    pantoprazole  40 mg Oral QAM AC    PARoxetine  30 mg Oral QAM    vitamin B-6  100 mg Oral Daily    spironolactone  50 mg Oral Daily    sucralfate  1 g Oral 4x Daily AC & HS    traZODone  50 mg Oral Nightly    vitamin C  500 mg Oral Daily    sodium chloride flush  10 mL IntraVENous 2 times per day    aspirin  325 mg Oral Daily     Continuous Infusions:   sodium chloride         CBC:   Recent Labs     03/11/22  1417 03/12/22  0449 03/13/22  0559   WBC 5.5 4.4* 5.9   HGB 11.4* 10.8* 10.7*    119* 132     CMP:    Recent Labs     03/11/22  1417 03/12/22  0449 03/13/22  0559   * 136 137   K 3.6 3.5 3.2*    101 102   CO2 19* 22* 23   BUN 50* 49* 46*   CREATININE 1.4* 1.4* 1.4*   GLUCOSE 81 92 100   CALCIUM 8.6 8.4* 8.5   LABGLOM 36* 36* 36*     Troponin: No results for input(s): TROPONINI in the last 72 hours. BNP: No results for input(s): BNP in the last 72 hours. INR:   Recent Labs     03/11/22  1446 03/12/22  0449   INR 1.27* 1.41*     Lipids:   Recent Labs     03/11/22  1417   LIPASE 19.3     Liver:   Recent Labs     03/12/22  0449   AST 17   ALT 6*   ALKPHOS 69   PROT 5.5*   LABALBU 3.1*   BILITOT 1.0     Iron:  No results for input(s): IRONS, FERRITIN in the last 72 hours. Invalid input(s): LABIRONS  US LIVER   Final Result   Impression:   1. Chronic liver disease with portal venous hypertension and ascites. 2. Gallbladder wall thickening may reflect cholecystitis or adjacent liver    disease. Possible gallstones and sludge in the region of the gallbladder    neck. Negative sonographic Cifuentes sign. 3. Echogenic right kidney with lower pole cyst.      This document has been electronically signed by: Kory Elias MD on    03/12/2022 02:58 AM      VL DUP LOWER EXTREMITY VENOUS RIGHT   Final Result   Negative for right lower extremity deep vein thrombosis.       This document has been electronically signed by: Varun Rowan MD on    03/11/2022 09:08 PM      Technique Used: Duplex examination performed utilizing grayscale, color    and spectral analysis. US GUIDED PARACENTESIS   Final Result   Successful ultrasound-guided paracentesis. **This report has been created using voice recognition software. It may contain minor errors which are inherent in voice recognition technology. **      Final report electronically signed by Dr. Peyman Palacio on 3/11/2022 4:09 PM      XR CHEST (2 VW)   Final Result   1. Small bilateral pleural effusions with associated compressive atelectasis. 2. Moderate cardiomegaly. **This report has been created using voice recognition software. It may contain minor errors which are inherent in voice recognition technology. **      Final report electronically signed by Dr Dominik Jade on 3/11/2022 2:49 PM      [address]    (Results Pending)         Objective:   Vitals: BP (!) 100/51   Pulse 81   Temp 97.6 °F (36.4 °C) (Oral)   Resp 18   Ht 5' 5\" (1.651 m)   Wt 186 lb 11.2 oz (84.7 kg)   SpO2 94%   BMI 31.07 kg/m²    Wt Readings from Last 3 Encounters:   03/13/22 186 lb 11.2 oz (84.7 kg)   02/09/22 166 lb (75.3 kg)   03/14/19 186 lb 12.8 oz (84.7 kg)      24HR INTAKE/OUTPUT:      Intake/Output Summary (Last 24 hours) at 3/13/2022 0932  Last data filed at 3/13/2022 0800  Gross per 24 hour   Intake 787 ml   Output --   Net 787 ml       Constitutional: Well-developed elderly lady comfortably asleep , no apparent distress   Skin:normal with no rash or lesions. HEENT: Head is normal.Throat is clear . Oral mucosa is moist   Neck:supple with no  carotid bruit  Cardiovascular: Irregular heart rhythm  Respiratory:  Clear to ausculation without wheezes, rhonchi or rales in the anterior  Abdomen: Soft. Good bowel sounds.   Ext: 2+ bilateral LE edema  Musculoskeletal:Intact  Neuro: Deferred      Electronically signed by José Luis Kahn Simba Lee MD on 3/13/2022 at 9:32 AM  **This report has been created using voice recognition software. It maycontain minor  errors which are inherent in voice recognition technology. **

## 2022-03-13 NOTE — PLAN OF CARE
Problem: Falls - Risk of:  Goal: Will remain free from falls  Description: Will remain free from falls  Outcome: Met This Shift     Problem: DAILY CARE  Goal: Daily care needs are met  Outcome: Met This Shift     Problem: PAIN  Goal: Patient's pain/discomfort is manageable  Outcome: Met This Shift  Note: Denies pain. Problem: SKIN INTEGRITY  Goal: Skin integrity is maintained or improved  Outcome: Ongoing  Note: BLE remain edematous with 1-2+ pitting edema. RLE with deep redness/warmth; area marked. Legs elevated at all times when in bed or chair. Problem: DISCHARGE BARRIERS  Goal: Patient's continuum of care needs are met  Outcome: Ongoing  Note: [address] on discharge for pulmonary hypertension clinic. Follow with GI for ascites. Patient will need new prescription for Lasix on discharge. Care plan reviewed with patient and son. Patient and son verbalize understanding of the plan of care and contribute to goal setting.

## 2022-03-13 NOTE — PROGRESS NOTES
improvement, on exam patient's abdomen is more distended     Subjective (past 24 hours): States she feels worse today, states she is having some difficulty breathing and her abdomen is \"full again\"    Medications:  Reviewed    Infusion Medications    sodium chloride       Scheduled Medications    ceFAZolin  1,000 mg IntraVENous Q8H    calcium-cholecalciferol  1 tablet Oral BID    carvedilol  6.25 mg Oral BID WC    furosemide  20 mg Oral Daily    cetirizine  5 mg Oral Daily    losartan  25 mg Oral Daily    [Held by provider] metOLazone  2.5 mg Oral Daily    multivitamin  1 tablet Oral Daily    pantoprazole  40 mg Oral QAM AC    PARoxetine  30 mg Oral QAM    vitamin B-6  100 mg Oral Daily    spironolactone  50 mg Oral Daily    sucralfate  1 g Oral 4x Daily AC & HS    traZODone  50 mg Oral Nightly    vitamin C  500 mg Oral Daily    sodium chloride flush  10 mL IntraVENous 2 times per day    aspirin  325 mg Oral Daily     PRN Meds: sodium chloride flush, sodium chloride, ondansetron **OR** ondansetron, polyethylene glycol, acetaminophen **OR** acetaminophen      Intake/Output Summary (Last 24 hours) at 3/13/2022 0647  Last data filed at 3/13/2022 0556  Gross per 24 hour   Intake 637 ml   Output --   Net 637 ml       Diet:  ADULT DIET; Regular; Low Sodium (2 gm); 1500 ml    Exam:  BP (!) 99/57   Pulse 90   Temp 97.6 °F (36.4 °C) (Oral)   Resp 18   Ht 5' 5\" (1.651 m)   Wt 186 lb 11.2 oz (84.7 kg)   SpO2 92% Comment: pt ws at 86%, placed on 2 L NC per oxygen protocol  BMI 31.07 kg/m²     General appearance: No apparent distress, appears stated age and cooperative. HEENT: Pupils equal, round, and reactive to light. Conjunctivae/corneas clear. Neck: Supple, with full range of motion. No jugular venous distention. Trachea midline. Respiratory:  Normal respiratory effort. Fine crackles to auscultation, bilaterally   Cardiovascular: Regular rate and rhythm with normal S1/S2; (+) murmur.   Abdomen: Soft, non-tender, distended with normal bowel sounds. Musculoskeletal: passive and active ROM x 4 extremities. Right lower extremity with redness to the anterior aspect and area marked   Skin: Skin color, texture, turgor normal.    Neurologic:  Neurovascularly intact without any focal sensory/motor deficits. Cranial nerves: II-XII intact, grossly non-focal.  Psychiatric: Alert and oriented x 4, thought content appropriate  Capillary Refill: Brisk,< 3 seconds   Peripheral Pulses: +2 palpable, equal bilaterally     Labs:   Recent Labs     03/11/22  1417 03/12/22  0449 03/13/22  0559   WBC 5.5 4.4* 5.9   HGB 11.4* 10.8* 10.7*   HCT 36.9* 35.2* 34.7*    119* 132     Recent Labs     03/11/22  1417 03/12/22  0449 03/13/22  0559   * 136 137   K 3.6 3.5 3.2*    101 102   CO2 19* 22* 23   BUN 50* 49* 46*   CREATININE 1.4* 1.4* 1.4*   CALCIUM 8.6 8.4* 8.5     Recent Labs     03/11/22  1417 03/12/22  0449   AST 23 17   ALT 8* 6*   BILIDIR 0.5*  --    BILITOT 1.0 1.0   ALKPHOS 86 69     Recent Labs     03/11/22  1446 03/12/22  0449   INR 1.27* 1.41*     Microbiology:    Ascitic fluid showing rare segmented neutrophils, no organisms, no growth preliminary    Urinalysis:      Lab Results   Component Value Date    NITRU NEGATIVE 03/11/2022    WBCUA 0-2 03/11/2022    BACTERIA NONE SEEN 03/11/2022    RBCUA 0-2 03/11/2022    BLOODU NEGATIVE 03/11/2022    SPECGRAV 1.017 03/11/2022    GLUCOSEU NEGATIVE 09/01/2018       Radiology:  XR CHEST (2 VW)    Result Date: 3/11/2022  PROCEDURE: XR CHEST (2 VW) CLINICAL INFORMATION: Shortness of Breath COMPARISON: Chest radiograph 1/18/2022, 2/26/2021. TECHNIQUE: 2 views of the chest FINDINGS: Small bilateral pleural effusions are seen. Bibasilar opacities likely relate to compressive atelectasis. Cardiac silhouette is moderately enlarged. A left atrial appendage clip and aortic valve are seen. No pneumothorax. No acute bony abnormality. Median sternotomy has been performed. 1. Small bilateral pleural effusions with associated compressive atelectasis. 2. Moderate cardiomegaly. **This report has been created using voice recognition software. It may contain minor errors which are inherent in voice recognition technology. ** Final report electronically signed by Dr Bertha Lynch on 3/11/2022 2:49 PM    US LIVER    Result Date: 3/12/2022  Exam: Limited abdominal ultrasound: Comparison: None Findings: Echogenic pancreas without mass. Normal caliber pancreatic duct. Enlarged heterogeneous liver with nodular surface. No discrete liver mass. Bidirectional flow within the portal veins. Dilated hepatic veins with appropriate flow direction. Diffuse gallbladder wall thickening to 9 mm. Sludge or small gallstones in the gallbladder neck region. Negative sonographic Cifuentes sign. Possible 2 mm gallbladder wall polyp. 4 mm CBD. Moderate ascites. Fluid surrounds the gallbladder. Echogenic right kidney with lower pole 3.6 x 3 x 0 cm cortical cyst.     Impression: 1. Chronic liver disease with portal venous hypertension and ascites. 2. Gallbladder wall thickening may reflect cholecystitis or adjacent liver disease. Possible gallstones and sludge in the region of the gallbladder neck. Negative sonographic Cifuentes sign. 3. Echogenic right kidney with lower pole cyst. This document has been electronically signed by: Sailaja Rosas MD on 03/12/2022 02:58 AM    US GUIDED PARACENTESIS    Result Date: 3/11/2022  PROCEDURE: US GUIDED PARACENTESIS CLINICAL INFORMATION: Ascites COMPARISON: 1/24/2022 TECHNIQUE: Ultrasound-guided paracentesis FINDINGS: The procedure was explained the patient. All risks were discussed. Written informed consent was obtained. Limited ultrasound was performed revealing the ascites. An acceptable location was identified. The skin was marked. The overlying area was sterilely prepped and draped. 2 percent lidocaine was given for local anesthesia.  Next a 5 Romanian one-step catheter  was introduced into the ascites. Clear yellow fluid was removed. The catheter was removed. The patient tolerated the procedure. No immediate complications. Physician performing procedure: Dr Willy Cuba Informed consent signed: Yes Local Anesthetic: 2 % Lidocaine Specimen volume: 70 ml Catheter: 19 ga 5 Wolof Aspirated ascites volume: 3.8 liters Aspirated ascites color: Old slightly heme tinged hardeep-colored fluid Site of Puncture:RLQ Complications: None observed     Successful ultrasound-guided paracentesis. **This report has been created using voice recognition software. It may contain minor errors which are inherent in voice recognition technology. ** Final report electronically signed by Dr. Yemi Gaming on 3/11/2022 4:09 PM    VL DUP LOWER EXTREMITY VENOUS RIGHT    Result Date: 3/11/2022  Venous duplex ultrasound right lower extremity Comparison: None Findings: The visualized deep veins are fully compressible with normal Doppler color flow and spectral tracings. Mild subcutaneous edema in the right leg. No popliteal cyst.     Negative for right lower extremity deep vein thrombosis. This document has been electronically signed by: Trey Baeza MD on 03/11/2022 09:08 PM Technique Used: Duplex examination performed utilizing grayscale, color and spectral analysis.       DVT prophylaxis: [] Lovenox                                 [x] SCDs                                 [] SQ Heparin                                 [] Encourage ambulation           [] Already on Anticoagulation     Code Status: Full Code    PT/OT Eval Status: Monitor    Tele:   [x] Sinus rhythm heart rate 88             [] no    Active Hospital Problems    Diagnosis Date Noted    Shortness of breath [R06.02] 03/11/2022       Electronically signed by ARTHUR Grider CNP on 3/13/2022 at 6:47 AM

## 2022-03-14 LAB
AFP-TUMOR MARKER: 2.6 UG/L
ALBUMIN SERPL-MCNC: 2.8 G/DL (ref 3.5–5.1)
ALP BLD-CCNC: 72 U/L (ref 38–126)
ALT SERPL-CCNC: < 5 U/L (ref 11–66)
ANION GAP SERPL CALCULATED.3IONS-SCNC: 12 MEQ/L (ref 8–16)
AST SERPL-CCNC: 18 U/L (ref 5–40)
BILIRUB SERPL-MCNC: 0.6 MG/DL (ref 0.3–1.2)
BILIRUBIN DIRECT: 0.3 MG/DL (ref 0–0.3)
BUN BLDV-MCNC: 39 MG/DL (ref 7–22)
CALCIUM SERPL-MCNC: 8.5 MG/DL (ref 8.5–10.5)
CHLORIDE BLD-SCNC: 99 MEQ/L (ref 98–111)
CO2: 26 MEQ/L (ref 23–33)
CREAT SERPL-MCNC: 1.2 MG/DL (ref 0.4–1.2)
ERYTHROCYTE [DISTWIDTH] IN BLOOD BY AUTOMATED COUNT: 18.4 % (ref 11.5–14.5)
ERYTHROCYTE [DISTWIDTH] IN BLOOD BY AUTOMATED COUNT: 63.7 FL (ref 35–45)
GFR SERPL CREATININE-BSD FRML MDRD: 43 ML/MIN/1.73M2
GLUCOSE BLD-MCNC: 92 MG/DL (ref 70–108)
HCT VFR BLD CALC: 34.4 % (ref 37–47)
HEMOGLOBIN: 10.8 GM/DL (ref 12–16)
MCH RBC QN AUTO: 29.8 PG (ref 26–33)
MCHC RBC AUTO-ENTMCNC: 31.4 GM/DL (ref 32.2–35.5)
MCV RBC AUTO: 94.8 FL (ref 81–99)
PLATELET # BLD: 118 THOU/MM3 (ref 130–400)
PMV BLD AUTO: 9.9 FL (ref 9.4–12.4)
POTASSIUM SERPL-SCNC: 3.3 MEQ/L (ref 3.5–5.2)
RBC # BLD: 3.63 MILL/MM3 (ref 4.2–5.4)
SARS-COV-2, NAAT: NOT DETECTED
SODIUM BLD-SCNC: 137 MEQ/L (ref 135–145)
TOTAL PROTEIN: 5.5 G/DL (ref 6.1–8)
WBC # BLD: 3.9 THOU/MM3 (ref 4.8–10.8)

## 2022-03-14 PROCEDURE — 97162 PT EVAL MOD COMPLEX 30 MIN: CPT

## 2022-03-14 PROCEDURE — 6370000000 HC RX 637 (ALT 250 FOR IP)

## 2022-03-14 PROCEDURE — 97116 GAIT TRAINING THERAPY: CPT

## 2022-03-14 PROCEDURE — 82248 BILIRUBIN DIRECT: CPT

## 2022-03-14 PROCEDURE — 80053 COMPREHEN METABOLIC PANEL: CPT

## 2022-03-14 PROCEDURE — 6360000002 HC RX W HCPCS: Performed by: NURSE PRACTITIONER

## 2022-03-14 PROCEDURE — 97161 PT EVAL LOW COMPLEX 20 MIN: CPT

## 2022-03-14 PROCEDURE — 87635 SARS-COV-2 COVID-19 AMP PRB: CPT

## 2022-03-14 PROCEDURE — 99233 SBSQ HOSP IP/OBS HIGH 50: CPT | Performed by: NURSE PRACTITIONER

## 2022-03-14 PROCEDURE — 36415 COLL VENOUS BLD VENIPUNCTURE: CPT

## 2022-03-14 PROCEDURE — 6370000000 HC RX 637 (ALT 250 FOR IP): Performed by: INTERNAL MEDICINE

## 2022-03-14 PROCEDURE — 2580000003 HC RX 258

## 2022-03-14 PROCEDURE — 6370000000 HC RX 637 (ALT 250 FOR IP): Performed by: NURSE PRACTITIONER

## 2022-03-14 PROCEDURE — 1200000000 HC SEMI PRIVATE

## 2022-03-14 PROCEDURE — 99232 SBSQ HOSP IP/OBS MODERATE 35: CPT | Performed by: INTERNAL MEDICINE

## 2022-03-14 PROCEDURE — 85027 COMPLETE CBC AUTOMATED: CPT

## 2022-03-14 RX ORDER — METOLAZONE 5 MG/1
5 TABLET ORAL DAILY
Status: DISCONTINUED | OUTPATIENT
Start: 2022-03-15 | End: 2022-03-16 | Stop reason: HOSPADM

## 2022-03-14 RX ORDER — FUROSEMIDE 40 MG/1
40 TABLET ORAL DAILY
Status: DISCONTINUED | OUTPATIENT
Start: 2022-03-15 | End: 2022-03-16 | Stop reason: HOSPADM

## 2022-03-14 RX ADMIN — SUCRALFATE 1 G: 1 TABLET ORAL at 05:30

## 2022-03-14 RX ADMIN — Medication 1 TABLET: at 08:45

## 2022-03-14 RX ADMIN — SODIUM CHLORIDE 25 ML: 9 INJECTION, SOLUTION INTRAVENOUS at 01:56

## 2022-03-14 RX ADMIN — Medication 1 TABLET: at 21:47

## 2022-03-14 RX ADMIN — PANTOPRAZOLE SODIUM 40 MG: 40 TABLET, DELAYED RELEASE ORAL at 05:30

## 2022-03-14 RX ADMIN — SPIRONOLACTONE 50 MG: 25 TABLET ORAL at 08:45

## 2022-03-14 RX ADMIN — POTASSIUM BICARBONATE 40 MEQ: 782 TABLET, EFFERVESCENT ORAL at 15:34

## 2022-03-14 RX ADMIN — CEFAZOLIN SODIUM 1000 MG: 1 INJECTION, SOLUTION INTRAVENOUS at 17:39

## 2022-03-14 RX ADMIN — CEFAZOLIN SODIUM 1000 MG: 1 INJECTION, SOLUTION INTRAVENOUS at 01:56

## 2022-03-14 RX ADMIN — ASPIRIN 325 MG: 325 TABLET ORAL at 08:48

## 2022-03-14 RX ADMIN — OXYCODONE HYDROCHLORIDE AND ACETAMINOPHEN 500 MG: 500 TABLET ORAL at 08:45

## 2022-03-14 RX ADMIN — CARVEDILOL 6.25 MG: 6.25 TABLET, FILM COATED ORAL at 16:00

## 2022-03-14 RX ADMIN — FUROSEMIDE 20 MG: 20 TABLET ORAL at 08:46

## 2022-03-14 RX ADMIN — ONDANSETRON 4 MG: 4 TABLET, ORALLY DISINTEGRATING ORAL at 21:47

## 2022-03-14 RX ADMIN — SODIUM CHLORIDE, PRESERVATIVE FREE 10 ML: 5 INJECTION INTRAVENOUS at 21:47

## 2022-03-14 RX ADMIN — Medication 100 MG: at 08:46

## 2022-03-14 RX ADMIN — METOLAZONE 2.5 MG: 2.5 TABLET ORAL at 08:46

## 2022-03-14 RX ADMIN — SODIUM CHLORIDE, PRESERVATIVE FREE 10 ML: 5 INJECTION INTRAVENOUS at 08:44

## 2022-03-14 RX ADMIN — SUCRALFATE 1 G: 1 TABLET ORAL at 21:47

## 2022-03-14 RX ADMIN — PAROXETINE HYDROCHLORIDE 30 MG: 30 TABLET, FILM COATED ORAL at 08:46

## 2022-03-14 RX ADMIN — CETIRIZINE HYDROCHLORIDE 5 MG: 5 TABLET ORAL at 08:45

## 2022-03-14 RX ADMIN — CEFAZOLIN SODIUM 1000 MG: 1 INJECTION, SOLUTION INTRAVENOUS at 08:49

## 2022-03-14 NOTE — PROGRESS NOTES
Gastroenterology Progress Note:     Patient Name:  Glenroy Olson   MRN: [id number]  [id number]  YOB: 1940  Admit Date: 3/11/2022  1:33 PM  Primary Care Physician: ARTHUR Santo - CNP   [id number]     Patient seen and examined. 24 hours events and chart reviewed. Subjective: Patient sitting up in the chair. Denies abd pain, n/v. She is tolerating a diet. Objective:  /66   Pulse 88   Temp 97.3 °F (36.3 °C) (Oral)   Resp 18   Ht 5' 5\" (1.651 m)   Wt 186 lb 11.2 oz (84.7 kg)   SpO2 95%   BMI 31.07 kg/m²     Physical Exam:    General:  Chronically ill appearing female  HEENT: Atraumatic, normocephalic. Moist oral mucous membranes. Neck: Supple without adenopathy, JVD, thyromegaly or masses. Trachea midline. CV: Heart RRR, no murmurs, rubs, gallops. Resp: Even, easy without cough or accessory use. Lungs clear to ascultation bilaterally. Abd: Round, soft, obese, nontender. No hepatosplenomegaly or mass present. Active bowel sounds heard. No distention noted. Ext:  Without cyanosis, clubbing. Trace BLE edema. Skin: Pink, warm, dry. Erythema to RLE. Neuro:  Alert, oriented x 3 with no obvious deficits.        Rectal: deferred    Labs:   CBC:   Lab Results   Component Value Date    WBC 3.9 03/14/2022    HGB 10.8 03/14/2022    HCT 34.4 03/14/2022    MCV 94.8 03/14/2022     03/14/2022     BMP:   Lab Results   Component Value Date     03/14/2022    K 3.3 03/14/2022    K 3.2 03/13/2022    CL 99 03/14/2022    CO2 26 03/14/2022    PHOS 4.4 04/11/2019    BUN 39 03/14/2022    CREATININE 1.2 03/14/2022    CALCIUM 8.5 03/14/2022     PT/INR:   Lab Results   Component Value Date    PROTIME 1.75 08/21/2011    INR 1.29 03/13/2022     Lipids:   Lab Results   Component Value Date    ALKPHOS 72 03/14/2022    ALT <5 03/14/2022    AST 18 03/14/2022    BILITOT 0.6 03/14/2022    BILIDIR 0.3 03/14/2022    LABALBU 2.8 03/14/2022    LABALBU 3.2 08/20/2011    AMYLASE 67 07/19/2019    LIPASE 19.3 03/11/2022     Current Meds:  Scheduled Meds:   ceFAZolin  1,000 mg IntraVENous Q8H    calcium-cholecalciferol  1 tablet Oral BID    carvedilol  6.25 mg Oral BID WC    furosemide  20 mg Oral Daily    cetirizine  5 mg Oral Daily    losartan  25 mg Oral Daily    metOLazone  2.5 mg Oral Daily    multivitamin  1 tablet Oral Daily    pantoprazole  40 mg Oral QAM AC    PARoxetine  30 mg Oral QAM    vitamin B-6  100 mg Oral Daily    spironolactone  50 mg Oral Daily    sucralfate  1 g Oral 4x Daily AC & HS    traZODone  50 mg Oral Nightly    vitamin C  500 mg Oral Daily    sodium chloride flush  10 mL IntraVENous 2 times per day    aspirin  325 mg Oral Daily     Continuous Infusions:   sodium chloride 25 mL (03/14/22 0156)       Assessment:  82 yo F admitted 03/11/22 for SOB & edema. Endorses dry heaves/nausea, decreased appetite, diarrhea, & decreased UO. She was seen about 1 week PTA, by cardiology, & her diuretics were increased. She followed up a GREG the following week & was sent to the ED for abd distention, possible paracentesis. H/O cirrhosis. CXR demonstrated bilateral pleural effusions with compressive atelectasis & moderate cardiomegaly. US liver demonstrated nodular, enlarged liver with portal venous HTN & ascites. S/P para 03/11/22 with 3.8L removed & 03/13/22 with 2.5L removed. 1. Decompensated liver cirrhosis  2. Ascites s/p para 03/11/22 & 03/13/22- likely cardiogenic  3. Moderate to severe pulmonary hypertension & tricuspid regurgitation- s/p cardiac cath 02/09/22, cardiology wants patient transferred to [facility], however they recommend close OP follow-up. 4. Normocytic anemia- no GI bleeding noted  5. ISIDRA on CKD- nephro following  6. BLE edema  7. SOB  8. Dry heaves/nausea- resolved  9. Portal venous hypertension  10. Paroxysmal afib  11. Acute on chronic CHF  12. Coagulopathy  13. H/O HTN  14. Hypokalemia  15. Pancytopenia  16.  Hypoalbuminemia   17. ? RLE cellulitis    Plan:     Monitor CBC   ATBs per primary   Increase Lasix to 40mg daily, home dose   Increase metolazone to 5mg daily, home dose   Continue Aldactone, may need increased to 100mg daily if okay with nephro & cardiology   2g sodium diet with fluid restriction   Daily weights   Strict I & O   Avoid hepatotoxic meds, okay for Tylenol 2g restriction   Avoid narcotics & benzodiazepines as they can precipitate HE   Continue PPI daily, home dose   Monitor CMP, INR   RN updated   Nephrology on board   Case discussed at length with primary team   Supportive care per primary team   Okay to discharge from GI standpoint   Will need a 4 week follow-up with Eileen GIBSON    Case reviewed and impression/plan reviewed in collaboration with Dr. Dc Guthrie  Electronically signed by ARTHUR Zamora - CNP on 3/14/2022 at 12:24 PM    [facility]

## 2022-03-14 NOTE — CARE COORDINATION
3/14/22, 7:27 AM EDT  DISCHARGE PLANNING EVALUATION:    Titi Jackson       Admitted: 3/11/2022/ [facility] day: 3   Location: -03/003-A Reason for admit: Shortness of breath [R06.02]  Pleural effusion [J90]  Other ascites [R18.8]   PMH:  has a past medical history of ISIDRA (acute kidney injury) ([facility] [address] Arthritis, Atrial fibrillation ([facility] [address] Bleeding stomach ulcer, CAD (coronary artery disease), Clostridium difficile infection, History of blood transfusion, Hyperlipidemia, Hypertension, and PONV (postoperative nausea and vomiting). Procedure: paracentesis on 3/11 with 3.8 L drained; had paracentesis on 3/13 with 2.5 L drained    Barriers to Discharge: On room air, cardiology initial plan tx to [facility] for pulmonary hypertension, [facility] called, GI follows, patient to go to [facility] as OP pulm. HTN. (see hospitalist note 3/14)  Daily weights, Lasix, Ancef IV. ASA, Coreg, Cozaar, Zaroxolyn, Paxil, Aldactone. Telemetry Atrial fib 80's. Ortho static vitals. PCP: ARTHUR Jamison CNP  Readmission Risk Score: 15.4 ( )%    Patient Goals/Plan/Treatment Preferences:  Met with Norma this am; she resides home alone. She drives, has son Thedore Gowers who assist with transportation/shopping. She uses rollator, has walk-in shower, shower chair, son checks on her daily, and she currently declined [facility]. She follows with cardiology Dr Aurelia Houston; confirmed PCP and insurance. Hospitalist planning possible discharge home soon and f/u as OP at [facility]. Transportation/Food Security/Housekeeping Addressed:  No issues identified.

## 2022-03-14 NOTE — PLAN OF CARE
Problem: Falls - Risk of:  Goal: Will remain free from falls  Description: Will remain free from falls  Outcome: Met This Shift     Problem: DAILY CARE  Goal: Daily care needs are met  Outcome: Met This Shift     Problem: PAIN  Goal: Patient's pain/discomfort is manageable  Outcome: Met This Shift  Note: Denies pain. Problem: SKIN INTEGRITY  Goal: Skin integrity is maintained or improved  Outcome: Ongoing  Note: BLE remain edematous with 1-2+ pitting edema. RLE with deep redness/warmth; area marked. Legs elevated at all times when in bed or chair. Problem: DISCHARGE BARRIERS  Goal: Patient's continuum of care needs are met  Outcome: Ongoing  Note: [address] on discharge for pulmonary hypertension clinic. Follow with GI for ascites. Patient will need new prescription for Lasix on discharge. Care plan reviewed with patient. Patient verbalizes understanding of the plan of care and contribute to goal setting.

## 2022-03-14 NOTE — PROGRESS NOTES
Renal Progress Note    Assessment and Plan:   1. Acute kidney injury resolving  2. Hypokalemia  3. Normocytic anemia  4. Hepatic cirrhosis   5. Thrombocytopenia with platelet level declining today from yesterday  6. Moderate to severe pulmonary hypertension  7. Hypotension improved  8. Condition      PLAN:  1. Labs reviewed  2. Serum creatinine is improved again today  3. Medications reviewed  4. Potassium replacement  5. Labs in the morning  6. We will follow    Patient Active Problem List:     Chronic atrial fibrillation (HCC)     Ascending cholangitis, possible     Elevated LFTs     Thrombocytopenia (HCC)     Leukopenia     ISIDRA (acute kidney injury) ([facility] [address]     SIRS (systemic inflammatory response syndrome) ([facility])     Cholecystitis, acute     Neutropenia (HCC)     Aortic stenosis, severe     Colitis     Nausea     Diarrhea     Essential hypertension     Anemia, normocytic normochromic     Hypocalcemia     Cirrhosis (HCC)     S/P AVR (aortic valve replacement)     Obesity (BMI 30-39. 9)     History of atrial fibrillation     Hypomagnesemia     Asymptomatic bacteriuria - no clinical indication for antimicrobial therapy     CKD (chronic kidney disease) stage 3, GFR 30-59 ml/min (HCC)     Acute renal failure superimposed on stage 3 chronic kidney disease (HCC)     VRE (vancomycin resistant enterococcus) culture positive     CHF (congestive heart failure), NYHA class I, acute on chronic, combined (HCC)     Shortness of breath      Subjective:   Admit Date: 3/11/2022    Interval History:  Seen and examined  Seen for acute kidney injury  Awake and alert this morning  No complaint  Updated by the staff  Blood pressure is improved  Urine output is not documented completely    Medications:   Scheduled Meds:   [START ON 3/15/2022] furosemide  40 mg Oral Daily    [START ON 3/15/2022] metOLazone  5 mg Oral Daily    ceFAZolin  1,000 mg IntraVENous Q8H    calcium-cholecalciferol  1 tablet Oral BID    carvedilol  6.25 mg Oral BID WC    cetirizine  5 mg Oral Daily    losartan  25 mg Oral Daily    multivitamin  1 tablet Oral Daily    pantoprazole  40 mg Oral QAM AC    PARoxetine  30 mg Oral QAM    vitamin B-6  100 mg Oral Daily    spironolactone  50 mg Oral Daily    sucralfate  1 g Oral 4x Daily AC & HS    traZODone  50 mg Oral Nightly    vitamin C  500 mg Oral Daily    sodium chloride flush  10 mL IntraVENous 2 times per day    aspirin  325 mg Oral Daily     Continuous Infusions:   sodium chloride 25 mL (03/14/22 0156)       CBC:   Recent Labs     03/12/22  0449 03/13/22  0559 03/14/22  0630   WBC 4.4* 5.9 3.9*   HGB 10.8* 10.7* 10.8*   * 132 118*     CMP:    Recent Labs     03/12/22 0449 03/13/22  0559 03/14/22  0630    137 137   K 3.5 3.2* 3.3*    102 99   CO2 22* 23 26   BUN 49* 46* 39*   CREATININE 1.4* 1.4* 1.2   GLUCOSE 92 100 92   CALCIUM 8.4* 8.5 8.5   LABGLOM 36* 36* 43*     Troponin: No results for input(s): TROPONINI in the last 72 hours. BNP: No results for input(s): BNP in the last 72 hours. INR:   Recent Labs     03/11/22  1446 03/12/22  0449 03/13/22  1242   INR 1.27* 1.41* 1.29*     Lipids:   Recent Labs     03/11/22  1417   LIPASE 19.3     Liver:   Recent Labs     03/14/22  0630   AST 18   ALT <5*   ALKPHOS 72   PROT 5.5*   LABALBU 2.8*   BILITOT 0.6     Iron:  No results for input(s): IRONS, FERRITIN in the last 72 hours. Invalid input(s): LABIRONS  US GUIDED PARACENTESIS   Final Result   Successful ultrasound-guided paracentesis. **This report has been created using voice recognition software. It may contain minor errors which are inherent in voice recognition technology. **      Final report electronically signed by Dr. Marzette Boast on 3/13/2022 10:01 AM      US LIVER   Final Result   Impression:   1. Chronic liver disease with portal venous hypertension and ascites. 2. Gallbladder wall thickening may reflect cholecystitis or adjacent liver    disease.  Possible gallstones and sludge in the region of the gallbladder    neck. Negative sonographic Cifuentes sign. 3. Echogenic right kidney with lower pole cyst.      This document has been electronically signed by: Sailaja Rosas MD on    03/12/2022 02:58 AM      VL DUP LOWER EXTREMITY VENOUS RIGHT   Final Result   Negative for right lower extremity deep vein thrombosis. This document has been electronically signed by: Kimberlyn Schneider MD on    03/11/2022 09:08 PM      Technique Used: Duplex examination performed utilizing grayscale, color    and spectral analysis. US GUIDED PARACENTESIS   Final Result   Successful ultrasound-guided paracentesis. **This report has been created using voice recognition software. It may contain minor errors which are inherent in voice recognition technology. **      Final report electronically signed by Dr. Elena Green on 3/11/2022 4:09 PM      XR CHEST (2 VW)   Final Result   1. Small bilateral pleural effusions with associated compressive atelectasis. 2. Moderate cardiomegaly. **This report has been created using voice recognition software. It may contain minor errors which are inherent in voice recognition technology. **      Final report electronically signed by Dr Bertha Lynch on 3/11/2022 2:49 PM            Objective:   Vitals: /66   Pulse 88   Temp 97.3 °F (36.3 °C) (Oral)   Resp 18   Ht 5' 5\" (1.651 m)   Wt 186 lb 11.2 oz (84.7 kg)   SpO2 95%   BMI 31.07 kg/m²    Wt Readings from Last 3 Encounters:   03/13/22 186 lb 11.2 oz (84.7 kg)   02/09/22 166 lb (75.3 kg)   03/14/19 186 lb 12.8 oz (84.7 kg)      24HR INTAKE/OUTPUT:      Intake/Output Summary (Last 24 hours) at 3/14/2022 1456  Last data filed at 3/14/2022 1435  Gross per 24 hour   Intake 830 ml   Output 2000 ml   Net -1170 ml       Constitutional : Well-developed elderly lady awake and alert  Skin:normal with no rash or lesions. HEENT: Head is normal.Throat is clear . Oral mucosa is moist. Pupils are reactive. Neck:supple with no  carotid bruit  Cardiovascular: Irregular heart rhythm  Respiratory:  Clear to ausculation without wheezes, rhonchi or rales in the anterior  Abdomen: Soft. Good bowel sounds. Nontender. Ext: 1+ bilateral LE edema  Musculoskeletal:Intact  Neuro: Awake and alert. Electronically signed by Jennifer Stubbs MD on 3/14/2022 at 2:56 PM  **This report has been created using voice recognition software. It maycontain minor  errors which are inherent in voice recognition technology. **

## 2022-03-14 NOTE — PROGRESS NOTES
Hospitalist Progress Note    Patient:  Luis Merritt      Unit/Bed:7K-03/003-A    YOB: 1940    MRN: [id number]       Acct: [de-identified]     PCP: ARTHUR Brice CNP    Date of Admission: 3/11/2022    Assessment/Plan:    1. Dyspnea--likely secondary to #2, on RA; improved  2. Abdominal ascites--had paracentesis on 3/11 with 3.8 L drained; had paracentesis on 3/13 with 2.5 L drained; on Lasix/Aldactone/ Zaroxolyn; follows with [facility] and appreciate input; ascitic fluid showing no growth at this time  3. Acute hypoxic respiratory failure--questioning if related to #2, currently on RA  4. ISIDRA on CKD stage III--creatinine at 1.4, monitor  5. Hypokalemia--replace per protocol, magnesium 1.8 on 3/13  6. Possible cellulitis right lower extremity (POA)--venous Doppler does not reveal DVT; Ancef 3/12, monitor response  7. Acute on chronic diastolic heart failure--echo from August 24, 2018 revealed EF 55% along with grade 2 diastolic dysfunction; repeat echo pending read; on Aldactone, Lasix, Zaroxolyn; chest x-ray reveals small bilateral pleural effusions  8. Essential hypertension, controlled--on Coreg and Cozaar, hold parameters to hold for systolic blood pressure less than 110; monitor; patient states her \"top number\" is normally never over 120  9. Paroxysmal atrial fibrillation--on aspirin, Coreg; follows with Dr. Анна Roth, on telemetry  10.  Moderately severe pulmonary hypertension--follows with Dr. Анна Roth, heart cath report noted from February 9, 2022; plan is to get her in the pulmonary hypertension clinic at [facility]; I discussed this case with Dr. Can Ramos at the [facility] who is a pulmonologist and we feel that she can follow-up outpatient and Dr. Richard Lima is going to be calling the pulmonary hypertension nurse to set up follow-up ASAP as this process was already initiated and evidence is noted in care everywhere  11. Thrombocytopenia--secondary to #13, monitor  12. Severe tricuspid regurgitation  13. Liver cirrhosis--follows with [facility] and appreciate their input  14. Coagulopathy--likely secondary to #12, INR 1.29  15. Portal venous hypertension  16. History of elevated troponin in the past    Expected discharge date: Pending clinical course    Disposition:    [x] Home       [] TCU       [] Rehab       [] Psych       [] SNF       [] [place]       [] Other-    Chief Complaint: Shortness of breath    Hospital Course: Per H&P done 3/11: \"Norma is an 69-year-old  female non-smoker with a PMHx of pulmonary hypertension, hypertrophic cardiomyopathy with preserved EF, hypertension, CAD, A. fib, severe valvular disease, S/P TAVR who presents to [facility] ED for the evaluation of shortness of breath and concern for fluid overload. Patient states she was sent from PCP office. She states that she has associated nausea, with dry heaving, diarrhea, decreased urine output, lower extremity swelling, and lightheaded and dizziness with standing from a seated position. She states that with the fluid in her stomach, she felt that she appeared 9 months pregnant. She states that prior to the paracentesis that was performed in the ED it was difficult to take deep breaths. Patient reports improved breathing effort after paracentesis in the ED. The patient states that she recently saw Dr. Mikie Ash last month for worsening shortness of breath and underwent a heart cath. Patient reports a 35 pound increase in weight in the past 6 months. Reports recent increase in water pill dosage, as recommended by PCP and cardiologist.  Patient denies chest pain, wheezing, cough, fever, chills, back pain, abdominal pain. \"    3/12--> hemodynamically stable, she follows with Dr. Mikie Ash from cardiology and [facility] for her cirrhosis however unknown etiology at this time, she is to follow-up with [facility] for pulmonary hypertension per cardiac cath report     3/13--> hemodynamically stable except blood pressures are bit soft;  [facility] from cardiology saw and per his note will need to be transferred to [facility] for pulmonary hypertension; patient's O2 sat was noted to be 86% in the night so she was placed on 2 L of oxygen with improvement, on exam patient's abdomen is more distended    3/14--> hemodynamically stable, on room air, patient had a paracentesis done yesterday with 2.5 L drained, GI saw; discussed with patient about either following up outpatient or possible transfer to [facility] and she was in agreement, I called transfer center and initiated the process~spoke with Dr. Javi Katz who is a pulmonologist from [facility] we discussed her medications, we discussed her current status, plan is for her to follow-up outpatient and Dr. Cate Aguilar is going to be calling the pulmonary hypertension nurse to set this up ASAP     Subjective (past 24 hours): States she feels better today, states her breathing is much improved today, she sitting up in the chair eating and offers no complaints    Medications:  Reviewed    Infusion Medications    sodium chloride 25 mL (03/14/22 0156)     Scheduled Medications    ceFAZolin  1,000 mg IntraVENous Q8H    calcium-cholecalciferol  1 tablet Oral BID    carvedilol  6.25 mg Oral BID WC    furosemide  20 mg Oral Daily    cetirizine  5 mg Oral Daily    losartan  25 mg Oral Daily    metOLazone  2.5 mg Oral Daily    multivitamin  1 tablet Oral Daily    pantoprazole  40 mg Oral QAM AC    PARoxetine  30 mg Oral QAM    vitamin B-6  100 mg Oral Daily    spironolactone  50 mg Oral Daily    sucralfate  1 g Oral 4x Daily AC & HS    traZODone  50 mg Oral Nightly    vitamin C  500 mg Oral Daily    sodium chloride flush  10 mL IntraVENous 2 times per day    aspirin  325 mg Oral Daily     PRN Meds: potassium chloride **OR** potassium alternative oral replacement **OR** potassium chloride, sodium chloride flush, sodium chloride, ondansetron **OR** ondansetron, polyethylene glycol, acetaminophen **OR** acetaminophen      Intake/Output Summary (Last 24 hours) at 3/14/2022 0853  Last data filed at 3/14/2022 0700  Gross per 24 hour   Intake 780 ml   Output 1350 ml   Net -570 ml       Diet:  ADULT DIET; Regular; Low Sodium (2 gm); 1500 ml    Exam:  /62   Pulse 87   Temp 97.7 °F (36.5 °C) (Oral)   Resp 16   Ht 5' 5\" (1.651 m)   Wt 186 lb 11.2 oz (84.7 kg)   SpO2 97%   BMI 31.07 kg/m²     General appearance: No apparent distress, appears stated age and cooperative. Chronically ill-appearing  HEENT: Pupils equal, round, and reactive to light. Conjunctivae/corneas clear. Neck: Supple, with full range of motion. No jugular venous distention. Trachea midline. Respiratory:  Normal respiratory effort. Fine crackles to auscultation, bilaterally   Cardiovascular: Regular rate and rhythm with normal S1/S2; (+) murmur. Abdomen: Soft, non-tender, nondistended with normal bowel sounds. Musculoskeletal: passive and active ROM x 4 extremities. Right lower extremity with redness to the anterior aspect    Skin: Skin color, texture, turgor normal.    Neurologic:  Neurovascularly intact without any focal sensory/motor deficits.  Cranial nerves: II-XII intact, grossly non-focal.  Psychiatric: Alert and oriented x 4, thought content appropriate  Capillary Refill: Brisk,< 3 seconds   Peripheral Pulses: +2 palpable, equal bilaterally     Labs:   Recent Labs     03/12/22  0449 03/13/22  0559 03/14/22  0630   WBC 4.4* 5.9 3.9*   HGB 10.8* 10.7* 10.8*   HCT 35.2* 34.7* 34.4*   * 132 118*     Recent Labs     03/12/22  0449 03/13/22  0559 03/14/22  0630    137 137   K 3.5 3.2* 3.3*    102 99   CO2 22* 23 26   BUN 49* 46* 39*   CREATININE 1.4* 1.4* 1.2   CALCIUM 8.4* 8.5 8.5     Recent Labs     03/11/22  1417 03/12/22  0449 03/14/22  0630   AST 23 17 18 ALT 8* 6* <5*   BILIDIR 0.5*  --  0.3   BILITOT 1.0 1.0 0.6   ALKPHOS 86 69 72     Recent Labs     03/11/22  1446 03/12/22  0449 03/13/22  1242   INR 1.27* 1.41* 1.29*     Microbiology:    Ascitic fluid showing rare segmented neutrophils, no organisms, no growth preliminary    Urinalysis:      Lab Results   Component Value Date    NITRU NEGATIVE 03/11/2022    WBCUA 0-2 03/11/2022    BACTERIA NONE SEEN 03/11/2022    RBCUA 0-2 03/11/2022    BLOODU NEGATIVE 03/11/2022    SPECGRAV 1.017 03/11/2022    GLUCOSEU NEGATIVE 09/01/2018       Radiology:  XR CHEST (2 VW)    Result Date: 3/11/2022  PROCEDURE: XR CHEST (2 VW) CLINICAL INFORMATION: Shortness of Breath COMPARISON: Chest radiograph 1/18/2022, 2/26/2021. TECHNIQUE: 2 views of the chest FINDINGS: Small bilateral pleural effusions are seen. Bibasilar opacities likely relate to compressive atelectasis. Cardiac silhouette is moderately enlarged. A left atrial appendage clip and aortic valve are seen. No pneumothorax. No acute bony abnormality. Median sternotomy has been performed. 1. Small bilateral pleural effusions with associated compressive atelectasis. 2. Moderate cardiomegaly. **This report has been created using voice recognition software. It may contain minor errors which are inherent in voice recognition technology. ** Final report electronically signed by Dr Caden Peña on 3/11/2022 2:49 PM    US LIVER    Result Date: 3/12/2022  Exam: Limited abdominal ultrasound: Comparison: None Findings: Echogenic pancreas without mass. Normal caliber pancreatic duct. Enlarged heterogeneous liver with nodular surface. No discrete liver mass. Bidirectional flow within the portal veins. Dilated hepatic veins with appropriate flow direction. Diffuse gallbladder wall thickening to 9 mm. Sludge or small gallstones in the gallbladder neck region. Negative sonographic Cifuentes sign. Possible 2 mm gallbladder wall polyp. 4 mm CBD. Moderate ascites.  Fluid surrounds the gallbladder. Echogenic right kidney with lower pole 3.6 x 3 x 0 cm cortical cyst.     Impression: 1. Chronic liver disease with portal venous hypertension and ascites. 2. Gallbladder wall thickening may reflect cholecystitis or adjacent liver disease. Possible gallstones and sludge in the region of the gallbladder neck. Negative sonographic Cifuentes sign. 3. Echogenic right kidney with lower pole cyst. This document has been electronically signed by: Karo Krishnan MD on 03/12/2022 02:58 AM    US GUIDED PARACENTESIS    Result Date: 3/11/2022  PROCEDURE: US GUIDED PARACENTESIS CLINICAL INFORMATION: Ascites COMPARISON: 1/24/2022 TECHNIQUE: Ultrasound-guided paracentesis FINDINGS: The procedure was explained the patient. All risks were discussed. Written informed consent was obtained. Limited ultrasound was performed revealing the ascites. An acceptable location was identified. The skin was marked. The overlying area was sterilely prepped and draped. 2 percent lidocaine was given for local anesthesia. Next a 5 Danish one-step catheter  was introduced into the ascites. Clear yellow fluid was removed. The catheter was removed. The patient tolerated the procedure. No immediate complications. Physician performing procedure: Dr Onel Cantu Informed consent signed: Yes Local Anesthetic: 2 % Lidocaine Specimen volume: 70 ml Catheter: 19 ga 5 [place] Aspirated ascites volume: 3.8 liters Aspirated ascites color: Old slightly heme tinged hardeep-colored fluid Site of Puncture:RLQ Complications: None observed     Successful ultrasound-guided paracentesis. **This report has been created using voice recognition software. It may contain minor errors which are inherent in voice recognition technology. ** Final report electronically signed by Dr. Uche Helton on 3/11/2022 4:09 PM    VL DUP LOWER EXTREMITY VENOUS RIGHT    Result Date: 3/11/2022  Venous duplex ultrasound right lower extremity Comparison: None Findings: The visualized deep veins are fully compressible with normal Doppler color flow and spectral tracings. Mild subcutaneous edema in the right leg. No popliteal cyst.     Negative for right lower extremity deep vein thrombosis. This document has been electronically signed by: Lon Thomas MD on 03/11/2022 09:08 PM Technique Used: Duplex examination performed utilizing grayscale, color and spectral analysis.       DVT prophylaxis: [] Lovenox                                 [x] SCDs                                 [] SQ Heparin                                 [] Encourage ambulation           [] Already on Anticoagulation     Code Status: Full Code    PT/OT Eval Status: Monitor    Tele:   [] yes              [x] no    Active Hospital Problems    Diagnosis Date Noted    Shortness of breath [R06.02] 03/11/2022       Electronically signed by ARTHUR Ramirez CNP on 3/14/2022 at 8:53 AM

## 2022-03-14 NOTE — PROGRESS NOTES
[id number] [id number]  INPATIENT PHYSICAL THERAPY  EVALUATION  [facility] ORTHOPEDICS 7K - 7K-03/003-A    Time In: 08  Time Out: 827  Timed Code Treatment Minutes: 16 Minutes  Minutes: 24          Date: 3/14/2022  Patient Name: Juan C Benitez,  Gender:  female        MRN: [id number]  : 1940  (81 y.o.)      Referring Practitioner: Edward Garcia PA-C  Diagnosis: shortness of breath  Additional Pertinent Hx: Per EMR \"Norma is an 70-year-old  female non-smoker with a PMHx of pulmonary hypertension, hypertrophic cardiomyopathy with preserved EF, hypertension, CAD, A. fib, severe valvular disease, S/P TAVR who presents to [facility] ED for the evaluation of shortness of breath and concern for fluid overload. Patient states she was sent from PCP office. She states that she has associated nausea, with dry heaving, diarrhea, decreased urine output, lower extremity swelling, and lightheaded and dizziness with standing from a seated position. She states that with the fluid in her stomach, she felt that she appeared 9 months pregnant. She states that prior to the paracentesis that was performed in the ED it was difficult to take deep breaths. Patient reports improved breathing effort after paracentesis in the ED. The patient states that she recently saw Dr. Zeferino Knight last month for worsening shortness of breath and underwent a heart cath. Patient reports a 35 pound increase in weight in the past 6 months. Reports recent increase in water pill dosage, as recommended by PCP and cardiologist.  Patient denies chest pain, wheezing, cough, fever, chills, back pain, abdominal pain. Barry Carias \"     Restrictions/Precautions:  Restrictions/Precautions: Fall Risk,Isolation,General Precautions    Subjective:  Chart Reviewed: Yes  Patient assessed for rehabilitation services?: Yes  Family / Caregiver Present: No  Subjective: OK to see pt per nursing.  Pt in bed when PT arrived, agreeable to PT session, wanting to change gown and clean up as pt spilled syrup during breakfast.    General:  Overall Orientation Status: Within Normal Limits  Follows Commands: Within Functional Limits    Vision: Impaired  Vision Exceptions: Wears glasses at all times    Hearing: Within functional limits         Pain: denies    Vitals: Vitals not assessed per clinical judgement, see nursing flowsheet    Social/Functional History:    Lives With: Alone  Type of Home: House  Home Layout: One level  Home Access: Stairs to enter with rails  Entrance Stairs - Number of Steps: 3 steps  Entrance Stairs - Rails: Both  Home Equipment: 4 wheeled walker,Reacher,Lift chair     Bathroom Shower/Tub: Walk-in shower,Shower chair with back  Bathroom Toilet: Standard  Bathroom Equipment: Grab bars in shower  Bathroom Accessibility: [address] Help From: Family  ADL Assistance: Independent  Homemaking Assistance: Independent  Homemaking Responsibilities: Yes  Ambulation Assistance: Independent  Transfer Assistance: Independent    Active : Yes  Occupation: Retired  Leisure & Hobbies: Doing puzzles, reading, keeping house clean  Additional Comments: Pt had to  use the walker during the days prior to admission. She had not been going to the store or getting outside during the days prior to admission. She has been sleeping in the sofa seat for a few weeks when it became to difficult to get into and out of the bed. She had help with donning her socks but she does not usually wear socks in her home.     OBJECTIVE:  Range of Motion:  Bilateral Lower Extremity: WNL    Strength:  Bilateral Lower Extremity: Impaired - 4-/5 hip, 4/5 knee and ankle    Balance:  Static Sitting Balance:  Independent  Dynamic Sitting Balance: Supervision  Static Standing Balance: Stand By Assistance, X 1  Dynamic Standing Balance: Stand By Assistance, Contact Guard Assistance, X 1, with cues for safety, with verbal cues   RW for support with reaching for items and to help assist with gown change, washing self and hands at the sink, no LOB noted, required intermittent UE support for safety with RW and counter for support  Bed Mobility:  Not Tested    Transfers:  Sit to Stand: Stand By Assistance, [address], X 1, with verbal cues  Stand to Sit:Stand By Assistance, X 1, with verbal cues  RW for support with no LOB noted  Ambulation:  Stand By Assistance, X 1, with cues for safety, with verbal cues , with increased time for completion  Distance: 220 feet, 10 feet  Surface: Level Tile  Device:Rolling Walker  Gait Deviations:  Slow Bharti, Decreased Step Length Bilaterally and Decreased Gait Speed  Cues for safety, no LOB noted, 2 short standing rest breaks required due to SOB noted    Functional Outcome Measures: Completed  AM-PAC Inpatient Mobility Raw Score : 18  AM-PAC Inpatient T-Scale Score : 43.63    ASSESSMENT:  Activity Tolerance:  Patient tolerance of  treatment: good. Treatment Initiated: Treatment and education initiated within context of evaluation. Evaluation time included review of current medical information, gathering information related to past medical, social and functional history, completion of standardized testing, formal and informal observation of tasks, assessment of data and development of plan of care and goals. Treatment time included skilled education and facilitation of tasks to increase safety and independence with functional mobility for improved independence and quality of life. Assessment: Body structures, Functions, Activity limitations: Decreased functional mobility ,Decreased balance,Decreased endurance,Decreased strength,Decreased posture  Assessment: Pt requires 1 person assist with mobility with tasks with use of RW for support. Pt would benefit from skilled therapy services following stay to ensure safe transitions in the home. Pt cont to require skilled PT services to progress with funtional mobility tasks to return home safely.   Prognosis: Good    REQUIRES PT FOLLOW UP: Yes    Discharge Recommendations:  Discharge Recommendations: Continue to assess pending progress,Patient would benefit from continued therapy after discharge,Home with Home health PT    Patient Education:  PT Education: General Safety,Gait Training,PT Role,Plan of Care,Functional Mobility Training,Transfer Training,Equipment,Goals    Equipment Recommendations:  Equipment Needed: No    Plan:  Times per week: 5x GM  Current Treatment Recommendations: Strengthening,Neuromuscular Re-education,Home Exercise Program,Safety Education & Training,Balance Training,Endurance Training,Patient/Caregiver Education & Training,Equipment Evaluation, Education, & procurement,Functional Mobility Training,Transfer Training,Gait Training,Stair training    Goals:  Patient goals : \"get out of here\"  Short term goals  Time Frame for Short term goals: by discharge  Short term goal 1: Pt will demo IND with gait with use of RW for support for >200 feet to progress with mobility. Short term goal 2: Pt willl demo IND with transfers with RW for support to progress with mobility. Short term goal 3: Pt will demo IND stair negotiation with B rail for support to progress towards PLOF. Long term goals  Time Frame for Long term goals : NA due to short ELOS    Following session, patient left in safe position with all fall risk precautions in place. Pt back in bedside chair following session, all needs and call light in reach following session, chair alarm on.

## 2022-03-14 NOTE — PROGRESS NOTES
Physician Progress Note      PATIENT:               Aravind Carmichael  CSN #:                  [id number]  :                       1940  ADMIT DATE:       3/11/2022 1:33 PM  [address] [place] DATE:  RESPONDING  PROVIDER #:        Nu Campos CNP          QUERY Mackenzie Stephen,    Pt admitted with Dyspnea land has Abdominal ascites. Per H/P: Moderate to   severe pulmonary HTN and severe tricuspid regurgitation, with possible   Cardiorenal syndrome. Noted. If possible, please document in progress notes   and discharge summary:    The medical record reflects the following:  Risk Factors: Moderate-severe pulm HTN, severe tricuspid regurg, EF 60%. CKD 3  Clinical Indicators: Chronic liver disease, portal venous hypertension. Acute   on chronic diastolic heart failure. Per Cardio consult : Portal venous   hypertension and ascites, likely cardiac related. Treatment: paracentesis 3.8 L 3/11/22. Transfer to [facility] per   Cardiology for Pulmonary HTN.   Options provided:  -- Dyspnea related to Cardiorenal Syndrome confirmed present on admission  -- Dyspnea related to Severe Pulmonary HTN causing abdominal ascites confirmed   present on admission  -- Dyspnea related to Abdominal ascites confirmed present on admission  -- Dyspnea multifactorial-related to Cardiorenal Syndrome, Severe Pulmonary   HTN & Abdominal Ascites confirmed present on admission  -- Other - I will add my own diagnosis  -- Disagree - Not applicable / Not valid  -- Disagree - Clinically unable to determine / Unknown  -- Refer to Clinical Documentation Reviewer    PROVIDER RESPONSE TEXT:    Read my note    Query created by: Janice Campos on 3/14/2022 10:13 AM      Electronically signed by:  Nu Cmapos CNP 3/14/2022 10:21 AM

## 2022-03-15 ENCOUNTER — APPOINTMENT (OUTPATIENT)
Dept: GENERAL RADIOLOGY | Age: 82
DRG: 432 | End: 2022-03-15
Payer: MEDICARE

## 2022-03-15 LAB
ALBUMIN SERPL-MCNC: 2.9 G/DL (ref 3.5–5.1)
ALP BLD-CCNC: 73 U/L (ref 38–126)
ALT SERPL-CCNC: < 5 U/L (ref 11–66)
ANION GAP SERPL CALCULATED.3IONS-SCNC: 10 MEQ/L (ref 8–16)
AST SERPL-CCNC: 17 U/L (ref 5–40)
BILIRUB SERPL-MCNC: 0.5 MG/DL (ref 0.3–1.2)
BUN BLDV-MCNC: 38 MG/DL (ref 7–22)
CALCIUM SERPL-MCNC: 8.2 MG/DL (ref 8.5–10.5)
CHLORIDE BLD-SCNC: 99 MEQ/L (ref 98–111)
CO2: 28 MEQ/L (ref 23–33)
CREAT SERPL-MCNC: 1.4 MG/DL (ref 0.4–1.2)
GFR SERPL CREATININE-BSD FRML MDRD: 36 ML/MIN/1.73M2
GLUCOSE BLD-MCNC: 97 MG/DL (ref 70–108)
INR BLD: 1.3 (ref 0.85–1.13)
MAGNESIUM: 1.6 MG/DL (ref 1.6–2.4)
POTASSIUM SERPL-SCNC: 4.1 MEQ/L (ref 3.5–5.2)
SODIUM BLD-SCNC: 137 MEQ/L (ref 135–145)
TISSUE TRANSGLUTAMINASE IGA: 0.9 U/ML
TOTAL PROTEIN: 5.5 G/DL (ref 6.1–8)
TTG, IGG: < 0.6 U/ML

## 2022-03-15 PROCEDURE — 6370000000 HC RX 637 (ALT 250 FOR IP): Performed by: NURSE PRACTITIONER

## 2022-03-15 PROCEDURE — 1200000000 HC SEMI PRIVATE

## 2022-03-15 PROCEDURE — 85610 PROTHROMBIN TIME: CPT

## 2022-03-15 PROCEDURE — 83735 ASSAY OF MAGNESIUM: CPT

## 2022-03-15 PROCEDURE — 6370000000 HC RX 637 (ALT 250 FOR IP): Performed by: INTERNAL MEDICINE

## 2022-03-15 PROCEDURE — 6370000000 HC RX 637 (ALT 250 FOR IP)

## 2022-03-15 PROCEDURE — 71046 X-RAY EXAM CHEST 2 VIEWS: CPT

## 2022-03-15 PROCEDURE — 36415 COLL VENOUS BLD VENIPUNCTURE: CPT

## 2022-03-15 PROCEDURE — 99232 SBSQ HOSP IP/OBS MODERATE 35: CPT | Performed by: NURSE PRACTITIONER

## 2022-03-15 PROCEDURE — 2580000003 HC RX 258

## 2022-03-15 PROCEDURE — 80053 COMPREHEN METABOLIC PANEL: CPT

## 2022-03-15 PROCEDURE — 6360000002 HC RX W HCPCS: Performed by: NURSE PRACTITIONER

## 2022-03-15 PROCEDURE — 94669 MECHANICAL CHEST WALL OSCILL: CPT

## 2022-03-15 PROCEDURE — 94760 N-INVAS EAR/PLS OXIMETRY 1: CPT

## 2022-03-15 RX ORDER — MIDODRINE HYDROCHLORIDE 2.5 MG/1
2.5 TABLET ORAL
Status: DISCONTINUED | OUTPATIENT
Start: 2022-03-15 | End: 2022-03-15

## 2022-03-15 RX ORDER — MAGNESIUM SULFATE IN WATER 40 MG/ML
2000 INJECTION, SOLUTION INTRAVENOUS PRN
Status: DISCONTINUED | OUTPATIENT
Start: 2022-03-15 | End: 2022-03-16 | Stop reason: HOSPADM

## 2022-03-15 RX ORDER — MIDODRINE HYDROCHLORIDE 5 MG/1
5 TABLET ORAL
Status: DISCONTINUED | OUTPATIENT
Start: 2022-03-15 | End: 2022-03-16 | Stop reason: HOSPADM

## 2022-03-15 RX ORDER — CEPHALEXIN 250 MG/1
250 CAPSULE ORAL EVERY 8 HOURS SCHEDULED
Status: DISCONTINUED | OUTPATIENT
Start: 2022-03-15 | End: 2022-03-16 | Stop reason: HOSPADM

## 2022-03-15 RX ADMIN — METOLAZONE 5 MG: 5 TABLET ORAL at 12:23

## 2022-03-15 RX ADMIN — MIDODRINE HYDROCHLORIDE 5 MG: 5 TABLET ORAL at 12:23

## 2022-03-15 RX ADMIN — TRAZODONE HYDROCHLORIDE 50 MG: 50 TABLET ORAL at 01:07

## 2022-03-15 RX ADMIN — Medication 100 MG: at 09:12

## 2022-03-15 RX ADMIN — PANTOPRAZOLE SODIUM 40 MG: 40 TABLET, DELAYED RELEASE ORAL at 06:33

## 2022-03-15 RX ADMIN — FUROSEMIDE 40 MG: 40 TABLET ORAL at 09:11

## 2022-03-15 RX ADMIN — PAROXETINE HYDROCHLORIDE 30 MG: 30 TABLET, FILM COATED ORAL at 09:11

## 2022-03-15 RX ADMIN — MIDODRINE HYDROCHLORIDE 5 MG: 5 TABLET ORAL at 16:54

## 2022-03-15 RX ADMIN — ASPIRIN 325 MG: 325 TABLET ORAL at 09:19

## 2022-03-15 RX ADMIN — SUCRALFATE 1 G: 1 TABLET ORAL at 06:33

## 2022-03-15 RX ADMIN — TRAZODONE HYDROCHLORIDE 50 MG: 50 TABLET ORAL at 22:00

## 2022-03-15 RX ADMIN — CEPHALEXIN 250 MG: 250 CAPSULE ORAL at 09:11

## 2022-03-15 RX ADMIN — OXYCODONE HYDROCHLORIDE AND ACETAMINOPHEN 500 MG: 500 TABLET ORAL at 09:11

## 2022-03-15 RX ADMIN — Medication 1 TABLET: at 09:11

## 2022-03-15 RX ADMIN — Medication 1 TABLET: at 22:00

## 2022-03-15 RX ADMIN — SODIUM CHLORIDE, PRESERVATIVE FREE 10 ML: 5 INJECTION INTRAVENOUS at 09:21

## 2022-03-15 RX ADMIN — MAGNESIUM SULFATE HEPTAHYDRATE 2000 MG: 40 INJECTION, SOLUTION INTRAVENOUS at 09:21

## 2022-03-15 RX ADMIN — MIDODRINE HYDROCHLORIDE 2.5 MG: 2.5 TABLET ORAL at 09:12

## 2022-03-15 RX ADMIN — SODIUM CHLORIDE 25 ML: 9 INJECTION, SOLUTION INTRAVENOUS at 02:51

## 2022-03-15 RX ADMIN — SUCRALFATE 1 G: 1 TABLET ORAL at 22:00

## 2022-03-15 RX ADMIN — CEPHALEXIN 250 MG: 250 CAPSULE ORAL at 13:49

## 2022-03-15 RX ADMIN — POTASSIUM BICARBONATE 40 MEQ: 782 TABLET, EFFERVESCENT ORAL at 09:19

## 2022-03-15 RX ADMIN — CEPHALEXIN 250 MG: 250 CAPSULE ORAL at 22:00

## 2022-03-15 RX ADMIN — CEFAZOLIN SODIUM 1000 MG: 1 INJECTION, SOLUTION INTRAVENOUS at 02:53

## 2022-03-15 RX ADMIN — SPIRONOLACTONE 50 MG: 25 TABLET ORAL at 09:12

## 2022-03-15 RX ADMIN — CETIRIZINE HYDROCHLORIDE 5 MG: 5 TABLET ORAL at 09:12

## 2022-03-15 ASSESSMENT — PAIN SCALES - GENERAL: PAINLEVEL_OUTOF10: 0

## 2022-03-15 NOTE — CARE COORDINATION
3/15/22, 10:34 AM EDT    DISCHARGE ON GOING EVALUATION    [facility] day: 4  Location: -03/003-A Reason for admit: Shortness of breath [R06.02]  Pleural effusion [J90]  Other ascites [R18.8]   Procedure:   paracentesis on 3/11 with 3.8 L drained; had paracentesis on 3/13 with 2.5 L drained  Barriers to Discharge: Keflex for cellulitis. Hypotension with diuretics. Midodrine started. PCP: ARTHUR Barajas CNP  Readmission Risk Score: 15.2 ( )%  Patient Goals/Plan/Treatment Preferences: Plans to discharge home alone with assist of family. Will follow-up with [facility] as OP.

## 2022-03-15 NOTE — PROGRESS NOTES
[facility]  OCCUPATIONAL THERAPY MISSED TREATMENT NOTE  [facility] ORTHOPEDICS 7K  7K-03/003-A      Date: 3/15/2022  Patient Name: Isabelle Henry        CSN: [id number]   : 1940  (81 y.o.)  Gender: female   Referring Practitioner: Maicol Ramey PA-C  Diagnosis: Shortness of Breath         REASON FOR MISSED TREATMENT: Patient Refused. Pt reports she is tired this date d/t not sleeping well. Pt educated on importance of participating in therapy. OT will see as time allows.

## 2022-03-15 NOTE — PROGRESS NOTES
Hospitalist Progress Note    Patient:  Harish Stewart      Unit/Bed:7K-03/003-A    YOB: 1940    MRN: [id number]       Acct: [de-identified]     PCP: ARTHUR Pizarro CNP    Date of Admission: 3/11/2022    Assessment/Plan:    1. Dyspnea--likely secondary to #2, on RA; O2 sat 90% on room air, will check chest x-ray to evaluate effusions and add incentive spirometry and Acapella  2. Abdominal ascites--had paracentesis on 3/11 with 3.8 L drained; had paracentesis on 3/13 with 2.5 L drained; on Lasix/Aldactone/ Zaroxolyn; follows with ALEXANDER Herndon and appreciate input; ascitic fluid showing no growth at this time; needs a 4-week follow-up with Mar Tate  3. Acute hypoxic respiratory failure--questioning if related to #2, currently on RA  4. ISIDRA on CKD stage III--creatinine at 1.4 today, monitor  5. Hypotension, borderline--questioning if this is secondary to her diuretics however she definitely needs these on board, will add midodrine 2.5 mg 3 times a day and monitor response; hold parameters noted on medications  6. Hypokalemia--replaced per protocol, magnesium 1.6  7. Hypomagnesia--replace per protocol  8. Possible cellulitis right lower extremity (POA)--venous Doppler does not reveal DVT; Ancef 3/12-3/15 and will transition to Keflex 250 mg 3 times a day for 5 more days, monitor response  9. Acute on chronic diastolic heart failure--echo from August 24, 2018 revealed EF 55% along with grade 2 diastolic dysfunction; repeat echo pending read; on Aldactone, Lasix, Zaroxolyn; chest x-ray revealed small bilateral pleural effusions  10. Essential hypertension, controlled--on Coreg with hold parameters to hold for systolic blood pressure less than 110; nephrology stopped Cozaar; monitor; patient states her \"top number\" is normally never over 120  11. Paroxysmal atrial fibrillation--on aspirin, Coreg; follows with Dr. Kayleen Nelson, on telemetry  12.  Moderately severe pulmonary hypertension--follows with Dr. Jenifer Mckeon, heart cath report noted from February 9, 2022; plan is to get her in the pulmonary hypertension clinic at [facility]; I discussed this case with Dr. Vanda Nicolas at the [facility] who is a pulmonologist and we feel that she can follow-up outpatient and Dr. Aliyah Ross is going to be calling the pulmonary hypertension nurse to set up follow-up ASAP as this process was already initiated and evidence is noted in care everywhere  13. Thrombocytopenia--secondary to #15, monitor  14. Severe tricuspid regurgitation  15. Liver cirrhosis--[facility]s with [facility] and appreciate their input  16. Coagulopathy--likely secondary to #12, INR 1.30  17. Portal venous hypertension  18. History of elevated troponin in the past    Expected discharge date: Pending clinical course    Disposition:    [x] Home       [] TCU       [] Rehab       [] Psych       [] SNF       [] [place]       [] Other-    Chief Complaint: Shortness of breath    Hospital Course: Per H&P done 3/11: \"Norma is an 76-year-old  female non-smoker with a PMHx of pulmonary hypertension, hypertrophic cardiomyopathy with preserved EF, hypertension, CAD, A. fib, severe valvular disease, S/P TAVR who presents to [facility] ED for the evaluation of shortness of breath and concern for fluid overload. Patient states she was sent from PCP office. She states that she has associated nausea, with dry heaving, diarrhea, decreased urine output, lower extremity swelling, and lightheaded and dizziness with standing from a seated position. She states that with the fluid in her stomach, she felt that she appeared 9 months pregnant. She states that prior to the paracentesis that was performed in the ED it was difficult to take deep breaths. Patient reports improved breathing effort after paracentesis in the ED.   The patient states that she recently saw Dr. Jenifer Mckeon last month for worsening shortness of breath and underwent a heart cath. Patient reports a 35 pound increase in weight in the past 6 months. Reports recent increase in water pill dosage, as recommended by PCP and cardiologist.  Patient denies chest pain, wheezing, cough, fever, chills, back pain, abdominal pain. \"    3/12--> hemodynamically stable, she follows with Dr. Daphne Hayden from cardiology and [facility] for her cirrhosis however unknown etiology at this time, she is to follow-up with [facility] for pulmonary hypertension per cardiac cath report     3/13--> hemodynamically stable except blood pressures are bit soft; Dr. Daphne Hayden from cardiology saw and per his note will need to be transferred to [facility] for pulmonary hypertension; patient's O2 sat was noted to be 86% in the night so she was placed on 2 L of oxygen with improvement, on exam patient's abdomen is more distended    3/14--> hemodynamically stable, on room air, patient had a paracentesis done yesterday with 2.5 L drained, GI saw; discussed with patient about either following up outpatient or possible transfer to [facility] and she was in agreement, I called transfer center and initiated the process~spoke with Dr. Lizz Mcclain who is a pulmonologist from [facility], we discussed her medications, we discussed her current status, plan is for her to follow-up outpatient and Dr. Caesar Essex is going to be calling the pulmonary hypertension nurse to set this up ASAP    3/15--> blood pressures on the lower side and with diuretics will add midodrine; discussed with Rajni Castanon from nephrology    [facility] information:  Encounters  - from Last 3 Months    Date Type Specialty Care Team Description   02/17/2022 Telephone PULMONARY MEDICINE Krish Navarro MD   Received Outside Medical Records   02/17/2022 Telephone PULMONARY PATRICK Navarro MD   Request Outside Imaging;  Request Outside Medical Records   02/16/2022 Orders Only PULMONARY MEDICINE Francisco Young APRN.CNS   Primary pulmonary hypertension ([facility] [address]   02/14/2022 Telephone PULMONARY MEDICINE Sindhu, Christiano Poe MD   New PH Referral        Subjective (past 24 hours): States she feels OK today, states her breathing is better, she sitting up in the chair; states she is tired and she was up most of the night as her IV went bad, she denies pain    Medications:  Reviewed    Infusion Medications    sodium chloride 25 mL (03/15/22 0251)     Scheduled Medications    furosemide  40 mg Oral Daily    metOLazone  5 mg Oral Daily    potassium bicarb-citric acid  40 mEq Oral Daily    ceFAZolin  1,000 mg IntraVENous Q8H    calcium-cholecalciferol  1 tablet Oral BID    carvedilol  6.25 mg Oral BID WC    cetirizine  5 mg Oral Daily    losartan  25 mg Oral Daily    multivitamin  1 tablet Oral Daily    pantoprazole  40 mg Oral QAM AC    PARoxetine  30 mg Oral QAM    vitamin B-6  100 mg Oral Daily    spironolactone  50 mg Oral Daily    sucralfate  1 g Oral 4x Daily AC & HS    traZODone  50 mg Oral Nightly    vitamin C  500 mg Oral Daily    sodium chloride flush  10 mL IntraVENous 2 times per day    aspirin  325 mg Oral Daily     PRN Meds: potassium chloride **OR** potassium alternative oral replacement **OR** potassium chloride, sodium chloride flush, sodium chloride, ondansetron **OR** ondansetron, polyethylene glycol, acetaminophen **OR** acetaminophen      Intake/Output Summary (Last 24 hours) at 3/15/2022 0713  Last data filed at 3/15/2022 0429  Gross per 24 hour   Intake 680 ml   Output 1200 ml   Net -520 ml       Diet:  ADULT DIET; Regular; Low Sodium (2 gm); 1500 ml    Exam:  BP (!) 102/57   Pulse 94   Temp 97.7 °F (36.5 °C) (Oral)   Resp 16   Ht 5' 5\" (1.651 m)   Wt 186 lb 11.2 oz (84.7 kg)   SpO2 93%   BMI 31.07 kg/m²     General appearance: No apparent distress, appears stated age and cooperative. Chronically ill-appearing  HEENT: Pupils equal, round, and reactive to light.  Conjunctivae/corneas clear.  Neck: Supple, with full range of motion. No jugular venous distention. Trachea midline. Respiratory:  Normal respiratory effort. Faint crackles to auscultation, bilaterally   Cardiovascular: Regular rate and rhythm with normal S1/S2; (+) murmur. Abdomen: Soft, non-tender, (++) distended with normal bowel sounds. Musculoskeletal: passive and active ROM x 4 extremities. Right lower extremity with redness to the anterior aspect however improved, discoloration noted to both feet however pulses were confirmed with Dopplers and patient states that that is chronic for her  Skin: Skin color, texture, turgor normal.    Neurologic:  Neurovascularly intact without any focal sensory/motor deficits.  Cranial nerves: II-XII intact, grossly non-focal.  Psychiatric: Alert and oriented x 4, thought content appropriate  Capillary Refill: Brisk,< 3 seconds   Peripheral Pulses: +2 palpable, equal bilaterally     Labs:   Recent Labs     03/13/22  0559 03/14/22  0630   WBC 5.9 3.9*   HGB 10.7* 10.8*   HCT 34.7* 34.4*    118*     Recent Labs     03/13/22  0559 03/14/22  0630 03/15/22  0515    137 137   K 3.2* 3.3* 4.1    99 99   CO2 23 26 28   BUN 46* 39* 38*   CREATININE 1.4* 1.2 1.4*   CALCIUM 8.5 8.5 8.2*     Recent Labs     03/14/22  0630 03/15/22  0515   AST 18 17   ALT <5* <5*   BILIDIR 0.3  --    BILITOT 0.6 0.5   ALKPHOS 72 73     Recent Labs     03/13/22  1242 03/15/22  0515   INR 1.29* 1.30*     Microbiology:    Ascitic fluid showing rare segmented neutrophils, no organisms, no growth preliminary    Urinalysis:      Lab Results   Component Value Date    NITRU NEGATIVE 03/11/2022    WBCUA 0-2 03/11/2022    BACTERIA NONE SEEN 03/11/2022    RBCUA 0-2 03/11/2022    BLOODU NEGATIVE 03/11/2022    SPECGRAV 1.017 03/11/2022    GLUCOSEU NEGATIVE 09/01/2018       Radiology:  XR CHEST (2 VW)    Result Date: 3/11/2022  PROCEDURE: XR CHEST (2 VW) CLINICAL INFORMATION: Shortness of Breath COMPARISON: Chest radiograph 1/18/2022, 2/26/2021. TECHNIQUE: 2 views of the chest FINDINGS: Small bilateral pleural effusions are seen. Bibasilar opacities likely relate to compressive atelectasis. Cardiac silhouette is moderately enlarged. A left atrial appendage clip and aortic valve are seen. No pneumothorax. No acute bony abnormality. Median sternotomy has been performed. 1. Small bilateral pleural effusions with associated compressive atelectasis. 2. Moderate cardiomegaly. **This report has been created using voice recognition software. It may contain minor errors which are inherent in voice recognition technology. ** Final report electronically signed by Dr Priti Richards on 3/11/2022 2:49 PM    US LIVER    Result Date: 3/12/2022  Exam: Limited abdominal ultrasound: Comparison: None Findings: Echogenic pancreas without mass. Normal caliber pancreatic duct. Enlarged heterogeneous liver with nodular surface. No discrete liver mass. Bidirectional flow within the portal veins. Dilated hepatic veins with appropriate flow direction. Diffuse gallbladder wall thickening to 9 mm. Sludge or small gallstones in the gallbladder neck region. Negative sonographic Cifuentes sign. Possible 2 mm gallbladder wall polyp. 4 mm CBD. Moderate ascites. Fluid surrounds the gallbladder. Echogenic right kidney with lower pole 3.6 x 3 x 0 cm cortical cyst.     Impression: 1. Chronic liver disease with portal venous hypertension and ascites. 2. Gallbladder wall thickening may reflect cholecystitis or adjacent liver disease. Possible gallstones and sludge in the region of the gallbladder neck. Negative sonographic Cifuentes sign.  3. Echogenic right kidney with lower pole cyst. This document has been electronically signed by: Jarvis Gayle MD on 03/12/2022 02:58 AM    US GUIDED PARACENTESIS    Result Date: 3/11/2022  PROCEDURE: US GUIDED PARACENTESIS CLINICAL INFORMATION: Ascites COMPARISON: 1/24/2022 TECHNIQUE: Ultrasound-guided paracentesis FINDINGS: The procedure was explained the patient. All risks were discussed. Written informed consent was obtained. Limited ultrasound was performed revealing the ascites. An acceptable location was identified. The skin was marked. The overlying area was sterilely prepped and draped. 2 percent lidocaine was given for local anesthesia. Next a 5 Croatian one-step catheter  was introduced into the ascites. Clear yellow fluid was removed. The catheter was removed. The patient tolerated the procedure. No immediate complications. Physician performing procedure: Dr Reba Raymundo Informed consent signed: Yes Local Anesthetic: 2 % Lidocaine Specimen volume: 70 ml Catheter: 19 ga 5 Citizen of [place] Aspirated ascites volume: 3.8 liters Aspirated ascites color: Old slightly heme tinged hardeep-colored fluid Site of Puncture:RLQ Complications: None observed     Successful ultrasound-guided paracentesis. **This report has been created using voice recognition software. It may contain minor errors which are inherent in voice recognition technology. ** Final report electronically signed by Dr. Francesca Roland on 3/11/2022 4:09 PM    VL DUP LOWER EXTREMITY VENOUS RIGHT    Result Date: 3/11/2022  Venous duplex ultrasound right lower extremity Comparison: None Findings: The visualized deep veins are fully compressible with normal Doppler color flow and spectral tracings. Mild subcutaneous edema in the right leg. No popliteal cyst.     Negative for right lower extremity deep vein thrombosis. This document has been electronically signed by: Jenn Winkler MD on 03/11/2022 09:08 PM Technique Used: Duplex examination performed utilizing grayscale, color and spectral analysis.       DVT prophylaxis: [] Lovenox                                 [x] SCDs                                 [] SQ Heparin                                 [] Encourage ambulation           [] Already on Anticoagulation     Code Status: Full Code    PT/OT Eval Status: Monitor    Tele:   [] yes              [x] no    Active Hospital Problems    Diagnosis Date Noted    Shortness of breath [R06.02] 03/11/2022       Electronically signed by ARTHUR Ambrose CNP on 3/15/2022 at 7:13 AM

## 2022-03-15 NOTE — CONSULTS
[address] [place] [address]                                  CONSULTATION    PATIENT NAME: Julissa Jang                    :        1940  MED REC NO:   [id number]                           ROOM:       0003  ACCOUNT NO:   [de-identified]                           ADMIT DATE: 2022  PROVIDER:     CIARA Piper Fall:  2022    REASON FOR CONSULTATION:  Acute exacerbation of chronic CHF. HISTORY OF PRESENT ILLNESS:  This is a patient who is an 60-year-old  lady well known to me from prior encounter. She is known to have  history of aortic valve replacement. The patient has recently been  complaining of shortness of breath and pedal edema. She did have a  heart catheterization, which showed severe pulmonary hypertension. Her  PA pressure was around 79 by the heart cath. The patient has 2+ AI  through the aortic valve. The patient has diastolic dysfunction, her  wedge pressure at that time was 33, but she had a preserved systolic  function of the left ventricle. She had moderate disease of the LAD. The patient had severe tricuspid regurg and her V-wave was extremely  high through the heart cath. It was felt that the patient has a  right-sided heart failure at this time. I talked with Dr. Josette Fountain and he  said that there is nothing much he can do for that. I did review the  cath also with the structural heart especially Dr. Denise Howe and it was not  recommended to consider clipping the tricuspid valve as this could  exacerbate her right side heart failure. We did offer the patient to go  to the [facility]. At this time, the matter of fact is on the  process of doing that as an outpatient when she showed up in the  emergency room with shortness of breath and the anasarca.     The patient admitted to the hospital.  She did receive an IV diuretics  and she improved her kidney function; however, continued to deteriorate. She had significant amount of arthritis expected from the severe  tricuspid regurgitation, right side heart failure, and she is having  cirrhosis. REVIEW OF SYSTEMS:  The patient had a history of chronic renal  sufficiency with an acute exacerbation. She has a history of atrial  fibrillation and closure of her atrial appendage. The patient had an  incisional hernia from her open heart surgery at the lower end of the  sternum, but there is no strangulation. The patient had history of  dyslipidemia, history of anemia. She had a history of C diff in the  past.  She denies fevers, chills, or rigors. She gained some weight  recently. She is more short of breath. PRIOR SURGERIES:  She had aortic valve replacement as mentioned. She  had a history of dilatation of the uterus and she had a history of right  knee replacement and she had endoscopies. SOCIAL HISTORY:  She denies smoking or alcohol abuse. ALLERGY:  SHE HAS INTOLERANCE TO THE ACE INHIBITORS THAT MAKE HER COUGH. PHYSICAL EXAMINATION:  VITAL SIGNS:  Exam showed her blood pressure is 110/70. She was in  atrial fib. Respiratory rate was 18. She was afebrile. She is  somewhat short of breath. NEUROLOGIC:  She has no focal neurological deficit. SKIN:  Warm. NECK:  There is jugular pulsation up to the angle of the mandible. CHEST:  She has no chest wall tenderness. BACK:  She has scoliosis of the dorsal spine. LUNGS:  She had scattered rhonchi. Decreased sound at the lung bases. HEART:  The 3/6 systolic murmur. The 2/6 systolic murmur of the aortic  area. ABDOMEN:  Somewhat protuberant with enlargement of the liver edges. EXTREMITIES:  Good femoral pulsation. 2+ pedal edema. LABORATORY DATA:  The lab work showed her sodium was 136, her BUN 49,  and creatinine 1.8. Hemoglobin is 10.8 and hematocrit is 38. Her TSH  is 7.4. Her EKG showed atrial fib. IMPRESSION:  In summary,  1.   The patient with severe pulmonary hypertension on the right side  heart failure resulted in liver cirrhosis, ascites, and pedal edema. The patient receiving diuretics. 2.  History of aortic valve replacement with 2+ AI. 3.  Moderate disease of the mid LAD. 4.  Diastolic dysfunction of the left ventricle with increased wedge  pressure by the heart cath recently. 5.  Renal failure. 6.  Chronic atrial fib. 7.  Malnutrition. 8.  Anemia. 9.  Electrolyte imbalance. PLAN:  From the cardiac standpoint, we could continue with the IV  diuretics and continue with medical treatment. The patient will  possibly benefit from being transfer to [facility] for the  pulmonary hypertension and her tricuspid regurg. We will continue with  opinion to see if there is anything else can be done, overall her  prognosis is poor. We thank you very much for allowing us to share in the management of  this patient.         Kristel Arzate M.D.    D: 03/15/2022 9:16:30       T: 03/15/2022 11:14:59     [email or web address]  Job#: [id number]     Doc#: [id number]    CC:

## 2022-03-15 NOTE — PROGRESS NOTES
Gastroenterology Progress Note:     Patient Name:  Maximilian Minaya   MRN: [id number]  [id number]  YOB: 1940  Admit Date: 3/11/2022  1:33 PM  Primary Care Physician: ARTHUR Marcelo - CNP   7K-03/003-A     Patient seen and examined. 24 hours events and chart reviewed. Subjective: Patient sitting up in the chair, visitors present. Denies abd pain, n/v. She had an episode of hypotension. Objective:  BP (!) 96/51   Pulse 92   Temp 98.6 °F (37 °C) (Oral)   Resp 18   Ht 5' 5\" (1.651 m)   Wt 186 lb 11.2 oz (84.7 kg)   SpO2 90%   BMI 31.07 kg/m²     Physical Exam:    General:  Chronically ill appearing female  HEENT: Atraumatic, normocephalic. Moist oral mucous membranes. Neck: Supple without adenopathy, JVD, thyromegaly or masses. Trachea midline. CV: Heart RRR, no murmurs, rubs, gallops. Resp: Even, easy without cough or accessory use. Lungs clear to ascultation bilaterally. Abd: Round, soft, obese, nontender. No hepatosplenomegaly or mass present. Active bowel sounds heard. Mild distention noted. Ext:  Without cyanosis, clubbing. Trace BLE edema. Skin: Pink, warm, dry. Erythema to RLE  Neuro:  Alert, oriented x 3 with no obvious deficits.        Rectal: deferred    Labs:   CBC:   Lab Results   Component Value Date    WBC 3.9 03/14/2022    HGB 10.8 03/14/2022    HCT 34.4 03/14/2022    MCV 94.8 03/14/2022     03/14/2022     BMP:   Lab Results   Component Value Date     03/15/2022    K 4.1 03/15/2022    K 3.2 03/13/2022    CL 99 03/15/2022    CO2 28 03/15/2022    PHOS 4.4 04/11/2019    BUN 38 03/15/2022    CREATININE 1.4 03/15/2022    CALCIUM 8.2 03/15/2022     PT/INR:   Lab Results   Component Value Date    PROTIME 1.75 08/21/2011    INR 1.30 03/15/2022     Lipids:   Lab Results   Component Value Date    ALKPHOS 73 03/15/2022    ALT <5 03/15/2022    AST 17 03/15/2022    BILITOT 0.5 03/15/2022    BILIDIR 0.3 03/14/2022    LABALBU 2.9 03/15/2022    LABALBU 3.2 08/20/2011    AMYLASE 67 07/19/2019    LIPASE 19.3 03/11/2022     Current Meds:  Scheduled Meds:   cephALEXin  250 mg Oral 3 times per day    midodrine  5 mg Oral TID WC    furosemide  40 mg Oral Daily    metOLazone  5 mg Oral Daily    potassium bicarb-citric acid  40 mEq Oral Daily    calcium-cholecalciferol  1 tablet Oral BID    carvedilol  6.25 mg Oral BID WC    cetirizine  5 mg Oral Daily    multivitamin  1 tablet Oral Daily    pantoprazole  40 mg Oral QAM AC    PARoxetine  30 mg Oral QAM    vitamin B-6  100 mg Oral Daily    spironolactone  50 mg Oral Daily    sucralfate  1 g Oral 4x Daily AC & HS    traZODone  50 mg Oral Nightly    vitamin C  500 mg Oral Daily    sodium chloride flush  10 mL IntraVENous 2 times per day    aspirin  325 mg Oral Daily     Continuous Infusions:   sodium chloride 25 mL (03/15/22 0251)       Assessment:  82 yo F admitted 03/11/22 for SOB & edema. Endorses dry heaves/nausea, decreased appetite, diarrhea, & decreased UO. She was seen about 1 week PTA, by cardiology, & her diuretics were increased. She followed up a GREG the following week & was sent to the ED for abd distention, possible paracentesis. H/O cirrhosis. CXR demonstrated bilateral pleural effusions with compressive atelectasis & moderate cardiomegaly. US liver demonstrated nodular, enlarged liver with portal venous HTN & ascites. S/P para 03/11/22 with 3.8L removed & 03/13/22 with 2.5L removed.     1. Decompensated liver cirrhosis- MELD 13  2. Ascites s/p para 03/11/22 & 03/13/22- likely cardiogenic  3. Moderate to severe pulmonary hypertension & tricuspid regurgitation- s/p cardiac cath 02/09/22, cardiology wants patient transferred to [facility], however they recommend close OP follow-up. 4. Normocytic anemia- no GI bleeding noted  5. ISIDRA on CKD- nephro following  6. BLE edema  7. SOB  8. Dry heaves/nausea- resolved  9. Portal venous hypertension  10. Paroxysmal afib  11. Acute on chronic CHF  12. Coagulopathy  13. H/O HTN  14. Hypokalemia- resolved  15. Pancytopenia  16. Hypoalbuminemia   17. ? RLE cellulitis   18.  Acute hypotension    Plan:    · Monitor CBC  · ATBs per primary  · Continue Lasix 40mg daily, home dose  · Continue metolazone 5mg daily, home dose  · Continue Aldactone, may need increased to 100mg daily if okay with nephro & cardiology  · 2g sodium diet with fluid restriction  · Daily weights  · Strict I & O  · Avoid hepatotoxic meds, okay for Tylenol 2g restriction  · Avoid narcotics & benzodiazepines as they can precipitate HE  · Continue PPI daily, home dose  · Midodrine started by primary for acute hypotension, would recommend monitoring closely for need & dosage  · Monitor CMP, INR  · RN updated  · Nephrology on board  · Case discussed at length with primary team  · Supportive care per primary team  · Okay to discharge from GI standpoint  · Will need a 4 week follow-up with Bautista GIBSON      Case reviewed and impression/plan reviewed in collaboration with Dr. Denise Howe  Electronically signed by ARTHUR Valentine - CNP on 3/15/2022 at 11:11 AM    [facility]

## 2022-03-15 NOTE — PROGRESS NOTES
Nephrology Progress Note    Patient - Jeff Mcdowell   MRN -  [id number]   Derek # - [de-identified]      - 1940    81 y.o. Admit Date: 3/11/2022  Hospital Day: 4  Location: --A  Date of evaluation -  3/15/2022    Subjective:   CC: shortness of breath   Denies shortness of breath  BP runs low  Good appetite  UOP 1200+ voids  BP Range: Systolic (41UDG), YMW:720 , Min:102 , JWC:601      Diastolic (89RNL), SXY:48, Min:53, Max:66      Objective:   VITALS:  BP (!) 102/57   Pulse 94   Temp 97.7 °F (36.5 °C) (Oral)   Resp 16   Ht 5' 5\" (1.651 m)   Wt 186 lb 11.2 oz (84.7 kg)   SpO2 93%   BMI 31.07 kg/m²    Patient Vitals for the past 24 hrs:   BP Temp Temp src Pulse Resp SpO2   03/15/22 0330 (!) 102/57 97.7 °F (36.5 °C) Oral 94 16 93 %   22 2140 (!) 110/54 -- -- 81 16 96 %   22 1530 133/60 97.8 °F (36.6 °C) Oral 90 18 93 %   22 1130 138/66 97.3 °F (36.3 °C) Oral 88 18 95 %   22 0830 (!) 108/53 96.7 °F (35.9 °C) Oral 95 18 96 %       Intake/Output Summary (Last 24 hours) at 3/15/2022 0820  Last data filed at 3/15/2022 0429  Gross per 24 hour   Intake 680 ml   Output 1200 ml   Net -520 ml       Admission weight: 166 lb (75.3 kg) Body mass index is 31.07 kg/m². Patient Vitals for the past 96 hrs (Last 3 readings):   Weight   22 0338 186 lb 11.2 oz (84.7 kg)   22 1340 166 lb (75.3 kg)       EXAM:  CONSTITUTIONAL:  No acute distress. Pleasant  HEENT:  Head is normocephalic, Extraocular movement intact. Neck is supple. Voice is clear. CARDIOVASCULAR:  S1, S2  regular rate and rhythm. RESPIRATORY: Clear to ausculation bilaterally. Equal breath sounds. No wheezes. No shortness of breath noted at rest.  ABDOMEN: soft, non tender, Rotund  NEUROLOGICAL: Patient is alert and oriented to person, place, and time. Recent and remote memory intact. Thought is coherant. SKIN: no rash, No significant bruises on exposed surfaces  MUSCULOSKELETAL: Movement is coordinated.  Moves all extremities   EXTREMITIES: Distal lower extremity temp is warm, Right lower extremities erythema. lower extremity edema. PSYCHIATRIC: mood and affect appropriate. Medications:   Med reviewed  Scheduled Meds:   midodrine  2.5 mg Oral TID WC    cephALEXin  250 mg Oral 3 times per day    furosemide  40 mg Oral Daily    metOLazone  5 mg Oral Daily    potassium bicarb-citric acid  40 mEq Oral Daily    calcium-cholecalciferol  1 tablet Oral BID    carvedilol  6.25 mg Oral BID WC    cetirizine  5 mg Oral Daily    losartan  25 mg Oral Daily    multivitamin  1 tablet Oral Daily    pantoprazole  40 mg Oral QAM AC    PARoxetine  30 mg Oral QAM    vitamin B-6  100 mg Oral Daily    spironolactone  50 mg Oral Daily    sucralfate  1 g Oral 4x Daily AC & HS    traZODone  50 mg Oral Nightly    vitamin C  500 mg Oral Daily    sodium chloride flush  10 mL IntraVENous 2 times per day    aspirin  325 mg Oral Daily     Labs:   Labs reviewed    Recent Labs     03/13/22  0559 03/14/22  0630   WBC 5.9 3.9*   HGB 10.7* 10.8*   HCT 34.7* 34.4*    118*     Recent Labs     03/13/22  0559 03/14/22  0630 03/15/22  0515    137 137   K 3.2* 3.3* 4.1    99 99   CO2 23 26 28   BUN 46* 39* 38*   CREATININE 1.4* 1.2 1.4*   CALCIUM 8.5 8.5 8.2*   LABALBU  --  2.8* 2.9*   ANIONGAP 12.0 12.0 10.0       ASSESSMENT:  1. Mild Acute Kidney Injury likely 2nd to renal hypoperfusion 2nd to Hypotension and diuresis. Creatinine near baseline. 2. Chronic Kidney Disease Stage IIIB  3. Hypokalemia on Spironolactone and K supplement   4. Moderate-severe pulm HTN, severe tricuspid regurg, EF 60%, Planned FU at [facility]  5. Hypotension. Stop Cozaar, Increase Midodrine from 2.5 to 5 mg 3x/d. Discussed with Leona GIBSON.    6. Chronic liver disease, portal venous hypertension, S/P paracentesis 3/11/22 and 3/13/22  BMP in AM  Principal Problem:    Shortness of breath  Resolved Problems:    * No resolved hospital problems.  Ata Payan, ARTHUR - CNP 8:20 AM 3/15/2022

## 2022-03-16 VITALS
HEART RATE: 88 BPM | TEMPERATURE: 98.2 F | OXYGEN SATURATION: 95 % | HEIGHT: 65 IN | SYSTOLIC BLOOD PRESSURE: 123 MMHG | WEIGHT: 178.7 LBS | DIASTOLIC BLOOD PRESSURE: 54 MMHG | BODY MASS INDEX: 29.77 KG/M2 | RESPIRATION RATE: 16 BRPM

## 2022-03-16 LAB
ANA SCREEN: DETECTED
ANAEROBIC CULTURE: NORMAL
ANION GAP SERPL CALCULATED.3IONS-SCNC: 11 MEQ/L (ref 8–16)
BODY FLUID CULTURE, STERILE: NORMAL
BUN BLDV-MCNC: 38 MG/DL (ref 7–22)
CALCIUM SERPL-MCNC: 8.2 MG/DL (ref 8.5–10.5)
CHLORIDE BLD-SCNC: 97 MEQ/L (ref 98–111)
CO2: 28 MEQ/L (ref 23–33)
CREAT SERPL-MCNC: 1.2 MG/DL (ref 0.4–1.2)
F-ACTIN AB IGG: 18 UNITS (ref 0–19)
GFR SERPL CREATININE-BSD FRML MDRD: 43 ML/MIN/1.73M2
GLUCOSE BLD-MCNC: 102 MG/DL (ref 70–108)
GRAM STAIN RESULT: NORMAL
MAGNESIUM: 1.5 MG/DL (ref 1.6–2.4)
POTASSIUM REFLEX MAGNESIUM: 3.5 MEQ/L (ref 3.5–5.2)
SMOOTH MUSCLE AB IGG TITER: NORMAL
SODIUM BLD-SCNC: 136 MEQ/L (ref 135–145)
T4 FREE: 1.06 NG/DL (ref 0.93–1.76)

## 2022-03-16 PROCEDURE — 97535 SELF CARE MNGMENT TRAINING: CPT

## 2022-03-16 PROCEDURE — 6370000000 HC RX 637 (ALT 250 FOR IP): Performed by: NURSE PRACTITIONER

## 2022-03-16 PROCEDURE — 97110 THERAPEUTIC EXERCISES: CPT

## 2022-03-16 PROCEDURE — 36415 COLL VENOUS BLD VENIPUNCTURE: CPT

## 2022-03-16 PROCEDURE — 6370000000 HC RX 637 (ALT 250 FOR IP)

## 2022-03-16 PROCEDURE — 2580000003 HC RX 258

## 2022-03-16 PROCEDURE — 99239 HOSP IP/OBS DSCHRG MGMT >30: CPT | Performed by: PHYSICIAN ASSISTANT

## 2022-03-16 PROCEDURE — 83735 ASSAY OF MAGNESIUM: CPT

## 2022-03-16 PROCEDURE — 6360000002 HC RX W HCPCS: Performed by: NURSE PRACTITIONER

## 2022-03-16 PROCEDURE — 99232 SBSQ HOSP IP/OBS MODERATE 35: CPT | Performed by: NURSE PRACTITIONER

## 2022-03-16 PROCEDURE — 80048 BASIC METABOLIC PNL TOTAL CA: CPT

## 2022-03-16 RX ORDER — CEPHALEXIN 250 MG/1
250 CAPSULE ORAL EVERY 8 HOURS SCHEDULED
Qty: 12 CAPSULE | Refills: 0 | Status: SHIPPED | OUTPATIENT
Start: 2022-03-16 | End: 2022-03-20

## 2022-03-16 RX ORDER — MIDODRINE HYDROCHLORIDE 5 MG/1
5 TABLET ORAL
Qty: 90 TABLET | Refills: 3 | Status: ON HOLD | OUTPATIENT
Start: 2022-03-16 | End: 2022-04-05 | Stop reason: HOSPADM

## 2022-03-16 RX ADMIN — PAROXETINE HYDROCHLORIDE 30 MG: 30 TABLET, FILM COATED ORAL at 11:34

## 2022-03-16 RX ADMIN — SUCRALFATE 1 G: 1 TABLET ORAL at 06:46

## 2022-03-16 RX ADMIN — METOLAZONE 5 MG: 5 TABLET ORAL at 11:32

## 2022-03-16 RX ADMIN — MAGNESIUM SULFATE HEPTAHYDRATE 2000 MG: 40 INJECTION, SOLUTION INTRAVENOUS at 13:31

## 2022-03-16 RX ADMIN — SPIRONOLACTONE 50 MG: 25 TABLET ORAL at 11:38

## 2022-03-16 RX ADMIN — SODIUM CHLORIDE, PRESERVATIVE FREE 10 ML: 5 INJECTION INTRAVENOUS at 13:38

## 2022-03-16 RX ADMIN — SUCRALFATE 1 G: 1 TABLET ORAL at 11:47

## 2022-03-16 RX ADMIN — CETIRIZINE HYDROCHLORIDE 5 MG: 5 TABLET ORAL at 12:06

## 2022-03-16 RX ADMIN — FUROSEMIDE 40 MG: 40 TABLET ORAL at 12:08

## 2022-03-16 RX ADMIN — CEPHALEXIN 250 MG: 250 CAPSULE ORAL at 06:46

## 2022-03-16 RX ADMIN — MIDODRINE HYDROCHLORIDE 5 MG: 5 TABLET ORAL at 13:17

## 2022-03-16 RX ADMIN — POTASSIUM BICARBONATE 40 MEQ: 391 TABLET, EFFERVESCENT ORAL at 11:50

## 2022-03-16 RX ADMIN — OXYCODONE HYDROCHLORIDE AND ACETAMINOPHEN 500 MG: 500 TABLET ORAL at 12:09

## 2022-03-16 RX ADMIN — ASPIRIN 325 MG: 325 TABLET ORAL at 12:03

## 2022-03-16 RX ADMIN — PANTOPRAZOLE SODIUM 40 MG: 40 TABLET, DELAYED RELEASE ORAL at 06:46

## 2022-03-16 RX ADMIN — Medication 1 TABLET: at 08:45

## 2022-03-16 RX ADMIN — Medication 100 MG: at 11:40

## 2022-03-16 RX ADMIN — MIDODRINE HYDROCHLORIDE 5 MG: 5 TABLET ORAL at 08:44

## 2022-03-16 RX ADMIN — Medication 1 TABLET: at 12:11

## 2022-03-16 ASSESSMENT — PAIN SCALES - GENERAL
PAINLEVEL_OUTOF10: 0
PAINLEVEL_OUTOF10: 0

## 2022-03-16 NOTE — PROGRESS NOTES
[facility]  Occupational Therapy  Daily Note  Time:   Time In: 5183  Time Out: 1025  Timed Code Treatment Minutes: 30 Minutes  Minutes: 30          Date: 3/16/2022  Patient Name: Ezra Dillard,   Gender: female      Room: [facility]-A  MRN: [id number]  : 1940  (81 y.o.)  Referring Practitioner: Brant Berry PA-C  Diagnosis: Shortness of Breath  Additional Pertinent Hx: Pt with a PMHx of pulmonary hypertension, hypertrophic cardiomyopathy with preserved EF, hypertension, CAD, A. fib, severe valvular disease, S/P TAVR who presents to [address] [place] [address] ED for the evaluation of shortness of breath and concern for fluid overload. Patient states she was sent from PCP office. She states that she has associated nausea, with dry heaving, diarrhea, decreased urine output, lower extremity swelling, and lightheaded and dizziness with standing from a seated position. She states that with the fluid in her stomach, she felt that she appeared 9 months pregnant. She states that prior to the paracentesis that was performed in the ED it was difficult to take deep breaths. Patient reports improved breathing effort after paracentesis in the ED. The patient states that she recently saw Dr. Storm Meier last month for worsening shortness of breath and underwent a heart cath. Patient reports a 35 pound increase in weight in the past 6 months. Reports recent increase in water pill dosage, as recommended by PCP and cardiologist    Restrictions/Precautions:  Restrictions/Precautions: Fall Risk,Isolation,General Precautions     SUBJECTIVE: Pt. RN okayed OT session. Pt. In room seated in chair upon arrival and agreeable to OT session. PAIN: Joni    Vitals: Vitals not assessed per clinical judgement, see nursing flowsheet    COGNITION: WFL    ADL:   Grooming: Contact Guard Assistance.   stood at sink x 4 mins to comb hair and complete oral care  Lower Extremity Dressing: Dependent to don socks    BALANCE:  Standing Balance: Contact Guard Assistance. BED MOBILITY:  Not Tested    TRANSFERS:  Sit to Stand:  [address], cues for hand placement. Stand to Sit: [address], cues for hand placement. FUNCTIONAL MOBILITY:  Assistive Device: Rolling Walker  Assist Level:  Contact Guard Assistance and with verbal cues . Distance: To and from [facility] and and [facility] in [facility], no LOB       ADDITIONAL ACTIVITIES:  Pt completed B UE exs with light resistance band x 12 reps, x 1 set, with good tolerance of exs, with  RBs throughout, and verbal and visual cues for tech with resistance band to improve strength and overall activity tolerance to support basic ADls. Pt. Educated on [place] use to incorporate into LB dressing. Pt. Reports she is familiar with the [facility] and would benefit from use has one at home, although declines need or use of other items. Pt. Reports her son will don socks if need arises. Pt. Informed where to locate itmes if needed in future. ASSESSMENT:     Activity Tolerance:  Patient tolerance of  treatment: good. Discharge Recommendations: Continue to assess pending progress and Patient would benefit from continued OT at discharge  Equipment Recommendations: Equipment Needed: Yes  Other: sock aid  Plan: Times per week: 5x  Specific instructions for Next Treatment: Functional mobility; ADLs and adaptations; endurance training  Current Treatment Recommendations: Functional Mobility Training,Endurance Training,Self-Care / ADL,Patient/Caregiver Education & Training  Plan Comment: Pt would be able to return home with help from Kenrick Morales and family as needed when medically stable and discharged from Acute. No follow up OT recommended after discharge.     Patient Education  Patient Education: ADL's, Home Exercise Program, Equipment Education, Importance of Increasing Activity and Assistive Device Safety    Goals  Short term goals  Time Frame for Short term goals: By discharge  Short term goal 1: Pt will demonstrate functional mobility walking to/from the bathroom or in the hallway around obstacles while using a rolling walker with Supervision to prepare for doing ADLs and homemaking activities. Short term goal 2: Pt will complete lower body dressing and bathing while using any AE needed with SBA to increase her independence with self care. Short term goal 3: Pt will complete toileting routine including transfers to/from the standard toilet seat with a grabbar and SBA to increase her independence with self care. Short term goal 4: Pt will complete upper body ROM and light resistance exercises while following any handout needed to increase her endurance and strength for ease of doing ADLs and functional mobility. Following session, patient left in safe position with all fall risk precautions in place.

## 2022-03-16 NOTE — PROGRESS NOTES
Hospitalist Progress Note      Patient:  Harish Stewart    Unit/Bed:7K-03/003-A  YOB: 1940  MRN: [id number]   Acct: [de-identified]   PCP: ARTHUR Pizarro CNP  Date of Admission: 3/11/2022    Assessment/Plan:      1. Dyspnea--likely secondary to #2, on RA; O2 sat 90% on room air, will check chest x-ray to evaluate effusions and add incentive spirometry and Acapella  2. Decompensated cirrhosis with aadominal ascites--had paracentesis on 3/11 with 3.8 L drained; had paracentesis on 3/13 with 2.5 L drained; on Lasix/Aldactone/ Zaroxolyn; follows with ALEXANDER Herndon and appreciate input; ascitic fluid showing no growth at this time; needs a 4-week follow-up with Mar Tate  3. Acute hypoxic respiratory failure--questioning if related to #2, currently on RA  4. ISIDRA on CKD stage III--creatinine at 1.2 today, monitor. Per Nephro pt is near her baseline. 5. Hypotension, borderline--questioning if this is secondary to her diuretics however she definitely needs these on board, increased midodrine to 5 mg 3 times a day and monitor response; hold parameters noted on medications  6. Hypokalemia--replaced per protocol, magnesium 1.6  7. Hypomagnesia--was replaced prior to discharge, repeat as outpatient  8. Possible cellulitis right lower extremity (POA)--venous Doppler does not reveal DVT; Ancef 3/12-3/15 and will transition to Keflex 250 mg 3 times a day for 5 more days, monitor response  9. Acute on chronic diastolic heart failure--echo from August 24, 2018 revealed EF 55% along with grade 2 diastolic dysfunction; repeat echo pending read; on Aldactone, Lasix, Zaroxolyn; chest x-ray revealed small bilateral pleural effusions  10.  Essential hypertension, controlled--on Coreg with hold parameters to hold for systolic blood pressure less than 110; nephrology stopped Cozaar; monitor; patient states her \"top number\" is normally never over 120  11. Paroxysmal atrial fibrillation--on aspirin, Coreg; follows with Dr. Alee Harris, on telemetry  12. Moderately severe pulmonary hypertension--follows with Dr. Alee Harris, heart cath report noted from February 9, 2022; plan is to get her in the pulmonary hypertension clinic at [facility]; previous hospitalist discussed this case with Dr. Jairon Quiroz at the [facility] clinic who is a pulmonologist and it was felt that she can follow-up outpatient and Dr. Kristie Almanzar is going to be calling the pulmonary hypertension nurse to set up follow-up ASAP as this process was already initiated and evidence is noted in care everywhere  13. Thrombocytopenia--secondary to #15, monitor  14. Severe tricuspid regurgitation - follows with Cardiology, outpatient f/u [facility]  15. Coagulopathy--likely secondary to #12, INR 1.30  16. S/p AVR  17. Portal venous hypertension  18.  History of elevated troponin in the past    Chief Complaint: Shortness of breath    Initial H and P:-    Per H&P done 3/11: \"Norma is an 73-year-old  female non-smoker with a PMHx of pulmonary hypertension, hypertrophic cardiomyopathy with preserved EF, hypertension, CAD, A. fib, severe valvular disease, S/P TAVR who presents to [facility] ED for the evaluation of shortness of breath and concern for fluid overload.  Patient states she was sent from PCP office.  She states that she has associated nausea, with dry heaving, diarrhea, decreased urine output, lower extremity swelling, and lightheaded and dizziness with standing from a seated position.  She states that with the fluid in her stomach, she felt that she appeared 9 months pregnant. Abigail Rebolledo states that prior to the paracentesis that was performed in the ED it was difficult to take deep breaths.  Patient reports improved breathing effort after paracentesis in the ED.  The patient states that she recently saw Dr. Alee Harris last month for worsening shortness of breath and underwent a heart cath.  Patient reports a 35 pound increase in weight in the past 6 months.  Reports recent increase in water pill dosage, as recommended by PCP and cardiologist. Cedric Sebastian denies chest pain, wheezing, cough, fever, chills, back pain, abdominal pain. \"     3/12--> hemodynamically stable, she follows with [facility]. K[facility] her [facility] however unknown etiology at this time, she is to follow-up with [facility] for pulmonary hypertension per cardiac cath report      3/13--> hemodynamically stable except blood pressures are bit soft; Dr. Komal Kunz from cardiology saw and per his note will need to be transferred to [facility] for pulmonary hypertension; patient's O2 sat was noted to be 86% in the night so she was placed on 2 L of oxygen with improvement, on exam patient's abdomen is more distended     3/14--> hemodynamically stable, on room air, patient had a paracentesis done yesterday with 2.5 L drained, GI saw; discussed with patient about either following up outpatient or possible transfer to [facility] and she was in agreement, I called transfer center and initiated the process~spoke with Dr. Marci Enriquez who is a pulmonologist from [facility], we discussed her medications, we discussed her current status, plan is for her to follow-up outpatient and Dr. Lattie Cranker is going to be calling the pulmonary hypertension nurse to set this up ASAP     3/15--> blood pressures on the lower side and with diuretics will add midodrine; discussed with Jh Barcenas from nephrology\"    Subjective (past 24 hours): Day of discharge:    3/16: I assumed care of this patient today. BP has been relatively stable, still running low-normal.     Pt was admitted 3/11 secondary to shortness of breath and concern for fluid overload. Patient has extensive cardiac history and is followed by Dr. Komal Kunz. Patient recently underwent a cardiac cath 2/10 which revealed preserved systolic function with EF of 60%. Cardiology was consulted as patient was found to be in acute exacerbation of her chronic CHF. It was felt that patient's presentation including ascites, pedal edema and cirrhosis is secondary to severe pulmonary hypertension. Patient was meant to go to [facility] for follow-up as an outpatient, however needed to be admitted to the hospital.  Cardiology recommended continuing diuretics and eventual follow-up at [facility]. Cardiology was okay with discharge, did not recommend increasing Aldactone 200 mg from 50 mg as suggested by GI. Therefore this medication without adjusted. Additionally, GI was also consulted and patient underwent 2 paracenteses 1 on 3/11 with 3.8 L removed and 3/13 with 2.5 L removed. At time of discharge pt felt much better and clinically was improved. Nephrology was consulted to help assist with diuretic management and ISIDRA. Pt was started on Midodrine TID for her hypotension and Cr was at baseline. All consultants and pt were comfortable with discharge. Pt will need close f/u at [facility]. Past medical history, family history, social history and allergies reviewed again and is unchanged since admission. ROS (All review of systems completed. Pertinent positives noted.  Otherwise All other systems reviewed and negative.)     Medications:  Reviewed    Infusion Medications    sodium chloride 25 mL (03/15/22 0251)     Scheduled Medications    cephALEXin  250 mg Oral 3 times per day    midodrine  5 mg Oral TID WC    furosemide  40 mg Oral Daily    metOLazone  5 mg Oral Daily    potassium bicarb-citric acid  40 mEq Oral Daily    calcium-cholecalciferol  1 tablet Oral BID    carvedilol  6.25 mg Oral BID WC    cetirizine  5 mg Oral Daily    multivitamin  1 tablet Oral Daily    pantoprazole  40 mg Oral QAM AC    PARoxetine  30 mg Oral QAM    vitamin B-6  100 mg Oral Daily    spironolactone  50 mg Oral Daily    sucralfate  1 g Oral 4x Daily AC & HS    traZODone  50 mg Oral Nightly    vitamin C 500 mg Oral Daily    sodium chloride flush  10 mL IntraVENous 2 times per day    aspirin  325 mg Oral Daily     PRN Meds: magnesium sulfate, potassium chloride **OR** potassium alternative oral replacement **OR** potassium chloride, sodium chloride flush, sodium chloride, ondansetron **OR** ondansetron, polyethylene glycol, acetaminophen **OR** acetaminophen      Intake/Output Summary (Last 24 hours) at 3/16/2022 0767  Last data filed at 3/16/2022 9417  Gross per 24 hour   Intake 620 ml   Output 200 ml   Net 420 ml       Diet:  ADULT DIET; Regular; Low Sodium (2 gm); 1500 ml    Exam:  /65   Pulse 80   Temp 97.9 °F (36.6 °C) (Oral)   Resp 16   Ht 5' 5\" (1.651 m)   Wt 178 lb 11.2 oz (81.1 kg)   SpO2 93%   BMI 29.74 kg/m²   General appearance: elderly, pleasant, no apparent distress, appears stated age and cooperative. HEENT: Pupils equal, round, and reactive to light. Conjunctivae/corneas clear. Neck: Supple, with full range of motion. No jugular venous distention. Trachea midline. Respiratory:  Normal respiratory effort. Clear to auscultation, bilaterally without Rales/Wheezes/Rhonchi. Cardiovascular: Regular rate and rhythm with normal S1/S2 without murmurs, rubs or gallops. Abdomen: Soft, non-tender, non-distended with normal bowel sounds. Musculoskeletal: passive and active ROM x 4 extremities. +2 edema b/l (greatly improved)  Skin: Skin color, texture, turgor normal.  No rashes or lesions. Neurologic:  Neurovascularly intact without any focal sensory/motor deficits.  Cranial nerves: II-XII intact, grossly non-focal.  Psychiatric: Alert and oriented x4, thought content appropriate, normal insight  Capillary Refill: Brisk,< 3 seconds   Peripheral Pulses: +2 palpable, equal bilaterally     Labs:   Recent Labs     03/14/22  0630   WBC 3.9*   HGB 10.8*   HCT 34.4*   *     Recent Labs     03/14/22  0630 03/15/22  0515 03/16/22  0530    137 136   K 3.3* 4.1 3.5   CL 99 99 97*   CO2 26 28 28   BUN 39* 38* 38*   CREATININE 1.2 1.4* 1.2   CALCIUM 8.5 8.2* 8.2*     Recent Labs     03/14/22  0630 03/15/22  0515   AST 18 17   ALT <5* <5*   BILIDIR 0.3  --    BILITOT 0.6 0.5   ALKPHOS 72 73     Recent Labs     03/13/22  1242 03/15/22  0515   INR 1.29* 1.30*     No results for input(s): Luz Elenayrel Lions in the last 72 hours. Microbiology:    Blood culture #1:   Lab Results   Component Value Date    BC No growth-preliminary  No growth   08/24/2018       Blood culture #2:No results found for: Noretta Katheryn    Organism:  Lab Results   Component Value Date    ORG Enterococcus faecium - (Group D) 08/31/2018         Lab Results   Component Value Date    LABGRAM  03/11/2022     Rare segmented neutrophils observed. No organisms observed. performed on cytospun specimen        MRSA culture only:No results found for: [facility]    Urine culture: No results found for: LABURIN    Respiratory culture: No results found for: CULTRESP    Aerobic and Anaerobic :  No results found for: LABAERO  Lab Results   Component Value Date    LABANAE No growth-preliminary  03/11/2022       Urinalysis:      Lab Results   Component Value Date    NITRU NEGATIVE 03/11/2022    WBCUA 0-2 03/11/2022    BACTERIA NONE SEEN 03/11/2022    RBCUA 0-2 03/11/2022    BLOODU NEGATIVE 03/11/2022    SPECGRAV 1.017 03/11/2022    GLUCOSEU NEGATIVE 09/01/2018       Radiology:  XR CHEST (2 VW)   Final Result   1. Moderate cardiomegaly. Metallic sternotomy sutures and vascular clips are present from prior surgery. A prosthetic heart valve is present and there is also an atrial appendage clip present. 2. Mild bibasilar atelectasis/pneumonia. Small bilateral pleural effusions. No appreciable change from prior study. **This report has been created using voice recognition software. It may contain minor errors which are inherent in voice recognition technology. **      Final report electronically signed by Dr. Harpal Culver on 3/15/2022 1:37 PM      US GUIDED PARACENTESIS   Final Result   Successful ultrasound-guided paracentesis. **This report has been created using voice recognition software. It may contain minor errors which are inherent in voice recognition technology. **      Final report electronically signed by Dr. Elsa Anna on 3/13/2022 10:01 AM      US LIVER   Final Result   Impression:   1. Chronic liver disease with portal venous hypertension and ascites. 2. Gallbladder wall thickening may reflect cholecystitis or adjacent liver    disease. Possible gallstones and sludge in the region of the gallbladder    neck. Negative sonographic Cifuentes sign. 3. Echogenic right kidney with lower pole cyst.      This document has been electronically signed by: Malia Sharma MD on    03/12/2022 02:58 AM      VL DUP LOWER EXTREMITY VENOUS RIGHT   Final Result   Negative for right lower extremity deep vein thrombosis. This document has been electronically signed by: Izzy Regan MD on    03/11/2022 09:08 PM      Technique Used: Duplex examination performed utilizing grayscale, color    and spectral analysis. US GUIDED PARACENTESIS   Final Result   Successful ultrasound-guided paracentesis. **This report has been created using voice recognition software. It may contain minor errors which are inherent in voice recognition technology. **      Final report electronically signed by Dr. Elsa Anna on 3/11/2022 4:09 PM      XR CHEST (2 VW)   Final Result   1. Small bilateral pleural effusions with associated compressive atelectasis. 2. Moderate cardiomegaly. **This report has been created using voice recognition software. It may contain minor errors which are inherent in voice recognition technology. **      Final report electronically signed by Dr Quinn Kahn on 3/11/2022 2:49 PM        Echocardiogram 2D/ M-Mode/ Colorflow/ Do    Result Date: 3/14/2022  Transthoracic Echocardiography Report (TTE)  Demographics   Patient Name    Patty Bracket Gender               Female   MR #            [id number]       Race                                                    Ethnicity   Account #       [de-identified]       Room Number          0003   Accession       [id number]      Date of Study        03/12/2022  Number   Date of Birth   1940      Referring Physician  RAINA Navarrete PA-C   Age             81 year(s)      Jes Rushing MD                                  Physician  Procedure Type of Study   TTE procedure:ECHOCARDIOGRAM LIMITED, ECHOCARDIOGRAM COMPLETE 2D W DOPPLER  W COLOR. Procedure Date Date: 03/12/2022 Start: 07:53 AM Study Location: Bedside Technical Quality: Adequate visualization Indications:Shortness of breath. Additional Medical History:Pulmonary Hypertension, Hypertrophic Cardiomyopathy, Hyperlipidemia, Atrial fibrillation, Acute Kidney injury, Aortic Valve replacement: 21 mm [employer] bioprosthetic, Hypertension, Congestive heart failure, Arthritis, Coronary artery disease. Patient Status: Routine Height: 65 inches Weight: 166 pounds BSA: 1.83 m^2 BMI: 27.62 kg/m^2 BP: 101/54 mmHg Allergies   - See Epic.   - No Known Allergies. Conclusions   Summary  Normal left ventricle size and systolic function. Ejection fraction was  estimated at -55%. There were no regional left ventricular wall motion  abnormalities and wall thickness was within normal limits. The right ventricular size was increasedl with normal systolic function  and increase wall thickness. Right ventricular systolic pressure was elevated. The tricuspid valve structure was normal with normal leaflet separation. DOPPLER: There was no evidence of tricuspid stenosis.  There was n severe  tricuspid reg  The pulmonic valve leaflets exhibited normal thickness, no calcification,  and normal cuspal separation. DOPPLER: The transpulmonic velocity was  within the normal range mild reg  Pulmonary artery systolic pressure calculated from tricuspid regurgitation  jet is 65-70 . severe pulmonary htn   Signature   ----------------------------------------------------------------  Electronically signed by Chevy Esqueda MD (Interpreting  physician) on 03/14/2022 at 01:10 PM  ----------------------------------------------------------------   Findings   Mitral Valve  The mitral valve structure was normal with normal leaflet separation. DOPPLER: The transmitral velocity was within the normal range with no  evidence for mitral stenosis. There was mild mitral regurgitation. Aortic Valve  Structurally normal aortic valve. Normally functioning bioprosthetic valve  in aortic position. no ai moderate increase velocity across the valve   Tricuspid Valve  The tricuspid valve structure was normal with normal leaflet separation. DOPPLER: There was no evidence of tricuspid stenosis. There was n severe  tricuspid reg   Pulmonic Valve  The pulmonic valve leaflets exhibited normal thickness, no calcification,  and normal cuspal separation. DOPPLER: The transpulmonic velocity was  within the normal range mild reg  Pulmonary artery systolic pressure calculated from tricuspid regurgitation  jet is 65-70 . severe pulmonary htn   Left Atrium  Left atrial size was mildly No evidence of thrombus in the left atrial  appendage. Left Ventricle  Normal left ventricle size and systolic function. Ejection fraction was  estimated at -55%. There were no regional left ventricular wall motion  abnormalities and wall thickness was within normal limits. Right Atrium  Right atrial size was dilated   Right Ventricle  The right ventricular size was increasedl with normal systolic function  and increase wall thickness. Right ventricular systolic pressure was elevated.    Pericardial Effusion  The pericardium was normal in appearance with no evidence of a pericardial  effusion. Pleural Effusion  mild pleural effusion. Aorta / Great Vessels  -Aortic root dimension within normal limits. -IVC size is within normal limits with normal respiratory phasic changes.   M-Mode/2D Measurements & Calculations   LV Diastolic      LV Systolic Dimension: 2.6 cm      LA Area: 22.4 cm^2  Dimension: 3.6 cm LV Volume Diastolic: 70.4 ml  LV CE:98.9 %      LV Volume Systolic: 88.2 ml  LV PW Diastolic:  LV EDV/LV EDV Index: 54.4 ml/30  1.4 cm            m^2LV ESV/LV ESV Index: 88.5 SW/84 RV Diastolic  Septum Diastolic: m^2                                Dimension: 3.5 cm  1.5 cm            EF Calculated: 54.8 %                                                       Ascending Aorta: 3.5                                                       cm                                                       LA volume/Index: 63.1                    LVOT: 1.9 cm                       ml /34m^2  Doppler Measurements & Calculations   MV Peak E-Wave: 171 AV Peak Velocity: 301   LVOT Peak Velocity: 89.4 cm/s  cm/s                cm/s                    LVOT Mean Velocity: 62.5 cm/s  MV Peak A-Wave: 129 AV Peak Gradient: 36.24 LVOT Peak Gradient: 3 mmHgLVOT  cm/s                mmHg                    Mean Gradient: 2 mmHg  MV E/A Ratio: 1.33  AV Mean Velocity: 208  MV Peak Gradient:   cm/s                    TV Peak E-Wave: 118 cm/s  11.7 mmHg           AV Mean Gradient: 20    TV Peak A-Wave: 105 cm/s                      mmHg  MV Deceleration     AV VTI: 65.8 cm         TV Peak Gradient: 5.57 mmHg  Time: 275 msec      AV Area                 TR Velocity:353 cm/s  MV P1/2t: 80 msec   (Continuity):0.84 cm^2  TR Gradient:49.84 mmHg  MVA by PHT:2.75                             PV Peak Velocity: 89.5 cm/s  cm^2                LVOT VTI: 19.6 cm       PV Peak Gradient: 3.2 mmHg                      IVRT: 32 msec                                               LA ED Velocity: 184 cm/s                      AV DVI (VTI): 0.3AV DVI  MR Velocity: 538    (Vmax):0.3  cm/s  [id number]    XR CHEST (2 VW)    Result Date: 3/11/2022  PROCEDURE: XR CHEST (2 VW) CLINICAL INFORMATION: Shortness of Breath COMPARISON: Chest radiograph 1/18/2022, 2/26/2021. TECHNIQUE: 2 views of the chest FINDINGS: Small bilateral pleural effusions are seen. Bibasilar opacities likely relate to compressive atelectasis. Cardiac silhouette is moderately enlarged. A left atrial appendage clip and aortic valve are seen. No pneumothorax. No acute bony abnormality. Median sternotomy has been performed. 1. Small bilateral pleural effusions with associated compressive atelectasis. 2. Moderate cardiomegaly. **This report has been created using voice recognition software. It may contain minor errors which are inherent in voice recognition technology. ** Final report electronically signed by Dr Aicha Monique on 3/11/2022 2:49 PM    US LIVER    Result Date: 3/12/2022  Exam: Limited abdominal ultrasound: Comparison: None Findings: Echogenic pancreas without mass. Normal caliber pancreatic duct. Enlarged heterogeneous liver with nodular surface. No discrete liver mass. Bidirectional flow within the portal veins. Dilated hepatic veins with appropriate flow direction. Diffuse gallbladder wall thickening to 9 mm. Sludge or small gallstones in the gallbladder neck region. Negative sonographic Cifuentes sign. Possible 2 mm gallbladder wall polyp. 4 mm CBD. Moderate ascites. Fluid surrounds the gallbladder. Echogenic right kidney with lower pole 3.6 x 3 x 0 cm cortical cyst.     Impression: 1. Chronic liver disease with portal venous hypertension and ascites. 2. Gallbladder wall thickening may reflect cholecystitis or adjacent liver disease.  Possible gallstones and sludge in the region of the gallbladder neck. Negative sonographic Cifuentes sign. 3. Echogenic right kidney with lower pole cyst. This document has been electronically signed by: Maura Guzman MD on 03/12/2022 02:58 AM    US GUIDED PARACENTESIS    Result Date: 3/11/2022  PROCEDURE: US GUIDED PARACENTESIS CLINICAL INFORMATION: Ascites COMPARISON: 1/24/2022 TECHNIQUE: Ultrasound-guided paracentesis FINDINGS: The procedure was explained the patient. All risks were discussed. Written informed consent was obtained. Limited ultrasound was performed revealing the ascites. An acceptable location was identified. The skin was marked. The overlying area was sterilely prepped and draped. 2 percent lidocaine was given for local anesthesia. Next a 5 Norwegian one-step catheter  was introduced into the ascites. Clear yellow fluid was removed. The catheter was removed. The patient tolerated the procedure. No immediate complications. Physician performing procedure: Dr Kimber Harrington Informed consent signed: Yes Local Anesthetic: 2 % Lidocaine Specimen volume: 70 ml Catheter: 19 ga 5 Faroese Aspirated ascites volume: 3.8 liters Aspirated ascites color: Old slightly heme tinged hardeep-colored fluid Site of Puncture:RLQ Complications: None observed     Successful ultrasound-guided paracentesis. **This report has been created using voice recognition software. It may contain minor errors which are inherent in voice recognition technology. ** Final report electronically signed by Dr. Mary Washington on 3/11/2022 4:09 PM    VL DUP LOWER EXTREMITY VENOUS RIGHT    Result Date: 3/11/2022  Venous duplex ultrasound right lower extremity Comparison: None Findings: The visualized deep veins are fully compressible with normal Doppler color flow and spectral tracings. Mild subcutaneous edema in the right leg. No popliteal cyst.     Negative for right lower extremity deep vein thrombosis.  This document has been electronically signed by: Windy Holliday MD on 03/11/2022 09:08 PM Technique Used: Duplex examination performed utilizing grayscale, color and spectral analysis.       Electronically signed by Aicha Majano PA-C on 3/16/2022 at 7:41 AM

## 2022-03-16 NOTE — DISCHARGE SUMMARY
Hospitalist Discharge Summary        Patient: Loida Flaherty  YOB: 1940  MRN: [id number]   Acct: [de-identified]    Primary Care Physician: ARTHUR Tejeda - CNP    Admit date  3/11/2022    Discharge date: 3/16/2022  5:31 PM    Chief Complaint on presentation :-  Shortness of breath    Discharge Assessment and Plan:-   1. Dyspnea--likely secondary to #2, on RA; O2 sat 90% on room air, will check chest x-ray to evaluate effusions and add incentive spirometry and Acapella  2. Decompensated cirrhosis with aadominal ascites--had paracentesis on 3/11 with 3.8 L drained; had paracentesis on 3/13 with 2.5 L drained; on Lasix/Aldactone/ Zaroxolyn; follows with ALEXANDER Herndon and appreciate input; ascitic fluid showing no growth at this time; needs a 4-week follow-up with Mar Tate  3. Acute hypoxic respiratory failure--questioning if related to #2, currently on RA  4. ISIDRA on CKD stage III--creatinine at 1.2 today, monitor. Per Nephro pt is near her baseline. 5. Hypotension, borderline--questioning if this is secondary to her diuretics however she definitely needs these on board, increased midodrine to 5 mg 3 times a day and monitor response; hold parameters noted on medications  6. Hypokalemia--replaced per protocol, magnesium 1.6  7. Hypomagnesia--was replaced prior to discharge, repeat as outpatient  8. Possible cellulitis right lower extremity (POA)--venous Doppler does not reveal DVT; Ancef 3/12-3/15 and will transition to Keflex 250 mg 3 times a day for 5 more days, monitor response  9. Acute on chronic diastolic heart failure--echo from August 24, 2018 revealed EF 55% along with grade 2 diastolic dysfunction; repeat echo pending read; on Aldactone, Lasix, Zaroxolyn; chest x-ray revealed small bilateral pleural effusions  10.  Essential hypertension, controlled--on Coreg with hold parameters to hold for systolic blood pressure less than 110; nephrology stopped Cozaar; monitor; patient states her \"top number\" is normally never over 120  11. Paroxysmal atrial fibrillation--on aspirin, Coreg; follows with Dr. Анна Roth, on telemetry  12. Moderately severe pulmonary hypertension--follows with Dr. Анна Roth, heart cath report noted from February 9, 2022; plan is to get her in the pulmonary hypertension clinic at [facility]; previous hospitalist discussed this case with Dr. Can Ramos at the [facility] who is a pulmonologist and it was felt that she can follow-up outpatient and Dr. Richard Lima is going to be calling the pulmonary hypertension nurse to set up follow-up ASAP as this process was already initiated and evidence is noted in care everywhere  13. Thrombocytopenia--secondary to #15, monitor  14. Severe tricuspid regurgitation - follows with Cardiology, outpatient f/u [facility]  15. Coagulopathy--likely secondary to #12, INR 1.30  16. S/p AVR  17. Portal venous hypertension  18.  History of elevated troponin in the past      Initial H and P and Hospital course:-  Per H&P done 3/11: \"Norma is an 81-year-old  female non-smoker with a PMHx of pulmonary hypertension, hypertrophic cardiomyopathy with preserved EF, hypertension, CAD, A. fib, severe valvular disease, S/P TAVR who presents to [facility] ED for the evaluation of shortness of breath and concern for fluid overload.  Patient states she was sent from PCP office.  She states that she has associated nausea, with dry heaving, diarrhea, decreased urine output, lower extremity swelling, and lightheaded and dizziness with standing from a seated position.  She states that with the fluid in her stomach, she felt that she appeared 9 months pregnant. Mini Gómez states that prior to the paracentesis that was performed in the ED it was difficult to take deep breaths.  Patient reports improved breathing effort after paracentesis in the ED.  The patient states that she recently saw Dr. Анна Roth last month for worsening shortness of breath and underwent a heart cath.  Patient reports a 35 pound increase in weight in the past 6 months.  Reports recent increase in water pill dosage, as recommended by PCP and cardiologist. Terri Charles denies chest pain, wheezing, cough, fever, chills, back pain, abdominal pain. \"     3/12--> hemodynamically stable, she follows with Dr. Kishor [facility] her [facility] however unknown etiology at this time, she is to follow-up with [facility] for pulmonary hypertension per cardiac cath report      3/13--> hemodynamically stable except blood pressures are bit soft; Dr. Kishor Hamilton from cardiology saw and per his note will need to be transferred to [facility] for pulmonary hypertension; patient's O2 sat was noted to be 86% in the night so she was placed on 2 L of oxygen with improvement, on exam patient's abdomen is more distended     3/14--> hemodynamically stable, on room air, patient had a paracentesis done yesterday with 2.5 L drained, GI saw; discussed with patient about either following up outpatient or possible transfer to [facility] and she was in agreement, I called transfer center and initiated the process~spoke with Dr. Unique Sidhu who is a pulmonologist from [facility], we discussed her medications, we discussed her current status, plan is for her to follow-up outpatient and Dr. Sabino Albrecht is going to be calling the pulmonary hypertension nurse to set this up ASAP     3/15--> blood pressures on the lower side and with diuretics will add midodrine; discussed with Madison Gutierrez from nephrology\"     Subjective (past 24 hours): Day of discharge:    3/16: I assumed care of this patient today. BP has been relatively stable, still running low-normal.      Pt was admitted 3/11 secondary to shortness of breath and concern for fluid overload. Patient has extensive cardiac history and is followed by Dr. Kishor Hamilton.   Patient recently underwent a cardiac cath 2/10 which revealed preserved distention. Trachea midline. Respiratory:  Normal respiratory effort. Clear to auscultation, bilaterally without Rales/Wheezes/Rhonchi. Cardiovascular: Regular rate and rhythm with normal S1/S2 without murmurs, rubs or gallops. Abdomen: Soft, non-tender, non-distended with normal bowel sounds. Musculoskeletal: passive and active ROM x 4 extremities. +2 edema b/l (greatly improved)  Skin: Skin color, texture, turgor normal.  No rashes or lesions. Neurologic:  Neurovascularly intact without any focal sensory/motor deficits.  Cranial nerves: II-XII intact, grossly non-focal.  Psychiatric: Alert and oriented x4, thought content appropriate, normal insight  Capillary Refill: Brisk,< 3 seconds   Peripheral Pulses: +2 palpable, equal bilaterally       Discharge Medications:-      Medication List      ASK your doctor about these medications    aspirin 325 MG tablet     CALTRATE 600+D3 PO     carvedilol 6.25 MG tablet  Commonly known as: COREG  Take 1 tablet by mouth 2 times daily (with meals)     fluticasone 50 MCG/ACT nasal spray  Commonly known as: FLONASE     furosemide 40 MG tablet  Commonly known as: LASIX     loratadine 10 MG tablet  Commonly known as: CLARITIN     losartan 25 MG tablet  Commonly known as: COZAAR     metOLazone 2.5 MG tablet  Commonly known as: ZAROXOLYN     omeprazole 20 MG delayed release capsule  Commonly known as: PRILOSEC     OSTEO BI-FLEX TRIPLE STRENGTH PO     PARoxetine 30 MG tablet  Commonly known as: PAXIL     * CENTRUM SILVER ADULT 50+ PO     * PreserVision AREDS Caps     spironolactone 50 MG tablet  Commonly known as: ALDACTONE     sucralfate 1 GM tablet  Commonly known as: CARAFATE  Take 1 tablet by mouth 4 times daily (before meals and nightly)     traZODone 50 MG tablet  Commonly known as: DESYREL     vitamin B-6 100 MG tablet  Commonly known as: PYRIDOXINE     vitamin C 500 MG tablet  Commonly known as: ASCORBIC ACID         * This list has 2 medication(s) that are the same as other medications prescribed for you. Read the directions carefully, and ask your doctor or other care provider to review them with you.                  Labs :-  Recent Results (from the past 72 hour(s))   Protime-INR    Collection Time: 03/13/22 12:42 PM   Result Value Ref Range    INR 1.29 (H) 0.85 - 1.13   Hepatitis Panel, Acute    Collection Time: 03/13/22  1:52 PM   Result Value Ref Range    Hepatitis B Surface Ag Negative     Hep A IgM Negative     Hep B Core Ab, IgM Negative     Hepatitis C Ab Negative    AFP Tumor Marker    Collection Time: 03/13/22  1:52 PM   Result Value Ref Range    AFP-Tumor Marker 2.6 <8.4 ug/L   Tissue transglutaminase, IgG    Collection Time: 03/13/22  1:52 PM   Result Value Ref Range    TTG, IgG < 0.6 <7.0 U/mL   Tissue transglutaminase, IgA    Collection Time: 03/13/22  1:52 PM   Result Value Ref Range    TISSUE TRANSGLUTAMINASE IGA 0.9 <7.0 U/mL   CBC    Collection Time: 03/14/22  6:30 AM   Result Value Ref Range    WBC 3.9 (L) 4.8 - 10.8 thou/mm3    RBC 3.63 (L) 4.20 - 5.40 mill/mm3    Hemoglobin 10.8 (L) 12.0 - 16.0 gm/dl    Hematocrit 34.4 (L) 37.0 - 47.0 %    MCV 94.8 81.0 - 99.0 fL    MCH 29.8 26.0 - 33.0 pg    MCHC 31.4 (L) 32.2 - 35.5 gm/dl    RDW-CV 18.4 (H) 11.5 - 14.5 %    RDW-SD 63.7 (H) 35.0 - 45.0 fL    Platelets 529 (L) 188 - 400 thou/mm3    MPV 9.9 9.4 - 12.4 fL   Basic Metabolic Panel    Collection Time: 03/14/22  6:30 AM   Result Value Ref Range    Sodium 137 135 - 145 meq/L    Potassium 3.3 (L) 3.5 - 5.2 meq/L    Chloride 99 98 - 111 meq/L    CO2 26 23 - 33 meq/L    Glucose 92 70 - 108 mg/dL    BUN 39 (H) 7 - 22 mg/dL    CREATININE 1.2 0.4 - 1.2 mg/dL    Calcium 8.5 8.5 - 10.5 mg/dL   Hepatic Function Panel    Collection Time: 03/14/22  6:30 AM   Result Value Ref Range    Albumin 2.8 (L) 3.5 - 5.1 g/dL    Total Bilirubin 0.6 0.3 - 1.2 mg/dL    Bilirubin, Direct 0.3 0.0 - 0.3 mg/dL    Alkaline Phosphatase 72 38 - 126 U/L    AST 18 5 - 40 U/L    ALT <5 (L) 11 - 66 U/L    Total Protein 5.5 (L) 6.1 - 8.0 g/dL   Anion Gap    Collection Time: 03/14/22  6:30 AM   Result Value Ref Range    Anion Gap 12.0 8.0 - 16.0 meq/L   Glomerular Filtration Rate, Estimated    Collection Time: 03/14/22  6:30 AM   Result Value Ref Range    Est, Glom Filt Rate 43 (A) ml/min/1.73m2   COVID-19, Rapid    Collection Time: 03/14/22  8:08 AM    Specimen: Nasopharyngeal Swab   Result Value Ref Range    SARS-CoV-2, NAAT NOT DETECTED NOT DETECTED   Comprehensive Metabolic Panel    Collection Time: 03/15/22  5:15 AM   Result Value Ref Range    Sodium 137 135 - 145 meq/L    Potassium 4.1 3.5 - 5.2 meq/L    Chloride 99 98 - 111 meq/L    CO2 28 23 - 33 meq/L    Glucose 97 70 - 108 mg/dL    BUN 38 (H) 7 - 22 mg/dL    CREATININE 1.4 (H) 0.4 - 1.2 mg/dL    Calcium 8.2 (L) 8.5 - 10.5 mg/dL    AST 17 5 - 40 U/L    Alkaline Phosphatase 73 38 - 126 U/L    Total Protein 5.5 (L) 6.1 - 8.0 g/dL    Albumin 2.9 (L) 3.5 - 5.1 g/dL    Total Bilirubin 0.5 0.3 - 1.2 mg/dL    ALT <5 (L) 11 - 66 U/L   Protime-INR    Collection Time: 03/15/22  5:15 AM   Result Value Ref Range    INR 1.30 (H) 0.85 - 1.13   Magnesium    Collection Time: 03/15/22  5:15 AM   Result Value Ref Range    Magnesium 1.6 1.6 - 2.4 mg/dL   Anion Gap    Collection Time: 03/15/22  5:15 AM   Result Value Ref Range    Anion Gap 10.0 8.0 - 16.0 meq/L   Glomerular Filtration Rate, Estimated    Collection Time: 03/15/22  5:15 AM   Result Value Ref Range    Est, Glom Filt Rate 36 (A) OL/WVK/9.94P8   Basic Metabolic Panel w/ Reflex to MG    Collection Time: 03/16/22  5:30 AM   Result Value Ref Range    Sodium 136 135 - 145 meq/L    Potassium reflex Magnesium 3.5 3.5 - 5.2 meq/L    Chloride 97 (L) 98 - 111 meq/L    CO2 28 23 - 33 meq/L    Glucose 102 70 - 108 mg/dL    BUN 38 (H) 7 - 22 mg/dL    CREATININE 1.2 0.4 - 1.2 mg/dL    Calcium 8.2 (L) 8.5 - 10.5 mg/dL   Anion Gap    Collection Time: 03/16/22  5:30 AM   Result Value Ref Range    Anion Gap 11.0 8.0 - 16.0 meq/L   Glomerular Filtration Rate, Estimated    Collection Time: 03/16/22  5:30 AM   Result Value Ref Range    Est, Glom Filt Rate 43 (A) ml/min/1.73m2   Magnesium    Collection Time: 03/16/22  5:30 AM   Result Value Ref Range    Magnesium 1.5 (L) 1.6 - 2.4 mg/dL        Microbiology:    Blood culture #1:   Lab Results   Component Value Date    BC No growth-preliminary  No growth   08/24/2018       Blood culture #2:No results found for: Glorine Hover    Organism:    Lab Results   Component Value Date    LABGRAM  03/11/2022     Rare segmented neutrophils observed. No organisms observed.  performed on cytospun specimen        MRSA culture only:No results found for: [facility]    Urine culture: No results found for: LABURIN  Lab Results   Component Value Date    ORG Enterococcus faecium - (Group D) 08/31/2018        Respiratory culture: No results found for: CULTRESP    Aerobic and Anaerobic :  No results found for: LABAERO  Lab Results   Component Value Date    LABANAE No growth-preliminary No growth  03/11/2022       Urinalysis:      Lab Results   Component Value Date    NITRU NEGATIVE 03/11/2022    WBCUA 0-2 03/11/2022    BACTERIA NONE SEEN 03/11/2022    RBCUA 0-2 03/11/2022    BLOODU NEGATIVE 03/11/2022    SPECGRAV 1.017 03/11/2022    GLUCOSEU NEGATIVE 09/01/2018       Radiology:-  Echocardiogram 2D/ M-Mode/ Colorflow/ Do    Result Date: 3/14/2022  Transthoracic Echocardiography Report (TTE)  Demographics   Patient Name    Deacon Green Gender               Female   MR #            [id number]       Race                                                    Ethnicity   Account #       [de-identified]       Room Number          0003   Accession       [id number]      Date of Study        03/12/2022  Number   Date of Birth   1940      Referring Physician  Arelis Pardaa, RAINA Kitchen PA-C   Age             81 year(s)      Sonographer          Zoe Weathers Katja Sebastian MD                                  Physician  Procedure Type of Study   TTE procedure:ECHOCARDIOGRAM LIMITED, ECHOCARDIOGRAM COMPLETE 2D W DOPPLER  W COLOR. Procedure Date Date: 03/12/2022 Start: 07:53 AM Study Location: Bedside Technical Quality: Adequate visualization Indications:Shortness of breath. Additional Medical History:Pulmonary Hypertension, Hypertrophic Cardiomyopathy, Hyperlipidemia, Atrial fibrillation, Acute Kidney injury, Aortic Valve replacement: 21 mm [employer] bioprosthetic, Hypertension, Congestive heart failure, Arthritis, Coronary artery disease. Patient Status: Routine Height: 65 inches Weight: 166 pounds BSA: 1.83 m^2 BMI: 27.62 kg/m^2 BP: 101/54 mmHg Allergies   - See Epic.   - No Known Allergies. Conclusions   Summary  Normal left ventricle size and systolic function. Ejection fraction was  estimated at -55%. There were no regional left ventricular wall motion  abnormalities and wall thickness was within normal limits. The right ventricular size was increasedl with normal systolic function  and increase wall thickness. Right ventricular systolic pressure was elevated. The tricuspid valve structure was normal with normal leaflet separation. DOPPLER: There was no evidence of tricuspid stenosis. There was n severe  tricuspid reg  The pulmonic valve leaflets exhibited normal thickness, no calcification,  and normal cuspal separation.  DOPPLER: The transpulmonic velocity was  within the normal range mild reg  Pulmonary artery systolic pressure calculated from tricuspid regurgitation  jet is 65-70 . severe pulmonary htn   Signature   ----------------------------------------------------------------  Electronically signed by Jasper Chauhan MD (Interpreting  physician) on 03/14/2022 at 01:10 PM  ----------------------------------------------------------------   Findings   Mitral Valve  The mitral valve structure was normal with normal leaflet separation. DOPPLER: The transmitral velocity was within the normal range with no  evidence for mitral stenosis. There was mild mitral regurgitation. Aortic Valve  Structurally normal aortic valve. Normally functioning bioprosthetic valve  in aortic position. no ai moderate increase velocity across the valve   Tricuspid Valve  The tricuspid valve structure was normal with normal leaflet separation. DOPPLER: There was no evidence of tricuspid stenosis. There was n severe  tricuspid reg   Pulmonic Valve  The pulmonic valve leaflets exhibited normal thickness, no calcification,  and normal cuspal separation. DOPPLER: The transpulmonic velocity was  within the normal range mild reg  Pulmonary artery systolic pressure calculated from tricuspid regurgitation  jet is 65-70 . severe pulmonary htn   Left Atrium  Left atrial size was mildly No evidence of thrombus in the left atrial  appendage. Left Ventricle  Normal left ventricle size and systolic function. Ejection fraction was  estimated at -55%. There were no regional left ventricular wall motion  abnormalities and wall thickness was within normal limits. Right Atrium  Right atrial size was dilated   Right Ventricle  The right ventricular size was increasedl with normal systolic function  and increase wall thickness. Right ventricular systolic pressure was elevated. Pericardial Effusion  The pericardium was normal in appearance with no evidence of a pericardial  effusion. Pleural Effusion  mild pleural effusion. Aorta / Great Vessels  -Aortic root dimension within normal limits. -IVC size is within normal limits with normal respiratory phasic changes.   M-Mode/2D Measurements & Calculations   LV Diastolic      LV Systolic Dimension: 2.6 cm      LA Area: 22.4 cm^2  Dimension: 3.6 cm LV Volume Diastolic: 13.6 ml  LV TR:25.5 %      LV Volume Systolic: 31.9 ml  LV PW Diastolic:  LV EDV/LV EDV Index: 54.4 ml/30  1.4 cm m^2LV ESV/LV ESV Index: 79.2 XS/66 RV Diastolic  Septum Diastolic: m^2                                Dimension: 3.5 cm  1.5 cm            EF Calculated: 54.8 %                                                       Ascending Aorta: 3.5                                                       cm                                                       LA volume/Index: 63.1                    LVOT: 1.9 cm                       ml /34m^2  Doppler Measurements & Calculations   MV Peak E-Wave: 171 AV Peak Velocity: 301   LVOT Peak Velocity: 89.4 cm/s  cm/s                cm/s                    LVOT Mean Velocity: 62.5 cm/s  MV Peak A-Wave: 129 AV Peak Gradient: 36.24 LVOT Peak Gradient: 3 mmHgLVOT  cm/s                mmHg                    Mean Gradient: 2 mmHg  MV E/A Ratio: 1.33  AV Mean Velocity: 208  MV Peak Gradient:   cm/s                    TV Peak E-Wave: 118 cm/s  11.7 mmHg           AV Mean Gradient: 20    TV Peak A-Wave: 105 cm/s                      mmHg  MV Deceleration     AV VTI: 65.8 cm         TV Peak Gradient: 5.57 mmHg  Time: 275 msec      AV Area                 TR Velocity:353 cm/s  MV P1/2t: 80 msec   (Continuity):0.84 cm^2  TR Gradient:49.84 mmHg  MVA by PHT:2.75                             PV Peak Velocity: 89.5 cm/s  cm^2                LVOT VTI: 19.6 cm       PV Peak Gradient: 3.2 mmHg                      IVRT: 32 msec                                               WA ED Velocity: 184 cm/s                      AV DVI (VTI): 0.3AV DVI  MR Velocity: 538    (Vmax):0.3  cm/s  [email or web address] [id number]    XR CHEST (2 VW)    Result Date: 3/11/2022  PROCEDURE: XR CHEST (2 VW) CLINICAL INFORMATION: Shortness of Breath COMPARISON: Chest radiograph 1/18/2022, 2/26/2021. TECHNIQUE: 2 views of the chest FINDINGS: Small bilateral pleural effusions are seen.  Bibasilar opacities likely the ascites. An acceptable location was identified. The skin was marked. The overlying area was sterilely prepped and draped. 2 percent lidocaine was given for local anesthesia. Next a 5 English one-step catheter  was introduced into the ascites. Clear yellow fluid was removed. The catheter was removed. The patient tolerated the procedure. No immediate complications. Physician performing procedure: Dr Em Mckeon Informed consent signed: Yes Local Anesthetic: 2 % Lidocaine Specimen volume: 70 ml Catheter: 19 ga 5 Sami Aspirated ascites volume: 3.8 liters Aspirated ascites color: Old slightly heme tinged hardeep-colored fluid Site of Puncture:RLQ Complications: None observed     Successful ultrasound-guided paracentesis. **This report has been created using voice recognition software. It may contain minor errors which are inherent in voice recognition technology. ** Final report electronically signed by Dr. Denzel Prado on 3/11/2022 4:09 PM    VL DUP LOWER EXTREMITY VENOUS RIGHT    Result Date: 3/11/2022  Venous duplex ultrasound right lower extremity Comparison: None Findings: The visualized deep veins are fully compressible with normal Doppler color flow and spectral tracings. Mild subcutaneous edema in the right leg. No popliteal cyst.     Negative for right lower extremity deep vein thrombosis. This document has been electronically signed by: Enrrique Hummel MD on 03/11/2022 09:08 PM Technique Used: Duplex examination performed utilizing grayscale, color and spectral analysis.        Follow-up scheduled after discharge :-    in the next few days with ARTHUR Avendano - CNP  in the next few days with [facility]    Consultations during this hospital stay:-  [] NONE [x] Cardiology  [] Nephrology  [] Hemo onco   [x] GI   [] ID  [] Endocrine  [] Pulm    [] Neuro    [] Psych   [] Urology  [] ENT   [] G SURGERY   []Ortho    []CV surg    [x] Palliative  [] Hospice [] Pain management   [x]    []TCU   [x]

## 2022-03-16 NOTE — PROGRESS NOTES
Consult Note        Patient:   Tony Olson  YOB: 1940  Age:  81 y.o. Room:  [address] [place], [place] [address]A  Code Status:  Full Code  MRN:  [id number]   Acct: [de-identified]    Date of Admission:  3/11/2022  1:33 PM  Date of Service:  3/16/2022  Consulting:  Pedro Zavala PA-C   Primary Care Physician:  ARTHUR Barajas - CNP  Completed By:  Eliezer Arriaga RN                 Reason for Palliative Care Evaluation:-             [x] Code Status Discussion              [] Goals of Care              [] Pain/Symptom Management               [] Emotional Support              [] Other:                   Current Issues:-  []  Pain  []  Fatigue  []  Nausea  []  Anxiety  []  Depression  [x]  Shortness of Breath  []  Constipation  []  Appetite  []  Other:             Advance Directives:-  [] [place] DNR Form  [x] Living Will  [x] Medical POA             Current Code Status:-  [x] Full Resuscitation  [] DNR-Comfort Care-Arrest  [] DNR-Comfort Care       [] Limited Resuscitation             [] No CPR            [] No shock            [] No ET intubation/reintubation            [] No resuscitative medications            [] Other limitation:              Palliative Performance Status:          [x] 60%  Ambulation reduced; Significant disease;Can't do hobbies/housework; intake normal or reduced; occasional assist; LOC full/confusion        [] 50%  Mainly sit/lie; Extensive disease; Can't do any work; Considerable assist; intake normal or reduced; LOC full/confusion        [] 40%  Mainly in bed; Extensive disease; Mainly assist; intake normal or reduced; LOC full/confusion         [] 30%  Bed Bound; Extensive disease; Total care; intake reduced; LOC full/confusion        [] 20%  Bed Bound; Extensive disease; Total care; intake minimal; Drowsy/coma        [] 10%  Bed Bound; Extensive disease;  Total care; Mouth care only; Drowsy/coma        [] 0  Death        Goals of care evaluation:-        The patient goals of care are to provide comfort care/supportive services/palliation & relieve suffering:  Goals of care discussed with:  [x] Patient independently  [] Patient and Family  [] Family or Healthcare DPOA independently  [] Unable to discuss with patient, family/DPOA not present         Family/Patient Discussion:  Received consult for code status discussion. Per chart review patient with significant cardiac history, aortic valve replacement with maze, afib, cirrhosis, CHF. Patient with severe pulmonary hypertension and mitral regurgitation, plans to follow up outpatient at [facility]. Patient admitted with SOB, has had paracentesis twice since admission. In room to speak with patient. Patient sitting up in chair, watching TV. Patient stated she is feeling much better than when admitted. Patient stated her swelling in her extremities had resolved and she had almost 6L of fluid taken off. Patient able to state that she also has plans to follow up at [facility] following discharge. Patient stated she lives in [place], where her son, grandchildren, and great grandchildren are all nearby. Patient stated that she had another son who unfortunately passed away when he as in his late 29's. Support given. Spoke with patient regarding her code status. Educated patient on FULL code including chest compressions with possible rib fx and organ damage, intubation, defibrillation, and resuscitative medications at the time of cardiac/respiratory arrest. Educated patient on [facility] with no intubation, which would continue to give her aggressive treatments up until cardiac/respiratory arrest. Patient agreed. Patient stated \"I have lived long enough, I would not want to be brought back\". Encouraged patient to speak with her family in regards to her wishes. Patient stated she had a Living will made and her family was aware she would not want to be alive on machines.  Patient hopeful of possible discharge later today. Patient denied further questions at this time. Plan/Follow-Up:  LIMITED NO X4. Will follow PRN, please call if needs arise.        Electronically signed by Nirav Rai RN on 3/16/2022 at 10:53 AM           Palliative Care Office: [phone]

## 2022-03-16 NOTE — PROGRESS NOTES
Reported off to Primary Nurse, Janene Toro. Patient is sitting in chair, call light within reach, and voices no concerns at present. SN Kiya/YOVANY.

## 2022-03-16 NOTE — PROGRESS NOTES
Patient discharged to home. Discharge instructions reviewed with patient and her son. RN answered all questions appropriately. Patient instructed to get lab work for magnesium by Saturday 3/20.

## 2022-03-16 NOTE — CARE COORDINATION
3/16/22, 12:03 PM EDT    DISCHARGE ON GOING EVALUATION    [facility] day: 5  Location: -03/003-A Reason for admit: Shortness of breath [R06.02]  Pleural effusion [J90]  Other ascites [R18.8]   Procedure: paracentesis on 3/11 with 3.8 L drained; had paracentesis on 3/13 with 2.5 L drained  Barriers to Discharge: Cardio consult complete, poor overall prognosis. New palliative consult for goals of care. PCP: ARTHUR Barlow CNP  Readmission Risk Score: 15 ( )%  Patient Goals/Plan/Treatment Preferences: Plans to discharge home, denied needs. Plan f/u with [facility] as OP.       3/16/22, 2:24 PM EDT    Patient goals/plan/ treatment preferences discussed by  and . Patient goals/plan/ treatment preferences reviewed with patient/ family. Patient/ family verbalize understanding of discharge plan and are in agreement with goal/plan/treatment preferences. Understanding was demonstrated using the teach back method. AVS provided by RN at time of discharge, which includes all necessary medical information pertaining to the patients current course of illness, treatment, post-discharge goals of care, and treatment preferences.         IMM Letter  IMM Letter given to Patient/Family/Significant other/Guardian/POA/by[de-identified] Care Manager  IMM Letter date given[de-identified] 03/14/22  IMM Letter time given[de-identified] 8129

## 2022-03-16 NOTE — PROGRESS NOTES
[address] [address]  INPATIENT PHYSICAL THERAPY  DAILY NOTE  [facility] ORTHOPEDICS 7K - 7K-03/003-A    Time In: 2352  Time Out: 1423  Timed Code Treatment Minutes: 14 Minutes  Minutes: 14          Date: 3/16/2022  Patient Name: Ramiro Ball,  Gender:  female        MRN: [id number]  : 1940  (81 y.o.)     Referring Practitioner: ORESTES Blank  Diagnosis: shortness of breath  Additional Pertinent Hx: Per EMR \"Norma is an 78-year-old  female non-smoker with a PMHx of pulmonary hypertension, hypertrophic cardiomyopathy with preserved EF, hypertension, CAD, A. fib, severe valvular disease, S/P TAVR who presents to [facility] ED for the evaluation of shortness of breath and concern for fluid overload. Patient states she was sent from PCP office. She states that she has associated nausea, with dry heaving, diarrhea, decreased urine output, lower extremity swelling, and lightheaded and dizziness with standing from a seated position. She states that with the fluid in her stomach, she felt that she appeared 9 months pregnant. She states that prior to the paracentesis that was performed in the ED it was difficult to take deep breaths. Patient reports improved breathing effort after paracentesis in the ED. The patient states that she recently saw Dr. Jez Simmons last month for worsening shortness of breath and underwent a heart cath. Patient reports a 35 pound increase in weight in the past 6 months. Reports recent increase in water pill dosage, as recommended by PCP and cardiologist.  Patient denies chest pain, wheezing, cough, fever, chills, back pain, abdominal pain. Jasvir Awkward \"     Prior Level of Function:  Lives With: Alone  Type of Home: House  Home Layout: One level  Home Access: Stairs to enter with rails  Entrance Stairs - Number of Steps: 3 steps  Entrance Stairs - Rails: Both  Home Equipment: 4 wheeled walker,Reacher,Lift chair   Bathroom Shower/Tub: Walk-in shower,Shower chair with back  [place] Toilet: Standard  Bathroom Equipment: Grab bars in shower  Bathroom Accessibility: Accessible    Receives Help From: Family  ADL Assistance: [address]: Independent  Homemaking Responsibilities: Yes  Ambulation Assistance: Independent  Transfer Assistance: Independent  Active : Yes  Additional Comments: Pt had to  use the walker during the days prior to admission. She had not been going to the store or getting outside during the days prior to admission. She has been sleeping in the sofa seat for a few weeks when it became to difficult to get into and out of the bed. She had help with donning her socks but she does not usually wear socks in her home. Restrictions/Precautions:  Restrictions/Precautions: Fall Risk,Isolation,General Precautions     SUBJECTIVE: RN ok'd PT session. Patient seated in recliner upon arrival. Patient is pleasant and agreeable to therapy. Patient having complaints of discomfort in IV in L hand stating \"when we are doing exercises my hand hurts\". Patient son present at end of session. Patient refusing to attempt ambulation or transfers due to stating \"I already did that with the other therapy\". PAIN: Patient reporting discomfort in L hand however did not rate pain    Vitals: Vitals not assessed per clinical judgement, see nursing flowsheet    OBJECTIVE:  Bed Mobility:  Not Tested    Transfers:  Not Tested    Ambulation:  Not Tested      Exercise:  Patient was guided in 1 set(s) 15 reps of exercise to both lower extremities. Seated marches, Long arc quads, Seated isometric hip adduction and Seated abduction/adduction. Exercises were completed for increased independence with functional mobility. Functional Outcome Measures: Not completed       ASSESSMENT:  Assessment: Patient progressing toward established goals. Activity Tolerance:  Patient tolerance of  treatment: fair.  Limited due to pain in IV      Equipment Recommendations:Equipment Needed: No  Discharge Recommendations: Patient would benefit from continued PT at discharge  Plan: Times per week: 5x GM  Current Treatment Recommendations: Strengthening,Neuromuscular Re-education,Home Exercise Program,Safety Education & Training,Balance Training,Endurance Training,Patient/Caregiver Education & Training,Equipment Evaluation, Education, & procurement,Functional Mobility Training,Transfer Training,Gait Training,Stair training    Patient Education  Patient Education: Verbal Exercise Instruction,  - Patient Verbalized Understanding, - Patient Requires Continued Education    Goals:  Patient goals : \"get out of here\"  Short term goals  Time Frame for Short term goals: by discharge  Short term goal 1: Pt will demo IND with gait with use of RW for support for >200 feet to progress with mobility. Short term goal 2: Pt willl demo IND with transfers with RW for support to progress with mobility. Short term goal 3: Pt will demo IND stair negotiation with B rail for support to progress towards PLOF. Long term goals  Time Frame for Long term goals : NA due to short ELOS    Following session, patient left in safe position with all fall risk precautions in place.

## 2022-03-16 NOTE — PROGRESS NOTES
significant bruises on exposed surfaces  MUSCULOSKELETAL: Movement is coordinated. Moves all extremities   EXTREMITIES: Distal lower extremity temp is warm, Right lower extremities erythema. No lower extremity edema. PSYCHIATRIC: mood and affect appropriate. Medications:   Med reviewed  Scheduled Meds:   cephALEXin  250 mg Oral 3 times per day    midodrine  5 mg Oral TID WC    furosemide  40 mg Oral Daily    metOLazone  5 mg Oral Daily    potassium bicarb-citric acid  40 mEq Oral Daily    calcium-cholecalciferol  1 tablet Oral BID    carvedilol  6.25 mg Oral BID WC    cetirizine  5 mg Oral Daily    multivitamin  1 tablet Oral Daily    pantoprazole  40 mg Oral QAM AC    PARoxetine  30 mg Oral QAM    vitamin B-6  100 mg Oral Daily    spironolactone  50 mg Oral Daily    sucralfate  1 g Oral 4x Daily AC & HS    traZODone  50 mg Oral Nightly    vitamin C  500 mg Oral Daily    sodium chloride flush  10 mL IntraVENous 2 times per day    aspirin  325 mg Oral Daily     Labs:   Labs reviewed    Recent Labs     03/14/22  0630   WBC 3.9*   HGB 10.8*   HCT 34.4*   *     Recent Labs     03/14/22  0630 03/15/22  0515 03/16/22  0530    137 136   K 3.3* 4.1 3.5   CL 99 99 97*   CO2 26 28 28   BUN 39* 38* 38*   CREATININE 1.2 1.4* 1.2   CALCIUM 8.5 8.2* 8.2*   LABALBU 2.8* 2.9*  --    ANIONGAP 12.0 10.0 11.0       ASSESSMENT:  1. Mild Acute Kidney Injury likely 2nd to renal hypoperfusion 2nd to Hypotension and diuresis. Creatinine improved and at baseline. 2. Chronic Kidney Disease Stage IIIB  3. Hypokalemia on Spironolactone and K supplement   4. Moderate-severe pulm HTN, severe tricuspid regurg, EF 60%, Planned FU at [facility]  5. Hypotension. Midodrine 5 mg 3x/d. 6. Chronic liver disease, portal venous hypertension, S/P paracentesis 3/11/22 and 3/13/22  BMP in AM  Principal Problem:    Shortness of breath  Resolved Problems:    * No resolved hospital problems.  Adriel Silver APRN - CNP 9:39 AM 3/16/2022

## 2022-03-16 NOTE — PROGRESS NOTES
Pt denies bath, stating she is being discharged today so she will do it at home. Oral care done. Linen changed. Pt up in chair and has no needs at this time. Call light within reach.  SN Manjit/RSC

## 2022-03-17 ENCOUNTER — CARE COORDINATION (OUTPATIENT)
Dept: CASE MANAGEMENT | Age: 82
End: 2022-03-17

## 2022-03-17 NOTE — CARE COORDINATION
Care Transitions Initial Outreach Attempt-1st attempt    Call within 2 business days of discharge: Yes   Attempted initial 24 hour transitional call to patient. Left VM to return call directly to [phone]    Patient: Loida Flaherty Patient : 1940 MRN: [id number]    Last Discharge [address] [address]       Complaint Diagnosis Description Type Department Provider    3/11/22 Shortness of Breath; Other Shortness of breath . .. ED to Hosp-Admission (Discharged) (ADMITTED) [facility] 7K Radha Stuart PA-C; Ranjeet Galindo. .. Noted following upcoming appointments from discharge chart review:   [facility] follow up appointment(s): No future appointments.   Non-[facility] follow up appointment(s): fabienne

## 2022-03-18 ENCOUNTER — CARE COORDINATION (OUTPATIENT)
Dept: CASE MANAGEMENT | Age: 82
End: 2022-03-18

## 2022-03-18 LAB
ANA PATTERN: ABNORMAL
ANA TITER: ABNORMAL
ANTINUCLEAR ANTIBODY, HEP-2, IGG: DETECTED

## 2022-03-18 NOTE — CARE COORDINATION
Care Transitions Initial Outreach Attempt-2nd attempt    Call within 2 business days of discharge: Yes   Attempted initial 24 hour transitional call to patient. Left VM to return call directly to [phone]. If no return call, CTN will sign off-2nd attempt. Patient: Maximilian Minaya Patient : 1940 MRN: [id number]    Last Discharge 79-01 [facility]       Complaint Diagnosis Description Type Department Provider    3/11/22 Shortness of Breath; Other Shortness of breath . .. ED to Hosp-Admission (Discharged) (ADMITTED) [facility] 7K Stella Weathers PA-C; Jasvir Shah. .. Noted following upcoming appointments from discharge chart review:   [address] follow up appointment(s): No future appointments.   Non-[facility] follow up appointment(s): PCP 3/23, GI

## 2022-03-19 ENCOUNTER — HOSPITAL ENCOUNTER (OUTPATIENT)
Age: 82
Discharge: HOME | End: 2022-03-19
Payer: MEDICARE

## 2022-03-19 DIAGNOSIS — I50.43 CHF (CONGESTIVE HEART FAILURE), NYHA CLASS I, ACUTE ON CHRONIC, COMBINED (HCC): ICD-10-CM

## 2022-03-19 LAB — MAGNESIUM: 1.8 MG/DL (ref 1.6–2.4)

## 2022-03-19 PROCEDURE — 36415 COLL VENOUS BLD VENIPUNCTURE: CPT

## 2022-03-19 PROCEDURE — 83735 ASSAY OF MAGNESIUM: CPT

## 2022-03-24 ENCOUNTER — HOSPITAL ENCOUNTER (INPATIENT)
Age: 82
LOS: 11 days | Discharge: NURSING, REHAB OR LONG-TERM CARE | DRG: 432 | End: 2022-04-05
Attending: EMERGENCY MEDICINE | Admitting: FAMILY MEDICINE
Payer: MEDICARE

## 2022-03-24 ENCOUNTER — APPOINTMENT (OUTPATIENT)
Dept: GENERAL RADIOLOGY | Age: 82
DRG: 432 | End: 2022-03-24
Payer: MEDICARE

## 2022-03-24 DIAGNOSIS — I95.9 HYPOTENSION, UNSPECIFIED HYPOTENSION TYPE: ICD-10-CM

## 2022-03-24 DIAGNOSIS — E86.0 DEHYDRATION: ICD-10-CM

## 2022-03-24 DIAGNOSIS — N18.32 STAGE 3B CHRONIC KIDNEY DISEASE (HCC): ICD-10-CM

## 2022-03-24 DIAGNOSIS — N17.9 AKI (ACUTE KIDNEY INJURY) (HCC): ICD-10-CM

## 2022-03-24 DIAGNOSIS — N17.9 ACUTE KIDNEY INJURY (HCC): ICD-10-CM

## 2022-03-24 DIAGNOSIS — N18.4 CKD (CHRONIC KIDNEY DISEASE) STAGE 4, GFR 15-29 ML/MIN (HCC): Primary | ICD-10-CM

## 2022-03-24 LAB
AMMONIA: 57 UMOL/L (ref 11–60)
BASOPHILS # BLD: 0.6 %
BASOPHILS ABSOLUTE: 0 THOU/MM3 (ref 0–0.1)
EOSINOPHIL # BLD: 1.1 %
EOSINOPHILS ABSOLUTE: 0.1 THOU/MM3 (ref 0–0.4)
ERYTHROCYTE [DISTWIDTH] IN BLOOD BY AUTOMATED COUNT: 18.1 % (ref 11.5–14.5)
ERYTHROCYTE [DISTWIDTH] IN BLOOD BY AUTOMATED COUNT: 63.7 FL (ref 35–45)
HCT VFR BLD CALC: 33.7 % (ref 37–47)
HEMOGLOBIN: 10.4 GM/DL (ref 12–16)
IMMATURE GRANS (ABS): 0.01 THOU/MM3 (ref 0–0.07)
IMMATURE GRANULOCYTES: 0.2 %
INR BLD: 1.16 (ref 0.85–1.13)
LYMPHOCYTES # BLD: 18 %
LYMPHOCYTES ABSOLUTE: 0.8 THOU/MM3 (ref 1–4.8)
MCH RBC QN AUTO: 29.4 PG (ref 26–33)
MCHC RBC AUTO-ENTMCNC: 30.9 GM/DL (ref 32.2–35.5)
MCV RBC AUTO: 95.2 FL (ref 81–99)
MONOCYTES # BLD: 8.3 %
MONOCYTES ABSOLUTE: 0.4 THOU/MM3 (ref 0.4–1.3)
NUCLEATED RED BLOOD CELLS: 0 /100 WBC
PLATELET # BLD: 213 THOU/MM3 (ref 130–400)
PMV BLD AUTO: 9.4 FL (ref 9.4–12.4)
RBC # BLD: 3.54 MILL/MM3 (ref 4.2–5.4)
SEG NEUTROPHILS: 71.8 %
SEGMENTED NEUTROPHILS ABSOLUTE COUNT: 3.4 THOU/MM3 (ref 1.8–7.7)
TROPONIN T: 0.02 NG/ML
WBC # BLD: 4.7 THOU/MM3 (ref 4.8–10.8)

## 2022-03-24 PROCEDURE — 93005 ELECTROCARDIOGRAM TRACING: CPT | Performed by: EMERGENCY MEDICINE

## 2022-03-24 PROCEDURE — 6370000000 HC RX 637 (ALT 250 FOR IP): Performed by: EMERGENCY MEDICINE

## 2022-03-24 PROCEDURE — 96374 THER/PROPH/DIAG INJ IV PUSH: CPT

## 2022-03-24 PROCEDURE — 85610 PROTHROMBIN TIME: CPT

## 2022-03-24 PROCEDURE — 71045 X-RAY EXAM CHEST 1 VIEW: CPT

## 2022-03-24 PROCEDURE — 80048 BASIC METABOLIC PNL TOTAL CA: CPT

## 2022-03-24 PROCEDURE — 85025 COMPLETE CBC W/AUTO DIFF WBC: CPT

## 2022-03-24 PROCEDURE — 96361 HYDRATE IV INFUSION ADD-ON: CPT

## 2022-03-24 PROCEDURE — 99285 EMERGENCY DEPT VISIT HI MDM: CPT

## 2022-03-24 PROCEDURE — 84484 ASSAY OF TROPONIN QUANT: CPT

## 2022-03-24 PROCEDURE — 83880 ASSAY OF NATRIURETIC PEPTIDE: CPT

## 2022-03-24 PROCEDURE — 82140 ASSAY OF AMMONIA: CPT

## 2022-03-24 PROCEDURE — 84145 PROCALCITONIN (PCT): CPT

## 2022-03-24 PROCEDURE — 80076 HEPATIC FUNCTION PANEL: CPT

## 2022-03-24 PROCEDURE — 36415 COLL VENOUS BLD VENIPUNCTURE: CPT

## 2022-03-24 PROCEDURE — 2580000003 HC RX 258: Performed by: EMERGENCY MEDICINE

## 2022-03-24 RX ORDER — MIDODRINE HYDROCHLORIDE 5 MG/1
5 TABLET ORAL ONCE
Status: COMPLETED | OUTPATIENT
Start: 2022-03-24 | End: 2022-03-24

## 2022-03-24 RX ORDER — ONDANSETRON 2 MG/ML
4 INJECTION INTRAMUSCULAR; INTRAVENOUS ONCE
Status: COMPLETED | OUTPATIENT
Start: 2022-03-25 | End: 2022-03-25

## 2022-03-24 RX ORDER — 0.9 % SODIUM CHLORIDE 0.9 %
500 INTRAVENOUS SOLUTION INTRAVENOUS ONCE
Status: COMPLETED | OUTPATIENT
Start: 2022-03-24 | End: 2022-03-24

## 2022-03-24 RX ADMIN — MIDODRINE HYDROCHLORIDE 5 MG: 5 TABLET ORAL at 23:21

## 2022-03-24 RX ADMIN — SODIUM CHLORIDE 500 ML: 9 INJECTION, SOLUTION INTRAVENOUS at 23:21

## 2022-03-25 ENCOUNTER — APPOINTMENT (OUTPATIENT)
Dept: ULTRASOUND IMAGING | Age: 82
DRG: 432 | End: 2022-03-25
Payer: MEDICARE

## 2022-03-25 PROBLEM — N17.9 ACUTE KIDNEY INJURY (HCC): Status: ACTIVE | Noted: 2022-03-25

## 2022-03-25 LAB
ALBUMIN FLUID: 1.6 GM/DL
ALBUMIN SERPL-MCNC: 2.9 G/DL (ref 3.5–5.1)
ALP BLD-CCNC: 87 U/L (ref 38–126)
ALT SERPL-CCNC: < 5 U/L (ref 11–66)
AMYLASE FLUID: 11 U/L
ANION GAP SERPL CALCULATED.3IONS-SCNC: 14 MEQ/L (ref 8–16)
AST SERPL-CCNC: 23 U/L (ref 5–40)
BILIRUB SERPL-MCNC: 0.5 MG/DL (ref 0.3–1.2)
BILIRUBIN DIRECT: 0.3 MG/DL (ref 0–0.3)
BODY FLUID RBC: ABNORMAL /CUMM
BUN BLDV-MCNC: 65 MG/DL (ref 7–22)
C DIFF TOXIN/ANTIGEN: NEGATIVE
CALCIUM IONIZED: 1.02 MMOL/L (ref 1.12–1.32)
CALCIUM SERPL-MCNC: 8 MG/DL (ref 8.5–10.5)
CHARACTER, BODY FLUID: ABNORMAL
CHLORIDE BLD-SCNC: 99 MEQ/L (ref 98–111)
CO2: 22 MEQ/L (ref 23–33)
COLOR: ABNORMAL
CREAT SERPL-MCNC: 2.4 MG/DL (ref 0.4–1.2)
EKG Q-T INTERVAL: 432 MS
EKG QRS DURATION: 94 MS
EKG QTC CALCULATION (BAZETT): 475 MS
EKG R AXIS: 129 DEGREES
EKG T AXIS: 47 DEGREES
EKG VENTRICULAR RATE: 73 BPM
GFR SERPL CREATININE-BSD FRML MDRD: 19 ML/MIN/1.73M2
GLUCOSE BLD-MCNC: 103 MG/DL (ref 70–108)
LD, FLUID: 109 U/L
MAGNESIUM: 1.8 MG/DL (ref 1.6–2.4)
MESOTHELIAL CELLS BODY FLUID: ABNORMAL
MONONUCLEAR CELLS BODY FLUID: 42.9 %
MRSA SCREEN RT-PCR: NEGATIVE
OSMOLALITY CALCULATION: 289 MOSMOL/KG (ref 275–300)
PATHOLOGIST REVIEW: ABNORMAL
PHOSPHORUS: 3.9 MG/DL (ref 2.4–4.7)
POLYMORPHONUCLEAR CELLS BODY FLUID: 57.1 %
POTASSIUM REFLEX MAGNESIUM: 4.1 MEQ/L (ref 3.5–5.2)
PRO-BNP: 4921 PG/ML (ref 0–1800)
PROCALCITONIN: 0.09 NG/ML (ref 0.01–0.09)
PROTEIN FLUID: 3 GM/DL
SODIUM BLD-SCNC: 135 MEQ/L (ref 135–145)
SPECIMEN: ABNORMAL
TOTAL CK: 29 U/L (ref 30–135)
TOTAL NUCLEATED CELLS BODY FLUID: 1004 /CUMM (ref 0–500)
TOTAL PROTEIN: 5.8 G/DL (ref 6.1–8)
TOTAL VOLUME RECEIVED BODY FLUID: 70 ML
VANCOMYCIN RESISTANT ENTEROCOCCUS: POSITIVE

## 2022-03-25 PROCEDURE — P9047 ALBUMIN (HUMAN), 25%, 50ML: HCPCS | Performed by: STUDENT IN AN ORGANIZED HEALTH CARE EDUCATION/TRAINING PROGRAM

## 2022-03-25 PROCEDURE — 6360000002 HC RX W HCPCS: Performed by: STUDENT IN AN ORGANIZED HEALTH CARE EDUCATION/TRAINING PROGRAM

## 2022-03-25 PROCEDURE — 6370000000 HC RX 637 (ALT 250 FOR IP)

## 2022-03-25 PROCEDURE — 82550 ASSAY OF CK (CPK): CPT

## 2022-03-25 PROCEDURE — 87070 CULTURE OTHR SPECIMN AEROBIC: CPT

## 2022-03-25 PROCEDURE — 82330 ASSAY OF CALCIUM: CPT

## 2022-03-25 PROCEDURE — 6360000002 HC RX W HCPCS: Performed by: FAMILY MEDICINE

## 2022-03-25 PROCEDURE — 49083 ABD PARACENTESIS W/IMAGING: CPT

## 2022-03-25 PROCEDURE — 6360000002 HC RX W HCPCS

## 2022-03-25 PROCEDURE — 84100 ASSAY OF PHOSPHORUS: CPT

## 2022-03-25 PROCEDURE — 99223 1ST HOSP IP/OBS HIGH 75: CPT

## 2022-03-25 PROCEDURE — P9047 ALBUMIN (HUMAN), 25%, 50ML: HCPCS | Performed by: EMERGENCY MEDICINE

## 2022-03-25 PROCEDURE — 89050 BODY FLUID CELL COUNT: CPT

## 2022-03-25 PROCEDURE — 2500000003 HC RX 250 WO HCPCS

## 2022-03-25 PROCEDURE — 2580000003 HC RX 258: Performed by: FAMILY MEDICINE

## 2022-03-25 PROCEDURE — 87205 SMEAR GRAM STAIN: CPT

## 2022-03-25 PROCEDURE — 0W9G3ZZ DRAINAGE OF PERITONEAL CAVITY, PERCUTANEOUS APPROACH: ICD-10-PCS | Performed by: RADIOLOGY

## 2022-03-25 PROCEDURE — P9047 ALBUMIN (HUMAN), 25%, 50ML: HCPCS | Performed by: FAMILY MEDICINE

## 2022-03-25 PROCEDURE — 88305 TISSUE EXAM BY PATHOLOGIST: CPT

## 2022-03-25 PROCEDURE — 83615 LACTATE (LD) (LDH) ENZYME: CPT

## 2022-03-25 PROCEDURE — 87075 CULTR BACTERIA EXCEPT BLOOD: CPT

## 2022-03-25 PROCEDURE — 6360000002 HC RX W HCPCS: Performed by: EMERGENCY MEDICINE

## 2022-03-25 PROCEDURE — 2580000003 HC RX 258: Performed by: STUDENT IN AN ORGANIZED HEALTH CARE EDUCATION/TRAINING PROGRAM

## 2022-03-25 PROCEDURE — 99291 CRITICAL CARE FIRST HOUR: CPT | Performed by: INTERNAL MEDICINE

## 2022-03-25 PROCEDURE — 87449 NOS EACH ORGANISM AG IA: CPT

## 2022-03-25 PROCEDURE — 87500 VANOMYCIN DNA AMP PROBE: CPT

## 2022-03-25 PROCEDURE — 82150 ASSAY OF AMYLASE: CPT

## 2022-03-25 PROCEDURE — 2580000003 HC RX 258: Performed by: EMERGENCY MEDICINE

## 2022-03-25 PROCEDURE — 83735 ASSAY OF MAGNESIUM: CPT

## 2022-03-25 PROCEDURE — 99233 SBSQ HOSP IP/OBS HIGH 50: CPT | Performed by: INTERNAL MEDICINE

## 2022-03-25 PROCEDURE — 36415 COLL VENOUS BLD VENIPUNCTURE: CPT

## 2022-03-25 PROCEDURE — 82042 OTHER SOURCE ALBUMIN QUAN EA: CPT

## 2022-03-25 PROCEDURE — 84157 ASSAY OF PROTEIN OTHER: CPT

## 2022-03-25 PROCEDURE — 2000000000 HC ICU R&B

## 2022-03-25 PROCEDURE — 87641 MR-STAPH DNA AMP PROBE: CPT

## 2022-03-25 PROCEDURE — 88112 CYTOPATH CELL ENHANCE TECH: CPT

## 2022-03-25 RX ORDER — ALBUMIN (HUMAN) 12.5 G/50ML
25 SOLUTION INTRAVENOUS EVERY 6 HOURS
Status: COMPLETED | OUTPATIENT
Start: 2022-03-25 | End: 2022-03-27

## 2022-03-25 RX ORDER — SODIUM CHLORIDE 9 MG/ML
25 INJECTION, SOLUTION INTRAVENOUS PRN
Status: DISCONTINUED | OUTPATIENT
Start: 2022-03-25 | End: 2022-04-05 | Stop reason: HOSPADM

## 2022-03-25 RX ORDER — 0.9 % SODIUM CHLORIDE 0.9 %
500 INTRAVENOUS SOLUTION INTRAVENOUS ONCE
Status: COMPLETED | OUTPATIENT
Start: 2022-03-25 | End: 2022-03-25

## 2022-03-25 RX ORDER — ALBUMIN (HUMAN) 12.5 G/50ML
25 SOLUTION INTRAVENOUS ONCE
Status: COMPLETED | OUTPATIENT
Start: 2022-03-25 | End: 2022-03-25

## 2022-03-25 RX ORDER — ASPIRIN 325 MG
325 TABLET ORAL DAILY
Status: DISCONTINUED | OUTPATIENT
Start: 2022-03-25 | End: 2022-03-27

## 2022-03-25 RX ORDER — HEPARIN SODIUM 5000 [USP'U]/ML
5000 INJECTION, SOLUTION INTRAVENOUS; SUBCUTANEOUS EVERY 8 HOURS SCHEDULED
Status: DISCONTINUED | OUTPATIENT
Start: 2022-03-25 | End: 2022-03-25

## 2022-03-25 RX ORDER — NOREPINEPHRINE BIT/0.9 % NACL 16MG/250ML
INFUSION BOTTLE (ML) INTRAVENOUS
Status: COMPLETED
Start: 2022-03-25 | End: 2022-03-25

## 2022-03-25 RX ORDER — SODIUM CHLORIDE 0.9 % (FLUSH) 0.9 %
5-40 SYRINGE (ML) INJECTION PRN
Status: DISCONTINUED | OUTPATIENT
Start: 2022-03-25 | End: 2022-04-05 | Stop reason: HOSPADM

## 2022-03-25 RX ORDER — DOPAMINE HYDROCHLORIDE 160 MG/100ML
1-5 INJECTION, SOLUTION INTRAVENOUS CONTINUOUS
Status: DISCONTINUED | OUTPATIENT
Start: 2022-03-25 | End: 2022-03-26

## 2022-03-25 RX ORDER — SODIUM CHLORIDE 9 MG/ML
1000 INJECTION, SOLUTION INTRAVENOUS CONTINUOUS
Status: DISCONTINUED | OUTPATIENT
Start: 2022-03-25 | End: 2022-03-25

## 2022-03-25 RX ORDER — ACETAMINOPHEN 500 MG
500 TABLET ORAL EVERY 6 HOURS PRN
Status: DISCONTINUED | OUTPATIENT
Start: 2022-03-25 | End: 2022-04-05 | Stop reason: HOSPADM

## 2022-03-25 RX ORDER — PAROXETINE 30 MG/1
30 TABLET, FILM COATED ORAL EVERY MORNING
Status: DISCONTINUED | OUTPATIENT
Start: 2022-03-25 | End: 2022-04-05 | Stop reason: HOSPADM

## 2022-03-25 RX ORDER — PANTOPRAZOLE SODIUM 40 MG/1
40 TABLET, DELAYED RELEASE ORAL
Status: DISCONTINUED | OUTPATIENT
Start: 2022-03-25 | End: 2022-03-26

## 2022-03-25 RX ORDER — FUROSEMIDE 20 MG/1
20 TABLET ORAL DAILY
Status: ON HOLD | COMMUNITY
End: 2022-04-05 | Stop reason: HOSPADM

## 2022-03-25 RX ORDER — ONDANSETRON 2 MG/ML
INJECTION INTRAMUSCULAR; INTRAVENOUS
Status: COMPLETED
Start: 2022-03-25 | End: 2022-03-25

## 2022-03-25 RX ORDER — ONDANSETRON 2 MG/ML
4 INJECTION INTRAMUSCULAR; INTRAVENOUS EVERY 6 HOURS PRN
Status: DISCONTINUED | OUTPATIENT
Start: 2022-03-25 | End: 2022-04-05 | Stop reason: HOSPADM

## 2022-03-25 RX ORDER — LANOLIN ALCOHOL/MO/W.PET/CERES
3 CREAM (GRAM) TOPICAL NIGHTLY PRN
Status: DISCONTINUED | OUTPATIENT
Start: 2022-03-25 | End: 2022-04-05 | Stop reason: HOSPADM

## 2022-03-25 RX ORDER — SODIUM CHLORIDE 0.9 % (FLUSH) 0.9 %
5-40 SYRINGE (ML) INJECTION EVERY 12 HOURS SCHEDULED
Status: DISCONTINUED | OUTPATIENT
Start: 2022-03-25 | End: 2022-04-05 | Stop reason: HOSPADM

## 2022-03-25 RX ORDER — CARVEDILOL 6.25 MG/1
6.25 TABLET ORAL 2 TIMES DAILY WITH MEALS
Status: DISCONTINUED | OUTPATIENT
Start: 2022-03-25 | End: 2022-03-27

## 2022-03-25 RX ORDER — SODIUM CHLORIDE 9 MG/ML
1000 INJECTION, SOLUTION INTRAVENOUS CONTINUOUS
Status: DISCONTINUED | OUTPATIENT
Start: 2022-03-25 | End: 2022-03-26

## 2022-03-25 RX ORDER — NOREPINEPHRINE BIT/0.9 % NACL 16MG/250ML
1-100 INFUSION BOTTLE (ML) INTRAVENOUS CONTINUOUS
Status: DISCONTINUED | OUTPATIENT
Start: 2022-03-25 | End: 2022-03-26

## 2022-03-25 RX ORDER — MIDODRINE HYDROCHLORIDE 10 MG/1
10 TABLET ORAL
Status: DISCONTINUED | OUTPATIENT
Start: 2022-03-25 | End: 2022-03-26

## 2022-03-25 RX ORDER — LOSARTAN POTASSIUM 25 MG/1
25 TABLET ORAL DAILY
Status: DISCONTINUED | OUTPATIENT
Start: 2022-03-25 | End: 2022-03-27

## 2022-03-25 RX ADMIN — DOPAMINE HYDROCHLORIDE IN DEXTROSE 2.5 MCG/KG/MIN: 1.6 INJECTION, SOLUTION INTRAVENOUS at 10:52

## 2022-03-25 RX ADMIN — Medication 5 MCG/MIN: at 14:45

## 2022-03-25 RX ADMIN — HEPARIN SODIUM 5000 UNITS: 5000 INJECTION INTRAVENOUS; SUBCUTANEOUS at 05:16

## 2022-03-25 RX ADMIN — ONDANSETRON 4 MG: 2 INJECTION INTRAMUSCULAR; INTRAVENOUS at 11:28

## 2022-03-25 RX ADMIN — MIDODRINE HYDROCHLORIDE 10 MG: 10 TABLET ORAL at 11:15

## 2022-03-25 RX ADMIN — MIDODRINE HYDROCHLORIDE 10 MG: 10 TABLET ORAL at 17:52

## 2022-03-25 RX ADMIN — SODIUM CHLORIDE 500 ML: 9 INJECTION, SOLUTION INTRAVENOUS at 00:46

## 2022-03-25 RX ADMIN — ONDANSETRON 4 MG: 2 INJECTION INTRAMUSCULAR; INTRAVENOUS at 00:01

## 2022-03-25 RX ADMIN — ALBUMIN (HUMAN) 25 G: 0.25 INJECTION, SOLUTION INTRAVENOUS at 05:15

## 2022-03-25 RX ADMIN — MIDODRINE HYDROCHLORIDE 10 MG: 10 TABLET ORAL at 08:11

## 2022-03-25 RX ADMIN — ALBUMIN (HUMAN) 25 G: 0.25 INJECTION, SOLUTION INTRAVENOUS at 15:44

## 2022-03-25 RX ADMIN — ALBUMIN (HUMAN) 25 G: 0.25 INJECTION, SOLUTION INTRAVENOUS at 21:47

## 2022-03-25 RX ADMIN — PANTOPRAZOLE SODIUM 40 MG: 40 TABLET, DELAYED RELEASE ORAL at 08:11

## 2022-03-25 RX ADMIN — SODIUM CHLORIDE 1000 ML: 9 INJECTION, SOLUTION INTRAVENOUS at 05:16

## 2022-03-25 RX ADMIN — SODIUM CHLORIDE 500 ML: 9 INJECTION, SOLUTION INTRAVENOUS at 10:15

## 2022-03-25 RX ADMIN — ALBUMIN (HUMAN) 25 G: 0.25 INJECTION, SOLUTION INTRAVENOUS at 03:13

## 2022-03-25 RX ADMIN — SODIUM CHLORIDE 1000 ML: 9 INJECTION, SOLUTION INTRAVENOUS at 02:57

## 2022-03-25 RX ADMIN — PAROXETINE 30 MG: 30 TABLET, FILM COATED ORAL at 08:11

## 2022-03-25 ASSESSMENT — ENCOUNTER SYMPTOMS
ABDOMINAL PAIN: 0
COUGH: 0
DIARRHEA: 0
VOMITING: 1
SHORTNESS OF BREATH: 0
BACK PAIN: 0
SORE THROAT: 0
CONSTIPATION: 0
WHEEZING: 0
CHEST TIGHTNESS: 0
RHINORRHEA: 0
BLOOD IN STOOL: 0
EYE REDNESS: 0
NAUSEA: 1

## 2022-03-25 ASSESSMENT — PAIN SCALES - GENERAL
PAINLEVEL_OUTOF10: 0

## 2022-03-25 NOTE — PROGRESS NOTES
Attempt to lay patient in trendelenburg position due to low blood pressure. Patient began vomiting and immediately placed in high flower position. PRN Zofran administered in patient right wrist peripheral IV. Emesis tan/yellow colored with approximately 10 mL.

## 2022-03-25 NOTE — PLAN OF CARE
Goals initiated at this time. Patient educated on contact isolation protocol r/t VRE infection. Signs posted and staff donning appropriate PPE when entering room. Will continue to monitor.      Problem: Physical Regulation:  Goal: Prevent transmision of infection  Description: Prevent transmision of infection  Outcome: Ongoing     Problem: Skin Integrity:  Goal: Risk for impaired skin integrity will decrease  Description: Risk for impaired skin integrity will decrease  Outcome: Ongoing  Goal: Will show no infection signs and symptoms  Description: Will show no infection signs and symptoms  Outcome: Ongoing  Goal: Absence of new skin breakdown  Description: Absence of new skin breakdown  Outcome: Ongoing     Problem: Falls - Risk of:  Goal: Will remain free from falls  Description: Will remain free from falls  Outcome: Ongoing  Goal: Absence of physical injury  Description: Absence of physical injury  Outcome: Ongoing

## 2022-03-25 NOTE — ED NOTES
Pt and vs reassessed. RR even and unlabored. Pt resting in bed alert. Pt given bedpan to have a bowel movement and states she is feeling nauseous. Pt medicated per [facility] and denies any other needs. No distress noted.  Pt stable at this time     Call[facility]  03/25/22 0008

## 2022-03-25 NOTE — ED NOTES
ED nurse-to-nurse bedside report    Chief Complaint   Patient presents with    Extremity Weakness    Fall      LOC: alert and orientated to name, place, date  Vital signs   Vitals:    03/24/22 2251 03/24/22 2324 03/25/22 0005 03/25/22 0047   BP: (S) (!) 83/50 (!) 86/47 (!) 82/50 (!) 85/46   Pulse: 75 70 71 72   Resp: 16 15 15 19   Temp: 97.8 °F (36.6 °C)      TempSrc: Oral      SpO2: 95% 97% 98% 95%   Weight: 190 lb (86.2 kg)      Height: 5' 4\" (1.626 m)         Pain:    Pain Interventions: none  Pain Goal: NA  Oxygen: No    Current needs required RA   Telemetry: Yes  LDAs:   Peripheral IV 03/24/22 Right Wrist (Active)   Site Assessment Clean;Dry; Intact 03/24/22 2324   Line Status Infusing 03/25/22 0047   Dressing Status Clean;Dry; Intact 03/24/22 2324     Continuous Infusions:   Mobility: Requires assistance * 2  [place] Fall Risk Score: No flowsheet data found.   Fall Interventions: side rails up, call light in reach, wheels locked  Report given to: [employer] RN  03/25/22 0129

## 2022-03-25 NOTE — ED TRIAGE NOTES
Pt presents to the ED via [facility] with c/o weakness and fall. EMS reports pt has been feeling weak; weakness started today. EMS reports pt has been falling a lot within the last weak. Pt denies any pain. POCT glucose pta is 163. Pt arrived to ED with 20 G IV and fluids running. EMS reports systolic BP was in the 33'U. EMS reports pt began to feel nauseous and dry heaving when they arrived to pts house but pt currently denies nausea. Bruising noted to the chest and pt states she is unaware of what bruising is from. EMS reports pt has a history of a poig heart valve replacement. RR even and unlabored.  EKG complete

## 2022-03-25 NOTE — H&P
History & Physical    Patient:  Yany Padilla  YOB: 1940  Date of Service: 3/25/2022  MRN: [id number]   Acct:  [de-identified]   Primary Care Physician: ARTHUR Pitts CNP    Chief Complaint: Weakness, Lightheaded    Assessment and Plan:  1. Decompensated Cirrhosis with Ascites: Recently admitted for same diagnosis. Underwent two paracentesis. Had follow up scheduled with GI. Continues to have third spacing and abdominal ascites. Abdomen without tenderness. No concern for SBP at this time. Will order therapeutic paracentesis. Consult to GI. 2. Acute on Chronic Hypotension, likely related to diuretics, GI losses, and medication noncompliance: Hypotensive on arrival 83/50. Patient received her home midodrine dose, two 500 ml fluid boluses and albumin. Improved to 97/49. Will order another albumin. Increased Midodrine to 10 mg TID. Low threshold for transfer to ICU for pressor support considering need for paracentesis. 3. Prerenal ISIDRA on CKDIII, secondary to extravascular volume overload with progression to ATN : Creatinine on arrival 2.4, GFR 19. Baseline creatinine appears to be around 1.2. Holding diuretics at this time due to #2. Albumin administered. Will need diuretics once her blood pressure will tolerate. 4. Acute on Chronic Diastolic Heart Failure:  Bilateral lower extremity edema and shortness of breath. CXR appears to show fluid volume overload. Diuretics on hold due to blood pressure. 5. History of Severe Pulmonary Hypertension: Plans to follow up with [facility] [place] at the beginning of April. 6. Longstanding Persistent Atrial Fibrillation: Rate controlled. BB ordered with hold parameters. Not on [address]. Per patient cardiology has her take 650 mg of Aspirin. 7. Palliative Care Consult    History of Present Illness:   History obtained from chart review and the patient.     The patient is a 81 y.o. female  with PMH cirrhosis, CHF, atrial fibrillation, coronary artery disease, and hypertension who presents with complaints of weakness and lightheadedness. Patient was recently discharged from [facility] approximately a week ago following an admission related to decompensated cirrhosis with ascites and hypoxic respiratory failure. She underwent large-volume paracentesis and was to have a follow-up with GI. Patient reports over the last several days that she has begun to have persistent diarrhea, with some nausea and vomiting. She reports a fall on Monday evening where she laid on the floor for 6 hours. Decreased oral intake, including not taking her midodrine regularly. Due to her worsening fatigue, weakness, and lightheadedness her son encouraged her to come back to the ER. In the ER patient was hypotensive requiring multiple fluid boluses and albumin administration. Past Medical History:        Diagnosis Date    ISIDRA (acute kidney injury) ([address] 6/11/2016    Arthritis     Atrial fibrillation (HCC)     Bleeding stomach ulcer 2011    CAD (coronary artery disease)     Clostridium difficile infection     History of blood transfusion     Hyperlipidemia     Hypertension     PONV (postoperative nausea and vomiting)        Past Surgical History:        Procedure Laterality Date    AORTIC VALVE REPLACEMENT      CARDIAC SURGERY      DILATION AND CURETTAGE OF UTERUS      JOINT REPLACEMENT Right 2011    knee    LA OFFICE/OUTPT VISIT,PROCEDURE ONLY N/A 6/28/2018    BIOPROSTHETIC AORTIC VALVE REPLACEMENT WITH MAZE PROCEDURE performed by Angie Hyde MD at [address] [place] [address] N/A 2/28/2019    EGD ESOPHAGOGASTRODUODENOSCOPY performed by Aly Whitney MD at [facility]       Home Medications:   No current facility-administered medications on file prior to encounter.      Current Outpatient Medications on File Prior to Encounter   Medication Sig Dispense Refill    midodrine (PROAMATINE) 5 MG tablet Take 1 tablet by mouth 3 times daily (with meals) 90 tablet 3    aspirin 325 MG tablet Take 325 mg by mouth daily      losartan (COZAAR) 25 MG tablet Take 25 mg by mouth daily At noon      furosemide (LASIX) 40 MG tablet Take 40 mg by mouth daily       metOLazone (ZAROXOLYN) 2.5 MG tablet Take 5 mg by mouth daily       vitamin C (ASCORBIC ACID) 500 MG tablet Take 500 mg by mouth daily      spironolactone (ALDACTONE) 50 MG tablet Take 50 mg by mouth daily @@ noon      omeprazole (PRILOSEC) 20 MG delayed release capsule Take 20 mg by mouth in the morning and at bedtime       Multiple Vitamins-Minerals (CENTRUM SILVER ADULT 50+ PO) Take 1 tablet by mouth daily      Calcium Carb-Cholecalciferol (CALTRATE 600+D3 PO) Take 1 tablet by mouth 2 times daily      sucralfate (CARAFATE) 1 GM tablet Take 1 tablet by mouth 4 times daily (before meals and nightly) (Patient taking differently: Take 1 g by mouth 4 times daily as needed ) 120 tablet 3    carvedilol (COREG) 6.25 MG tablet Take 1 tablet by mouth 2 times daily (with meals) 60 tablet 3    Misc Natural Products (OSTEO BI-FLEX TRIPLE STRENGTH PO) Take 750 mg by mouth daily      Multiple Vitamins-Minerals (PRESERVISION AREDS) CAPS Take by mouth 2 times daily      fluticasone (FLONASE) 50 MCG/ACT nasal spray 1-2 sprays by Nasal route daily      Pyridoxine HCl (VITAMIN B-6) 100 MG tablet Take 100 mg by mouth daily      loratadine (CLARITIN) 10 MG tablet Take 10 mg by mouth daily       PARoxetine (PAXIL) 30 MG tablet Take 30 mg by mouth every morning      traZODone (DESYREL) 50 MG tablet Take 50 mg by mouth nightly as needed          Allergies:  Ace inhibitors    Social History:    reports that she has never smoked. She has never used smokeless tobacco. She reports that she does not drink alcohol and does not use drugs.     Family History:       Problem Relation Age of Onset    Diabetes Father     Stroke Father     High Blood Pressure Mother     Cancer Sister         blood    Diabetes Brother  Cancer Brother         lymphoma    High Blood Pressure Maternal Grandmother     High Blood Pressure Maternal Grandfather     Diabetes Paternal Grandmother     Diabetes Paternal Grandfather     Diabetes Brother     Heart Disease Neg Hx        Review of systems:  Constitutional: no fever, no night sweats, positive for fatigue and weakness  Head: no headache, no head injury, no migranes. Eye: no blurring of vision, no double vision. Ears: no hearing difficulty, no tinnitus  Mouth/throat: no ulceration, dental caries, dysphagia  Lungs: no cough, no wheeze positive for exertional dyspnea  CVS: no palpitation, no chest pain,   GI: positive for nausea and diarrhea  SHARONA: no dysuria, frequency and urgency, no hematuria, no kidney stones  Musculoskeletal: no joint pain, increased swelling in lower extremities  Endocrine: no polyuria, polydypsia, no cold or heat intolerence  Hematology: no anemia, no easy brusing or bleeding, no hx of clotting disorder  Dermatology: no skin rash, no eczema, no prurities,  Psychiatry: no depression, no anxiety,no panic attacks, no suicide ideation  Neurology: no syncope, no seizures, no numbness or tingling of hands, no numbness or tingling of feet, no paresis    10 point review of systems completed, all other than noted above are negative. Vitals:   Vitals:    03/25/22 0332   BP: (!) 97/49   Pulse:    Resp:    Temp:    SpO2:       BMI: Body mass index is 32.61 kg/m².                 Exam:  Physical Examination: General appearance - alert, well appearing, and in no distress and chronically ill appearing  Mental status - alert, oriented to person, place, and time  Neck - supple, no significant adenopathy, no JVD, or carotid bruits  Chest - clear to auscultation, no wheezes, rales or rhonchi, symmetric air entry  Heart - normal rate, irregular rhythm, normal S1, S2, no murmurs, rubs, clicks or gallops  Abdomen - soft distended with fluid wave  Neurological - alert, oriented, g/dL    Total Bilirubin 0.5 0.3 - 1.2 mg/dL    Bilirubin, Direct 0.3 0.0 - 0.3 mg/dL    Alkaline Phosphatase 87 38 - 126 U/L    AST 23 5 - 40 U/L    ALT <5 (L) 11 - 66 U/L    Total Protein 5.8 (L) 6.1 - 8.0 g/dL   Brain Natriuretic Peptide    Collection Time: 03/24/22 11:16 PM   Result Value Ref Range    Pro-BNP 4921.0 (H) 0.0 - 1800.0 pg/mL   Troponin    Collection Time: 03/24/22 11:16 PM   Result Value Ref Range    Troponin T 0.017 (A) ng/ml   Ammonia    Collection Time: 03/24/22 11:16 PM   Result Value Ref Range    Ammonia 57 11 - 60 umol/L   Protime-INR    Collection Time: 03/24/22 11:16 PM   Result Value Ref Range    INR 1.16 (H) 0.85 - 1.13   Anion Gap    Collection Time: 03/24/22 11:16 PM   Result Value Ref Range    Anion Gap 14.0 8.0 - 16.0 meq/L   Glomerular Filtration Rate, Estimated    Collection Time: 03/24/22 11:16 PM   Result Value Ref Range    Est, Glom Filt Rate 19 (A) ml/min/1.73m2   Osmolality    Collection Time: 03/24/22 11:16 PM   Result Value Ref Range    Osmolality Calc 289.0 275.0 - 300.0 mOsmol/kg   Procalcitonin    Collection Time: 03/24/22 11:16 PM   Result Value Ref Range    Procalcitonin 0.09 0.01 - 0.09 ng/mL   Clostridium Difficile Toxin/Antigen    Collection Time: 03/25/22  1:00 AM    Specimen: Stool   Result Value Ref Range    C.diff Toxin/Antigen NEGATIVE        Radiology:     XR CHEST PORTABLE    Result Date: 3/24/2022  1 view chest x-ray Comparison: CR,SR - XR CHEST PORTABLE - 03/09/2019 10:14 AM EST  CR,SR - XR CHEST PORTABLE - 03/06/2019 02:34 PM EST  CR,SR - XR CHEST PORTABLE - 08/24/2018 12:24 AM EDT  CR,SR - XR CHEST PORTABLE - 07/05/2018 08:12 AM EDT  CR,SR - XR CHEST PORTABLE - 07/04/2018 06:07 AM EDT Findings: Moderate bilateral pleural effusions, less than seen recently. Patchy opacity at the right lung base is new. Cardiomegaly. Mild central pulmonary vascular congestion.  Mild periosteal thickening of the proximal left humerus along with subtle lytic changes in the lateral endosteal region. There is no fracture. 1. Cardiomegaly with possible failure. Recommend follow-up. 2. Patchy opacity at the right lung base could be a superimposed infectious component. Follow-up to clearing recommended. 3. Indeterminate appearance of the proximal left humerus. Not seen on prior exams. Correlate with history of pain in this area. Further evaluate if clinically indicated. This document has been electronically signed by:  Ashlyn Loco MD on 03/24/2022 11:58 PM        EKG: Atrial Fibrillation        DVT prophylaxis: [] Lovenox                                 [] SCDs                                 [x] SQ Heparin                                 [] Encourage ambulation, low risk for DVT, no chemical or mechanical prophylaxis necessary              [] Already on Anticoagulation                Anticipated Disposition upon discharge: [x] Home                                                                         [] Home with Home Health                                                                         [] [facility] Pop                                                                         [] [address]          Electronically signed by ARTHUR Slois CNP on 3/25/2022 at 3:59 AM

## 2022-03-25 NOTE — PROGRESS NOTES
Yamil Aquino 61  OCCUPATIONAL THERAPY MISSED TREATMENT NOTE  [facility] ICU STEPDOWN TELEMETRY 4K  [id number]      Date: 3/25/2022  Patient Name: Hipolito Sanchez        CSN: [id number]   : 1940  (81 y.o.)  Gender: female                REASON FOR MISSED TREATMENT: Hold Treatment per Nursing. Low BP at 78/47. OT will hold today. Will check back when appropriate.

## 2022-03-25 NOTE — PLAN OF CARE
Problem: Discharge Planning:  Goal: Discharged to appropriate level of care  Description: Discharged to appropriate level of care  Outcome: Ongoing   See SW note 3/25/22

## 2022-03-25 NOTE — H&P
CRITICAL CARE PROGRESS NOTE      Patient:  Francisco Cabrera    Unit/Bed:4D-06/006-A  YOB: 1940  MRN: [id number]   PCP: ARTHUR Cates CNP  Date of Admission: 3/24/2022  Chief Complaint:- Hypotension    Assessment and Plan:    Hypotension: Multifactorial.  Per chart review appears to be chronic. Received paracentesis 2.5 L removed. Does not appear to be responsive to fluids or albumin that were given previously. Is on midodrine 5mg 3 times daily at home. This was increased to 10mg 3 times daily. Will initiate Levophed as well as albumin 25 g every 6 hours x8 doses total.  Decompensated liver cirrhosis with ascites: Status post paracentesis on 3/25 with 2.5 L removed. Will monitor cell count with differential to determine if need for SBP prophylaxis is present. Previous history of cardiogenic ascites with admission from 3/11/2022 to 3/17/2022 and was treated for CHF exacerbation at that time. GI following. ISIDRA on CKDIII: Creatinine on arrival of 2.4. Baseline appears to be around 1.3. Patient received IVF. Check BMP in the a.m. Acute hypoxic respiratory failure: Currently requiring 4 L/min via nasal cannula with no home O2 requirement. This is likely secondary to increased IVF. Denies SOB currently continue to monitor and wean FiO2 to keep SPO2 greater than 90%. Repeat CXR in the a.m. Acute on chronic HFpEF: Echo on 3/12/2022 shows LV size and systolic function and WNL. EF estimated at 55%. No regional left ventricular wall motion abnormalities and wall thickness within normal limits. Elevated RVSP. Pulmonary artery systolic pressure calculated at 65-70. Home diuretics on hold secondary to #1. Strict I's and O's. Daily weights. Persistent atrial fibrillation, Hx of: Rate control with carvedilol. Remains in A.fib. SCDs for DVT prophylaxis. Severe pulmonary hypertension, Hx of: Pulmonary artery systolic pressure was calculated 65-70 Echo 3/12/2022.   Per chart review will be following up with [facility] in April. CAD, Hx of: Continue ASA. INITIAL H AND P AND ICU COURSE:  Per initial H&P \"The patient is a 81 y.o. female  with PMH cirrhosis, CHF, atrial fibrillation, coronary artery disease, and hypertension who presents with complaints of weakness and lightheadedness. Patient was recently discharged from [facility] approximately a week ago following an admission related to decompensated cirrhosis with ascites and hypoxic respiratory failure. She underwent large-volume paracentesis and was to have a follow-up with GI. Patient reports over the last several days that she has begun to have persistent diarrhea, with some nausea and vomiting. She reports a fall on Monday evening where she laid on the floor for 6 hours. Decreased oral intake, including not taking her midodrine regularly. Due to her worsening fatigue, weakness, and lightheadedness her son encouraged her to come back to the ER. In the ER patient was hypotensive requiring multiple fluid boluses and albumin administration\". On 3/25/2022 patient received diagnostic/therapeutic thoracentesis in which 2.5 L were removed in IR. Patient noted to have consistently low blood pressures before and after with maps fluctuating between 50-70. Patient on midodrine however minimal improvement. Did not respond to fluid boluses or albumin at that time. She was requiring 2 L/min on nasal cannula with no previous home O2 requirements. Initially she was tried on low-dose dopamine infusion however there was minimal to no response in her blood pressure. ICU was contacted for further evaluation and management. For further information please see assessment and plan    Past Medical History:  :Per HPI.   Family History:  family history includes Cancer in her brother and sister; Diabetes in her brother, brother, father, paternal grandfather, and paternal grandmother; High Blood Pressure in her maternal grandfather, maternal grandmother, and mother; Stroke in her father. Social History: Per chart review never smoker, denies EtOH and recreational drug use    ROS   Denies chest pain, dizziness, headache, SOP, abdominal pain, and/V/D/C. Scheduled Meds:   sodium chloride flush  5-40 mL IntraVENous 2 times per day    carvedilol  6.25 mg Oral BID WC    [Held by provider] losartan  25 mg Oral Daily    midodrine  10 mg Oral TID WC    PARoxetine  30 mg Oral QAM    pantoprazole  40 mg Oral QAM AC    aspirin  325 mg Oral Daily     Continuous Infusions:   sodium chloride      sodium chloride Stopped (03/25/22 1050)    DOPamine 7.5 mcg/kg/min (03/25/22 1240)       PHYSICAL EXAMINATION:  T: 98.  P: 82. RR: 16. B/P: 99/43. FiO2: 4 LPM via NC. O2 Sat: 90.  I/O:  +1L. No UOP documented. Body mass index is 32.92 kg/m². GCS:   15  General:   Acute on chronically ill female. In no acute distress. HEENT:  normocephalic and atraumatic. No scleral icterus. PERR  Neck: supple. No Thyromegaly. Lungs: clear to auscultation. No retractions. No respiratory distress. On 4 L/min via nasal cannula  Cardiac: Irregular rhythm. Regular rate. no JVD. Abdomen: soft. Nontender. BS present. Extremities:  No clubbing, cyanosis. Mild pitting edema at knees bilaterally. SCD's present. Vasculature: capillary refill < 3 seconds. Palpable dorsalis pedis pulses. Skin:  warm and dry. Psych:  Alert and oriented x3. Affect appropriate  Lymph:  No supraclavicular adenopathy. Neurologic:  No focal deficit. No seizures. Data: (All radiographs, tracings, PFTs, and imaging are personally viewed and interpreted unless otherwise noted). Sodium 135, potassium 4.1, chloride 99, CO2 22, BUN 65, creatinine 2.4, glucose 103  WBC 4.7, Hgb 10.4, MCV 95.2, HCT 33.7,   Albumin 2.9, alk phos 87, ALT less than 5, ammonia 57, AST 23, bili 0.5  Telemetry shows Atrial  fibrillation  Echo on 3/12/2022 shows LV size and function WNL. EF estimated at 55%.   No LV regional wall motion abnormalities. Pulmonary artery systolic pressure calculated at 65-70. CXR 3/24 shows cardiomegaly. Patchy opacity of the right lung. Mild  pulmonary vascular congestion. Seen with multidisciplinary ICU team .  Meets Continued ICU Level Care Criteria:    [x] Yes   [] No - Transfer Planned to listed location:  [] HOSPITALIST CONTACTED-      Case and plan discussed with Dr. Adeline Ross. Electronically signed by Lala Wilkerson DO     [address]  Patient seen by me including key components of medical care. Case discussed with resident physician. On low dose pressors. Italicized font, if present,  represents changes to the note made by me. CC time 35 minutes. Time was discontiguous. Time does not include procedure. Time does include my direct assessment of the patient and coordination of care. Time represents more than 50% of the time involved with patient care by the [address] [place] [address] team.  Electronically signed by Gege Deluna.  Adeline Ross MD.

## 2022-03-25 NOTE — ED NOTES
Pt and vs reassessed. RR even and unlabored. Pt resting in bed alert. Pt used bed pan. Pt continuously having diarrhea and unable to hold bowel movements in. Stool sample collected. Pt on bed pan again at this time.  Pt denies any other needs and is stable at this time     Azael Neal, [facility]  03/25/22 [id number]

## 2022-03-25 NOTE — ED NOTES
Patient transferred to [address] [place] [address] room 001 nurse informed.       Huey Jerry  03/25/22 8172

## 2022-03-25 NOTE — ED PROVIDER NOTES
[place] ENCOUNTER          Pt Name: Mikey Pearce  MRN: [id number]  [place] 1940  Date of evaluation: 3/24/2022  Emergency Physician: Lyubov Garg, [facility]       Chief Complaint   Patient presents with    Extremity Weakness    Fall     History obtained from the patient. HISTORY OF PRESENT ILLNESS    HPI  Mikey Pearce is a 81 y.o. female who presents to the emergency department for evaluation of generalized weakness. Patient with a past medical history of cirrhosis, CHF, atrial fibrillation, hypertension, and coronary disease. She was recently discharged from the hospital 7 days ago. Over the last 5 days she reports that she has had decreased p.o. intake nausea and dry heaves. And persistent diarrhea. Diarrhea is loose and watery. No blood. No melena. Her son states today she had worsening fatigue and generalized weakness therefore he brought her into the ED. The patient has no other acute complaints at this time. REVIEW OF SYSTEMS   Review of Systems   Constitutional: Positive for appetite change and fatigue. Negative for chills and fever. HENT: Negative for congestion, rhinorrhea and sore throat. Eyes: Negative for redness and visual disturbance. Respiratory: Negative for cough, chest tightness, shortness of breath and wheezing. Cardiovascular: Positive for leg swelling. Negative for chest pain. Gastrointestinal: Positive for nausea and vomiting. Negative for abdominal pain, blood in stool, constipation and diarrhea. Endocrine: Negative for polydipsia and polyuria. Genitourinary: Negative for decreased urine volume, dysuria and urgency. Musculoskeletal: Negative for back pain and myalgias. Skin: Negative for rash and wound. Neurological: Positive for weakness. Negative for light-headedness and headaches. Hematological: Does not bruise/bleed easily.    Psychiatric/Behavioral: Negative daily, Disp: , Rfl:     Calcium Carb-Cholecalciferol (CALTRATE 600+D3 PO), Take 1 tablet by mouth 2 times daily, Disp: , Rfl:     sucralfate (CARAFATE) 1 GM tablet, Take 1 tablet by mouth 4 times daily (before meals and nightly) (Patient taking differently: Take 1 g by mouth 4 times daily as needed ), Disp: 120 tablet, Rfl: 3    carvedilol (COREG) 6.25 MG tablet, Take 1 tablet by mouth 2 times daily (with meals), Disp: 60 tablet, Rfl: 3    Misc Natural Products (OSTEO BI-FLEX TRIPLE STRENGTH PO), Take 750 mg by mouth daily, Disp: , Rfl:     Multiple Vitamins-Minerals (PRESERVISION AREDS) CAPS, Take by mouth 2 times daily, Disp: , Rfl:     fluticasone (FLONASE) 50 MCG/ACT nasal spray, 1-2 sprays by Nasal route daily, Disp: , Rfl:     Pyridoxine HCl (VITAMIN B-6) 100 MG tablet, Take 100 mg by mouth daily, Disp: , Rfl:     loratadine (CLARITIN) 10 MG tablet, Take 10 mg by mouth daily , Disp: , Rfl:     PARoxetine (PAXIL) 30 MG tablet, Take 30 mg by mouth every morning, Disp: , Rfl:     traZODone (DESYREL) 50 MG tablet, Take 50 mg by mouth nightly as needed , Disp: , Rfl:       SOCIAL HISTORY     Social History     Social History Narrative    Not on file     Social History     Tobacco Use    Smoking status: Never Smoker    Smokeless tobacco: Never Used   Vaping Use    Vaping Use: Never used   Substance Use Topics    Alcohol use: No    Drug use: No         ALLERGIES     Allergies   Allergen Reactions    Ace Inhibitors Other (See Comments)         FAMILY HISTORY     Family History   Problem Relation Age of Onset    Diabetes Father     Stroke Father     High Blood Pressure Mother     Cancer Sister         blood    Diabetes Brother     Cancer Brother         lymphoma    High Blood Pressure Maternal Grandmother     High Blood Pressure Maternal Grandfather     Diabetes Paternal Grandmother     Diabetes Paternal Grandfather     Diabetes Brother     Heart Disease Neg Hx          PHYSICAL EXAM     ED Triage Vitals [03/24/22 2251]   BP Temp Temp Source Pulse Resp SpO2 Height Weight   (S) (!) 83/50 97.8 °F (36.6 °C) Oral 75 16 95 % 5' 5\" (1.651 m) 178 lb (80.7 kg)         Additional Vital Signs:  Vitals:    03/24/22 2251   BP: (S) (!) 83/50   Pulse: 75   Resp: 16   Temp: 97.8 °F (36.6 °C)   SpO2: 95%       Physical Exam  Constitutional:       General: She is not in acute distress. Appearance: She is well-developed. She is not diaphoretic. HENT:      Head: Normocephalic and atraumatic. Mouth/Throat:      Mouth: Mucous membranes are dry. Eyes:      General:         Right eye: No discharge. Left eye: No discharge. Conjunctiva/sclera: Conjunctivae normal.      Pupils: Pupils are equal, round, and reactive to light. Neck:      Thyroid: No thyromegaly. Vascular: No JVD. Cardiovascular:      Rate and Rhythm: Normal rate and regular rhythm. Heart sounds: Normal heart sounds. Pulmonary:      Effort: Pulmonary effort is normal. No respiratory distress. Breath sounds: Normal breath sounds. Abdominal:      General: Bowel sounds are normal. There is distension. Palpations: Abdomen is soft. There is fluid wave. Tenderness: There is no abdominal tenderness. Musculoskeletal:         General: No tenderness. Normal range of motion. Cervical back: Normal range of motion and neck supple. Right lower leg: Edema present. Left lower leg: Edema present. Lymphadenopathy:      Cervical: No cervical adenopathy. Skin:     General: Skin is warm and dry. Capillary Refill: Capillary refill takes less than 2 seconds. Findings: No rash. Neurological:      Mental Status: She is alert and oriented to person, place, and time. She is not disoriented. GCS: GCS eye subscore is 4. GCS verbal subscore is 5. GCS motor subscore is 6. Motor: No abnormal muscle tone or seizure activity. Gait: Gait normal.      Comments: Awake and alert.  Moves all extremities. Non-focal exam with steady gait. Initial vital signs and nursing assessment reviewed and abnormal from Hypotension from dehydration. Pulsoximetry is normal per my interpretation. MEDICAL DECISION MAKING   Initial Assessment: Given the patient's above chief complaint and findings on history and physical examination, I thought it was appropriate to consider the following emergency medical conditions: Dehydration, ISIDRA, chronic hypotension, electrolyte derangement, infectious etiologies, anemia, CHF, cirrhosis, although some of these diagnoses are unlikely they were considered in my medical decision making.     Plan: CBC, BMP, ECG, BNP, troponin, LFTs, ammonia, C. difficile toxin, procalcitonin, urinalysis, chest x-ray symptomatic treatment with IV fluid bolus, home midodrine, and reassess         ED RESULTS   Laboratory results:  Labs Reviewed   CBC WITH AUTO DIFFERENTIAL - Abnormal; Notable for the following components:       Result Value    WBC 4.7 (*)     RBC 3.54 (*)     Hemoglobin 10.4 (*)     Hematocrit 33.7 (*)     MCHC 30.9 (*)     RDW-CV 18.1 (*)     RDW-SD 63.7 (*)     Lymphocytes Absolute 0.8 (*)     All other components within normal limits   BASIC METABOLIC PANEL W/ REFLEX TO MG FOR LOW K - Abnormal; Notable for the following components:    CO2 22 (*)     BUN 65 (*)     CREATININE 2.4 (*)     Calcium 8.0 (*)     All other components within normal limits   HEPATIC FUNCTION PANEL - Abnormal; Notable for the following components:    Albumin 2.9 (*)     ALT <5 (*)     Total Protein 5.8 (*)     All other components within normal limits   BRAIN NATRIURETIC PEPTIDE - Abnormal; Notable for the following components:    Pro-BNP 4921.0 (*)     All other components within normal limits   TROPONIN - Abnormal; Notable for the following components:    Troponin T 0.017 (*)     All other components within normal limits   PROTIME-INR - Abnormal; Notable for the following components:    INR 1.16 (*) All other components within normal limits   GLOMERULAR FILTRATION RATE, ESTIMATED - Abnormal; Notable for the following components:    Est, Glom Filt Rate 19 (*)     All other components within normal limits   CK - Abnormal; Notable for the following components: Total CK 29 (*)     All other components within normal limits   C DIFF TOXIN/ANTIGEN   VRE SCREEN BY PCR   AMMONIA   ANION GAP   OSMOLALITY   PROCALCITONIN   URINALYSIS WITH REFLEX TO CULTURE   PROCALCITONIN   MRSA BY PCR       Radiologic studies results:  XR CHEST PORTABLE   Final Result   1. Cardiomegaly with possible failure. Recommend follow-up. 2. Patchy opacity at the right lung base could be a superimposed    infectious component. Follow-up to clearing recommended. 3. Indeterminate appearance of the proximal left humerus. Not seen on    prior exams. Correlate with history of pain in this area. Further evaluate    if clinically indicated. This document has been electronically signed by: Danica Reed MD on 03/24/2022 11:58 PM      IR  [address]    (Results Pending)       ED Medications administered this visit:   Medications   midodrine (PROAMATINE) tablet 5 mg (has no administration in time range)         ED COURSE     ED Course as of 03/25/22 0622   Fri Mar 25, 2022   0217 BP(!): 94/59  BP improving with IV hydration and midodrine. [DD]   0217 Discussed case with Dr. Farzaneh Fonseca. She graciously accepted the admission. Recommended albumin infusion. [DD]   2537 Pro-BNP(!): 4921.0  Patient without hypoxia. No shortness of breath. [DD]   6040 Troponin T(!): 0.017  ECG no acute ischemic changes. Likely related to ISIDRA. [DD]   0622 Hemoglobin Quant(!): 10.4  Stable [DD]   0622 Creatinine(!): 2.4  Elevated from prior. [DD]      ED Course User Index  [DD] Dorita , DO   Patient was given multiple fluid bolus. He was given her home midodrine.   Her blood pressure remained tenuous but improved to acceptable range during her ED stay. This is likely combination of her ISIDRA and not taking her home midodrine. ISIDRA is likely from dehydration secondary to her diarrhea and poor p.o. intake. This could also be due to hepato or cardiorenal syndrome. As patient has history of CHF and cirrhosis. Abdomen has ascites but is nontender. No clear infectious source on ED evaluation to administer antibiotics. The diagnosis, extensive differential diagnosis, laboratory and imaging findings were discussed at the bedside. The patient was an active participant in their care. They are agreeable to the plan of care. All questions and concerns were addressed at the time of the encounter. MEDICATION CHANGES     DISCHARGE MEDICATIONS:  New Prescriptions    No medications on file            FINAL DISPOSITION     Final diagnoses:   Dehydration   ISIDRA (acute kidney injury) ([facility] [address]   Hypotension, unspecified hypotension type     Condition: condition: Stable  Dispo: admit to ICU stepdown    PATIENT REFERRED TO:  No follow-up provider specified. Critical Care Time   There was a high probability of clinically significant/life threatening deterioration in this patient's condition which required my urgent intervention. Patient required hemodynamic monitoring and multiple fluid boluses during her ED course with multiple reassessments. Total critical care time was 30 minutes. This excludes any time for separately reportable procedures. This transcription was electronically signed. Parts of this transcriptions may have been dictated by use of voice recognition software and electronically transcribed, and parts may have been transcribed with the assistance of an ED scribe. The transcription may contain errors not detected in proofreading.     Electronically Signed: Lisseth Davis DO, 03/24/22, 11:15 PM      Lisseth Davis,   03/25/22 9014       Lisseth Davis DO  03/25/22 [address],   03/25/22 0631       Lisseth Davis DO  03/25/22 5962

## 2022-03-25 NOTE — ED NOTES
Dr. Holloway Primer at bedside to assess pt.  Pt denying any pain at this time     Rosa M Chauhan RN  03/24/22 6313

## 2022-03-25 NOTE — PROGRESS NOTES
Pharmacy Medication History Note      List of current medications patient is taking is complete. Source of information: patient, [facility], Dr. Lilly Seymour office    Changes made to medication list:  Medications removed (include reason, ex. therapy complete or physician discontinued):  · none    Medications added/doses adjusted:  · Furosemide 40 mg daily adjusted to 20 mg daily   · Sucralfate 1 g 4 times daily adjusted to once daily     Other notes (ex. Recent course of antibiotics, Coumadin dosing):  · Received cephalexin 250 mg TID from 3/16/22-3/19/22  · Patient reports that she takes aspirin 325 mg BID, rather than daily. I spoke with Dr. Lilly Seymour office. She is prescribed aspirin 325 mg once daily. · Denies use of other OTC or herbal medications.       Allergies reviewed: up to date      Electronically signed by Rajat Watts on 3/25/2022 at 11:12 AM

## 2022-03-25 NOTE — PROGRESS NOTES
Patient's blood pressure 78/47 MAP (57), heart rate 80bpm. Patient placed in trendelenburg position at this time. 500cc bolus administered through right wrist peripheral IV. Patient lung sounds clear throughout. Oxygen initiated at 2L/min via nasal cannula due to patient's oxygen saturation at 88% on room air.

## 2022-03-25 NOTE — PROGRESS NOTES
Consult Note        Patient:   Kathryn Aragon  YOB: 1940  Age:  81 y.o. Room:  [address] [place], [place] [address]  Code Status:  Limited  MRN:  [id number]   Acct: [de-identified]    Date of Admission:  3/24/2022 10:46 PM  Date of Service:  3/25/2022  Consulting:  Neetu James MD   Primary Care Physician:  ARTHUR Rodriguez - CNP  Completed By:  Dixon Scott RN                 Reason for Palliative Care Evaluation:-             [] Code Status Discussion              [x] Goals of Care              [] Pain/Symptom Management               [] Emotional Support              [] Other:                   Current Issues:- admitted for ISIDRA, decompensated cirrhosis with ascites and hypotension  []  Pain  []  Fatigue  []  Nausea  []  Anxiety  []  Depression  []  Shortness of Breath  []  Constipation  []  Appetite  []  Other:             Advance Directives:-  [] [place] DNR Form  [x] Living Will  [x] Medical POA             Current Code Status:-  [] Full Resuscitation  [] DNR-Comfort Care-Arrest  [] DNR-Comfort Care       [x] Limited Resuscitation             [x] No CPR            [x] No shock            [x] No ET intubation/reintubation            [x] No resuscitative medications            [] Other limitation:              Palliative Performance Status:          [] 60%  Ambulation reduced; Significant disease;Can't do hobbies/housework; intake normal or reduced; occasional assist; LOC full/confusion        [] 50%  Mainly sit/lie; Extensive disease; Can't do any work; Considerable assist; intake normal or reduced; LOC full/confusion        [x] 40%  Mainly in bed; Extensive disease; Mainly assist; intake normal or reduced; LOC full/confusion         [] 30%  Bed Bound; Extensive disease; Total care; intake reduced; LOC full/confusion        [] 20%  Bed Bound; Extensive disease; Total care; intake minimal; Drowsy/coma        [] 10%  Bed Bound; Extensive disease;  Total care; Mouth care only; Drowsy/coma        [] 0  Death        Goals of care evaluation:-        The patient goals of care are to provide comfort care/supportive services/palliation & relieve suffering:  Goals of care discussed with:  [] Patient independently  [x] Patient and Family  [] Family or Healthcare DPOA independently  [] Unable to discuss with patient, family/DPOA not present         Family/Patient Discussion:  Met with Antonio Mann and her son Morenita Jones in ICU room 6. Antonio Mann is A/O x4. She confirms her code status as limited x4. She states she wants to return home with home health. Discussed long term goals associated with her chronic illnesses. She stated she \"just wants to go home\". Questions regarding her BP and medications answered.          Electronically signed by Eliana Martin RN on 3/25/2022 at 4:26 PM           Palliative Care Office: [phone]

## 2022-03-25 NOTE — CARE COORDINATION
3/25/22, 9:18 AM EDT  DISCHARGE PLANNING EVALUATION:    Jason Weinberg       Admitted: 3/24/2022/ [address] day: 0   Location: -01/001-A Reason for admit: Dehydration [E86.0]  ISIDRA (acute kidney injury) ([facility] [address] [N17.9]  Acute kidney injury ([address] [N17.9]  Hypotension, unspecified hypotension type [I95.9]   PMH:  has a past medical history of ISIDRA (acute kidney injury) ([address] Arthritis, Atrial fibrillation ([address] Bleeding stomach ulcer, CAD (coronary artery disease), Clostridium difficile infection, History of blood transfusion, Hyperlipidemia, Hypertension, and PONV (postoperative nausea and vomiting). Procedure:   3/25 Paracentesis = 2.5L removed  Barriers to Discharge:  ISIDRA/Decompensated Liver Cirrhois/Ascites/Hypotension/CHF. Oxygen 4L, IVF, Dopamine gtt, Midodrine continued. Transfer to ICU; hand-off given to Rajwinder ICU CM  Creatinine 2.4; monitor. BP 80-90/s; monitor. PCP: ARTHUR Stearns CNP  Readmission Risk Score: 20.2 ( )%    Patient Goals/Plan/Treatment Preferences: plans home alone; son Ricki Regan attentive to needs and prefers [address] (nsg, therapy, aide) and just called this agency yesterday; therapy following, follows [facility] for pulmonary hypertension  Transportation/Food Security/Housekeeping Addressed:  No issues identified.

## 2022-03-25 NOTE — CARE COORDINATION
03/25/22 1323   Readmission Assessment   Number of Days since last admission? 8-30 days   Previous Disposition Home Alone   Who is being Valeri Adrian   (son Win Stout)   Did you visit your Primary Care Physician after you left the hospital, before you returned this time? Yes   Did you see a specialist, such as Cardiac, Pulmonary, Orthopedic Physician, etc. after you left the hospital? No   Who advised the patient to return to the hospital? Caregiver  (son Eveline Zavala)   Does the patient report anything that got in the way of taking their medications? No   In our efforts to provide the best possible care to you and others like you, can you think of anything that we could have done to help you after you left the hospital the first time, so that you might not have needed to return so soon?  Other (Comment)  (left too early last admission, very weak and had diarrhea)

## 2022-03-25 NOTE — ED NOTES
This nurse receives bedside report from [address] [place] [address],[address] at this time.        [facility]  03/25/22 8870

## 2022-03-25 NOTE — PLAN OF CARE
Name: Christi An  YOB: 1940  MRN: [id number]  Date: 03/25/22     Plan of Care      Patient assessed this morning. A&Ox3. Reports weakness and dizziness early this morning which led to her admission. PMHx of WHO class 2 severe pulmonary HTN, CKD3, PAF, bioprosthetic valve, CAD, s/p TAVR, chronic diastolic heart failure, cirrhosis with repeated admissions for similar issues and repeated thoracentesis/paracentesis. HF & cirrhosis likely both contributing to her ascites. S/p paracentesis morning of 3/25/22 with 2.5L removed in IR. Patient's blood pressure consistently low before and after paracentesis with MAP fluctuating between 50-70. Received midodrine 10mg around 8am and 11am without improvement. Did not respond to 500mL bolus, NICOM results pending. Also did not respond to 2 doses of albumin 25% 25g each. Patient also becoming hypoxic requiring 2L per NC, with no home O2 requirement. Spoke with ICU team who suggested trying dopamine infusion 2.5-5 mcg/kg/min. Attempted to place patient in [facility] also which was not tolerated. Patient now nauseous and vomiting with most recent BP 81/50 (MAP 58). Restarted IV NS at 50cc. Spoke with ICU regarding no BP improvement with dopamine gtt. They will accept patient for transfer.      Electronically signed by Juan Carlos Briones DO on 3/25/2022 at 11:43 AM

## 2022-03-25 NOTE — CARE COORDINATION
DISCHARGE/PLANNING EVALUATION  3/25/22, 4:08 PM EDT    Reason for Referral: prefers new [facility]  Mental Status: alert and appropriate in conversation  Decision Making: self  Family/Social/Home Environment: lives at home alone  Current Services including food security, transportation and housekeeping: no concerns  Current Equipment:  Payment Source:Medicare and [employer]  Concerns or Barriers to Discharge: none  Post acute provider list with quality measures, geographic area and applicable managed care information provided. Questions regarding selection process answered:would like to use [facility]    Teach Back Method used with Leyda Morley and son Macrelino Hughes regarding care plan and needs  Patient and son verbalize understanding of the plan of care and contribute to goal setting. Patient goals, treatment preferences and discharge plan: Return home with [facility]    Call to [facility], spoke with Iban Bishop, referral made for home health at discharge, she asked to kept up to date when pt will discharge.      Electronically signed by RONI Marrero on 3/25/2022 at 4:08 PM

## 2022-03-25 NOTE — FLOWSHEET NOTE
03/25/22 1027   Treatment Team Notification   Reason for Communication Evaluate  (Cardiology Consult)   Team Member Name Dr. Opal Muñiz Provider   Method of Communication Call   Response Waiting for response   Notification Time [phone]     1027: First attempt made to contact Dr. Tera Trejo regarding outstanding cardiology consult. No response and left message regarding consult.

## 2022-03-25 NOTE — CONSULTS
Consult History & Physical      Patient:  Christi Trinh  YOB: 1940  MRN: [id number]     Acct: [de-identified]    Chief Complaint:  Cirrhosis with ascites    Date of Service: Pt seen/examined in consultation on 3/25/2022    History Of Present Illness:      81 y.o. female who we are asked to see/evaluate by Sally Ortiz MD for medical management of cirrhosis with ascites. She came to the ED yesterday for generalized weakness and dizziness. She fell at home a few days ago. She denies hitting her head. She states she \"got up in the middle of the night and did not turn the lights on. \" She denies abdominal pain, nausea, vomiting, constipation, melena, and hematochezia. She endorses intermittent dry heaves. She endorses intermittent diarrhea that started this past week. She has a history of cirrhosis and CHF. She says she was taking her diuretics at home. She received a paracentesis today with 2.5L fluid removed. She has a history of severe pulmonary hypertension and has an appointment at [facility] next week. She was recently admitted 03/11/22-03/17/22 for SOB & edema. She received 2 paracenteses during that admission. Ascites was shown to be cardiogenic. She was treated for a CHF exacerbation. Cardiology recommended she be transferred to [facility] for her severe pulmonary HTN, but it was decided she would have close OP follow-up. Past Medical History:    Past Medical History:   Diagnosis Date    ISIDRA [facility]acute kidney injury) ([facility] [address] 06/11/2016    Arthritis     Atrial fibrillation ([facility] [address]     Bleeding stomach ulcer 2011    CAD (coronary artery disease)     Cirrhosis of liver with ascites (HCC)     Clostridium difficile infection     History of blood transfusion     Hyperlipidemia     Hypertension     PONV (postoperative nausea and vomiting)        Home Medications:  Prior to Admission medications    Medication Sig Start Date End Date Taking?  Authorizing Provider   midodrine (PROAMATINE) 5 MG tablet Take 1 tablet by mouth 3 times daily (with meals) 3/16/22   Peggy Perez PA-C   aspirin 325 MG tablet Take 325 mg by mouth daily    Historical Provider, MD   losartan (COZAAR) 25 MG tablet Take 25 mg by mouth daily At noon    Historical Provider, MD   furosemide (LASIX) 40 MG tablet Take 40 mg by mouth daily     Historical Provider, MD   metOLazone (ZAROXOLYN) 2.5 MG tablet Take 5 mg by mouth daily     Historical Provider, MD   vitamin C (ASCORBIC ACID) 500 MG tablet Take 500 mg by mouth daily    Historical Provider, MD   spironolactone (ALDACTONE) 50 MG tablet Take 50 mg by mouth daily @@ noon    Historical Provider, MD   omeprazole (PRILOSEC) 20 MG delayed release capsule Take 20 mg by mouth in the morning and at bedtime     Historical Provider, MD   Multiple Vitamins-Minerals (CENTRUM SILVER ADULT 50+ PO) Take 1 tablet by mouth daily    Historical Provider, MD   Calcium Carb-Cholecalciferol (CALTRATE 600+D3 PO) Take 1 tablet by mouth 2 times daily    Historical Provider, MD   sucralfate (CARAFATE) 1 GM tablet Take 1 tablet by mouth 4 times daily (before meals and nightly)  Patient taking differently: Take 1 g by mouth 4 times daily as needed  8/26/18   Christian Ash MD   carvedilol (COREG) 6.25 MG tablet Take 1 tablet by mouth 2 times daily (with meals) 8/26/18   Christian Ash MD   Misc Natural Products (OSTEO BI-FLEX TRIPLE STRENGTH PO) Take 750 mg by mouth daily    Historical Provider, MD   Multiple Vitamins-Minerals (PRESERVISION AREDS) CAPS Take by mouth 2 times daily    Historical Provider, MD   fluticasone (FLONASE) 50 MCG/ACT nasal spray 1-2 sprays by Nasal route daily    Historical Provider, MD   Pyridoxine HCl (VITAMIN B-6) 100 MG tablet Take 100 mg by mouth daily    Historical Provider, MD   loratadine (CLARITIN) 10 MG tablet Take 10 mg by mouth daily     Historical Provider, MD   PARoxetine (PAXIL) 30 MG tablet Take 30 mg by mouth every morning    Historical Provider, MD   traZODone (DESYREL) 50 MG tablet Take 50 mg by mouth nightly as needed     Historical Provider, MD       Surgical History:  Past Surgical History:   Procedure Laterality Date    AORTIC VALVE REPLACEMENT      CARDIAC SURGERY      DILATION AND CURETTAGE OF UTERUS      JOINT REPLACEMENT Right 2011    knee    [place] OFFICE/OUTPT VISIT,PROCEDURE ONLY N/A 6/28/2018    BIOPROSTHETIC AORTIC VALVE REPLACEMENT WITH MAZE PROCEDURE performed by Loralee Olszewski, MD at [facility] ENDOSCOPY N/A 2/28/2019    EGD ESOPHAGOGASTRODUODENOSCOPY performed by Maria Esther Rodriguez MD at [facility]       Family History:  Family History   Problem Relation Age of Onset    Diabetes Father     Stroke Father     High Blood Pressure Mother     Cancer Sister         blood    Diabetes Brother     Cancer Brother         lymphoma    High Blood Pressure Maternal Grandmother     High Blood Pressure Maternal Grandfather     Diabetes Paternal Grandmother     Diabetes Paternal Grandfather     Diabetes Brother     Heart Disease Neg Hx        Past GI History:  Cirrhosis, esophagitis, gastritis, ascites, paracentesis, EGD, colonoscopy, diverticulosis    Last EGD 05/2021- distal esophagitis, gastritis & portal hypertensive gastropathy, fundic polyps, no varices  Last colonoscopy- years ago    Dr. Jessy Conde patient    Allergies:  Ace inhibitors    Social History:   TOBACCO:   reports that she has never smoked. She has never used smokeless tobacco.  ETOH:   reports no history of alcohol use. Review Of Systems  GENERAL: +generalized weakness  EYES:  No  blurred vision, double vision   CARDIOVASCULAR: No chest pain or palpitations. RESPIRATORY:  No dyspnea or cough. GI:  See HPI  MUSCULOSKELETAL: No new painful or swollen joints or myalgias. :   No dysuria or hematuria. SKIN:  No rashes or jaundice. NEUROLOGIC:  +dizziness  PSYCH:  No anxiety or depression. ENDOCRINE:  No polyuria or polydipsia.     BLOOD: +anemia    PHYSICAL EXAM:  BP (S) (!) 78/47   Pulse 80   Temp 97.7 °F (36.5 °C) (Oral)   Resp 18   Ht 5' 4\" (1.626 m)   Wt 191 lb 12.8 oz (87 kg)   SpO2 (S) (!) 88%   BMI 32.92 kg/m²     General appearance: Chronically ill appearing female  HEENT: Normal cephalic, atraumatic without obvious deformity. Pupils equal, round, and reactive to light. Neck: Supple, with full range of motion. No jugular venous distention. Trachea midline. Respiratory:  Normal respiratory effort. Clear, diminished to auscultation, bilaterally without Rales/Wheezes/Rhonchi. Cardiovascular: Regular rate and rhythm without murmurs, rubs or gallops. Abdomen: Soft, non-tender, non-distended with active bowel sounds. Musculoskeletal: No clubbing, cyanosis or edema bilaterally. Skin: Pale, warm, dry. No rashes or lesions. Scattered ecchymosis throughout the body  Psychiatric: Alert and oriented, thought content appropriate, normal insight    Labs:   Recent Labs     03/24/22 2316   WBC 4.7*   HGB 10.4*   HCT 33.7*        Recent Labs     03/24/22 2316 03/25/22  0446     --    K 4.1  --    CL 99  --    CO2 22*  --    BUN 65*  --    CREATININE 2.4*  --    CALCIUM 8.0*  --    PHOS  --  3.9     Recent Labs     03/24/22 2316   AST 23   ALT <5*   BILIDIR 0.3   BILITOT 0.5   ALKPHOS 87     Recent Labs     03/24/22 2316   INR 1.16*       Radiology:   CXR 03/24/22  Impression   1. Cardiomegaly with possible failure. Recommend follow-up. 2. Patchy opacity at the right lung base could be a superimposed    infectious component. Follow-up to clearing recommended. 3. Indeterminate appearance of the proximal left humerus. Not seen on    prior exams. Correlate with history of pain in this area. Further evaluate    if clinically indicated.      US paracentesis 03/25/22  Aspirated ascites volume: 2.5 liters   Aspirated ascites color: dark yellow    Site of Puncture:RLQ    Complications: none        FINDINGS: There are 5 saved ultrasound images. Preprocedure images show moderate ascites. Post procedure image shows minimal residual.           Impression    Successful ultrasound-guided paracentesis. Code Status: Limited    ASSESSMENT:  1. Fall at home  2. Decompensated liver cirrhosis with ascites- MELD 16  3. Ascites- likely cardiogenic, s/p para 03/25/22 with 2.5 L removed  4. Acute CHF exacerbation  5. ISIDRA- likely cardiorenal  6. Severe pulmonary HTN  7. Leukopenia  8. Normocytic anemia- no GI bleeding noted  9. Hypoalbuminemia  10. Coagulopathy  11. Acute diarrhea  12. Cardiomegaly  13. Chronic hypotension- on Midodrine  14. Paroxysmal afib  15. Severe tricuspid regurgitation    PLAN:     Monitor H & H, transfuse prn   Resume diuretics as soon as able   2g sodium diet with fluid restriction   Ascitic labs pending   Continue Midodrine TID, home dose   Continue PPI daily, home dose   Daily weights   Strict I & O   CMP, INR in the morning   Discontinue subcutaneous Heparin for DVT prophylaxis as patient is high risk for bleeding in the setting of cirrhosis with coagulopathy   SCDs for DVT prophylaxis   Avoid hepatotoxic meds, okay for Tylenol 2g limit   Avoid narcotic analgesics & benzodiazepines as they can precipitate HE   Consult cardiology given recurrent CHF exacerbations   Palliative care consulted by primary   RN updated   Supportive care per primary team  Will follow       Case reviewed and impression/plan reviewed in collaboration with Dr. Rubin Elkins  Electronically signed by ARTHUR Mccallum CNP on 3/25/2022 at 10:22 AM    [place]  Thank you for the consultation. If there are any questions or concerns this weekend, please call Dr. Wagner Tolentino as he is covering for [place].

## 2022-03-25 NOTE — ED NOTES
ED to inpatient nurses report    Chief Complaint   Patient presents with    Extremity Weakness    Fall      Present to ED from home  LOC: alert and orientated to name, place, date  Vital signs   Vitals:    03/25/22 0047 03/25/22 0125 03/25/22 0300 03/25/22 0306   BP: (!) 85/46 (!) 94/59 (!) 90/40 (!) 84/50   Pulse: 72 75 72 72   Resp: 19 16 16 16   Temp:       TempSrc:       SpO2: 95% 97% 96% 94%   Weight:       Height:          Oxygen Baseline room air     Current needs required none Bipap/Cpap No  LDAs:   Peripheral IV 03/24/22 Right Wrist (Active)   Site Assessment Clean;Dry; Intact 03/25/22 0126   Line Status Normal saline locked 03/25/22 0126   Dressing Status Clean;Dry; Intact 03/24/22 1623     Mobility: Requires assistance * 1  Pending ED orders: None  Present condition: Stable     Electronically signed by Merna Gardiner RN on 3/25/2022 at 3:15 AM       Merna Gardiner RN  03/25/22 9976

## 2022-03-25 NOTE — ED NOTES
Pt and vs reassessed. RR even and unlabored. Pt resting in bed alert. Pt taken off bedpan and denies any other needs. No distress noted.  Pt stable at this time     Martinez Gordillo, [facility]  03/25/22 5680

## 2022-03-26 ENCOUNTER — APPOINTMENT (OUTPATIENT)
Dept: ULTRASOUND IMAGING | Age: 82
DRG: 432 | End: 2022-03-26
Payer: MEDICARE

## 2022-03-26 ENCOUNTER — APPOINTMENT (OUTPATIENT)
Dept: GENERAL RADIOLOGY | Age: 82
DRG: 432 | End: 2022-03-26
Payer: MEDICARE

## 2022-03-26 LAB
ADENOVIRUS F 40 41 PCR: NOT DETECTED
ALBUMIN SERPL-MCNC: 3.1 G/DL (ref 3.5–5.1)
ALP BLD-CCNC: 64 U/L (ref 38–126)
ALT SERPL-CCNC: < 5 U/L (ref 11–66)
ANION GAP SERPL CALCULATED.3IONS-SCNC: 14 MEQ/L (ref 8–16)
ANION GAP SERPL CALCULATED.3IONS-SCNC: 15 MEQ/L (ref 8–16)
AST SERPL-CCNC: 13 U/L (ref 5–40)
ASTROVIRUS PCR: NOT DETECTED
BACTERIA: ABNORMAL /HPF
BILIRUB SERPL-MCNC: 0.6 MG/DL (ref 0.3–1.2)
BILIRUBIN URINE: NEGATIVE
BLOOD, URINE: NEGATIVE
BUN BLDV-MCNC: 65 MG/DL (ref 7–22)
BUN BLDV-MCNC: 68 MG/DL (ref 7–22)
CALCIUM SERPL-MCNC: 7.9 MG/DL (ref 8.5–10.5)
CALCIUM SERPL-MCNC: 8.1 MG/DL (ref 8.5–10.5)
CAMPYLOBACTER PCR: NOT DETECTED
CASTS 2: ABNORMAL /LPF
CASTS UA: ABNORMAL /LPF
CHARACTER, URINE: CLEAR
CHLORIDE BLD-SCNC: 101 MEQ/L (ref 98–111)
CHLORIDE BLD-SCNC: 101 MEQ/L (ref 98–111)
CLOSTRIDIUM DIFFICILE, PCR: NOT DETECTED
CO2: 20 MEQ/L (ref 23–33)
CO2: 21 MEQ/L (ref 23–33)
COLOR: ABNORMAL
CREAT SERPL-MCNC: 2.7 MG/DL (ref 0.4–1.2)
CREAT SERPL-MCNC: 2.7 MG/DL (ref 0.4–1.2)
CREATININE URINE: 108.8 MG/DL
CREATININE URINE: 90.8 MG/DL
CRYPTOSPORIDIUM PCR: NOT DETECTED
CRYSTALS, UA: ABNORMAL
CYCLOSPORA CAYETANENSIS PCR: NOT DETECTED
E COLI 0157 PCR: NORMAL
E COLI ENTEROAGGREGATIVE PCR: NOT DETECTED
E COLI ENTEROPATHOGENIC PCR: NOT DETECTED
E COLI ENTEROTOXIGENIC PCR: NOT DETECTED
E COLI SHIGA LIKE TOXIN PCR: NOT DETECTED
E COLI SHIGELLA/ENTEROINVASIVE PCR: NOT DETECTED
E HISTOLYTICA GI FILM ARRAY: NOT DETECTED
EPITHELIAL CELLS, UA: ABNORMAL /HPF
ERYTHROCYTE [DISTWIDTH] IN BLOOD BY AUTOMATED COUNT: 18.1 % (ref 11.5–14.5)
ERYTHROCYTE [DISTWIDTH] IN BLOOD BY AUTOMATED COUNT: 62.6 FL (ref 35–45)
GFR SERPL CREATININE-BSD FRML MDRD: 17 ML/MIN/1.73M2
GFR SERPL CREATININE-BSD FRML MDRD: 17 ML/MIN/1.73M2
GIARDIA LAMBLIA PCR: NOT DETECTED
GLUCOSE BLD-MCNC: 116 MG/DL (ref 70–108)
GLUCOSE BLD-MCNC: 99 MG/DL (ref 70–108)
GLUCOSE URINE: NEGATIVE MG/DL
HCT VFR BLD CALC: 33.6 % (ref 37–47)
HEMOGLOBIN: 10.5 GM/DL (ref 12–16)
INR BLD: 1.25 (ref 0.85–1.13)
KETONES, URINE: ABNORMAL
LEUKOCYTE ESTERASE, URINE: ABNORMAL
MAGNESIUM: 1.7 MG/DL (ref 1.6–2.4)
MCH RBC QN AUTO: 29.7 PG (ref 26–33)
MCHC RBC AUTO-ENTMCNC: 31.3 GM/DL (ref 32.2–35.5)
MCV RBC AUTO: 95.2 FL (ref 81–99)
MISCELLANEOUS 2: ABNORMAL
NITRITE, URINE: NEGATIVE
NOROVIRUS GI GII PCR: NOT DETECTED
OSMOLALITY URINE: 378 MOSMOL/KG (ref 250–750)
PH UA: 5 (ref 5–9)
PHOSPHORUS: 4.4 MG/DL (ref 2.4–4.7)
PLATELET # BLD: 234 THOU/MM3 (ref 130–400)
PLESIOMONAS SHIGELLOIDES PCR: NOT DETECTED
PMV BLD AUTO: 9.3 FL (ref 9.4–12.4)
POTASSIUM REFLEX MAGNESIUM: 3.9 MEQ/L (ref 3.5–5.2)
POTASSIUM SERPL-SCNC: 4 MEQ/L (ref 3.5–5.2)
PROTEIN UA: NEGATIVE
RBC # BLD: 3.53 MILL/MM3 (ref 4.2–5.4)
RBC URINE: ABNORMAL /HPF
RENAL EPITHELIAL, UA: ABNORMAL
ROTAVIRUS A PCR: NOT DETECTED
SALMONELLA PCR: NOT DETECTED
SAPOVIRUS PCR: NOT DETECTED
SODIUM BLD-SCNC: 136 MEQ/L (ref 135–145)
SODIUM BLD-SCNC: 136 MEQ/L (ref 135–145)
SODIUM URINE: < 20 MEQ/L
SODIUM URINE: < 20 MEQ/L
SPECIFIC GRAVITY, URINE: 1.02 (ref 1–1.03)
TOTAL PROTEIN: 5.3 G/DL (ref 6.1–8)
UREA NITROGEN, UR: 501 MG/DL
UREA NITROGEN, UR: 569 MG/DL
URIC ACID: 9.5 MG/DL (ref 2.4–5.7)
UROBILINOGEN, URINE: 1 EU/DL (ref 0–1)
VIBRIO CHOLERAE PCR: NOT DETECTED
VIBRIO PCR: NOT DETECTED
WBC # BLD: 6 THOU/MM3 (ref 4.8–10.8)
WBC UA: ABNORMAL /HPF
YEAST: ABNORMAL
YERSINIA ENTEROCOLITICA PCR: NOT DETECTED

## 2022-03-26 PROCEDURE — 81001 URINALYSIS AUTO W/SCOPE: CPT

## 2022-03-26 PROCEDURE — 84100 ASSAY OF PHOSPHORUS: CPT

## 2022-03-26 PROCEDURE — 36415 COLL VENOUS BLD VENIPUNCTURE: CPT

## 2022-03-26 PROCEDURE — 83935 ASSAY OF URINE OSMOLALITY: CPT

## 2022-03-26 PROCEDURE — 2580000003 HC RX 258

## 2022-03-26 PROCEDURE — 80053 COMPREHEN METABOLIC PANEL: CPT

## 2022-03-26 PROCEDURE — 2580000003 HC RX 258: Performed by: STUDENT IN AN ORGANIZED HEALTH CARE EDUCATION/TRAINING PROGRAM

## 2022-03-26 PROCEDURE — 85610 PROTHROMBIN TIME: CPT

## 2022-03-26 PROCEDURE — 84300 ASSAY OF URINE SODIUM: CPT

## 2022-03-26 PROCEDURE — 6370000000 HC RX 637 (ALT 250 FOR IP): Performed by: NURSE PRACTITIONER

## 2022-03-26 PROCEDURE — P9047 ALBUMIN (HUMAN), 25%, 50ML: HCPCS | Performed by: STUDENT IN AN ORGANIZED HEALTH CARE EDUCATION/TRAINING PROGRAM

## 2022-03-26 PROCEDURE — 82570 ASSAY OF URINE CREATININE: CPT

## 2022-03-26 PROCEDURE — 6360000002 HC RX W HCPCS: Performed by: STUDENT IN AN ORGANIZED HEALTH CARE EDUCATION/TRAINING PROGRAM

## 2022-03-26 PROCEDURE — 6370000000 HC RX 637 (ALT 250 FOR IP)

## 2022-03-26 PROCEDURE — 2580000003 HC RX 258: Performed by: FAMILY MEDICINE

## 2022-03-26 PROCEDURE — 71045 X-RAY EXAM CHEST 1 VIEW: CPT

## 2022-03-26 PROCEDURE — 83735 ASSAY OF MAGNESIUM: CPT

## 2022-03-26 PROCEDURE — 99223 1ST HOSP IP/OBS HIGH 75: CPT | Performed by: INTERNAL MEDICINE

## 2022-03-26 PROCEDURE — 84540 ASSAY OF URINE/UREA-N: CPT

## 2022-03-26 PROCEDURE — 6370000000 HC RX 637 (ALT 250 FOR IP): Performed by: INTERNAL MEDICINE

## 2022-03-26 PROCEDURE — 99233 SBSQ HOSP IP/OBS HIGH 50: CPT | Performed by: SURGERY

## 2022-03-26 PROCEDURE — 85027 COMPLETE CBC AUTOMATED: CPT

## 2022-03-26 PROCEDURE — 2060000000 HC ICU INTERMEDIATE R&B

## 2022-03-26 PROCEDURE — 0097U HC GI PTHGN MULT REV TRANS & AMP PRB TECH 22 TRGT: CPT

## 2022-03-26 PROCEDURE — 84550 ASSAY OF BLOOD/URIC ACID: CPT

## 2022-03-26 PROCEDURE — 76770 US EXAM ABDO BACK WALL COMP: CPT

## 2022-03-26 RX ORDER — SODIUM CHLORIDE 9 MG/ML
1000 INJECTION, SOLUTION INTRAVENOUS CONTINUOUS
Status: DISCONTINUED | OUTPATIENT
Start: 2022-03-26 | End: 2022-03-26

## 2022-03-26 RX ORDER — PANTOPRAZOLE SODIUM 40 MG/1
40 TABLET, DELAYED RELEASE ORAL
Status: DISCONTINUED | OUTPATIENT
Start: 2022-03-26 | End: 2022-04-05 | Stop reason: HOSPADM

## 2022-03-26 RX ORDER — SODIUM CHLORIDE 9 MG/ML
25 INJECTION, SOLUTION INTRAVENOUS PRN
Status: DISCONTINUED | OUTPATIENT
Start: 2022-03-26 | End: 2022-04-05 | Stop reason: HOSPADM

## 2022-03-26 RX ORDER — LACTULOSE 10 G/15ML
10 SOLUTION ORAL 2 TIMES DAILY
Status: DISCONTINUED | OUTPATIENT
Start: 2022-03-26 | End: 2022-03-26

## 2022-03-26 RX ORDER — SODIUM CHLORIDE 0.9 % (FLUSH) 0.9 %
5-40 SYRINGE (ML) INJECTION EVERY 12 HOURS SCHEDULED
Status: DISCONTINUED | OUTPATIENT
Start: 2022-03-26 | End: 2022-04-05 | Stop reason: HOSPADM

## 2022-03-26 RX ORDER — LIDOCAINE HYDROCHLORIDE 10 MG/ML
5 INJECTION, SOLUTION EPIDURAL; INFILTRATION; INTRACAUDAL; PERINEURAL ONCE
Status: DISCONTINUED | OUTPATIENT
Start: 2022-03-26 | End: 2022-04-05 | Stop reason: HOSPADM

## 2022-03-26 RX ORDER — SODIUM CHLORIDE 0.9 % (FLUSH) 0.9 %
5-40 SYRINGE (ML) INJECTION PRN
Status: DISCONTINUED | OUTPATIENT
Start: 2022-03-26 | End: 2022-04-05 | Stop reason: HOSPADM

## 2022-03-26 RX ADMIN — Medication 3 MG: at 00:27

## 2022-03-26 RX ADMIN — ALBUMIN (HUMAN) 25 G: 0.25 INJECTION, SOLUTION INTRAVENOUS at 11:22

## 2022-03-26 RX ADMIN — ASPIRIN 325 MG: 325 TABLET ORAL at 08:22

## 2022-03-26 RX ADMIN — PANTOPRAZOLE SODIUM 40 MG: 40 TABLET, DELAYED RELEASE ORAL at 08:22

## 2022-03-26 RX ADMIN — ALBUMIN (HUMAN) 25 G: 0.25 INJECTION, SOLUTION INTRAVENOUS at 16:14

## 2022-03-26 RX ADMIN — MIDODRINE HYDROCHLORIDE 10 MG: 10 TABLET ORAL at 12:20

## 2022-03-26 RX ADMIN — SODIUM CHLORIDE 1000 ML: 9 INJECTION, SOLUTION INTRAVENOUS at 07:47

## 2022-03-26 RX ADMIN — RIFAXIMIN 550 MG: 550 TABLET ORAL at 20:17

## 2022-03-26 RX ADMIN — ALBUMIN (HUMAN) 25 G: 0.25 INJECTION, SOLUTION INTRAVENOUS at 04:27

## 2022-03-26 RX ADMIN — PAROXETINE 30 MG: 30 TABLET, FILM COATED ORAL at 08:22

## 2022-03-26 RX ADMIN — SODIUM CHLORIDE, PRESERVATIVE FREE 10 ML: 5 INJECTION INTRAVENOUS at 08:07

## 2022-03-26 RX ADMIN — SODIUM CHLORIDE 1000 ML: 9 INJECTION, SOLUTION INTRAVENOUS at 14:13

## 2022-03-26 RX ADMIN — LACTULOSE 10 G: 20 SOLUTION ORAL at 14:11

## 2022-03-26 RX ADMIN — ALBUMIN (HUMAN) 25 G: 0.25 INJECTION, SOLUTION INTRAVENOUS at 22:59

## 2022-03-26 RX ADMIN — MIDODRINE HYDROCHLORIDE 10 MG: 10 TABLET ORAL at 08:22

## 2022-03-26 RX ADMIN — MIDODRINE HYDROCHLORIDE 15 MG: 10 TABLET ORAL at 16:09

## 2022-03-26 RX ADMIN — PANTOPRAZOLE SODIUM 40 MG: 40 TABLET, DELAYED RELEASE ORAL at 16:09

## 2022-03-26 ASSESSMENT — PAIN SCALES - GENERAL
PAINLEVEL_OUTOF10: 0

## 2022-03-26 NOTE — CONSULTS
[facility]    [address]  [facility]  3/26/2022 4:17 PM    Pt Name:    Iker Reinoso  MRN:     [id number]   [id number]  YOB: 1940  Admit Date:    3/24/2022 10:46 PM  Primary Care Physician:  ARTHUR Murillo CNP    [facility] Number:   [id number]    Reason for Consult:  ISIDRA, low urine output  Requesting provider:  Leti Maki MD  History:   The patient is a 81 y.o. who is not a very good historian who has underlying chronic kidney disease stage III, moderate to severe pulmonary hypertension, chronic liver dysfunction, portal venous hypertension, ascites requiring paracentesis, chronic hypotension who presented to the hospital with complaints of progressively worsening generalized weakness associated with dizziness, decreased oral intake, abdominal distention as well as bilateral lower extremity swelling. Patient has history of paracentesis and recently underwent large-volume paracentesis and was to have a follow-up with GI. Patient reports that over last several days she started to have persistent nausea, intermittent vomiting as well as diarrhea. Patient reports decreased oral intake. She presented to the emergency room where she was noted to be hypotensive. She required multiple fluid boluses and albumin. She also underwent paracentesis with 2.5 L of fluid removal.  She was started on midodrine but blood pressure did not improve. She was started on pressors. She was admitted to ICU. Nephrology was consulted today due to elevated serum creatinine. Blood pressure was in systolic 75M to 80R yesterday multiple times. Currently blood pressure is in systolic mid 89L. Patient is sitting at her bedside chair. Poor historian. Currently on normal saline.     Past Medical History:  Past Medical History:   Diagnosis Date    ISIDRA (acute kidney injury) ([facility] [address] 06/11/2016    Arthritis     Atrial fibrillation ([address]  Bleeding stomach ulcer 2011    CAD (coronary artery disease)     Cirrhosis of liver with ascites (HCC)     Clostridium difficile infection     History of blood transfusion     Hyperlipidemia     Hypertension     PONV (postoperative nausea and vomiting)        Past Surgical History:  Past Surgical History:   Procedure Laterality Date    AORTIC VALVE REPLACEMENT      CARDIAC SURGERY      DILATION AND CURETTAGE OF UTERUS      JOINT REPLACEMENT Right 2011    knee    [place] OFFICE/OUTPT VISIT,PROCEDURE ONLY N/A 6/28/2018    BIOPROSTHETIC AORTIC VALVE REPLACEMENT WITH MAZE PROCEDURE performed by Oma Martin MD at [facility] N/A 2/28/2019    EGD ESOPHAGOGASTRODUODENOSCOPY performed by Radha Torres MD at [facility] Endoscopy       Family History:  Family History   Problem Relation Age of Onset    Diabetes Father     Stroke Father     High Blood Pressure Mother     Cancer Sister         blood    Diabetes Brother     Cancer Brother         lymphoma    High Blood Pressure Maternal Grandmother     High Blood Pressure Maternal Grandfather     Diabetes Paternal Grandmother     Diabetes Paternal Grandfather     Diabetes Brother     Heart Disease Neg Hx        Social History:  Social History     Socioeconomic History    Marital status:      Spouse name: Not on file    Number of children: 2    Years of education: Not on file    Highest education level: Not on file   Occupational History    Not on file   Tobacco Use    Smoking status: Never Smoker    Smokeless tobacco: Never Used   Vaping Use    Vaping Use: Never used   Substance and Sexual Activity    Alcohol use: No    Drug use: No    Sexual activity: Not Currently   Other Topics Concern    Not on file   Social History Narrative    Not on file     Social Determinants of Health     Financial Resource Strain:     Difficulty of Paying Living Expenses: Not on file   Food Insecurity:     Worried About [facility] in the Last Year: Not on file    Krystle of Food in the Last Year: Not on file   Transportation Needs:     Lack of Transportation (Medical): Not on file    Lack of Transportation (Non-Medical): Not on file   Physical Activity:     Days of Exercise per Week: Not on file    Minutes of Exercise per Session: Not on file   Stress:     Feeling of Stress : Not on file   Social Connections:     Frequency of Communication with Friends and Family: Not on file    Frequency of Social Gatherings with Friends and Family: Not on file    Attends [employer] Services: Not on file    Active Member of [address] [place] [address] or Organizations: Not on file    Attends Club or Organization Meetings: Not on file    Marital Status: Not on file   Intimate Partner Violence:     Fear of Current or Ex-Partner: Not on file    Emotionally Abused: Not on file    Physically Abused: Not on file    Sexually Abused: Not on file   Housing Stability:     Unable to Pay for Housing in the Last Year: Not on file    Number of [place] in the Last Year: Not on file    Unstable Housing in the Last Year: Not on file       Home Meds:  Prior to Admission medications    Medication Sig Start Date End Date Taking?  Authorizing Provider   furosemide (LASIX) 20 MG tablet Take 20 mg by mouth daily   Yes Historical Provider, MD   midodrine (PROAMATINE) 5 MG tablet Take 1 tablet by mouth 3 times daily (with meals) 3/16/22   Nehal Bruno PA-C   aspirin 325 MG tablet Take 325 mg by mouth daily    Historical Provider, MD   losartan (COZAAR) 25 MG tablet Take 25 mg by mouth daily At noon    Historical Provider, MD   metOLazone (ZAROXOLYN) 5 MG tablet Take 5 mg by mouth daily     Historical Provider, MD   vitamin C (ASCORBIC ACID) 500 MG tablet Take 500 mg by mouth daily    Historical Provider, MD   spironolactone (ALDACTONE) 50 MG tablet Take 50 mg by mouth daily @@ noon    Historical Provider, MD   omeprazole (PRILOSEC) 20 MG delayed release capsule Take 20 mg by mouth in the morning and at bedtime     Historical Provider, MD   Multiple Vitamins-Minerals (CENTRUM SILVER ADULT 50+ PO) Take 1 tablet by mouth daily    Historical Provider, MD   Calcium Carb-Cholecalciferol (CALTRATE 600+D3 PO) Take 1 tablet by mouth 2 times daily    Historical Provider, MD   sucralfate (CARAFATE) 1 GM tablet Take 1 tablet by mouth 4 times daily (before meals and nightly)  Patient taking differently: Take 1 g by mouth daily  8/26/18   Santos Manuel MD   carvedilol (COREG) 6.25 MG tablet Take 1 tablet by mouth 2 times daily (with meals) 8/26/18   MD Mar [facility] Natural Products (OSTEO BI-FLEX TRIPLE STRENGTH PO) Take 750 mg by mouth daily    Historical Provider, MD   Multiple Vitamins-Minerals (PRESERVISION AREDS) CAPS Take 1 capsule by mouth 2 times daily     Historical Provider, MD   fluticasone (FLONASE) 50 MCG/ACT nasal spray 1-2 sprays by Nasal route daily    Historical Provider, MD   Pyridoxine HCl (VITAMIN B-6) 100 MG tablet Take 100 mg by mouth daily    Historical Provider, MD   loratadine (CLARITIN) 10 MG tablet Take 10 mg by mouth daily     Historical Provider, MD   PARoxetine (PAXIL) 30 MG tablet Take 30 mg by mouth every morning    Historical Provider, MD   traZODone (DESYREL) 50 MG tablet Take 50 mg by mouth nightly as needed     Historical Provider, MD       Review of Systems:  Constitutional: Positive for generalized weakness, fatigue, diarrhea, nausea  Head: Negative for headaches  Eyes: Negative for blurry vision or discharge  Ears: Negative for ear pain or hearing changes  Nose: Negative for runny nose or epistaxis  Respiratory: Negative for shortness of breath. Negative for cough or sputum production. Negative for hemoptysis  Cardiovascular: Negative for chest pain  GI: Positive for nausea, vomiting and diarrhea.   : Negative for discharge, dysuria, or hematuria  Skin: Negative for rash  Psychiatric: Reports stable mood, negative for depression or insomnia    All other review of systems were reviewed and negative    Current Meds:  Infusion:    sodium chloride      sodium chloride 1,000 mL (03/26/22 1413)    sodium chloride      DOPamine 7.5 mcg/kg/min (03/25/22 1240)    norepinephrine Stopped (03/26/22 1105)     Meds:    lidocaine 1 % injection  5 mL IntraDERmal Once    sodium chloride flush  5-40 mL IntraVENous 2 times per day    pantoprazole  40 mg Oral BID AC    rifaximin  550 mg Oral BID    midodrine  15 mg Oral TID WC    sodium chloride flush  5-40 mL IntraVENous 2 times per day    carvedilol  6.25 mg Oral BID WC    [Held by provider] losartan  25 mg Oral Daily    PARoxetine  30 mg Oral QAM    aspirin  325 mg Oral Daily    albumin human  25 g IntraVENous Q6H     Meds prn: sodium chloride flush, sodium chloride, sodium chloride flush, sodium chloride, acetaminophen, ondansetron, melatonin     Allergies/Intolerances: ALLERGIES: Ace inhibitors    24HR INTAKE/OUTPUT:      Intake/Output Summary (Last 24 hours) at 3/26/2022 1617  Last data filed at 3/26/2022 1047  Gross per 24 hour   Intake 497.59 ml   Output 700 ml   Net -202.41 ml     I/O last 3 completed shifts: In: 1409.6 [P.O.:100; I.V.:856.2; IV Piggyback:453.4]  Out: 700 [Urine:700]  I/O this shift:  In: 161.8 [I.V.:161.8]  Out: -   Admission weight: 190 lb (86.2 kg)  Wt Readings from Last 3 Encounters:   03/26/22 193 lb 12.6 oz (87.9 kg)   03/15/22 178 lb 11.2 oz (81.1 kg)   02/09/22 166 lb (75.3 kg)     Body mass index is 33.26 kg/m².     Physical Examination:  VITALS:   Vitals:    03/26/22 1529 03/26/22 1530 03/26/22 1531 03/26/22 1532   BP:  (!) 95/41     Pulse: 73 73 75 74   Resp: 18 16 17 17   Temp:       TempSrc:       SpO2: 93% 93% 94% 94%   Weight:       Height:         Weight:   Wt Readings from Last 3 Encounters:   03/26/22 193 lb 12.6 oz (87.9 kg)   03/15/22 178 lb 11.2 oz (81.1 kg)   02/09/22 166 lb (75.3 kg)     Constitutional and General Appearance: Ill-appearing, awake but not very oriented, no acute distress  Eyes: no icteric sclera in left eye or right eye,  no pallor conjunctiva in left or right eye, no discharge seen from left eye or right eye  Ears and Nose: normal external appearance of left and right ear. Oral: moist oral mucus membranes  Neck: No jugular venous distention  Lungs: Air entry diminished, no use of accessory muscles or labored breathing  Heart: regular rate, S1, S2  Extremities: Bilateral 2+ LE edema, no tenderness  GI: Distended without any tenderness  Skin: Warm to touch  Musculo: No joint deformities noted  Neuro: no slurred speech, no facial drooping  Psychiatric: Normal mood and affect    Lab Data  CBC:   Recent Labs     03/24/22 2316 03/26/22  0406   WBC 4.7* 6.0   HGB 10.4* 10.5*   HCT 33.7* 33.6*    234     BMP:  Recent Labs     03/24/22 2316 03/25/22  0446 03/26/22  0406 03/26/22  0801     --  136 136   K 4.1  --  4.0 3.9   CL 99  --  101 101   CO2 22*  --  21* 20*   BUN 65*  --  68* 65*   CREATININE 2.4*  --  2.7* 2.7*   GLUCOSE 103  --  116* 99   CALCIUM 8.0*  --  7.9* 8.1*   MG  --  1.8 1.7  --      Hepatic:   Recent Labs     03/24/22 2316 03/26/22  0406   LABALBU 2.9* 3.1*   AST 23 13   ALT <5* <5*   BILITOT 0.5 0.6   ALKPHOS 87 64     Additional Labs: Urine sodium less than 20  UA benign, no microscopic hematuria or proteinuria  Diagnostics: Chest x-ray shows bilateral pleural effusions, consolidation    Old tests reviewed. Echo: EF 55%, severe pulmonary hypertension  Labs: Baseline serum creatinine 1.1-1.4      Impression and Plan:  1. ISIDRA on CKD 3. ISIDRA appears to be mostly secondary to ATN in setting of recent profound hypotension and decompensated liver cirrhosis. Blood pressure was in low 70s to 80s yesterday. Patient also has underlying hepatic dysfunction and has had paracentesis. Currently on IV fluids. Off pressors at this time. Continue with IV fluids for now. Monitor renal function.   No hydronephrosis. Agree with holding losartan. On IV albumin  2. Electrolytes overall stable  3. Acid-base status. Appears to be mixed metabolic acidosis and respiratory alkalosis given underlying liver disease. 4. Decompensated liver cirrhosis with ascites. Status post paracentesis. IV albumin already ordered  5. Atrial fibrillation  6. Severe pulmonary hypertension  7. Hypotension. Continue midodrine  8. Renal cysts  9. Medications reviewed    Thank you for the consult. Please feel free to call me if you have any questions.      Presley Chakraborty MD  [facility]

## 2022-03-26 NOTE — FLOWSHEET NOTE
03/26/22 0934   Encounter Summary   Services provided to: Patient   Referral/Consult From: Rounding   Continue Visiting Yes  (3/26 NR)   Complexity of Encounter Low   Length of Encounter 15 minutes   Routine   Type Initial   Assessment Unable to respond   Intervention Prayer   In my encounter with the 82yr old patient, I attempted to see the patient, but they were unresponsive at this time. No family was present in the room. I offered a prayer at the pts side. A  will attempt to see the patient at a later time as a follow up. The pt was admitted due to acute kidney injury.

## 2022-03-26 NOTE — PLAN OF CARE
Problem: Physical Regulation:  Goal: Prevent transmision of infection  Description: Prevent transmision of infection  Outcome: Met This Shift  Note: No signs of infection at present. Peripheral IV lines, no trevino     Problem: Falls - Risk of:  Goal: Will remain free from falls  Description: Will remain free from falls  Outcome: Met This Shift  Note: Remained in bed after melatonin given. Oriented. Utilizes call light. Goal: Absence of physical injury  Description: Absence of physical injury  Outcome: Met This Shift     Problem: Pain:  Description: Pain management should include both nonpharmacologic and pharmacologic interventions. Goal: Pain level will decrease  Description: Pain level will decrease  Outcome: Met This Shift  Note: Able to verbalize pain using pain scale. Denies pain at present. Pain goal discussed. Goal: Control of acute pain  Description: Control of acute pain  Outcome: Met This Shift   careplan reviewed with patient.   Verbalize understanding plan of care and contribute to goal setting

## 2022-03-26 NOTE — PLAN OF CARE
Problem: Physical Regulation:  Goal: Prevent transmision of infection  Description: Prevent transmision of infection  3/26/2022 1521 by Courtney Dailey RN  Outcome: Ongoing  3/26/2022 0226 by Jose Jones RN  Outcome: Met This Shift  Note: No signs of infection at present. Peripheral IV lines, no trevino     Problem: Skin Integrity:  Goal: Risk for impaired skin integrity will decrease  Description: Risk for impaired skin integrity will decrease  3/26/2022 1521 by Courtney Dailey RN  Outcome: Ongoing  3/26/2022 0226 by Jose Jones RN  Note: Able to turn self, utilizes call light appropriately. Oriented   Goal: Will show no infection signs and symptoms  Description: Will show no infection signs and symptoms  Outcome: Ongoing  Goal: Absence of new skin breakdown  Description: Absence of new skin breakdown  Outcome: Ongoing     Problem: Falls - Risk of:  Goal: Will remain free from falls  Description: Will remain free from falls  3/26/2022 1521 by Courtney Dailey RN  Outcome: Ongoing  3/26/2022 0226 by Jose Jones RN  Outcome: Met This Shift  Note: Remained in bed after melatonin given. Oriented. Utilizes call light. Goal: Absence of physical injury  Description: Absence of physical injury  3/26/2022 1521 by Courtney Dailey RN  Outcome: Ongoing  3/26/2022 0226 by Jose Jones RN  Outcome: Met This Shift     Problem: Pain:  Goal: Pain level will decrease  Description: Pain level will decrease  3/26/2022 1521 by Courtney Dailey RN  Outcome: Met This Shift  3/26/2022 0226 by Jose Jones RN  Outcome: Met This Shift  Note: Able to verbalize pain using pain scale. Denies pain at present. Pain goal discussed. Goal: Control of acute pain  Description: Control of acute pain  3/26/2022 1521 by Courtney Dailey RN  Outcome: Met This Shift  3/26/2022 0226 by Jose Jones RN  Outcome: Met This Shift  Goal: Control of chronic pain  Description: Control of chronic pain  Outcome:  Met This Shift     Problem: Discharge Planning:  Goal: Discharged to appropriate level of care  Description: Discharged to appropriate level of care  3/26/2022 1521 by Gena Seymour RN  Outcome: Ongoing  3/26/2022 0226 by Matthew Arias RN  Outcome: Ongoing  Note: Able to participate in care planning. Unsure if safe to go home alone at discharge, team evaluating.

## 2022-03-26 NOTE — PROGRESS NOTES
Gi Progress Note  Subjective:  CC:  Cirrhosis  Patient reports:  No c/o  Nursing reports: Stable    Diet:  ADULT DIET; Regular;  Low Sodium (2 gm); 1200 ml      Medications:  Scheduled Meds:   lidocaine 1 % injection  5 mL IntraDERmal Once    sodium chloride flush  5-40 mL IntraVENous 2 times per day    pantoprazole  40 mg Oral BID AC    lactulose  10 g Oral BID    sodium chloride flush  5-40 mL IntraVENous 2 times per day    carvedilol  6.25 mg Oral BID WC    [Held by provider] losartan  25 mg Oral Daily    midodrine  10 mg Oral TID WC    PARoxetine  30 mg Oral QAM    aspirin  325 mg Oral Daily    albumin human  25 g IntraVENous Q6H     Continuous Infusions:   sodium chloride      sodium chloride      sodium chloride 50 mL/hr at 03/26/22 1047    DOPamine 7.5 mcg/kg/min (03/25/22 1240)    norepinephrine Stopped (03/26/22 1105)     PRN Meds:sodium chloride flush, sodium chloride, sodium chloride flush, sodium chloride, acetaminophen, ondansetron, melatonin    Objective:    Lab Results   Component Value Date    WBC 6.0 03/26/2022    RBC 3.53 03/26/2022    RBC 2.95 08/22/2011    HGB 10.5 03/26/2022    HCT 33.6 03/26/2022    MCV 95.2 03/26/2022    MCH 29.7 03/26/2022    MCHC 31.3 03/26/2022    RDW 16.8 12/05/2018     03/26/2022    MPV 9.3 03/26/2022     Lab Results   Component Value Date     03/26/2022    K 3.9 03/26/2022    K 4.0 03/26/2022     03/26/2022    CO2 20 03/26/2022    BUN 65 03/26/2022    CREATININE 2.7 03/26/2022    GLUCOSE 99 03/26/2022    GLUCOSE 93 08/22/2011    CALCIUM 8.1 03/26/2022     Lab Results   Component Value Date    CALCIUM 8.1 03/26/2022     No results found for: IONCA  Lab Results   Component Value Date    MG 1.7 03/26/2022     Lab Results   Component Value Date    PHOS 4.4 03/26/2022     No results found for: BNP  Lab Results   Component Value Date    ALKPHOS 64 03/26/2022    ALT <5 03/26/2022    AST 13 03/26/2022    PROT 5.3 03/26/2022    BILITOT 0.6 03/26/2022    BILIDIR 0.3 03/24/2022    LABALBU 3.1 03/26/2022    LABALBU 3.2 08/20/2011     Lab Results   Component Value Date    LACTA 1.9 03/11/2022     Lab Results   Component Value Date    AMYLASE 67 07/19/2019     Lab Results   Component Value Date    LIPASE 19.3 03/11/2022     Lab Results   Component Value Date    CHOL 97 02/09/2022    TRIG 62 02/09/2022    HDL 53 02/09/2022    LDLCALC 32 02/09/2022     No results for input(s): POCGLU in the last 72 hours. Recent Labs     03/25/22  0446   CKTOTAL 29*     Lab Results   Component Value Date    LABA1C 5.3 06/21/2018     Lab Results   Component Value Date    INR 1.25 03/26/2022    PROTIME 1.75 08/21/2011     No results found for: PHART, PO2ART, AWP6RFC, XNH1AGQ, BEART      Physical Exam:    Vitals: BP (!) 100/51   Pulse 80   Temp 98.4 °F (36.9 °C) (Oral)   Resp 21   Ht 5' 4\" (1.626 m)   Wt 193 lb 12.6 oz (87.9 kg)   SpO2 99%   BMI 33.26 kg/m²   24 hour intake/output:    Intake/Output Summary (Last 24 hours) at 3/26/2022 1221  Last data filed at 3/26/2022 1047  Gross per 24 hour   Intake 497.59 ml   Output 700 ml   Net -202.41 ml     Last 3 weights:   Wt Readings from Last 3 Encounters:   03/26/22 193 lb 12.6 oz (87.9 kg)   03/15/22 178 lb 11.2 oz (81.1 kg)   02/09/22 166 lb (75.3 kg)         General appearance - alert,  and in no distress  Chest - clear to auscultation, no wheezes, rales or rhonchi, symmetric air entry  Heart - normal rate, regular rhythm, normal S1, S2, s4 systolic murmur  Abdomen - soft, nontender, moderately distended, no masses or organomegaly  Neurological - alert, oriented, normal speech, no focal findings or movement disorder noted  Assessment:  Patient Active Problem List   Diagnosis    Chronic atrial fibrillation (HCC)    Ascending cholangitis, possible    Elevated LFTs    Thrombocytopenia (HCC)    Leukopenia    [facility]IDRA [facility]) [facility] [address]    SIRS (systemic inflammatory response syndrome) (HCC)    Cholecystitis, acute  Neutropenia (HCC)    Aortic stenosis, severe    Colitis    Nausea    Diarrhea    Essential hypertension    Anemia, normocytic normochromic    Hypocalcemia    Cirrhosis (HCC)    S/P AVR (aortic valve replacement)    Obesity (BMI 30-39. 9)    History of atrial fibrillation    Hypomagnesemia    Asymptomatic bacteriuria - no clinical indication for antimicrobial therapy    CKD (chronic kidney disease) stage 3, GFR 30-59 ml/min (HCC)    Acute renal failure superimposed on stage 3 chronic kidney disease (HCC)    VRE (vancomycin resistant enterococcus) culture positive    CHF (congestive heart failure), NYHA class I, acute on chronic, combined (HCC)    Shortness of breath    Acute kidney injury ([facility] [address]    Hypotension    Dehydration     A:  1. Cirrhosis  2. Fall at home  3. CHF  4. Cardiomegaly  5. Afib  6. Ascites    Plan:  1. Cirrhosis with ascites. Will continue to monitor  2. CHF  3. Cardiomyopathy  4. Ascites:   Continue to do paracentesis as needed. 2g sodium diet  5. Ammonia level mildly elevated. Will start low dose Lactulose. Consider Xifaxan. 6.  CKD  Total time 50 minutes.   (D/w patient and family)    Marian Felix MD  [facility]

## 2022-03-26 NOTE — PROGRESS NOTES
Patient:  Lelia Clark    Unit/Bed:4D-06/006-A  MRN: [id number]   PCP: ARTHUR Hawkins CNP  Date of Admission: 3/24/2022    Assessment and Plan(All pulmonary edema, renal failure, PE, and respiratory failure diagnoses are acute in nature unless otherwise specified):        1. Hypotension:  Multifactorial.  Per chart review appears to be chronic. Received paracentesis 2.5 L removed 3/25. Does not appear to be responsive to fluids or albumin that were given previously. Is on midodrine 5mg 3 times daily at home. This was increased to 10mg 3 times daily. Will initiate Levophed as well as albumin 25 g every 6 hours x8 doses total.  Titrate to a MAP goal of >60 since patient is asymptomatic. Will obtain PiCC line over concern that patient may require pressors for prolonged duration. 2. Decompensated liver cirrhosis with ascites: Status post paracentesis on 3/25 with 2.5 L removed. Previous history of cardiogenic ascites with admission from 3/11/2022 to 3/17/2022 and was treated for CHF exacerbation at that time. GI following. PMN <250; no indication for SBP ppx. Due to hypotension will hold off on Bblocker. No s/s of AMS so will hold off on HE treatment. 3. ISIDRA on CKDIII: Creatinine on arrival of 2.4. Baseline appears to be around 1.3. Patient received IVF. Creatinine 2.7/eGFR 17; worsening. Will check renal US and electrolytes, making 0.3 cc/kg/hr past 24 hours; will follow and consider nephrology consult if needed. 4. Acute hypoxic respiratory failure: Currently requiring 3 L/min via nasal cannula with no home O2 requirement. This is likely secondary to increased IVF. Denies SOB currently continue to monitor and wean FiO2 to keep SPO2 greater than 90%. 5. Acute on chronic HFpEF: Echo on 3/12/2022 shows LV size and systolic function and WNL. EF estimated at 55%. No regional left ventricular wall motion abnormalities and wall thickness within normal limits. Elevated RVSP. Pulmonary artery systolic pressure calculated at 65-70. Home diuretics on hold secondary to #1. Strict I's and O's. Daily weights. 6. Persistent atrial fibrillation, Hx of: Rate control with carvedilol. Remains in A.fib. SCDs for DVT prophylaxis. No DVT ppx per GI due to high risk of bleeding given cirrhosis history. 7. Severe pulmonary hypertension, Hx of: Pulmonary artery systolic pressure was calculated 65-70 Echo 3/12/2022. Per chart review will be following up with [facility] in April. 8. CAD, Hx of: Continue ASA. CC:  Hypotension  Chief Complaint   Patient presents with    Extremity Weakness    Fall       HPI:   Per Chart Review:    The patient is a 81 y.o. female  with PMH cirrhosis, CHF, atrial fibrillation, coronary artery disease, and hypertension who presents with complaints of weakness and lightheadedness.  Patient was recently discharged from [address] [place] [address] approximately a week ago following an admission related to decompensated cirrhosis with ascites and hypoxic respiratory failure.  She underwent large-volume paracentesis and was to have a follow-up with GI.  Patient reports over the last several days that she has begun to have persistent diarrhea, with some nausea and vomiting. She reports a fall on Monday evening where she laid on the floor for 6 hours.  Decreased oral intake, including not taking her midodrine regularly. Due to her worsening fatigue, weakness, and lightheadedness her son encouraged her to come back to the ER.  In the ER patient was hypotensive requiring multiple fluid boluses and albumin administration\". On 3/25/2022 patient received diagnostic/therapeutic thoracentesis in which 2.5 L were removed in IR. Patient noted to have consistently low blood pressures before and after with maps fluctuating between 50-70. Patient on midodrine however minimal improvement. Did not respond to fluid boluses or albumin at that time.   She was requiring 2 L/min on nasal cannula with no previous home O2 requirements. Initially she was tried on low-dose dopamine infusion however there was minimal to no response in her blood pressure. ICU was contacted for further evaluation and management.     For further information please see assessment and plan    ROS:   Patient denies any headache fever chills cough chest pain shortness of breath abdominal pain nausea vomiting diarrhea constipation leg swelling. Denies any lightheadedness or dizziness. PMH:  Per HPI  SHX:    Per Chart Review:  Social History     Tobacco History     Smoking Status  Never Smoker    Smokeless Tobacco Use  Never Used          Alcohol History     Alcohol Use Status  No          Drug Use     Drug Use Status  No          Sexual Activity     Sexually Active  Not Currently                FHX:   Per Chart Review:  Family History   Problem Relation Age of Onset    Diabetes Father     Stroke Father     High Blood Pressure Mother     Cancer Sister         blood    Diabetes Brother     Cancer Brother         lymphoma    High Blood Pressure Maternal Grandmother     High Blood Pressure Maternal Grandfather     Diabetes Paternal Grandmother     Diabetes Paternal Grandfather     Diabetes Brother     Heart Disease Neg Hx        Allergies:   Per Chart Review:   Allergies   Allergen Reactions    Ace Inhibitors Other (See Comments)       Medications:     sodium chloride      sodium chloride      sodium chloride Stopped (03/25/22 1050)    DOPamine 7.5 mcg/kg/min (03/25/22 1240)    norepinephrine 5 mcg/min (03/26/22 0600)      lidocaine 1 % injection  5 mL IntraDERmal Once    sodium chloride flush  5-40 mL IntraVENous 2 times per day    sodium chloride flush  5-40 mL IntraVENous 2 times per day    carvedilol  6.25 mg Oral BID WC    [Held by provider] losartan  25 mg Oral Daily    midodrine  10 mg Oral TID WC    PARoxetine  30 mg Oral QAM    pantoprazole  40 mg Oral QAM AC    aspirin  325 mg Oral Daily    albumin human  25 g IntraVENous Q6H       Vital Signs:   Vitals:    03/26/22 0400 03/26/22 0500 03/26/22 0530 03/26/22 0600   BP: (!) 112/59 115/61 (!) 94/54 (!) 100/54   Pulse: 90 90 78 77   Resp: 17 17 20 26   Temp:       TempSrc:       SpO2: 97%   97%   Weight:       Height:           T: 98.4   P: 77   RR: 15   B/P: 90/50  FiO2: 3L NC  O2 Sat:96    I/O: KSKUXV:4300 / Output:700 =  Total+520   I/O last 3 completed shifts: In: 1409.6 [P.O.:100; I.V.:856.2; IV Piggyback:453.4]  Out: 700 [Urine:700]  No intake/output data recorded. GCS: 15    Body mass index is 33.26 kg/m². [employer] 3L NC    Physical Exam:     General:   Chronically ill appearing obese white female  HEENT:  normocephalic and atraumatic. No scleral icterus. PERR  Neck: supple. No Thyromegaly. Lungs: clear to auscultation. No retractions; on NC  Cardiac: RRR. No JVD. Abdomen: soft. Nontender. Large abdomen  Extremities:  No clubbing, cyanosis. Mild b/l PE to LE   Vasculature: capillary refill < 3 seconds. Palpable dorsalis pedis pulses. Skin:  warm and dry. Scattered ecchymosis  Psych:  Alert and oriented x3. Affect appropriate  Lymph:  No supraclavicular adenopathy. Neurologic:  No focal deficit. No seizures. Data: (All radiographs, tracings, PFTs, and imaging are personally viewed and interpreted unless otherwise noted).  Patient appears to be in atrial fibrillation without RVR on telemetry   Sodium 136 potassium 4.0 chloride 101 CO2 21 BUN 68 creatinine 2.7   ALT less than 5 AST 13 bilirubin 0.6   WBC 6.0 hemoglobin 10.5 platelets 984   INR 1.25   UA shows 5-10 epithelial cells and trace leukocyte esterase few bacteria, likely contaminant.  Chest x-ray from 3/26/2022 showed slightly worsened vascular congestion and bilateral pleural effusions.     Electronically signed by Silvina Karimi MD, MPH              Electronically signed by Felicia Carey MD on 3/26/2022 at 7:01 AM             [facility] performed an [facility] . I have reviewed the above documentation completed by the resident physician  Italicized font, if present, represents changes to the note made by me. Time spent with patient 20minutes. Time could have been discontiguous. Time does not include procedures. Time does include my direct assessment of the patient and coordination of care.   Time represents more than 50% of the time involved with patient care by the [address] [place] [address] team.      Off pressors this am, monitor and anticipate transfer to floor    Electronically signed by Karla Kyle MD on 3/26/2022 at 11:14 AM

## 2022-03-27 LAB
ANION GAP SERPL CALCULATED.3IONS-SCNC: 16 MEQ/L (ref 8–16)
BUN BLDV-MCNC: 67 MG/DL (ref 7–22)
CALCIUM SERPL-MCNC: 8.3 MG/DL (ref 8.5–10.5)
CHLORIDE BLD-SCNC: 103 MEQ/L (ref 98–111)
CO2: 20 MEQ/L (ref 23–33)
CREAT SERPL-MCNC: 2.9 MG/DL (ref 0.4–1.2)
ERYTHROCYTE [DISTWIDTH] IN BLOOD BY AUTOMATED COUNT: 18 % (ref 11.5–14.5)
ERYTHROCYTE [DISTWIDTH] IN BLOOD BY AUTOMATED COUNT: 64.7 FL (ref 35–45)
FERRITIN: 117 NG/ML (ref 10–291)
GFR SERPL CREATININE-BSD FRML MDRD: 16 ML/MIN/1.73M2
GLUCOSE BLD-MCNC: 93 MG/DL (ref 70–108)
HCT VFR BLD CALC: 33.6 % (ref 37–47)
HEMOGLOBIN: 10 GM/DL (ref 12–16)
IRON SATURATION: 19 % (ref 20–50)
IRON: 25 UG/DL (ref 50–170)
MCH RBC QN AUTO: 29.1 PG (ref 26–33)
MCHC RBC AUTO-ENTMCNC: 29.8 GM/DL (ref 32.2–35.5)
MCV RBC AUTO: 97.7 FL (ref 81–99)
PLATELET # BLD: 189 THOU/MM3 (ref 130–400)
PMV BLD AUTO: 10 FL (ref 9.4–12.4)
POTASSIUM SERPL-SCNC: 4.2 MEQ/L (ref 3.5–5.2)
PROCALCITONIN: 0.17 NG/ML (ref 0.01–0.09)
RBC # BLD: 3.44 MILL/MM3 (ref 4.2–5.4)
SODIUM BLD-SCNC: 139 MEQ/L (ref 135–145)
T4 FREE: 0.83 NG/DL (ref 0.93–1.76)
TOTAL IRON BINDING CAPACITY: 132 UG/DL (ref 171–450)
TSH SERPL DL<=0.05 MIU/L-ACNC: 5.31 UIU/ML (ref 0.4–4.2)
WBC # BLD: 4.7 THOU/MM3 (ref 4.8–10.8)

## 2022-03-27 PROCEDURE — P9047 ALBUMIN (HUMAN), 25%, 50ML: HCPCS | Performed by: STUDENT IN AN ORGANIZED HEALTH CARE EDUCATION/TRAINING PROGRAM

## 2022-03-27 PROCEDURE — 82728 ASSAY OF FERRITIN: CPT

## 2022-03-27 PROCEDURE — 83540 ASSAY OF IRON: CPT

## 2022-03-27 PROCEDURE — 6370000000 HC RX 637 (ALT 250 FOR IP): Performed by: STUDENT IN AN ORGANIZED HEALTH CARE EDUCATION/TRAINING PROGRAM

## 2022-03-27 PROCEDURE — 2580000003 HC RX 258: Performed by: INTERNAL MEDICINE

## 2022-03-27 PROCEDURE — 99233 SBSQ HOSP IP/OBS HIGH 50: CPT | Performed by: INTERNAL MEDICINE

## 2022-03-27 PROCEDURE — 2060000000 HC ICU INTERMEDIATE R&B

## 2022-03-27 PROCEDURE — 83550 IRON BINDING TEST: CPT

## 2022-03-27 PROCEDURE — 84145 PROCALCITONIN (PCT): CPT

## 2022-03-27 PROCEDURE — 99232 SBSQ HOSP IP/OBS MODERATE 35: CPT | Performed by: INTERNAL MEDICINE

## 2022-03-27 PROCEDURE — 36415 COLL VENOUS BLD VENIPUNCTURE: CPT

## 2022-03-27 PROCEDURE — 85027 COMPLETE CBC AUTOMATED: CPT

## 2022-03-27 PROCEDURE — 84439 ASSAY OF FREE THYROXINE: CPT

## 2022-03-27 PROCEDURE — 6370000000 HC RX 637 (ALT 250 FOR IP)

## 2022-03-27 PROCEDURE — 6360000002 HC RX W HCPCS: Performed by: STUDENT IN AN ORGANIZED HEALTH CARE EDUCATION/TRAINING PROGRAM

## 2022-03-27 PROCEDURE — 2580000003 HC RX 258: Performed by: STUDENT IN AN ORGANIZED HEALTH CARE EDUCATION/TRAINING PROGRAM

## 2022-03-27 PROCEDURE — 6370000000 HC RX 637 (ALT 250 FOR IP): Performed by: INTERNAL MEDICINE

## 2022-03-27 PROCEDURE — 84443 ASSAY THYROID STIM HORMONE: CPT

## 2022-03-27 PROCEDURE — 80048 BASIC METABOLIC PNL TOTAL CA: CPT

## 2022-03-27 PROCEDURE — 6370000000 HC RX 637 (ALT 250 FOR IP): Performed by: NURSE PRACTITIONER

## 2022-03-27 RX ORDER — SODIUM CHLORIDE 9 MG/ML
INJECTION, SOLUTION INTRAVENOUS CONTINUOUS
Status: DISCONTINUED | OUTPATIENT
Start: 2022-03-27 | End: 2022-03-28

## 2022-03-27 RX ORDER — MIDODRINE HYDROCHLORIDE 10 MG/1
20 TABLET ORAL
Status: DISCONTINUED | OUTPATIENT
Start: 2022-03-27 | End: 2022-03-28

## 2022-03-27 RX ADMIN — PANTOPRAZOLE SODIUM 40 MG: 40 TABLET, DELAYED RELEASE ORAL at 06:46

## 2022-03-27 RX ADMIN — SODIUM CHLORIDE, PRESERVATIVE FREE 10 ML: 5 INJECTION INTRAVENOUS at 09:09

## 2022-03-27 RX ADMIN — SODIUM CHLORIDE: 9 INJECTION, SOLUTION INTRAVENOUS at 15:12

## 2022-03-27 RX ADMIN — PAROXETINE 30 MG: 30 TABLET, FILM COATED ORAL at 09:09

## 2022-03-27 RX ADMIN — ALBUMIN (HUMAN) 25 G: 0.25 INJECTION, SOLUTION INTRAVENOUS at 09:11

## 2022-03-27 RX ADMIN — RIFAXIMIN 550 MG: 550 TABLET ORAL at 21:30

## 2022-03-27 RX ADMIN — ALBUMIN (HUMAN) 25 G: 0.25 INJECTION, SOLUTION INTRAVENOUS at 03:39

## 2022-03-27 RX ADMIN — PANTOPRAZOLE SODIUM 40 MG: 40 TABLET, DELAYED RELEASE ORAL at 17:22

## 2022-03-27 RX ADMIN — MIDODRINE HYDROCHLORIDE 15 MG: 10 TABLET ORAL at 11:44

## 2022-03-27 RX ADMIN — MIDODRINE HYDROCHLORIDE 15 MG: 10 TABLET ORAL at 09:09

## 2022-03-27 RX ADMIN — RIFAXIMIN 550 MG: 550 TABLET ORAL at 09:09

## 2022-03-27 RX ADMIN — MIDODRINE HYDROCHLORIDE 20 MG: 10 TABLET ORAL at 17:22

## 2022-03-27 ASSESSMENT — PAIN SCALES - GENERAL
PAINLEVEL_OUTOF10: 0

## 2022-03-27 NOTE — PROGRESS NOTES
[facility]   Nephrology progress note  3/27/2022, 1:32 PM      Pt Name:    Jose Wolf  MRN:     [id number]     [place]:    1940  Admit Date:    3/24/2022 10:46 PM  Primary Care Physician:  ARTHUR Birmingham CNP   Room number  4K-03/003-A    Chief Complaint: Nephrology following for ISIDRA/CKD    Subjective:  Patient seen and examined  Seen earlier today during rounds  Was transferred out of ICU last night  Sitting at her bedside chair  This is late entry  Clinically appearing improved  No chest pain  No shortness of breath      Objective:  24HR INTAKE/OUTPUT:    Intake/Output Summary (Last 24 hours) at 3/27/2022 1332  Last data filed at 3/27/2022 0315  Gross per 24 hour   Intake 706.68 ml   Output 0 ml   Net 706.68 ml     I/O last 3 completed shifts: In: 1204.3 [P.O.:255; I.V.:380.1; IV Piggyback:569.2]  Out: 700 [Urine:700]  No intake/output data recorded. Admission weight: 190 lb (86.2 kg)  Wt Readings from Last 3 Encounters:   03/27/22 192 lb 4.8 oz (87.2 kg)   03/15/22 178 lb 11.2 oz (81.1 kg)   02/09/22 166 lb (75.3 kg)     Body mass index is 33.01 kg/m².     Physical examination  VITALS:     Vitals:    03/26/22 2329 03/27/22 0315 03/27/22 0900 03/27/22 1130   BP: (!) 108/57 (!) 100/59 114/63 99/69   Pulse: 74 72 82 81   Resp: 20 20 16 18   Temp: 97.8 °F (36.6 °C) 97.8 °F (36.6 °C) 97.7 °F (36.5 °C) 97.5 °F (36.4 °C)   TempSrc: Oral Oral Oral Oral   SpO2: 97% 94% 94% 99%   Weight:  192 lb 4.8 oz (87.2 kg)     Height:         General Appearance: appears comfortable, no distress  Neck: No JVD  Lungs:no use of accessory muscles  GI: soft, non-tender, no guarding  Extremities: + LE edema      Lab Data  CBC:   Recent Labs     03/24/22  2316 03/26/22  0406 03/27/22  0418   WBC 4.7* 6.0 4.7*   HGB 10.4* 10.5* 10.0*   HCT 33.7* 33.6* 33.6*    234 189     BMP:  Recent Labs     03/24/22  2316 03/25/22  0446 03/26/22  0406 03/26/22  0801 03/27/22  0418   NA   < >  -- 136 136 139   K  --   --  4.0 3.9 4.2   CL   < >  --  101 101 103   CO2   < >  --  21* 20* 20*   BUN   < >  --  68* 65* 67*   CREATININE   < >  --  2.7* 2.7* 2.9*   GLUCOSE   < >  --  116* 99 93   CALCIUM   < >  --  7.9* 8.1* 8.3*   MG  --  1.8 1.7  --   --    PHOS  --  3.9 4.4  --   --     < > = values in this interval not displayed. Hepatic:   Recent Labs     03/24/22  2316 03/26/22  0406   LABALBU 2.9* 3.1*   AST 23 13   ALT <5* <5*   BILITOT 0.5 0.6   ALKPHOS 87 64         Meds:  Infusion:    sodium chloride      sodium chloride       Meds:    lidocaine 1 % injection  5 mL IntraDERmal Once    sodium chloride flush  5-40 mL IntraVENous 2 times per day    pantoprazole  40 mg Oral BID AC    rifaximin  550 mg Oral BID    midodrine  15 mg Oral TID WC    sodium chloride flush  5-40 mL IntraVENous 2 times per day    PARoxetine  30 mg Oral QAM     Meds prn: sodium chloride flush, sodium chloride, sodium chloride flush, sodium chloride, acetaminophen, ondansetron, melatonin       Impression and Plan:  1. ISIDRA on CKD 3. ISIDRA appears to be ATN in setting of recent hypotension and decompensated liver cirrhosis  Blood pressure was in systolic 23F to 14B recently  Serum creatinine slightly higher today but rate of rise has slowed in last 24 hours  Monitor for now  Blood pressure borderline low  Continue with midodrine  Avoid hypotension  Urine electrolytes reviewed  Trial of low-dose IV fluids    2.  Acid-base status. Mixed metabolic acidosis and respiratory alkalosis  3. Decompensated liver cirrhosis with ascites status post paracentesis  4. Hypotension. Continue with midodrine  5. Renal cysts  6. Atrial fibrillation    D/W patient and RN  Dr. Devorah Grady assumes care in [facility].     Phi Drake MD  [facility]

## 2022-03-27 NOTE — PROGRESS NOTES
Gi Progress Note  Subjective:  CC:  cirrhosis  Patient reports:  Feeling better,  No abdominal pain    Diet:  ADULT DIET; Regular;  Low Sodium (2 gm); 1200 ml      Medications:  Scheduled Meds:   lidocaine 1 % injection  5 mL IntraDERmal Once    sodium chloride flush  5-40 mL IntraVENous 2 times per day    pantoprazole  40 mg Oral BID AC    rifaximin  550 mg Oral BID    midodrine  15 mg Oral TID WC    sodium chloride flush  5-40 mL IntraVENous 2 times per day    PARoxetine  30 mg Oral QAM     Continuous Infusions:   sodium chloride      sodium chloride       PRN Meds:sodium chloride flush, sodium chloride, sodium chloride flush, sodium chloride, acetaminophen, ondansetron, melatonin    Objective:    Lab Results   Component Value Date    WBC 4.7 03/27/2022    RBC 3.44 03/27/2022    RBC 2.95 08/22/2011    HGB 10.0 03/27/2022    HCT 33.6 03/27/2022    MCV 97.7 03/27/2022    MCH 29.1 03/27/2022    MCHC 29.8 03/27/2022    RDW 16.8 12/05/2018     03/27/2022    MPV 10.0 03/27/2022     Lab Results   Component Value Date     03/27/2022    K 4.2 03/27/2022    K 3.9 03/26/2022     03/27/2022    CO2 20 03/27/2022    BUN 67 03/27/2022    CREATININE 2.9 03/27/2022    GLUCOSE 93 03/27/2022    GLUCOSE 93 08/22/2011    CALCIUM 8.3 03/27/2022     Lab Results   Component Value Date    CALCIUM 8.3 03/27/2022     No results found for: IONCA  Lab Results   Component Value Date    MG 1.7 03/26/2022     Lab Results   Component Value Date    PHOS 4.4 03/26/2022     No results found for: BNP  Lab Results   Component Value Date    ALKPHOS 64 03/26/2022    ALT <5 03/26/2022    AST 13 03/26/2022    PROT 5.3 03/26/2022    BILITOT 0.6 03/26/2022    BILIDIR 0.3 03/24/2022    LABALBU 3.1 03/26/2022    LABALBU 3.2 08/20/2011     Lab Results   Component Value Date    LACTA 1.9 03/11/2022     Lab Results   Component Value Date    AMYLASE 67 07/19/2019     Lab Results   Component Value Date    LIPASE 19.3 03/11/2022     Lab Results   Component Value Date    CHOL 97 02/09/2022    TRIG 62 02/09/2022    HDL 53 02/09/2022    LDLCALC 32 02/09/2022     No results for input(s): POCGLU in the last 72 hours. Recent Labs     03/25/22  0446   CKTOTAL 29*     Lab Results   Component Value Date    LABA1C 5.3 06/21/2018     Lab Results   Component Value Date    INR 1.25 03/26/2022    PROTIME 1.75 08/21/2011     No results found for: PHART, PO2ART, JMV7PCH, OHC6CLK, BEART      Physical Exam:    Vitals: BP 99/69   Pulse 81   Temp 97.5 °F (36.4 °C) (Oral)   Resp 18   Ht 5' 4\" (1.626 m)   Wt 192 lb 4.8 oz (87.2 kg)   SpO2 99%   BMI 33.01 kg/m²   24 hour intake/output:    Intake/Output Summary (Last 24 hours) at 3/27/2022 1151  Last data filed at 3/27/2022 0315  Gross per 24 hour   Intake 706.68 ml   Output 0 ml   Net 706.68 ml     Last 3 weights: Wt Readings from Last 3 Encounters:   03/27/22 192 lb 4.8 oz (87.2 kg)   03/15/22 178 lb 11.2 oz (81.1 kg)   02/09/22 166 lb (75.3 kg)         General appearance - alert, and in no distress  Abdomen - soft, nontender, mildly distended, no masses or organomegaly  Neurological - alert, oriented, normal speech, no focal findings or movement disorder noted  Assessment:  Patient Active Problem List   Diagnosis    Chronic atrial fibrillation (HCC)    Ascending cholangitis, possible    Elevated LFTs    Thrombocytopenia (HCC)    Leukopenia    ISIDRA [facility]acute kidney injury) ([facility] [address]    SIRS (systemic inflammatory response syndrome) ([facility])    Cholecystitis, acute    Neutropenia ([facility])    Aortic stenosis, severe    Colitis    Nausea    Diarrhea    Essential hypertension    Anemia, normocytic normochromic    Hypocalcemia    Cirrhosis (HCC)    S/P AVR (aortic valve replacement)    Obesity (BMI 30-39. 9)    History of atrial fibrillation    Hypomagnesemia    Asymptomatic bacteriuria - no clinical indication for antimicrobial therapy    CKD (chronic kidney disease) stage 3, GFR 30-59 ml/min ([facility])    Acute renal failure superimposed on stage 3 chronic kidney disease (HCC)    VRE (vancomycin resistant enterococcus) culture positive    CHF (congestive heart failure), NYHA class I, acute on chronic, combined (HCC)    Shortness of breath    Acute kidney injury ([facility] [address]    Hypotension    Dehydration     A:  1. Cirrhosis  2. CHF  3. ISIDRA  4. Afib  5. Ascites  6. Anemia      Plan:  1. Cirrhosis; Stable. 2.  Encephalopathy,  Xifaxan started. (had declined lactulose). Check ammonia level in am  3. Ascites;  cotninue to monitor  4. Afib  5. ISIDRA:  Renal following  6.   Anemia; stable  Total time 25 minutes    Pancho Chanel MD  [facility]

## 2022-03-27 NOTE — FLOWSHEET NOTE
03/27/22 1404   Treatment Team Notification   Reason for Communication Review case  Meli Eldridge results)   Team Member Name Dr. Guadalupe Fleischer Attending Provider   Method of Communication Secure Message   Response No new orders   Notification Time [phone]: Perfect Serve message sent to Dr. Jim Carpenter regarding patient's Wynonia Bowser results. Patient SVI Change was 30.5%.

## 2022-03-27 NOTE — PROGRESS NOTES
Hospitalist Progress Note    Patient:  Hipolito Sanchez      Unit/Bed:4K-03/003-A    YOB: 1940    MRN: [id number]       Acct: [de-identified]     PCP: ARTHUR Genao CNP    Date of Admission: 3/24/2022    Assessment/Plan:    1. Hypotension: Chronic. Multifactorial severe pulmonary HTN -> Right heart failure -> decompensate liver cirrhosis. Paracentesis 2.5L removed on 3/25. Not response to Albumin + fluid + Midodrine 10mg BID. Required pressure support Levophed. Wean off Levophed 3/26. Plan: cont Midodrine 15mg TID. 2. Acute hypoxic respiratory failure: Improved. Required 4L NC on admission with no O2 supplement at baseline. 3/27:  CXR yesterday showed bilateral pleural effusion with increase bilateral lower ling consolidation. Currently on 1 L NC with O2 Sat 99%. Plan: wean off O2 as tolerate keep O2 sat > 90%, [place]cal    3. Severe Pulmonary HTN: Echo (3/11/22): Pulmonary artery pressure from tricuspid regurgitation 65/70. Plan: close monitor diuretic giving patient hypotension and decompensate liver, [facility] 4. Severe Tricuspid regurgitation: Echo (3/11/22). Patient is not a surgical candidate for now. Will referred to [facility] as outpatient    5. Persistent a fib: home med includes Carvedilol. Has been on sinus rhythm since 3/26/22. Plan: cont tele, hold Carvedilol due to hypotension, check TSH with reflex     6. Acute on chronic HFpEF: Echo 3/11/22 EF 60%. Hypotension on Midodrine. Plan: monitor diuretic, low sodium diet. Strict <2L fluid intake daily    7. Normocytic Anemia: anemia work up. 8. Decompensate liver cirrhosis with ascites: Stable. GI consult appreciated - Start Xifaxan, check Ammonia, patient refuse Lactulose; avoid sedative,benzo medication. 9. ISIDRA on CKDIII: BUN/Cr 65/2.4 on admission. Baseline Cr 1.2.  Nephrologist consult appreciate - ISIDRA likely 2/2 ATN from profound hypotension and decompensated liver cirrhosis, Albumin was given. Plan: hold NS, avoid nephrotoxicity. 10. Bilateral lower extremities edema and tenderness: ACE Wrap     Expected discharge date:  3/31/22    Disposition:    [x] Home       [] TCU       [] Rehab       [] Psych       [] SNF       [] [place]       [] Other-    Chief Complaint: Extremity weakness, fall    Hospital Course: Carolyn Cadena is 81years old female who presented to ED for generalize weakness on 3/24/22. Past medical history significant with pulmonary hypertension secondary to severe tricuspid regurgitation, decompensated liver cirrhosis, chronic hypotension, persistent A. fib on carvedilol with rate control, heart failure preserved ejection fraction, CKD stage III. Patient recently discharged from the hospital on March 17, 2022.  2 days prior to presentation patient reports decreased p.o. intake, nausea, dry heaves, persistent diarrhea with loose watery stool. On the day of presentation patient's son report that patient worsening fatigue and generalized weakness therefore he brought her to the ED. In the ED patient found to have blood pressure 83/50, heart rate 75, respiration rate 16, O2 sat at 95%. BMP showed Cr 2.4 which increased 1.2 from 1 months prior. CXR showed cardiomegaly possible failure, patchy opacity of right lung base. Patient was admitted and manage for decompensate liver cirrhosis and ISIDRA on CKD. Patient subsequently underwent paracentesis with 2.5L fluid removed. Patient develop hypotension which didn't response to albumin and NS. Patient required pressure support and she was transferred to ICU on 3/25/22. On 3/26, patient wean off pressure support and transferred out of ICU. Subjective (past 24 hours): No event overnight. Upon visit, patient sits on recliner. Reported feeling better than yesterday except cold intolerance. Denied any fever, chill, SOB, chest pain, abdominal pain.      ROS.   Constitutional: Negative for appetite change, chills, diaphoresis, fever and unexpected weight change. HENT: Negative for congestion, dental problem, mouth sores, nosebleed  Respiratory: Negative for choking, chest tightness, wheezing and stridor. Cardiovascular: Negative for chest pain and palpitations. Gastrointestinal: Negative for anal bleeding, diarrhea, nausea and vomiting. Genitourinary: Negative for dysuria, flank pain, hematuria and urgency. Psychiatric/Behavioral: Negative for agitation, behavioral problems, confusion, decreased concentration, dysphoric mood and hallucinations. Medications:  Reviewed    Infusion Medications    sodium chloride      sodium chloride       Scheduled Medications    lidocaine 1 % injection  5 mL IntraDERmal Once    sodium chloride flush  5-40 mL IntraVENous 2 times per day    pantoprazole  40 mg Oral BID AC    rifaximin  550 mg Oral BID    midodrine  15 mg Oral TID WC    sodium chloride flush  5-40 mL IntraVENous 2 times per day    [Held by provider] carvedilol  6.25 mg Oral BID WC    [Held by provider] losartan  25 mg Oral Daily    PARoxetine  30 mg Oral QAM    aspirin  325 mg Oral Daily    albumin human  25 g IntraVENous Q6H     PRN Meds: sodium chloride flush, sodium chloride, sodium chloride flush, sodium chloride, acetaminophen, ondansetron, melatonin      Intake/Output Summary (Last 24 hours) at 3/27/2022 0724  Last data filed at 3/27/2022 0315  Gross per 24 hour   Intake 868.5 ml   Output 0 ml   Net 868.5 ml       Diet:  ADULT DIET; Regular; Low Sodium (2 gm); 1200 ml    Exam:  BP (!) 100/59   Pulse 72   Temp 97.8 °F (36.6 °C) (Oral)   Resp 20   Ht 5' 4\" (1.626 m)   Wt 192 lb 4.8 oz (87.2 kg)   SpO2 94%   BMI 33.01 kg/m²     General appearance: No apparent distress, appears stated age and cooperative. HEENT: Pupils equal, round, and reactive to light. Conjunctivae/corneas clear. Neck: Supple, with full range of motion. No jugular venous distention.  Trachea midline. Respiratory:  Normal respiratory effort. Clear to auscultation, bilaterally without Rales/Wheezes/Rhonchi. Cardiovascular: Regular rate and rhythm, systolic murmur on apex radiate to axillary region. Abdomen: Soft, non-tender, non-distended with normal bowel sounds. Musculoskeletal: passive and active ROM x 4 extremities. Skin: Skin color, texture, turgor normal.  No rashes or lesions. Neurologic:  Neurovascularly intact without any focal sensory/motor deficits. Cranial nerves: II-XII intact, grossly non-focal.  Psychiatric: Alert and oriented, thought content appropriate, normal insight  Capillary Refill: Brisk,< 3 seconds   Peripheral Pulses: +2 palpable, equal bilaterally       Labs:   Recent Labs     03/24/22 2316 03/26/22 0406 03/27/22 0418   WBC 4.7* 6.0 4.7*   HGB 10.4* 10.5* 10.0*   HCT 33.7* 33.6* 33.6*    234 189     Recent Labs     03/24/22 2316 03/25/22 0446 03/26/22 0406 03/26/22  0801 03/27/22 0418   NA   < >  --  136 136 139   K  --   --  4.0 3.9 4.2   CL   < >  --  101 101 103   CO2   < >  --  21* 20* 20*   BUN   < >  --  68* 65* 67*   CREATININE   < >  --  2.7* 2.7* 2.9*   CALCIUM   < >  --  7.9* 8.1* 8.3*   PHOS  --  3.9 4.4  --   --     < > = values in this interval not displayed. Recent Labs     03/24/22 2316 03/26/22 0406   AST 23 13   ALT <5* <5*   BILIDIR 0.3  --    BILITOT 0.5 0.6   ALKPHOS 87 64     Recent Labs     03/24/22 2316 03/26/22 0406   INR 1.16* 1.25*     Recent Labs     03/25/22 0446   CKTOTAL 29*       Microbiology:      Urinalysis:      Lab Results   Component Value Date    NITRU NEGATIVE 03/26/2022    WBCUA 2-4 03/26/2022    BACTERIA FEW 03/26/2022    RBCUA 3-5 03/26/2022    BLOODU NEGATIVE 03/26/2022    SPECGRAV 1.017 03/11/2022    GLUCOSEU NEGATIVE 03/26/2022       Radiology:  US RENAL COMPLETE   Final Result      1. Bilateral renal cysts. No evidence of stones or hydronephrosis. 2. Moderate amount of free fluid within the abdomen.    3.

## 2022-03-28 ENCOUNTER — APPOINTMENT (OUTPATIENT)
Dept: ULTRASOUND IMAGING | Age: 82
DRG: 432 | End: 2022-03-28
Payer: MEDICARE

## 2022-03-28 LAB
ALBUMIN SERPL-MCNC: 3.7 G/DL (ref 3.5–5.1)
AMMONIA: 32 UMOL/L (ref 11–60)
ANION GAP SERPL CALCULATED.3IONS-SCNC: 14 MEQ/L (ref 8–16)
BUN BLDV-MCNC: 67 MG/DL (ref 7–22)
CALCIUM SERPL-MCNC: 8.7 MG/DL (ref 8.5–10.5)
CHLORIDE BLD-SCNC: 103 MEQ/L (ref 98–111)
CO2: 22 MEQ/L (ref 23–33)
CREAT SERPL-MCNC: 2.6 MG/DL (ref 0.4–1.2)
GFR SERPL CREATININE-BSD FRML MDRD: 18 ML/MIN/1.73M2
GLUCOSE BLD-MCNC: 113 MG/DL (ref 70–108)
POTASSIUM SERPL-SCNC: 4.2 MEQ/L (ref 3.5–5.2)
SODIUM BLD-SCNC: 139 MEQ/L (ref 135–145)

## 2022-03-28 PROCEDURE — 6370000000 HC RX 637 (ALT 250 FOR IP): Performed by: INTERNAL MEDICINE

## 2022-03-28 PROCEDURE — 82140 ASSAY OF AMMONIA: CPT

## 2022-03-28 PROCEDURE — 82040 ASSAY OF SERUM ALBUMIN: CPT

## 2022-03-28 PROCEDURE — 99232 SBSQ HOSP IP/OBS MODERATE 35: CPT | Performed by: INTERNAL MEDICINE

## 2022-03-28 PROCEDURE — 99233 SBSQ HOSP IP/OBS HIGH 50: CPT | Performed by: INTERNAL MEDICINE

## 2022-03-28 PROCEDURE — 36415 COLL VENOUS BLD VENIPUNCTURE: CPT

## 2022-03-28 PROCEDURE — 49083 ABD PARACENTESIS W/IMAGING: CPT

## 2022-03-28 PROCEDURE — P9047 ALBUMIN (HUMAN), 25%, 50ML: HCPCS | Performed by: NURSE PRACTITIONER

## 2022-03-28 PROCEDURE — 6370000000 HC RX 637 (ALT 250 FOR IP): Performed by: STUDENT IN AN ORGANIZED HEALTH CARE EDUCATION/TRAINING PROGRAM

## 2022-03-28 PROCEDURE — 80048 BASIC METABOLIC PNL TOTAL CA: CPT

## 2022-03-28 PROCEDURE — 2580000003 HC RX 258: Performed by: STUDENT IN AN ORGANIZED HEALTH CARE EDUCATION/TRAINING PROGRAM

## 2022-03-28 PROCEDURE — 6360000002 HC RX W HCPCS: Performed by: NURSE PRACTITIONER

## 2022-03-28 PROCEDURE — 6360000002 HC RX W HCPCS: Performed by: STUDENT IN AN ORGANIZED HEALTH CARE EDUCATION/TRAINING PROGRAM

## 2022-03-28 PROCEDURE — 2580000003 HC RX 258: Performed by: INTERNAL MEDICINE

## 2022-03-28 PROCEDURE — 6360000002 HC RX W HCPCS: Performed by: INTERNAL MEDICINE

## 2022-03-28 PROCEDURE — 2060000000 HC ICU INTERMEDIATE R&B

## 2022-03-28 PROCEDURE — 6370000000 HC RX 637 (ALT 250 FOR IP)

## 2022-03-28 PROCEDURE — 97166 OT EVAL MOD COMPLEX 45 MIN: CPT

## 2022-03-28 RX ORDER — LEVOTHYROXINE SODIUM 0.05 MG/1
50 TABLET ORAL
Status: DISCONTINUED | OUTPATIENT
Start: 2022-03-29 | End: 2022-04-05 | Stop reason: HOSPADM

## 2022-03-28 RX ORDER — ALBUMIN (HUMAN) 12.5 G/50ML
25 SOLUTION INTRAVENOUS ONCE
Status: COMPLETED | OUTPATIENT
Start: 2022-03-28 | End: 2022-03-28

## 2022-03-28 RX ORDER — SODIUM CHLORIDE 9 MG/ML
INJECTION, SOLUTION INTRAVENOUS CONTINUOUS
Status: DISCONTINUED | OUTPATIENT
Start: 2022-03-28 | End: 2022-03-30

## 2022-03-28 RX ADMIN — ONDANSETRON 4 MG: 2 INJECTION INTRAMUSCULAR; INTRAVENOUS at 14:18

## 2022-03-28 RX ADMIN — SODIUM CHLORIDE: 9 INJECTION, SOLUTION INTRAVENOUS at 11:10

## 2022-03-28 RX ADMIN — SODIUM CHLORIDE, PRESERVATIVE FREE 10 ML: 5 INJECTION INTRAVENOUS at 08:52

## 2022-03-28 RX ADMIN — PAROXETINE 30 MG: 30 TABLET, FILM COATED ORAL at 08:53

## 2022-03-28 RX ADMIN — MIDODRINE HYDROCHLORIDE 15 MG: 10 TABLET ORAL at 14:17

## 2022-03-28 RX ADMIN — RIFAXIMIN 550 MG: 550 TABLET ORAL at 21:41

## 2022-03-28 RX ADMIN — MIDODRINE HYDROCHLORIDE 20 MG: 10 TABLET ORAL at 08:53

## 2022-03-28 RX ADMIN — MIDODRINE HYDROCHLORIDE 15 MG: 10 TABLET ORAL at 17:32

## 2022-03-28 RX ADMIN — PANTOPRAZOLE SODIUM 40 MG: 40 TABLET, DELAYED RELEASE ORAL at 17:32

## 2022-03-28 RX ADMIN — RIFAXIMIN 550 MG: 550 TABLET ORAL at 08:53

## 2022-03-28 RX ADMIN — IRON SUCROSE 300 MG: 20 INJECTION, SOLUTION INTRAVENOUS at 12:46

## 2022-03-28 RX ADMIN — PANTOPRAZOLE SODIUM 40 MG: 40 TABLET, DELAYED RELEASE ORAL at 06:59

## 2022-03-28 RX ADMIN — ALBUMIN (HUMAN) 25 G: 0.25 INJECTION, SOLUTION INTRAVENOUS at 14:24

## 2022-03-28 RX ADMIN — ONDANSETRON 4 MG: 2 INJECTION INTRAMUSCULAR; INTRAVENOUS at 07:06

## 2022-03-28 RX ADMIN — ONDANSETRON 4 MG: 2 INJECTION INTRAMUSCULAR; INTRAVENOUS at 01:24

## 2022-03-28 RX ADMIN — SODIUM CHLORIDE, PRESERVATIVE FREE 10 ML: 5 INJECTION INTRAVENOUS at 21:40

## 2022-03-28 ASSESSMENT — PAIN SCALES - GENERAL
PAINLEVEL_OUTOF10: 0

## 2022-03-28 NOTE — PROGRESS NOTES
Yamil Aquino 61  INPATIENT OCCUPATIONAL THERAPY  [facility] ICU STEPDOWN TELEMETRY 4K  EVALUATION    Time:    Time In: 915  Time Out: 929  Timed Code Treatment Minutes: 0 Minutes  Minutes: 14          Date: 3/28/2022  Patient Name: Rowena Navarro,   Gender: female      MRN: [id number]  : 1940  (81 y.o.)  Referring Practitioner: ARTHUR Bansal CNP  Diagnosis: dehydration  Additional Pertinent Hx: Per ER note on 3/24/2022: 81 y.o. female who presents to the emergency department for evaluation of generalized weakness. Patient with a past medical history of cirrhosis, CHF, atrial fibrillation, hypertension, and coronary disease. She was recently discharged from the hospital 7 days ago. Over the last 5 days she reports that she has had decreased p.o. intake nausea and dry heaves. And persistent diarrhea. Diarrhea is loose and watery. No blood. No melena. Her son states today she had worsening fatigue and generalized weakness therefore he brought her into the ED. Restrictions/Precautions:  Restrictions/Precautions: Fall Risk    Subjective  Chart Reviewed: Yes,Orders,Progress Notes,History and Physical  Patient assessed for rehabilitation services?: Yes  Family / Caregiver Present: No    Subjective: up in recliner upon arrival of therapist. Pt reports not feeling well & required encouragement for minimal activity    Pain:  Pain Assessment  Patient Currently in Pain: No (c/o nausea)    Vitals: Vitals not assessed per clinical judgement, see nursing flowsheet    Social/Functional History:  Lives With: Alone  Type of Home: House  Home Layout: One level,Work area in basement,Performs ADL's on one level  Home Equipment: 4 wheeled walker   Bathroom Shower/Tub: Tub/Shower unit       ADL Assistance: Independent  Ambulation Assistance: Independent  Transfer Assistance: Independent          Additional Comments: Pt reported not feeling well, not wanting to answer questions.  Reports using 4 wheeled walker at home, son stops in. Recent hospitalization with HH to be initiated. Pt reports recent fall. VISION:WFL    HEARING:  [place]    COGNITION: Slow Processing    RANGE OF MOTION:  Bilateral Upper Extremity:  WFL    STRENGTH:  Bilateral Upper Extremity:  3/5 grossly    SENSATION:   WFL    ADL:   Lower Extremity Dressing: Dependent. Cisco Nielsen BALANCE:  Standing Balance: Contact Guard Assistance. BED MOBILITY:  Not Tested    TRANSFERS:  Sit to Stand:  Contact Guard Assistance. Stand to Sit: Contact Guard Assistance. **completed to reposition in recliner    FUNCTIONAL MOBILITY:  Pt declined       Activity Tolerance:  Patient tolerance of  treatment: fair. Assessment:  Assessment: Pt demo decreased ADL & functional mobility over PLOF d/t decreased balance & endurance. Continued OT recommended to faciliate improvements in these performance areas to increase indep with ADLs & decrease fall risk upon returning home. Performance deficits / Impairments: Decreased functional mobility ,Decreased ADL status,Decreased balance,Decreased high-level IADLs,Decreased safe awareness,Decreased endurance  Prognosis: Fair  REQUIRES OT FOLLOW UP: Yes  Decision Making: Medium Complexity  Safety Devices in place: Yes  Type of devices: All fall risk precautions in place,Call light within reach    Treatment Initiated: No treatment initiated-short session    Discharge Recommendations:  Continue to assess pending progress,Patient would benefit from continued therapy after discharge    Patient Education:  OT Education: OT [facility]  Barriers to Learning: none    Equipment Recommendations: Other: Monitor pending progress    Plan:  Times per week: 3-5x  Current Treatment Recommendations: Balance Training,Endurance Training,Functional Mobility Training,Self-Care / ADL,Safety Education & Training. See long-term goal time frame for expected duration of plan of care.   If no long-term goals established, a short length of stay is anticipated. Goals:  Patient goals : feel better  Short term goals  Time Frame for Short term goals: until discharge  Short term goal 1: Pt will complete various sit-stand t/fs including toilet with CGA & 0-2 vcs for safety  Short term goal 2: Pt will complete BADL routine with min A & min vcs for safety  Short term goal 3: Pt will tolerate further assessment of functional mobility by OTR when appropriate  Long term goals  Time Frame for Long term goals : No LTG set d/t short ELOS         Following session, patient left in safe position with all fall risk precautions in place.

## 2022-03-28 NOTE — PROGRESS NOTES
1038: Perfect serve message sent to Dr. Carlos Howe, regarding consult for recent CHF exacerbation contributing to recurrent ascites. Waiting for a response.

## 2022-03-28 NOTE — PROGRESS NOTES
Hospitalist Progress Note    Patient:  Yany Padilla      Unit/Bed:4K-03/003-A    YOB: 1940    MRN: [id number]       Acct: [de-identified]     PCP: ARTHUR Pitts CNP    Date of Admission: 3/24/2022    Assessment/Plan:    1. Hypotension: Chronic. Multifactorial severe pulmonary HTN -> Right heart failure -> decompensate liver cirrhosis. Paracentesis 2.5L removed on 3/25. Not response to Albumin + fluid + Midodrine 10mg BID. Required pressure support Levophed. Wean off Levophed 3/26. Cheetah show night normal CO, low TPR not fluid response. Midodrine was increased to 20mg TID  3/27: /66, patient is going to paracentesis which suspected BP will drop after paracentesis. Plan: monitor BP, reduce Midodrine 15 mg TID today    2. Acute hypoxic respiratory failure: Resolved. Required 4L NC on admission with no O2 supplement at baseline. CXR (3/26) showed bilateral pleural effusion with increase bilateral lower lung consolidation, bilateral pleural effusion. Procal 0.17. Patient was afebrile since admission. Currently off NC with O2 Sat 91%    3. Severe Pulmonary HTN: Echo (3/11/22): Pulmonary artery pressure from tricuspid regurgitation 65/70. Plan: close monitor diuretic giving patient hypotension and decompensate liver, Patient had an appointment with [facility] at the beginning of April. Plan: will discuss with patient Card. To eval the benefit of inpatient transferred to [facility] clinic giving patient worsening     4. Severe Tricuspid regurgitation: Echo (3/11/22). Patient is not a surgical candidate for now. Will referred to Card mika as outpatient    5. Persistent a fib: home med includes Carvedilol. Patient was afib since admission to 3/25/22. Had been on sinus rhythm since 3/26/22. Thyroid function test on 3/27/22 showed hypothyroidism.  Plan: cont tele, hold Carvedilol due to hypotension, start Synthroid 50 mg daily, contact with patient's Cardiology Dr. Mago Benavides to eval whether patient will need Eliquis 2.5mg on discharge. 6. Hypothyroidism: new diagnosis TSH 5.310 with free T4 0.83. Plan: start Synthroid 50 mg daily. 7. Acute on chronic HFpEF: Echo 3/11/22 EF 60%. Hypotension on Midodrine 15mg TID. Cheetah test showed good CO and low TPR. Plan: monitor diuretic, low sodium diet. Strict <2L fluid intake daily. 8. Normocytic Anemia: anemia work up showed early iron deficiency anemia and anemia of from CKD. Plan: Venofer 300mg x 3 doses. 9. Decompensate liver cirrhosis with ascites: Stable without sign of rebound tenderness or AMS. GI consult appreciated which Xifaxan was started. Ammonia 32. Patient refuse Lactulose  3/27: Paracentesis today. Plan: cont Xifaxan; avoid sedative, benzo medication. 10. ISIDRA on CKDIII: Improved. BUN/Cr 65/2.4 on admission. Baseline Cr 1.2. Nephrologist consult appreciate - ISIDRA likely 2/2 ATN from profound hypotension and decompensated liver cirrhosis, Albumin was given. 3/28: Nephrology consult appreciated - recommend adjust Midodrine if patient BP tolerate after paracentesis, gently fluid replacement [email or web address], avoid nephrotoxicity. 11. Bilateral lower extremities edema and tenderness: ACE Wrap     Expected discharge date:  3/31/22    Disposition:    [x] Home       [] TCU       [] Rehab       [] Psych       [] SNF       [] [place]       [] Other-    Chief Complaint: Extremity weakness, fall    Hospital Course: Macy Rosenberg is 81years old female who presented to ED for generalize weakness on 3/24/22. Past medical history significant with pulmonary hypertension secondary to severe tricuspid regurgitation, decompensated liver cirrhosis, chronic hypotension, persistent A. fib on carvedilol with rate control, heart failure preserved ejection fraction, CKD stage III.  Patient recently discharged from the hospital on March 17, 2022.  2 days prior to presentation patient reports decreased p.o. intake, nausea, dry heaves, persistent diarrhea with loose watery stool. On the day of presentation patient's son report that patient worsening fatigue and generalized weakness therefore he brought her to the ED. In the ED patient found to have blood pressure 83/50, heart rate 75, respiration rate 16, O2 sat at 95%. BMP showed Cr 2.4 which increased 1.2 from 1 months prior. CXR showed cardiomegaly possible failure, patchy opacity of right lung base. Patient was admitted and manage for decompensate liver cirrhosis and ISIDRA on CKD. Patient subsequently underwent paracentesis with 2.5L fluid removed. Patient develop hypotension which didn't response to albumin and NS. Patient required pressure support and she was transferred to ICU on 3/25/22. On 3/26, patient wean off pressure support and transferred out of ICU. Cheetah test on 3/27/22 showed nigh normal CO with low TPR not responsive to fluid. Thyroid function test showed hypothyroidism. Patient was started Synthroid 50 mg daily on 3/28/22    Subjective (past 24 hours): No event overnight. Upon visit, patient sits on recliner. Reports abdominal pain and lower extremity swelling. Denies any fever, chill, shortness of breath, chest pain, abdominal pain.    ROS. Constitutional: Negative for appetite change, chills, diaphoresis, fever and unexpected weight change. HENT: Negative for congestion, dental problem, mouth sores, nosebleed  Respiratory: Negative for choking, chest tightness, wheezing and stridor. Cardiovascular: Negative for chest pain and palpitations. Gastrointestinal: Negative for anal bleeding, diarrhea, nausea and vomiting. Genitourinary: Negative for dysuria, flank pain, hematuria and urgency. Psychiatric/Behavioral: Negative for agitation, behavioral problems, confusion, decreased concentration, dysphoric mood and hallucinations.      Medications:  Reviewed    Infusion Medications    sodium chloride 50 mL/hr at 03/28/22 1110    sodium chloride      sodium chloride       Scheduled Medications    iron sucrose  300 mg IntraVENous Q48H    albumin human  25 g IntraVENous Once    midodrine  15 mg Oral TID WC    lidocaine 1 % injection  5 mL IntraDERmal Once    sodium chloride flush  5-40 mL IntraVENous 2 times per day    pantoprazole  40 mg Oral BID AC    rifaximin  550 mg Oral BID    sodium chloride flush  5-40 mL IntraVENous 2 times per day    PARoxetine  30 mg Oral QAM     PRN Meds: sodium chloride flush, sodium chloride, sodium chloride flush, sodium chloride, acetaminophen, ondansetron, melatonin      Intake/Output Summary (Last 24 hours) at 3/28/2022 1328  Last data filed at 3/28/2022 0547  Gross per 24 hour   Intake 828.17 ml   Output 2 ml   Net 826.17 ml       Diet:  ADULT DIET; Regular; Low Sodium (2 gm); 1200 ml    Exam:  /66   Pulse 98   Temp 98.4 °F (36.9 °C) (Oral)   Resp 18   Ht 5' 4\" (1.626 m)   Wt 192 lb 4.8 oz (87.2 kg)   SpO2 91%   BMI 33.01 kg/m²     General appearance: No apparent distress, appears stated age and cooperative. HEENT: Pupils equal, round, and reactive to light. Conjunctivae/corneas clear. Neck: Supple, with full range of motion. No jugular venous distention. Trachea midline. Respiratory:  Normal respiratory effort. Clear to auscultation, bilaterally without Rales/Wheezes/Rhonchi. Cardiovascular: Regular rate and rhythm, systolic murmur on apex radiate to axillary region. Abdomen: Soft, non-tender, non-distended with normal bowel sounds. Musculoskeletal: passive and active ROM x 4 extremities. Skin: Skin color, texture, turgor normal.  No rashes or lesions. Neurologic:  Neurovascularly intact without any focal sensory/motor deficits.  Cranial nerves: II-XII intact, grossly non-focal.  Psychiatric: Alert and oriented, thought content appropriate, normal insight  Capillary Refill: Brisk,< 3 seconds   Peripheral Pulses: +2 palpable, equal bilaterally       Labs:   Recent Labs     03/26/22  0406 03/27/22  3550 WBC 6.0 4.7*   HGB 10.5* 10.0*   HCT 33.6* 33.6*    189     Recent Labs     03/26/22  0406 03/26/22  0406 03/26/22  0801 03/27/22  0418 03/28/22  0419      < > 136 139 139   K 4.0  --  3.9 4.2 4.2      < > 101 103 103   CO2 21*   < > 20* 20* 22*   BUN 68*   < > 65* 67* 67*   CREATININE 2.7*   < > 2.7* 2.9* 2.6*   CALCIUM 7.9*   < > 8.1* 8.3* 8.7   PHOS 4.4  --   --   --   --     < > = values in this interval not displayed. Recent Labs     03/26/22 0406   AST 13   ALT <5*   BILITOT 0.6   ALKPHOS 64     Recent Labs     03/26/22 0406   INR 1.25*     No results for input(s): CKTOTAL, TROPONINI in the last 72 hours. Microbiology:      Urinalysis:      Lab Results   Component Value Date    NITRU NEGATIVE 03/26/2022    WBCUA 2-4 03/26/2022    BACTERIA FEW 03/26/2022    RBCUA 3-5 03/26/2022    BLOODU NEGATIVE 03/26/2022    SPECGRAV 1.017 03/11/2022    GLUCOSEU NEGATIVE 03/26/2022       Radiology:  US RENAL COMPLETE   Final Result      1. Bilateral renal cysts. No evidence of stones or hydronephrosis. 2. Moderate amount of free fluid within the abdomen. 3. Postvoid images could not be obtained through the bladder. 4. There are bilateral ureteric jets present. **This report has been created using voice recognition software. It may contain minor errors which are inherent in voice recognition technology. **      Final report electronically signed by DR Jorge Dowd on 3/26/2022 8:51 AM      XR CHEST PORTABLE   Final Result   Increased bilateral lower lung consolidations. Bilateral pleural effusions. This document has been electronically signed by: Brianna Parson MD on    03/26/2022 04:26 AM      [address] [employer]   Final Result    Successful ultrasound-guided paracentesis. **This report has been created using voice recognition software. It may contain minor errors which are inherent in voice recognition technology. **      Final report electronically signed by  Juve Wiggins DO, MD on 3/25/2022 10:05 AM      XR CHEST PORTABLE   Final Result   1. Cardiomegaly with possible failure. Recommend follow-up. 2. Patchy opacity at the right lung base could be a superimposed    infectious component. Follow-up to clearing recommended. 3. Indeterminate appearance of the proximal left humerus. Not seen on    prior exams. Correlate with history of pain in this area. Further evaluate    if clinically indicated. This document has been electronically signed by:  Amanda Hoskins MD on 03/24/2022 11:58 PM      [employer]    (Results Pending)       DVT prophylaxis: [] Lovenox                                 [x] SCDs                                 [] SQ Heparin                                 [] Encourage ambulation           [] Already on Anticoagulation     Code Status: Limited    PT/OT Eval Status: 3/25/22    Tele:   [x] yes             [] no    Electronically signed by John Torres DO on 3/28/2022 at 1:28 PM

## 2022-03-28 NOTE — PROGRESS NOTES
Gastroenterology Progress Note:     Patient Name:  Walt Chand   MRN: [id number]  [id number]  YOB: 1940  Admit Date: 3/24/2022 10:46 PM  Primary Care Physician: ARTHUR Jackson CNP   4K-03/003-A     Patient seen and examined. 24 hours events and chart reviewed. Subjective: Patient sleeping in the chair. Denies abd pain. Endorses nausea, no vomiting, however has not requested any medication. Objective:  /68   Pulse 85   Temp 98.3 °F (36.8 °C) (Oral)   Resp 16   Ht 5' 4\" (1.626 m)   Wt 192 lb 4.8 oz (87.2 kg)   SpO2 91%   BMI 33.01 kg/m²     Physical Exam:    General:  Chronically ill appearing female  HEENT: Atraumatic, normocephalic. Moist oral mucous membranes. Neck: Supple without adenopathy, JVD, thyromegaly or masses. Trachea midline. CV: Heart RRR, no murmurs, rubs, gallops. Resp: Even, easy without cough or accessory use. Lungs clear to ascultation bilaterally. Abd: Round, semi-soft, nontender. No hepatosplenomegaly or mass present. Active bowel sounds heard. Mild distention noted. Ext:  Without cyanosis, clubbing. BLE edema. Skin: Pink, warm, dry  Neuro:  Alert, oriented x 3 with no obvious deficits.        Rectal: deferred    Labs:   CBC:   Lab Results   Component Value Date    WBC 4.7 03/27/2022    HGB 10.0 03/27/2022    HCT 33.6 03/27/2022    MCV 97.7 03/27/2022     03/27/2022     BMP:   Lab Results   Component Value Date     03/28/2022    K 4.2 03/28/2022    K 3.9 03/26/2022     03/28/2022    CO2 22 03/28/2022    PHOS 4.4 03/26/2022    BUN 67 03/28/2022    CREATININE 2.6 03/28/2022    CALCIUM 8.7 03/28/2022     PT/INR:   Lab Results   Component Value Date    PROTIME 1.75 08/21/2011    INR 1.25 03/26/2022     Lipids:   Lab Results   Component Value Date    ALKPHOS 64 03/26/2022    ALT <5 03/26/2022    AST 13 03/26/2022    BILITOT 0.6 03/26/2022    BILIDIR 0.3 03/24/2022    LABALBU 3.7 03/28/2022    LABALBU 3.2 08/20/2011 AMYLASE 67 07/19/2019    LIPASE 19.3 03/11/2022      Ref. Range 3/27/2022 04:18   Ferritin Latest Ref Range: 10 - 291 ng/mL 117   Iron Latest Ref Range: 50 - 170 ug/dL 25 (L)   Iron Saturation Latest Ref Range: 20 - 50 % 19 (L)   TIBC Latest Ref Range: 171 - 450 ug/dL 132 (L)     Current Meds:  Scheduled Meds:   iron sucrose  300 mg IntraVENous Q48H    midodrine  20 mg Oral TID WC    lidocaine 1 % injection  5 mL IntraDERmal Once    sodium chloride flush  5-40 mL IntraVENous 2 times per day    pantoprazole  40 mg Oral BID AC    rifaximin  550 mg Oral BID    sodium chloride flush  5-40 mL IntraVENous 2 times per day    PARoxetine  30 mg Oral QAM     Continuous Infusions:   sodium chloride      sodium chloride      sodium chloride         Assessment:  80 yo F admitted 03/24/22 for generalized weakness & dizziness with a fall at home. H/O cirrhosis. H/O CHF. Diuretics recently increased by cardiology. Recent admission 03/11/22-03/17/22 for SOB & edema. She has required recent paracentesis. H/O severe pulmonary HTN & severe tricuspid regurgitation. Briefly transferred to ICU for worsening hypotension. Stool studies negative. Refused Lactulose, Xifaxan started for intermittent confusion. 1. Fall at home  2. Decompensated liver cirrhosis with ascites- MELD 16  3. Ascites- likely cardiogenic & hepatic, s/p para 03/25/22 with 2.5 L removed  4. Acute CHF exacerbation  5. ISIDRA on CKD- likely cardiorenal  6. Severe pulmonary HTN  7. Leukopenia  8. Normocytic anemia- no GI bleeding noted  9. Hypoalbuminemia  10. Coagulopathy  11. Acute diarrhea- stool panel negative  12. Cardiomegaly  13. Acute on chronic hypotension- on Midodrine  14. Paroxysmal afib  15. Severe tricuspid regurgitation  16.  JAMES    Plan:    · Monitor H & H, transfuse prn  · Resume diuretics when okay with nephrology  · 2g sodium diet with fluid restriction  · Continue Midodrine TID, may need to re-evaluate dosage as pressure are now a little elevated  · Continue PPI daily, home dose  · Daily weights  · HFP, INR in the morning  · Strict I & O  · SCDs for DVT prophylaxis  · US paracentesis with labs & albumin  · IV Venofer per primary  · Avoid hepatotoxic meds, okay for Tylenol 2g limit  · Avoid narcotic analgesics & benzodiazepines as they can precipitate HE  · Consult cardiology given recurrent CHF exacerbations  · Nephrology on board  · RN updated  · Supportive care per primary team  Will follow    Case reviewed and impression/plan reviewed in collaboration with Dr. Milo Stern  Electronically signed by ARTHUR Miramontes CNP on 3/28/2022 at 10:14 AM    [facility]

## 2022-03-28 NOTE — CARE COORDINATION
3/28/22, 7:36 AM EDT    DISCHARGE BARRIERS        Patient transferred to  3. Report given to unit SW, Bri JUAREZ, regarding discharge plan for this patient.

## 2022-03-28 NOTE — CARE COORDINATION
Collaborative Discharge Planning    Sylvia Bryan  :  1940  MRN:  [id number]    ADMIT DATE:  3/24/2022      Discharge Planning Discharge Planning  Living Arrangements: Alone  Support Systems: Family Members  Potential Assistance Needed: N/A  Type of Home Care Services: None  Patient expects to be discharged to[de-identified] [facility] Board Notes /Social Work Whiteboard Notes  /Social Work Whiteboard: 3/28;  - [facility] (nursing, PT/OT, Aide), need orders    Discharge Plan Home with home health  plans home alone; son Edenilson Hogan attentive to needs and prefers [address] (nsg, therapy, aide) and just called this agency yesterday; therapy following, follows [facility] for pulmonary hypertension  Discharge Milestones and Delays: Clinical status  ISIDRA/Decompensated Liver Cirrhois/Ascites/Hypotension/CHF. Rifaximin continued    SIGNED:  Gayathri Santana RN   3/28/2022, 8:58 AM      3/28/22, 8:59 AM EDT    Patient goals/plan/ treatment preferences discussed by  and . Patient goals/plan/ treatment preferences reviewed with patient/ family. Patient/ family verbalize understanding of discharge plan and are in agreement with goal/plan/treatment preferences. Understanding was demonstrated using the teach back method. AVS provided by RN at time of discharge, which includes all necessary medical information pertaining to the patients current course of illness, treatment, post-discharge goals of care, and treatment preferences.

## 2022-03-28 NOTE — PROGRESS NOTES
Renal Progress Note    Assessment and Plan:   1. Acute kidney injury with serum creatinine improved today from yesterday. 2. Metabolic acidosis improving and due to acute kidney injury  3. Bicytopenia  4. Hypotension improved  5. Deconditioning  6. Iron deficiency anemia    PLAN:  1. Labs reviewed  2. Serum creatinine is improved  3. Medications reviewed. 4. Continue intravenous fluid  5. Intravenous iron protocol. 6.  Labs in the morning  7. We will follow    Patient Active Problem List:     Chronic atrial fibrillation ([facility])     Ascending cholangitis, possible     Elevated LFTs     Thrombocytopenia ([facility])     Leukopenia     ISIDRA (acute kidney injury) ([facility] [address]     SIRS (systemic inflammatory response syndrome) ([facility])     Cholecystitis, acute     Neutropenia ([facility])     Aortic stenosis, severe     Colitis     Nausea     Diarrhea     Essential hypertension     Anemia, normocytic normochromic     Hypocalcemia     Cirrhosis ([facility])     S/P AVR (aortic valve replacement)     Obesity (BMI 30-39. 9)     History of atrial fibrillation     Hypomagnesemia     Asymptomatic bacteriuria - no clinical indication for antimicrobial therapy     CKD (chronic kidney disease) stage 3, GFR 30-59 ml/min ([facility])     Acute renal failure superimposed on stage 3 chronic kidney disease ([facility])     VRE (vancomycin resistant enterococcus) culture positive     CHF (congestive heart failure), NYHA class I, acute on chronic, combined ([facility])     Shortness of breath     Acute kidney injury ([facility] [address]     Hypotension     Dehydration      Subjective:   Admit Date: 3/24/2022    Interval History:   Seen for acute kidney injury on chronic kidney disease  Awake and alert this morning.     No complaint  Updated by the staff  No issues  Blood pressure is improved  Urine output is not completely documented      Medications:   Scheduled Meds:   midodrine  20 mg Oral TID WC    lidocaine 1 % injection  5 mL IntraDERmal Once    sodium chloride flush  5-40 mL IntraVENous 2 times per day    pantoprazole  40 mg Oral BID AC    rifaximin  550 mg Oral BID    sodium chloride flush  5-40 mL IntraVENous 2 times per day    PARoxetine  30 mg Oral QAM     Continuous Infusions:   sodium chloride      sodium chloride         CBC:   Recent Labs     03/26/22 0406 03/27/22 0418   WBC 6.0 4.7*   HGB 10.5* 10.0*    189     CMP:    Recent Labs     03/26/22  0801 03/27/22 0418 03/28/22 0419    139 139   K 3.9 4.2 4.2    103 103   CO2 20* 20* 22*   BUN 65* 67* 67*   CREATININE 2.7* 2.9* 2.6*   GLUCOSE 99 93 113*   CALCIUM 8.1* 8.3* 8.7   LABGLOM 17* 16* 18*     Troponin: No results for input(s): TROPONINI in the last 72 hours. BNP: No results for input(s): BNP in the last 72 hours. INR:   Recent Labs     03/26/22 0406   INR 1.25*     Lipids: No results for input(s): CHOL, LDLDIRECT, TRIG, HDL, AMYLASE, LIPASE in the last 72 hours. Liver:   Recent Labs     03/26/22 0406 03/26/22 0406 03/28/22 0419   AST 13  --   --    ALT <5*  --   --    ALKPHOS 64  --   --    PROT 5.3*  --   --    LABALBU 3.1*   < > 3.7   BILITOT 0.6  --   --     < > = values in this interval not displayed. Iron:    Recent Labs     03/27/22 0418   FERRITIN 117     US RENAL COMPLETE   Final Result      1. Bilateral renal cysts. No evidence of stones or hydronephrosis. 2. Moderate amount of free fluid within the abdomen. 3. Postvoid images could not be obtained through the bladder. 4. There are bilateral ureteric jets present. **This report has been created using voice recognition software. It may contain minor errors which are inherent in voice recognition technology. **      Final report electronically signed by DR Sylvia Garcia on 3/26/2022 8:51 AM      XR CHEST PORTABLE   Final Result   Increased bilateral lower lung consolidations. Bilateral pleural effusions.       This document has been electronically signed by: Sukhdev Prieto MD on    03/26/2022 04:26 AM      [address] [employer] Final Result    Successful ultrasound-guided paracentesis. **This report has been created using voice recognition software. It may contain minor errors which are inherent in voice recognition technology. **      Final report electronically signed by Dr. Adrianna Burnett MD on 3/25/2022 10:05 AM      XR CHEST PORTABLE   Final Result   1. Cardiomegaly with possible failure. Recommend follow-up. 2. Patchy opacity at the right lung base could be a superimposed    infectious component. Follow-up to clearing recommended. 3. Indeterminate appearance of the proximal left humerus. Not seen on    prior exams. Correlate with history of pain in this area. Further evaluate    if clinically indicated. This document has been electronically signed by: Elier Landers MD on 03/24/2022 11:58 PM            Objective:   Vitals: /78   Pulse 85   Temp 97.7 °F (36.5 °C) (Oral)   Resp 18   Ht 5' 4\" (1.626 m)   Wt 192 lb 4.8 oz (87.2 kg)   SpO2 92%   BMI 33.01 kg/m²    Wt Readings from Last 3 Encounters:   03/27/22 192 lb 4.8 oz (87.2 kg)   03/15/22 178 lb 11.2 oz (81.1 kg)   02/09/22 166 lb (75.3 kg)      24HR INTAKE/OUTPUT:      Intake/Output Summary (Last 24 hours) at 3/28/2022 0759  Last data filed at 3/28/2022 0547  Gross per 24 hour   Intake 828.17 ml   Output 202 ml   Net 626.17 ml       Constitutional: Well-developed elderly lady alert, awake, no apparent distress   Skin:normal with no rash or lesions. HEENT:Pupils are reactive . Throat is clear . Oral mucosa is moist   Neck:supple with no thyromegaly or carotid bruit  Cardiovascular:  S1, S2 without murmur or rubs   Respiratory:  Clear to ausculation without wheezes, rhonchi or rales. Abdomen: +bs, soft, no tenderness to palpation and no palpable mass. No abdominal bruit   Ext: No LE edema  Musculoskeletal:Intact  Neuro:Alert and awake. Well oriented  with no focal deficit. Speech is normal      Electronically signed by Walt Savage

## 2022-03-28 NOTE — PROGRESS NOTES
Yamil Aquino 61  PHYSICAL THERAPY MISSED TREATMENT NOTE  [facility] ICU STEPDOWN TELEMETRY 4K    Date: 3/28/2022  Patient Name: Mikey Pearce        MRN: [id number]   : 1940  (81 y.o.)  Gender: female                REASON FOR MISSED TREATMENT:  Patient refused treatment. Pt declines PT due to being tired.

## 2022-03-29 LAB
ALBUMIN SERPL-MCNC: 3.6 G/DL (ref 3.5–5.1)
ALP BLD-CCNC: 61 U/L (ref 38–126)
ALT SERPL-CCNC: 8 U/L (ref 11–66)
ANAEROBIC CULTURE: NORMAL
ANION GAP SERPL CALCULATED.3IONS-SCNC: 17 MEQ/L (ref 8–16)
AST SERPL-CCNC: 26 U/L (ref 5–40)
BILIRUB SERPL-MCNC: 1.5 MG/DL (ref 0.3–1.2)
BILIRUBIN DIRECT: 0.7 MG/DL (ref 0–0.3)
BODY FLUID CULTURE, STERILE: NORMAL
BUN BLDV-MCNC: 66 MG/DL (ref 7–22)
CALCIUM SERPL-MCNC: 8.8 MG/DL (ref 8.5–10.5)
CHLORIDE BLD-SCNC: 103 MEQ/L (ref 98–111)
CO2: 20 MEQ/L (ref 23–33)
CREAT SERPL-MCNC: 2.9 MG/DL (ref 0.4–1.2)
GFR SERPL CREATININE-BSD FRML MDRD: 16 ML/MIN/1.73M2
GLUCOSE BLD-MCNC: 83 MG/DL (ref 70–108)
GLUCOSE BLD-MCNC: 92 MG/DL (ref 70–108)
GRAM STAIN RESULT: NORMAL
INR BLD: 1.45 (ref 0.85–1.13)
MAGNESIUM: 1.6 MG/DL (ref 1.6–2.4)
POTASSIUM SERPL-SCNC: 3.7 MEQ/L (ref 3.5–5.2)
SODIUM BLD-SCNC: 140 MEQ/L (ref 135–145)
TOTAL PROTEIN: 5.6 G/DL (ref 6.1–8)

## 2022-03-29 PROCEDURE — 83735 ASSAY OF MAGNESIUM: CPT

## 2022-03-29 PROCEDURE — 6370000000 HC RX 637 (ALT 250 FOR IP): Performed by: INTERNAL MEDICINE

## 2022-03-29 PROCEDURE — 2060000000 HC ICU INTERMEDIATE R&B

## 2022-03-29 PROCEDURE — 6370000000 HC RX 637 (ALT 250 FOR IP): Performed by: STUDENT IN AN ORGANIZED HEALTH CARE EDUCATION/TRAINING PROGRAM

## 2022-03-29 PROCEDURE — 80053 COMPREHEN METABOLIC PANEL: CPT

## 2022-03-29 PROCEDURE — 82948 REAGENT STRIP/BLOOD GLUCOSE: CPT

## 2022-03-29 PROCEDURE — 82248 BILIRUBIN DIRECT: CPT

## 2022-03-29 PROCEDURE — 99233 SBSQ HOSP IP/OBS HIGH 50: CPT | Performed by: INTERNAL MEDICINE

## 2022-03-29 PROCEDURE — 97110 THERAPEUTIC EXERCISES: CPT

## 2022-03-29 PROCEDURE — 6360000002 HC RX W HCPCS: Performed by: STUDENT IN AN ORGANIZED HEALTH CARE EDUCATION/TRAINING PROGRAM

## 2022-03-29 PROCEDURE — 85610 PROTHROMBIN TIME: CPT

## 2022-03-29 PROCEDURE — 6370000000 HC RX 637 (ALT 250 FOR IP)

## 2022-03-29 PROCEDURE — 2580000003 HC RX 258: Performed by: INTERNAL MEDICINE

## 2022-03-29 PROCEDURE — 97162 PT EVAL MOD COMPLEX 30 MIN: CPT

## 2022-03-29 PROCEDURE — 99232 SBSQ HOSP IP/OBS MODERATE 35: CPT | Performed by: INTERNAL MEDICINE

## 2022-03-29 PROCEDURE — 2580000003 HC RX 258: Performed by: STUDENT IN AN ORGANIZED HEALTH CARE EDUCATION/TRAINING PROGRAM

## 2022-03-29 RX ORDER — MAGNESIUM SULFATE 1 G/100ML
1000 INJECTION INTRAVENOUS ONCE
Status: COMPLETED | OUTPATIENT
Start: 2022-03-29 | End: 2022-03-29

## 2022-03-29 RX ORDER — MIDODRINE HYDROCHLORIDE 5 MG/1
5 TABLET ORAL
Status: DISCONTINUED | OUTPATIENT
Start: 2022-03-29 | End: 2022-03-30

## 2022-03-29 RX ORDER — POTASSIUM CHLORIDE 20 MEQ/1
20 TABLET, EXTENDED RELEASE ORAL ONCE
Status: COMPLETED | OUTPATIENT
Start: 2022-03-29 | End: 2022-03-29

## 2022-03-29 RX ADMIN — RIFAXIMIN 550 MG: 550 TABLET ORAL at 20:20

## 2022-03-29 RX ADMIN — POTASSIUM CHLORIDE 20 MEQ: 20 TABLET, EXTENDED RELEASE ORAL at 12:46

## 2022-03-29 RX ADMIN — PANTOPRAZOLE SODIUM 40 MG: 40 TABLET, DELAYED RELEASE ORAL at 16:36

## 2022-03-29 RX ADMIN — LEVOTHYROXINE SODIUM 50 MCG: 0.05 TABLET ORAL at 06:00

## 2022-03-29 RX ADMIN — RIFAXIMIN 550 MG: 550 TABLET ORAL at 09:01

## 2022-03-29 RX ADMIN — MIDODRINE HYDROCHLORIDE 5 MG: 5 TABLET ORAL at 16:36

## 2022-03-29 RX ADMIN — SODIUM CHLORIDE, PRESERVATIVE FREE 10 ML: 5 INJECTION INTRAVENOUS at 09:01

## 2022-03-29 RX ADMIN — PANTOPRAZOLE SODIUM 40 MG: 40 TABLET, DELAYED RELEASE ORAL at 06:00

## 2022-03-29 RX ADMIN — MAGNESIUM SULFATE HEPTAHYDRATE 1000 MG: 1 INJECTION, SOLUTION INTRAVENOUS at 09:57

## 2022-03-29 RX ADMIN — SODIUM CHLORIDE: 9 INJECTION, SOLUTION INTRAVENOUS at 13:30

## 2022-03-29 RX ADMIN — PAROXETINE 30 MG: 30 TABLET, FILM COATED ORAL at 09:01

## 2022-03-29 ASSESSMENT — PAIN SCALES - GENERAL
PAINLEVEL_OUTOF10: 0

## 2022-03-29 NOTE — PLAN OF CARE
Problem: Physical Regulation:  Goal: Prevent transmision of infection  Description: Prevent transmision of infection  Outcome: Ongoing     Problem: Skin Integrity:  Goal: Risk for impaired skin integrity will decrease  Description: Risk for impaired skin integrity will decrease  Outcome: Ongoing  Goal: Will show no infection signs and symptoms  Description: Will show no infection signs and symptoms  Outcome: Ongoing  Goal: Absence of new skin breakdown  Description: Absence of new skin breakdown  Outcome: Ongoing     Problem: Falls - Risk of:  Goal: Will remain free from falls  Description: Will remain free from falls  Outcome: Ongoing  Goal: Absence of physical injury  Description: Absence of physical injury  Outcome: Ongoing     Problem: Pain:  Goal: Pain level will decrease  Description: Pain level will decrease  Outcome: Ongoing  Goal: Control of acute pain  Description: Control of acute pain  Outcome: Ongoing  Goal: Control of chronic pain  Description: Control of chronic pain  Outcome: Ongoing     Problem: Discharge Planning:  Goal: Discharged to appropriate level of care  Description: Discharged to appropriate level of care  Outcome: Ongoing

## 2022-03-29 NOTE — PROGRESS NOTES
[facility]  INPATIENT PHYSICAL THERAPY  EVALUATION  [facility] ICU STEPDOWN TELEMETRY 4K - 4K-03/003-A    Time In: 0800  Time Out: 0821  Timed Code Treatment Minutes: 10 Minutes  Minutes: 21          Date: 3/29/2022  Patient Name: Girish Villegas,  Gender:  female        MRN: [id number]  : 1940  (81 y.o.)      Referring Practitioner: Bhavya Toro CNP  Diagnosis: Dehydration  Additional Pertinent Hx: Gilles Jamil is 81years old female who presented to ED for generalize weakness on 3/24/22. Past medical history significant with pulmonary hypertension secondary to severe tricuspid regurgitation, decompensated liver cirrhosis, chronic hypotension, persistent A. fib on carvedilol with rate control, heart failure preserved ejection fraction, CKD stage III. Patient recently discharged from the hospital on 2022.  2 days prior to presentation patient reports decreased p.o. intake, nausea, dry heaves, persistent diarrhea with loose watery stool. On the day of presentation patient's son report that patient worsening fatigue and generalized weakness therefore he brought her to the ED. In the ED patient found to have blood pressure 83/50, heart rate 75, respiration rate 16, O2 sat at 95%. BMP showed Cr 2.4 which increased 1.2 from 1 months prior. CXR showed cardiomegaly possible failure, patchy opacity of right lung base. Patient was admitted and manage for decompensate liver cirrhosis and ISIDRA on CKD. Patient subsequently underwent paracentesis with 2.5L fluid removed. Patient develop hypotension which didn't response to albumin and NS. Patient required pressure support and she was transferred to ICU on 3/25/22. On 3/26, patient wean off pressure support and transferred out of ICU. Cheetah test on 3/27/22 showed nigh normal CO with low TPR not responsive to fluid. Thyroid function test showed hypothyroidism. Patient was started Synthroid 50 mg daily on 3/28/22. Restrictions/Precautions:  Restrictions/Precautions: Fall Risk    Subjective:  Chart Reviewed: Yes  Patient assessed for rehabilitation services?: Yes  Family / Caregiver Present: No  Subjective: RN approved session, pt is in recliner, requesting to use the bathroom. General:  Overall Orientation Status: Within Functional Limits  Follows Commands: Within Functional Limits    Vision: Within Functional Limits    Hearing: Within functional limits       Pain: denies    Vitals: Vitals not assessed per clinical judgement, see nursing flowsheet    Social/Functional History:    Lives With: Alone  Type of Home: House  Home Layout: One level,Work area in basement,Performs ADL's on one level  Home Equipment: 4 wheeled walker     Bathroom Shower/Tub: Tub/Shower unit       ADL Assistance: Independent     Ambulation Assistance: Independent  Transfer Assistance: Independent    Active : Yes     Additional Comments: Pt amb with rollator, son checks in 3x/day. Recent admission    OBJECTIVE:  Range of Motion:  Bilateral Lower Extremity: WFL    Strength:  Bilateral Lower Extremity: Impaired - grossly 4-/5    Balance:  Static Standing Balance: Stand By Assistance  Dynamic Standing Balance: Contact Guard Assistance    Bed Mobility:  Not Tested    Transfers:  Sit to Stand: Stand By Assistance, from EOB and toilet  Stand to Sit:Stand By Assistance    Ambulation:  Stand By Assistance, [employer] Assistance  Distance: 15 feet, 30 feet  Surface: Level Tile  Device:Rolling Walker  Gait Deviations: Forward Flexed Posture, Slow Bharti, Decreased Step Length Bilaterally and Decreased Gait Speed  **Slow, steady pace, CGA with directional changes    Exercise:  Patient was guided in 1 set(s) 10 reps of exercise to both lower extremities. Ankle pumps, Seated marches and Long arc quads. Exercises were completed for increased independence with functional mobility.     Functional Outcome Measures: Not completed ASSESSMENT:  Activity Tolerance:  Patient tolerance of  treatment: good. Treatment Initiated: Treatment and education initiated within context of evaluation. Evaluation time included review of current medical information, gathering information related to past medical, social and functional history, completion of standardized testing, formal and informal observation of tasks, assessment of data and development of plan of care and goals. Treatment time included skilled education and facilitation of tasks to increase safety and independence with functional mobility for improved independence and quality of life. Assessment: Body structures, Functions, Activity limitations: Decreased functional mobility ,Decreased balance,Decreased strength  Assessment: Pt tolerates session well, limited by generalized weakness. PT to continue to progress strength and functional mobility. Prognosis: Good    REQUIRES PT FOLLOW UP: Yes    Discharge Recommendations:  Discharge Recommendations: Home with Home health PT    Patient Education:  PT Education: Plan of Care,PT Role    Equipment Recommendations:  Equipment Needed: No    Plan:  Times per week: 3-5x GM  Current Treatment Recommendations: Mariia Flatness Re-education,Safety Education & Training,Balance Training,Patient/Caregiver Education & Training,Functional Mobility Training,Transfer Training,Gait Training    Goals:  Patient goals : to go home  Short term goals  Time Frame for Short term goals: by discharge  Short term goal 1: Pt to transfer supine <--> sit S to enable pt to get in/out of bed. Short term goal 2: Pt to transfer sit <--> stand S for increased functional mobility. Short term goal 3: Pt to ambulate >100 feet with RW SBA for household ambulation. Long term goals  Time Frame for Long term goals : NA due to short length of stay. Following session, patient left in safe position with all fall risk precautions in place.

## 2022-03-29 NOTE — PROGRESS NOTES
Hospitalist Progress Note    Patient:  Kina Eddy      Unit/Bed:4K-03/003-A    YOB: 1940    MRN: [id number]       Acct: [de-identified]     PCP: ARTHUR Camacho CNP    Date of Admission: 3/24/2022    Assessment/Plan:    1. Hypotension: Chronic. Multifactorial severe pulmonary HTN -> Right heart failure -> decompensate liver cirrhosis. Paracentesis 2.5L removed on 3/25. Not response to Albumin + fluid + Midodrine 10mg BID. Required pressure support Levophed. Wean off Levophed 3/26. Cheetah show night normal CO, low TPR not fluid response. Midodrine was increased to 20mg TID. On 3/28, Midodrine was reduced to 15 mg TID today  3/29: SBP stable 120s with after initiate Synthroid. Reduce Midodrine to 5mg TID. 2. Decompensate liver cirrhosis with ascites: Stable without sign of rebound tenderness or AMS. GI consult appreciated which Xifaxan was started. Ammonia 32. Patient refuse Lactulose. Paracentesis (3/25) with 2.5 L fluid removed and 3/28 5.5L fluid removed. 3/29: Albumin was given after paracentesis on 3/28. Cont Xifaxan, low sodium diet, avoid narcotic analgesics & benzodiazepines, avoid hepatotoxic meds, okay for Tylenol 2g limit    3. ISIDRA on CKDIII: 2/2 extrinsic compression vs cardiorenal syndrome vs volume depletion on arrival. Improved. BUN/Cr 65/2.4 on admission. Baseline Cr 1.2. Nephrologist consult appreciate - ISIDRA likely 2/2 ATN from profound hypotension and decompensated liver cirrhosis, Albumin was given. 3/29: Nephrology consult appreciated - increase Cr likely from large volume removed from paracentesis. Plan: start gently fluid replacement NS @50ml/hr, Nephro following    4. Anion gap metabolic acidosis: multifactorial ISIDRA, poor oral intake, electrolyte rearrangement . Plan: cont gently fluid replacement giving patient possible intravascular depletion s/p paracentesis, magic cup.    5. Severe Pulmonary HTN: Echo (3/11/22):  Pulmonary artery pressure from tricuspid regurgitation 65/70. Plan: close monitor diuretic giving patient hypotension and decompensate liver, Patient had an appointment with [facility] at the beginning of April. Plan: will discuss with patient Card. To mika the benefit of inpatient transferred to [facility] giving patient worsening     6. Severe Tricuspid regurgitation: Echo (3/11/22). Patient is not a surgical candidate for now. Will referred to Rod brennan as outpatient    7. Persistent a fib: home med includes Carvedilol. Patient was afib since admission to 3/25/22. Had been on sinus rhythm since 3/26/22. Thyroid function test on 3/27/22 showed hypothyroidism. Plan: cont tele, hold Carvedilol due to hypotension, start Synthroid 50 mg daily, contact with patient's Cardiology Dr. Cain Delgado to eval whether patient will need Eliquis 2.5mg on discharge. Consult Cardiology. 8. Hypothyroidism: new diagnosis TSH 5.310 with free T4 0.83. Plan: start Synthroid 50 mg daily. 9. Acute on chronic HFpEF: Echo 3/11/22 EF 60%. Hypotension on Midodrine 15mg TID. Cheetah test showed good CO and low TPR. Plan: monitor diuretic, low sodium diet. Strict <2L fluid intake daily. 10. Aortic insufficiency: s/p AVR. Left heart cath (2/9/22): 2+ AI which could contribute for Pul HTN    11. Normocytic Anemia: anemia work up showed early iron deficiency anemia and anemia of from CKD. Plan: Venofer 300mg x 3 doses. 12. Bilateral lower extremities edema and tenderness: Roger hoses    13. Acute hypoxic respiratory failure: Resolved. Required 4L NC on admission with no O2 supplement at baseline. CXR (3/26) showed bilateral pleural effusion with increase bilateral lower lung consolidation, bilateral pleural effusion. Procal 0.17. Patient was afebrile since admission.  Currently off NC with O2 Sat 91%     Expected discharge date:  3/31/22    Disposition:    [x] Home       [] TCU       [] Rehab       [] Psych       [] SNF       [] [facility]       [] Other-    Chief Complaint: Extremity weakness, fall    Hospital Course: Tara Lee is 81years old female who presented to ED for generalize weakness on 3/24/22. Past medical history significant with pulmonary hypertension secondary to severe tricuspid regurgitation, decompensated liver cirrhosis, chronic hypotension, persistent A. fib on carvedilol with rate control, heart failure preserved ejection fraction, CKD stage III. Patient recently discharged from the hospital on March 17, 2022.  2 days prior to presentation patient reports decreased p.o. intake, nausea, dry heaves, persistent diarrhea with loose watery stool. On the day of presentation patient's son report that patient worsening fatigue and generalized weakness therefore he brought her to the ED. In the ED patient found to have blood pressure 83/50, heart rate 75, respiration rate 16, O2 sat at 95%. BMP showed Cr 2.4 which increased 1.2 from 1 months prior. CXR showed cardiomegaly possible failure, patchy opacity of right lung base. Patient was admitted and manage for decompensate liver cirrhosis and ISIDRA on CKD. Patient subsequently underwent paracentesis with 2.5L fluid removed. Patient develop hypotension which didn't response to albumin and NS. Patient required pressure support and she was transferred to ICU on 3/25/22. On 3/26, patient wean off pressure support and transferred out of ICU. Cheetah test on 3/27/22 showed nigh normal CO with low TPR not responsive to fluid. Thyroid function test showed hypothyroidism. Patient was started Synthroid 50 mg daily on 3/28/22    Subjective (past 24 hours): No event overnight. Upon visit, patient sits on recliner. Reports feeling better after paracentesis. Denied any abdominal pain, discomfort, fever, chill, SOB.    ROS. Constitutional: Negative for appetite change, chills, diaphoresis, fever and unexpected weight change.    HENT: Negative for congestion, dental problem, mouth sores, nosebleed  Respiratory: Negative for choking, chest tightness, wheezing and stridor. Cardiovascular: Negative for chest pain and palpitations. Gastrointestinal: Negative for anal bleeding, diarrhea, nausea and vomiting. Genitourinary: Negative for dysuria, flank pain, hematuria and urgency. Psychiatric/Behavioral: Negative for agitation, behavioral problems, confusion, decreased concentration, dysphoric mood and hallucinations. Medications:  Reviewed    Infusion Medications    sodium chloride 50 mL/hr at 03/29/22 1330    sodium chloride      sodium chloride       Scheduled Medications    midodrine  5 mg Oral TID WC    iron sucrose  300 mg IntraVENous Q48H    levothyroxine  50 mcg Oral QAM AC    lidocaine 1 % injection  5 mL IntraDERmal Once    sodium chloride flush  5-40 mL IntraVENous 2 times per day    pantoprazole  40 mg Oral BID AC    rifaximin  550 mg Oral BID    sodium chloride flush  5-40 mL IntraVENous 2 times per day    PARoxetine  30 mg Oral QAM     PRN Meds: sodium chloride flush, sodium chloride, sodium chloride flush, sodium chloride, acetaminophen, ondansetron, melatonin      Intake/Output Summary (Last 24 hours) at 3/29/2022 1502  Last data filed at 3/29/2022 1416  Gross per 24 hour   Intake 480 ml   Output 520 ml   Net -40 ml       Diet:  ADULT DIET; Regular; Low Sodium (2 gm); 1200 ml    Exam:  /66   Pulse 95   Temp 98.6 °F (37 °C) (Oral)   Resp 16   Ht 5' 4\" (1.626 m)   Wt 192 lb 4.8 oz (87.2 kg)   SpO2 96%   BMI 33.01 kg/m²     General appearance: No apparent distress, appears stated age and cooperative. HEENT: Pupils equal, round, and reactive to light. Conjunctivae/corneas clear. Neck: Supple, with full range of motion. No jugular venous distention. Trachea midline. Respiratory:  Normal respiratory effort. Clear to auscultation, bilaterally without Rales/Wheezes/Rhonchi.   Cardiovascular: Regular rate and rhythm, systolic murmur on apex radiate to axillary region. Abdomen: Soft, non-tender, non-distended with normal bowel sounds. Musculoskeletal: passive and active ROM x 4 extremities. Skin: Skin color, texture, turgor normal.  No rashes or lesions. Neurologic:  Neurovascularly intact without any focal sensory/motor deficits. Cranial nerves: II-XII intact, grossly non-focal.  Psychiatric: Alert and oriented, thought content appropriate, normal insight  Capillary Refill: Brisk,< 3 seconds   Peripheral Pulses: +2 palpable, equal bilaterally       Labs:   Recent Labs     03/27/22 0418   WBC 4.7*   HGB 10.0*   HCT 33.6*        Recent Labs     03/27/22  0418 03/28/22  0419 03/29/22 0442    139 140   K 4.2 4.2 3.7    103 103   CO2 20* 22* 20*   BUN 67* 67* 66*   CREATININE 2.9* 2.6* 2.9*   CALCIUM 8.3* 8.7 8.8     Recent Labs     03/29/22 0442   AST 26   ALT 8*   BILIDIR 0.7*   BILITOT 1.5*   ALKPHOS 61     Recent Labs     03/29/22 0442   INR 1.45*     No results for input(s): CKTOTAL, TROPONINI in the last 72 hours. Microbiology:      Urinalysis:      Lab Results   Component Value Date    NITRU NEGATIVE 03/26/2022    WBCUA 2-4 03/26/2022    BACTERIA FEW 03/26/2022    RBCUA 3-5 03/26/2022    BLOODU NEGATIVE 03/26/2022    SPECGRAV 1.017 03/11/2022    GLUCOSEU NEGATIVE 03/26/2022       Radiology:  US GUIDED PARACENTESIS   Final Result   Successful ultrasound-guided paracentesis. **This report has been created using voice recognition software. It may contain minor errors which are inherent in voice recognition technology. **      Final report electronically signed by Dr. Herberth Solano on 3/28/2022 4:47 PM      US RENAL COMPLETE   Final Result      1. Bilateral renal cysts. No evidence of stones or hydronephrosis. 2. Moderate amount of free fluid within the abdomen. 3. Postvoid images could not be obtained through the bladder. 4. There are bilateral ureteric jets present.          **This report has been created using voice recognition software. It may contain minor errors which are inherent in voice recognition technology. **      Final report electronically signed by DR Irvin Correa on 3/26/2022 8:51 AM      XR CHEST PORTABLE   Final Result   Increased bilateral lower lung consolidations. Bilateral pleural effusions. This document has been electronically signed by: Reema Dixon MD on    03/26/2022 04:26 AM      [address] [employer]   Final Result    Successful ultrasound-guided paracentesis. **This report has been created using voice recognition software. It may contain minor errors which are inherent in voice recognition technology. **      Final report electronically signed by Dr. Madison Gaines MD on 3/25/2022 10:05 AM      XR CHEST PORTABLE   Final Result   1. Cardiomegaly with possible failure. Recommend follow-up. 2. Patchy opacity at the right lung base could be a superimposed    infectious component. Follow-up to clearing recommended. 3. Indeterminate appearance of the proximal left humerus. Not seen on    prior exams. Correlate with history of pain in this area. Further evaluate    if clinically indicated. This document has been electronically signed by:  Kari Wilson MD on 03/24/2022 11:58 PM          DVT prophylaxis: [] Lovenox                                 [x] SCDs                                 [] SQ Heparin                                 [] Encourage ambulation           [] Already on Anticoagulation     Code Status: Limited    PT/OT Eval Status: 3/25/22    Tele:   [x] yes             [] no    Electronically signed by Tab Larios DO on 3/29/2022 at 3:02 PM

## 2022-03-29 NOTE — PROGRESS NOTES
Renal Progress Note    Assessment and Plan:   1. Acute kidney injury with serum creatinine increased today. .  Etiology of this is not certain but can be seen sometime post paracentesis if large volume is involved  2. Metabolic acidosis  3. Hypotension with blood pressure much better  4. Deconditioning  5. Iron deficiency anemia  6. Normocytic anemia  7. Pulmonary hypertension  8. Ascites status post paracentesis    PLAN:  1. Labs reviewed  2. Serum creatinine is increased today  3. Medications reviewed. 4. Continue intravenous fluid  5. Labs in the morning  6. We will follow    Patient Active Problem List:     Chronic atrial fibrillation (HCC)     Ascending cholangitis, possible     Elevated LFTs     Thrombocytopenia (HCC)     Leukopenia     ISIDRA (acute kidney injury) ([facility] [address]     SIRS (systemic inflammatory response syndrome) ([facility])     Cholecystitis, acute     Neutropenia (HCC)     Aortic stenosis, severe     Colitis     Nausea     Diarrhea     Essential hypertension     Anemia, normocytic normochromic     Hypocalcemia     Cirrhosis (HCC)     S/P AVR (aortic valve replacement)     Obesity (BMI 30-39. 9)     History of atrial fibrillation     Hypomagnesemia     Asymptomatic bacteriuria - no clinical indication for antimicrobial therapy     CKD (chronic kidney disease) stage 3, GFR 30-59 ml/min (HCC)     Acute renal failure superimposed on stage 3 chronic kidney disease (HCC)     VRE (vancomycin resistant enterococcus) culture positive     CHF (congestive heart failure), NYHA class I, acute on chronic, combined (HCC)     Shortness of breath     Acute kidney injury ([facility] [address]     Hypotension     Dehydration      Subjective:   Admit Date: 3/24/2022    Interval History:   Seen for acute kidney injury on chronic kidney disease  Comfortably asleep this morning  Updated by the staff  No issues  Had a high-volume paracentesis yesterday with removal of 5.5 L  Blood pressure is labile  Urine output is incompletely documented      Medications:   Scheduled Meds:   iron sucrose  300 mg IntraVENous Q48H    midodrine  15 mg Oral TID WC    levothyroxine  50 mcg Oral QAM AC    lidocaine 1 % injection  5 mL IntraDERmal Once    sodium chloride flush  5-40 mL IntraVENous 2 times per day    pantoprazole  40 mg Oral BID AC    rifaximin  550 mg Oral BID    sodium chloride flush  5-40 mL IntraVENous 2 times per day    PARoxetine  30 mg Oral QAM     Continuous Infusions:   sodium chloride 50 mL/hr at 03/28/22 2338    sodium chloride      sodium chloride         CBC:   Recent Labs     03/27/22 0418   WBC 4.7*   HGB 10.0*        CMP:    Recent Labs     03/27/22 0418 03/28/22 0419 03/29/22 0442    139 140   K 4.2 4.2 3.7    103 103   CO2 20* 22* 20*   BUN 67* 67* 66*   CREATININE 2.9* 2.6* 2.9*   GLUCOSE 93 113* 92   CALCIUM 8.3* 8.7 8.8   LABGLOM 16* 18* 16*     Troponin: No results for input(s): TROPONINI in the last 72 hours. BNP: No results for input(s): BNP in the last 72 hours. INR:   Recent Labs     03/29/22 0442   INR 1.45*     Lipids: No results for input(s): CHOL, LDLDIRECT, TRIG, HDL, AMYLASE, LIPASE in the last 72 hours. Liver:   Recent Labs     03/29/22 0442   AST 26   ALT 8*   ALKPHOS 61   PROT 5.6*   LABALBU 3.6   BILITOT 1.5*     Iron:    Recent Labs     03/27/22 0418   FERRITIN 117     US GUIDED PARACENTESIS   Final Result   Successful ultrasound-guided paracentesis. **This report has been created using voice recognition software. It may contain minor errors which are inherent in voice recognition technology. **      Final report electronically signed by Dr. Laurie Huntley on 3/28/2022 4:47 PM      US RENAL COMPLETE   Final Result      1. Bilateral renal cysts. No evidence of stones or hydronephrosis. 2. Moderate amount of free fluid within the abdomen. 3. Postvoid images could not be obtained through the bladder. 4. There are bilateral ureteric jets present. **This report has been created using voice recognition software. It may contain minor errors which are inherent in voice recognition technology. **      Final report electronically signed by DR Magdalena Medellin on 3/26/2022 8:51 AM      XR CHEST PORTABLE   Final Result   Increased bilateral lower lung consolidations. Bilateral pleural effusions. This document has been electronically signed by: Albert Maldonado MD on    03/26/2022 04:26 AM      [address]   Final Result    Successful ultrasound-guided paracentesis. **This report has been created using voice recognition software. It may contain minor errors which are inherent in voice recognition technology. **      Final report electronically signed by Dr. Primo Rogers MD on 3/25/2022 10:05 AM      XR CHEST PORTABLE   Final Result   1. Cardiomegaly with possible failure. Recommend follow-up. 2. Patchy opacity at the right lung base could be a superimposed    infectious component. Follow-up to clearing recommended. 3. Indeterminate appearance of the proximal left humerus. Not seen on    prior exams. Correlate with history of pain in this area. Further evaluate    if clinically indicated. This document has been electronically signed by: Jann Peraza MD on 03/24/2022 11:58 PM            Objective:   Vitals: /69   Pulse 92   Temp 97.5 °F (36.4 °C) (Oral)   Resp 14   Ht 5' 4\" (1.626 m)   Wt 192 lb 4.8 oz (87.2 kg)   SpO2 94%   BMI 33.01 kg/m²    Wt Readings from Last 3 Encounters:   03/27/22 192 lb 4.8 oz (87.2 kg)   03/15/22 178 lb 11.2 oz (81.1 kg)   02/09/22 166 lb (75.3 kg)      24HR INTAKE/OUTPUT:      Intake/Output Summary (Last 24 hours) at 3/29/2022 1953  Last data filed at 3/29/2022 0768  Gross per 24 hour   Intake 230 ml   Output 120 ml   Net 110 ml       Constitutional: Well-developed elderly lady notably asleep this morning  Skin:normal with no rash or lesions.   HEENT: Normal.Throat is clear .Oral mucosa is moist   Neck:supple with no carotid bruit  Cardiovascular:  S1, S2 without murmur or rubs   Respiratory:  Clear to auscultation  Abdomen: Soft. Good bowel sounds. Ext: No LE edema  Musculoskeletal:Intact  Neuro: Deferred      Electronically signed by Mika Oden MD on 3/29/2022 at 6:58 AM  **This report has been created using voice recognition software. It maycontain minor  errors which are inherent in voice recognition technology. **

## 2022-03-29 NOTE — CARE COORDINATION
Collaborative Discharge Planning    Ivan Delgado  :  1940  MRN:  [id number]    ADMIT DATE:  3/24/2022      Discharge Planning Discharge Planning  Living Arrangements: Alone  Support Systems: Family Members  Potential Assistance Needed: N/A  Type of Home Care Services: None  Patient expects to be discharged to[de-identified] [facility] Board Notes /Social Work Whiteboard Notes  /Social Work Whiteboard: 3/28;  - [facility] (nursing, PT/OT, Aide), need orders     Procedure:   3/28 Paracentesis = 5.5L remove    Discharge Plan Home with home health  plans home alone; son Taj Coe attentive to needs and prefers [address] (nsg, therapy, aide); therapy following, follows [facility] for pulmonary hypertension; monitor if this is planned again    Discharge Milestones and Delays: Administering IV medications  ISIDRA/Decompensated Liver Cirrhois/Ascites/Hypotension/CHF. Rifaximin continued. Creatinine 2.9; monitor. Urine Output = 120 ml/24h; monitor.  IVF continued      SIGNED:  Wale Sommers RN   3/29/2022, 7:44 AM

## 2022-03-30 LAB
ANION GAP SERPL CALCULATED.3IONS-SCNC: 17 MEQ/L (ref 8–16)
BASE EXCESS MIXED: -3 MMOL/L (ref -2–3)
BUN BLDV-MCNC: 63 MG/DL (ref 7–22)
CALCIUM SERPL-MCNC: 8.5 MG/DL (ref 8.5–10.5)
CHLORIDE BLD-SCNC: 104 MEQ/L (ref 98–111)
CO2: 15 MEQ/L (ref 23–33)
COLLECTED BY:: ABNORMAL
CREAT SERPL-MCNC: 2.9 MG/DL (ref 0.4–1.2)
GFR SERPL CREATININE-BSD FRML MDRD: 16 ML/MIN/1.73M2
GLUCOSE BLD-MCNC: 107 MG/DL (ref 70–108)
HCO3, MIXED: 23 MMOL/L (ref 23–28)
O2 SAT, MIXED: 52 %
PCO2, MIXED VENOUS: 43 MMHG (ref 41–51)
PH, MIXED: 7.33 (ref 7.31–7.41)
PO2 MIXED: 30 MMHG (ref 25–40)
POTASSIUM SERPL-SCNC: 4.4 MEQ/L (ref 3.5–5.2)
REASON FOR REJECTION: NORMAL
REJECTED TEST: NORMAL
SODIUM BLD-SCNC: 136 MEQ/L (ref 135–145)

## 2022-03-30 PROCEDURE — 6360000002 HC RX W HCPCS: Performed by: INTERNAL MEDICINE

## 2022-03-30 PROCEDURE — 87205 SMEAR GRAM STAIN: CPT

## 2022-03-30 PROCEDURE — 82803 BLOOD GASES ANY COMBINATION: CPT

## 2022-03-30 PROCEDURE — 82150 ASSAY OF AMYLASE: CPT

## 2022-03-30 PROCEDURE — 6370000000 HC RX 637 (ALT 250 FOR IP): Performed by: STUDENT IN AN ORGANIZED HEALTH CARE EDUCATION/TRAINING PROGRAM

## 2022-03-30 PROCEDURE — 6370000000 HC RX 637 (ALT 250 FOR IP): Performed by: NURSE PRACTITIONER

## 2022-03-30 PROCEDURE — 84157 ASSAY OF PROTEIN OTHER: CPT

## 2022-03-30 PROCEDURE — 2060000000 HC ICU INTERMEDIATE R&B

## 2022-03-30 PROCEDURE — 87075 CULTR BACTERIA EXCEPT BLOOD: CPT

## 2022-03-30 PROCEDURE — 2580000003 HC RX 258: Performed by: INTERNAL MEDICINE

## 2022-03-30 PROCEDURE — 97535 SELF CARE MNGMENT TRAINING: CPT

## 2022-03-30 PROCEDURE — 80048 BASIC METABOLIC PNL TOTAL CA: CPT

## 2022-03-30 PROCEDURE — 2500000003 HC RX 250 WO HCPCS: Performed by: NURSE PRACTITIONER

## 2022-03-30 PROCEDURE — 6360000002 HC RX W HCPCS: Performed by: STUDENT IN AN ORGANIZED HEALTH CARE EDUCATION/TRAINING PROGRAM

## 2022-03-30 PROCEDURE — 89050 BODY FLUID CELL COUNT: CPT

## 2022-03-30 PROCEDURE — 82042 OTHER SOURCE ALBUMIN QUAN EA: CPT

## 2022-03-30 PROCEDURE — 6370000000 HC RX 637 (ALT 250 FOR IP)

## 2022-03-30 PROCEDURE — 2500000003 HC RX 250 WO HCPCS: Performed by: INTERNAL MEDICINE

## 2022-03-30 PROCEDURE — 6370000000 HC RX 637 (ALT 250 FOR IP): Performed by: INTERNAL MEDICINE

## 2022-03-30 PROCEDURE — 2580000003 HC RX 258: Performed by: STUDENT IN AN ORGANIZED HEALTH CARE EDUCATION/TRAINING PROGRAM

## 2022-03-30 PROCEDURE — 99233 SBSQ HOSP IP/OBS HIGH 50: CPT | Performed by: INTERNAL MEDICINE

## 2022-03-30 PROCEDURE — 94760 N-INVAS EAR/PLS OXIMETRY 1: CPT

## 2022-03-30 PROCEDURE — 36415 COLL VENOUS BLD VENIPUNCTURE: CPT

## 2022-03-30 PROCEDURE — 87070 CULTURE OTHR SPECIMN AEROBIC: CPT

## 2022-03-30 PROCEDURE — 88305 TISSUE EXAM BY PATHOLOGIST: CPT

## 2022-03-30 PROCEDURE — 97110 THERAPEUTIC EXERCISES: CPT

## 2022-03-30 PROCEDURE — 82945 GLUCOSE OTHER FLUID: CPT

## 2022-03-30 PROCEDURE — 99223 1ST HOSP IP/OBS HIGH 75: CPT | Performed by: INTERNAL MEDICINE

## 2022-03-30 PROCEDURE — 2580000003 HC RX 258

## 2022-03-30 PROCEDURE — 2580000003 HC RX 258: Performed by: NURSE PRACTITIONER

## 2022-03-30 PROCEDURE — 99232 SBSQ HOSP IP/OBS MODERATE 35: CPT | Performed by: NURSE PRACTITIONER

## 2022-03-30 PROCEDURE — 6360000002 HC RX W HCPCS: Performed by: NURSE PRACTITIONER

## 2022-03-30 PROCEDURE — 88112 CYTOPATH CELL ENHANCE TECH: CPT

## 2022-03-30 PROCEDURE — P9047 ALBUMIN (HUMAN), 25%, 50ML: HCPCS | Performed by: STUDENT IN AN ORGANIZED HEALTH CARE EDUCATION/TRAINING PROGRAM

## 2022-03-30 PROCEDURE — 83615 LACTATE (LD) (LDH) ENZYME: CPT

## 2022-03-30 RX ORDER — SODIUM BICARBONATE 650 MG/1
1300 TABLET ORAL 2 TIMES DAILY
Status: DISCONTINUED | OUTPATIENT
Start: 2022-03-30 | End: 2022-04-02

## 2022-03-30 RX ORDER — ALBUMIN (HUMAN) 12.5 G/50ML
25 SOLUTION INTRAVENOUS ONCE
Status: COMPLETED | OUTPATIENT
Start: 2022-03-30 | End: 2022-03-30

## 2022-03-30 RX ORDER — MIDODRINE HYDROCHLORIDE 5 MG/1
5 TABLET ORAL
Status: DISCONTINUED | OUTPATIENT
Start: 2022-03-30 | End: 2022-03-30

## 2022-03-30 RX ORDER — LIDOCAINE HYDROCHLORIDE 10 MG/ML
5 INJECTION, SOLUTION EPIDURAL; INFILTRATION; INTRACAUDAL; PERINEURAL ONCE
Status: DISCONTINUED | OUTPATIENT
Start: 2022-03-30 | End: 2022-04-05 | Stop reason: HOSPADM

## 2022-03-30 RX ORDER — MIDODRINE HYDROCHLORIDE 10 MG/1
10 TABLET ORAL
Status: DISCONTINUED | OUTPATIENT
Start: 2022-03-31 | End: 2022-04-05 | Stop reason: HOSPADM

## 2022-03-30 RX ADMIN — PAROXETINE 30 MG: 30 TABLET, FILM COATED ORAL at 20:40

## 2022-03-30 RX ADMIN — Medication 3 MG: at 00:03

## 2022-03-30 RX ADMIN — CEFTRIAXONE 2000 MG: 10 INJECTION, POWDER, FOR SOLUTION INTRAVENOUS at 11:30

## 2022-03-30 RX ADMIN — SODIUM CHLORIDE, PRESERVATIVE FREE 10 ML: 5 INJECTION INTRAVENOUS at 08:31

## 2022-03-30 RX ADMIN — SODIUM BICARBONATE: 84 INJECTION, SOLUTION INTRAVENOUS at 11:54

## 2022-03-30 RX ADMIN — ALBUMIN (HUMAN) 25 G: 0.25 INJECTION, SOLUTION INTRAVENOUS at 20:37

## 2022-03-30 RX ADMIN — SODIUM BICARBONATE 1300 MG: 650 TABLET ORAL at 11:30

## 2022-03-30 RX ADMIN — MIDODRINE HYDROCHLORIDE 5 MG: 5 TABLET ORAL at 08:29

## 2022-03-30 RX ADMIN — PANTOPRAZOLE SODIUM 40 MG: 40 TABLET, DELAYED RELEASE ORAL at 05:05

## 2022-03-30 RX ADMIN — RIFAXIMIN 550 MG: 550 TABLET ORAL at 20:40

## 2022-03-30 RX ADMIN — IRON SUCROSE 300 MG: 20 INJECTION, SOLUTION INTRAVENOUS at 13:46

## 2022-03-30 RX ADMIN — PANTOPRAZOLE SODIUM 40 MG: 40 TABLET, DELAYED RELEASE ORAL at 16:44

## 2022-03-30 RX ADMIN — ONDANSETRON 4 MG: 2 INJECTION INTRAMUSCULAR; INTRAVENOUS at 21:38

## 2022-03-30 RX ADMIN — SODIUM BICARBONATE 1300 MG: 650 TABLET ORAL at 20:41

## 2022-03-30 RX ADMIN — BUMETANIDE 0.5 MG/HR: 0.25 INJECTION INTRAMUSCULAR; INTRAVENOUS at 18:23

## 2022-03-30 RX ADMIN — MIDODRINE HYDROCHLORIDE 5 MG: 5 TABLET ORAL at 11:30

## 2022-03-30 RX ADMIN — LEVOTHYROXINE SODIUM 50 MCG: 0.05 TABLET ORAL at 05:05

## 2022-03-30 RX ADMIN — ONDANSETRON 4 MG: 2 INJECTION INTRAMUSCULAR; INTRAVENOUS at 06:32

## 2022-03-30 RX ADMIN — RIFAXIMIN 550 MG: 550 TABLET ORAL at 08:29

## 2022-03-30 RX ADMIN — MIDODRINE HYDROCHLORIDE 5 MG: 5 TABLET ORAL at 16:44

## 2022-03-30 ASSESSMENT — PAIN SCALES - GENERAL
PAINLEVEL_OUTOF10: 0

## 2022-03-30 ASSESSMENT — PAIN - FUNCTIONAL ASSESSMENT
PAIN_FUNCTIONAL_ASSESSMENT: 0-10
PAIN_FUNCTIONAL_ASSESSMENT: 0-10

## 2022-03-30 NOTE — PROGRESS NOTES
Spoke to Naresh in Dr. Cain Delgado office. He is not receiving call this week due to change in computer system per Naresh. All consults to be redirected to Heart center at [facility]. Updated Kenyetta Wells CNP and Dr. Yennifer Del Rio.

## 2022-03-30 NOTE — CONSULTS
[facility]    Patient's Name/Date of Birth: Kathryn Aragon / 1940 (60 y.o.)    Date: March 30, 2022     Referring Provider: Jeffry Corona DO    CHIEF COMPLAINT: weakness      HPI: This is a pleasant 81 y.o. female presents with weakness to [facility] on 3/23/22. Patient has significant past medical history of pulmonary HTN, severe tricuspid regurgitation, decompensated liver cirrhosis, persistent A-fib on carvedilol, and HFpEF and CKD stage III. Patient was recently discharged from the hospital 3/17/22. Patient symptoms started two days prior with decreased PO intake, nausea, dry heaves, persistent diarrhea with watery stool. Consulted for aortic insuffiencey +2, pulmonary hypertension with severe tricuspid regurgitation. Echo 3/30/22 pending, echo 3/24/22 severe tricuspid regurgitation, increased RV size and thickness with PAP 65-70mmHg. Echo 08/2018 shows normal TR with PAP 45mmHg. Catheter 02/2022 shows pulmonary hypertension. EKG on 3/24/22 patient is in persistent atrial fibration consistent with prior readings. Patient complains of cold and numb hands, as well as distention of the abdomen. Patient was receiving paracentesis for hepatic congestion and cirrhosis.      Echo: Results for orders placed during the hospital encounter of 03/11/22    Echocardiogram 2D/ M-Mode/ Colorflow/ Do    Narrative  Transthoracic Echocardiography Report (TTE)    Demographics    Patient Name    Rin Reasoner Gender               Female    MR #            [id number]       Race                     Ethnicity    Account #       [de-identified]       Room Number          0003    Accession       [id number]      Date of Study        03/12/2022  Number    Date of Birth   1940      Referring Physician  Phylicia Low, RAINA Yang PA-C    Age             81 year(s)      Sonographer          Regla Patel, [address] [facility] MD  Physician    Procedure    Type of Study    TTE procedure:ECHOCARDIOGRAM LIMITED, ECHOCARDIOGRAM COMPLETE 2D W DOPPLER  W COLOR. Procedure Date  Date: 03/12/2022 Start: 07:53 AM    Study Location: Bedside  Technical Quality: Adequate visualization    Indications:Shortness of breath. Additional Medical History:Pulmonary Hypertension, Hypertrophic  Cardiomyopathy, Hyperlipidemia, Atrial fibrillation, Acute Kidney injury,  Aortic Valve replacement: 21 mm [employer] bioprosthetic,  Hypertension, Congestive heart failure, Arthritis, Coronary artery disease. Patient Status: Routine    Height: 65 inches Weight: 166 pounds BSA: 1.83 m^2 BMI: 27.62 kg/m^2    BP: 101/54 mmHg    Allergies  - See Epic.    - No Known Allergies. Conclusions    Summary  Normal left ventricle size and systolic function. Ejection fraction was  estimated at -55%. There were no regional left ventricular wall motion  abnormalities and wall thickness was within normal limits. The right ventricular size was increasedl with normal systolic function  and increase wall thickness. Right ventricular systolic pressure was elevated. The tricuspid valve structure was normal with normal leaflet separation. DOPPLER: There was no evidence of tricuspid stenosis. There was n severe  tricuspid reg  The pulmonic valve leaflets exhibited normal thickness, no calcification,  and normal cuspal separation. DOPPLER: The transpulmonic velocity was  within the normal range mild reg  Pulmonary artery systolic pressure calculated from tricuspid regurgitation  jet is 65-70 . severe pulmonary htn    Signature    ----------------------------------------------------------------  Electronically signed by Anthony Sheets MD (Interpreting  physician) on 03/14/2022 at 01:10 PM  ----------------------------------------------------------------    Findings    Mitral Valve  The mitral valve structure was normal with normal leaflet separation.   DOPPLER: The transmitral velocity was within the normal range with no  evidence for mitral stenosis. There was mild mitral regurgitation. Aortic Valve  Structurally normal aortic valve. Normally functioning bioprosthetic valve  in aortic position. no ai moderate increase velocity across the valve    Tricuspid Valve  The tricuspid valve structure was normal with normal leaflet separation. DOPPLER: There was no evidence of tricuspid stenosis. There was n severe  tricuspid reg    Pulmonic Valve  The pulmonic valve leaflets exhibited normal thickness, no calcification,  and normal cuspal separation. DOPPLER: The transpulmonic velocity was  within the normal range mild reg  Pulmonary artery systolic pressure calculated from tricuspid regurgitation  jet is 65-70 . severe pulmonary htn    Left Atrium  Left atrial size was mildly No evidence of thrombus in the left atrial  appendage. Left Ventricle  Normal left ventricle size and systolic function. Ejection fraction was  estimated at -55%. There were no regional left ventricular wall motion  abnormalities and wall thickness was within normal limits. Right Atrium  Right atrial size was dilated    Right Ventricle  The right ventricular size was increasedl with normal systolic function  and increase wall thickness. Right ventricular systolic pressure was elevated. Pericardial Effusion  The pericardium was normal in appearance with no evidence of a pericardial  effusion. Pleural Effusion  mild pleural effusion. Aorta / Great Vessels  -Aortic root dimension within normal limits. -IVC size is within normal limits with normal respiratory phasic changes.     M-Mode/2D Measurements & Calculations    LV Diastolic      LV Systolic Dimension: 2.6 cm      LA Area: 22.4 cm^2  Dimension: 3.6 cm LV Volume Diastolic: 91.8 ml  LV UE:61.4 %      LV Volume Systolic: 29.5 ml  LV PW Diastolic:  LV EDV/LV EDV Index: 54.4 ml/30  1.4 cm            m^2LV ESV/LV ESV Index: 24.6 ml/13 RV Diastolic  Septum Diastolic: m^2                                Dimension: 3.5 cm  1.5 cm            EF Calculated: 54.8 %  Ascending Aorta: 3.5  cm  LA volume/Index: 63.1  LVOT: 1.9 cm                       ml /34m^2    Doppler Measurements & Calculations    MV Peak E-Wave: 171 AV Peak Velocity: 301   LVOT Peak Velocity: 89.4 cm/s  cm/s                cm/s                    LVOT Mean Velocity: 62.5 cm/s  MV Peak A-Wave: 129 AV Peak Gradient: 36.24 LVOT Peak Gradient: 3 mmHgLVOT  cm/s                mmHg                    Mean Gradient: 2 mmHg  MV E/A Ratio: 1.33  AV Mean Velocity: 208  MV Peak Gradient:   cm/s                    TV Peak E-Wave: 118 cm/s  11.7 mmHg           AV Mean Gradient: 20    TV Peak A-Wave: 105 cm/s  mmHg  MV Deceleration     AV VTI: 65.8 cm         TV Peak Gradient: 5.57 mmHg  Time: 275 msec      AV Area                 TR Velocity:353 cm/s  MV P1/2t: 80 msec   (Continuity):0.84 cm^2  TR Gradient:49.84 mmHg  MVA by PHT:2.75                             PV Peak Velocity: 89.5 cm/s  cm^2                LVOT VTI: 19.6 cm       PV Peak Gradient: 3.2 mmHg  IVRT: 32 msec    OR ED Velocity: 184 cm/s  AV DVI (VTI): 0.3AV DVI  MR Velocity: 538    (Vmax):0.3  cm/s    [id number]       All labs, EKG's, diagnostic testing and images as well as cardiac cath, stress testing were reviewed during this encounter    Past Medical History:   Diagnosis Date    ISIDRA (acute kidney injury) ([facility] [address] 06/11/2016    Arthritis     Atrial fibrillation ([facility] [address]     Bleeding stomach ulcer 2011    CAD (coronary artery disease)     Cirrhosis of liver with ascites (HCC)     Clostridium difficile infection     History of blood transfusion     Hyperlipidemia     Hypertension     PONV (postoperative nausea and vomiting)      Past Surgical History:   Procedure Laterality Date    AORTIC VALVE 5581    sodium chloride flush 0.9 % injection 5-40 mL  5-40 mL IntraVENous 2 times per day ARTHUR Solis CNP   10 mL at 03/30/22 0831    sodium chloride flush 0.9 % injection 5-40 mL  5-40 mL IntraVENous PRN ARTHUR Solis CNP        0.9 % sodium chloride infusion  25 mL IntraVENous PRN ARTHUR Solis - CNP        PARoxetine (PAXIL) tablet 30 mg  30 mg Oral QAM ARTHUR Solis CNP   30 mg at 03/29/22 0901    acetaminophen (TYLENOL) tablet 500 mg  500 mg Oral Q6H PRN ARTHUR Foley - RAINA        ondansetron [facility] injection 4 mg  4 mg IntraVENous Q6H PRN Jorge Alberto Zacarias DO   4 mg at 03/30/22 6701    melatonin tablet 3 mg  3 mg Oral Nightly PRN ARTHUR Das CNP   3 mg at 03/30/22 0003     Prior to Admission medications    Medication Sig Start Date End Date Taking?  Authorizing Provider   furosemide (LASIX) 20 MG tablet Take 20 mg by mouth daily   Yes Historical Provider, MD   midodrine (PROAMATINE) 5 MG tablet Take 1 tablet by mouth 3 times daily (with meals) 3/16/22   Kieran Anderson PA-C   aspirin 325 MG tablet Take 325 mg by mouth daily    Historical Provider, MD   losartan (COZAAR) 25 MG tablet Take 25 mg by mouth daily At noon    Historical Provider, MD   metOLazone (ZAROXOLYN) 5 MG tablet Take 5 mg by mouth daily     Historical Provider, MD   vitamin C (ASCORBIC ACID) 500 MG tablet Take 500 mg by mouth daily    Historical Provider, MD   spironolactone (ALDACTONE) 50 MG tablet Take 50 mg by mouth daily @@ noon    Historical Provider, MD   omeprazole (PRILOSEC) 20 MG delayed release capsule Take 20 mg by mouth in the morning and at bedtime     Historical Provider, MD   Multiple Vitamins-Minerals (CENTRUM SILVER ADULT 50+ PO) Take 1 tablet by mouth daily    Historical Provider, MD   Calcium Carb-Cholecalciferol (CALTRATE 600+D3 PO) Take 1 tablet by mouth 2 times daily    Historical Provider, MD   sucralfate (CARAFATE) 1 GM tablet Take 1 tablet by mouth 4 times daily (before meals and nightly)  Patient taking differently: Take 1 g by mouth daily  8/26/18   Sandra Clinton MD   carvedilol (COREG) 6.25 MG tablet Take 1 tablet by mouth 2 times daily (with meals) 8/26/18   Sandra Clinton MD   Misc Natural Products (OSTEO BI-FLEX TRIPLE STRENGTH PO) Take 750 mg by mouth daily    Historical Provider, MD   Multiple Vitamins-Minerals (PRESERVISION AREDS) CAPS Take 1 capsule by mouth 2 times daily     Historical Provider, MD   fluticasone (FLONASE) 50 MCG/ACT nasal spray 1-2 sprays by Nasal route daily    Historical Provider, MD   Pyridoxine HCl (VITAMIN B-6) 100 MG tablet Take 100 mg by mouth daily    Historical Provider, MD   loratadine (CLARITIN) 10 MG tablet Take 10 mg by mouth daily     Historical Provider, MD   PARoxetine (PAXIL) 30 MG tablet Take 30 mg by mouth every morning    Historical Provider, MD   traZODone (DESYREL) 50 MG tablet Take 50 mg by mouth nightly as needed     Historical Provider, MD   Scheduled Meds:   sodium bicarbonate  1,300 mg Oral BID    cefTRIAXone (ROCEPHIN) IV  2,000 mg IntraVENous Q24H    midodrine  5 mg Oral TID WC    iron sucrose  300 mg IntraVENous Q48H    levothyroxine  50 mcg Oral QAM AC    lidocaine 1 % injection  5 mL IntraDERmal Once    sodium chloride flush  5-40 mL IntraVENous 2 times per day    pantoprazole  40 mg Oral BID AC    rifaximin  550 mg Oral BID    sodium chloride flush  5-40 mL IntraVENous 2 times per day    PARoxetine  30 mg Oral QAM     Continuous Infusions:   sodium bicarbonate infusion 100 mL/hr at 03/30/22 1154    sodium chloride      sodium chloride       PRN Meds:.sodium chloride flush, sodium chloride, sodium chloride flush, sodium chloride, acetaminophen, ondansetron, melatonin    Allergies   Allergen Reactions    Ace Inhibitors Other (See Comments)     Family History   Problem Relation Age of Onset    Diabetes Father     Stroke Father     High Blood Pressure Mother     Cancer Sister         blood    Diabetes Brother     Cancer Brother         lymphoma    High Blood Pressure Maternal Grandmother     High Blood Pressure Maternal Grandfather     Diabetes Paternal Grandmother     Diabetes Paternal Grandfather     Diabetes Brother     Heart Disease Neg Hx      Social History     Socioeconomic History    Marital status:      Spouse name: Not on file    Number of children: 2    Years of education: Not on file    Highest education level: Not on file   Occupational History    Not on file   Tobacco Use    Smoking status: Never Smoker    Smokeless tobacco: Never Used   Vaping Use    Vaping Use: Never used   Substance and Sexual Activity    Alcohol use: No    Drug use: No    Sexual activity: Not Currently   Other Topics Concern    Not on file   Social History Narrative    Not on file     Social Determinants of Health     Financial Resource Strain:     Difficulty of Paying Living Expenses: Not on file   Food Insecurity:     Worried About Running Out of Food in the Last Year: Not on file    Krystle of Food in the Last Year: Not on file   Transportation Needs:     Lack of Transportation (Medical): Not on file    Lack of Transportation (Non-Medical):  Not on file   Physical Activity:     Days of Exercise per Week: Not on file    Minutes of Exercise per Session: Not on file   Stress:     Feeling of Stress : Not on file   Social Connections:     Frequency of Communication with Friends and Family: Not on file    Frequency of Social Gatherings with Friends and Family: Not on file    Attends Buddhist Services: Not on file    Active Member of Clubs or Organizations: Not on file    Attends Club or Organization Meetings: Not on file    Marital Status: Not on file   Intimate Partner Violence:     Fear of Current or Ex-Partner: Not on file    Emotionally Abused: Not on file    Physically Abused: Not on file    Sexually Abused: Not on file   Housing Stability:     Unable to Pay for Housing in the Last Year: Not on file    Number of Places Lived in the Last Year: Not on file    Unstable Housing in the Last Year: Not on file     ROS:   Constitutional: Denies any recent wt change. Eyes:  Denies any blurring or double vision, no glaucoma  Ears/Nose/Mouth/Throat:  Denies any chronic sinus/rhinitis, bleeding gums  Cardiovascular:  As described above. Respiratory:  Denies any frequent cough, wheezing or coughing up blood  Genitourinary:  Denies difficulty with urination and kidney stones  Gastrointestinal:  Denies any chronic problems with abdominal pain, nausea, vomiting or diarrhea  Musculoskeletal:  Denies any joint pain, back pain, or difficulty walking  Integumentary:  Denies any rash  Neurological:  No numbness or tingling  Endocrine:  Denies any polydipsia. Hematologic/Lymphatic:  Denies any hemorrhage or lymphatic drainage problems. Labs:  CBC: No results for input(s): WBC, HGB, HCT, MCV, PLT in the last 72 hours. BMP:   Recent Labs     03/28/22  0419 03/29/22  0442 03/30/22  0556    140 136   K 4.2 3.7 4.4    103 104   CO2 22* 20* 15*   BUN 67* 66* 63*   CREATININE 2.6* 2.9* 2.9*   MG  --  1.6  --      Accucheck Glucoses:   Recent Labs     03/29/22  0720   POCGLU 83     Cardiac Enzymes: No results for input(s): CKTOTAL, CKMB, CKMBINDEX, TROPONINI in the last 72 hours. PT/INR:   Recent Labs     03/29/22  0442   INR 1.45*     APTT: No results for input(s): APTT in the last 72 hours.   Liver Profile:  Lab Results   Component Value Date    AST 26 03/29/2022    ALT 8 03/29/2022    BILIDIR 0.7 03/29/2022    BILITOT 1.5 03/29/2022    ALKPHOS 61 03/29/2022     Lab Results   Component Value Date    CHOL 97 02/09/2022    HDL 53 02/09/2022    TRIG 62 02/09/2022     TSH:   Lab Results   Component Value Date    TSH 5.310 03/27/2022     UA:   Lab Results   Component Value Date    COLORU DK YELLOW 03/26/2022    PHUR 5.0 03/26/2022    LABCAST NONE SEEN 03/11/2022    LABCAST NONE SEEN 03/11/2022 WBCUA 2-4 03/26/2022    RBCUA 3-5 03/26/2022    YEAST NONE SEEN 03/26/2022    BACTERIA FEW 03/26/2022    SPECGRAV 1.017 03/11/2022    LEUKOCYTESUR TRACE 03/26/2022    UROBILINOGEN 1.0 03/26/2022    BILIRUBINUR NEGATIVE 03/26/2022    BILIRUBINUR NEGATIVE 08/21/2011    BLOODU NEGATIVE 03/26/2022    GLUCOSEU NEGATIVE 03/26/2022         Physical Exam:  Vitals:    03/30/22 1115   BP: (!) 112/59   Pulse: 91   Resp: 18   Temp: 97.5 °F (36.4 °C)   SpO2: 94%        Intake/Output Summary (Last 24 hours) at 3/30/2022 1513  Last data filed at 3/30/2022 1347  Gross per 24 hour   Intake 2459.4 ml   Output 200 ml   Net 2259.4 ml      General:  No acute distress  Neck: Supple, no carotid bruits  Heart: Irregular rhythm normal rate. Systolic murmur over left parasternal  5th intercostal and palpable right ventricular heave  Lungs: clear to ascultation no rales, wheezes, or rhonchi  Abdomen: positive bowel sounds, soft, non-tender, enlarged liver  Extremities:cyanosis of finger tips bilaterally, coolness of the arm and legs b/l. Legs +4 pitting edema. Neurologic: alert and oriented x 3, cranial nerves 2-12 grossly intact, motor and sensory intact, moving all extremities  Skin: No rashes     Assessment:  Torrential TR  RV failure  Severe PH  Low-output state  Congestive cirrhosis  ISIDRA on CKD  Preserved EF  S/p Bioprosthetic AVR with AI  Mild MR  Patent coronaries    Plan:    Had extensive conversation with the patient regarding her guarded prognosis. She is clearly in RV failure with low-output failure. Her last TTE shows severely dilated RV with poor systolic function and D-shaped LV, severe RA dilation, torrential TR, and severe PH, with associated MR and AI. Her failing liver and kidney have become prohibitive in terms of volume control. She previously wanted all aggressive care, but at this point no true options appear to be realistic in terms of salvage therapy.   She is non-operative, and intervention of the TV is unlikely to result in any meaningful result due to the RV dilation and PH. She would need WESTNO, SGC insertion, ICU, and likely inotropes without exit strategy, which would not truly result in a way for her to recover. Explained the options of care and the workup required if patient wanted to pursue intervention in hospoital. Explained the uncertainty of improvement in her current condition after the extensive intervention. Explained the patient her option to pursue further care with outside facilities and her choice for a second opinion. Patient's son at bedside, they both understood the diagnosis. At this time, the patient does not want further intervention with regards to her severe TR and pulmonary HTN. Patient is willing to speak with Palliative care again. Patient at this time also expressed that if her heart stopped she does not want mechanical, pharmaceutical, or electrical intervention, and does not want intubation. Continue aggressive diuresis with Bumex gtt, wrap legs, K>4, Mg>2. Consider paracentesis. Midodrine can continue to help diurese as she will likely no tolerated aggressive drainage. Use IV Albumin 25% for replenishment during hypotension    Thank you for allowing us to participate in the care of this patient. Please do not hesitate to call us with questions.     Electronically signed by Keri Swift MD on 3/30/22 at 5:18 PM EDT  Interventional Cardiology - [facility]

## 2022-03-30 NOTE — PROGRESS NOTES
following  3/30: cautious IV Sodium bicarb drip @150ml/hr, Sodium bicarb tab 1,300 mg BID    5. Anion gap metabolic acidosis: multifactorial ISIDRA, poor oral intake, electrolyte rearrangement, tissue hypoxia, hypotension. Plan: cont gently fluid replacement giving patient possible intravascular depletion s/p paracentesis, magic cup  3/30: worsening metabolic acidosis. 2/2 worsening ISIDRA. Plan: see 4    6. Severe Pulmonary HTN: Echo (3/11/22): Pulmonary artery pressure from tricuspid regurgitation 65/70. Plan: close monitor diuretic giving patient hypotension and decompensate liver, Patient had an appointment with [facility] at the beginning of April. Plan: will discuss with patient Card. To eval the benefit of inpatient transferred to [facility] [place] clinic giving patient worsening     7. Severe Tricuspid regurgitation: Echo (3/11/22). Patient is not a surgical candidate for now. Will referred to [facility] as outpatient    8. Persistent a fib: home med includes Carvedilol. Patient was afib since admission to 3/25/22. Had been on sinus rhythm since 3/26/22. Thyroid function test on 3/27/22 showed hypothyroidism. Plan: cont tele, hold Carvedilol due to hypotension, start Synthroid 50 mg daily, contact with patient's Cardiology Dr. Clemencia Cerda to eval whether patient will need Eliquis 2.5mg on discharge. Consult Cardiology. 9. Hypothyroidism: new diagnosis TSH 5.310 with free T4 0.83. Plan: start Synthroid 50 mg daily. 10. Acute on chronic HFpEF: Echo 3/11/22 EF 60%. Hypotension on Midodrine 15mg TID. Cheetah test showed good CO and low TPR. Plan: monitor diuretic, low sodium diet. Strict <2L fluid intake daily. 11. Aortic insufficiency: s/p AVR. Left heart cath (2/9/22): 2+ AI which could contribute for Pul HTN    12. Normocytic Anemia: anemia work up showed early iron deficiency anemia and anemia of from CKD. Plan: Venofer 300mg x 3 doses. 13. Bilateral lower extremities edema and tenderness:  Vance Aguilera hoses    14. Acute hypoxic respiratory failure: Resolved. Required 4L NC on admission with no O2 supplement at baseline. CXR (3/26) showed bilateral pleural effusion with increase bilateral lower lung consolidation, bilateral pleural effusion. Procal 0.17. Patient was afebrile since admission. Currently off NC with O2 Sat 91%     Expected discharge date:  3/31/22    Disposition:    [x] Home       [] TCU       [] Rehab       [] Psych       [] SNF       [] [place]       [] Other-    Chief Complaint: Extremity weakness, fall    Hospital Course: Farhana Kee is 81years old female who presented to ED for generalize weakness on 3/24/22. Past medical history significant with pulmonary hypertension secondary to severe tricuspid regurgitation, decompensated liver cirrhosis, chronic hypotension, persistent A. fib on carvedilol with rate control, heart failure preserved ejection fraction, CKD stage III. Patient recently discharged from the hospital on March 17, 2022.  2 days prior to presentation patient reports decreased p.o. intake, nausea, dry heaves, persistent diarrhea with loose watery stool. On the day of presentation patient's son report that patient worsening fatigue and generalized weakness therefore he brought her to the ED. In the ED patient found to have blood pressure 83/50, heart rate 75, respiration rate 16, O2 sat at 95%. BMP showed Cr 2.4 which increased 1.2 from 1 months prior. CXR showed cardiomegaly possible failure, patchy opacity of right lung base. Patient was admitted and manage for decompensate liver cirrhosis and ISIDRA on CKD. Patient subsequently underwent paracentesis with 2.5L fluid removed. Patient develop hypotension which didn't response to albumin and NS. Patient required pressure support and she was transferred to ICU on 3/25/22. On 3/26, patient wean off pressure support and transferred out of ICU.  Cheetah test on 3/27/22 showed nigh normal CO with low TPR not responsive to fluid. Thyroid function test showed hypothyroidism. Patient was started Synthroid 50 mg daily on 3/28/22. On 3/30, ascites fluid positive culture which Ceftriaxone was started. Patient develop worsening metabolic acidosis which bicarb drip and tab were started. Subjective (past 24 hours): No event overnight. Upon visit, patient sits on recliner. Reported feeling jeny but still weak. Denied any fever, chill, SOB, chest pain, chest pressure, abdominal pain, abdominal tenderness. ROS. Constitutional: Negative for appetite change, chills, diaphoresis, fever and unexpected weight change. HENT: Negative for congestion, dental problem, mouth sores, nosebleed  Respiratory: Negative for choking, chest tightness, wheezing and stridor. Cardiovascular: Negative for chest pain and palpitations. Gastrointestinal: Negative for anal bleeding, diarrhea, nausea and vomiting. Genitourinary: Negative for dysuria, flank pain, hematuria and urgency. Psychiatric/Behavioral: Negative for agitation, behavioral problems, confusion, decreased concentration, dysphoric mood and hallucinations.      Medications:  Reviewed    Infusion Medications    sodium bicarbonate infusion 100 mL/hr at 03/30/22 1154    sodium chloride      sodium chloride       Scheduled Medications    sodium bicarbonate  1,300 mg Oral BID    cefTRIAXone (ROCEPHIN) IV  2,000 mg IntraVENous Q24H    midodrine  5 mg Oral TID WC    iron sucrose  300 mg IntraVENous Q48H    levothyroxine  50 mcg Oral QAM AC    lidocaine 1 % injection  5 mL IntraDERmal Once    sodium chloride flush  5-40 mL IntraVENous 2 times per day    pantoprazole  40 mg Oral BID AC    rifaximin  550 mg Oral BID    sodium chloride flush  5-40 mL IntraVENous 2 times per day    PARoxetine  30 mg Oral QAM     PRN Meds: sodium chloride flush, sodium chloride, sodium chloride flush, sodium chloride, acetaminophen, ondansetron, melatonin      Intake/Output Summary (Last 24 hours) at 3/30/2022 1418  Last data filed at 3/30/2022 1347  Gross per 24 hour   Intake 2459.4 ml   Output 200 ml   Net 2259.4 ml       Diet:  ADULT DIET; Regular; Low Sodium (2 gm); 1200 ml  ADULT ORAL NUTRITION SUPPLEMENT; AM Snack, PM Snack; Other Oral Supplement; magic cup + Boost    Exam:  BP (!) 112/59   Pulse 91   Temp 97.5 °F (36.4 °C) (Oral)   Resp 18   Ht 5' 4\" (1.626 m)   Wt 192 lb 4.8 oz (87.2 kg)   SpO2 94%   BMI 33.01 kg/m²     General appearance: No apparent distress, appears stated age and cooperative. HEENT: Pupils equal, round, and reactive to light. Conjunctivae/corneas clear. Neck: Supple, with full range of motion. No jugular venous distention. Trachea midline. Respiratory:  Normal respiratory effort. Clear to auscultation, bilaterally without Rales/Wheezes/Rhonchi. Cardiovascular: Regular rate and rhythm, systolic murmur on apex radiate to axillary region. Abdomen: Soft, non-tender, non-distended with normal bowel sounds. Musculoskeletal: passive and active ROM x 4 extremities. Skin: Skin color, texture, turgor normal.  No rashes or lesions. Neurologic:  Neurovascularly intact without any focal sensory/motor deficits. Cranial nerves: II-XII intact, grossly non-focal.  Psychiatric: Alert and oriented, thought content appropriate, normal insight  Capillary Refill: Brisk,< 3 seconds   Peripheral Pulses: +2 palpable, equal bilaterally       Labs:   No results for input(s): WBC, HGB, HCT, PLT in the last 72 hours. Recent Labs     03/28/22  0419 03/29/22 0442 03/30/22  0556    140 136   K 4.2 3.7 4.4    103 104   CO2 22* 20* 15*   BUN 67* 66* 63*   CREATININE 2.6* 2.9* 2.9*   CALCIUM 8.7 8.8 8.5     Recent Labs     03/29/22 0442   AST 26   ALT 8*   BILIDIR 0.7*   BILITOT 1.5*   ALKPHOS 61     Recent Labs     03/29/22 0442   INR 1.45*     No results for input(s): CKTOTAL, TROPONINI in the last 72 hours.     Microbiology:      Urinalysis:      Lab Results   Component Value Date NITRU NEGATIVE 03/26/2022    WBCUA 2-4 03/26/2022    BACTERIA FEW 03/26/2022    RBCUA 3-5 03/26/2022    BLOODU NEGATIVE 03/26/2022    SPECGRAV 1.017 03/11/2022    GLUCOSEU NEGATIVE 03/26/2022       Radiology:  US GUIDED PARACENTESIS   Final Result   Successful ultrasound-guided paracentesis. **This report has been created using voice recognition software. It may contain minor errors which are inherent in voice recognition technology. **      Final report electronically signed by Dr. Sarath Guzman on 3/28/2022 4:47 PM      US RENAL COMPLETE   Final Result      1. Bilateral renal cysts. No evidence of stones or hydronephrosis. 2. Moderate amount of free fluid within the abdomen. 3. Postvoid images could not be obtained through the bladder. 4. There are bilateral ureteric jets present. **This report has been created using voice recognition software. It may contain minor errors which are inherent in voice recognition technology. **      Final report electronically signed by DR Sylvia Garcia on 3/26/2022 8:51 AM      XR CHEST PORTABLE   Final Result   Increased bilateral lower lung consolidations. Bilateral pleural effusions. This document has been electronically signed by: Sukhdev Prieto MD on    03/26/2022 04:26 AM      [address]   Final Result    Successful ultrasound-guided paracentesis. **This report has been created using voice recognition software. It may contain minor errors which are inherent in voice recognition technology. **      Final report electronically signed by Dr. Woodrow Jin MD on 3/25/2022 10:05 AM      XR CHEST PORTABLE   Final Result   1. Cardiomegaly with possible failure. Recommend follow-up. 2. Patchy opacity at the right lung base could be a superimposed    infectious component. Follow-up to clearing recommended. 3. Indeterminate appearance of the proximal left humerus. Not seen on    prior exams.  Correlate with history of pain in this area. Further evaluate    if clinically indicated. This document has been electronically signed by:  Elier Landers MD on 03/24/2022 11:58 PM          DVT prophylaxis: [] Lovenox                                 [x] SCDs                                 [] SQ Heparin                                 [] Encourage ambulation           [] Already on Anticoagulation     Code Status: Limited    PT/OT Eval Status: 3/25/22    Tele:   [x] yes             [] no    Electronically signed by Dhiraj Aviles DO on 3/30/2022 at 2:18 PM

## 2022-03-30 NOTE — PROGRESS NOTES
[address] ICU STEPDOWN TELEMETRY 4K  Occupational Therapy  Daily Note  Time:    Time In: 4702  Time Out: 1524  Timed Code Treatment Minutes: 41 Minutes  Minutes: 41          Date: 3/30/2022  Patient Name: Jose Wolf,   Gender: female      Room: [facility], [facility]–1983003-A  MRN: [id number]  : 1940  (81 y.o.)  Referring Practitioner: ARTHUR Montero CNP  Diagnosis: dehydration  Additional Pertinent Hx: Per ER note on 3/24/2022: 81 y.o. female who presents to the emergency department for evaluation of generalized weakness. Patient with a past medical history of cirrhosis, CHF, atrial fibrillation, hypertension, and coronary disease. She was recently discharged from the hospital 7 days ago. Over the last 5 days she reports that she has had decreased p.o. intake nausea and dry heaves. And persistent diarrhea. Diarrhea is loose and watery. No blood. No melena. Her son states today she had worsening fatigue and generalized weakness therefore he brought her into the ED. Restrictions/Precautions:  Restrictions/Precautions: Fall Risk      SUBJECTIVE: required encouragement, quick to say  She wasn't feeling good-cooperative with encouragement    PAIN: 0/10: no c/o pain during session    Vitals: Vitals not assessed per clinical judgement, see nursing flowsheet    COGNITION: Slow Processing and Decreased Problem Solving    ADL:   Grooming: Minimal Assistance. for completeness of combing back of hair, s/u for brushing teeth  Bathing: Moderate Assistance. A for B lower legs & feet, completeness of washing bottom; sponge bath completed UB while standing with CGA & LB seated  Upper Extremity Dressing: Moderate Assistance. for hospital gown  Lower Extremity Dressing: Dependent. for slipper socks  Toileting: Moderate Assistance. for completeness of wiping after BM  Toilet Transfer: Minimal Assistance. from STS. BALANCE:  Sitting Balance:  Stand By Assistance.     Standing Balance: Contact Guard Assistance. BED MOBILITY:  Not Tested    TRANSFERS:  Sit to Stand:  Contact Guard Assistance. from recliner  Stand to Sit: [employer]. to recliner    FUNCTIONAL MOBILITY:  Assistive Device: Rolling Walker  Assist Level:  Contact Guard Assistance. Distance: To and from bathroom  vcs for posture         ASSESSMENT:     Activity Tolerance:  Patient tolerance of  treatment: fair. Discharge Recommendations: Continue to assess pending progress  Equipment Recommendations: Other: Monitor pending progress  Plan: Times per week: 3-5x  Current Treatment Recommendations: Balance Training,Endurance Training,Functional Mobility Training,Self-Care / ADL,Safety Education & Training    Patient Education  Patient Education: see above    Goals  Short term goals  Time Frame for Short term goals: until discharge  Short term goal 1: Pt will complete various sit-stand t/fs including toilet with CGA & 0-2 vcs for safety PART MET, REVISE  Short term goal 2: Pt will complete BADL routine with min A & min vcs for safety NOT MET, CONTINUE  Short term goal 3: Pt will tolerate further assessment of functional mobility by OTR when appropriate MET, REVISE  Long term goals  Time Frame for Long term goals : No LTG set d/t short ELOS    Revised Short-Term Goals  Short term goals  Time Frame for Short term goals: until discharge  Short term goal 1: Pt will complete various sit-stand t/fs including toilet with SBA & 0-2 vcs for safety  Short term goal 2: Pt will complete BADL routine with min A & min vcs for safety  Short term goal 3: Pt will complete mobility to/from bathroom with RW, SBA, & 0-2 vcs for walker safety  Short term goal 4: Pt will tolerate standing 4-5 min with SBA for increased ease of sinkside grooming  Long term goals  Time Frame for Long term goals : No LTG set d/t short ELOS      Following session, patient left in safe position with all fall risk precautions in place.

## 2022-03-30 NOTE — PLAN OF CARE
Problem: Physical Regulation:  Goal: Prevent transmision of infection  Description: Prevent transmision of infection  3/30/2022 1440 by Asad Noriega RN  Outcome: Ongoing  Note: Hand washing utilized and isolation techniques used. Problem: Skin Integrity:  Goal: Risk for impaired skin integrity will decrease  Description: Risk for impaired skin integrity will decrease  3/30/2022 1440 by Asad Noriega RN  Outcome: Ongoing  Note: Skin assessed and pillow support with alternating pressure points with pillows utilized. Problem: Skin Integrity:  Goal: Will show no infection signs and symptoms  Description: Will show no infection signs and symptoms  3/30/2022 1440 by Asad Noriega RN  Outcome: Ongoing  Note: HR and temperature monitored closely along with assessed wound integrity. Problem: Skin Integrity:  Goal: Absence of new skin breakdown  Description: Absence of new skin breakdown  3/30/2022 1440 by Asad Noriega RN  Outcome: Ongoing  Note: Pillow support and turning utilized. Problem: Falls - Risk of:  Goal: Will remain free from falls  Description: Will remain free from falls  3/30/2022 1440 by Asad Noriega RN  Outcome: Ongoing  Note: Call light and belongings within reach. Gait belt and walker used when moving patient. Problem: Falls - Risk of:  Goal: Absence of physical injury  Description: Absence of physical injury  3/30/2022 1440 by Asad Noriega RN  Outcome: Ongoing  Note: Skin assessed, nutrition encouraged, pillow support utilized. Problem: Pain:  Goal: Pain level will decrease  Description: Pain level will decrease  3/30/2022 1440 by Asad Noriega RN  Outcome: Ongoing  Note: Pain assessed hourly and interventions completed as appropriate.      Problem: Discharge Planning:  Goal: Discharged to appropriate level of care  Description: Discharged to appropriate level of care  3/30/2022 1440 by Asad Noriega RN  Outcome: Ongoing  Note: Discharge needs anticipated and assessed.

## 2022-03-30 NOTE — PROCEDURES
Abdominal Paracentesis Procedure Note          Patient: Thea Raygoza  : 1940  MRN: [id number]  Billing Number: [id number]  Unit/Bed: 4-A  Date: 3/30/22    Pre-procedure Diagnosis: Abdominal ascites, right     Consent: Informed consent was The patient provided written consent for this procedure. after risks, benefits, and alternatives to the procedure were discussed. Time out was conducted immediately prior to procedure and correct patient, correct procedure, correct site, correct side, correct patient position, and consent verified. Physical exam, clinical history, and allergies were reviewed. Indications: Ascites, pulmonary hypertension, right heart failure     Procedure Details: The patient was properly positioned and the site was cleansed and prepped using chlorhexidine. Maximum drying time of chlorhexidine was established and patient was draped with sterile drape. Sterile gloves, hairnet, mask, and sterile gown with maximum barrier precautions were utilized. The entry site had been previously identified by ultrasound and marked. 2% Lidocaine was used to anesthetize the right abdomen at the entry site. A small incision was made in the skin at the previously marked site using a #10 scalpel. Utilizing dynamic ultrasound an 8 Tajik catheter over a 16 gauge introducer needle was advanced into the peritoneal cavity with negative pressure using the Z-line technique. The catheter/introducer needle was advanced until peritoneal fluid was aspirated into the syringe. The introducer needle was removed leaving the catheter in place. Peritoneal fluid was aspirated into a 60cc collection syringe for analysis. The catheter was then connected to an extension set with plastic vacuum bottle spike. The extension set was inserted into the vacuum bottle until fluid flow ceased. The catheter was removed and pressure was held at the insertion site.  A Band-Aid was applied to the entry site and the procedure was completed. US interpretation: Ultrasound was utilized to identify pocket of fluid. Post procedure Ultrasound demonstrated minimal residual peritoneal fluid. Findings: 2300 ml of Shira peritoneal fluid was removed. EBL: < 5 mL    Complications: None; patient tolerated the procedure well.           Condition: Stable    Post-procedure Diagnosis: Ascites, pulmonary hypertension, right heart failure      Electronically signed by Ann-Marie Oshea DO, MBA on 3/30/2022 at 7:30 PM

## 2022-03-30 NOTE — CARE COORDINATION
Collaborative Discharge Planning    Cait Kellogg  :  1940  MRN:  [id number]    ADMIT DATE:  3/24/2022      Discharge Planning Discharge Planning  Living Arrangements: Alone  Support Systems: Family Members  Potential Assistance Needed: N/A  Type of Home Care Services: None  Patient expects to be discharged to[de-identified[facility] Board Notes /Social Work Whiteboard Notes  /Social Work Whiteboard: 3/30;  - [facility] (nursing, PT/OT, Aide), need orders    Procedure   3/28 Paracentesis = 5.5L removed  Discharge Plan Home with home health  plans home alone; manjit Guaman Payer attentive to needs and prefers [address] (nsg, therapy, aide); therapy following, follows [facility] for pulmonary hypertension; monitor if this is planned again  Discharge Milestones and Delays: Administering IV medications  ISIDRA/Decompensated Liver Cirrhois/Ascites/Hypotension/CHF. Rifaximin continued. Creatinine 2.9; monitor. Urine Output = 400 ml/24h; monitor. Bicarb gtt, IV AB continued.  Await Dr. Fisher Self, Cardiology recommendations            SIGNED:  Dez Perez RN   3/30/2022, 2:31 PM          ]

## 2022-03-30 NOTE — PROGRESS NOTES
Gastroenterology Progress Note:     Patient Name:  Nita Eaton   MRN: [id number]  [id number]  YOB: 1940  Admit Date: 3/24/2022 10:46 PM  Primary Care Physician: ARTHUR Romero - CNP   [id number]-03/[id number]     Patient seen and examined. 24 hours events and chart reviewed. Subjective: Patient sleeping in bed, arouses easily. Denies abd pain, n/v. Last BM this morning. Paracentesis & lab results reviewed. Midodrine stopped by primary. Objective:  BP (!) 145/75   Pulse 101   Temp 97.4 °F (36.3 °C) (Oral)   Resp 18   Ht 5' 4\" (1.626 m)   Wt 192 lb 4.8 oz (87.2 kg)   SpO2 94%   BMI 33.01 kg/m²     Physical Exam:    General:  Chronically ill appearing female  HEENT: Atraumatic, normocephalic. Moist oral mucous membranes. Neck: Supple without adenopathy, JVD, thyromegaly or masses. Trachea midline. CV: Heart RRR, no murmurs, rubs, gallops. Resp: Even, easy without cough or accessory use. Lungs clear to ascultation bilaterally. Abd: Round, soft, obese, nontender. No hepatosplenomegaly or mass present. Active bowel sounds heard. No distention noted. Ext:  Without cyanosis, clubbing. BLE edema. Skin: Pink, warm, dry  Neuro:  Alert, oriented x 3 with no obvious deficits.        Rectal: deferred    Labs:   CBC:   Lab Results   Component Value Date    WBC 4.7 03/27/2022    HGB 10.0 03/27/2022    HCT 33.6 03/27/2022    MCV 97.7 03/27/2022     03/27/2022     BMP:   Lab Results   Component Value Date     03/30/2022    K 4.4 03/30/2022    K 3.9 03/26/2022     03/30/2022    CO2 15 03/30/2022    PHOS 4.4 03/26/2022    BUN 63 03/30/2022    CREATININE 2.9 03/30/2022    CALCIUM 8.5 03/30/2022     PT/INR:   Lab Results   Component Value Date    PROTIME 1.75 08/21/2011    INR 1.45 03/29/2022     Lipids:   Lab Results   Component Value Date    ALKPHOS 61 03/29/2022    ALT 8 03/29/2022    AST 26 03/29/2022    BILITOT 1.5 03/29/2022    BILIDIR 0.7 03/29/2022    LABALBU 3.6 03/29/2022    LABALBU 3.2 08/20/2011    AMYLASE 67 07/19/2019    LIPASE 19.3 03/11/2022      Ref. Range 3/25/2022 09:00   Character, Body Fluid Unknown SL. HAZY   LD, Fluid Latest Units: U/L 109   Protein, Fluid Latest Units: gm/dl 3.0   Amylase, Fluid Latest Units: U/L 11   Albumin, Fluid Latest Units: gm/dl 1.6   Body Fluid RBC Latest Units: /cumm 30181   Mesothelial Cells Body Fluid Unknown 2+   Mononuclear Cells Body Fluid Latest Units: % 42.9   Polymorphonuclear Cells Body Fluid Latest Units: % 57.1   Total Nucleated cells Body Fluid Latest Ref Range: 0 - 500 /cumm 1004 (H)   Total Volume Received Body Fluid Latest Units: ml 70.0     Significant Diagnostic Studies:   US paracentesis 03/28/22  Aspirated ascites volume: 5.5 liters   Aspirated ascites color: light red    Site of Puncture:RLQ   Complications: No immediate complications           Impression   Successful ultrasound-guided paracentesis. Current Meds:  Scheduled Meds:   sodium bicarbonate  1,300 mg Oral BID    cefTRIAXone (ROCEPHIN) IV  2,000 mg IntraVENous Q24H    iron sucrose  300 mg IntraVENous Q48H    levothyroxine  50 mcg Oral QAM AC    lidocaine 1 % injection  5 mL IntraDERmal Once    sodium chloride flush  5-40 mL IntraVENous 2 times per day    pantoprazole  40 mg Oral BID AC    rifaximin  550 mg Oral BID    sodium chloride flush  5-40 mL IntraVENous 2 times per day    PARoxetine  30 mg Oral QAM     Continuous Infusions:   sodium bicarbonate infusion      sodium chloride      sodium chloride         Assessment:  82 yo F admitted 03/24/22 for generalized weakness & dizziness with a fall at home. H/O cirrhosis. H/O CHF. Diuretics recently increased by cardiology. Recent admission 03/11/22-03/17/22 for SOB & edema. She has required recent paracentesis. H/O severe pulmonary HTN & severe tricuspid regurgitation. Briefly transferred to ICU for worsening hypotension. Stool studies negative.  Refused Lactulose, Xifaxan started for intermittent confusion. S/P para 03/25/22 with 2.5L removed, negative for SBP. S/P para 03/28/22 with 5.5L removed, positive for SBP, IV Rocephin started.     1. Fall at home  2. Decompensated liver cirrhosis with ascites- MELD 16  3. Ascites- likely cardiogenic & hepatic, s/p para 03/25/22 with 2.5 L removed negative for SBP, s/p para 03/28/22 5.5L removed positive for SBP  4. Acute CHF exacerbation  5. ISIDRA on CKD- likely cardiorenal  6. Severe pulmonary HTN  7. Leukopenia  8. Normocytic anemia- no GI bleeding noted  9. Hypoalbuminemia  10. Coagulopathy  11. Acute diarrhea- stool panel negative  12. Cardiomegaly  13. Acute on chronic hypotension- on Midodrine  14. Paroxysmal afib  15. Severe tricuspid regurgitation  16. JAMES   17.  SBP- IV Rocephin started 03/30/20    Plan:    · Monitor H & H, transfuse prn  · Resume diuretics when okay with nephrology  · 2g sodium diet with fluid restriction  · Midodrine stopped by primary  · Continue PPI daily, home dose  · Daily weights  · HFP, INR in the morning  · Strict I & O  · SCDs for DVT prophylaxis  · IV Rocephin 2g daily, will need at least 5 days, started 03/30/22  · IV Venofer per nephro  · Avoid hepatotoxic meds, okay for Tylenol 2g limit  · Avoid narcotic analgesics & benzodiazepines as they can precipitate HE  · Consult cardiology given recurrent CHF exacerbations, consulted 03/25/22 & have not seen the patient yet  · Nephrology on board  · RN updated  · Supportive care per primary team  Will follow    Case reviewed and impression/plan reviewed in collaboration with Dr. Silvana Lao  Electronically signed by ARTHUR Foley CNP on 3/30/2022 at 10:23 AM    [facility]

## 2022-03-30 NOTE — PROGRESS NOTES
Nephrology Progress Note    Patient - Dwayne Vora   MRN -  [id number]   [facility]      - 1940    81 y.o. Admit Date: 3/24/2022  Hospital Day: 5  Location: --A  Date of evaluation -  3/30/2022    Subjective:   CC: fall  Denies shortness of breath on Room air   +voids   Appears fatigued. Up in chair  BP ok  BP Range: Systolic (38HXD), GTN:065 , Min:129 , VFH:622      Diastolic (87LMU), XJT:61, Min:63, Max:82      Objective:   VITALS:  BP (!) 145/75   Pulse 101   Temp 97.4 °F (36.3 °C) (Oral)   Resp 18   Ht 5' 4\" (1.626 m)   Wt 192 lb 4.8 oz (87.2 kg)   SpO2 94%   BMI 33.01 kg/m²    Patient Vitals for the past 24 hrs:   BP Temp Temp src Pulse Resp SpO2   22 0824 (!) 145/75 97.4 °F (36.3 °C) Oral 101 18 94 %   22 0451 139/76 97.5 °F (36.4 °C) Oral 90 16 93 %   22 2336 138/82 97.6 °F (36.4 °C) Oral 93 16 95 %   22 1938 132/65 97.7 °F (36.5 °C) Oral 99 16 96 %   22 1617 135/68 98.5 °F (36.9 °C) Oral 91 16 96 %   22 1141 129/66 98.6 °F (37 °C) Oral 95 16 96 %   22 0856 134/63 97.4 °F (36.3 °C) Oral 93 15 --       Intake/Output Summary (Last 24 hours) at 3/30/2022 0843  Last data filed at 3/30/2022 0831  Gross per 24 hour   Intake 2939.4 ml   Output 400 ml   Net 2539.4 ml       Admission weight: 190 lb (86.2 kg) Body mass index is 33.01 kg/m². Patient Vitals for the past 96 hrs (Last 3 readings):   Weight   22 0315 192 lb 4.8 oz (87.2 kg)       EXAM:  CONSTITUTIONAL:  No acute distress. Pleasant  HEENT:  Head is normocephalic, Extraocular movement intact. Neck is supple. Voice is clear. CARDIOVASCULAR:  S1, S2  regular rate and rhythm. murmur  RESPIRATORY: Clear to ausculation bilaterally. Equal breath sounds. No wheezes. No shortness of breath noted at rest.  ABDOMEN: soft, non tender, Rotund  NEUROLOGICAL: Patient is alert and oriented to person, place, and time. Recent and remote memory intact. Thought is coherant.   SKIN: no rash, No significant bruises on exposed surfaces  MUSCULOSKELETAL: Movement is coordinated. Moves all extremities   EXTREMITIES: Distal lower extremity temp is warm, trace lower extremity edema. PSYCHIATRIC: mood and affect appropriate. Medications:   Med reviewed  Scheduled Meds:   midodrine  5 mg Oral TID WC    iron sucrose  300 mg IntraVENous Q48H    levothyroxine  50 mcg Oral QAM AC    lidocaine 1 % injection  5 mL IntraDERmal Once    sodium chloride flush  5-40 mL IntraVENous 2 times per day    pantoprazole  40 mg Oral BID AC    rifaximin  550 mg Oral BID    sodium chloride flush  5-40 mL IntraVENous 2 times per day    PARoxetine  30 mg Oral QAM     Labs:   Labs reviewed    No results for input(s): WBC, HGB, HCT, PLT in the last 72 hours. Recent Labs     03/28/22  0419 03/29/22  0442 03/30/22  0556    140 136   K 4.2 3.7 4.4    103 104   CO2 22* 20* 15*   BUN 67* 66* 63*   CREATININE 2.6* 2.9* 2.9*   CALCIUM 8.7 8.8 8.5   LABALBU 3.7 3.6  --    ANIONGAP 14.0 17.0* 17.0*       US RENAL COMPLETE  Result Date: 3/26/2022  1. Bilateral renal cysts. No evidence of stones or hydronephrosis. 2. Moderate amount of free fluid within the abdomen. 3. Postvoid images could not be obtained through the bladder. 4. There are bilateral ureteric jets present. Summary 3/14/22   Normal left ventricle size and systolic function. Ejection fraction was   estimated at -55%. There were no regional left ventricular wall motion   abnormalities and wall thickness was within normal limits. The right ventricular size was increasedl with normal systolic function   and increase wall thickness. Right ventricular systolic pressure was elevated. The tricuspid valve structure was normal with normal leaflet separation. DOPPLER: There was no evidence of tricuspid stenosis.  There was n severe   tricuspid reg   The pulmonic valve leaflets exhibited normal thickness, no calcification,   and normal cuspal separation. DOPPLER: The transpulmonic velocity was   within the normal range mild reg   Pulmonary artery systolic pressure calculated from tricuspid regurgitation   jet is 65-70 . severe pulmonary htn    ASSESSMENT:  1. Acute Kidney Injury likely 2nd to renal hypoperfusion  2. Chronic Kidney Disease Stage IIIB  3. Anion gap metabolic acidosis, worsening. Likely 2nd to ISIDRA, and tissue hypoxia from hypotension, Will give cautious IV bicarb drip with PO bicarb. Discussed with Dr Georgiana Andino  4. Paroxymal atrial fibrillation   5. Moderate-severe pulm HTN, severe tricuspid regurg, Right heart failure, s/p AVR, EF 60%, Planned FU at [facility]  6. Chronic Hypotension. S/P Levophed, Continue Midodrine 5 mg 3x/d.  7. Iron deficiency anemia on Venofer    BMP in AM  Principal Problem:    Acute kidney injury ([address]  Active Problems:    Hypotension    Dehydration  Resolved Problems:    * No resolved hospital problems.  Ruthie Mark, ARTHUR - CNP 8:43 AM 3/30/2022

## 2022-03-30 NOTE — PLAN OF CARE
Pt. Has bed alarm on for safety, call light and bedside table in reach. Pt. Calls out for assistance. Pt. Denies pain at this time. Pt. Is A&Ox4. Will continue to monitor.    Problem: Physical Regulation:  Goal: Prevent transmision of infection  Description: Prevent transmision of infection  Outcome: Ongoing     Problem: Skin Integrity:  Goal: Risk for impaired skin integrity will decrease  Description: Risk for impaired skin integrity will decrease  Outcome: Ongoing  Goal: Will show no infection signs and symptoms  Description: Will show no infection signs and symptoms  Outcome: Ongoing  Goal: Absence of new skin breakdown  Description: Absence of new skin breakdown  Outcome: Ongoing     Problem: Falls - Risk of:  Goal: Will remain free from falls  Description: Will remain free from falls  Outcome: Ongoing  Goal: Absence of physical injury  Description: Absence of physical injury  Outcome: Ongoing     Problem: Pain:  Goal: Pain level will decrease  Description: Pain level will decrease  Outcome: Ongoing  Goal: Control of acute pain  Description: Control of acute pain  Outcome: Ongoing  Goal: Control of chronic pain  Description: Control of chronic pain  Outcome: Ongoing     Problem: Discharge Planning:  Goal: Discharged to appropriate level of care  Description: Discharged to appropriate level of care  Outcome: Ongoing

## 2022-03-31 LAB
ALBUMIN FLUID: 1.8 GM/DL
ALBUMIN SERPL-MCNC: 3.6 G/DL (ref 3.5–5.1)
ALP BLD-CCNC: 61 U/L (ref 38–126)
ALT SERPL-CCNC: 12 U/L (ref 11–66)
AMYLASE FLUID: 5 U/L
ANION GAP SERPL CALCULATED.3IONS-SCNC: 16 MEQ/L (ref 8–16)
AST SERPL-CCNC: 38 U/L (ref 5–40)
BILIRUB SERPL-MCNC: 1.3 MG/DL (ref 0.3–1.2)
BILIRUBIN DIRECT: 0.5 MG/DL (ref 0–0.3)
BODY FLUID RBC: 7000 /CUMM
BUN BLDV-MCNC: 60 MG/DL (ref 7–22)
CALCIUM SERPL-MCNC: 8.6 MG/DL (ref 8.5–10.5)
CHARACTER, BODY FLUID: ABNORMAL
CHLORIDE BLD-SCNC: 100 MEQ/L (ref 98–111)
CO2: 22 MEQ/L (ref 23–33)
COLOR: ABNORMAL
CREAT SERPL-MCNC: 2.8 MG/DL (ref 0.4–1.2)
ERYTHROCYTE [DISTWIDTH] IN BLOOD BY AUTOMATED COUNT: 18.8 % (ref 11.5–14.5)
ERYTHROCYTE [DISTWIDTH] IN BLOOD BY AUTOMATED COUNT: 60.5 FL (ref 35–45)
GFR SERPL CREATININE-BSD FRML MDRD: 16 ML/MIN/1.73M2
GLUCOSE BLD-MCNC: 127 MG/DL (ref 70–108)
GLUCOSE, FLUID: 140 MG/DL
HCT VFR BLD CALC: 37.3 % (ref 37–47)
HEMOGLOBIN: 11.6 GM/DL (ref 12–16)
LD, FLUID: 98 U/L
MAGNESIUM: 1.5 MG/DL (ref 1.6–2.4)
MCH RBC QN AUTO: 29.7 PG (ref 26–33)
MCHC RBC AUTO-ENTMCNC: 31.1 GM/DL (ref 32.2–35.5)
MCV RBC AUTO: 95.6 FL (ref 81–99)
MESOTHELIAL CELLS BODY FLUID: ABNORMAL
MONONUCLEAR CELLS BODY FLUID: 16.6 %
PATHOLOGIST REVIEW: ABNORMAL
PLATELET # BLD: 161 THOU/MM3 (ref 130–400)
PMV BLD AUTO: 9.7 FL (ref 9.4–12.4)
POLYMORPHONUCLEAR CELLS BODY FLUID: 83.4 %
POTASSIUM REFLEX MAGNESIUM: 3.4 MEQ/L (ref 3.5–5.2)
POTASSIUM SERPL-SCNC: 3.8 MEQ/L (ref 3.5–5.2)
PROTEIN FLUID: 2.4 GM/DL
RBC # BLD: 3.9 MILL/MM3 (ref 4.2–5.4)
SODIUM BLD-SCNC: 138 MEQ/L (ref 135–145)
SPECIMEN: ABNORMAL
TOTAL NUCLEATED CELLS BODY FLUID: 1930 /CUMM (ref 0–500)
TOTAL PROTEIN: 5.5 G/DL (ref 6.1–8)
TOTAL VOLUME RECEIVED BODY FLUID: 21 ML
WBC # BLD: 6.1 THOU/MM3 (ref 4.8–10.8)

## 2022-03-31 PROCEDURE — 83735 ASSAY OF MAGNESIUM: CPT

## 2022-03-31 PROCEDURE — 6370000000 HC RX 637 (ALT 250 FOR IP): Performed by: NURSE PRACTITIONER

## 2022-03-31 PROCEDURE — 2500000003 HC RX 250 WO HCPCS: Performed by: NURSE PRACTITIONER

## 2022-03-31 PROCEDURE — 82248 BILIRUBIN DIRECT: CPT

## 2022-03-31 PROCEDURE — 6360000002 HC RX W HCPCS: Performed by: NURSE PRACTITIONER

## 2022-03-31 PROCEDURE — 6370000000 HC RX 637 (ALT 250 FOR IP): Performed by: INTERNAL MEDICINE

## 2022-03-31 PROCEDURE — 6370000000 HC RX 637 (ALT 250 FOR IP)

## 2022-03-31 PROCEDURE — 84132 ASSAY OF SERUM POTASSIUM: CPT

## 2022-03-31 PROCEDURE — 85027 COMPLETE CBC AUTOMATED: CPT

## 2022-03-31 PROCEDURE — 36415 COLL VENOUS BLD VENIPUNCTURE: CPT

## 2022-03-31 PROCEDURE — 99232 SBSQ HOSP IP/OBS MODERATE 35: CPT | Performed by: INTERNAL MEDICINE

## 2022-03-31 PROCEDURE — 2580000003 HC RX 258: Performed by: STUDENT IN AN ORGANIZED HEALTH CARE EDUCATION/TRAINING PROGRAM

## 2022-03-31 PROCEDURE — 2500000003 HC RX 250 WO HCPCS: Performed by: INTERNAL MEDICINE

## 2022-03-31 PROCEDURE — 2580000003 HC RX 258: Performed by: NURSE PRACTITIONER

## 2022-03-31 PROCEDURE — 2060000000 HC ICU INTERMEDIATE R&B

## 2022-03-31 PROCEDURE — 99233 SBSQ HOSP IP/OBS HIGH 50: CPT | Performed by: INTERNAL MEDICINE

## 2022-03-31 PROCEDURE — 2580000003 HC RX 258: Performed by: INTERNAL MEDICINE

## 2022-03-31 PROCEDURE — 99232 SBSQ HOSP IP/OBS MODERATE 35: CPT | Performed by: PHYSICIAN ASSISTANT

## 2022-03-31 PROCEDURE — 6370000000 HC RX 637 (ALT 250 FOR IP): Performed by: STUDENT IN AN ORGANIZED HEALTH CARE EDUCATION/TRAINING PROGRAM

## 2022-03-31 PROCEDURE — 80053 COMPREHEN METABOLIC PANEL: CPT

## 2022-03-31 RX ADMIN — SODIUM CHLORIDE, PRESERVATIVE FREE 10 ML: 5 INJECTION INTRAVENOUS at 07:49

## 2022-03-31 RX ADMIN — PAROXETINE 30 MG: 30 TABLET, FILM COATED ORAL at 20:05

## 2022-03-31 RX ADMIN — Medication 3 MG: at 01:27

## 2022-03-31 RX ADMIN — BUMETANIDE 0.5 MG/HR: 0.25 INJECTION INTRAMUSCULAR; INTRAVENOUS at 19:59

## 2022-03-31 RX ADMIN — MIDODRINE HYDROCHLORIDE 10 MG: 10 TABLET ORAL at 17:08

## 2022-03-31 RX ADMIN — SODIUM BICARBONATE: 84 INJECTION, SOLUTION INTRAVENOUS at 01:29

## 2022-03-31 RX ADMIN — MIDODRINE HYDROCHLORIDE 10 MG: 10 TABLET ORAL at 07:50

## 2022-03-31 RX ADMIN — SODIUM BICARBONATE 1300 MG: 650 TABLET ORAL at 07:50

## 2022-03-31 RX ADMIN — RIFAXIMIN 550 MG: 550 TABLET ORAL at 20:05

## 2022-03-31 RX ADMIN — MIDODRINE HYDROCHLORIDE 10 MG: 10 TABLET ORAL at 12:04

## 2022-03-31 RX ADMIN — RIFAXIMIN 550 MG: 550 TABLET ORAL at 07:50

## 2022-03-31 RX ADMIN — PANTOPRAZOLE SODIUM 40 MG: 40 TABLET, DELAYED RELEASE ORAL at 06:45

## 2022-03-31 RX ADMIN — SODIUM BICARBONATE 1300 MG: 650 TABLET ORAL at 20:05

## 2022-03-31 RX ADMIN — LEVOTHYROXINE SODIUM 50 MCG: 0.05 TABLET ORAL at 06:45

## 2022-03-31 RX ADMIN — CEFTRIAXONE 2000 MG: 10 INJECTION, POWDER, FOR SOLUTION INTRAVENOUS at 12:08

## 2022-03-31 RX ADMIN — METOPROLOL TARTRATE 12.5 MG: 25 TABLET, FILM COATED ORAL at 12:05

## 2022-03-31 RX ADMIN — PANTOPRAZOLE SODIUM 40 MG: 40 TABLET, DELAYED RELEASE ORAL at 17:08

## 2022-03-31 ASSESSMENT — PAIN - FUNCTIONAL ASSESSMENT
PAIN_FUNCTIONAL_ASSESSMENT: 0-10

## 2022-03-31 ASSESSMENT — PAIN SCALES - GENERAL
PAINLEVEL_OUTOF10: 0

## 2022-03-31 NOTE — PROGRESS NOTES
2100: 2 ABD's placed over paracentesis site d/t clear yellow drainage. Moderate saturation.  Will continue to monitor

## 2022-03-31 NOTE — PROGRESS NOTES
Renal Progress Note    Assessment and Plan:   1. Acute kidney injury mostly stable  2. Metabolic acidosis much improved  3. Deconditioning  4. Normocytic anemia  5. Pulmonary hypertension  6. Ascites status post paracentesis  7. Bilateral low segment edema    PLAN:  1. Labs reviewed  2. Serum creatinine is stable   3. Medications reviewed. 4. No changes  5. Agree with holding sodium bicarbonate infusion   6. Labs in the morning  7. We will follow    Patient Active Problem List:     Chronic atrial fibrillation ([facility])     Ascending cholangitis, possible     Elevated LFTs     Thrombocytopenia ([facility])     Leukopenia     ISIDRA (acute kidney injury) ([facility]     SIRS (systemic inflammatory response syndrome) ([facility])     Cholecystitis, acute     Neutropenia ([facility])     Aortic stenosis, severe     Colitis     Nausea     Diarrhea     Essential hypertension     Anemia, normocytic normochromic     Hypocalcemia     Cirrhosis ([facility])     S/P AVR (aortic valve replacement)     Obesity (BMI 30-39. 9)     History of atrial fibrillation     Hypomagnesemia     Asymptomatic bacteriuria - no clinical indication for antimicrobial therapy     CKD (chronic kidney disease) stage 3, GFR 30-59 ml/min ([facility])     Acute renal failure superimposed on stage 3 chronic kidney disease ([facility])     VRE (vancomycin resistant enterococcus) culture positive     CHF (congestive heart failure), NYHA class I, acute on chronic, combined ([facility])     Shortness of breath     Acute kidney injury ([facility] [address]     Hypotension     Dehydration      Subjective:   Admit Date: 3/24/2022    Interval History:   Seen for acute kidney injury on chronic kidney disease  Very awake and alert this morning  Updated by the staff  No issues  Had paracentesis yesterday  Blood pressure is labile  Urine output is incompletely documented again      Medications:   Scheduled Meds:   sodium bicarbonate  1,300 mg Oral BID    cefTRIAXone (ROCEPHIN) IV  2,000 mg IntraVENous Q24H    midodrine  10 mg Oral TID WC    lidocaine PF  5 mL IntraDERmal Once    iron sucrose  300 mg IntraVENous Q48H    levothyroxine  50 mcg Oral QAM AC    lidocaine 1 % injection  5 mL IntraDERmal Once    sodium chloride flush  5-40 mL IntraVENous 2 times per day    pantoprazole  40 mg Oral BID AC    rifaximin  550 mg Oral BID    sodium chloride flush  5-40 mL IntraVENous 2 times per day    PARoxetine  30 mg Oral QAM     Continuous Infusions:   [Held by provider] sodium bicarbonate infusion 100 mL/hr at 03/31/22 0129    bumetanide 0.1 mg/mL infusion 0.5 mg/hr (03/31/22 0128)    sodium chloride      sodium chloride         CBC:   Recent Labs     03/31/22  0832   WBC 6.1   HGB 11.6*        CMP:    Recent Labs     03/29/22  0442 03/30/22  0556 03/31/22  0435    136 138   K 3.7 4.4 3.8    104 100   CO2 20* 15* 22*   BUN 66* 63* 60*   CREATININE 2.9* 2.9* 2.8*   GLUCOSE 92 107 127*   CALCIUM 8.8 8.5 8.6   LABGLOM 16* 16* 16*     Troponin: No results for input(s): TROPONINI in the last 72 hours. BNP: No results for input(s): BNP in the last 72 hours. INR:   Recent Labs     03/29/22 0442   INR 1.45*     Lipids: No results for input(s): CHOL, LDLDIRECT, TRIG, HDL, AMYLASE, LIPASE in the last 72 hours. Liver:   Recent Labs     03/31/22 0435   AST 38   ALT 12   ALKPHOS 61   PROT 5.5*   LABALBU 3.6   BILITOT 1.3*     Iron:    No results for input(s): IRONS, FERRITIN in the last 72 hours. Invalid input(s): LABIRONS  US GUIDED PARACENTESIS   Final Result   Successful ultrasound-guided paracentesis. **This report has been created using voice recognition software. It may contain minor errors which are inherent in voice recognition technology. **      Final report electronically signed by Dr. Tom Ponce on 3/28/2022 4:47 PM      US RENAL COMPLETE   Final Result      1. Bilateral renal cysts. No evidence of stones or hydronephrosis. 2. Moderate amount of free fluid within the abdomen.    3. Postvoid images could not be obtained through the bladder. 4. There are bilateral ureteric jets present. **This report has been created using voice recognition software. It may contain minor errors which are inherent in voice recognition technology. **      Final report electronically signed by DR Alexia Richmond on 3/26/2022 8:51 AM      XR CHEST PORTABLE   Final Result   Increased bilateral lower lung consolidations. Bilateral pleural effusions. This document has been electronically signed by: Mary Kate Dueñas MD on    03/26/2022 04:26 AM      [address]   Final Result    Successful ultrasound-guided paracentesis. **This report has been created using voice recognition software. It may contain minor errors which are inherent in voice recognition technology. **      Final report electronically signed by Dr. Evita Valencia MD on 3/25/2022 10:05 AM      XR CHEST PORTABLE   Final Result   1. Cardiomegaly with possible failure. Recommend follow-up. 2. Patchy opacity at the right lung base could be a superimposed    infectious component. Follow-up to clearing recommended. 3. Indeterminate appearance of the proximal left humerus. Not seen on    prior exams. Correlate with history of pain in this area. Further evaluate    if clinically indicated. This document has been electronically signed by:  Soco Wu MD on 03/24/2022 11:58 PM            Objective:   Vitals: /66   Pulse 90   Temp 97.5 °F (36.4 °C) (Oral)   Resp 18   Ht 5' 4\" (1.626 m)   Wt 183 lb 1.6 oz (83.1 kg)   SpO2 90%   BMI 31.43 kg/m²    Wt Readings from Last 3 Encounters:   03/31/22 183 lb 1.6 oz (83.1 kg)   03/15/22 178 lb 11.2 oz (81.1 kg)   02/09/22 166 lb (75.3 kg)      24HR INTAKE/OUTPUT:      Intake/Output Summary (Last 24 hours) at 3/31/2022 1021  Last data filed at 3/31/2022 0758  Gross per 24 hour   Intake 2306.99 ml   Output 800 ml   Net 1506.99 ml       Constitutional: Well-developed elderly lady very awake and very alert this morning  Skin:normal with no rash or lesions. HEENT: Normal.Throat is clear . Oral mucosa is moist   Neck:supple with no carotid bruit  Cardiovascular:  S1, S2 without murmur or rubs   Respiratory: Decreased sounds bilateral lungs  Abdomen: Soft. Good bowel sounds. .  Distended. Nontender. Ext: 2+ bilateral LE edema  Musculoskeletal:Intact  Neuro: Awake and alert. Normal speech. No focal deficit. Electronically signed by Lucas Benedict MD on 3/31/2022 at 10:21 AM  **This report has been created using voice recognition software. It maycontain minor  errors which are inherent in voice recognition technology. **

## 2022-03-31 NOTE — PROGRESS NOTES
[address] THERAPY MISSED TREATMENT NOTE  [facility] ICU STEPDOWN TELEMETRY 4K  [id number]-[id number]      Date: 3/31/2022  Patient Name: Celine Gold        CSN: [id number]   : 1940  (81 y.o.)  Gender: female   Referring Practitioner: ARTHUR Marin CNP  Diagnosis: dehydration         REASON FOR MISSED TREATMENT: Patient Refused Patient states she does not feel good and tired. Patient educated regarding importance of participation however continues to decline at this time. Will attempt at next available time.

## 2022-03-31 NOTE — PROGRESS NOTES
Hospitalist Progress Note    Patient:  Josephina Meckel    YOB: 1940  Unit/Bed:4K-03/003-A  MRN: [id number]    Acct: [de-identified]   PCP: ARTHUR Hurtado CNP    Date of Admission: 3/24/2022      Assessment/Plan:   ISIDRA on CKD: seems to have plateaued at 6.7-7.2 range. Management per Nephrology and Cardiology with diuresis. No e/o hydro. Suspect Cardiorenal syndrome. Avoid nephrotoxins, holding losartan and aldactone at this time.  Metabolic Acidosis with AG: mild, resolved, will continue to monitor. No indication for bicarb drip at this time. Reviewed VBG.  Anasarca / Ascites: multifactorial, mostly related to RHF/pulm HTN and resulting cardiac cirrhosis. There is also mild hypoalbuminemia.   o Diuresis per Cardiology, continue on bumex drip  o Repeat K and Mg later today   SBP: patient had cutibacterium grow from 3.25 ascitic fluid, sample had >250 PMN as well as repeat sample from 3/30. Continues on Rocephin IV. Will follow ascitic cultures.  Bilateral Pleural Effusions / Acute Hypoxic Resp Failure: improved, was POA, now on RA. Could be component of poor ventilation 2/2 ascites.  Acute on chronic HFpEF with RHF: her volume status is tenuous - she is prone to poor CO with drops in preload, will need close monitoring during diuresis.  Acute on Chronic Hypotension: due to fluid shifts, right heart failure, liver disease. Adjust midodrine based on her BP - has been improved but providing more support since getting diuretic drip.  Hx of AS s/p TAVR now with AI 2+: noted.  Severe Pulm HTN / Severe TR: looks to be mostly group 2 per prior RH data. Discussed at length potential transfer to [facility] with patient, family, and Cardiology - they elect for continued care locally and are not wishing for aggressive treatment (such as swan-juve, pressors, inotropes etc)   Atrial fibrillation: not on [address] pta. Consider initiation of Eliquis 2. 5?  Defer to her usual cardiologist, Dr. Alyssa Tirado. Continue rate control.  Hypothyroidism: new, increased synthroid, will need continued monitoring.  NSVT x10bts on 3/29: contineu to monitor tele and lytes, no recurrence seen. Goals of Care: [facility] Chroman discussion with patient/family on 3/30 with Dr. Maria Teresa Robles and myself. Discussed all potential treatment options and potential interventions, patient and patient's son are in agreement that no further wishes for transfer to [facility] clinic for pulmonary hypertension specialist, and no wish for ICU transfer for [place] directed therapy, as they are understanding patient's heart failure is in the end stages and she is not a candidate for open heart surgery. Her CODE STATUS is limited no x4, will be optimizing her therapy with her current regimen and she is okay with IV Bumex and other IV therapies as necessary, but her overall goal is to get home. Palliative care is following to assist with patient and family's goals moving forwards. She desires to avoid SNF despite recommendations. Expected discharge date:  >2 days    Disposition: pending  [] Home  [] TCU  [] Rehab  [] Psych  [] SNF  [] [place]  [] Other-    ===================================================================      Chief Complaint: SOB    Hospital Course: Per HPI, \"Norma العراقي is 81years old female who presented to ED for generalize weakness on 3/24/22. Past medical history significant with pulmonary hypertension secondary to severe tricuspid regurgitation, decompensated liver cirrhosis, chronic hypotension, persistent A. fib on carvedilol with rate control, heart failure preserved ejection fraction, CKD stage III. Patient recently discharged from the hospital on March 17, 2022.  2 days prior to presentation patient reports decreased p.o. intake, nausea, dry heaves, persistent diarrhea with loose watery stool.   On the day of presentation patient's son report that patient worsening fatigue and generalized weakness therefore he brought her to the ED.     In the ED patient found to have blood pressure 83/50, heart rate 75, respiration rate 16, O2 sat at 95%. BMP showed Cr 2.4 which increased 1.2 from 1 months prior. CXR showed cardiomegaly possible failure, patchy opacity of right lung base. Patient was admitted and manage for decompensate liver cirrhosis and ISIDRA on CKD. Patient subsequently underwent paracentesis with 2.5L fluid removed. Patient develop hypotension which didn't response to albumin and NS. Patient required pressure support and she was transferred to ICU on 3/25/22. On 3/26, patient wean off pressure support and transferred out of ICU. Cheetah test on 3/27/22 showed nigh normal CO with low TPR not responsive to fluid. Thyroid function test showed hypothyroidism. Patient was started Synthroid 50 mg daily on 3/28/22. On 3/30, ascites fluid positive culture which Ceftriaxone was started. Patient develop worsening metabolic acidosis which bicarb drip and tab were started. \"    Subjective (past 24 hours): Patient seen at bedside. She feels more comfortable today after paracentesis was completed yesterday. She denies any fevers or chills. She still has shortness of breath and feels overall fatigued but no new major complaints at this time. She denies chest pain or palpitations.       Medications:  Reviewed    Infusion Medications    [Held by provider] sodium bicarbonate infusion 100 mL/hr at 03/31/22 0129    bumetanide 0.1 mg/mL infusion 0.5 mg/hr (03/31/22 0128)    sodium chloride      sodium chloride       Scheduled Medications    metoprolol tartrate  12.5 mg Oral BID    sodium bicarbonate  1,300 mg Oral BID    cefTRIAXone (ROCEPHIN) IV  2,000 mg IntraVENous Q24H    midodrine  10 mg Oral TID WC    lidocaine PF  5 mL IntraDERmal Once    iron sucrose  300 mg IntraVENous Q48H    levothyroxine  50 mcg Oral QAM AC    lidocaine 1 % injection  5 mL IntraDERmal Once    sodium chloride flush  5-40 mL IntraVENous 2 times per day    pantoprazole  40 mg Oral BID AC    rifaximin  550 mg Oral BID    sodium chloride flush  5-40 mL IntraVENous 2 times per day    PARoxetine  30 mg Oral QAM     PRN Meds: sodium chloride flush, sodium chloride, sodium chloride flush, sodium chloride, acetaminophen, ondansetron, melatonin      ROS: reviewed from prior note, full ROS unchanged unless otherwise stated in hospital course/subjective portion. Intake/Output Summary (Last 24 hours) at 3/31/2022 1648  Last data filed at 3/31/2022 1200  Gross per 24 hour   Intake 2586.99 ml   Output 700 ml   Net 1886.99 ml       Exam:  BP (!) 114/56   Pulse 77   Temp 97.4 °F (36.3 °C) (Oral)   Resp 18   Ht 5' 4\" (1.626 m)   Wt 183 lb 1.6 oz (83.1 kg)   SpO2 99%   BMI 31.43 kg/m²     General appearance: Acute on chronically ill-appearing, comfortable at this time  Eyes:  PERRL. Conjunctivae/corneas clear. HENT: Head normal appearing. Nares normal. Oral mucosa moist.  Hearing intact. Neck: Supple, with full range of motion. Trachea midline. No gross JVD appreciated. Respiratory: Diminished inspiratory effort, bibasilar rales, no wheezing or rhonchi appreciated. Cardiovascular: Normal rate, irregular rhythm  Harsh 4 out of 6 systolic ejection murmur in the right upper sternal border  2+ bilateral lower extremity pitting edema  Abdomen: Protuberant abdomen with moderate distention, dullness to percussion. Bowel sounds are normoactive. Musculoskeletal: No joint swelling or tenderness. Normal tone. No abnormal movements. Generalized weakness  Skin: Warm and dry. No rashes or lesions. There appears to be poor turgor in the distal extremities. Neurologic:  No focal sensory/motor deficits in the upper or lower extremities. Cranial nerves:  grossly non-focal 2-12. Psychiatric: Alert and oriented, normal insight and thought content.    Capillary Refill: Slow  Peripheral Pulses: Difficult to palpate with edema      Labs:   Recent Labs     03/31/22  0832   WBC 6.1   HGB 11.6*   HCT 37.3        Recent Labs     03/29/22  0442 03/30/22  0556 03/31/22 0435    136 138   K 3.7 4.4 3.8    104 100   CO2 20* 15* 22*   BUN 66* 63* 60*   CREATININE 2.9* 2.9* 2.8*   CALCIUM 8.8 8.5 8.6     Recent Labs     03/29/22  0442 03/31/22  0435   AST 26 38   ALT 8* 12   BILIDIR 0.7* 0.5*   BILITOT 1.5* 1.3*   ALKPHOS 61 61     Recent Labs     03/29/22 0442   INR 1.45*     No results for input(s): CKTOTAL, TROPONINI in the last 72 hours. No results for input(s): PROCAL in the last 72 hours. Lab Results   Component Value Date    NITRU NEGATIVE 03/26/2022    WBCUA 2-4 03/26/2022    BACTERIA FEW 03/26/2022    RBCUA 3-5 03/26/2022    BLOODU NEGATIVE 03/26/2022    SPECGRAV 1.017 03/11/2022    GLUCOSEU NEGATIVE 03/26/2022       Radiology (48 hours):  No results found. DVT prophylaxis:    [] Lovenox  [x] SCDs  [] SQ Heparin  [] Encourage ambulation   [] Already on Anticoagulation       Diet: ADULT DIET; Regular; Low Sodium (2 gm); 1200 ml  ADULT ORAL NUTRITION SUPPLEMENT; AM Snack, PM Snack;  Other Oral Supplement; magic cup + Boost  Code Status: Limited  PT/OT: yes  Tele: yes  IVF: na    Electronically signed by John Jones DO on 3/31/2022 at 4:48 PM

## 2022-03-31 NOTE — CARE COORDINATION
Collaborative Discharge Planning    Ivan Delgado  :  1940  MRN:  [id number]    ADMIT DATE:  3/24/2022      Discharge Planning Discharge Planning  Living Arrangements: Alone  Support Systems: Family Members  Potential Assistance Needed: N/A  Type of Home Care Services: None  Patient expects to be discharged to[de-identified] [facility] Board Notes /Social Work Whiteboard Notes  /Social Work Whiteboard: 3/30;  - [facility] (nursing, PT/OT, Aide), need orders    Procedure   3/28 Paracentesis = 5.5L removed    Discharge Plan Home with home health  plans home alone; son Taj Coe attentive to needs and prefers [address] (nsg, therapy, aide); therapy following, follows [facility]note 3[facility] for pulmonary hypertension    Discharge Milestones and Delays: Clinical status  ISIDRA/Decompensated Liver Cirrhois/Ascites/Hypotension/CHF. Rifaximin continued. Creatinine 2.8; monitor. Urine Output = 500 ml/24h; monitor. Bumex gtt, IV AB, Oxygen 2L continued.  Cardiology following for for AI, Mitral Regurgitation, Pulmonary HTN; plans IV Diuresing            SIGNED:  Wale Sommers RN   3/31/2022, 8:34 AM

## 2022-03-31 NOTE — PLAN OF CARE
Problem: Physical Regulation:  Goal: Prevent transmision of infection  Description: Prevent transmision of infection  3/30/2022 2202 by Delma Mckenna RN  Outcome: Ongoing  3/30/2022 1440 by Mony Lerma RN  Outcome: Ongoing  Note: Hand washing utilized and isolation techniques used. 3/30/2022 0848 by Delma Mckenna RN  Outcome: Ongoing     Problem: Skin Integrity:  Goal: Risk for impaired skin integrity will decrease  Description: Risk for impaired skin integrity will decrease  3/30/2022 2202 by Delma Mckenna RN  Outcome: Ongoing  3/30/2022 1440 by Mony Lerma RN  Outcome: Ongoing  Note: Skin assessed and pillow support with alternating pressure points with pillows utilized. 3/30/2022 0848 by Delma Mckenna RN  Outcome: Ongoing  Goal: Will show no infection signs and symptoms  Description: Will show no infection signs and symptoms  3/30/2022 2202 by Delma Mckenna RN  Outcome: Ongoing  3/30/2022 1440 by Mony Lerma RN  Outcome: Ongoing  Note: HR and temperature monitored closely along with assessed wound integrity. 3/30/2022 0848 by Delma Mckenna RN  Outcome: Ongoing  Goal: Absence of new skin breakdown  Description: Absence of new skin breakdown  3/30/2022 2202 by Delma Mckenna RN  Outcome: Ongoing  3/30/2022 1440 by Mony Lerma RN  Outcome: Ongoing  Note: Pillow support and turning utilized. 3/30/2022 0848 by Delma Mckenna RN  Outcome: Ongoing     Problem: Falls - Risk of:  Goal: Will remain free from falls  Description: Will remain free from falls  3/30/2022 2202 by Delma Mckenna RN  Outcome: Ongoing  3/30/2022 1440 by Mony Lerma RN  Outcome: Ongoing  Note: Call light and belongings within reach. Gait belt and walker used when moving patient.   3/30/2022 0848 by Delma Mckenna RN  Outcome: Ongoing  Goal: Absence of physical injury  Description: Absence of physical injury  3/30/2022 2202 by Delma Mckenna RN  Outcome: Ongoing  3/30/2022 1440 by Mony Lerma RN  Outcome: Ongoing  Note: Skin assessed, nutrition encouraged, pillow support utilized. 3/30/2022 0848 by Candace Middleton RN  Outcome: Ongoing     Problem: Pain:  Goal: Pain level will decrease  Description: Pain level will decrease  3/30/2022 2202 by Candace Middleton RN  Outcome: Ongoing  3/30/2022 1440 by Sarah Persaud RN  Outcome: Ongoing  Note: Pain assessed hourly and interventions completed as appropriate. 3/30/2022 0848 by Candace Middleton RN  Outcome: Ongoing  Goal: Control of acute pain  Description: Control of acute pain  3/30/2022 2202 by Candace Middleton RN  Outcome: Ongoing  3/30/2022 0848 by Candace Middleton RN  Outcome: Ongoing  Goal: Control of chronic pain  Description: Control of chronic pain  3/30/2022 2202 by Candace Middleton RN  Outcome: Ongoing  3/30/2022 0848 by Candace Middleton RN  Outcome: Ongoing     Problem: Discharge Planning:  Goal: Discharged to appropriate level of care  Description: Discharged to appropriate level of care  3/30/2022 2202 by Candace Middleton RN  Outcome: Ongoing  3/30/2022 1440 by Sarah Persaud RN  Outcome: Ongoing  Note: Discharge needs anticipated and assessed.   3/30/2022 0848 by Candace Middleton RN  Outcome: Ongoing

## 2022-03-31 NOTE — PROGRESS NOTES
Cardiology Progress Note      Patient:  Celine Gold  YOB: 1940  MRN: [id number]   Acct: [de-identified]  Admit Date:  3/24/2022  Primary Cardiologist: Remington Whitney MD    Note per dr Eulas Hodgkin: weakness        HPI: This is a pleasant 81 y.o. female presents with weakness to [facility] on 3/23/22. Patient has significant past medical history of pulmonary HTN, severe tricuspid regurgitation, decompensated liver cirrhosis, persistent A-fib on carvedilol, and HFpEF and CKD stage III. Patient was recently discharged from the hospital 3/17/22. Patient symptoms started two days prior with decreased PO intake, nausea, dry heaves, persistent diarrhea with watery stool.      Consulted for aortic insuffiencey +2, pulmonary hypertension with severe tricuspid regurgitation. Echo 3/30/22 pending, echo 3/24/22 severe tricuspid regurgitation, increased RV size and thickness with PAP 65-70mmHg. Echo 08/2018 shows normal TR with PAP 45mmHg. Catheter 02/2022 shows pulmonary hypertension. EKG on 3/24/22 patient is in persistent atrial fibration consistent with prior readings. Patient complains of cold and numb hands, as well as distention of the abdomen. Patient was receiving paracentesis for hepatic congestion and cirrhosis. \"    Subjective (Events in last 24 hours): pt awake and alert. NAD. No cp or sob. Denies orthopnea. Has ble edema and abd fullness. Just doesn't feel well. Has some nausea and poor appetite.   No vomiting or diarrhea  On 2 l/min O2  On bumex gtt at 0.625 mg/hr    Net I/o +6.7 L    Objective:   /79   Pulse 86   Temp 97.4 °F (36.3 °C) (Oral)   Resp 18   Ht 5' 4\" (1.626 m)   Wt 183 lb 1.6 oz (83.1 kg)   SpO2 99%   BMI 31.43 kg/m²        TELEMETRY: nsr    Physical Exam:  General Appearance: alert and oriented to person, place and time, in no acute distress  Cardiovascular: normal rate, regular rhythm, normal S1 and S2, no murmurs, rubs, clicks, or gallops, distal pulses intact, no carotid bruits, no JVD  Pulmonary/Chest: bibasilar crackles  Abdomen: soft, non-tender, non-distended, normal bowel sounds, no masses Extremities: +ble edema up to thighs, pulse   Skin: warm and dry, no rash or erythema  Head: normocephalic and atraumatic  Eyes: pupils equal, round, and reactive to light  Neck: supple and non-tender without mass, no thyromegaly  Neurological: alert, oriented, normal speech, no focal findings or movement disorder noted    Medications:    metoprolol tartrate  12.5 mg Oral BID    sodium bicarbonate  1,300 mg Oral BID    cefTRIAXone (ROCEPHIN) IV  2,000 mg IntraVENous Q24H    midodrine  10 mg Oral TID WC    lidocaine PF  5 mL IntraDERmal Once    iron sucrose  300 mg IntraVENous Q48H    levothyroxine  50 mcg Oral QAM AC    lidocaine 1 % injection  5 mL IntraDERmal Once    sodium chloride flush  5-40 mL IntraVENous 2 times per day    pantoprazole  40 mg Oral BID AC    rifaximin  550 mg Oral BID    sodium chloride flush  5-40 mL IntraVENous 2 times per day    PARoxetine  30 mg Oral QAM      [Held by provider] sodium bicarbonate infusion 100 mL/hr at 03/31/22 0129    bumetanide 0.1 mg/mL infusion 0.5 mg/hr (03/31/22 0128)    sodium chloride      sodium chloride       sodium chloride flush, 5-40 mL, PRN  sodium chloride, 25 mL, PRN  sodium chloride flush, 5-40 mL, PRN  sodium chloride, 25 mL, PRN  acetaminophen, 500 mg, Q6H PRN  ondansetron, 4 mg, Q6H PRN  melatonin, 3 mg, Nightly PRN        Lab Data:    Cardiac Enzymes:  No results for input(s): CKTOTAL, CKMB, CKMBINDEX, TROPONINI in the last 72 hours.     CBC:   Lab Results   Component Value Date    WBC 6.1 03/31/2022    RBC 3.90 03/31/2022    RBC 2.95 08/22/2011    HGB 11.6 03/31/2022    HCT 37.3 03/31/2022     03/31/2022       CMP:    Lab Results   Component Value Date     03/31/2022    K 3.8 03/31/2022    K 3.9 03/26/2022     03/31/2022    CO2 22 03/31/2022    BUN 60 03/31/2022    CREATININE 2.8 03/31/2022    LABGLOM 16 03/31/2022    GLUCOSE 127 03/31/2022    GLUCOSE 93 08/22/2011    CALCIUM 8.6 03/31/2022       Hepatic Function Panel:    Lab Results   Component Value Date    ALKPHOS 61 03/31/2022    ALT 12 03/31/2022    AST 38 03/31/2022    PROT 5.5 03/31/2022    BILITOT 1.3 03/31/2022    BILIDIR 0.5 03/31/2022    LABALBU 3.6 03/31/2022    LABALBU 3.2 08/20/2011       Magnesium:    Lab Results   Component Value Date    MG 1.6 03/29/2022       PT/INR:    Lab Results   Component Value Date    PROTIME 1.75 08/21/2011    INR 1.45 03/29/2022       HgBA1c:    Lab Results   Component Value Date    LABA1C 5.3 06/21/2018       FLP:    Lab Results   Component Value Date    TRIG 62 02/09/2022    HDL 53 02/09/2022    LDLCALC 32 02/09/2022       TSH:    Lab Results   Component Value Date    TSH 5.310 03/27/2022         Assessment:    Torrential TR  RV failure  Severe PHTN  Low-output state   Congestive cirrhosis  ISIDRA/CKD  Ascites - s/p paracentesis 3/30/22  Preserved EF - ef 55 per TTE 3/14/22  Hx bioprosthetic AVR with AI  Mild MR  Patent coronaries per cath 2/2022    Plan:    Daily I/o and weights  2 liter fluid restriction and 2gm sodium diet  Keep mag >2 and K >4  Elevate legs, wrap legs  Cont diuresis  Daily BMP  Use albumin 25% for replenishment during hypotension   Cont midodrine  Cont BB     Electronically signed by Aneudy Panchal PA-C on 3/31/2022 at 2:23 PM

## 2022-03-31 NOTE — PROGRESS NOTES
Follow Up / Progress Note        Patient:   John Valdez  YOB: 1940  Age:  81 y.o. Room:  [facility]03/003-A  MRN:  [id number]            Family/Patient Discussion:  Spoke with Maria G Yusef at in room. She was sitting up in the chair. She states she is feeling better and the plan is to return home, alone, with home health. She also states that her son lives close by and will be able to help her at home. Discussed safety issues with her living alone as she reports being weak and having to use a walker to get around in her hospital room. She states she is \"going to try it because I hate nursing homes\". Discussed irreversible chronic liver disease and the potential need for frequent paracentesis. She asked several questions regarding paracentesis and explanation given. I encouraged her to consider ECF d/t safety concerns. She voiced understanding. Maria G Holbrook verified code status as limited x4.           Electronically signed by Man Patricio RN on 3/31/2022 at 1:55 PM             Palliative Care Office: [phone]

## 2022-04-01 LAB
ANION GAP SERPL CALCULATED.3IONS-SCNC: 17 MEQ/L (ref 8–16)
BUN BLDV-MCNC: 58 MG/DL (ref 7–22)
CALCIUM SERPL-MCNC: 8.4 MG/DL (ref 8.5–10.5)
CHLORIDE BLD-SCNC: 97 MEQ/L (ref 98–111)
CO2: 22 MEQ/L (ref 23–33)
CREAT SERPL-MCNC: 2.8 MG/DL (ref 0.4–1.2)
GFR SERPL CREATININE-BSD FRML MDRD: 16 ML/MIN/1.73M2
GLUCOSE BLD-MCNC: 113 MG/DL (ref 70–108)
MAGNESIUM: 1.7 MG/DL (ref 1.6–2.4)
POTASSIUM SERPL-SCNC: 3.3 MEQ/L (ref 3.5–5.2)
SODIUM BLD-SCNC: 136 MEQ/L (ref 135–145)

## 2022-04-01 PROCEDURE — 6370000000 HC RX 637 (ALT 250 FOR IP): Performed by: INTERNAL MEDICINE

## 2022-04-01 PROCEDURE — 2500000003 HC RX 250 WO HCPCS: Performed by: NURSE PRACTITIONER

## 2022-04-01 PROCEDURE — 80048 BASIC METABOLIC PNL TOTAL CA: CPT

## 2022-04-01 PROCEDURE — 6360000002 HC RX W HCPCS: Performed by: NURSE PRACTITIONER

## 2022-04-01 PROCEDURE — 83735 ASSAY OF MAGNESIUM: CPT

## 2022-04-01 PROCEDURE — 36415 COLL VENOUS BLD VENIPUNCTURE: CPT

## 2022-04-01 PROCEDURE — 99233 SBSQ HOSP IP/OBS HIGH 50: CPT | Performed by: INTERNAL MEDICINE

## 2022-04-01 PROCEDURE — 2580000003 HC RX 258: Performed by: STUDENT IN AN ORGANIZED HEALTH CARE EDUCATION/TRAINING PROGRAM

## 2022-04-01 PROCEDURE — 99232 SBSQ HOSP IP/OBS MODERATE 35: CPT | Performed by: PHYSICIAN ASSISTANT

## 2022-04-01 PROCEDURE — 6370000000 HC RX 637 (ALT 250 FOR IP): Performed by: STUDENT IN AN ORGANIZED HEALTH CARE EDUCATION/TRAINING PROGRAM

## 2022-04-01 PROCEDURE — 2060000000 HC ICU INTERMEDIATE R&B

## 2022-04-01 PROCEDURE — 97110 THERAPEUTIC EXERCISES: CPT

## 2022-04-01 PROCEDURE — 6370000000 HC RX 637 (ALT 250 FOR IP): Performed by: NURSE PRACTITIONER

## 2022-04-01 PROCEDURE — 97530 THERAPEUTIC ACTIVITIES: CPT

## 2022-04-01 PROCEDURE — 94761 N-INVAS EAR/PLS OXIMETRY MLT: CPT

## 2022-04-01 PROCEDURE — 6370000000 HC RX 637 (ALT 250 FOR IP)

## 2022-04-01 PROCEDURE — 99232 SBSQ HOSP IP/OBS MODERATE 35: CPT | Performed by: INTERNAL MEDICINE

## 2022-04-01 PROCEDURE — 6360000002 HC RX W HCPCS: Performed by: INTERNAL MEDICINE

## 2022-04-01 PROCEDURE — 2580000003 HC RX 258: Performed by: INTERNAL MEDICINE

## 2022-04-01 RX ORDER — POTASSIUM CHLORIDE 7.45 MG/ML
10 INJECTION INTRAVENOUS PRN
Status: DISCONTINUED | OUTPATIENT
Start: 2022-04-01 | End: 2022-04-05 | Stop reason: HOSPADM

## 2022-04-01 RX ORDER — POTASSIUM CHLORIDE 20 MEQ/1
40 TABLET, EXTENDED RELEASE ORAL PRN
Status: DISCONTINUED | OUTPATIENT
Start: 2022-04-01 | End: 2022-04-05 | Stop reason: HOSPADM

## 2022-04-01 RX ORDER — MAGNESIUM SULFATE IN WATER 40 MG/ML
2000 INJECTION, SOLUTION INTRAVENOUS PRN
Status: DISCONTINUED | OUTPATIENT
Start: 2022-04-01 | End: 2022-04-05 | Stop reason: HOSPADM

## 2022-04-01 RX ADMIN — IRON SUCROSE 300 MG: 20 INJECTION, SOLUTION INTRAVENOUS at 11:37

## 2022-04-01 RX ADMIN — SODIUM CHLORIDE, PRESERVATIVE FREE 10 ML: 5 INJECTION INTRAVENOUS at 09:06

## 2022-04-01 RX ADMIN — LEVOTHYROXINE SODIUM 50 MCG: 0.05 TABLET ORAL at 06:30

## 2022-04-01 RX ADMIN — Medication 3 MG: at 20:05

## 2022-04-01 RX ADMIN — PAROXETINE 30 MG: 30 TABLET, FILM COATED ORAL at 20:03

## 2022-04-01 RX ADMIN — CEFTRIAXONE 2000 MG: 10 INJECTION, POWDER, FOR SOLUTION INTRAVENOUS at 13:12

## 2022-04-01 RX ADMIN — SODIUM CHLORIDE, PRESERVATIVE FREE 10 ML: 5 INJECTION INTRAVENOUS at 20:03

## 2022-04-01 RX ADMIN — MIDODRINE HYDROCHLORIDE 10 MG: 10 TABLET ORAL at 09:05

## 2022-04-01 RX ADMIN — MIDODRINE HYDROCHLORIDE 10 MG: 10 TABLET ORAL at 16:42

## 2022-04-01 RX ADMIN — PANTOPRAZOLE SODIUM 40 MG: 40 TABLET, DELAYED RELEASE ORAL at 16:42

## 2022-04-01 RX ADMIN — RIFAXIMIN 550 MG: 550 TABLET ORAL at 09:06

## 2022-04-01 RX ADMIN — Medication 3 MG: at 01:05

## 2022-04-01 RX ADMIN — SODIUM BICARBONATE 1300 MG: 650 TABLET ORAL at 20:03

## 2022-04-01 RX ADMIN — MIDODRINE HYDROCHLORIDE 10 MG: 10 TABLET ORAL at 13:13

## 2022-04-01 RX ADMIN — PANTOPRAZOLE SODIUM 40 MG: 40 TABLET, DELAYED RELEASE ORAL at 06:30

## 2022-04-01 RX ADMIN — POTASSIUM CHLORIDE 40 MEQ: 20 TABLET, EXTENDED RELEASE ORAL at 16:42

## 2022-04-01 RX ADMIN — SODIUM BICARBONATE 1300 MG: 650 TABLET ORAL at 09:04

## 2022-04-01 RX ADMIN — RIFAXIMIN 550 MG: 550 TABLET ORAL at 20:03

## 2022-04-01 RX ADMIN — METOPROLOL TARTRATE 12.5 MG: 25 TABLET, FILM COATED ORAL at 09:05

## 2022-04-01 ASSESSMENT — PAIN SCALES - GENERAL
PAINLEVEL_OUTOF10: 0
PAINLEVEL_OUTOF10: 0

## 2022-04-01 NOTE — PROGRESS NOTES
Gastroenterology Progress Note:     Patient Name:  Thea Raygoza   MRN: [id number]  [id number]  YOB: 1940  Admit Date: 3/24/2022 10:46 PM  Primary Care Physician: ARTHUR Carmona - CNP   4K-03/003-A     Patient seen and examined. 24 hours events and chart reviewed. Subjective: Patient sitting up in the chair. Denies abd pain, n/v. She continues on a Bumex gtt. Paracentesis 03/30/22 with 2.3 L off, positive for SBP, done by primary. Objective:  BP (!) 125/58   Pulse 83   Temp 98.3 °F (36.8 °C) (Oral)   Resp 18   Ht 5' 4\" (1.626 m)   Wt 179 lb 8 oz (81.4 kg)   SpO2 91%   BMI 30.81 kg/m²     Physical Exam:    General:  Chronically ill appearing female  HEENT: Atraumatic, normocephalic. Moist oral mucous membranes. Neck: Supple without adenopathy, JVD, thyromegaly or masses. Trachea midline. CV: Heart RRR, no murmurs, rubs, gallops. Resp: Even, easy without cough or accessory use. Lungs clear to ascultation bilaterally. Abd: Round, soft, obese, nontender. No hepatosplenomegaly or mass present. Active bowel sounds heard. Mild distention noted. Ext:  Without cyanosis, clubbing. BLE edema. Skin: Pink, warm, dry. Scattered ecchymosis noted to BUE. Neuro:  Alert, oriented x 3 with no obvious deficits.        Rectal: deferred    Labs:   CBC:   Lab Results   Component Value Date    WBC 6.1 03/31/2022    HGB 11.6 03/31/2022    HCT 37.3 03/31/2022    MCV 95.6 03/31/2022     03/31/2022     BMP:   Lab Results   Component Value Date     03/31/2022    K 3.4 03/31/2022     03/31/2022    CO2 22 03/31/2022    PHOS 4.4 03/26/2022    BUN 60 03/31/2022    CREATININE 2.8 03/31/2022    CALCIUM 8.6 03/31/2022     PT/INR:   Lab Results   Component Value Date    PROTIME 1.75 08/21/2011    INR 1.45 03/29/2022     Lipids:   Lab Results   Component Value Date    ALKPHOS 61 03/31/2022    ALT 12 03/31/2022    AST 38 03/31/2022    BILITOT 1.3 03/31/2022    BILIDIR 0.5 03/31/2022 LABALBU 3.6 03/31/2022    LABALBU 3.2 08/20/2011    AMYLASE 67 07/19/2019    LIPASE 19.3 03/11/2022     Current Meds:  Scheduled Meds:   metoprolol tartrate  12.5 mg Oral BID    sodium bicarbonate  1,300 mg Oral BID    cefTRIAXone (ROCEPHIN) IV  2,000 mg IntraVENous Q24H    midodrine  10 mg Oral TID WC    lidocaine PF  5 mL IntraDERmal Once    iron sucrose  300 mg IntraVENous Q48H    levothyroxine  50 mcg Oral QAM AC    lidocaine 1 % injection  5 mL IntraDERmal Once    sodium chloride flush  5-40 mL IntraVENous 2 times per day    pantoprazole  40 mg Oral BID AC    rifaximin  550 mg Oral BID    sodium chloride flush  5-40 mL IntraVENous 2 times per day    PARoxetine  30 mg Oral QAM     Continuous Infusions:   [Held by provider] sodium bicarbonate infusion 100 mL/hr at 03/31/22 0129    bumetanide 0.1 mg/mL infusion 0.5 mg/hr (03/31/22 1959)    sodium chloride      sodium chloride         Assessment:  82 yo F admitted 03/24/22 for generalized weakness & dizziness with a fall at home. H/O cirrhosis. H/O CHF. Diuretics recently increased by cardiology. Recent admission 03/11/22-03/17/22 for SOB & edema. She has required recent paracentesis. H/O severe pulmonary HTN & severe tricuspid regurgitation. Briefly transferred to ICU for worsening hypotension. Stool studies negative. Refused Lactulose, Xifaxan started for intermittent confusion. S/P para 03/25/22 with 2.5L removed, negative for SBP. S/P para 03/28/22 with 5.5L removed, positive for SBP, IV Rocephin started.     1. Fall at home  2. Decompensated liver cirrhosis with ascites- MELD 16  3. Ascites- likely cardiogenic & hepatic, s/p para 03/25/22 with 2.5 L removed negative for SBP, s/p para 03/28/22 5.5L removed positive for SBP, s/p para 03/30/22 with 2.3 L positive for SBP  4. Acute CHF exacerbation  5. ISIDRA on CKD- likely cardiorenal  6. Severe pulmonary HTN  7. Leukopenia- resolved  8. Normocytic anemia- no GI bleeding noted  9. Hypoalbuminemia  10. Coagulopathy  11. Acute diarrhea- stool panel negative  12. Cardiomegaly  13. Acute on chronic hypotension- on Midodrine  14. Paroxysmal afib  15. Severe tricuspid regurgitation  16. JAMES   17. SBP- IV Rocephin started 03/30/20     Plan:    · Monitor H & H, transfuse prn  · Bumex gtt per cardiology, will need transitioned to PO at discharge  · 2g sodium diet with fluid restriction  · Midodrine TID per primary  · Continue PPI daily, home dose  · Daily weights  · HFP, INR in the morning  · Strict I & O  · SCDs for DVT prophylaxis  · IV Rocephin 2g daily, will need at least 5 days, started 03/30/22  · IV Venofer per nephro  · Avoid hepatotoxic meds, okay for Tylenol 2g limit  · Avoid narcotic analgesics & benzodiazepines as they can precipitate HE  · Do not recommend frequent paracentesis as this can exacerbate liver disease  · May need set-up for OP paracentesis schedule, patient has not been receiving diuresis most of this admission, will depend on how she responds to diuresis, will be addressed at OP follow-up visit  · Nephrology & cardiology on board  · RN updated  · Supportive care per primary team  Will follow    Case reviewed and impression/plan reviewed in collaboration with Dr. Aly Yousif  Electronically signed by ARTHUR Medina CNP on 4/1/2022 at 10:26 AM    [email or web address]     If there are any questions or concerns this weekend, please call Dr. Davida Flores as he is covering for [email or web address].

## 2022-04-01 NOTE — PROGRESS NOTES
[facility] ICU STEPDOWN TELEMETRY 4K  Occupational Therapy  Daily Note  Time:   Time In:   Time Out: 1123  Timed Code Treatment Minutes: 25 Minutes  Minutes: 25          Date: 2022  Patient Name: Bandar Ramirez,   Gender: female      [id number]  MRN: [id number]  : 1940  (81 y.o.)  Referring Practitioner: ARTHUR Zheng CNP  Diagnosis: dehydration  Additional Pertinent Hx: Per ER note on 3/24/2022: 81 y.o. female who presents to the emergency department for evaluation of generalized weakness. Patient with a past medical history of cirrhosis, CHF, atrial fibrillation, hypertension, and coronary disease. She was recently discharged from the hospital 7 days ago. Over the last 5 days she reports that she has had decreased p.o. intake nausea and dry heaves. And persistent diarrhea. Diarrhea is loose and watery. No blood. No melena. Her son states today she had worsening fatigue and generalized weakness therefore he brought her into the ED. Restrictions/Precautions:  Restrictions/Precautions: Fall Risk     SUBJECTIVE: Pt seated in bedside chair upon arrival, agreeable to OT session. PAIN: No c/o. Vitals: Oxygen: Pt on 5L O2 during session- non symptomatic throughout. Heart Rate: WNL    COGNITION: Decreased Insight, Decreased Problem Solving and Decreased Safety Awareness    ADL:   Lower Extremity Dressing: Maximum Assistance. Adjusting slipper socks seated in chair- pt states her son helps at home. BALANCE:  Sitting Balance:  Supervision. Standing Balance: Stand By Assistance, [employer]. x4 minutes standing talking with therapist within unit sitting area. BED MOBILITY:  Not Tested    TRANSFERS:  Sit to Stand:  Contact Guard Assistance. Stand to Sit: Contact Guard Assistance. FUNCTIONAL MOBILITY:  Assistive Device: Rolling Walker   Assist Level:  Contact Guard Assistance.    Distance:   Completed functional mobility short household distance within unit hallway at slow pace, no LOB noted. Pt requires min cues for walker safety- seated rest break after trial of mobility, min fatigue noted. ADDITIONAL ACTIVITIES:  Patient identified a personal goal to increase UB strength and improve overall endurance so they can complete their toilet & shower transfers; skilled edu on UE strengthening. Completed BUE AROM exercises x10 reps x1 set in all joints/planes to increase strength and endurance required for ADLs. Pt required min rest break between each exercise and min v/c for proper technique. ASSESSMENT:     Activity Tolerance:  Patient tolerance of  treatment: fair. Discharge Recommendations: Patient would benefit from continued OT at discharge  Equipment Recommendations: Other: Monitor pending progress  Plan: Times per week: 3-5x  Current Treatment Recommendations: Balance Training,Endurance Training,Functional Mobility Training,Self-Care / ADL,Safety Education & Training    Patient Education  Patient Education: ADL's, Home Exercise Program, Importance of Increasing Activity, Assistive Device Safety and Safety with transfers and mobility. Goals  Short term goals  Time Frame for Short term goals: until discharge  Short term goal 1: Pt will complete various sit-stand t/fs including toilet with SBA & 0-2 vcs for safety  Short term goal 2: Pt will complete BADL routine with min A & min vcs for safety  Short term goal 3: Pt will complete mobility to/from bathroom with RW, SBA, & 0-2 vcs for walker safety  Short term goal 4: Pt will tolerate standing 4-5 min with SBA for increased ease of sinkside grooming  Long term goals  Time Frame for Long term goals : No LTG set d/t short ELOS    Following session, patient left in safe position with all fall risk precautions in place.

## 2022-04-01 NOTE — CARE COORDINATION
Collaborative Discharge Planning    Ivan Delgado  :  1940  MRN:  [id number]    ADMIT DATE:  3/24/2022      Discharge Planning Discharge Planning  Living Arrangements: Alone  Support Systems: Family Members  Potential Assistance Needed: N/A  Type of Home Care Services: None  Patient expects to be discharged to[de-identified] House  White Board Notes /Social Work Whiteboard Notes  /Social Work Whiteboard: ; SW - [facility] (nursing, PT/OT, Aide), need orders    Procedure   3/28 Paracentesis = 5.5L removed  Discharge Plan Home with home health  plans home alone; son Taj Coe attentive to needs and prefers [address] (nsg, therapy, aide); therapy following, follows [facility] for pulmonary hypertension    Update: Palliative Care discussed manjit Luna concerns of need for rehab at [facility]; spoke w patient; she would prefer [employer] after choices offered; SW referral will follow up w patient/family     Discharge Milestones and Delays: Clinical status  ISIDRA/Decompensated Liver Cirrhois/Ascites/Hypotension/CHF. Rifaximin continued. Urine Output = 2500 ml/24h; monitor. Bumex gtt, IV AB continued.  Cardiology following for for AI, Mitral Regurgitation, Pulmonary HTN; plans IV Diuresing         SIGNED:  Wale Sommers RN   2022, 9:27 AM

## 2022-04-01 NOTE — CARE COORDINATION
4/1/22, 2:05 PM EDT  DISCHARGE PLANNING EVALUATION    SW notified that patient and son is wanting [address] [place] [address]. SW spoke with patient about discharge planning. Patient reported that she would like referral to [address] [place] [address] for short term rehab. SW called son and confirmed he is in agreement with [facility]. SW made referral to [address] [place] [address].

## 2022-04-01 NOTE — DISCHARGE INSTR - COC
Continuity of Care Form    Patient Name: Stefany Walters   :  1940  MRN:  [id number]    Admit date:  3/24/2022  Discharge date:  2022      Code Status Order: Limited     Intubation/Re-intubation No   Defibrillation/Cardioversion No   Chest Compressions No   Resuscitative Medications No       Advance Directives:      Admitting Physician:  Comfort Grady MD  PCP: ARTHUR Norris - CNP    Discharging Nurse: [facility] Unit/Room#: 4K-03/003-A  Discharging Unit Phone Number: [phone]    Emergency Contact:   Extended Emergency Contact Information  Primary Emergency Contact:  Tuan Heredia   [address] Phone: [phone]  Relation: Child    Past Surgical History:  Past Surgical History:   Procedure Laterality Date    AORTIC VALVE REPLACEMENT      CARDIAC SURGERY      DILATION AND CURETTAGE OF UTERUS      JOINT REPLACEMENT Right 2011    knee    [facility] OFFICE/OUTPT VISIT,PROCEDURE ONLY N/A 2018    BIOPROSTHETIC AORTIC VALVE REPLACEMENT WITH MAZE PROCEDURE performed by Kapil Novak MD at [place] [address] N/A 2019    EGD ESOPHAGOGASTRODUODENOSCOPY performed by Mayra Macias MD at [facility]       Immunization History:   Immunization History   Administered Date(s) Administered    COVID-19, Pfizer Purple top, DILUTE for use, 12+ yrs, 30mcg/0.3mL dose 2021, 2021    Influenza, High Dose (Fluzone 65 yrs and older) 2018    Pneumococcal Conjugate 13-valent (Anay Hummer) 2014       Active Problems:  Patient Active Problem List   Diagnosis Code    Chronic atrial fibrillation ([facility]) I48.20    Ascending cholangitis, possible K83.09    Elevated LFTs R79.89    Thrombocytopenia ([facility]) D69.6    Leukopenia D72.819    ISIDRA (acute kidney injury) ([facility] [address] N17.9    SIRS (systemic inflammatory response syndrome) ([facility]) R65.10    Cholecystitis, acute K81.0    Neutropenia ([facility]) D70.9    Aortic stenosis, severe I35.0    Colitis K52.9    Nausea R11.0    Diarrhea R19.7    Essential hypertension I10    Anemia, normocytic normochromic D64.9    Hypocalcemia E83.51    Cirrhosis ([facility]) K74.60    S/P AVR (aortic valve replacement) Z95.2    Obesity (BMI 30-39. 9) E66.9    History of atrial fibrillation Z86.79    Hypomagnesemia E83.42    Asymptomatic bacteriuria - no clinical indication for antimicrobial therapy R82.71    CKD (chronic kidney disease) stage 3, GFR 30-59 ml/min ([facility]) N18.30    Acute renal failure superimposed on stage 3 chronic kidney disease ([facility]) N17.9, N18.30    VRE (vancomycin resistant enterococcus) culture positive Z22.39    CHF (congestive heart failure), NYHA class I, acute on chronic, combined ([facility]) I50.43    Shortness of breath R06.02    Acute kidney injury ([facility] [address] N17.9    Hypotension I95.9    Dehydration E86.0       Isolation/Infection:   Isolation            Contact          Patient Infection Status       Infection Onset Added Last Indicated Last Indicated By Review Planned Expiration Resolved Resolved By    VRE  09/05/18 03/25/22 VRE Screen by PCR        Urine 8/2018  PCR 3/2022    Resolved    C-diff Rule Out 03/25/22 03/25/22 03/26/22 Gastrointestinal Panel, Molecular (Ordered)   03/26/22 Rule-Out Test Resulted    C-diff Rule Out 03/25/22 03/25/22 03/25/22 Clostridium Difficile Toxin/Antigen (Ordered)   03/25/22 Rule-Out Test Resulted            Nurse Assessment:  Last Vital Signs: BP (!) 103/51   Pulse 81   Temp 97.3 °F (36.3 °C) (Oral)   Resp 17   Ht 5' 4\" (1.626 m)   Wt 179 lb 8 oz (81.4 kg)   SpO2 95%   BMI 30.81 kg/m²     Last documented pain score (0-10 scale): Pain Level: 0  Last Weight:   Wt Readings from Last 1 Encounters:   04/01/22 179 lb 8 oz (81.4 kg)     Mental Status:  oriented, alert, and able to concentrate and follow conversation    IV Access: none      Nursing Mobility/ADLs:  Walking   Assisted  Transfer  Assisted  Bathing  Assisted  Dressing  Assisted  Toileting Assisted  Feeding  Independent  Med Admin  Independent  Med Delivery   whole    Wound Care Documentation and Therapy:  Wound 03/06/19 Buttocks Mid (Active)   Wound Etiology Pressure Unstageable 04/01/22 0815   Dressing Status Other (Comment) 03/30/22 0451   Wound Cleansed Soap and water 03/25/22 1950   Dressing/Treatment Moisture barrier 04/01/22 0815   Wound Assessment Pink/red;Dry;Non-blanchable erythema 04/01/22 0815   Drainage Amount None 04/01/22 0815   Odor None 04/01/22 0815   Hannah-wound Assessment Non-blanchable erythema 04/01/22 0815   Number of days: 1121        Elimination:  Continence: Bowel: No  Bladder: Yes  Urinary Catheter: None   Colostomy/Ileostomy/Ileal Conduit: No       Date of Last BM: 04/05/2022      Intake/Output Summary (Last 24 hours) at 4/1/2022 1500  Last data filed at 4/1/2022 1446  Gross per 24 hour   Intake 1078.12 ml   Output 2350 ml   Net -1271.88 ml     I/O last 3 completed shifts: In: 2687 [P.O.:880; I.V.:1714.9; IV Piggyback:92.1]  Out: 2550 [Urine:2550]    Safety Concerns:     None    Impairments/Disabilities:      None    Nutrition Therapy:  Current Nutrition Therapy:   - Oral Diet:  General and Low Sodium (2gm)    Routes of Feeding: Oral  Liquids: No Restrictions  Daily Fluid Restriction: yes - amount 1200  Last Modified Barium Swallow with Video (Video Swallowing Test): not done    Treatments at the Time of Hospital Discharge:   Respiratory Treatments:   Oxygen Therapy:  is not on home oxygen therapy.   Ventilator:    - No ventilator support    Rehab Therapies: Physical Therapy and Occupational Therapy  Weight Bearing Status/Restrictions: No weight bearing restrictions  Other Medical Equipment (for information only, NOT a DME order):  walker  Other Treatments:     Patient's personal belongings (please select all that are sent with patient):  Glasses    RN SIGNATURE:  Electronically signed by Lucie Barcenas RN on 4/5/2022 at 11:52 AM'    CASE MANAGEMENT/SOCIAL WORK SECTION    Inpatient Status Date: ***    Readmission Risk Assessment Score:  Readmission Risk              Risk of Unplanned Readmission:  26           Discharging to Facility/ Agency   Name: [facility] [place] [facility] [address] [place] [address]  Address: [address] [place] [address], [address] [place] [address] Road  Phone: [phone]  Fax: [phone]    Dialysis Facility (if applicable)   Name:  Address:  Dialysis Schedule:  Phone:  Fax:    / signature: Electronically signed by Ivory Epley, LSW on 4/1/22 at 3:01 PM EDT    PHYSICIAN SECTION    Prognosis: Fair    Condition at Discharge: Stable    Rehab Potential (if transferring to Rehab): Fair    Recommended Labs or Other Treatments After Discharge: [facility]    Physician Certification: I certify the above information and transfer of Thea Raygoza  is necessary for the continuing treatment of the diagnosis listed and that she requires [place] for less 30 days.      Update Admission H&P: No change in H&P    PHYSICIAN SIGNATURE:  Electronically signed by Radha Turner DO on 4/5/22 at 10:01 AM EDT

## 2022-04-01 NOTE — PLAN OF CARE
Problem: Physical Regulation:  Goal: Prevent transmision of infection  Description: Prevent transmision of infection  Outcome: Ongoing  Note: Pt is in isolation for hx of VRE. Isolation precautions in place for staff, visitors, and patient safety. PPE and handwashing per policy. Will continue to monitor. Problem: Skin Integrity:  Goal: Absence of new skin breakdown  Description: Absence of new skin breakdown  Outcome: Ongoing  Note: No new skin breakdown present. Pt turns self frequently and is able to reposition independently. Pt offered Q2H turns with pillow support. Will continue to monitor. Problem: Falls - Risk of:  Goal: Will remain free from falls  Description: Will remain free from falls  Outcome: Ongoing  Note: Pt remains free from falls. She is a one assist with a walker and a gait belt. Standard fall precautions are in place. Will continue to monitor. Problem: Pain:  Goal: Pain level will decrease  Description: Pain level will decrease  Outcome: Ongoing  Note: Pt denies pain at this time, rating it a \"0/10. \" PRN medication available per MAR. Pt denies any further questions or complaints at this time. Will continue to monitor. Problem: Discharge Planning:  Goal: Discharged to appropriate level of care  Description: Discharged to appropriate level of care  Outcome: Ongoing  Note: Pt is planning home at discharge. Social work and case management on the case. Will continue to monitor. Care plan reviewed with patient. Patient verbalizes understanding of the plan of care and contribute to goal setting.

## 2022-04-01 NOTE — PROGRESS NOTES
Hospitalist Progress Note    Patient:  Sergei Woods      Unit/Bed:4K-03/003-A    YOB: 1940    MRN: [id number]       Acct: [de-identified]     PCP: ARTHUR Ortiz CNP    Date of Admission: 3/24/2022    Assessment/Plan:    · ISIDRA on CKD: Creatinine not improving. Remains around 2.8   avoid nephrotoxic substances. Hold losartan and Aldactone. Nephrology/cardiology following. Suspect cardiorenal syndrome. Continue Bumex drip 0.25 mg/hr. Plan to transition to oral diuretics 4/2. · Anion gap metabolic acidosis: mild. Continue to monitor. Nephrology following. · Anasarca / Ascites: Multifactorial however mostly related to right heart failure/pulmonary hypertension and liver cirrhosis. S/p abdominal paracentesis with 2.3 L removed. ? Cardiology/GI/nephrology following. Continue Bumex drip. ? 2 L fluid restriction and 2 g sodium diet. · SBP: Cultures and cytology showed no malignant cells, no growth and no organisms noted. Cell count with differential on 3/30 demonstrates 1930 total nucleated cells with PMNs 83.4%. PMNs > 250. Continue Rocephin. GI Following. · Bilateral Pleural Effusions / Acute Hypoxic Resp Failure: Improved with diuresis. Currently on room air. Continue with Bumex drip. · Acute on chronic HFpEF with RHF: Continue diuresis. Monitor closely. Prone to poor cardiac output with drops in preload. Volume status tenuous. · Acute on Chronic Hypotension: Secondary to RHF. Cardiology following and recommends albumin 25% for replenishment during hypotension. · Hx of AS s/p TAVR now with AI 2+: noted. · Severe Pulm HTN / Severe TR: Appears to be mostly group 2 per prior [facility] data.  Discussed at length potential transfer to [facility] with patient, family, and Cardiology - they elect for continued care locally and are not wishing for aggressive treatment (such as swan-juve, pressors, inotropes etc)  · Atrial fibrillation: not on [address]   Consider [address] however will defer to her usual cardiologist, Dr. Radha Rowell.  Continue rate control. · Hypothyroidism: Synthroid increased. Continue to monitor. · NSVT x10bts on 3/29: Without recurrence. Keep mg > 2 and K > 4. Tele. · Goals of Care: Kimber Francesca discussion with patient/family on 3/30 with Dr. Jose Chu and Dr. Jesus Ramos. Discussed all potential treatment options and potential interventions, patient and patient's son are in agreement that no further wishes for transfer to [facility] for pulmonary hypertension specialist, and no wish for ICU transfer for [place] directed therapy, as they are understanding patient's heart failure is in the end stages and she is not a candidate for open heart surgery. Her CODE STATUS is limited no x4, will be optimizing her therapy with her current regimen and she is okay with IV Bumex and other IV therapies as necessary, but her overall goal is to get home. Palliative care is following to assist with patient and family's goals moving forwards. She desires to avoid SNF despite recommendations. Expected discharge date:  1-2 Days    Disposition:    [x] Home       [] TCU       [] Rehab       [] Psych       [] SNF       [] [place]       [] Other-    Chief Complaint: General Weakness    Hospital Course: Per HPI, \"Norma Ding is 81years old female who presented to ED for generalize weakness on 3/24/22. Past medical history significant with pulmonary hypertension secondary to severe tricuspid regurgitation, decompensated liver cirrhosis, chronic hypotension, persistent A. fib on carvedilol with rate control, heart failure preserved ejection fraction, CKD stage III.  Patient recently discharged from the hospital on March 17, 2022.  2 days prior to presentation patient reports decreased p.o. intake, nausea, dry heaves, persistent diarrhea with loose watery stool.  On the day of presentation patient's son report that patient worsening fatigue and generalized weakness therefore he brought her to the ED.     In the ED patient found to have blood pressure 83/50, heart rate 75, respiration rate 16, O2 sat at 95%. BMP showed Cr 2.4 which increased 1.2 from 1 months prior. CXR showed cardiomegaly possible failure, patchy opacity of right lung base. Patient was admitted and manage for decompensate liver cirrhosis and ISIDRA on CKD. Patient subsequently underwent paracentesis with 2.5L fluid removed. Patient develop hypotension which didn't response to albumin and NS. Patient required pressure support and she was transferred to ICU on 3/25/22. On 3/26, patient wean off pressure support and transferred out of ICU. Cheetah test on 3/27/22 showed nigh normal CO with low TPR not responsive to fluid. Thyroid function test showed hypothyroidism. Patient was started Synthroid 50 mg daily on 3/28/22. On 3/30, ascites fluid positive culture which Ceftriaxone was started. Patient develop worsening metabolic acidosis which bicarb drip and tab were started. \"     Subjective (past 24 hours): Patient evaluated at bedside. States she is feeling much better. Has no complaints. Denies shortness of breath, fever, chills, chest pain, nausea, vomiting, diarrhea, constipation. ROS (12 point review of systems completed. Pertinent positives noted. Otherwise ROS is negative).     Medications:  Reviewed    Infusion Medications    [Held by provider] sodium bicarbonate infusion 100 mL/hr at 03/31/22 0129    bumetanide 0.1 mg/mL infusion 0.25 mg/hr (04/01/22 1325)    sodium chloride      sodium chloride       Scheduled Medications    [START ON 4/2/2022] metoprolol tartrate  12.5 mg Oral QAM AC    sodium bicarbonate  1,300 mg Oral BID    cefTRIAXone (ROCEPHIN) IV  2,000 mg IntraVENous Q24H    midodrine  10 mg Oral TID WC    lidocaine PF  5 mL IntraDERmal Once    levothyroxine  50 mcg Oral QAM AC    lidocaine 1 % injection  5 mL IntraDERmal Once    sodium chloride flush  5-40 mL IntraVENous 2 times per day    pantoprazole  40 mg Oral BID AC    rifaximin  550 mg Oral BID    sodium chloride flush  5-40 mL IntraVENous 2 times per day    PARoxetine  30 mg Oral QAM     PRN Meds: magnesium sulfate, potassium chloride **OR** potassium alternative oral replacement **OR** potassium chloride, sodium chloride flush, sodium chloride, sodium chloride flush, sodium chloride, acetaminophen, ondansetron, melatonin      Intake/Output Summary (Last 24 hours) at 4/1/2022 1827  Last data filed at 4/1/2022 1649  Gross per 24 hour   Intake 1078.12 ml   Output 2050 ml   Net -971.88 ml       Diet:  ADULT DIET; Regular; Low Sodium (2 gm); 1200 ml  ADULT ORAL NUTRITION SUPPLEMENT; AM Snack, PM Snack; Other Oral Supplement; magic cup + Boost    Exam:  BP (!) 115/56   Pulse 79   Temp 97.5 °F (36.4 °C) (Oral)   Resp 18   Ht 5' 4\" (1.626 m)   Wt 179 lb 8 oz (81.4 kg)   SpO2 95%   BMI 30.81 kg/m²     General appearance: Acute on chronically ill-appearing female no apparent distress, appears stated age and cooperative. HEENT: PERRL, EOMI. Conjunctivae/corneas clear. Neck: Supple, with full range of motion. No jugular venous distention. Trachea midline. Respiratory:  Normal respiratory effort. Clear to auscultation, bilaterally without Rales/Wheezes/Rhonchi. Cardiovascular: Regular rate. Irregular rhythm noted. Systolic murmur noted. Abdomen: Soft, non-tender. Mild distention noted. BS present. Musculoskeletal: passive and active ROM x 4 extremities. Skin: Discoloration of bilateral lower extremities noted. bilateral lower extremity pitting edema noted. Neurologic:  Neurovascularly intact without any focal sensory/motor deficits.  Cranial nerves: II-XII intact, grossly non-focal.  Psychiatric: Alert and oriented, thought content appropriate, normal insight  Capillary Refill: mildly delayed  Peripheral Pulses: +2 palpable, equal bilaterally       Labs:   Recent Labs     03/31/22  0832   WBC 6.1   HGB 11.6*   HCT 37.3    Recent Labs     03/30/22  0556 03/30/22  0556 03/31/22  0435 03/31/22  1801 04/01/22  1042     --  138  --  136   K 4.4   < > 3.8 3.4* 3.3*     --  100  --  97*   CO2 15*  --  22*  --  22*   BUN 63*  --  60*  --  58*   CREATININE 2.9*  --  2.8*  --  2.8*   CALCIUM 8.5  --  8.6  --  8.4*    < > = values in this interval not displayed. Recent Labs     03/31/22  0435   AST 38   ALT 12   BILIDIR 0.5*   BILITOT 1.3*   ALKPHOS 61     No results for input(s): INR in the last 72 hours. No results for input(s): [place] Dec in the last 72 hours. Microbiology:      Urinalysis:      Lab Results   Component Value Date    NITRU NEGATIVE 03/26/2022    WBCUA 2-4 03/26/2022    BACTERIA FEW 03/26/2022    RBCUA 3-5 03/26/2022    BLOODU NEGATIVE 03/26/2022    SPECGRAV 1.017 03/11/2022    GLUCOSEU NEGATIVE 03/26/2022       Radiology:  US GUIDED PARACENTESIS   Final Result   Successful ultrasound-guided paracentesis. **This report has been created using voice recognition software. It may contain minor errors which are inherent in voice recognition technology. **      Final report electronically signed by Dr. Kelle Hays on 3/28/2022 4:47 PM      US RENAL COMPLETE   Final Result      1. Bilateral renal cysts. No evidence of stones or hydronephrosis. 2. Moderate amount of free fluid within the abdomen. 3. Postvoid images could not be obtained through the bladder. 4. There are bilateral ureteric jets present. **This report has been created using voice recognition software. It may contain minor errors which are inherent in voice recognition technology. **      Final report electronically signed by DR Mamadou Dixon on 3/26/2022 8:51 AM      XR CHEST PORTABLE   Final Result   Increased bilateral lower lung consolidations. Bilateral pleural effusions.       This document has been electronically signed by: Jones Carmichael MD on    03/26/2022 04:26 AM      [facility]   Final Result Successful ultrasound-guided paracentesis. **This report has been created using voice recognition software. It may contain minor errors which are inherent in voice recognition technology. **      Final report electronically signed by Dr. Bro Lane MD on 3/25/2022 10:05 AM      XR CHEST PORTABLE   Final Result   1. Cardiomegaly with possible failure. Recommend follow-up. 2. Patchy opacity at the right lung base could be a superimposed    infectious component. Follow-up to clearing recommended. 3. Indeterminate appearance of the proximal left humerus. Not seen on    prior exams. Correlate with history of pain in this area. Further evaluate    if clinically indicated. This document has been electronically signed by: Kelly Pacheco MD on 03/24/2022 11:58 PM          DVT prophylaxis: [] Lovenox                                 [x] SCDs                                 [] SQ Heparin                                 [] Encourage ambulation           [] Already on Anticoagulation     Code Status: Limited    PT/OT Eval Status:  Following    Tele:   [x] yes             [] no    Case and plan discussed with Dr. Justina Tamez    Electronically signed by Lala Wilkerson DO on 4/1/2022 at 6:27 PM

## 2022-04-01 NOTE — PROGRESS NOTES
Yamil Aquino 61  PHYSICAL THERAPY MISSED TREATMENT NOTE  [facility] ICU STEPDOWN TELEMETRY 4K    Date: 2022  Patient Name: Mikey Pearce        MRN: [id number]   : 1940  (81 y.o.)  Gender: female   Referring Practitioner: Christine Rogers CNP  Diagnosis: Dehydration         REASON FOR MISSED TREATMENT:  Patient refused treatment. Pt reports she just did therapy (assuming OT) and got cold and just got a warm blanket and doesn't want to get up now. Will try back as able.      Yanique Ashley PT, DPT

## 2022-04-01 NOTE — PROGRESS NOTES
Cardiology Progress Note      Patient:  Bertin Rosario  YOB: 1940  MRN: [id number]   Acct: [de-identified]  Admit Date:  3/24/2022  Primary Cardiologist: Arlin Dahl MD    Note per dr Crespo Double: weakness        HPI: This is a pleasant 81 y.o. female presents with weakness to [facility] on 3/23/22. Patient has significant past medical history of pulmonary HTN, severe tricuspid regurgitation, decompensated liver cirrhosis, persistent A-fib on carvedilol, and HFpEF and CKD stage III. Patient was recently discharged from the hospital 3/17/22. Patient symptoms started two days prior with decreased PO intake, nausea, dry heaves, persistent diarrhea with watery stool.      Consulted for aortic insuffiencey +2, pulmonary hypertension with severe tricuspid regurgitation. Echo 3/30/22 pending, echo 3/24/22 severe tricuspid regurgitation, increased RV size and thickness with PAP 65-70mmHg. Echo 08/2018 shows normal TR with PAP 45mmHg. Catheter 02/2022 shows pulmonary hypertension. EKG on 3/24/22 patient is in persistent atrial fibration consistent with prior readings. Patient complains of cold and numb hands, as well as distention of the abdomen. Patient was receiving paracentesis for hepatic congestion and cirrhosis. \"    Subjective (Events in last 24 hours):  Pt awake and alert. NAD. No cp or sob.   Sob much improved  Still with leg edema  On RA  On bumex gtt at 0.5 mg/hr    Net I/o +4.9 L    Objective:   BP (!) 125/58   Pulse 83   Temp 98.3 °F (36.8 °C) (Oral)   Resp 18   Ht 5' 4\" (1.626 m)   Wt 179 lb 8 oz (81.4 kg)   SpO2 91%   BMI 30.81 kg/m²        TELEMETRY: nsr    Physical Exam:  General Appearance: alert and oriented to person, place and time, in no acute distress  Cardiovascular: normal rate, regular rhythm, normal S1 and S2, +murmur  Pulmonary/Chest: bibasilar crackles  Abdomen: soft, non-tender, non-distended, normal bowel sounds, no masses Extremities: +ble edema up to thighs, pulse Skin: warm and dry, no rash or erythema  Head: normocephalic and atraumatic  Eyes: pupils equal, round, and reactive to light  Neck: supple and non-tender without mass, no thyromegaly  Neurological: alert, oriented, normal speech, no focal findings or movement disorder noted    Medications:    metoprolol tartrate  12.5 mg Oral BID    sodium bicarbonate  1,300 mg Oral BID    cefTRIAXone (ROCEPHIN) IV  2,000 mg IntraVENous Q24H    midodrine  10 mg Oral TID WC    lidocaine PF  5 mL IntraDERmal Once    iron sucrose  300 mg IntraVENous Q48H    levothyroxine  50 mcg Oral QAM AC    lidocaine 1 % injection  5 mL IntraDERmal Once    sodium chloride flush  5-40 mL IntraVENous 2 times per day    pantoprazole  40 mg Oral BID AC    rifaximin  550 mg Oral BID    sodium chloride flush  5-40 mL IntraVENous 2 times per day    PARoxetine  30 mg Oral QAM      [Held by provider] sodium bicarbonate infusion 100 mL/hr at 03/31/22 0129    bumetanide 0.1 mg/mL infusion 0.5 mg/hr (03/31/22 1959)    sodium chloride      sodium chloride       magnesium sulfate, 2,000 mg, PRN  sodium chloride flush, 5-40 mL, PRN  sodium chloride, 25 mL, PRN  sodium chloride flush, 5-40 mL, PRN  sodium chloride, 25 mL, PRN  acetaminophen, 500 mg, Q6H PRN  ondansetron, 4 mg, Q6H PRN  melatonin, 3 mg, Nightly PRN        Lab Data:    Cardiac Enzymes:  No results for input(s): CKTOTAL, CKMB, CKMBINDEX, TROPONINI in the last 72 hours.     CBC:   Lab Results   Component Value Date    WBC 6.1 03/31/2022    RBC 3.90 03/31/2022    RBC 2.95 08/22/2011    HGB 11.6 03/31/2022    HCT 37.3 03/31/2022     03/31/2022       CMP:    Lab Results   Component Value Date     03/31/2022    K 3.4 03/31/2022     03/31/2022    CO2 22 03/31/2022    BUN 60 03/31/2022    CREATININE 2.8 03/31/2022    LABGLOM 16 03/31/2022    GLUCOSE 127 03/31/2022    GLUCOSE 93 08/22/2011    CALCIUM 8.6 03/31/2022       Hepatic Function Panel:    Lab Results   Component Value Date    ALKPHOS 61 03/31/2022    ALT 12 03/31/2022    AST 38 03/31/2022    PROT 5.5 03/31/2022    BILITOT 1.3 03/31/2022    BILIDIR 0.5 03/31/2022    LABALBU 3.6 03/31/2022    LABALBU 3.2 08/20/2011       Magnesium:    Lab Results   Component Value Date    MG 1.5 03/31/2022       PT/INR:    Lab Results   Component Value Date    PROTIME 1.75 08/21/2011    INR 1.45 03/29/2022       HgBA1c:    Lab Results   Component Value Date    LABA1C 5.3 06/21/2018       FLP:    Lab Results   Component Value Date    TRIG 62 02/09/2022    HDL 53 02/09/2022    LDLCALC 32 02/09/2022       TSH:    Lab Results   Component Value Date    TSH 5.310 03/27/2022         Assessment:    Torrential TR  RV failure  Severe PHTN  Low-output state   Congestive cirrhosis  ISIDRA/CKD  Ascites - s/p paracentesis 3/30/22  Preserved EF - ef 55 per TTE 3/14/22  Hx bioprosthetic AVR with AI  Mild MR  Patent coronaries per cath 2/2022  DNR-CCA    Plan:    Daily I/o and weights  2 liter fluid restriction and 2gm sodium diet  Keep mag >2 and K >4, replace prn per nephro recs  Elevate legs, wrap legs  Cont diuresis  Daily BMP - waiting on BMP for today  Use albumin 25% for replenishment during hypotension   Cont midodrine  Cont BB     Electronically signed by Aneudy Panchal PA-C on 4/1/2022 at 10:21 AM

## 2022-04-01 NOTE — PROGRESS NOTES
BID    cefTRIAXone (ROCEPHIN) IV  2,000 mg IntraVENous Q24H    midodrine  10 mg Oral TID WC    lidocaine PF  5 mL IntraDERmal Once    iron sucrose  300 mg IntraVENous Q48H    levothyroxine  50 mcg Oral QAM AC    lidocaine 1 % injection  5 mL IntraDERmal Once    sodium chloride flush  5-40 mL IntraVENous 2 times per day    pantoprazole  40 mg Oral BID AC    rifaximin  550 mg Oral BID    sodium chloride flush  5-40 mL IntraVENous 2 times per day    PARoxetine  30 mg Oral QAM     Continuous Infusions:   [Held by provider] sodium bicarbonate infusion 100 mL/hr at 03/31/22 0129    bumetanide 0.1 mg/mL infusion 0.5 mg/hr (03/31/22 1959)    sodium chloride      sodium chloride         CBC:   Recent Labs     03/31/22  0832   WBC 6.1   HGB 11.6*        CMP:    Recent Labs     03/30/22  0556 03/31/22  0435 03/31/22  1801    138  --    K 4.4 3.8 3.4*    100  --    CO2 15* 22*  --    BUN 63* 60*  --    CREATININE 2.9* 2.8*  --    GLUCOSE 107 127*  --    CALCIUM 8.5 8.6  --    LABGLOM 16* 16*  --      Troponin: No results for input(s): TROPONINI in the last 72 hours. BNP: No results for input(s): BNP in the last 72 hours. INR:   No results for input(s): INR in the last 72 hours. Lipids: No results for input(s): CHOL, LDLDIRECT, TRIG, HDL, AMYLASE, LIPASE in the last 72 hours. Liver:   Recent Labs     03/31/22  0435   AST 38   ALT 12   ALKPHOS 61   PROT 5.5*   LABALBU 3.6   BILITOT 1.3*     Iron:    No results for input(s): IRONS, FERRITIN in the last 72 hours. Invalid input(s): LABIRONS  US GUIDED PARACENTESIS   Final Result   Successful ultrasound-guided paracentesis. **This report has been created using voice recognition software. It may contain minor errors which are inherent in voice recognition technology. **      Final report electronically signed by Dr. Gloria Young on 3/28/2022 4:47 PM      US RENAL COMPLETE   Final Result      1. Bilateral renal cysts.  No evidence of stones or hydronephrosis. 2. Moderate amount of free fluid within the abdomen. 3. Postvoid images could not be obtained through the bladder. 4. There are bilateral ureteric jets present. **This report has been created using voice recognition software. It may contain minor errors which are inherent in voice recognition technology. **      Final report electronically signed by DR Beverly Harrington on 3/26/2022 8:51 AM      XR CHEST PORTABLE   Final Result   Increased bilateral lower lung consolidations. Bilateral pleural effusions. This document has been electronically signed by: Bandar Calix MD on    03/26/2022 04:26 AM      [id number] [employer]   Final Result    Successful ultrasound-guided paracentesis. **This report has been created using voice recognition software. It may contain minor errors which are inherent in voice recognition technology. **      Final report electronically signed by Dr. Francis Kemp MD on 3/25/2022 10:05 AM      XR CHEST PORTABLE   Final Result   1. Cardiomegaly with possible failure. Recommend follow-up. 2. Patchy opacity at the right lung base could be a superimposed    infectious component. Follow-up to clearing recommended. 3. Indeterminate appearance of the proximal left humerus. Not seen on    prior exams. Correlate with history of pain in this area. Further evaluate    if clinically indicated. This document has been electronically signed by:  Rodrigo Atwood MD on 03/24/2022 11:58 PM            Objective:   Vitals: BP (!) 112/56   Pulse 75   Temp 98.2 °F (36.8 °C) (Oral)   Resp 18   Ht 5' 4\" (1.626 m)   Wt 179 lb 8 oz (81.4 kg)   SpO2 95%   BMI 30.81 kg/m²    Wt Readings from Last 3 Encounters:   04/01/22 179 lb 8 oz (81.4 kg)   03/15/22 178 lb 11.2 oz (81.1 kg)   02/09/22 166 lb (75.3 kg)      24HR INTAKE/OUTPUT:      Intake/Output Summary (Last 24 hours) at 4/1/2022 0703  Last data filed at 4/1/2022 2946  Gross per 24 hour   Intake 580 ml   Output 2250 ml   Net -1670 ml       Constitutional: Well-developed elderly lady comfortably asleep during rounds  Skin:normal with no rash or lesions. HEENT: Head is normal.Throat is clear . Oral mucosa is moist   Neck:supple with no carotid bruit  Cardiovascular:  S1, S2 without murmur or rubs   Respiratory: Clear to auscultation  Abdomen: Soft. Good bowel sounds. . Mildly distended. Ext: !+ bilateral LE edema  Musculoskeletal:Intact  Neuro: Deferred      Electronically signed by Glenn Narayan MD on 4/1/2022 at 7:03 AM  **This report has been created using voice recognition software. It maycontain minor  errors which are inherent in voice recognition technology. **

## 2022-04-02 PROBLEM — N18.4 CKD (CHRONIC KIDNEY DISEASE) STAGE 4, GFR 15-29 ML/MIN (HCC): Status: ACTIVE | Noted: 2018-09-03

## 2022-04-02 LAB
ALBUMIN SERPL-MCNC: 3.1 G/DL (ref 3.5–5.1)
ALP BLD-CCNC: 79 U/L (ref 38–126)
ALT SERPL-CCNC: 8 U/L (ref 11–66)
ANION GAP SERPL CALCULATED.3IONS-SCNC: 17 MEQ/L (ref 8–16)
AST SERPL-CCNC: 24 U/L (ref 5–40)
BILIRUB SERPL-MCNC: 0.6 MG/DL (ref 0.3–1.2)
BILIRUBIN DIRECT: 0.3 MG/DL (ref 0–0.3)
BUN BLDV-MCNC: 58 MG/DL (ref 7–22)
CALCIUM SERPL-MCNC: 8.1 MG/DL (ref 8.5–10.5)
CHLORIDE BLD-SCNC: 96 MEQ/L (ref 98–111)
CO2: 24 MEQ/L (ref 23–33)
CREAT SERPL-MCNC: 2.9 MG/DL (ref 0.4–1.2)
GFR SERPL CREATININE-BSD FRML MDRD: 16 ML/MIN/1.73M2
GLUCOSE BLD-MCNC: 95 MG/DL (ref 70–108)
INR BLD: 1.23 (ref 0.85–1.13)
MAGNESIUM: 1.3 MG/DL (ref 1.6–2.4)
MAGNESIUM: 1.7 MG/DL (ref 1.6–2.4)
POTASSIUM SERPL-SCNC: 2.9 MEQ/L (ref 3.5–5.2)
POTASSIUM SERPL-SCNC: 3.6 MEQ/L (ref 3.5–5.2)
SODIUM BLD-SCNC: 137 MEQ/L (ref 135–145)
TOTAL PROTEIN: 5.4 G/DL (ref 6.1–8)

## 2022-04-02 PROCEDURE — 6370000000 HC RX 637 (ALT 250 FOR IP): Performed by: INTERNAL MEDICINE

## 2022-04-02 PROCEDURE — 94760 N-INVAS EAR/PLS OXIMETRY 1: CPT

## 2022-04-02 PROCEDURE — 2580000003 HC RX 258: Performed by: INTERNAL MEDICINE

## 2022-04-02 PROCEDURE — 0W9G3ZZ DRAINAGE OF PERITONEAL CAVITY, PERCUTANEOUS APPROACH: ICD-10-PCS | Performed by: RADIOLOGY

## 2022-04-02 PROCEDURE — 84132 ASSAY OF SERUM POTASSIUM: CPT

## 2022-04-02 PROCEDURE — 85610 PROTHROMBIN TIME: CPT

## 2022-04-02 PROCEDURE — 6370000000 HC RX 637 (ALT 250 FOR IP)

## 2022-04-02 PROCEDURE — 99232 SBSQ HOSP IP/OBS MODERATE 35: CPT | Performed by: STUDENT IN AN ORGANIZED HEALTH CARE EDUCATION/TRAINING PROGRAM

## 2022-04-02 PROCEDURE — 6360000002 HC RX W HCPCS: Performed by: STUDENT IN AN ORGANIZED HEALTH CARE EDUCATION/TRAINING PROGRAM

## 2022-04-02 PROCEDURE — 2580000003 HC RX 258

## 2022-04-02 PROCEDURE — 83735 ASSAY OF MAGNESIUM: CPT

## 2022-04-02 PROCEDURE — 2500000003 HC RX 250 WO HCPCS: Performed by: INTERNAL MEDICINE

## 2022-04-02 PROCEDURE — 2060000000 HC ICU INTERMEDIATE R&B

## 2022-04-02 PROCEDURE — 6370000000 HC RX 637 (ALT 250 FOR IP): Performed by: NURSE PRACTITIONER

## 2022-04-02 PROCEDURE — 99233 SBSQ HOSP IP/OBS HIGH 50: CPT | Performed by: INTERNAL MEDICINE

## 2022-04-02 PROCEDURE — 2500000003 HC RX 250 WO HCPCS: Performed by: NURSE PRACTITIONER

## 2022-04-02 PROCEDURE — 2580000003 HC RX 258: Performed by: STUDENT IN AN ORGANIZED HEALTH CARE EDUCATION/TRAINING PROGRAM

## 2022-04-02 PROCEDURE — 36415 COLL VENOUS BLD VENIPUNCTURE: CPT

## 2022-04-02 PROCEDURE — 82248 BILIRUBIN DIRECT: CPT

## 2022-04-02 PROCEDURE — 6370000000 HC RX 637 (ALT 250 FOR IP): Performed by: STUDENT IN AN ORGANIZED HEALTH CARE EDUCATION/TRAINING PROGRAM

## 2022-04-02 PROCEDURE — 99232 SBSQ HOSP IP/OBS MODERATE 35: CPT | Performed by: INTERNAL MEDICINE

## 2022-04-02 PROCEDURE — 80053 COMPREHEN METABOLIC PANEL: CPT

## 2022-04-02 PROCEDURE — 6360000002 HC RX W HCPCS: Performed by: NURSE PRACTITIONER

## 2022-04-02 RX ORDER — SODIUM BICARBONATE 650 MG/1
650 TABLET ORAL 2 TIMES DAILY
Status: DISCONTINUED | OUTPATIENT
Start: 2022-04-02 | End: 2022-04-03

## 2022-04-02 RX ORDER — POTASSIUM CHLORIDE 7.45 MG/ML
10 INJECTION INTRAVENOUS
Status: COMPLETED | OUTPATIENT
Start: 2022-04-02 | End: 2022-04-02

## 2022-04-02 RX ORDER — ALBUMIN, HUMAN INJ 5% 5 %
25 SOLUTION INTRAVENOUS ONCE
Status: DISCONTINUED | OUTPATIENT
Start: 2022-04-02 | End: 2022-04-03 | Stop reason: SDUPTHER

## 2022-04-02 RX ADMIN — MAGNESIUM SULFATE HEPTAHYDRATE 2000 MG: 40 INJECTION, SOLUTION INTRAVENOUS at 06:26

## 2022-04-02 RX ADMIN — POTASSIUM CHLORIDE 10 MEQ: 7.46 INJECTION, SOLUTION INTRAVENOUS at 09:20

## 2022-04-02 RX ADMIN — METOPROLOL TARTRATE 12.5 MG: 25 TABLET, FILM COATED ORAL at 06:06

## 2022-04-02 RX ADMIN — SODIUM CHLORIDE, PRESERVATIVE FREE 10 ML: 5 INJECTION INTRAVENOUS at 20:15

## 2022-04-02 RX ADMIN — POTASSIUM BICARBONATE 20 MEQ: 782 TABLET, EFFERVESCENT ORAL at 20:15

## 2022-04-02 RX ADMIN — Medication 3 MG: at 20:15

## 2022-04-02 RX ADMIN — POTASSIUM BICARBONATE 20 MEQ: 782 TABLET, EFFERVESCENT ORAL at 11:30

## 2022-04-02 RX ADMIN — PANTOPRAZOLE SODIUM 40 MG: 40 TABLET, DELAYED RELEASE ORAL at 06:06

## 2022-04-02 RX ADMIN — SODIUM BICARBONATE 1300 MG: 650 TABLET ORAL at 09:20

## 2022-04-02 RX ADMIN — MIDODRINE HYDROCHLORIDE 10 MG: 10 TABLET ORAL at 09:20

## 2022-04-02 RX ADMIN — PANTOPRAZOLE SODIUM 40 MG: 40 TABLET, DELAYED RELEASE ORAL at 15:45

## 2022-04-02 RX ADMIN — POTASSIUM CHLORIDE 10 MEQ: 7.46 INJECTION, SOLUTION INTRAVENOUS at 06:51

## 2022-04-02 RX ADMIN — BUMETANIDE 0.25 MG/HR: 0.25 INJECTION INTRAMUSCULAR; INTRAVENOUS at 02:31

## 2022-04-02 RX ADMIN — RIFAXIMIN 550 MG: 550 TABLET ORAL at 09:20

## 2022-04-02 RX ADMIN — POTASSIUM CHLORIDE 40 MEQ: 20 TABLET, EXTENDED RELEASE ORAL at 06:31

## 2022-04-02 RX ADMIN — PAROXETINE 30 MG: 30 TABLET, FILM COATED ORAL at 20:15

## 2022-04-02 RX ADMIN — MIDODRINE HYDROCHLORIDE 10 MG: 10 TABLET ORAL at 16:43

## 2022-04-02 RX ADMIN — LEVOTHYROXINE SODIUM 50 MCG: 0.05 TABLET ORAL at 06:06

## 2022-04-02 RX ADMIN — POTASSIUM BICARBONATE 20 MEQ: 782 TABLET, EFFERVESCENT ORAL at 15:45

## 2022-04-02 RX ADMIN — MIDODRINE HYDROCHLORIDE 10 MG: 10 TABLET ORAL at 11:30

## 2022-04-02 RX ADMIN — RIFAXIMIN 550 MG: 550 TABLET ORAL at 20:15

## 2022-04-02 RX ADMIN — CEFTRIAXONE 2000 MG: 10 INJECTION, POWDER, FOR SOLUTION INTRAVENOUS at 11:30

## 2022-04-02 RX ADMIN — SODIUM BICARBONATE 650 MG: 650 TABLET ORAL at 16:44

## 2022-04-02 ASSESSMENT — PAIN SCALES - GENERAL
PAINLEVEL_OUTOF10: 0

## 2022-04-02 NOTE — PROGRESS NOTES
Cardiology Progress Note      Patient:  Diana Santoro  YOB: 1940  MRN: [id number]   Acct: [de-identified]  Admit Date:  3/24/2022  Primary Cardiologist: Blaire Carrion MD   Note per dr Vladislav Escobar is a pleasant 81 y.o. female presents with weakness to [facility] on 3/23/22. Patient has significant past medical history of pulmonary HTN, severe tricuspid regurgitation, decompensated liver cirrhosis, persistent A-fib on carvedilol, and HFpEF and CKD stage III. Patient was recently discharged from the hospital 3/17/22. Patient symptoms started two days prior with decreased PO intake, nausea, dry heaves, persistent diarrhea with watery stool.      Consulted for aortic insuffiencey +2, pulmonary hypertension with severe tricuspid regurgitation. Echo 3/30/22 pending, echo 3/24/22 severe tricuspid regurgitation, increased RV size and thickness with PAP 65-70mmHg. Echo 08/2018 shows normal TR with PAP 45mmHg. Catheter 02/2022 shows pulmonary hypertension. EKG on 3/24/22 patient is in persistent atrial fibration consistent with prior readings. Patient complains of cold and numb hands, as well as distention of the abdomen. Patient was receiving paracentesis for hepatic congestion and cirrhosis. \"      Subjective (Events in last 24 hours):   Pt awake, alert. NAD, denies cp, sob. Feeling much better. Still with some leg swelling. Still with bumex running per neph.      Objective:   BP (!) 113/55   Pulse 75   Temp 97.8 °F (36.6 °C) (Oral)   Resp 18   Ht 5' 4\" (1.626 m)   Wt 180 lb 9.6 oz (81.9 kg)   SpO2 100%   BMI 31.00 kg/m²      vss  TELEMETRY: nsr    Physical Exam:  General Appearance: alert and oriented to person, place and time, in no acute distress  Cardiovascular: normal rate, regular rhythm, normal S1 and S2, no murmurs, rubs, clicks, or gallops, distal pulses intact, no carotid bruits, no JVD  Pulmonary/Chest: clear to auscultation bilaterally- no wheezes, rales or rhonchi, normal air movement, no respiratory distress  Abdomen: soft, non-tender, non-distended, normal bowel sounds, no masses   Extremities: no cyanosis, clubbing, + mild bilat LE edema, pulse   Skin: warm and dry, no rash or erythema  Neurological: alert, oriented, normal speech, no focal findings or movement disorder noted    Medications:    sodium bicarbonate  650 mg Oral BID    potassium bicarb-citric acid  20 mEq Oral TID    albumin human  25 g IntraVENous Once    metoprolol tartrate  12.5 mg Oral QAM AC    cefTRIAXone (ROCEPHIN) IV  2,000 mg IntraVENous Q24H    midodrine  10 mg Oral TID WC    lidocaine PF  5 mL IntraDERmal Once    levothyroxine  50 mcg Oral QAM AC    lidocaine 1 % injection  5 mL IntraDERmal Once    sodium chloride flush  5-40 mL IntraVENous 2 times per day    pantoprazole  40 mg Oral BID AC    rifaximin  550 mg Oral BID    sodium chloride flush  5-40 mL IntraVENous 2 times per day    PARoxetine  30 mg Oral QAM      [Held by provider] sodium bicarbonate infusion Stopped (03/31/22 0747)    bumetanide 0.1 mg/mL infusion 0.25 mg/hr (04/02/22 1117)    sodium chloride      sodium chloride       magnesium sulfate, 2,000 mg, PRN  potassium chloride, 40 mEq, PRN   Or  potassium alternative oral replacement, 40 mEq, PRN   Or  potassium chloride, 10 mEq, PRN  sodium chloride flush, 5-40 mL, PRN  sodium chloride, 25 mL, PRN  sodium chloride flush, 5-40 mL, PRN  sodium chloride, 25 mL, PRN  acetaminophen, 500 mg, Q6H PRN  ondansetron, 4 mg, Q6H PRN  melatonin, 3 mg, Nightly PRN        Diagnostics:  EKG:     Echo:   Conclusions      Summary   Normal left ventricle size and systolic function. Ejection fraction was   estimated at -55%. There were no regional left ventricular wall motion   abnormalities and wall thickness was within normal limits. The right ventricular size was increasedl with normal systolic function   and increase wall thickness.    Right ventricular systolic pressure was elevated. The tricuspid valve structure was normal with normal leaflet separation. DOPPLER: There was no evidence of tricuspid stenosis. There was n severe   tricuspid reg   The pulmonic valve leaflets exhibited normal thickness, no calcification,   and normal cuspal separation. DOPPLER: The transpulmonic velocity was   within the normal range mild reg   Pulmonary artery systolic pressure calculated from tricuspid regurgitation   jet is 65-70 . severe pulmonary htn      Signature      ----------------------------------------------------------------   Electronically signed by Vaishali Medel MD (Interpreting   physician) on 03/14/2022  Stress:     Left Heart Cath:   SUMMARY:  1.  Preserved systolic function. Ejection fraction about 60%. Hypertrophic cardiomyopathy. 2.  Gradient across the aortic valve is _____ mmHg with the valve area  1.16.  3.  Coronary angiogram showed the RCA is a codominant artery, patent. 4.  Patent short left main. 5.  Dominant circumflex, patent. 6.  Patent LAD and its major branches. 7.  2+ AI. 8.  Moderately severe pulmonary hypertension with severe tricuspid  regurg and no step-up in oxygen saturation from different chambers of  the right side of the heart. The patient tolerated the procedure well.     RECOMMENDATIONS:  We will continue with medical treatment. We will try  to have the patient seen in the Pulmonary Hypertension Clinic in  [facility]. See if there is anything that can be done, check her  room air O2.     The patient has no acute complications from the procedure.           Haley Rush M.D.     D: 02/09/2022  Lab Data:    Cardiac Enzymes:  No results for input(s): CKTOTAL, CKMB, CKMBINDEX, TROPONINI in the last 72 hours.     CBC:   Lab Results   Component Value Date    WBC 6.1 03/31/2022    RBC 3.90 03/31/2022    RBC 2.95 08/22/2011    HGB 11.6 03/31/2022    HCT 37.3 03/31/2022     03/31/2022       CMP:    Lab Results   Component Value Date  04/02/2022    K 2.9 04/02/2022    K 3.4 03/31/2022    CL 96 04/02/2022    CO2 24 04/02/2022    BUN 58 04/02/2022    CREATININE 2.9 04/02/2022    LABGLOM 16 04/02/2022    GLUCOSE 95 04/02/2022    GLUCOSE 93 08/22/2011    CALCIUM 8.1 04/02/2022       Hepatic Function Panel:    Lab Results   Component Value Date    ALKPHOS 79 04/02/2022    ALT 8 04/02/2022    AST 24 04/02/2022    PROT 5.4 04/02/2022    BILITOT 0.6 04/02/2022    BILIDIR 0.3 04/02/2022    LABALBU 3.1 04/02/2022    LABALBU 3.2 08/20/2011       Magnesium:    Lab Results   Component Value Date    MG 1.3 04/02/2022       PT/INR:    Lab Results   Component Value Date    PROTIME 1.75 08/21/2011    INR 1.23 04/02/2022       HgBA1c:    Lab Results   Component Value Date    LABA1C 5.3 06/21/2018       FLP:    Lab Results   Component Value Date    TRIG 62 02/09/2022    HDL 53 02/09/2022    LDLCALC 32 02/09/2022       TSH:    Lab Results   Component Value Date    TSH 5.310 03/27/2022         Assessment:  Torrential TR  RV failure  Severe PHTN  Low-output state   Congestive cirrhosis  ISIDRA/CKD  Ascites - s/p paracentesis 3/30/22  Preserved EF - ef 55 per TTE 3/14/22  Hx bioprosthetic AVR with AI  Mild MR  Patent coronaries per cath 2/2022  DNR-CCA      Plan:    Daily weights  2 g sodium restriction daily  2 L fluid restriction daily  Keep K>4, Mg>2    CHF Guidelines  BB-yes   ACE/ARB/Entresto- no, isidra/ckd   Diuretics-diuresis per nephrology  Aldactone-no. Consider later  Farxiga-no. Depends on insurance. On midodrine. Cardiology will follow up Monday for any further needs. Patient to f/u with primary cardiologist, Dr Scotty Mendez, in 2 weeks.    Electronically signed by Ellie Aguero PA-C on 4/2/2022 at 12:59 PM

## 2022-04-02 NOTE — PROGRESS NOTES
Gastroenterology Progress Note:   Patient: Merlene Roblero  : 1940  Acct#: [de-identified]    Patient Name:  Merlene Roblero , 81 y.o. , female with decompensated liver cirrhosis    ASSESSMENT   81yo admitted after a fall at home with decompensated liver cirrhosis due to CHF. Paracentesis performed 3/28/22, positive for SBP on IV Rocephin  1. Decompensated liver cirrhosis with ascites MELD 19  2. Ascites s/p paracentesis 3/28/22 with 5L removed, positive for SBP on rocephin  3. CHF - acute exacerbation  4. Severe pulmonary hypertension  5. Anemia - normocytic, no evidence of active bleeding  6. Coagulopathy  7. Acute diarrhea - stool studies were negative  8. Hypokalemia/ Hypomag - On replacement. 9. Isolation precautions- contact. 10. ISIDRA - followed by Renal , has cardiorenal syndrome. Principal Problem:    Acute kidney injury ([facility] [address]  Active Problems:    Hypotension    Dehydration  Resolved Problems:    * No resolved hospital problems. *     Patient Active Problem List   Diagnosis    Chronic atrial fibrillation (HCC)    Ascending cholangitis, possible    Elevated LFTs    Thrombocytopenia (HCC)    Leukopenia    ISIDRA (acute kidney injury) ([address]    SIRS (systemic inflammatory response syndrome) ([facility])    Cholecystitis, acute    Neutropenia (HCC)    Aortic stenosis, severe    Colitis    Nausea    Diarrhea    Essential hypertension    Anemia, normocytic normochromic    Hypocalcemia    Cirrhosis (HCC)    S/P AVR (aortic valve replacement)    Obesity (BMI 30-39. 9)    History of atrial fibrillation    Hypomagnesemia    Asymptomatic bacteriuria - no clinical indication for antimicrobial therapy    CKD (chronic kidney disease) stage 3, GFR 30-59 ml/min (HCC)    Acute renal failure superimposed on stage 3 chronic kidney disease (HCC)    VRE (vancomycin resistant enterococcus) culture positive    CHF (congestive heart failure), NYHA class I, acute on chronic, combined ([facility])    Shortness of breath    Acute kidney injury ([facility] [address]    Hypotension    Dehydration       PLAN       1. Use daily weights to monitor fluid status with ascites instead of I and O's.   2.  Continue to monitor H/H, transfuse PRN  3. IV Rocephin started 3/30/22 for SBP, will need at least 5 days coverage with IV. 4.  Avoid hepatotoxic meds, okay for Tylenol 2g limit. 5.  Misa Dowd will need a repeat paracentesis 2-3 days after the one on 3/30/22 to make sure that the PMN count has decreased by 50% or more. This is mainly diagnostic 250 mL, however, if she is very distended may remove 4 liters. 6.  Will follow up with Dr. Ana Abrams or Laurita Mtz CNP as outpatient. 7.  Will need oral abx upon discharge for SBP prophylaxis. SUBJECTIVE      Patient seen and examined. 24 hours events and chart reviewed. (  x )Medications Reviewed ;(   )Old records reviewed    Day 8 of stay. Feeling: ( []  )Better  ([]   ) Worse      ([x]   )Same     Tolerating full diet. Breathing has improved. She denies abdominal discomfort. REVIEW OF SYSTEMS:    GENERAL:  No fever, chills or weight loss. CARDIOVASCULAR:  No chest pain or palpitations. RESPIRATORY:  No dyspnea or cough. PHYSICAL EXAMINATION:    In: 1028.1   Out: 2400 [Urine:2400]  I/O last 3 completed shifts: In: 1128.1 [P.O.:600; IV Piggyback:528.1]  Out: 3770 [Urine:3450]   ADULT DIET; Regular; Low Sodium (2 gm); 1200 ml  ADULT ORAL NUTRITION SUPPLEMENT; AM Snack, PM Snack;  Other Oral Supplement; magic cup + Boost    Vital Signs  BP (!) 127/58   Pulse 82   Temp 97.6 °F (36.4 °C) (Oral)   Resp 20   Ht 5' 4\" (1.626 m)   Wt 180 lb 9.6 oz (81.9 kg)   SpO2 94%   BMI 31.00 kg/m²     General:   ([x]   )Comfortable      ([]   )Obese          (  [x] )chronically sick looking      other:    HEENT: ( [x]  )NoPalour(  [x] )No Icterus (   )ET tube in place                      Mucosa: ( x  )moist(   )dry : Other:    CV: ( [x]  )RRR(   [])Irregular/arrhythmias : Murmur: ([] ) No   ([x]  ) Yes: LLSB holosyst murmur    Resp: ([x]  )CTA Bilaterally,[x] No added sounds ( []  )Rhonci([]   )Wheeze ([]   )Rales    Abd: ([x]   )Soft([x]   )Non tender (  [x] )  + shifting dullness to percussion. Distended. Ext: ([]   )No edema ( [x]  )Edema 2+ pitting bilat lower extremity  ( [x]  )No cyanosis    Skin: ( [x]  )Warm  ( []  )Cold   (   )Dry   ( x  )No rash    Neuro:    ( [x]  )Oriented X 3      ([]  )Confused      ( X  )  Other:  Moves all 4 extremities. REVIEW OF DIAGNOSTIC TESTING AND LABS:      Significant Diagnostic Studies:     RADIOLOGY:    No new radiology  Paracentesis performed 3/28/22 with 5L drained. + SBP    LABS:      CBC:   Recent Labs     03/31/22  0832   WBC 6.1   HGB 11.6*          BMP:    Recent Labs     03/31/22  0435 03/31/22  1801 04/01/22  1042 04/02/22 0438     --  136 137   K 3.8 3.4* 3.3* 2.9*     --  97* 96*   CO2 22*  --  22* 24   BUN 60*  --  58* 58*   CREATININE 2.8*  --  2.8* 2.9*   GLUCOSE 127*  --  113* 95       Hepatic Function Panel:    Recent Labs     03/31/22 0435 03/31/22 0435 04/02/22 0438   ALKPHOS 61  --  79   ALT 12  --  8*   AST 38  --  24   PROT 5.5*   < > 5.4*   BILITOT 1.3*  --  0.6   BILIDIR 0.5*  --  0.3   LABALBU 3.6  --  3.1*    < > = values in this interval not displayed.        INR:   Recent Labs     04/02/22 0438   INR 1.23*     Calcium:  Recent Labs     04/02/22 0438   CALCIUM 8.1*     Magnesium:  Recent Labs     04/02/22 0438   MG 1.3*       MEDICATIONS    Scheduled Meds:   potassium chloride  10 mEq IntraVENous Q2H    metoprolol tartrate  12.5 mg Oral QAM AC    sodium bicarbonate  1,300 mg Oral BID    cefTRIAXone (ROCEPHIN) IV  2,000 mg IntraVENous Q24H    midodrine  10 mg Oral TID WC    lidocaine PF  5 mL IntraDERmal Once    levothyroxine  50 mcg Oral QAM AC    lidocaine 1 % injection  5 mL IntraDERmal Once    sodium chloride flush  5-40 mL IntraVENous 2 times per day    pantoprazole  40 mg Oral BID AC    rifaximin  550 mg Oral BID    sodium chloride flush  5-40 mL IntraVENous 2 times per day    PARoxetine  30 mg Oral QAM     Continuous Infusions:   [Held by provider] sodium bicarbonate infusion 100 mL/hr at 03/31/22 0129    bumetanide 0.1 mg/mL infusion 0.25 mg/hr (04/02/22 0231)    sodium chloride      sodium chloride       PRN Meds:.magnesium sulfate, potassium chloride **OR** potassium alternative oral replacement **OR** potassium chloride, sodium chloride flush, sodium chloride, sodium chloride flush, sodium chloride, acetaminophen, ondansetron, melatonin    Prepared by Champ Lyles RNC, MS  Patient seen and examined by Napoleon Moura MD   Note reviewed, updated, and signed by Napoleon Moura MD

## 2022-04-02 NOTE — PROGRESS NOTES
Renal Progress Note  [facility]    Patient :  Bandar Ramirez; 81 y.o. MRN# [id number]  Location:  -03/003-A  Attending:  Claudene Stabs, MD  Admit Date:  3/24/2022   Hospital Day: 8      Subjective:     Nephrology is following the patient for ISIDRA, volume overload. Patient seen and examined. She is on bumex drip at 0.25 mg/hour. Urine output 2.1 liters/24 hours. She has edema but states it is better. Denies SOB. On room air. K and mag low.     Outpatient Medications:     Medications Prior to Admission: furosemide (LASIX) 20 MG tablet, Take 20 mg by mouth daily  midodrine (PROAMATINE) 5 MG tablet, Take 1 tablet by mouth 3 times daily (with meals)  aspirin 325 MG tablet, Take 325 mg by mouth daily  losartan (COZAAR) 25 MG tablet, Take 25 mg by mouth daily At noon  metOLazone (ZAROXOLYN) 5 MG tablet, Take 5 mg by mouth daily   vitamin C (ASCORBIC ACID) 500 MG tablet, Take 500 mg by mouth daily  spironolactone (ALDACTONE) 50 MG tablet, Take 50 mg by mouth daily @@ noon  omeprazole (PRILOSEC) 20 MG delayed release capsule, Take 20 mg by mouth in the morning and at bedtime   Multiple Vitamins-Minerals (CENTRUM SILVER ADULT 50+ PO), Take 1 tablet by mouth daily  Calcium Carb-Cholecalciferol (CALTRATE 600+D3 PO), Take 1 tablet by mouth 2 times daily  sucralfate (CARAFATE) 1 GM tablet, Take 1 tablet by mouth 4 times daily (before meals and nightly) (Patient taking differently: Take 1 g by mouth daily )  carvedilol (COREG) 6.25 MG tablet, Take 1 tablet by mouth 2 times daily (with meals)  Misc Natural Products (OSTEO BI-FLEX TRIPLE STRENGTH PO), Take 750 mg by mouth daily  Multiple Vitamins-Minerals (PRESERVISION AREDS) CAPS, Take 1 capsule by mouth 2 times daily   fluticasone (FLONASE) 50 MCG/ACT nasal spray, 1-2 sprays by Nasal route daily  Pyridoxine HCl (VITAMIN B-6) 100 MG tablet, Take 100 mg by mouth daily  loratadine (CLARITIN) 10 MG tablet, Take 10 mg by mouth daily   PARoxetine (PAXIL) 30 MG tablet, Take 30 mg by mouth every morning  traZODone (DESYREL) 50 MG tablet, Take 50 mg by mouth nightly as needed     Current Medications:     Scheduled Meds:    sodium bicarbonate  650 mg Oral BID    potassium bicarb-citric acid  20 mEq Oral TID    metoprolol tartrate  12.5 mg Oral QAM AC    cefTRIAXone (ROCEPHIN) IV  2,000 mg IntraVENous Q24H    midodrine  10 mg Oral TID WC    lidocaine PF  5 mL IntraDERmal Once    levothyroxine  50 mcg Oral QAM AC    lidocaine 1 % injection  5 mL IntraDERmal Once    sodium chloride flush  5-40 mL IntraVENous 2 times per day    pantoprazole  40 mg Oral BID AC    rifaximin  550 mg Oral BID    sodium chloride flush  5-40 mL IntraVENous 2 times per day    PARoxetine  30 mg Oral QAM     Continuous Infusions:    [Held by provider] sodium bicarbonate infusion Stopped (22 0747)    bumetanide 0.1 mg/mL infusion 0.25 mg/hr (22 0915)    sodium chloride      sodium chloride       PRN Meds:  magnesium sulfate, potassium chloride **OR** potassium alternative oral replacement **OR** potassium chloride, sodium chloride flush, sodium chloride, sodium chloride flush, sodium chloride, acetaminophen, ondansetron, melatonin    Input/Output:       I/O last 3 completed shifts: In: 1128.1 [P.O.:600; IV Piggyback:528.1]  Out: 0864 [Urine:3450].       Patient Vitals for the past 96 hrs (Last 3 readings):   Weight   22 0226 180 lb 9.6 oz (81.9 kg)   22 0636 179 lb 8 oz (81.4 kg)   22 0436 183 lb 1.6 oz (83.1 kg)       Vital Signs:   Temperature:  Temp: 97.5 °F (36.4 °C)  TMax:   Temp (24hrs), Av.6 °F (36.4 °C), Min:97.3 °F (36.3 °C), Max:97.9 °F (36.6 °C)    Respirations:  Resp: 17  Pulse:   Pulse: 81  BP:    BP: 114/66  BP Range: Systolic (79TSR), TLA:673 , Min:103 , ODC:996       Diastolic (16ALA), FVM:65, Min:51, Max:66      Physical Examination:     General:  Awake, alert, no distress  HEENT: NC/AT/ MMM, JVD  Chest:               Decreased no rales  Cardiac:  S1 S2   Abdomen: Soft, distended  Neuro:  No facial droop, No Asterixis  SKIN:  No rashes, good skin turgor. Extremities:  3+ LE edema    Labs:       Recent Labs     03/31/22  0832   WBC 6.1   RBC 3.90*   HGB 11.6*   HCT 37.3   MCV 95.6   MCH 29.7   MCHC 31.1*      MPV 9.7      BMP:   Recent Labs     03/31/22  0435 03/31/22  1801 04/01/22  1042 04/02/22  0438     --  136 137   K 3.8 3.4* 3.3* 2.9*     --  97* 96*   CO2 22*  --  22* 24   BUN 60*  --  58* 58*   CREATININE 2.8*  --  2.8* 2.9*   GLUCOSE 127*  --  113* 95   CALCIUM 8.6  --  8.4* 8.1*      Phosphorus:   No results for input(s): PHOS in the last 72 hours.   Magnesium:    Recent Labs     03/31/22  1801 04/01/22  1042 04/02/22 0438   MG 1.5* 1.7 1.3*     Albumin:    Recent Labs     03/31/22  0435 04/02/22  0438   LABALBU 3.6 3.1*     BNP:    No results found for: BNP  SAW:    No results found for: SAW  SPEP:  Lab Results   Component Value Date    PROT 5.4 04/02/2022     UPEP:   No results found for: LABPE  C3:   No results found for: C3  C4:   No results found for: C4  MPO ANCA:   No results found for: MPO  PR3 ANCA:   No results found for: PR3  Anti-GBM:   No results found for: GBMABIGG  Hep BsAg:         Lab Results   Component Value Date    HEPBSAG Negative 03/13/2022     Hep C AB:          Lab Results   Component Value Date    HEPCAB Negative 03/13/2022       Urinalysis/Chemistries:      Lab Results   Component Value Date    NITRU NEGATIVE 03/26/2022    COLORU ORANGE 03/30/2022    COLORU DK YELLOW 03/26/2022    PHUR 5.0 03/26/2022    LABCAST NONE SEEN 03/11/2022    LABCAST NONE SEEN 03/11/2022    WBCUA 2-4 03/26/2022    RBCUA 3-5 03/26/2022    YEAST NONE SEEN 03/26/2022    BACTERIA FEW 03/26/2022    SPECGRAV 1.017 03/11/2022    LEUKOCYTESUR TRACE 03/26/2022    UROBILINOGEN 1.0 03/26/2022    BILIRUBINUR NEGATIVE 03/26/2022    BILIRUBINUR NEGATIVE 08/21/2011    BLOODU NEGATIVE 03/26/2022    GLUCOSEU NEGATIVE 03/26/2022 NONIUA TRACE 03/26/2022     Urine Sodium:   No results found for: GONZALO  Urine Potassium:  No results found for: KUR  Urine Chloride:  No results found for: CLUR  Urine Osmolarity:   Lab Results   Component Value Date    CARLOTA 378 03/26/2022     Urine Protein:   No components found for: TOTALPROTEIN, URINE   Urine Creatinine:   No results found for: LABCREA  Urine Eosinophils:  No components found for: UEOS        Impression and Plan:  1. ISIDRA: due to cardiorenal, hypotension, has not chnaged much since admission. Baseline around 1.2-1.5 mg/dL. On bumex drip at 0.25 mg/hr. Continue for now  2. Hypokalemia/hypomagnesemia from diuretics\"  -replacement in progress. Add effer-K 20 meq TID. Recheck lytes this afternoon    3. Hypotension on Midodrine  4. Volume overload  5. Right heart failure/severe pulm HTN  6. Ascites, SBP  7. Hx AVR        Please don't hesitate to call with any questions.   Electronically signed by Daniel Lee DO on 4/2/2022 at 10:56 AM

## 2022-04-02 NOTE — PLAN OF CARE
Problem: Physical Regulation:  Goal: Prevent transmision of infection  Description: Prevent transmision of infection  Outcome: Ongoing  Note: Patient remains in contact isloation for VRE. Isolation precautions remain in place for staff and visitors. Patient educated on transmission risks. Problem: Skin Integrity:  Goal: Absence of new skin breakdown  Description: Absence of new skin breakdown  Outcome: Ongoing  Note: No new skin breakdown this shift. Chair cushion in place for patient. Zinc cream applied on buttocks to prevent further breakdown. Patient ambulates with one assistance to bed side commode. Problem: Falls - Risk of:  Goal: Will remain free from falls  Description: Will remain free from falls  Outcome: Ongoing  Note: Patient remains free of falls this shift. Patient alert and oriented, uses call light appropriately, bed and chair alarm in place for safety measures. One assistance with walker and gait belt for transfer. Problem: Pain:  Goal: Pain level will decrease  Description: Pain level will decrease  Outcome: Ongoing  Note: Pain Assessment: 0-10  Pain Level: 0   Patient's Stated Pain Goal: No pain   Is pain goal met at this time? Yes    Patient denies any pain this shift. PRN pain medication available as needed. Educated patient on non-pharmalogical interventions available to assist in pain control. Problem: Discharge Planning:  Goal: Discharged to appropriate level of care  Description: Discharged to appropriate level of care  Outcome: Ongoing  Note: Social work and case management collaborating with patient and care team for appropriate discharge level of care. Patient plans to discharge to [address] [place] [address] for rehab. Care plan reviewed with patient and family. Patient and family verbalize understanding of the plan of care and contribute to goal setting.

## 2022-04-02 NOTE — PROGRESS NOTES
Assessment and Plan:        1. Hypotension- combination of ascites, PHT; improved. On midodring  2. SBP- on Rocephin; WILL SHE NEED F/U PARACENTESIS? Have reduced her Beta blocker  3. CKD- stable  4. Ascites with SBP- abdomen huge. May need another tap  5. afib- rate controlled- not on [address]. 6. Hypokalemia/magnesemia- due to diuresis- being replaced    CC:  Pt with PHT, secondary, afib, ckd, chronic liver dz with portal hypertension and ascites adm hypotension, multifactorial .  Required albumin, pressors for correction. Currently stable  HPI: See above    ROS (12 point review of systems completed. Pertinent positives noted. Otherwise ROS is negative) : Twelve point ROS completed and found to be negative except as noted above.     PMH:  Per HPI  SHX:  Lived at home  [id number] [place] [address],[address]: Noncontributory    Allergies: See above    Medications:     [Held by provider] sodium bicarbonate infusion 100 mL/hr at 03/31/22 0129    bumetanide 0.1 mg/mL infusion 0.25 mg/hr (04/02/22 0231)    sodium chloride      sodium chloride        potassium chloride  10 mEq IntraVENous Q2H    metoprolol tartrate  12.5 mg Oral QAM AC    sodium bicarbonate  1,300 mg Oral BID    cefTRIAXone (ROCEPHIN) IV  2,000 mg IntraVENous Q24H    midodrine  10 mg Oral TID WC    lidocaine PF  5 mL IntraDERmal Once    levothyroxine  50 mcg Oral QAM AC    lidocaine 1 % injection  5 mL IntraDERmal Once    sodium chloride flush  5-40 mL IntraVENous 2 times per day    pantoprazole  40 mg Oral BID AC    rifaximin  550 mg Oral BID    sodium chloride flush  5-40 mL IntraVENous 2 times per day    PARoxetine  30 mg Oral QAM       Vital Signs:   BP (!) 127/58   Pulse 82   Temp 97.6 °F (36.4 °C) (Oral)   Resp 20   Ht 5' 4\" (1.626 m)   Wt 180 lb 9.6 oz (81.9 kg)   SpO2 94%   BMI 31.00 kg/m²      Intake/Output Summary (Last 24 hours) at 4/2/2022 0905  Last data filed at 4/2/2022 0703  Gross per 24 hour   Intake 928.12 ml   Output 2400 ml   Net -1471.88 ml Physical Examination: General appearance - chronically ill appearing  Mental status - alert, oriented to person, place, and time  Neck - supple, no significant adenopathy, no JVD, or carotid bruits  Chest - clear to auscultation, no wheezes, rales or rhonchi, symmetric air entry  Heart - irregularly irregular rhythm with rate 70, coarse systolic murmur LSB  Abdomen - very distended, dull to percussion  Neurological - alert, oriented, normal speech, no focal findings or movement disorder noted  Musculoskeletal - no muscular tenderness noted  Extremities - peripheral pulses normal, no pedal edema, no clubbing or cyanosis, pedal edema 2 +  Skin - pale    Data: (All radiographs, tracings, PFTs, and imaging are personally viewed and interpreted unless otherwise noted).           Electronically signed by Carrie Swartz MD on 4/2/2022 at 9:05 AM

## 2022-04-03 ENCOUNTER — APPOINTMENT (OUTPATIENT)
Dept: ULTRASOUND IMAGING | Age: 82
DRG: 432 | End: 2022-04-03
Payer: MEDICARE

## 2022-04-03 LAB
ANION GAP SERPL CALCULATED.3IONS-SCNC: 14 MEQ/L (ref 8–16)
BUN BLDV-MCNC: 58 MG/DL (ref 7–22)
CALCIUM SERPL-MCNC: 8 MG/DL (ref 8.5–10.5)
CHLORIDE BLD-SCNC: 93 MEQ/L (ref 98–111)
CO2: 31 MEQ/L (ref 23–33)
CREAT SERPL-MCNC: 2.5 MG/DL (ref 0.4–1.2)
GFR SERPL CREATININE-BSD FRML MDRD: 18 ML/MIN/1.73M2
GLUCOSE BLD-MCNC: 114 MG/DL (ref 70–108)
MAGNESIUM: 1.5 MG/DL (ref 1.6–2.4)
POTASSIUM SERPL-SCNC: 3.8 MEQ/L (ref 3.5–5.2)
SODIUM BLD-SCNC: 138 MEQ/L (ref 135–145)

## 2022-04-03 PROCEDURE — 2580000003 HC RX 258: Performed by: STUDENT IN AN ORGANIZED HEALTH CARE EDUCATION/TRAINING PROGRAM

## 2022-04-03 PROCEDURE — P9045 ALBUMIN (HUMAN), 5%, 250 ML: HCPCS | Performed by: STUDENT IN AN ORGANIZED HEALTH CARE EDUCATION/TRAINING PROGRAM

## 2022-04-03 PROCEDURE — 2500000003 HC RX 250 WO HCPCS: Performed by: NURSE PRACTITIONER

## 2022-04-03 PROCEDURE — 87075 CULTR BACTERIA EXCEPT BLOOD: CPT

## 2022-04-03 PROCEDURE — 87205 SMEAR GRAM STAIN: CPT

## 2022-04-03 PROCEDURE — 6360000002 HC RX W HCPCS: Performed by: NURSE PRACTITIONER

## 2022-04-03 PROCEDURE — 99232 SBSQ HOSP IP/OBS MODERATE 35: CPT | Performed by: INTERNAL MEDICINE

## 2022-04-03 PROCEDURE — 6360000002 HC RX W HCPCS: Performed by: STUDENT IN AN ORGANIZED HEALTH CARE EDUCATION/TRAINING PROGRAM

## 2022-04-03 PROCEDURE — 2580000003 HC RX 258

## 2022-04-03 PROCEDURE — 6370000000 HC RX 637 (ALT 250 FOR IP)

## 2022-04-03 PROCEDURE — 80048 BASIC METABOLIC PNL TOTAL CA: CPT

## 2022-04-03 PROCEDURE — 83735 ASSAY OF MAGNESIUM: CPT

## 2022-04-03 PROCEDURE — 6370000000 HC RX 637 (ALT 250 FOR IP): Performed by: NURSE PRACTITIONER

## 2022-04-03 PROCEDURE — 87070 CULTURE OTHR SPECIMN AEROBIC: CPT

## 2022-04-03 PROCEDURE — 6370000000 HC RX 637 (ALT 250 FOR IP): Performed by: INTERNAL MEDICINE

## 2022-04-03 PROCEDURE — 89050 BODY FLUID CELL COUNT: CPT

## 2022-04-03 PROCEDURE — 6370000000 HC RX 637 (ALT 250 FOR IP): Performed by: STUDENT IN AN ORGANIZED HEALTH CARE EDUCATION/TRAINING PROGRAM

## 2022-04-03 PROCEDURE — 49083 ABD PARACENTESIS W/IMAGING: CPT

## 2022-04-03 PROCEDURE — 36415 COLL VENOUS BLD VENIPUNCTURE: CPT

## 2022-04-03 PROCEDURE — 2060000000 HC ICU INTERMEDIATE R&B

## 2022-04-03 RX ORDER — ALBUMIN, HUMAN INJ 5% 5 %
12.5 SOLUTION INTRAVENOUS
Status: COMPLETED | OUTPATIENT
Start: 2022-04-03 | End: 2022-04-03

## 2022-04-03 RX ADMIN — ALBUMIN (HUMAN) 12.5 G: 12.5 INJECTION, SOLUTION INTRAVENOUS at 11:31

## 2022-04-03 RX ADMIN — SODIUM CHLORIDE, PRESERVATIVE FREE 10 ML: 5 INJECTION INTRAVENOUS at 07:50

## 2022-04-03 RX ADMIN — POTASSIUM BICARBONATE 20 MEQ: 782 TABLET, EFFERVESCENT ORAL at 20:26

## 2022-04-03 RX ADMIN — PANTOPRAZOLE SODIUM 40 MG: 40 TABLET, DELAYED RELEASE ORAL at 05:55

## 2022-04-03 RX ADMIN — POTASSIUM BICARBONATE 20 MEQ: 782 TABLET, EFFERVESCENT ORAL at 14:09

## 2022-04-03 RX ADMIN — RIFAXIMIN 550 MG: 550 TABLET ORAL at 07:50

## 2022-04-03 RX ADMIN — SODIUM CHLORIDE, PRESERVATIVE FREE 10 ML: 5 INJECTION INTRAVENOUS at 20:26

## 2022-04-03 RX ADMIN — ONDANSETRON 4 MG: 2 INJECTION INTRAMUSCULAR; INTRAVENOUS at 02:24

## 2022-04-03 RX ADMIN — POTASSIUM BICARBONATE 20 MEQ: 782 TABLET, EFFERVESCENT ORAL at 07:50

## 2022-04-03 RX ADMIN — MIDODRINE HYDROCHLORIDE 10 MG: 10 TABLET ORAL at 07:50

## 2022-04-03 RX ADMIN — CEFTRIAXONE 2000 MG: 10 INJECTION, POWDER, FOR SOLUTION INTRAVENOUS at 12:48

## 2022-04-03 RX ADMIN — Medication 3 MG: at 20:26

## 2022-04-03 RX ADMIN — LEVOTHYROXINE SODIUM 50 MCG: 0.05 TABLET ORAL at 05:55

## 2022-04-03 RX ADMIN — RIFAXIMIN 550 MG: 550 TABLET ORAL at 20:26

## 2022-04-03 RX ADMIN — PAROXETINE 30 MG: 30 TABLET, FILM COATED ORAL at 20:26

## 2022-04-03 RX ADMIN — MIDODRINE HYDROCHLORIDE 10 MG: 10 TABLET ORAL at 16:06

## 2022-04-03 RX ADMIN — MIDODRINE HYDROCHLORIDE 10 MG: 10 TABLET ORAL at 12:48

## 2022-04-03 RX ADMIN — MAGNESIUM SULFATE HEPTAHYDRATE 2000 MG: 40 INJECTION, SOLUTION INTRAVENOUS at 06:06

## 2022-04-03 RX ADMIN — METOPROLOL TARTRATE 12.5 MG: 25 TABLET, FILM COATED ORAL at 05:55

## 2022-04-03 RX ADMIN — ALBUMIN (HUMAN) 12.5 G: 12.5 INJECTION, SOLUTION INTRAVENOUS at 09:06

## 2022-04-03 RX ADMIN — PANTOPRAZOLE SODIUM 40 MG: 40 TABLET, DELAYED RELEASE ORAL at 16:06

## 2022-04-03 ASSESSMENT — PAIN SCALES - GENERAL
PAINLEVEL_OUTOF10: 0

## 2022-04-03 NOTE — PLAN OF CARE
Problem: Physical Regulation:  Goal: Prevent transmision of infection  Description: Prevent transmision of infection  Outcome: Ongoing  Note: Patient remains in contact isolation for VRE. Isolation precautions in place for staff and visitors. Education provided to patient on transmission risks. Problem: Skin Integrity:  Goal: Absence of new skin breakdown  Description: Absence of new skin breakdown  Outcome: Ongoing  Note: No new skin breakdown this shift. Patient being turned and repositioned every two hours with pillow support to prevent further breakdown while in bed. Chair cushion provided while patient rests in chair. Heels and elbows elevated on pillows. Problem: Falls - Risk of:  Goal: Will remain free from falls  Description: Will remain free from falls  Outcome: Ongoing  Note: Patient remains free of falls this shift. Alert and oriented, uses call light appropriately, bed and chair alarm in place for safety measures. Gripper socks applied. Problem: Pain:  Description: Pain management should include both nonpharmacologic and pharmacologic interventions. Goal: Pain level will decrease  Description: Pain level will decrease  Outcome: Ongoing  Note: Pain Assessment: 0-10  Pain Level: 0   Patient's Stated Pain Goal: No pain   Is pain goal met at this time? Yes    Patient denies any pain throughout shift. PRN medications available. Educated patient on non-pharmalogical interventions to control pain. Emotional support provided. Problem: Discharge Planning:  Goal: Discharged to appropriate level of care  Description: Discharged to appropriate level of care  Outcome: Ongoing  Note: Social work and case management collaborating with patient and care team to identify discharge needs and barriers. Patient plans [facility] at discharge for rehab. Care plan reviewed with patient and family.   Patient and family verbalize understanding of the plan of care and contribute to

## 2022-04-03 NOTE — PROGRESS NOTES
Pt found for the third time sitting up at the edge of the bed attempting to get out of bed. Pt reoriented at this time and assisted to [facility] then up to chair. x1 IC episode of stool, pt bathed/linens changed. At this time pt complaining of some nausea, prn zofran given. Pt bilateral feet noted to be darker purple than previous assessment. Pt pulses palpable and pt complaining of some numbness in her bilateral toes. Dr. Bridger Savage informed and at bedside to assess, no new orders at this time. Pt up in chair with feet elevated, refusing SCDs at this time. 0315: Pt resting quietly in chair, pt stating her nausea has improved.

## 2022-04-03 NOTE — PROGRESS NOTES
Renal Progress Note  [facility]    Patient :  Etta Scott; 81 y.o. MRN# [id number]  Location:  4-03/003-A  Attending:  Sabino Washington MD  Admit Date:  3/24/2022   Hospital Day: 9      Subjective:     Nephrology is following the patient for ISIDRA, volume overload. Patient was seen this morning. States she is urinating a lot. Per RN has had some incontinent episodes so not all has been documented. Weight up 2 pounds from yesterday. Repeat paracentesis has been ordered.       Outpatient Medications:     Medications Prior to Admission: furosemide (LASIX) 20 MG tablet, Take 20 mg by mouth daily  midodrine (PROAMATINE) 5 MG tablet, Take 1 tablet by mouth 3 times daily (with meals)  aspirin 325 MG tablet, Take 325 mg by mouth daily  losartan (COZAAR) 25 MG tablet, Take 25 mg by mouth daily At noon  metOLazone (ZAROXOLYN) 5 MG tablet, Take 5 mg by mouth daily   vitamin C (ASCORBIC ACID) 500 MG tablet, Take 500 mg by mouth daily  spironolactone (ALDACTONE) 50 MG tablet, Take 50 mg by mouth daily @@ noon  omeprazole (PRILOSEC) 20 MG delayed release capsule, Take 20 mg by mouth in the morning and at bedtime   Multiple Vitamins-Minerals (CENTRUM SILVER ADULT 50+ PO), Take 1 tablet by mouth daily  Calcium Carb-Cholecalciferol (CALTRATE 600+D3 PO), Take 1 tablet by mouth 2 times daily  sucralfate (CARAFATE) 1 GM tablet, Take 1 tablet by mouth 4 times daily (before meals and nightly) (Patient taking differently: Take 1 g by mouth daily )  carvedilol (COREG) 6.25 MG tablet, Take 1 tablet by mouth 2 times daily (with meals)  Misc Natural Products (OSTEO BI-FLEX TRIPLE STRENGTH PO), Take 750 mg by mouth daily  Multiple Vitamins-Minerals (PRESERVISION AREDS) CAPS, Take 1 capsule by mouth 2 times daily   fluticasone (FLONASE) 50 MCG/ACT nasal spray, 1-2 sprays by Nasal route daily  Pyridoxine HCl (VITAMIN B-6) 100 MG tablet, Take 100 mg by mouth daily  loratadine (CLARITIN) 10 MG tablet, Take 10 mg by mouth daily   PARoxetine (PAXIL) 30 MG tablet, Take 30 mg by mouth every morning  traZODone (DESYREL) 50 MG tablet, Take 50 mg by mouth nightly as needed     Current Medications:     Scheduled Meds:    albumin human  12.5 g IntraVENous Q1H    potassium bicarb-citric acid  20 mEq Oral TID    metoprolol tartrate  12.5 mg Oral QAM AC    cefTRIAXone (ROCEPHIN) IV  2,000 mg IntraVENous Q24H    midodrine  10 mg Oral TID WC    lidocaine PF  5 mL IntraDERmal Once    levothyroxine  50 mcg Oral QAM AC    lidocaine 1 % injection  5 mL IntraDERmal Once    sodium chloride flush  5-40 mL IntraVENous 2 times per day    pantoprazole  40 mg Oral BID AC    rifaximin  550 mg Oral BID    sodium chloride flush  5-40 mL IntraVENous 2 times per day    PARoxetine  30 mg Oral QAM     Continuous Infusions:    [Held by provider] sodium bicarbonate infusion Stopped (22 0738)    bumetanide 0.1 mg/mL infusion 0.25 mg/hr (22 2974)    sodium chloride      sodium chloride       PRN Meds:  magnesium sulfate, potassium chloride **OR** potassium alternative oral replacement **OR** potassium chloride, sodium chloride flush, sodium chloride, sodium chloride flush, sodium chloride, acetaminophen, ondansetron, melatonin    Input/Output:       I/O last 3 completed shifts: In: 1897.6 [P.O.:1120; I.V.:539; IV Piggyback:238.5]  Out: 2800 [Urine:2800].       Patient Vitals for the past 96 hrs (Last 3 readings):   Weight   22 0029 182 lb 14.3 oz (83 kg)   22 0226 180 lb 9.6 oz (81.9 kg)   22 0636 179 lb 8 oz (81.4 kg)       Vital Signs:   Temperature:  Temp: 98.2 °F (36.8 °C)  TMax:   Temp (24hrs), Av.9 °F (36.6 °C), Min:97.4 °F (36.3 °C), Max:98.3 °F (36.8 °C)    Respirations:  Resp: 18  Pulse:   Pulse: 73  BP:    BP: (!) 121/59  BP Range: Systolic (63NPQ), UAX:735 , Min:113 , GIF:679       Diastolic (09IWX), EH, Min:55, Max:68      Physical Examination:     General:  Awake, alert, no distress  HEENT: NC/AT/ MMM,   Chest:               Decreased no rales  Cardiac:  S1 S2   Abdomen: Soft, distended  Neuro:  No facial droop, No Asterixis  SKIN:  No rashes, good skin turgor. Extremities:  2+ LE edema, improving    Labs:       No results for input(s): WBC, RBC, HGB, HCT, MCV, MCH, MCHC, RDW, PLT, MPV in the last 72 hours. BMP:   Recent Labs     04/01/22  1042 04/01/22  1042 04/02/22  0438 04/02/22  1239 04/03/22  0453     --  137  --  138   K 3.3*   < > 2.9* 3.6 3.8   CL 97*  --  96*  --  93*   CO2 22*  --  24  --  31   BUN 58*  --  58*  --  58*   CREATININE 2.8*  --  2.9*  --  2.5*   GLUCOSE 113*  --  95  --  114*   CALCIUM 8.4*  --  8.1*  --  8.0*    < > = values in this interval not displayed. Phosphorus:   No results for input(s): PHOS in the last 72 hours.   Magnesium:    Recent Labs     04/02/22  0438 04/02/22  1239 04/03/22  0453   MG 1.3* 1.7 1.5*     Albumin:    Recent Labs     04/02/22 0438   LABALBU 3.1*     BNP:    No results found for: BNP  SAW:    No results found for: SAW  SPEP:  Lab Results   Component Value Date    PROT 5.4 04/02/2022     UPEP:   No results found for: LABPE  C3:   No results found for: C3  C4:   No results found for: C4  MPO ANCA:   No results found for: MPO  PR3 ANCA:   No results found for: PR3  Anti-GBM:   No results found for: GBMABIGG  Hep BsAg:         Lab Results   Component Value Date    HEPBSAG Negative 03/13/2022     Hep C AB:          Lab Results   Component Value Date    HEPCAB Negative 03/13/2022       Urinalysis/Chemistries:      Lab Results   Component Value Date    NITRU NEGATIVE 03/26/2022    COLORU ORANGE 03/30/2022    COLORU DK YELLOW 03/26/2022    PHUR 5.0 03/26/2022    LABCAST NONE SEEN 03/11/2022    LABCAST NONE SEEN 03/11/2022    WBCUA 2-4 03/26/2022    RBCUA 3-5 03/26/2022    YEAST NONE SEEN 03/26/2022    BACTERIA FEW 03/26/2022    SPECGRAV 1.017 03/11/2022    LEUKOCYTESUR TRACE 03/26/2022    UROBILINOGEN 1.0 03/26/2022    BILIRUBINUR NEGATIVE 03/26/2022    BILIRUBINUR NEGATIVE 08/21/2011    BLOODU NEGATIVE 03/26/2022    GLUCOSEU NEGATIVE 03/26/2022    KETUA TRACE 03/26/2022     Urine Sodium:   No results found for: GONZALO  Urine Potassium:  No results found for: KUR  Urine Chloride:  No results found for: CLUR  Urine Osmolarity:   Lab Results   Component Value Date    OSMOU 378 03/26/2022     Urine Protein:   No components found for: TOTALPROTEIN, URINE   Urine Creatinine:   No results found for: LABCREA  Urine Eosinophils:  No components found for: UEOS        Impression and Plan:  1. ISIDRA: due to cardiorenal, hypotension: slightly better today. Good urine output. Still hypervolemic will continue bumex drip. Baseline around 1.2-1.5 mg/dL. 2. Hypokalemia: Improved. hypomag replaced this morning    3. Hypotension on Midodrine  4. Volume overload  5. Metabolic acidosis resolved. Stop po bicarb  6. Right heart failure/severe pulm HTN  7. Ascites, SBP: repeat paracentesis ordered  8. Hx AVR        Please don't hesitate to call with any questions.   Electronically signed by Michael Kauffman DO on 4/3/2022 at 9:47 AM

## 2022-04-03 NOTE — PLAN OF CARE
Problem: Skin Integrity:  Goal: Absence of new skin breakdown  Description: Absence of new skin breakdown  4/2/2022 2105 by Nicolas Mejia RN  Outcome: Met This Shift  4/2/2022 1516 by Rosalind Wilson RN  Outcome: Ongoing  Note: No new skin breakdown this shift. Chair cushion in place for patient. Zinc cream applied on buttocks to prevent further breakdown. Patient ambulates with one assistance to bed side commode. Problem: Falls - Risk of:  Goal: Will remain free from falls  Description: Will remain free from falls  4/2/2022 2105 by Nicolas Mejia RN  Outcome: Met This Shift  4/2/2022 1516 by Rosalind Wilson RN  Outcome: Ongoing  Note: Patient remains free of falls this shift. Patient alert and oriented, uses call light appropriately, bed and chair alarm in place for safety measures. One assistance with walker and gait belt for transfer. Problem: Pain:  Goal: Pain level will decrease  Description: Pain level will decrease  4/2/2022 2105 by Nicolas Mejia RN  Outcome: Met This Shift  4/2/2022 1516 by Rosalind Wilson RN  Outcome: Ongoing  Note: Pain Assessment: 0-10  Pain Level: 0   Patient's Stated Pain Goal: No pain   Is pain goal met at this time? Yes    Patient denies any pain this shift. PRN pain medication available as needed. Educated patient on non-pharmalogical interventions available to assist in pain control. Problem: Physical Regulation:  Goal: Prevent transmision of infection  Description: Prevent transmision of infection  4/2/2022 1516 by Rosalind Wilson RN  Outcome: Ongoing  Note: Patient remains in contact isloation for VRE. Isolation precautions remain in place for staff and visitors. Patient educated on transmission risks.       Problem: Discharge Planning:  Goal: Discharged to appropriate level of care  Description: Discharged to appropriate level of care  4/2/2022 1516 by Rosalind Wilson RN  Outcome: Ongoing  Note: Social work and case management collaborating with patient and care team for appropriate discharge level of care. Patient plans to discharge to [address] [place] [address] for rehab.

## 2022-04-03 NOTE — PROGRESS NOTES
Gastroenterology Progress Note:   Patient: Merlene Roblero  : 1940  Acct#: [de-identified]    Patient Name:  Merlene Roblero , 81 y.o. , female with decompensated liver cirrhosis    ASSESSMENT   81yo admitted after a fall at home with decompensated liver cirrhosis due to CHF. Paracentesis performed 3/28/22, positive for SBP on IV Rocephin  1. Decompensated liver cirrhosis with ascites - today,  MELD 9  2. Ascites s/p paracentesis 3/28/22 with 5L removed, positive for SBP on rocephin IV. Getting paracentesis this morning. 3. CHF - acute exacerbation. Severe Tricuspid Regurgitation. 4. Severe pulmonary hypertension  5. Anemia - normocytic, no evidence of active bleeding  6. Coagulopathy  7. Acute diarrhea - stool studies were negative  8. Hypokalemia/ Hypomag - On replacement. 9. Isolation precautions- contact. 10. ISIDRA - followed by Renal , has cardiorenal syndrome. On Bumex drip. Creatinine improving. 2.5 today. Principal Problem:    Acute kidney injury ([address]  Active Problems:    Hypotension    Dehydration    SBP (spontaneous bacterial peritonitis) ([address]    Other ascites  Resolved Problems:    * No resolved hospital problems. *     Patient Active Problem List   Diagnosis    Chronic atrial fibrillation (HCC)    Ascending cholangitis, possible    Elevated LFTs    Thrombocytopenia (HCC)    Leukopenia    ISIDRA (acute kidney injury) ([address]    SIRS (systemic inflammatory response syndrome) (HCC)    Cholecystitis, acute    Neutropenia (HCC)    Aortic stenosis, severe    Colitis    Nausea    Diarrhea    Essential hypertension    Anemia, normocytic normochromic    Hypocalcemia    Cirrhosis (HCC)    S/P AVR (aortic valve replacement)    Obesity (BMI 30-39. 9)    History of atrial fibrillation    Hypomagnesemia    Asymptomatic bacteriuria - no clinical indication for antimicrobial therapy    CKD (chronic kidney disease) stage 4, GFR 15-29 ml/min (HCC)    Acute renal failure superimposed on stage 3 chronic kidney disease (HCC)    VRE (vancomycin resistant enterococcus) culture positive    CHF (congestive heart failure), NYHA class I, acute on chronic, combined (HCC)    Shortness of breath    [facility] kidney [facility]    Hypotension    Dehydration    SBP (spontaneous bacterial peritonitis) (HCC)    Other ascites       PLAN       1. Use daily weights to monitor fluid status with ascites instead of I and O's.   2.  Continue to monitor H/H, transfuse PRN  3. IV Rocephin started 3/30/22 for SBP, will need at least 5 days coverage with IV. 4.  Avoid hepatotoxic meds, okay for Tylenol 2g limit. 5.  Norma currently getting  repeat paracentesis to make sure that the PMN count has decreased by 50% or more. From ascites fluid   This is mainly diagnostic 250 mL, however, if she is very distended may remove 4 liters. Getting IV Albumin with paracentsis. 6.  Will follow up with Dr. Baron Fleming or Rm Rodriguez CNP as outpatient. 7.  Will need oral abx upon discharge for SBP prophylaxis. 8.  Discharge planning - to Nursing Home. SUBJECTIVE      Patient seen and examined. 24 hours events and chart reviewed. (  x )Medications Reviewed ;(   )Old records reviewed    Day 9 of stay. Feeling: ( []  )Better  ([]   ) Worse      ([x]   )Same     Tolerating full diet. Breathing has improved. She denies abdominal discomfort. No fever. REVIEW OF SYSTEMS:    GENERAL:  No fever, chills or weight loss. CARDIOVASCULAR:  No chest pain or palpitations. RESPIRATORY:  No dyspnea or cough. PHYSICAL EXAMINATION:    In: 1847.6   Out: 1400 [Urine:1400]  I/O last 3 completed shifts: In: 1897.6 [P.O.:1120; I.V.:539; IV Piggyback:238.5]  Out: 2800 [Urine:2800]   Weight 190 3/24,  182 lb on 4/3/22. ADULT DIET; Regular; Low Sodium (2 gm); 1200 ml  ADULT ORAL NUTRITION SUPPLEMENT; AM Snack, PM Snack;  Other Oral Supplement; magic cup + Boost    Vital Signs  BP (!) 126/58   Pulse 79   Temp 98.3 °F (36.8 °C) (Oral)   Resp 16   Ht 5' 4\" (1.626 m)   Wt 182 lb 14.3 oz (83 kg)   SpO2 96%   BMI 31.39 kg/m²      Pt. Examined in Ultrasound Dept. General:   ([x]   )Comfortable      ([]   )Obese          (  [x] )chronically sick looking      other:    HEENT: ( [x]  )NoPalour(  [x] )No Icterus (   )ET tube in place                      Mucosa: ( x  )moist(   )dry : Other:    CV: ( [x]  )RRR(   [])Irregular/arrhythmias : Murmur: ([]  ) No   ([x]  ) Yes: LLSB holosyst murmur    Resp: ([x]  )CTA Bilaterally,[x] No added sounds ( []  )Rhonci([]   )Wheeze ([]   )Rales    Abd: ([x]   )Soft([x]   )Non tender (  [x] )  + shifting dullness to percussion. Non distended. Ext: ([]   )No edema ( [x]  )Edema 2+ pitting bilat lower extremity  ( [x]  )No cyanosis    Skin: ( [x]  )Warm  ( []  )Cold   (   )Dry   ( x  )No rash    Neuro:    ( [x]  )Oriented X 3      ([]  )Confused      ( X  )  Other:  Moves all 4 extremities. REVIEW OF DIAGNOSTIC TESTING AND LABS:      Significant Diagnostic Studies:     RADIOLOGY:      Paracentesis performed 3/28/22 with 5L drained. + SBP  Paracentesis done today. LABS:      CBC:   Recent Labs     03/31/22  0832   WBC 6.1   HGB 11.6*          BMP:    Recent Labs     04/01/22  1042 04/01/22  1042 04/02/22  0438 04/02/22  1239 04/03/22  0453     --  137  --  138   K 3.3*   < > 2.9* 3.6 3.8   CL 97*  --  96*  --  93*   CO2 22*  --  24  --  31   BUN 58*  --  58*  --  58*   CREATININE 2.8*  --  2.9*  --  2.5*   GLUCOSE 113*  --  95  --  114*    < > = values in this interval not displayed.        Hepatic Function Panel:    Recent Labs     04/02/22  0438   ALKPHOS 79   ALT 8*   AST 24   PROT 5.4*   BILITOT 0.6   BILIDIR 0.3   LABALBU 3.1*       INR:   Recent Labs     04/02/22  0438   INR 1.23*     Calcium:  Recent Labs     04/03/22  0453   CALCIUM 8.0*     Magnesium:  Recent Labs     04/03/22  0453   MG 1.5*       MEDICATIONS    Scheduled Meds:   sodium bicarbonate  650 mg Oral BID    potassium bicarb-citric acid  20 mEq Oral TID    albumin human  25 g IntraVENous Once    metoprolol tartrate  12.5 mg Oral QAM AC    cefTRIAXone (ROCEPHIN) IV  2,000 mg IntraVENous Q24H    midodrine  10 mg Oral TID WC    lidocaine PF  5 mL IntraDERmal Once    levothyroxine  50 mcg Oral QAM AC    lidocaine 1 % injection  5 mL IntraDERmal Once    sodium chloride flush  5-40 mL IntraVENous 2 times per day    pantoprazole  40 mg Oral BID AC    rifaximin  550 mg Oral BID    sodium chloride flush  5-40 mL IntraVENous 2 times per day    PARoxetine  30 mg Oral QAM     Continuous Infusions:   [Held by provider] sodium bicarbonate infusion Stopped (03/31/22 5315)    bumetanide 0.1 mg/mL infusion 0.25 mg/hr (04/02/22 4604)    sodium chloride      sodium chloride       PRN Meds:.magnesium sulfate, potassium chloride **OR** potassium alternative oral replacement **OR** potassium chloride, sodium chloride flush, sodium chloride, sodium chloride flush, sodium chloride, acetaminophen, ondansetron, melatonin    Prepared by Lex Powers RNC, MS  Patient seen and examined by Evie Libman, MD   Note reviewed, updated, and signed by Evie Libman MD

## 2022-04-03 NOTE — PROGRESS NOTES
Hospitalist Progress Note    Patient:  Etta Scott      Unit/Bed:4K-03/003-A    YOB: 1940    MRN: [id number]       Acct: [de-identified]     PCP: ARTHUR Chopra CNP    Date of Admission: 3/24/2022    Assessment/Plan:    · ISIDRA on CKD: Creatinine slightly improved. Still elevated at 2.5. UOP good. Avoid nephrotoxic substances. Hold losartan and Aldactone. Nephrology/cardiology following. Suspect cardiorenal syndrome. Continue Bumex drip 0.25 mg/hr due to hypervolemia   · Anasarca / Ascites: Multifactorial however mostly related to right heart failure/pulmonary hypertension and liver cirrhosis. S/p abdominal paracentesis with 2.3 L removed. ? Cardiology/GI/nephrology following. Continue Bumex drip. ? 2 L fluid restriction and 2 g sodium diet. · SBP: Cultures and cytology showed no malignant cells, no growth and no organisms noted. Cell count with differential on 3/30 demonstrates 1930 total nucleated cells with PMNs 83.4%. PMNs > 250. Continue Rocephin day #5. GI Following. Will need repeat paracentesis to confirm resolution/improvement of SBP which is being completed on 4/3 with 25 g albumin administered. Will follow cell count with differential.  · Decompensated Liver Cirrhosis:  With ascites. S/p Paracentesis showing SBP. MELD 19. GI following. Plan as above. · Bilateral Pleural Effusions / Acute Hypoxic Resp Failure: Improved with diuresis. Currently on room air. Continue with Bumex drip. · Acute on chronic HFpEF with RHF: Continue diuresis. Monitor closely. Prone to poor cardiac output with drops in preload. Volume status tenuous. · Acute on Chronic Hypotension:  Cardiology following and recommends albumin 25% for replenishment during hypotension. Continue midodrine  · Hx of AS s/p TAVR now with AI 2+: noted. · Severe Pulm HTN / Severe TR: Appears to be mostly group 2 per prior [facility] data.  Discussed at length potential transfer to [facility] with patient, family, and Cardiology - they elect for continued care locally and are not wishing for aggressive treatment (such as swan-cory, pressors, inotropes etc)  · Atrial fibrillation: not on [address]. Consider [address] however will defer to her usual cardiologist, Dr. Ramu Osborne.  Continue rate control. · Hypothyroidism: Synthroid increased. Continue to monitor. · NSVT x10bts on 3/29: Without recurrence. Keep mg > 2 and K > 4. Tele. · Anion gap metabolic acidosis: Resolved. Continue to monitor. Nephrology following. · Goals of Care: Cheron Snellen discussion with patient/family on 3/30 with Dr. Lucrecia Tobar and Dr. Blaise Woody. Discussed all potential treatment options and potential interventions, patient and patient's son are in agreement that no further wishes for transfer to [facility] for pulmonary hypertension specialist, and no wish for ICU transfer for [place] directed therapy, as they are understanding patient's heart failure is in the end stages and she is not a candidate for open heart surgery. Her CODE STATUS is limited no x4, will be optimizing her therapy with her current regimen and she is okay with IV Bumex and other IV therapies as necessary, but her overall goal is to get home. Palliative care is following to assist with patient and family's goals moving forwards. She desires to avoid SNF despite recommendations. Expected discharge date:  1-2 Days    Disposition:    [x] Home       [] TCU       [] Rehab       [] Psych       [] SNF       [] [place]       [] Other-    Chief Complaint: General Weakness    Hospital Course: Per HPI, \"Norma Singh is 81years old female who presented to ED for generalize weakness on 3/24/22. Past medical history significant with pulmonary hypertension secondary to severe tricuspid regurgitation, decompensated liver cirrhosis, chronic hypotension, persistent A. fib on carvedilol with rate control, heart failure preserved ejection fraction, CKD stage III.  Patient recently discharged from the hospital on March 17, 2022.  2 days prior to presentation patient reports decreased p.o. intake, nausea, dry heaves, persistent diarrhea with loose watery stool.  On the day of presentation patient's son report that patient worsening fatigue and generalized weakness therefore he brought her to the ED.     In the ED patient found to have blood pressure 83/50, heart rate 75, respiration rate 16, O2 sat at 95%. BMP showed Cr 2.4 which increased 1.2 from 1 months prior. CXR showed cardiomegaly possible failure, patchy opacity of right lung base. Patient was admitted and manage for decompensate liver cirrhosis and ISIDRA on CKD. Patient subsequently underwent paracentesis with 2.5L fluid removed. Patient develop hypotension which didn't response to albumin and NS. Patient required pressure support and she was transferred to ICU on 3/25/22. On 3/26, patient wean off pressure support and transferred out of ICU. Cheetah test on 3/27/22 showed nigh normal CO with low TPR not responsive to fluid. Thyroid function test showed hypothyroidism. Patient was started Synthroid 50 mg daily on 3/28/22. On 3/30, ascites fluid positive culture which Ceftriaxone was started. Patient develop worsening metabolic acidosis which bicarb drip and tab were started. \"     Further information please see assessment and plan above    Subjective (past 24 hours): Patient evaluated at bedside. Getting ready to receive paracentesis. Denies fever, headache, chills, chest pain, shortness of breath, abdominal pain, nausea/vomiting/diarrhea/constipation. States she wants to go home by Tuesday. No other acute complaints. ROS (12 point review of systems completed. Pertinent positives noted. Otherwise ROS is negative).     Medications:  Reviewed    Infusion Medications    [Held by provider] sodium bicarbonate infusion Stopped (03/31/22 9435)    bumetanide 0.1 mg/mL infusion 0.25 mg/hr (04/02/22 8762)    sodium chloride      sodium chloride       Scheduled Medications    albumin human  12.5 g IntraVENous Q1H    potassium bicarb-citric acid  20 mEq Oral TID    metoprolol tartrate  12.5 mg Oral QAM AC    cefTRIAXone (ROCEPHIN) IV  2,000 mg IntraVENous Q24H    midodrine  10 mg Oral TID WC    lidocaine PF  5 mL IntraDERmal Once    levothyroxine  50 mcg Oral QAM AC    lidocaine 1 % injection  5 mL IntraDERmal Once    sodium chloride flush  5-40 mL IntraVENous 2 times per day    pantoprazole  40 mg Oral BID AC    rifaximin  550 mg Oral BID    sodium chloride flush  5-40 mL IntraVENous 2 times per day    PARoxetine  30 mg Oral QAM     PRN Meds: magnesium sulfate, potassium chloride **OR** potassium alternative oral replacement **OR** potassium chloride, sodium chloride flush, sodium chloride, sodium chloride flush, sodium chloride, acetaminophen, ondansetron, melatonin      Intake/Output Summary (Last 24 hours) at 4/3/2022 1025  Last data filed at 4/3/2022 0709  Gross per 24 hour   Intake 1496.62 ml   Output 1400 ml   Net 96.62 ml       Diet:  ADULT DIET; Regular; Low Sodium (2 gm); 1200 ml  ADULT ORAL NUTRITION SUPPLEMENT; AM Snack, PM Snack; Other Oral Supplement; magic cup + Boost    Exam:  BP (!) 121/59   Pulse 73   Temp 98.2 °F (36.8 °C) (Oral)   Resp 18   Ht 5' 4\" (1.626 m)   Wt 182 lb 14.3 oz (83 kg)   SpO2 96%   BMI 31.39 kg/m²     General appearance: Acute on chronically ill-appearing female no apparent distress, appears stated age and cooperative. HEENT: PERRL, EOMI. Conjunctivae/corneas clear. Neck: Supple, with full range of motion. No jugular venous distention. Trachea midline. Respiratory:  Normal respiratory effort. Clear to auscultation, bilaterally without Rales/Wheezes/Rhonchi. Cardiovascular: Regular rate. Irregular rhythm noted. Systolic murmur noted. Abdomen: Soft, non-tender. Protuberant. BS present. Musculoskeletal: passive and active ROM x 4 extremities.   Skin: Discoloration of bilateral lower extremities noted. bilateral lower extremity pitting edema noted. Neurologic:  Neurovascularly intact without any focal sensory/motor deficits. Cranial nerves: II-XII intact, grossly non-focal.  Psychiatric: Alert and oriented, thought content appropriate, normal insight  Capillary Refill: mildly delayed  Peripheral Pulses: +2 palpable, equal bilaterally       Labs:   No results for input(s): WBC, HGB, HCT, PLT in the last 72 hours. Recent Labs     04/01/22  1042 04/01/22  1042 04/02/22  0438 04/02/22  1239 04/03/22  0453     --  137  --  138   K 3.3*   < > 2.9* 3.6 3.8   CL 97*  --  96*  --  93*   CO2 22*  --  24  --  31   BUN 58*  --  58*  --  58*   CREATININE 2.8*  --  2.9*  --  2.5*   CALCIUM 8.4*  --  8.1*  --  8.0*    < > = values in this interval not displayed. Recent Labs     04/02/22 0438   AST 24   ALT 8*   BILIDIR 0.3   BILITOT 0.6   ALKPHOS 79     Recent Labs     04/02/22 0438   INR 1.23*     No results for input(s): CKTOTAL, TROPONINI in the last 72 hours. Microbiology:      Urinalysis:      Lab Results   Component Value Date    NITRU NEGATIVE 03/26/2022    WBCUA 2-4 03/26/2022    BACTERIA FEW 03/26/2022    RBCUA 3-5 03/26/2022    BLOODU NEGATIVE 03/26/2022    SPECGRAV 1.017 03/11/2022    GLUCOSEU NEGATIVE 03/26/2022       Radiology:  US GUIDED PARACENTESIS   Final Result   Successful ultrasound-guided paracentesis. **This report has been created using voice recognition software. It may contain minor errors which are inherent in voice recognition technology. **      Final report electronically signed by Dr. Yen Spangler on 3/28/2022 4:47 PM      US RENAL COMPLETE   Final Result      1. Bilateral renal cysts. No evidence of stones or hydronephrosis. 2. Moderate amount of free fluid within the abdomen. 3. Postvoid images could not be obtained through the bladder. 4. There are bilateral ureteric jets present.          **This report has been created using voice recognition software. It may contain minor errors which are inherent in voice recognition technology. **      Final report electronically signed by DR Beverly Harrington on 3/26/2022 8:51 AM      XR CHEST PORTABLE   Final Result   Increased bilateral lower lung consolidations. Bilateral pleural effusions. This document has been electronically signed by: Bandar Calix MD on    03/26/2022 04:26 AM      [address]   Final Result    Successful ultrasound-guided paracentesis. **This report has been created using voice recognition software. It may contain minor errors which are inherent in voice recognition technology. **      Final report electronically signed by Dr. Francis Kemp MD on 3/25/2022 10:05 AM      XR CHEST PORTABLE   Final Result   1. Cardiomegaly with possible failure. Recommend follow-up. 2. Patchy opacity at the right lung base could be a superimposed    infectious component. Follow-up to clearing recommended. 3. Indeterminate appearance of the proximal left humerus. Not seen on    prior exams. Correlate with history of pain in this area. Further evaluate    if clinically indicated. This document has been electronically signed by: Rodrigo Atwood MD on 03/24/2022 11:58 PM      [address]    (Results Pending)       DVT prophylaxis: [] Lovenox                                 [x] SCDs                                 [] SQ Heparin                                 [] Encourage ambulation           [] Already on Anticoagulation     Code Status: Limited    PT/OT Eval Status:  Following    Tele:   [x] yes             [] no    Case and plan discussed with Dr. Mindy Carmichael    Electronically signed by Liana Olivares DO on 4/3/2022 at 10:25 AM

## 2022-04-04 LAB
ANAEROBIC CULTURE: NORMAL
ANION GAP SERPL CALCULATED.3IONS-SCNC: 17 MEQ/L (ref 8–16)
BODY FLUID CULTURE, STERILE: NORMAL
BODY FLUID RBC: 4000 /CUMM
BUN BLDV-MCNC: 55 MG/DL (ref 7–22)
CALCIUM IONIZED: 0.88 MMOL/L (ref 1.12–1.32)
CALCIUM SERPL-MCNC: 7.7 MG/DL (ref 8.5–10.5)
CHARACTER, BODY FLUID: ABNORMAL
CHLORIDE BLD-SCNC: 93 MEQ/L (ref 98–111)
CO2: 28 MEQ/L (ref 23–33)
COLOR: YELLOW
CREAT SERPL-MCNC: 2.3 MG/DL (ref 0.4–1.2)
ERYTHROCYTE [DISTWIDTH] IN BLOOD BY AUTOMATED COUNT: 18.7 % (ref 11.5–14.5)
ERYTHROCYTE [DISTWIDTH] IN BLOOD BY AUTOMATED COUNT: 64.2 FL (ref 35–45)
GFR SERPL CREATININE-BSD FRML MDRD: 20 ML/MIN/1.73M2
GLUCOSE BLD-MCNC: 98 MG/DL (ref 70–108)
GRAM STAIN RESULT: NORMAL
HCT VFR BLD CALC: 35.2 % (ref 37–47)
HEMOGLOBIN: 11 GM/DL (ref 12–16)
MAGNESIUM: 1.5 MG/DL (ref 1.6–2.4)
MCH RBC QN AUTO: 30.2 PG (ref 26–33)
MCHC RBC AUTO-ENTMCNC: 31.3 GM/DL (ref 32.2–35.5)
MCV RBC AUTO: 96.7 FL (ref 81–99)
MESOTHELIAL CELLS BODY FLUID: ABNORMAL
MONONUCLEAR CELLS BODY FLUID: 36.1 %
PATHOLOGIST REVIEW: ABNORMAL
PLATELET # BLD: 100 THOU/MM3 (ref 130–400)
PMV BLD AUTO: 10.6 FL (ref 9.4–12.4)
POLYMORPHONUCLEAR CELLS BODY FLUID: 63.9 %
POTASSIUM SERPL-SCNC: 3 MEQ/L (ref 3.5–5.2)
RBC # BLD: 3.64 MILL/MM3 (ref 4.2–5.4)
SODIUM BLD-SCNC: 138 MEQ/L (ref 135–145)
SPECIMEN: ABNORMAL
TOTAL NUCLEATED CELLS BODY FLUID: 1167 /CUMM (ref 0–500)
TOTAL VOLUME RECEIVED BODY FLUID: 70 ML
WBC # BLD: 6.1 THOU/MM3 (ref 4.8–10.8)

## 2022-04-04 PROCEDURE — 6370000000 HC RX 637 (ALT 250 FOR IP): Performed by: STUDENT IN AN ORGANIZED HEALTH CARE EDUCATION/TRAINING PROGRAM

## 2022-04-04 PROCEDURE — 36415 COLL VENOUS BLD VENIPUNCTURE: CPT

## 2022-04-04 PROCEDURE — 97116 GAIT TRAINING THERAPY: CPT

## 2022-04-04 PROCEDURE — 6370000000 HC RX 637 (ALT 250 FOR IP)

## 2022-04-04 PROCEDURE — 99232 SBSQ HOSP IP/OBS MODERATE 35: CPT | Performed by: INTERNAL MEDICINE

## 2022-04-04 PROCEDURE — 82330 ASSAY OF CALCIUM: CPT

## 2022-04-04 PROCEDURE — 85027 COMPLETE CBC AUTOMATED: CPT

## 2022-04-04 PROCEDURE — 6360000002 HC RX W HCPCS: Performed by: STUDENT IN AN ORGANIZED HEALTH CARE EDUCATION/TRAINING PROGRAM

## 2022-04-04 PROCEDURE — 6370000000 HC RX 637 (ALT 250 FOR IP): Performed by: INTERNAL MEDICINE

## 2022-04-04 PROCEDURE — 2060000000 HC ICU INTERMEDIATE R&B

## 2022-04-04 PROCEDURE — 83735 ASSAY OF MAGNESIUM: CPT

## 2022-04-04 PROCEDURE — 80048 BASIC METABOLIC PNL TOTAL CA: CPT

## 2022-04-04 PROCEDURE — 2500000003 HC RX 250 WO HCPCS: Performed by: NURSE PRACTITIONER

## 2022-04-04 PROCEDURE — 2500000003 HC RX 250 WO HCPCS: Performed by: INTERNAL MEDICINE

## 2022-04-04 PROCEDURE — 2580000003 HC RX 258: Performed by: STUDENT IN AN ORGANIZED HEALTH CARE EDUCATION/TRAINING PROGRAM

## 2022-04-04 PROCEDURE — 6360000002 HC RX W HCPCS: Performed by: NURSE PRACTITIONER

## 2022-04-04 PROCEDURE — 2580000003 HC RX 258: Performed by: INTERNAL MEDICINE

## 2022-04-04 RX ORDER — BUMETANIDE 1 MG/1
1 TABLET ORAL DAILY
Status: DISCONTINUED | OUTPATIENT
Start: 2022-04-04 | End: 2022-04-05

## 2022-04-04 RX ORDER — CALCIUM GLUCONATE 20 MG/ML
2000 INJECTION, SOLUTION INTRAVENOUS ONCE
Status: COMPLETED | OUTPATIENT
Start: 2022-04-04 | End: 2022-04-04

## 2022-04-04 RX ADMIN — POTASSIUM BICARBONATE 40 MEQ: 782 TABLET, EFFERVESCENT ORAL at 10:10

## 2022-04-04 RX ADMIN — PANTOPRAZOLE SODIUM 40 MG: 40 TABLET, DELAYED RELEASE ORAL at 15:32

## 2022-04-04 RX ADMIN — MIDODRINE HYDROCHLORIDE 10 MG: 10 TABLET ORAL at 17:02

## 2022-04-04 RX ADMIN — MIDODRINE HYDROCHLORIDE 10 MG: 10 TABLET ORAL at 08:42

## 2022-04-04 RX ADMIN — POTASSIUM BICARBONATE 20 MEQ: 782 TABLET, EFFERVESCENT ORAL at 15:32

## 2022-04-04 RX ADMIN — RIFAXIMIN 550 MG: 550 TABLET ORAL at 19:41

## 2022-04-04 RX ADMIN — PANTOPRAZOLE SODIUM 40 MG: 40 TABLET, DELAYED RELEASE ORAL at 05:44

## 2022-04-04 RX ADMIN — PAROXETINE 30 MG: 30 TABLET, FILM COATED ORAL at 19:40

## 2022-04-04 RX ADMIN — METOPROLOL TARTRATE 12.5 MG: 25 TABLET, FILM COATED ORAL at 05:44

## 2022-04-04 RX ADMIN — BUMETANIDE 0.25 MG/HR: 0.25 INJECTION INTRAMUSCULAR; INTRAVENOUS at 01:17

## 2022-04-04 RX ADMIN — CEFTRIAXONE 2000 MG: 10 INJECTION, POWDER, FOR SOLUTION INTRAVENOUS at 11:22

## 2022-04-04 RX ADMIN — POTASSIUM BICARBONATE 20 MEQ: 782 TABLET, EFFERVESCENT ORAL at 19:40

## 2022-04-04 RX ADMIN — MAGNESIUM SULFATE HEPTAHYDRATE 2000 MG: 40 INJECTION, SOLUTION INTRAVENOUS at 07:05

## 2022-04-04 RX ADMIN — RIFAXIMIN 550 MG: 550 TABLET ORAL at 08:42

## 2022-04-04 RX ADMIN — POTASSIUM BICARBONATE 20 MEQ: 782 TABLET, EFFERVESCENT ORAL at 08:43

## 2022-04-04 RX ADMIN — POTASSIUM BICARBONATE 20 MEQ: 782 TABLET, EFFERVESCENT ORAL at 11:41

## 2022-04-04 RX ADMIN — MIDODRINE HYDROCHLORIDE 10 MG: 10 TABLET ORAL at 11:22

## 2022-04-04 RX ADMIN — SODIUM CHLORIDE, PRESERVATIVE FREE 10 ML: 5 INJECTION INTRAVENOUS at 19:41

## 2022-04-04 RX ADMIN — LEVOTHYROXINE SODIUM 50 MCG: 0.05 TABLET ORAL at 05:44

## 2022-04-04 RX ADMIN — CALCIUM GLUCONATE 2000 MG: 20 INJECTION, SOLUTION INTRAVENOUS at 11:33

## 2022-04-04 RX ADMIN — BUMETANIDE 1 MG: 1 TABLET ORAL at 15:32

## 2022-04-04 ASSESSMENT — PAIN SCALES - GENERAL
PAINLEVEL_OUTOF10: 0

## 2022-04-04 NOTE — PLAN OF CARE
Problem: Physical Regulation:  Goal: Prevent transmision of infection  Description: Prevent transmision of infection  4/4/2022 1052 by Ramu Rojo RN  Outcome: Ongoing  Note: Isolation precaution in place for staff and visitors. Patient verbalizes understanding of transmission prevention. Problem: Skin Integrity:  Goal: Absence of new skin breakdown  Description: Absence of new skin breakdown  4/4/2022 1052 by Ramu Rojo RN  Outcome: Ongoing  Note: No new skin break down. Turn every 2 hours. Chair cushion while up in chair. Heels elevated while patient is in bed or up in the chair. Problem: Falls - Risk of:  Goal: Will remain free from falls  Description: Will remain free from falls  4/4/2022 1052 by Ramu Rojo RN  Outcome: Ongoing  Note: Patient remained free from falls, call light and belongings within reach. Bed/Chair alarm on and in the lowest position. Problem: Pain:  Goal: Pain level will decrease  Description: Pain level will decrease  4/4/2022 1052 by Ramu Rojo RN  Outcome: Ongoing  Note: Patient doesn't complain of pain. Repositioned as needed. Problem: Discharge Planning:  Goal: Discharged to appropriate level of care  Description: Discharged to appropriate level of care  4/4/2022 1052 by Ramu Rojo RN  Outcome: Ongoing  Note: Plans to discharge to [facility]. Social work and case management on. Care plan reviewed with patient. Patient verbalized understanding of the plan of care and contribute to goal setting.

## 2022-04-04 NOTE — PLAN OF CARE
Problem: Physical Regulation:  Goal: Prevent transmision of infection  Description: Prevent transmision of infection  4/3/2022 2247 by Kirk Guthrie RN  Outcome: Met This Shift  4/3/2022 1649 by Britney Mccurdy RN  Outcome: Ongoing  Note: Patient remains in contact isolation for VRE. Isolation precautions in place for staff and visitors. Education provided to patient on transmission risks. Problem: Skin Integrity:  Goal: Absence of new skin breakdown  Description: Absence of new skin breakdown  4/3/2022 2247 by Kirk Guthrie RN  Outcome: Met This Shift  4/3/2022 1649 by Britney Mccurdy RN  Outcome: Ongoing  Note: No new skin breakdown this shift. Patient being turned and repositioned every two hours with pillow support to prevent further breakdown while in bed. Chair cushion provided while patient rests in chair. Heels and elbows elevated on pillows. Problem: Falls - Risk of:  Goal: Will remain free from falls  Description: Will remain free from falls  4/3/2022 2247 by Kirk Guthrie RN  Outcome: Met This Shift  4/3/2022 1649 by Britney Mccurdy RN  Outcome: Ongoing  Note: Patient remains free of falls this shift. Alert and oriented, uses call light appropriately, bed and chair alarm in place for safety measures. Gripper socks applied. Problem: Pain:  Goal: Pain level will decrease  Description: Pain level will decrease  4/3/2022 2247 by Kirk Guthrie RN  Outcome: Met This Shift  4/3/2022 1649 by Britney Mccurdy RN  Outcome: Ongoing  Note: Pain Assessment: 0-10  Pain Level: 0   Patient's Stated Pain Goal: No pain   Is pain goal met at this time? Yes    Patient denies any pain throughout shift. PRN medications available. Educated patient on non-pharmalogical interventions to control pain. Emotional support provided.              Problem: Discharge Planning:  Goal: Discharged to appropriate level of care  Description: Discharged to appropriate level of care  4/3/2022 2247 by Joseph Milan RN  Outcome: Met This Shift  4/3/2022 1649 by Chaitanya Diaz RN  Outcome: Ongoing  Note: Social work and case management collaborating with patient and care team to identify discharge needs and barriers. Patient plans [facility] at discharge for rehab.

## 2022-04-04 NOTE — PROGRESS NOTES
Renal Progress Note    Assessment and Plan:   1. Acute kidney injury improving  2. Hypokalemia  3. Deconditioning  4. Thrombocytopenia chronic  5. Normocytic anemia  6. Volume overload improved    PLAN:  1. Labs reviewed  2. Serum creatinine is improved  3. Serum potassium is low. 4.  Medications reviewed  5. Aggressive potassium replacement  6. Continue bumetanide infusion for now  7. Labs in the morning  8. We will follow    Patient Active Problem List:     Chronic atrial fibrillation ([facility])     Ascending cholangitis, possible     Elevated LFTs     Thrombocytopenia (HCC)     Leukopenia     ISIDRA (acute kidney injury) ([facility] [address]     SIRS (systemic inflammatory response syndrome) ([facility])     Cholecystitis, acute     Neutropenia ([facility])     Aortic stenosis, severe     Colitis     Nausea     Diarrhea     Essential hypertension     Anemia, normocytic normochromic     Hypocalcemia     Cirrhosis ([facility])     S/P AVR (aortic valve replacement)     Obesity (BMI 30-39. 9)     History of atrial fibrillation     Hypomagnesemia     Asymptomatic bacteriuria - no clinical indication for antimicrobial therapy     CKD (chronic kidney disease) stage 4, GFR 15-29 ml/min ([facility])     Acute renal failure superimposed on stage 3 chronic kidney disease ([facility])     VRE (vancomycin resistant enterococcus) culture positive     CHF (congestive heart failure), NYHA class I, acute on chronic, combined ([facility])     Shortness of breath     Acute kidney injury ([facility] [address]     Hypotension     Dehydration     SBP (spontaneous bacterial peritonitis) ([facility])     Other ascites     Stage 3b chronic kidney disease ([facility])      Subjective:   Admit Date: 3/24/2022    Interval History:   Seeing for acute kidney injury  Awake and alert  No complaint  Updated by the staff.     No issues  Stable blood pressure  Good urine output          Medications:   Scheduled Meds:   calcium replacement protocol   Other RX Placeholder    potassium bicarb-citric acid  20 mEq Oral TID    metoprolol tartrate  12.5 mg Oral QAM AC    cefTRIAXone (ROCEPHIN) IV  2,000 mg IntraVENous Q24H    midodrine  10 mg Oral TID WC    lidocaine PF  5 mL IntraDERmal Once    levothyroxine  50 mcg Oral QAM AC    lidocaine 1 % injection  5 mL IntraDERmal Once    sodium chloride flush  5-40 mL IntraVENous 2 times per day    pantoprazole  40 mg Oral BID AC    rifaximin  550 mg Oral BID    sodium chloride flush  5-40 mL IntraVENous 2 times per day    PARoxetine  30 mg Oral QAM     Continuous Infusions:   [Held by provider] sodium bicarbonate infusion Stopped (03/31/22 0701)    bumetanide 0.1 mg/mL infusion 0.25 mg/hr (04/04/22 0117)    sodium chloride      sodium chloride         CBC:   Recent Labs     04/04/22  0545   WBC 6.1   HGB 11.0*   *     CMP:    Recent Labs     04/02/22 0438 04/02/22 0438 04/02/22  1239 04/03/22  0453 04/04/22  0545     --   --  138 138   K 2.9*   < > 3.6 3.8 3.0*   CL 96*  --   --  93* 93*   CO2 24  --   --  31 28   BUN 58*  --   --  58* 55*   CREATININE 2.9*  --   --  2.5* 2.3*   GLUCOSE 95  --   --  114* 98   CALCIUM 8.1*  --   --  8.0* 7.7*   LABGLOM 16*  --   --  18* 20*    < > = values in this interval not displayed. Troponin: No results for input(s): TROPONINI in the last 72 hours. BNP: No results for input(s): BNP in the last 72 hours. INR:   Recent Labs     04/02/22 0438   INR 1.23*     Lipids: No results for input(s): CHOL, LDLDIRECT, TRIG, HDL, AMYLASE, LIPASE in the last 72 hours. Liver:   Recent Labs     04/02/22 0438   AST 24   ALT 8*   ALKPHOS 79   PROT 5.4*   LABALBU 3.1*   BILITOT 0.6     Iron:  No results for input(s): IRONS, FERRITIN in the last 72 hours. Invalid input(s): LABIRONS  US GUIDED PARACENTESIS   Final Result   Uncomplicated paracentesis. **This report has been created using voice recognition software. It may contain minor errors which are inherent in voice recognition technology. **      Final report electronically signed by Dr. Pepe Howe on 4/3/2022 11:01 AM      US GUIDED PARACENTESIS   Final Result   Successful ultrasound-guided paracentesis. **This report has been created using voice recognition software. It may contain minor errors which are inherent in voice recognition technology. **      Final report electronically signed by Dr. Aixa Vargas on 3/28/2022 4:47 PM      US RENAL COMPLETE   Final Result      1. Bilateral renal cysts. No evidence of stones or hydronephrosis. 2. Moderate amount of free fluid within the abdomen. 3. Postvoid images could not be obtained through the bladder. 4. There are bilateral ureteric jets present. **This report has been created using voice recognition software. It may contain minor errors which are inherent in voice recognition technology. **      Final report electronically signed by DR Jorge Dowd on 3/26/2022 8:51 AM      XR CHEST PORTABLE   Final Result   Increased bilateral lower lung consolidations. Bilateral pleural effusions. This document has been electronically signed by: Brianna Parson MD on    03/26/2022 04:26 AM      [address] [facility]   Final Result    Successful ultrasound-guided paracentesis. **This report has been created using voice recognition software. It may contain minor errors which are inherent in voice recognition technology. **      Final report electronically signed by Dr. Louis Haley MD on 3/25/2022 10:05 AM      XR CHEST PORTABLE   Final Result   1. Cardiomegaly with possible failure. Recommend follow-up. 2. Patchy opacity at the right lung base could be a superimposed    infectious component. Follow-up to clearing recommended. 3. Indeterminate appearance of the proximal left humerus. Not seen on    prior exams. Correlate with history of pain in this area. Further evaluate    if clinically indicated. This document has been electronically signed by:  Funmilayo Peguero MD on 03/24/2022 11:58 PM Objective:   Vitals: /69   Pulse 78   Temp 99 °F (37.2 °C) (Oral)   Resp 18   Ht 5' 4\" (1.626 m)   Wt 178 lb 1.6 oz (80.8 kg)   SpO2 93%   BMI 30.57 kg/m²    Wt Readings from Last 3 Encounters:   04/04/22 178 lb 1.6 oz (80.8 kg)   03/15/22 178 lb 11.2 oz (81.1 kg)   02/09/22 166 lb (75.3 kg)      24HR INTAKE/OUTPUT:      Intake/Output Summary (Last 24 hours) at 4/4/2022 0908  Last data filed at 4/4/2022 0840  Gross per 24 hour   Intake 2019.96 ml   Output 2520 ml   Net -500.04 ml       Constitutional: Well-developed elderly lady alert, awake, no apparent distress   Skin:normal with no rash or lesions. HEENT:Pupils are reactive . Throat is clear . Oral mucosa is moist   Neck:supple with no thyromegaly or carotid bruit  Cardiovascular:  S1, S2 without murmur or rubs   Respiratory:  Clear to ausculation without wheezes, rhonchi or rales. Abdomen: +bs, soft, no tenderness to palpation and no palpable mass. No abdominal bruit   Ext: 2+ bilateral LE edema  Musculoskeletal:Intact  Neuro:Alert and awake. Well oriented  with no focal deficit. Speech is normal      Electronically signed by Michael Little MD on 4/4/2022 at 9:08 AM  **This report has been created using voice recognition software. It maycontain minor  errors which are inherent in voice recognition technology. **

## 2022-04-04 NOTE — PROGRESS NOTES
Gastroenterology Progress Note:     Patient Name:  Merlene Roblero   MRN: [id number]  [id number]  YOB: 1940  Admit Date: 3/24/2022 10:46 PM  Primary Care Physician: ARTHUR Daly - CNP   4K-03/003-A     Patient seen and examined. 24 hours events and chart reviewed. Subjective: Patient sitting up in the chair. Denies abd pain, n/v. She had a BM this morning. She says her arms hurt from multiple \"needlesticks with them trying to get blood. \" She is wanting to go home. Objective:  /69   Pulse 78   Temp 99 °F (37.2 °C) (Oral)   Resp 18   Ht 5' 4\" (1.626 m)   Wt 178 lb 1.6 oz (80.8 kg)   SpO2 93%   BMI 30.57 kg/m²     Physical Exam:    General:  Chronically ill appearing female  HEENT: Atraumatic, normocephalic. Moist oral mucous membranes. Neck: Supple without adenopathy, JVD, thyromegaly or masses. Trachea midline. CV: Heart RRR, no murmurs, rubs, gallops. Resp: Even, easy without cough or accessory use. Lungs clear to ascultation bilaterally. Abd: Round, soft, obese, nontender. No hepatosplenomegaly or mass present. Active bowel sounds heard. No distention noted. Ext:  Without cyanosis, clubbing. BLE edema. Skin: Pink, warm, dry. Scattered ecchymosis throughout the body. Neuro:  Alert, oriented x 3 with no obvious deficits.        Rectal: deferred    Labs:   CBC:   Lab Results   Component Value Date    WBC 6.1 04/04/2022    HGB 11.0 04/04/2022    HCT 35.2 04/04/2022    MCV 96.7 04/04/2022     04/04/2022     BMP:   Lab Results   Component Value Date     04/04/2022    K 3.0 04/04/2022    K 3.4 03/31/2022    CL 93 04/04/2022    CO2 28 04/04/2022    PHOS 4.4 03/26/2022    BUN 55 04/04/2022    CREATININE 2.3 04/04/2022    CALCIUM 7.7 04/04/2022     PT/INR:   Lab Results   Component Value Date    PROTIME 1.75 08/21/2011    INR 1.23 04/02/2022     Lipids:   Lab Results   Component Value Date    ALKPHOS 79 04/02/2022    ALT 8 04/02/2022    AST 24 04/02/2022 BILITOT 0.6 04/02/2022    BILIDIR 0.3 04/02/2022    LABALBU 3.1 04/02/2022    LABALBU 3.2 08/20/2011    AMYLASE 67 07/19/2019    LIPASE 19.3 03/11/2022      Ref. Range 4/3/2022 09:40   Character, Body Fluid Unknown BLOODY   Body Fluid RBC Latest Units: /cumm 4000   Mesothelial Cells Body Fluid Unknown 2+   Mononuclear Cells Body Fluid Latest Units: % 36.1   Polymorphonuclear Cells Body Fluid Latest Units: % 63.9   Total Nucleated cells Body Fluid Latest Ref Range: 0 - 500 /cumm 1167 (H)   Total Volume Received Body Fluid Latest Units: ml 70.0     Significant Diagnostic Studies:   US paracentesis 04/03/22  FINDINGS: Small moderate ascites. Clear yellow ascites was obtained. A total of 1.6 L was eventually drained. Postprocedure there is minimal residual ascites.           Impression   Uncomplicated paracentesis. Current Meds:  Scheduled Meds:   calcium replacement protocol   Other RX Placeholder    potassium bicarb-citric acid  20 mEq Oral TID    metoprolol tartrate  12.5 mg Oral QAM AC    cefTRIAXone (ROCEPHIN) IV  2,000 mg IntraVENous Q24H    midodrine  10 mg Oral TID WC    lidocaine PF  5 mL IntraDERmal Once    levothyroxine  50 mcg Oral QAM AC    lidocaine 1 % injection  5 mL IntraDERmal Once    sodium chloride flush  5-40 mL IntraVENous 2 times per day    pantoprazole  40 mg Oral BID AC    rifaximin  550 mg Oral BID    sodium chloride flush  5-40 mL IntraVENous 2 times per day    PARoxetine  30 mg Oral QAM     Continuous Infusions:   [Held by provider] sodium bicarbonate infusion Stopped (03/31/22 0747)    bumetanide 0.1 mg/mL infusion 0.25 mg/hr (04/04/22 0117)    sodium chloride      sodium chloride         Assessment:  80 yo F admitted 03/24/22 for generalized weakness & dizziness with a fall at home. H/O cirrhosis. H/O CHF. Diuretics recently increased by cardiology. Recent admission 03/11/22-03/17/22 for SOB & edema. She has required recent paracentesis.  H/O severe pulmonary HTN & severe tricuspid regurgitation. Briefly transferred to ICU for worsening hypotension. Stool studies negative. Refused Lactulose, Xifaxan started for intermittent confusion.  S/P para 03/25/22 with 2.5L removed, negative for SBP. S/P para 03/28/22 with 5.5L removed, positive for SBP, IV Rocephin started. S/P para 04/03/22 with 1.6L removed.     1. Fall at home  2. Decompensated liver cirrhosis with ascites due to CHF- MELD 16  3. Ascites- likely cardiogenic & hepatic, s/p para 03/25/22 with 2.5 L removed negative for SBP, s/p para 03/28/22 5.5L removed positive for SBP, s/p para 03/30/22 with 2.3 L positive for SBP, para 04/03/22 with 1.6 L removed  4. Acute CHF exacerbation  5. ISIDRA on CKD- likely cardiorenal  6. Severe pulmonary HTN  7. Leukopenia- resolved  8. Normocytic anemia- no GI bleeding noted  9. Hypoalbuminemia  10. Coagulopathy  11. Acute diarrhea- stool panel negative  12. Cardiomegaly  13. Acute on chronic hypotension- on Midodrine  14. Paroxysmal afib  15. Severe tricuspid regurgitation  16. JAMES   17. SBP- IV Rocephin started 03/30/20    18.  Hypokalemia    Plan:    · Monitor H & H, transfuse prn  · Bumex gtt per cardiology, will need transitioned to PO at discharge  · 2g sodium diet with fluid restriction  · Midodrine TID per primary  · Continue PPI daily, home dose  · Daily weights  · HFP, INR in the morning  · Strict I & O  · SCDs for DVT prophylaxis  · Continue IV Rocephin, switch to PO Cipro, renal dosing of 500mg daily at discharge  · IV Venofer per nephro  · Avoid hepatotoxic meds, okay for Tylenol 2g limit  · Avoid narcotic analgesics & benzodiazepines as they can precipitate HE  · Nephrology & cardiology on board  · RN updated  · Supportive care per primary team  Will follow    Case reviewed and impression/plan reviewed in collaboration with Dr. Zarina Orozco  Electronically signed by ARTHUR Cano CNP on 4/4/2022 at 9:18 AM    [facility]

## 2022-04-04 NOTE — PROGRESS NOTES
Hospitalist Progress Note    Patient:  aJson Weinberg      Unit/Bed:4K-03/003-A    YOB: 1940    MRN: [id number]       Acct: [de-identified]     PCP: ARTHUR Stearns CNP    Date of Admission: 3/24/2022    Assessment/Plan:    · ISIDRA on CKD: Creatinine slightly improved. Still elevated at 2.3. UOP good. Avoid nephrotoxic substances. Hold losartan and Aldactone. Nephrology/cardiology following. Initial concern for cardiorenal syndrome. Continue Bumex drip 0.25 mg/hr due to hypervolemia   · Anasarca / Ascites: Multifactorial however mostly related to right heart failure/pulmonary hypertension and liver cirrhosis. S/p abdominal paracentesis with 1.6 L removed on 4/3/22.  ? Cardiology/GI/nephrology following. Continue Bumex drip. ? 2 L fluid restriction and 2 g sodium diet. · SBP: Cultures and cytology showed no malignant cells, no growth and no organisms noted. Cell count with differential on 3/30 demonstrates 1930 total nucleated cells with PMNs 83.4%. PMNs > 250. Continue Rocephin day #6. GI Following. Repeat paracentesis on 4/3/2022 with 1.6 L removed. Cell count shows ~729 PMNs when corrected for RBCs which correlates to greater than 50% reduction from previous paracentesis. · Decompensated Liver Cirrhosis:  With ascites. S/p Paracentesis. SBP improving. GI following. Plan as above. · Bilateral Pleural Effusions / Acute Hypoxic Resp Failure: Improved with diuresis. Currently on room air. Continue with Bumex drip. · Acute on chronic HFpEF with RHF: Continue diuresis. Monitor closely. Prone to poor cardiac output with drops in preload. Volume status tenuous. · Acute on Chronic Hypotension:  Cardiology following and recommends albumin 25% for replenishment during hypotension. Continue midodrine  · Hx of AS s/p TAVR now with AI 2+: noted. · Severe Pulm HTN / Severe TR: Appears to be mostly group 2 per prior [facility] data.  Discussed at length potential transfer to [facility] with patient, family, and Cardiology - they elect for continued care locally and are not wishing for aggressive treatment (such as swan-cory, pressors, inotropes etc)  · Atrial fibrillation: not on [address]. Consider [address] however will defer to her usual cardiologist, Dr. Linda Wood.  Continue rate control. · Hypokalemia: [place]  · Hypothyroidism: Synthroid increased. Continue to monitor. · NSVT x10bts on 3/29: Without recurrence. Keep mg > 2 and K > 4. Tele. · Anion gap metabolic acidosis: Resolved. Continue to monitor. Nephrology following. · Goals of Care: [place] discussion with patient/family on 3/30 with Dr. Laquita Moody and Dr. Amber Graves. Discussed all potential treatment options and potential interventions, patient and patient's son are in agreement that no further wishes for transfer to [facility] for pulmonary hypertension specialist, and no wish for ICU transfer for [place] directed therapy, as they are understanding patient's heart failure is in the end stages and she is not a candidate for open heart surgery. Her CODE STATUS is limited no x4, will be optimizing her therapy with her current regimen and she is okay with IV Bumex and other IV therapies as necessary, but her overall goal is to get home. Palliative care is following to assist with patient and family's goals moving forwards. She desires to avoid SNF despite recommendations. Expected discharge date:  1-2 Days    Disposition:    [] Home       [] TCU       [] Rehab       [] Psych       [x] SNF       [] [place]       [] Other-    Chief Complaint: General Weakness    Hospital Course: Per HPI, \"Norma Tobias is 81years old female who presented to ED for generalize weakness on 3/24/22.  Past medical history significant with pulmonary hypertension secondary to severe tricuspid regurgitation, decompensated liver cirrhosis, chronic hypotension, persistent A. fib on carvedilol with rate control, heart failure preserved ejection fraction, CKD stage III. Patient recently discharged from the hospital on March 17, 2022.  2 days prior to presentation patient reports decreased p.o. intake, nausea, dry heaves, persistent diarrhea with loose watery stool.  On the day of presentation patient's son report that patient worsening fatigue and generalized weakness therefore he brought her to the ED.     In the ED patient found to have blood pressure 83/50, heart rate 75, respiration rate 16, O2 sat at 95%. BMP showed Cr 2.4 which increased 1.2 from 1 months prior. CXR showed cardiomegaly possible failure, patchy opacity of right lung base. Patient was admitted and manage for decompensate liver cirrhosis and ISIDRA on CKD. Patient subsequently underwent paracentesis with 2.5L fluid removed. Patient develop hypotension which didn't response to albumin and NS. Patient required pressure support and she was transferred to ICU on 3/25/22. On 3/26, patient wean off pressure support and transferred out of ICU. Cheetah test on 3/27/22 showed nigh normal CO with low TPR not responsive to fluid. Thyroid function test showed hypothyroidism. Patient was started Synthroid 50 mg daily on 3/28/22. On 3/30, ascites fluid positive culture which Ceftriaxone was started. Patient develop worsening metabolic acidosis which bicarb drip and tab were started. \"  Patient received initial paracentesis which did show SBP with PMNs greater than 1600. She was started on ceftriaxone and received secondary paracentesis on 4/3/2022. 1.6 L removed at this time. PMNs at this time are 729 which correlates with greater than 50% reduction. Further information please see assessment and plan above    Subjective (past 24 hours): Patient evaluated at bedside. Received paracentesis on 4/3. States she feels much better. Denies headache, fever, chills, chest pain, shortness of breath, N/V/D/C. States she really would like to go home. No other acute complaints    ROS (12 point review of systems completed. Pertinent positives noted. Otherwise ROS is negative). Medications:  Reviewed    Infusion Medications    [Held by provider] sodium bicarbonate infusion Stopped (03/31/22 0747)    bumetanide 0.1 mg/mL infusion 0.25 mg/hr (04/04/22 0117)    sodium chloride      sodium chloride       Scheduled Medications    calcium replacement protocol   Other RX Placeholder    calcium gluconate  2,000 mg IntraVENous Once    potassium bicarb-citric acid  20 mEq Oral TID    metoprolol tartrate  12.5 mg Oral QAM AC    cefTRIAXone (ROCEPHIN) IV  2,000 mg IntraVENous Q24H    midodrine  10 mg Oral TID WC    lidocaine PF  5 mL IntraDERmal Once    levothyroxine  50 mcg Oral QAM AC    lidocaine 1 % injection  5 mL IntraDERmal Once    sodium chloride flush  5-40 mL IntraVENous 2 times per day    pantoprazole  40 mg Oral BID AC    rifaximin  550 mg Oral BID    sodium chloride flush  5-40 mL IntraVENous 2 times per day    PARoxetine  30 mg Oral QAM     PRN Meds: magnesium sulfate, potassium chloride **OR** potassium alternative oral replacement **OR** potassium chloride, sodium chloride flush, sodium chloride, sodium chloride flush, sodium chloride, acetaminophen, ondansetron, melatonin      Intake/Output Summary (Last 24 hours) at 4/4/2022 0937  Last data filed at 4/4/2022 0840  Gross per 24 hour   Intake 2019.96 ml   Output 2520 ml   Net -500.04 ml       Diet:  ADULT DIET; Regular; Low Sodium (2 gm); 1200 ml  ADULT ORAL NUTRITION SUPPLEMENT; AM Snack, PM Snack; Other Oral Supplement; magic cup + Boost    Exam:  /69   Pulse 78   Temp 99 °F (37.2 °C) (Oral)   Resp 18   Ht 5' 4\" (1.626 m)   Wt 178 lb 1.6 oz (80.8 kg)   SpO2 93%   BMI 30.57 kg/m²     General appearance: Acute on chronically ill-appearing female no apparent distress, appears stated age and cooperative. HEENT: PERRL, EOMI. Conjunctivae/corneas clear. Neck: Supple, with full range of motion. No jugular venous distention.  Trachea midline. Respiratory:  Normal respiratory effort. Clear to auscultation, bilaterally without Rales/Wheezes/Rhonchi. Cardiovascular: Regular rate. Irregular rhythm noted. Systolic murmur noted. Abdomen: Soft, non-tender. Protuberant. BS present. Musculoskeletal: passive and active ROM x 4 extremities. Skin: Discoloration of bilateral lower extremities noted. bilateral lower extremity pitting edema noted. Neurologic:  Neurovascularly intact without any focal sensory/motor deficits. Cranial nerves: II-XII intact, grossly non-focal.  Psychiatric: Alert and oriented, thought content appropriate, normal insight  Capillary Refill: mildly delayed  Peripheral Pulses: +2 palpable, equal bilaterally     Labs:   Recent Labs     04/04/22  0545   WBC 6.1   HGB 11.0*   HCT 35.2*   *     Recent Labs     04/02/22  0438 04/02/22  0438 04/02/22  1239 04/03/22  0453 04/04/22  0545     --   --  138 138   K 2.9*   < > 3.6 3.8 3.0*   CL 96*  --   --  93* 93*   CO2 24  --   --  31 28   BUN 58*  --   --  58* 55*   CREATININE 2.9*  --   --  2.5* 2.3*   CALCIUM 8.1*  --   --  8.0* 7.7*    < > = values in this interval not displayed. Recent Labs     04/02/22  0438   AST 24   ALT 8*   BILIDIR 0.3   BILITOT 0.6   ALKPHOS 79     Recent Labs     04/02/22 0438   INR 1.23*     No results for input(s): CKTOTAL, TROPONINI in the last 72 hours. Microbiology:      Urinalysis:      Lab Results   Component Value Date    NITRU NEGATIVE 03/26/2022    WBCUA 2-4 03/26/2022    BACTERIA FEW 03/26/2022    RBCUA 3-5 03/26/2022    BLOODU NEGATIVE 03/26/2022    SPECGRAV 1.017 03/11/2022    GLUCOSEU NEGATIVE 03/26/2022       Radiology:  US GUIDED PARACENTESIS   Final Result   Uncomplicated paracentesis. **This report has been created using voice recognition software. It may contain minor errors which are inherent in voice recognition technology. **      Final report electronically signed by Dr. Mary Beth Campos on 4/3/2022 11:01 AM      US GUIDED PARACENTESIS   Final Result   Successful ultrasound-guided paracentesis. **This report has been created using voice recognition software. It may contain minor errors which are inherent in voice recognition technology. **      Final report electronically signed by Dr. Delaney Reynaga on 3/28/2022 4:47 PM      US RENAL COMPLETE   Final Result      1. Bilateral renal cysts. No evidence of stones or hydronephrosis. 2. Moderate amount of free fluid within the abdomen. 3. Postvoid images could not be obtained through the bladder. 4. There are bilateral ureteric jets present. **This report has been created using voice recognition software. It may contain minor errors which are inherent in voice recognition technology. **      Final report electronically signed by DR Beverly Harrington on 3/26/2022 8:51 AM      XR CHEST PORTABLE   Final Result   Increased bilateral lower lung consolidations. Bilateral pleural effusions. This document has been electronically signed by: Bandar Calix MD on    03/26/2022 04:26 AM      [address]   Final Result    Successful ultrasound-guided paracentesis. **This report has been created using voice recognition software. It may contain minor errors which are inherent in voice recognition technology. **      Final report electronically signed by Dr. Francis Kemp MD on 3/25/2022 10:05 AM      XR CHEST PORTABLE   Final Result   1. Cardiomegaly with possible failure. Recommend follow-up. 2. Patchy opacity at the right lung base could be a superimposed    infectious component. Follow-up to clearing recommended. 3. Indeterminate appearance of the proximal left humerus. Not seen on    prior exams. Correlate with history of pain in this area. Further evaluate    if clinically indicated. This document has been electronically signed by:  Rodrigo Atwood MD on 03/24/2022 11:58 PM          DVT prophylaxis: [] Lovenox                                 [x] SCDs                                 [] SQ Heparin                                 [] Encourage ambulation           [] Already on Anticoagulation     Code Status: Limited    PT/OT Eval Status:  Following    Tele:   [x] yes             [] no    Case and plan discussed with Dr. Dennie Cox    Electronically signed by Pablo Domínguez DO on 4/4/2022 at 9:37 AM

## 2022-04-04 NOTE — PROGRESS NOTES
[facility]  INPATIENT PHYSICAL THERAPY  DAILY NOTE  [facility] ICU STEPDOWN TELEMETRY 4K - 4K-03/003-A    Time In: 1020  Time Out: 1043  Timed Code Treatment Minutes: 23 Minutes  Minutes: 23          Date: 2022  Patient Name: Amaris Siddiqi,  Gender:  female        MRN: [id number]  : 1940  (81 y.o.)     Referring Practitioner: Kerri Pereira CNP  Diagnosis: Dehydration  Additional Pertinent Hx: Klaudia Sommers is 81years old female who presented to ED for generalize weakness on 3/24/22. Past medical history significant with pulmonary hypertension secondary to severe tricuspid regurgitation, decompensated liver cirrhosis, chronic hypotension, persistent A. fib on carvedilol with rate control, heart failure preserved ejection fraction, CKD stage III. Patient recently discharged from the hospital on 2022.  2 days prior to presentation patient reports decreased p.o. intake, nausea, dry heaves, persistent diarrhea with loose watery stool. On the day of presentation patient's son report that patient worsening fatigue and generalized weakness therefore he brought her to the ED. In the ED patient found to have blood pressure 83/50, heart rate 75, respiration rate 16, O2 sat at 95%. BMP showed Cr 2.4 which increased 1.2 from 1 months prior. CXR showed cardiomegaly possible failure, patchy opacity of right lung base. Patient was admitted and manage for decompensate liver cirrhosis and ISIDRA on CKD. Patient subsequently underwent paracentesis with 2.5L fluid removed. Patient develop hypotension which didn't response to albumin and NS. Patient required pressure support and she was transferred to ICU on 3/25/22. On 3/26, patient wean off pressure support and transferred out of ICU. Cheetah test on 3/27/22 showed nigh normal CO with low TPR not responsive to fluid. Thyroid function test showed hypothyroidism. Patient was started Synthroid 50 mg daily on 3/28/22.      Prior Level of Function:  Lives With: Alone  Type of Home: House  Home Layout: One level,Work area in basement,Performs ADL's on one level  Home Equipment: 4 wheeled walker   Bathroom Shower/Tub: Tub/Shower unit    ADL Assistance: Independent  Ambulation Assistance: Independent  Transfer Assistance: Independent  Active : Yes  Additional Comments: Pt amb with rollator, son checks in 3x/day. Recent admission    Restrictions/Precautions:  Restrictions/Precautions: Fall Risk     SUBJECTIVE: RN approved session, pt is seated in recliner, agreeable to PT on second attempt. PAIN: denies    Vitals: Vitals not assessed per clinical judgement, see nursing flowsheet    OBJECTIVE:  Bed Mobility:  Not Tested    Transfers:  Sit to Stand: Stand By Assistance  Stand to Sit:Stand By Assistance    Ambulation:  Contact Guard Assistance  Distance: 30 feet around room, 120 feet in hallway  Surface: Level Tile  Device:Rolling Walker  Gait Deviations: Forward Flexed Posture, Slow Bharti, Decreased Step Length Bilaterally and Decreased Gait Speed  **Pt states she is \"worn out\" after hallway ambulation    Functional Outcome Measures: Not completed     ASSESSMENT:  Assessment: Patient progressing toward established goals. Activity Tolerance:  Patient tolerance of  treatment: good. Equipment Recommendations:Equipment Needed: No  Discharge Recommendations: Subacte/Skilled Nursing Facility  Plan: Times per week: 3-5x GM  Current Treatment Recommendations: Frederic Paling Re-education,Safety Education & Training,Balance Training,Patient/Caregiver Education & Training,Functional Mobility Training,Transfer Training,Gait Training    Patient Education  Patient Education: Transfers, Gait    Goals:  Patient goals : to go home  Short term goals  Time Frame for Short term goals: by discharge  Short term goal 1: Pt to transfer supine <--> sit S to enable pt to get in/out of bed.   Short term goal 2: Pt to transfer sit <--> stand S for increased functional mobility. Short term goal 3: Pt to ambulate >100 feet with RW SBA for household ambulation. Long term goals  Time Frame for Long term goals : NA due to short length of stay. Following session, patient left in safe position with all fall risk precautions in place.

## 2022-04-04 NOTE — CARE COORDINATION
4/4/22, 2:36 PM EDT    DISCHARGE PLANNING EVALUATION    Spoke with Enedina at [facility] and she stated that they can accept patient and discharge.

## 2022-04-05 VITALS
BODY MASS INDEX: 30.63 KG/M2 | WEIGHT: 179.4 LBS | TEMPERATURE: 96.8 F | OXYGEN SATURATION: 97 % | SYSTOLIC BLOOD PRESSURE: 122 MMHG | DIASTOLIC BLOOD PRESSURE: 48 MMHG | HEART RATE: 74 BPM | RESPIRATION RATE: 18 BRPM | HEIGHT: 64 IN

## 2022-04-05 LAB
ALBUMIN SERPL-MCNC: 3.1 G/DL (ref 3.5–5.1)
ALP BLD-CCNC: 126 U/L (ref 38–126)
ALT SERPL-CCNC: 7 U/L (ref 11–66)
ANION GAP SERPL CALCULATED.3IONS-SCNC: 14 MEQ/L (ref 8–16)
AST SERPL-CCNC: 22 U/L (ref 5–40)
BILIRUB SERPL-MCNC: 0.5 MG/DL (ref 0.3–1.2)
BILIRUBIN DIRECT: 0.3 MG/DL (ref 0–0.3)
BUN BLDV-MCNC: 54 MG/DL (ref 7–22)
CALCIUM IONIZED: 0.84 MMOL/L (ref 1.12–1.32)
CALCIUM SERPL-MCNC: 7.8 MG/DL (ref 8.5–10.5)
CHLORIDE BLD-SCNC: 94 MEQ/L (ref 98–111)
CO2: 31 MEQ/L (ref 23–33)
CREAT SERPL-MCNC: 2.3 MG/DL (ref 0.4–1.2)
ERYTHROCYTE [DISTWIDTH] IN BLOOD BY AUTOMATED COUNT: 18.6 % (ref 11.5–14.5)
ERYTHROCYTE [DISTWIDTH] IN BLOOD BY AUTOMATED COUNT: 63.7 FL (ref 35–45)
GFR SERPL CREATININE-BSD FRML MDRD: 20 ML/MIN/1.73M2
GLUCOSE BLD-MCNC: 94 MG/DL (ref 70–108)
HCT VFR BLD CALC: 36.2 % (ref 37–47)
HEMOGLOBIN: 11.1 GM/DL (ref 12–16)
INR BLD: 1.25 (ref 0.85–1.13)
MAGNESIUM: 1.6 MG/DL (ref 1.6–2.4)
MCH RBC QN AUTO: 29.8 PG (ref 26–33)
MCHC RBC AUTO-ENTMCNC: 30.7 GM/DL (ref 32.2–35.5)
MCV RBC AUTO: 97.1 FL (ref 81–99)
PLATELET # BLD: 109 THOU/MM3 (ref 130–400)
PMV BLD AUTO: 10.7 FL (ref 9.4–12.4)
POTASSIUM SERPL-SCNC: 3.7 MEQ/L (ref 3.5–5.2)
RBC # BLD: 3.73 MILL/MM3 (ref 4.2–5.4)
SARS-COV-2, NAAT: NOT DETECTED
SODIUM BLD-SCNC: 139 MEQ/L (ref 135–145)
TOTAL PROTEIN: 5.1 G/DL (ref 6.1–8)
WBC # BLD: 7.3 THOU/MM3 (ref 4.8–10.8)

## 2022-04-05 PROCEDURE — 36415 COLL VENOUS BLD VENIPUNCTURE: CPT

## 2022-04-05 PROCEDURE — 99232 SBSQ HOSP IP/OBS MODERATE 35: CPT | Performed by: INTERNAL MEDICINE

## 2022-04-05 PROCEDURE — 2580000003 HC RX 258: Performed by: STUDENT IN AN ORGANIZED HEALTH CARE EDUCATION/TRAINING PROGRAM

## 2022-04-05 PROCEDURE — 87635 SARS-COV-2 COVID-19 AMP PRB: CPT

## 2022-04-05 PROCEDURE — 6370000000 HC RX 637 (ALT 250 FOR IP): Performed by: STUDENT IN AN ORGANIZED HEALTH CARE EDUCATION/TRAINING PROGRAM

## 2022-04-05 PROCEDURE — 6360000002 HC RX W HCPCS: Performed by: NURSE PRACTITIONER

## 2022-04-05 PROCEDURE — 85610 PROTHROMBIN TIME: CPT

## 2022-04-05 PROCEDURE — 6370000000 HC RX 637 (ALT 250 FOR IP): Performed by: INTERNAL MEDICINE

## 2022-04-05 PROCEDURE — 85027 COMPLETE CBC AUTOMATED: CPT

## 2022-04-05 PROCEDURE — 99239 HOSP IP/OBS DSCHRG MGMT >30: CPT | Performed by: INTERNAL MEDICINE

## 2022-04-05 PROCEDURE — 97530 THERAPEUTIC ACTIVITIES: CPT

## 2022-04-05 PROCEDURE — 83735 ASSAY OF MAGNESIUM: CPT

## 2022-04-05 PROCEDURE — 6360000002 HC RX W HCPCS

## 2022-04-05 PROCEDURE — 82248 BILIRUBIN DIRECT: CPT

## 2022-04-05 PROCEDURE — 80053 COMPREHEN METABOLIC PANEL: CPT

## 2022-04-05 PROCEDURE — 82330 ASSAY OF CALCIUM: CPT

## 2022-04-05 PROCEDURE — 97535 SELF CARE MNGMENT TRAINING: CPT

## 2022-04-05 PROCEDURE — 94760 N-INVAS EAR/PLS OXIMETRY 1: CPT

## 2022-04-05 PROCEDURE — 2500000003 HC RX 250 WO HCPCS: Performed by: NURSE PRACTITIONER

## 2022-04-05 RX ORDER — MIDODRINE HYDROCHLORIDE 10 MG/1
10 TABLET ORAL
DISCHARGE
Start: 2022-04-05

## 2022-04-05 RX ORDER — CIPROFLOXACIN 500 MG/1
500 TABLET, FILM COATED ORAL DAILY
Refills: 3 | Status: ON HOLD | DISCHARGE
Start: 2022-04-05 | End: 2022-04-27 | Stop reason: HOSPADM

## 2022-04-05 RX ORDER — BUMETANIDE 1 MG/1
1 TABLET ORAL 2 TIMES DAILY
Qty: 30 TABLET | Refills: 3 | DISCHARGE
Start: 2022-04-05

## 2022-04-05 RX ORDER — CALCIUM GLUCONATE 20 MG/ML
2000 INJECTION, SOLUTION INTRAVENOUS ONCE
Status: COMPLETED | OUTPATIENT
Start: 2022-04-05 | End: 2022-04-05

## 2022-04-05 RX ORDER — BUMETANIDE 1 MG/1
1 TABLET ORAL 2 TIMES DAILY
Status: DISCONTINUED | OUTPATIENT
Start: 2022-04-05 | End: 2022-04-05 | Stop reason: HOSPADM

## 2022-04-05 RX ORDER — LEVOTHYROXINE SODIUM 0.05 MG/1
50 TABLET ORAL
Qty: 30 TABLET | Refills: 3 | DISCHARGE
Start: 2022-04-06

## 2022-04-05 RX ADMIN — SODIUM CHLORIDE, PRESERVATIVE FREE 10 ML: 5 INJECTION INTRAVENOUS at 09:34

## 2022-04-05 RX ADMIN — MIDODRINE HYDROCHLORIDE 10 MG: 10 TABLET ORAL at 09:34

## 2022-04-05 RX ADMIN — RIFAXIMIN 550 MG: 550 TABLET ORAL at 09:34

## 2022-04-05 RX ADMIN — LEVOTHYROXINE SODIUM 50 MCG: 0.05 TABLET ORAL at 05:07

## 2022-04-05 RX ADMIN — MIDODRINE HYDROCHLORIDE 10 MG: 10 TABLET ORAL at 11:50

## 2022-04-05 RX ADMIN — CALCIUM GLUCONATE 2000 MG: 20 INJECTION, SOLUTION INTRAVENOUS at 10:17

## 2022-04-05 RX ADMIN — POTASSIUM BICARBONATE 20 MEQ: 782 TABLET, EFFERVESCENT ORAL at 09:34

## 2022-04-05 RX ADMIN — Medication 400 MG: at 10:01

## 2022-04-05 RX ADMIN — PANTOPRAZOLE SODIUM 40 MG: 40 TABLET, DELAYED RELEASE ORAL at 05:20

## 2022-04-05 RX ADMIN — CEFTRIAXONE 2000 MG: 10 INJECTION, POWDER, FOR SOLUTION INTRAVENOUS at 11:00

## 2022-04-05 RX ADMIN — METOPROLOL TARTRATE 12.5 MG: 25 TABLET, FILM COATED ORAL at 05:08

## 2022-04-05 RX ADMIN — BUMETANIDE 1 MG: 1 TABLET ORAL at 09:34

## 2022-04-05 ASSESSMENT — PAIN SCALES - GENERAL
PAINLEVEL_OUTOF10: 0

## 2022-04-05 NOTE — PROGRESS NOTES
Gastroenterology Progress Note:     Patient Name:  Etta Scott   MRN: [id number]  [id number]  YOB: 1940  Admit Date: 3/24/2022 10:46 PM  Primary Care Physician: ARTHUR Chopra CNP   [id number]     Patient seen and examined. 24 hours events and chart reviewed. Subjective: Patient sitting up in the chair, eating lunch. Denies abd pain, n/v. She is being discharged to SNF today. Objective:  BP (!) 122/48   Pulse 74   Temp 96.8 °F (36 °C) (Axillary)   Resp 18   Ht 5' 4\" (1.626 m)   Wt 179 lb 6.4 oz (81.4 kg)   SpO2 97%   BMI 30.79 kg/m²     Physical Exam:    General:  Chronically ill appearing female  HEENT: Atraumatic, normocephalic. Moist oral mucous membranes. Neck: Supple without adenopathy, JVD, thyromegaly or masses. Trachea midline. CV: Heart RRR, no murmurs, rubs, gallops. Resp: Even, easy without cough or accessory use. Lungs clear to ascultation bilaterally. Abd: Round, soft, obese, nontender. No hepatosplenomegaly or mass present. Active bowel sounds heard. Mild distention noted. Ext:  Without cyanosis, clubbing. BLE edema. Skin: Pink, warm, dry. Scattered ecchymosis throughout the body  Neuro:  Alert, oriented x 3 with no obvious deficits.        Rectal: deferred    Labs:   CBC:   Lab Results   Component Value Date    WBC 7.3 04/05/2022    HGB 11.1 04/05/2022    HCT 36.2 04/05/2022    MCV 97.1 04/05/2022     04/05/2022     BMP:   Lab Results   Component Value Date     04/05/2022    K 3.7 04/05/2022    K 3.4 03/31/2022    CL 94 04/05/2022    CO2 31 04/05/2022    PHOS 4.4 03/26/2022    BUN 54 04/05/2022    CREATININE 2.3 04/05/2022    CALCIUM 7.8 04/05/2022     PT/INR:   Lab Results   Component Value Date    PROTIME 1.75 08/21/2011    INR 1.25 04/05/2022     Lipids:   Lab Results   Component Value Date    ALKPHOS 126 04/05/2022    ALT 7 04/05/2022    AST 22 04/05/2022    BILITOT 0.5 04/05/2022    BILIDIR 0.3 04/05/2022    LABALBU 3.1 04/05/2022    LABALBU 3.2 08/20/2011    AMYLASE 67 07/19/2019    LIPASE 19.3 03/11/2022     Significant Diagnostic Studies: None the last 24 hours    Current Meds:  Scheduled Meds:   bumetanide  1 mg Oral BID    calcium replacement protocol   Other RX Placeholder    potassium bicarb-citric acid  20 mEq Oral TID    metoprolol tartrate  12.5 mg Oral QAM AC    cefTRIAXone (ROCEPHIN) IV  2,000 mg IntraVENous Q24H    midodrine  10 mg Oral TID WC    lidocaine PF  5 mL IntraDERmal Once    levothyroxine  50 mcg Oral QAM AC    lidocaine 1 % injection  5 mL IntraDERmal Once    sodium chloride flush  5-40 mL IntraVENous 2 times per day    pantoprazole  40 mg Oral BID AC    rifaximin  550 mg Oral BID    sodium chloride flush  5-40 mL IntraVENous 2 times per day    PARoxetine  30 mg Oral QAM     Continuous Infusions:   [Held by provider] sodium bicarbonate infusion Stopped (03/31/22 7314)    sodium chloride      sodium chloride         Assessment:  82 yo F admitted 03/24/22 for generalized weakness & dizziness with a fall at home. H/O cirrhosis. H/O CHF. Diuretics recently increased by cardiology. Recent admission 03/11/22-03/17/22 for SOB & edema. She has required recent paracentesis. H/O severe pulmonary HTN & severe tricuspid regurgitation. Briefly transferred to ICU for worsening hypotension. Stool studies negative. Refused Lactulose, Xifaxan started for intermittent confusion.  S/P para 03/25/22 with 2.5L removed, negative for SBP. S/P para 03/28/22 with 5.5L removed, positive for SBP, IV Rocephin started. S/P para 04/03/22 with 1.6L removed.     1. Fall at home  2. Decompensated liver cirrhosis with ascites due to CHF- MELD 16  3. Ascites- likely cardiogenic & hepatic, s/p para 03/25/22 with 2.5 L removed negative for SBP, s/p para 03/28/22 5.5L removed positive for SBP, s/p para 03/30/22 with 2.3 L positive for SBP, para 04/03/22 with 1.6 L removed  4. Acute CHF exacerbation  5.  ISIDRA on CKD- likely cardiorenal  6. Severe pulmonary HTN  7. Leukopenia- resolved  8. Normocytic anemia- no GI bleeding noted  9. Hypoalbuminemia  10. Coagulopathy  11. Acute diarrhea- stool panel negative  12. Cardiomegaly  13. Acute on chronic hypotension- on Midodrine  14. Paroxysmal afib  15. Severe tricuspid regurgitation  16. JAMES   17. SBP- IV Rocephin started 03/30/20    18. Hypokalemia- resolved    Plan:    · Monitor H & H, transfuse prn  · 2g sodium diet with fluid restriction  · Midodrine TID per primary  · Continue PPI daily, home dose  · Daily weights  · HFP, INR in the morning  · Strict I & O  · SCDs for DVT prophylaxis  · Patient is currently only on Bumex 1mg BID, concern for recurrence of ascites fluid, fluid overload/CHF exacerbation given current diuretics as patient was on Zaroxolyn, Aldactone, & Lasix PTA and continued to have fluid overload.  RN to update cardiology & primary  · Continue IV Rocephin, switch to PO Cipro, renal dosing of 500mg daily at discharge  · IV Venofer per nephro  · Avoid hepatotoxic meds, okay for Tylenol 2g limit  · Avoid narcotic analgesics & benzodiazepines as they can precipitate HE  · Nephrology & cardiology on board  · RN updated  · Supportive care per primary team   Will need a 2 week follow-up with Nicholas GIBSON    Case reviewed and impression/plan reviewed in collaboration with Dr. Shailesh Hernandez  Electronically signed by ATRHUR Alarcon - CNP on 4/5/2022 at 11:59 AM    [facility]

## 2022-04-05 NOTE — DISCHARGE SUMMARY
now with AI 2+: noted. · Severe Pulm HTN / Severe TR: Appears to be mostly group 2 per prior [facility] data. Discussed at length potential transfer to [facility] with patient, family, and Cardiology - they elect for continued care locally and are not wishing for aggressive treatment (such as swan-juve, pressors, inotropes etc)  · Atrial fibrillation: not on [address]. Consider [address] however will defer to her usual cardiologist, Dr. Kyle Nicely.  Continue rate control. · Hypokalemia: Secondary to diuretics. 20mEq Potassium TID. BMP in 1 week and follow up with Nephrology. · Hypothyroidism: Synthroid increased. Continue to monitor. · NSVT x10bts on 3/29: Without recurrence. Keep mg > 2 and K > 4.  · Anion gap metabolic acidosis: Resolved. Continue to monitor. Nephrology following. · Bilateral Pleural Effusions / Acute Hypoxic Resp Failure: Resolved. Improved with diuresis. Currently on room air. The patient was seen and examined on day of discharge and this discharge summary is in conjunction with any daily progress note from day of discharge. Hospital Course:   Per HPI, \"Norma Guy is 81years old female who presented to ED for generalize weakness on 3/24/22. Past medical history significant with pulmonary hypertension secondary to severe tricuspid regurgitation, decompensated liver cirrhosis, chronic hypotension, persistent A. fib on carvedilol with rate control, heart failure preserved ejection fraction, CKD stage III. Patient recently discharged from the hospital on March 17, 2022.  2 days prior to presentation patient reports decreased p.o. intake, nausea, dry heaves, persistent diarrhea with loose watery stool.  On the day of presentation patient's son report that patient worsening fatigue and generalized weakness therefore he brought her to the ED.     In the ED patient found to have blood pressure 83/50, heart rate 75, respiration rate 16, O2 sat at 95%. BMP showed Cr 2.4 which increased 1.2 from 1 months prior.  CXR showed cardiomegaly possible failure, patchy opacity of right lung base. Patient was admitted and manage for decompensate liver cirrhosis and ISIDRA on CKD. Patient subsequently underwent paracentesis with 2.5L fluid removed. Patient develop hypotension which didn't response to albumin and NS. Patient required pressure support and she was transferred to ICU on 3/25/22. On 3/26, patient wean off pressure support and transferred out of ICU. Cheetah test on 3/27/22 showed nigh normal CO with low TPR not responsive to fluid. Thyroid function test showed hypothyroidism. Patient was started Synthroid 50 mg daily on 3/28/22. On 3/30, ascites fluid positive culture which Ceftriaxone was started. Patient develop worsening metabolic acidosis which bicarb drip and tab were started. \"  Patient received initial paracentesis which did show SBP with PMNs greater than 1600. She was started on ceftriaxone and received secondary paracentesis on 4/3/2022. 1.6 L removed at this time. PMNs at this time are 729 which correlates with greater than 50% reduction. Overall clinical improvement.      Further information please see assessment and plan above    Exam:     Vitals:  Vitals:    04/05/22 0508 04/05/22 0918 04/05/22 0920 04/05/22 1115   BP: 129/60  106/69 (!) 122/48   Pulse: 83  79 74   Resp:   18 18   Temp:   98.3 °F (36.8 °C) 96.8 °F (36 °C)   TempSrc:   Oral Axillary   SpO2:  94% 98% 97%   Weight:       Height:         Weight: Weight: 179 lb 6.4 oz (81.4 kg)     24 hour intake/output:    Intake/Output Summary (Last 24 hours) at 4/5/2022 1407  Last data filed at 4/5/2022 1230  Gross per 24 hour   Intake 920 ml   Output 850 ml   Net 70 ml       General appearance: Acute on chronically ill-appearing female no apparent distress, appears stated age and cooperative. HEENT: PERRL, EOMI. Conjunctivae/corneas clear. Neck: Supple, with full range of motion. No jugular venous distention. Trachea midline.   Respiratory:  Normal respiratory effort. Clear to auscultation, bilaterally without Rales/Wheezes/Rhonchi. Cardiovascular: Regular rate. Irregular rhythm noted. Systolic murmur noted. Abdomen: Soft, non-tender. Protuberant. BS present. Musculoskeletal: passive and active ROM x 4 extremities. Skin: Discoloration of bilateral lower extremities noted. bilateral lower extremity pitting edema noted. Neurologic:  Neurovascularly intact without any focal sensory/motor deficits. Cranial nerves: II-XII intact, grossly non-focal.  Psychiatric: Alert and oriented, thought content appropriate, normal insight  Capillary Refill: mildly delayed  Peripheral Pulses: +2 palpable, equal bilaterally        Labs: For convenience and continuity at follow-up the following most recent labs are provided:      CBC:    Lab Results   Component Value Date    WBC 7.3 04/05/2022    HGB 11.1 04/05/2022    HCT 36.2 04/05/2022     04/05/2022       Renal:    Lab Results   Component Value Date     04/05/2022    K 3.7 04/05/2022    K 3.4 03/31/2022    CL 94 04/05/2022    CO2 31 04/05/2022    BUN 54 04/05/2022    CREATININE 2.3 04/05/2022    CALCIUM 7.8 04/05/2022    PHOS 4.4 03/26/2022         Significant Diagnostic Studies    Radiology:   US GUIDED PARACENTESIS   Final Result   Uncomplicated paracentesis. **This report has been created using voice recognition software. It may contain minor errors which are inherent in voice recognition technology. **      Final report electronically signed by Dr. Adriana Her on 4/3/2022 11:01 AM      US GUIDED PARACENTESIS   Final Result   Successful ultrasound-guided paracentesis. **This report has been created using voice recognition software. It may contain minor errors which are inherent in voice recognition technology. **      Final report electronically signed by Dr. Ryan Anderson on 3/28/2022 4:47 PM      US RENAL COMPLETE   Final Result      1. Bilateral renal cysts.  No evidence of stones or hydronephrosis. 2. Moderate amount of free fluid within the abdomen. 3. Postvoid images could not be obtained through the bladder. 4. There are bilateral ureteric jets present. **This report has been created using voice recognition software. It may contain minor errors which are inherent in voice recognition technology. **      Final report electronically signed by DR Natalie Albarado on 3/26/2022 8:51 AM      XR CHEST PORTABLE   Final Result   Increased bilateral lower lung consolidations. Bilateral pleural effusions. This document has been electronically signed by: Pierre Mejia MD on    03/26/2022 04:26 AM      [address]   Final Result    Successful ultrasound-guided paracentesis. **This report has been created using voice recognition software. It may contain minor errors which are inherent in voice recognition technology. **      Final report electronically signed by Dr. Darius Whiting MD on 3/25/2022 10:05 AM      XR CHEST PORTABLE   Final Result   1. Cardiomegaly with possible failure. Recommend follow-up. 2. Patchy opacity at the right lung base could be a superimposed    infectious component. Follow-up to clearing recommended. 3. Indeterminate appearance of the proximal left humerus. Not seen on    prior exams. Correlate with history of pain in this area. Further evaluate    if clinically indicated. This document has been electronically signed by: Jaswant Ambrosio MD on 03/24/2022 11:58 PM             Consults:     PALLIATIVE CARE EVAL  IP CONSULT TO GI  IP CONSULT TO SOCIAL WORK  IP CONSULT TO NEPHROLOGY  IP CONSULT TO CARDIOLOGY    Disposition: SNF  Condition at Discharge: Stable    Code Status:  Limited     Patient Instructions:    Discharge lab work: BMP  Activity: activity as tolerated  Diet: ADULT DIET; Regular; Low Sodium (2 gm); 1200 ml  ADULT ORAL NUTRITION SUPPLEMENT; AM Snack, PM Snack;  Other Oral Supplement; magic cup + Boost Follow-up visits:   MD Josep Hernandez  [address] [facility]  [address]     On 5/20/2022  Cardiology, Bring medications, photo ID, & insurance card, Please arrive 15 minutes prior to appointment time, Your appointment time is at 10:45 AM    [facility] [address] [id number]  [phone]    [facility] physician to follow    Jeremy Isaac MD  [address] [place] [address]    Schedule an appointment as soon as possible for a visit in 4 weeks  BMP 1 week before    Makenzie Hernandez APRN - CNP  [facility]  [address] [address] [phone]    Schedule an appointment as soon as possible for a visit in 2 weeks  at [facility] office, Gastroenterology         Discharge Medications:        Medication List      START taking these medications    bumetanide 1 MG tablet  Commonly known as: BUMEX  Take 1 tablet by mouth 2 times daily     ciprofloxacin 500 MG tablet  Commonly known as: CIPRO  Take 1 tablet by mouth daily     levothyroxine 50 MCG tablet  Commonly known as: SYNTHROID  Take 1 tablet by mouth every morning (before breakfast)  Start taking on: April 6, 2022     potassium bicarb-citric acid 20 MEQ Tbef effervescent tablet  Commonly known as: EFFER-K  Take 1 tablet by mouth in the morning, at noon, and at bedtime     rifaximin 550 MG tablet  Commonly known as: XIFAXAN  Take 1 tablet by mouth 2 times daily        CHANGE how you take these medications    midodrine 10 MG tablet  Commonly known as: PROAMATINE  Take 1 tablet by mouth 3 times daily (with meals)  What changed:   · medication strength  · how much to take     sucralfate 1 GM tablet  Commonly known as: CARAFATE  Take 1 tablet by mouth 4 times daily (before meals and nightly)  What changed: when to take this        CONTINUE taking these medications    aspirin 325 MG tablet     CALTRATE 600+D3 PO     fluticasone 50 MCG/ACT nasal spray  Commonly known as: FLONASE     loratadine 10 MG tablet  Commonly known as: CLARITIN     omeprazole 20 MG delayed release capsule  Commonly known as: PRILOSEC     OSTEO BI-FLEX TRIPLE STRENGTH PO     PARoxetine 30 MG tablet  Commonly known as: PAXIL     * CENTRUM SILVER ADULT 50+ PO     * PreserVision AREDS Caps     traZODone 50 MG tablet  Commonly known as: DESYREL     vitamin B-6 100 MG tablet  Commonly known as: PYRIDOXINE     vitamin C 500 MG tablet  Commonly known as: ASCORBIC ACID         * This list has 2 medication(s) that are the same as other medications prescribed for you. Read the directions carefully, and ask your doctor or other care provider to review them with you. STOP taking these medications    carvedilol 6.25 MG tablet  Commonly known as: COREG     furosemide 20 MG tablet  Commonly known as: LASIX     losartan 25 MG tablet  Commonly known as: COZAAR     metOLazone 5 MG tablet  Commonly known as: ZAROXOLYN     spironolactone 50 MG tablet  Commonly known as: ALDACTONE           Where to Get Your Medications      Information about where to get these medications is not yet available    Ask your nurse or doctor about these medications  · bumetanide 1 MG tablet  · ciprofloxacin 500 MG tablet  · levothyroxine 50 MCG tablet  · midodrine 10 MG tablet  · potassium bicarb-citric acid 20 MEQ Tbef effervescent tablet  · rifaximin 550 MG tablet         Time Spent on discharge is more than 30 minutes in the examination, evaluation, counseling and review of medications and discharge plan. Case and plan developed in coordination with Wil Law MD       Signed: Thank you ARTHUR Romero CNP for the opportunity to be involved in this patient's care.     Electronically signed by Delta Cruz DO on 4/5/2022 at 2:07 PM

## 2022-04-05 NOTE — PROGRESS NOTES
Report called to [address] [place] [address] at the [employer]. AVS and last dose [facility] faxed. Discharged to [facility] by car per her son. Pleasant and cooperative.

## 2022-04-05 NOTE — PROGRESS NOTES
Renal Progress Note    Assessment and Plan:   1. Acute kidney injury improved and stable  2. Hypokalemia better  3. Thrombocytopenia appears to be chronic  4. Normocytic anemia  5. Volume overload resolving  6. Deconditioning  7. Hypoalbuminemia    PLAN:  1. Labs reviewed  2. Serum creatinine is improved  3. Serum potassium is back to normal  4. Medications reviewed  5. Magnesium oxide 400 mg orally once this morning since magnesium level is on the low end of normal.  6. Labs in the morning if not discharged today  7. Otherwise okay to discharge from renal perspective when discharged by the primary service  8. We will follow    Patient Active Problem List:     Chronic atrial fibrillation (HCC)     Ascending cholangitis, possible     Elevated LFTs     Thrombocytopenia (HCC)     Leukopenia     ISIDRA (acute kidney injury) ([facility]     SIRS (systemic inflammatory response syndrome) ([facility])     Cholecystitis, acute     Neutropenia ([facility])     Aortic stenosis, severe     Colitis     Nausea     Diarrhea     Essential hypertension     Anemia, normocytic normochromic     Hypocalcemia     Cirrhosis ([facility])     S/P AVR (aortic valve replacement)     Obesity (BMI 30-39. 9)     History of atrial fibrillation     Hypomagnesemia     Asymptomatic bacteriuria - no clinical indication for antimicrobial therapy     CKD (chronic kidney disease) stage 4, GFR 15-29 ml/min ([facility])     Acute renal failure superimposed on stage 3 chronic kidney disease ([facility])     VRE (vancomycin resistant enterococcus) culture positive     CHF (congestive heart failure), NYHA class I, acute on chronic, combined ([facility])     Shortness of breath     Acute kidney injury ([facility]     Hypotension     Dehydration     SBP (spontaneous bacterial peritonitis) ([facility])     Other ascites     Stage 3b chronic kidney disease ([facility])      Subjective:   Admit Date: 3/24/2022    Interval History:   Seeing for acute kidney injury on chronic kidney disease  Very awake and alert   With a good night sleep  Denies any complaint  No shortness of breath  Updated by the staff. Blood pressure is mostly good  Urine output is not documented completely          Medications:   Scheduled Meds:   calcium replacement protocol   Other RX Placeholder    bumetanide  1 mg Oral Daily    potassium bicarb-citric acid  20 mEq Oral TID    metoprolol tartrate  12.5 mg Oral QAM AC    cefTRIAXone (ROCEPHIN) IV  2,000 mg IntraVENous Q24H    midodrine  10 mg Oral TID WC    lidocaine PF  5 mL IntraDERmal Once    levothyroxine  50 mcg Oral QAM AC    lidocaine 1 % injection  5 mL IntraDERmal Once    sodium chloride flush  5-40 mL IntraVENous 2 times per day    pantoprazole  40 mg Oral BID AC    rifaximin  550 mg Oral BID    sodium chloride flush  5-40 mL IntraVENous 2 times per day    PARoxetine  30 mg Oral QAM     Continuous Infusions:   [Held by provider] sodium bicarbonate infusion Stopped (03/31/22 0768)    sodium chloride      sodium chloride         CBC:   Recent Labs     04/04/22 0545   WBC 6.1   HGB 11.0*   *     CMP:    Recent Labs     04/03/22 0453 04/04/22  0545 04/05/22 0443    138 139   K 3.8 3.0* 3.7   CL 93* 93* 94*   CO2 31 28 31   BUN 58* 55* 54*   CREATININE 2.5* 2.3* 2.3*   GLUCOSE 114* 98 94   CALCIUM 8.0* 7.7* 7.8*   LABGLOM 18* 20* 20*     Troponin: No results for input(s): TROPONINI in the last 72 hours. BNP: No results for input(s): BNP in the last 72 hours. INR:   Recent Labs     04/05/22 0443   INR 1.25*     Lipids: No results for input(s): CHOL, LDLDIRECT, TRIG, HDL, AMYLASE, LIPASE in the last 72 hours. Liver:   Recent Labs     04/05/22 0443   AST 22   ALT 7*   ALKPHOS 126   PROT 5.1*   LABALBU 3.1*   BILITOT 0.5     Iron:  No results for input(s): IRONS, FERRITIN in the last 72 hours. Invalid input(s): LABIRONS  US GUIDED PARACENTESIS   Final Result   Uncomplicated paracentesis. **This report has been created using voice recognition software.   It may contain minor errors which are inherent in voice recognition technology. **      Final report electronically signed by Dr. Mary Beth Campos on 4/3/2022 11:01 AM      US GUIDED PARACENTESIS   Final Result   Successful ultrasound-guided paracentesis. **This report has been created using voice recognition software. It may contain minor errors which are inherent in voice recognition technology. **      Final report electronically signed by Dr. Elliott Juarez on 3/28/2022 4:47 PM      US RENAL COMPLETE   Final Result      1. Bilateral renal cysts. No evidence of stones or hydronephrosis. 2. Moderate amount of free fluid within the abdomen. 3. Postvoid images could not be obtained through the bladder. 4. There are bilateral ureteric jets present. **This report has been created using voice recognition software. It may contain minor errors which are inherent in voice recognition technology. **      Final report electronically signed by DR Sathish Garvin on 3/26/2022 8:51 AM      XR CHEST PORTABLE   Final Result   Increased bilateral lower lung consolidations. Bilateral pleural effusions. This document has been electronically signed by: Octavia Raphael MD on    03/26/2022 04:26 AM      [address]   Final Result    Successful ultrasound-guided paracentesis. **This report has been created using voice recognition software. It may contain minor errors which are inherent in voice recognition technology. **      Final report electronically signed by Dr. Victor Manuel Contreras MD on 3/25/2022 10:05 AM      XR CHEST PORTABLE   Final Result   1. Cardiomegaly with possible failure. Recommend follow-up. 2. Patchy opacity at the right lung base could be a superimposed    infectious component. Follow-up to clearing recommended. 3. Indeterminate appearance of the proximal left humerus. Not seen on    prior exams. Correlate with history of pain in this area.  Further evaluate    if clinically indicated. This document has been electronically signed by: Edgar Flores MD on 03/24/2022 11:58 PM            Objective:   Vitals: /60   Pulse 83   Temp 98.7 °F (37.1 °C) (Oral)   Resp 18   Ht 5' 4\" (1.626 m)   Wt 179 lb 6.4 oz (81.4 kg)   SpO2 94%   BMI 30.79 kg/m²    Wt Readings from Last 3 Encounters:   04/05/22 179 lb 6.4 oz (81.4 kg)   03/15/22 178 lb 11.2 oz (81.1 kg)   02/09/22 166 lb (75.3 kg)      24HR INTAKE/OUTPUT:      Intake/Output Summary (Last 24 hours) at 4/5/2022 4760  Last data filed at 4/5/2022 0321  Gross per 24 hour   Intake 1499 ml   Output 850 ml   Net 649 ml       Constitutional: Well-developed elderly lady alert, awake, no apparent distress   Skin:normal with no rash or lesions. HEENT:Pupils are reactive . Throat is clear . Oral mucosa is moist   Neck:supple with no thyromegaly or carotid bruit  Cardiovascular:  S1, S2 without murmur or rubs   Respiratory:  Clear to ausculation without wheezes, rhonchi or rales. Abdomen: +bs, soft, no tenderness to palpation and no palpable mass. No abdominal bruit   Ext: 1+ bilateral LE edema  Musculoskeletal:Intact  Neuro:Alert and awake. Well oriented  with no focal deficit. Speech is normal      Electronically signed by Ramon Mandujano MD on 4/5/2022 at 7:07 AM  **This report has been created using voice recognition software. It maycontain minor  errors which are inherent in voice recognition technology. **

## 2022-04-05 NOTE — PROGRESS NOTES
[facility] ICU STEPDOWN TELEMETRY 4K  Occupational Therapy  Daily Note  Time:   Time In: 5482  Time Out: 5469  Timed Code Treatment Minutes: 25 Minutes  Minutes: 25          Date: 2022  Patient Name: Merlene Roblero,   Gender: female      Room: [facility]  MRN: [id number]  : 1940  (81 y.o.)  Referring Practitioner: ARTHUR Plata CNP  Diagnosis: dehydration  Additional Pertinent Hx: Per ER note on 3/24/2022: 81 y.o. female who presents to the emergency department for evaluation of generalized weakness. Patient with a past medical history of cirrhosis, CHF, atrial fibrillation, hypertension, and coronary disease. She was recently discharged from the hospital 7 days ago. Over the last 5 days she reports that she has had decreased p.o. intake nausea and dry heaves. And persistent diarrhea. Diarrhea is loose and watery. No blood. No melena. Her son states today she had worsening fatigue and generalized weakness therefore he brought her into the ED. Restrictions/Precautions:  Restrictions/Precautions: Fall Risk     SUBJECTIVE: Pt seated in bedside chair upon arrival, agreeable to OT session. PAIN: Denies    Vitals: Heart Rate: WFL on tele monitor during mobility. COGNITION: Slow Processing, Decreased Insight and Decreased Problem Solving    ADL:   Grooming: Contact Guard Assistance. Standing sink side washing hand and for oral care with 1-2 UE release from countertop. Lower Extremity Dressing: Maximum Assistance. Donning/doffing socks onto B feet- pt son assists at home or pt is primarily barefoot. Toileting: Contact Guard Assistance. For standing balance to complete hygiene after BM with 1 UE release from RW. Toilet Transfer: Stand By Assistance. STS- use of grab bar as assist.. BALANCE:  Sitting Balance:  Supervision. Standing Balance: Stand By Assistance, [employer]. x3 minutes within ADL routine.     BED MOBILITY:  Not Tested    TRANSFERS:  Sit to Stand:  Contact Guard Assistance. Stand to Sit: Stand By Assistance. FUNCTIONAL MOBILITY:  Assistive Device: Rolling Walker   Assist Level:  Contact Guard Assistance. Distance:   Completed functional mobility within unit hallway x1 lap around nurses station and to/from BR at slow pace, no LOB noted. Pt requires several standing rest breaks within hallway and min cues for walker safety to keep close to WOOD- lengthy seated rest break after trial of mobility, mod fatigue noted. ASSESSMENT:     Activity Tolerance:  Patient tolerance of  treatment: fair. Discharge Recommendations: Subacute/skilled nursing facility  Equipment Recommendations: Other: Monitor pending progress  Plan: Times per week: 3-5x  Current Treatment Recommendations: Balance Training,Endurance Training,Functional Mobility Training,Self-Care / ADL,Safety Education & Training    Patient Education  Patient Education: ADL's, Energy Conservation, Importance of Increasing Activity, Assistive Device Safety and Safety with transfers and mobility. Goals  Short term goals  Time Frame for Short term goals: until discharge  Short term goal 1: Pt will complete various sit-stand t/fs including toilet with SBA & 0-2 vcs for safety  Short term goal 2: Pt will complete BADL routine with min A & min vcs for safety  Short term goal 3: Pt will complete mobility to/from bathroom with RW, SBA, & 0-2 vcs for walker safety  Short term goal 4: Pt will tolerate standing 4-5 min with SBA for increased ease of sinkside grooming  Long term goals  Time Frame for Long term goals : No LTG set d/t short ELOS    Following session, patient left in safe position with all fall risk precautions in place.

## 2022-04-05 NOTE — PLAN OF CARE
Problem: Physical Regulation:  Goal: Prevent transmision of infection  Description: Prevent transmision of infection  Outcome: Ongoing  Note: Patient is in isolation per order. Problem: Skin Integrity:  Goal: Absence of new skin breakdown  Description: Absence of new skin breakdown  Outcome: Ongoing  Note: Patient is absent of new skin breakdown this shift. Problem: Falls - Risk of:  Goal: Will remain free from falls  Description: Will remain free from falls  Outcome: Ongoing  Note: No falls this shift. Problem: Pain:  Goal: Pain level will decrease  Description: Pain level will decrease  Outcome: Ongoing  Note: Pain Assessment: 0-10  Pain Level: 0   Patient's Stated Pain Goal: No pain   Is pain goal met at this time? Yes             Problem: Discharge Planning:  Goal: Discharged to appropriate level of care  Description: Discharged to appropriate level of care  Outcome: Ongoing  Note: No discharge plans this shift. Care plan reviewed with patient. Patient verbalize understanding of the plan of care and contribute to goal setting.

## 2022-04-05 NOTE — CARE COORDINATION
4/5/22, 12:18 PM EDT    Patient goals/plan/ treatment preferences discussed by  and . Patient goals/plan/ treatment preferences reviewed with patient/ family. Patient/ family verbalize understanding of discharge plan and are in agreement with goal/plan/treatment preferences. Understanding was demonstrated using the teach back method. AVS provided by RN at time of discharge, which includes all necessary medical information pertaining to the patients current course of illness, treatment, post-discharge goals of care, and treatment preferences. Services After Discharge  Services At/After Discharge: Nursing Services,Skilled Therapy [facility] SERVICES [facility]   IMM Letter  IMM Letter given to Patient/Family/Significant other/Guardian/POA/by[de-identified] MELODY Salinas CM  IMM Letter date given[de-identified] 04/05/22  IMM Letter time given[de-identified] [phone] with Enedina at the [facility] and she is aware of discharge and plan to discharge around 2 pm.  Spoke with patient and she plans to have her son transport and that she will call him after 1230 pm when he wakes up. She stated that he has to be at work at 4 pm.  RN has phone number for report and fax number. COVID test completed and negative. No further needs voiced.

## 2022-04-08 LAB
ANAEROBIC CULTURE: NORMAL
BODY FLUID CULTURE, STERILE: NORMAL
GRAM STAIN RESULT: NORMAL

## 2022-04-11 ENCOUNTER — TELEPHONE (OUTPATIENT)
Dept: NEPHROLOGY | Age: 82
End: 2022-04-11

## 2022-04-12 ENCOUNTER — TELEPHONE (OUTPATIENT)
Dept: NEPHROLOGY | Age: 82
End: 2022-04-12

## 2022-04-12 NOTE — TELEPHONE ENCOUNTER
Kate lazcano's nurse at [facility] called to inform you that pt has gained 10 lbs in the last six days. She is on Bumex 1 mg BID. Please advise on what you want them to do.

## 2022-04-13 NOTE — TELEPHONE ENCOUNTER
Spoke to [facility]. It is hard to put pt on food restriction, due to that she is non compliant. They have found food in her room and trash can, they do not know where she is getting it. On fluid restriction of 1500 cc per day. She is on a low sodium diet. Swelling is more in the abdomen, swelling in legs +1 - +2, and her lungs are clear. Pt does get SOB with transfer and ambulates.

## 2022-04-14 VITALS
SYSTOLIC BLOOD PRESSURE: 122 MMHG | WEIGHT: 180 LBS | HEART RATE: 95 BPM | BODY MASS INDEX: 30.9 KG/M2 | DIASTOLIC BLOOD PRESSURE: 76 MMHG

## 2022-04-14 PROBLEM — H35.3210 EXUDATIVE AGE-RELATED MACULAR DEGENERATION OF RIGHT EYE (HCC): Status: ACTIVE | Noted: 2021-04-29

## 2022-04-14 PROBLEM — L81.9 DYSCHROMIA: Status: ACTIVE | Noted: 2020-07-06

## 2022-04-14 PROBLEM — D17.30 BENIGN LIPOMATOUS NEOPLASM OF SKIN AND SUBCUTANEOUS TISSUE OF UNSPECIFIED SITES: Status: ACTIVE | Noted: 2020-01-28

## 2022-04-14 PROBLEM — Z85.828 PERSONAL HISTORY OF OTHER MALIGNANT NEOPLASM OF SKIN: Status: ACTIVE | Noted: 2020-01-28

## 2022-04-14 PROBLEM — H35.30 MACULAR DEGENERATION: Status: ACTIVE | Noted: 2019-06-03

## 2022-04-14 PROBLEM — Z12.83 ENCOUNTER FOR SCREENING FOR MALIGNANT NEOPLASM OF SKIN: Status: ACTIVE | Noted: 2020-07-06

## 2022-04-14 PROBLEM — D09.9 CARCINOMA IN SITU, UNSPECIFIED: Status: ACTIVE | Noted: 2021-10-15

## 2022-04-14 PROBLEM — L82.1 OTHER SEBORRHEIC KERATOSIS: Status: ACTIVE | Noted: 2020-07-06

## 2022-04-14 PROBLEM — L28.1 PRURIGO NODULARIS: Status: ACTIVE | Noted: 2020-07-06

## 2022-04-14 PROBLEM — L81.4 OTHER MELANIN HYPERPIGMENTATION: Status: ACTIVE | Noted: 2020-07-06

## 2022-04-14 PROBLEM — E78.00 PURE HYPERCHOLESTEROLEMIA: Status: ACTIVE | Noted: 2022-04-14

## 2022-04-14 PROBLEM — Z48.02 ENCOUNTER FOR REMOVAL OF SUTURES: Status: ACTIVE | Noted: 2020-03-03

## 2022-04-14 PROBLEM — G47.00 INSOMNIA, UNSPECIFIED: Status: ACTIVE | Noted: 2022-04-14

## 2022-04-14 PROBLEM — D48.5 NEOPLASM OF UNCERTAIN BEHAVIOR OF SKIN: Status: ACTIVE | Noted: 2020-01-07

## 2022-04-14 PROBLEM — L28.0 LICHENIFICATION AND LICHEN SIMPLEX CHRONICUS: Status: ACTIVE | Noted: 2020-01-07

## 2022-04-14 PROBLEM — E78.00 PURE HYPERCHOLESTEROLEMIA, UNSPECIFIED: Status: ACTIVE | Noted: 2022-04-14

## 2022-04-14 PROBLEM — D22.9 MELANOCYTIC NEVI, UNSPECIFIED: Status: ACTIVE | Noted: 2020-07-06

## 2022-04-14 PROBLEM — L20.9 ATOPIC DERMATITIS: Status: ACTIVE | Noted: 2019-12-05

## 2022-04-14 PROBLEM — I78.1 NEVUS, NON-NEOPLASTIC: Status: ACTIVE | Noted: 2020-07-06

## 2022-04-14 PROBLEM — L57.0 ACTINIC KERATOSIS: Status: ACTIVE | Noted: 2021-04-06

## 2022-04-14 RX ORDER — POTASSIUM CHLORIDE 20 MEQ/1
20 TABLET, EXTENDED RELEASE ORAL 2 TIMES DAILY
COMMUNITY

## 2022-04-14 RX ORDER — LOSARTAN POTASSIUM 25 MG/1
TABLET ORAL
COMMUNITY
End: 2022-04-22

## 2022-04-14 RX ORDER — ONDANSETRON 4 MG/1
TABLET, FILM COATED ORAL
COMMUNITY
Start: 2022-03-23

## 2022-04-14 RX ORDER — SPIRONOLACTONE 50 MG/1
TABLET, FILM COATED ORAL
COMMUNITY
End: 2022-04-22

## 2022-04-14 RX ORDER — TRIAMCINOLONE ACETONIDE 1 MG/G
CREAM TOPICAL
COMMUNITY
Start: 2020-07-31

## 2022-04-14 RX ORDER — METOLAZONE 2.5 MG/1
TABLET ORAL
Status: ON HOLD | COMMUNITY
End: 2022-04-27 | Stop reason: HOSPADM

## 2022-04-14 RX ORDER — ALUMINUM ZIRCONIUM OCTACHLOROHYDREX GLY 16 G/100G
GEL TOPICAL 2 TIMES DAILY
Status: ON HOLD | COMMUNITY
End: 2022-04-27 | Stop reason: HOSPADM

## 2022-04-14 RX ORDER — DOXYCYCLINE HYCLATE 100 MG
TABLET ORAL
COMMUNITY
Start: 2022-01-10

## 2022-04-14 RX ORDER — SENNOSIDES 8.6 MG
650 CAPSULE ORAL EVERY 8 HOURS PRN
COMMUNITY

## 2022-04-14 RX ORDER — METOLAZONE 2.5 MG/1
5 TABLET ORAL
Status: ON HOLD | COMMUNITY
Start: 2022-01-06 | End: 2022-04-27 | Stop reason: HOSPADM

## 2022-04-14 RX ORDER — POTASSIUM CHLORIDE 750 MG/1
TABLET, FILM COATED, EXTENDED RELEASE ORAL
COMMUNITY
Start: 2022-01-10

## 2022-04-14 RX ORDER — ATORVASTATIN CALCIUM 20 MG/1
20 TABLET, FILM COATED ORAL DAILY
Status: ON HOLD | COMMUNITY
End: 2022-04-22

## 2022-04-22 ENCOUNTER — APPOINTMENT (OUTPATIENT)
Dept: GENERAL RADIOLOGY | Age: 82
DRG: 432 | End: 2022-04-22
Payer: MEDICARE

## 2022-04-22 ENCOUNTER — HOSPITAL ENCOUNTER (INPATIENT)
Age: 82
LOS: 5 days | Discharge: NURSING, REHAB OR LONG-TERM CARE | DRG: 432 | End: 2022-04-27
Attending: FAMILY MEDICINE | Admitting: STUDENT IN AN ORGANIZED HEALTH CARE EDUCATION/TRAINING PROGRAM
Payer: MEDICARE

## 2022-04-22 ENCOUNTER — APPOINTMENT (OUTPATIENT)
Dept: ULTRASOUND IMAGING | Age: 82
DRG: 432 | End: 2022-04-22
Payer: MEDICARE

## 2022-04-22 DIAGNOSIS — R18.8 OTHER ASCITES: Primary | ICD-10-CM

## 2022-04-22 DIAGNOSIS — E87.6 HYPOKALEMIA: ICD-10-CM

## 2022-04-22 DIAGNOSIS — R60.0 BILATERAL LEG EDEMA: ICD-10-CM

## 2022-04-22 PROBLEM — K74.60 DECOMPENSATION OF CIRRHOSIS OF LIVER (HCC): Status: ACTIVE | Noted: 2022-04-22

## 2022-04-22 PROBLEM — K72.90 DECOMPENSATION OF CIRRHOSIS OF LIVER (HCC): Status: ACTIVE | Noted: 2022-04-22

## 2022-04-22 LAB
ALBUMIN SERPL-MCNC: 2.5 GM/DL (ref 3.4–5)
ALP BLD-CCNC: 99 U/L (ref 46–116)
ALT SERPL-CCNC: 6 U/L (ref 14–63)
ANION GAP SERPL CALCULATED.3IONS-SCNC: 14 MEQ/L (ref 8–16)
ANION GAP: 9 MEQ/L (ref 8–16)
APTT: 31.6 SECONDS (ref 22–38)
AST SERPL-CCNC: 17 U/L (ref 15–37)
BASOPHILS # BLD: 0.4 % (ref 0–3)
BILIRUB SERPL-MCNC: 1 MG/DL (ref 0.2–1)
BUN BLDV-MCNC: 54 MG/DL (ref 7–18)
BUN BLDV-MCNC: 57 MG/DL (ref 7–22)
CALCIUM SERPL-MCNC: 8.1 MG/DL (ref 8.5–10.5)
CHLORIDE BLD-SCNC: 91 MEQ/L (ref 98–111)
CHLORIDE BLD-SCNC: 97 MEQ/L (ref 98–107)
CO2: 32 MEQ/L (ref 23–33)
CO2: 35 MEQ/L (ref 21–32)
CREAT SERPL-MCNC: 1.6 MG/DL (ref 0.4–1.2)
CREAT SERPL-MCNC: 1.8 MG/DL (ref 0.6–1.3)
EKG ATRIAL RATE: 202 BPM
EKG Q-T INTERVAL: 316 MS
EKG Q-T INTERVAL: 414 MS
EKG QRS DURATION: 102 MS
EKG QRS DURATION: 104 MS
EKG QTC CALCULATION (BAZETT): 380 MS
EKG QTC CALCULATION (BAZETT): 511 MS
EKG R AXIS: -23 DEGREES
EKG R AXIS: -26 DEGREES
EKG T AXIS: 115 DEGREES
EKG T AXIS: 161 DEGREES
EKG VENTRICULAR RATE: 87 BPM
EKG VENTRICULAR RATE: 92 BPM
EOSINOPHILS RELATIVE PERCENT: 1.5 % (ref 0–4)
GFR SERPL CREATININE-BSD FRML MDRD: 31 ML/MIN/1.73M2
GFR, ESTIMATED: 29 ML/MIN/1.73M2
GLUCOSE BLD-MCNC: 122 MG/DL (ref 70–108)
GLUCOSE BLD-MCNC: 91 MG/DL (ref 74–106)
HCT VFR BLD CALC: 33 % (ref 37–47)
HEMOGLOBIN: 10.5 GM/DL (ref 12–16)
INR BLD: 1.33 (ref 0.85–1.13)
LACTATE: 1.3 MMOL/L (ref 0.9–1.7)
LYMPHOCYTES # BLD: 16.9 % (ref 15–47)
MAGNESIUM: 1.5 MG/DL (ref 1.8–2.4)
MAGNESIUM: 1.7 MG/DL (ref 1.6–2.4)
MCH RBC QN AUTO: 30 PG (ref 27–31)
MCHC RBC AUTO-ENTMCNC: 31.8 GM/DL (ref 33–37)
MCV RBC AUTO: 94.3 FL (ref 81–99)
MONOCYTES: 8.1 % (ref 0–12)
NT PRO BNP: ABNORMAL PG/ML (ref 0–1800)
PDW BLD-RTO: 16.5 % (ref 11.5–14.5)
PLATELET # BLD: 262 THOU/MM3 (ref 130–400)
PMV BLD AUTO: 7.6 FL (ref 7.4–10.4)
POC CALCIUM: 8.1 MG/DL (ref 8.5–10.1)
POTASSIUM SERPL-SCNC: 2.1 MEQ/L (ref 3.5–5.1)
POTASSIUM SERPL-SCNC: 3.2 MEQ/L (ref 3.5–5.2)
RBC # BLD: 3.5 MILL/MM3 (ref 4.2–5.4)
SEGS: 73.1 % (ref 43–75)
SODIUM BLD-SCNC: 137 MEQ/L (ref 135–145)
SODIUM BLD-SCNC: 141 MEQ/L (ref 136–145)
TOTAL PROTEIN: 6.1 GM/DL (ref 6.4–8.2)
TROPONIN T: 0.02 NG/ML
TROPONIN, HIGH SENSITIVITY: 43.7 PG/ML (ref 0–51.3)
WBC # BLD: 5.3 THOU/MM3 (ref 4.8–10.8)

## 2022-04-22 PROCEDURE — 80053 COMPREHEN METABOLIC PANEL: CPT

## 2022-04-22 PROCEDURE — 85025 COMPLETE CBC W/AUTO DIFF WBC: CPT

## 2022-04-22 PROCEDURE — 85610 PROTHROMBIN TIME: CPT

## 2022-04-22 PROCEDURE — 85730 THROMBOPLASTIN TIME PARTIAL: CPT

## 2022-04-22 PROCEDURE — 6360000002 HC RX W HCPCS: Performed by: STUDENT IN AN ORGANIZED HEALTH CARE EDUCATION/TRAINING PROGRAM

## 2022-04-22 PROCEDURE — 83605 ASSAY OF LACTIC ACID: CPT

## 2022-04-22 PROCEDURE — 93005 ELECTROCARDIOGRAM TRACING: CPT | Performed by: FAMILY MEDICINE

## 2022-04-22 PROCEDURE — 83735 ASSAY OF MAGNESIUM: CPT

## 2022-04-22 PROCEDURE — 36415 COLL VENOUS BLD VENIPUNCTURE: CPT

## 2022-04-22 PROCEDURE — 76705 ECHO EXAM OF ABDOMEN: CPT

## 2022-04-22 PROCEDURE — 93005 ELECTROCARDIOGRAM TRACING: CPT

## 2022-04-22 PROCEDURE — 99223 1ST HOSP IP/OBS HIGH 75: CPT | Performed by: STUDENT IN AN ORGANIZED HEALTH CARE EDUCATION/TRAINING PROGRAM

## 2022-04-22 PROCEDURE — 93010 ELECTROCARDIOGRAM REPORT: CPT | Performed by: INTERNAL MEDICINE

## 2022-04-22 PROCEDURE — 84484 ASSAY OF TROPONIN QUANT: CPT

## 2022-04-22 PROCEDURE — 6360000002 HC RX W HCPCS: Performed by: FAMILY MEDICINE

## 2022-04-22 PROCEDURE — 99285 EMERGENCY DEPT VISIT HI MDM: CPT

## 2022-04-22 PROCEDURE — 6370000000 HC RX 637 (ALT 250 FOR IP): Performed by: FAMILY MEDICINE

## 2022-04-22 PROCEDURE — 87205 SMEAR GRAM STAIN: CPT

## 2022-04-22 PROCEDURE — 1200000000 HC SEMI PRIVATE

## 2022-04-22 PROCEDURE — 83880 ASSAY OF NATRIURETIC PEPTIDE: CPT

## 2022-04-22 PROCEDURE — 6370000000 HC RX 637 (ALT 250 FOR IP): Performed by: STUDENT IN AN ORGANIZED HEALTH CARE EDUCATION/TRAINING PROGRAM

## 2022-04-22 PROCEDURE — 71046 X-RAY EXAM CHEST 2 VIEWS: CPT

## 2022-04-22 RX ORDER — ASPIRIN 325 MG
325 TABLET ORAL DAILY
Status: DISCONTINUED | OUTPATIENT
Start: 2022-04-23 | End: 2022-04-27 | Stop reason: HOSPADM

## 2022-04-22 RX ORDER — CIPROFLOXACIN 500 MG/1
500 TABLET, FILM COATED ORAL DAILY
Status: DISCONTINUED | OUTPATIENT
Start: 2022-04-22 | End: 2022-04-27 | Stop reason: HOSPADM

## 2022-04-22 RX ORDER — METOLAZONE 5 MG/1
5 TABLET ORAL
Status: DISCONTINUED | OUTPATIENT
Start: 2022-04-22 | End: 2022-04-27 | Stop reason: HOSPADM

## 2022-04-22 RX ORDER — SODIUM CHLORIDE 0.9 % (FLUSH) 0.9 %
5-40 SYRINGE (ML) INJECTION PRN
Status: DISCONTINUED | OUTPATIENT
Start: 2022-04-22 | End: 2022-04-27 | Stop reason: HOSPADM

## 2022-04-22 RX ORDER — MIDODRINE HYDROCHLORIDE 10 MG/1
10 TABLET ORAL
Status: DISCONTINUED | OUTPATIENT
Start: 2022-04-22 | End: 2022-04-27 | Stop reason: HOSPADM

## 2022-04-22 RX ORDER — BUMETANIDE 1 MG/1
1 TABLET ORAL 2 TIMES DAILY
Status: DISCONTINUED | OUTPATIENT
Start: 2022-04-22 | End: 2022-04-27 | Stop reason: HOSPADM

## 2022-04-22 RX ORDER — MAGNESIUM SULFATE 1 G/100ML
1000 INJECTION INTRAVENOUS ONCE
Status: COMPLETED | OUTPATIENT
Start: 2022-04-22 | End: 2022-04-22

## 2022-04-22 RX ORDER — ACETAMINOPHEN 325 MG/1
650 TABLET ORAL EVERY 6 HOURS PRN
Status: DISCONTINUED | OUTPATIENT
Start: 2022-04-22 | End: 2022-04-27 | Stop reason: HOSPADM

## 2022-04-22 RX ORDER — ENOXAPARIN SODIUM 100 MG/ML
30 INJECTION SUBCUTANEOUS DAILY
Status: DISCONTINUED | OUTPATIENT
Start: 2022-04-22 | End: 2022-04-27 | Stop reason: HOSPADM

## 2022-04-22 RX ORDER — SODIUM CHLORIDE 9 MG/ML
INJECTION, SOLUTION INTRAVENOUS PRN
Status: DISCONTINUED | OUTPATIENT
Start: 2022-04-22 | End: 2022-04-27 | Stop reason: HOSPADM

## 2022-04-22 RX ORDER — PAROXETINE 30 MG/1
30 TABLET, FILM COATED ORAL EVERY MORNING
Status: DISCONTINUED | OUTPATIENT
Start: 2022-04-23 | End: 2022-04-27 | Stop reason: HOSPADM

## 2022-04-22 RX ORDER — SODIUM CHLORIDE 0.9 % (FLUSH) 0.9 %
5-40 SYRINGE (ML) INJECTION EVERY 12 HOURS SCHEDULED
Status: DISCONTINUED | OUTPATIENT
Start: 2022-04-22 | End: 2022-04-27 | Stop reason: HOSPADM

## 2022-04-22 RX ORDER — ONDANSETRON 2 MG/ML
4 INJECTION INTRAMUSCULAR; INTRAVENOUS EVERY 6 HOURS PRN
Status: DISCONTINUED | OUTPATIENT
Start: 2022-04-22 | End: 2022-04-27 | Stop reason: HOSPADM

## 2022-04-22 RX ORDER — POTASSIUM CHLORIDE 750 MG/1
40 TABLET, FILM COATED, EXTENDED RELEASE ORAL ONCE
Status: COMPLETED | OUTPATIENT
Start: 2022-04-22 | End: 2022-04-22

## 2022-04-22 RX ORDER — POTASSIUM CHLORIDE 7.45 MG/ML
10 INJECTION INTRAVENOUS
Status: COMPLETED | OUTPATIENT
Start: 2022-04-22 | End: 2022-04-23

## 2022-04-22 RX ORDER — POLYETHYLENE GLYCOL 3350 17 G/17G
17 POWDER, FOR SOLUTION ORAL DAILY PRN
Status: DISCONTINUED | OUTPATIENT
Start: 2022-04-22 | End: 2022-04-27 | Stop reason: HOSPADM

## 2022-04-22 RX ORDER — PANTOPRAZOLE SODIUM 40 MG/1
40 TABLET, DELAYED RELEASE ORAL
Status: DISCONTINUED | OUTPATIENT
Start: 2022-04-23 | End: 2022-04-27 | Stop reason: HOSPADM

## 2022-04-22 RX ORDER — ONDANSETRON 4 MG/1
4 TABLET, ORALLY DISINTEGRATING ORAL EVERY 8 HOURS PRN
Status: DISCONTINUED | OUTPATIENT
Start: 2022-04-22 | End: 2022-04-27 | Stop reason: HOSPADM

## 2022-04-22 RX ORDER — ATORVASTATIN CALCIUM 20 MG/1
20 TABLET, FILM COATED ORAL DAILY
Status: DISCONTINUED | OUTPATIENT
Start: 2022-04-22 | End: 2022-04-27 | Stop reason: HOSPADM

## 2022-04-22 RX ORDER — LEVOTHYROXINE SODIUM 0.05 MG/1
50 TABLET ORAL
Status: DISCONTINUED | OUTPATIENT
Start: 2022-04-23 | End: 2022-04-27 | Stop reason: HOSPADM

## 2022-04-22 RX ORDER — ACETAMINOPHEN 650 MG/1
650 SUPPOSITORY RECTAL EVERY 6 HOURS PRN
Status: DISCONTINUED | OUTPATIENT
Start: 2022-04-22 | End: 2022-04-27 | Stop reason: HOSPADM

## 2022-04-22 RX ADMIN — RIFAXIMIN 550 MG: 550 TABLET ORAL at 21:46

## 2022-04-22 RX ADMIN — CIPROFLOXACIN HYDROCHLORIDE 500 MG: 500 TABLET, FILM COATED ORAL at 21:46

## 2022-04-22 RX ADMIN — POTASSIUM CHLORIDE 40 MEQ: 750 TABLET, EXTENDED RELEASE ORAL at 14:09

## 2022-04-22 RX ADMIN — POTASSIUM CHLORIDE 10 MEQ: 7.46 INJECTION, SOLUTION INTRAVENOUS at 18:41

## 2022-04-22 RX ADMIN — MAGNESIUM SULFATE HEPTAHYDRATE 1000 MG: 1 INJECTION, SOLUTION INTRAVENOUS at 14:13

## 2022-04-22 RX ADMIN — POTASSIUM CHLORIDE 10 MEQ: 7.46 INJECTION, SOLUTION INTRAVENOUS at 20:24

## 2022-04-22 RX ADMIN — MIDODRINE HYDROCHLORIDE 10 MG: 10 TABLET ORAL at 21:46

## 2022-04-22 RX ADMIN — ENOXAPARIN SODIUM 30 MG: 100 INJECTION SUBCUTANEOUS at 21:46

## 2022-04-22 ASSESSMENT — ENCOUNTER SYMPTOMS
ABDOMINAL DISTENTION: 1
NAUSEA: 0
VOMITING: 0
COLOR CHANGE: 0
COUGH: 0
SHORTNESS OF BREATH: 1

## 2022-04-22 ASSESSMENT — PAIN SCALES - GENERAL: PAINLEVEL_OUTOF10: 0

## 2022-04-22 ASSESSMENT — PAIN - FUNCTIONAL ASSESSMENT: PAIN_FUNCTIONAL_ASSESSMENT: NONE - DENIES PAIN

## 2022-04-22 NOTE — PROGRESS NOTES
Transfer  Report from [address] [place] [address]  Referring Physician Dr. Yohan PACHECO  Patient Condition:   41. Patient of Dr. Nata Hurt at [facility]. Patient to [facility] for SOB, Abd. distension. Patient has decompensated liver disease, was admitted late March for same presentation. No labs are back yet. Dr. Nata Hurt states patient will tapped but he cannot not do that a PACC. Dr. Nata Hurt stated patient is [facility]. CXR also not back yet. 97.8, HR 92, R 16, B/P 130/58 97 % 2L NC.  Admit to medical , Inpatient, Dx SOB

## 2022-04-22 NOTE — FLOWSHEET NOTE
04/22/22 1640   Treatment Team Notification   Reason for Communication Patient/Family request   Team Member Name [address] [place] Team Role Advanced Practice Nurse   Method of Communication Secure Message   Response Waiting for response   Notification Time 1640     Notified of patient's arrival.

## 2022-04-22 NOTE — ED NOTES
Pt resting, resp even and unlabored, skin pale, warm and dry. INT intact, site soft and without edema or redness. O2 continues at 2lpm via nasal cannule. Pt denies any complaints. [facility] here and report given, pt released to be transported to [facility] in stable condition.      Sukhdev Castelan RN  04/22/22 0411

## 2022-04-22 NOTE — ED NOTES
Dr. Marcelina Sanders with patient and son discussing further care.      Francisco Javier Cottrell RN  04/22/22 3222

## 2022-04-22 NOTE — ED NOTES
Pt complains of fluid retention for the last couple weeks. Pt complains of SOB, denies chest pain or pressure. Pt alert, resp sl labored, skin pale, warm and dry, bilateral pedal edema noted up to both thighs, abd distended. Pt states she was just released from the hospital 2 weeks ago for fluid retention. Son at the bedside. Cardiac monitor applied.      Raffi Sotelo, MELODY  04/22/22 168 [facility], MELODY  04/22/22 1830

## 2022-04-22 NOTE — ED NOTES
O2 sat=88%, O2 put on at 2lpm via nasal cannule, pulse ox increased to 95%.      Dimitris Leblanc RN  04/22/22 6624

## 2022-04-22 NOTE — H&P
Hospitalist - History & Physical      Patient: Amaris Siddiqi    Unit/Bed:5K-05/005-A  YOB: 1940  MRN: [id number]   Acct: [de-identified]   PCP: ARTHUR Aden CNP    Date of Service: Pt seen/examined on 04/22/22  and Admitted to [Inpatient] with expected LOS [greater than] two midnights due to medical therapy. Chief Complaint:  Swelling    Assessment and Plan:-  1. Acute on chronic decompensated cirrhosis  2. Anasarca  a. Unclear etiology of cirrhosis. Suspect possible fatty liver disease. Patient denies any alcohol history. b. MELD-Na: 16.  c. Last EGD appears to be 5/2021: Distal esophagitis, gastritis, portal hypertensive gastropathy, fundic polyps, no varices. d. Patient with worsening anasarca and orthopnea/shortness of breath.  e. Order ultrasound of abdomen to evaluate for degree of ascites. On exam, patient mild to moderate abdominal distention concerning for ascites. f. Will order IR guided paracentesis. Studies ordered. g. Patient has a history of SBP and is on chronic prophylactic therapy with fluoroquinolone. We will continue.  h. Currently holding diuretics as patient has had prior difficulty with hypotension due to diuresis as well as paracenteses. We will limit to 4 L due to history of hypotension  i. Continue home midodrine.  j. Patient does not appear to be on lactulose at baseline. Will confirm with accurate med rec.  k. Continue rifaximin. 3. Acute hypoxic respiratory failure  a. Suspect this is 2/2 to pulmonary edema  b. Holding diuretics pending paracentesis. Likely resume following paracentesis. c. Continue with supplemental oxygen and wean as tolerated. 4. History of HFpEF  a. Unclear if in acute exacerbation or if worsening shortness of breath and edema is more related to cirrhosis. b. Echo 3/2022: EF 55%. Severe tricuspid regurgitation. Severe pulmonary hypertension. c. BNP elevated.   We will hold on diuretics as patient appears volume overloaded on exam with planning paracentesis. d. Likely resume diuretics tomorrow/following paracentesis if patient is hemodynamically stable. 5. Severe hypokalemia  a. Patient received 40 mEq of potassium at outside facility. We will continue with IV potassium replacement here due to severity. b. This is likely due to home diuretic use. Currently on hold pending paracentesis to avoid hypotension. 6. CKD stage IIIb  a. Renal function appears close to baseline with a creatinine of 1.6.  b. Holding diuretics pending paracentesis. c. Holding nephrotoxic agents for the time being. May be able to resume safely tomorrow. d. Hold losartan. 7. CAD  a. Follows with Dr. Toro Masters. b. [id number] [address] to continue ASA. Consider reducing to 81mg.   c. Holding ARB pending paracentesis due to hx of hypotension. d. Vieira snot appear to be on BB, likely due to cirrhosis/hx SBP  8. History of SBP  a. On chronic prophylactic therapy with ciprofloxacin. We will continue. b. Paracentesis pending. 9. History of atrial fibrillation  a. Patient reports not currently on anticoagulation. b. Currently rate controlled with a heart rate of 80-90. Not on BB therapy. Previously on Carvedilol. 10. Hypothyroidism  a. Continue synthroid. 11. Hx AS a/p TAVR now with AI.  12. Hx severe pulmonary HTN  a. Patient previously declined aggressive measure for management including transfer to tertiary care facility. Disposition: SNF in 3-4 days. History Of Present Illness:    Patient is a 82/F with medical  History of Cirrhosis, HFpEF, and SBP on ciprofloxacin prophylaxis presents to the hospital at the behest of her outpatient PCP due to worsening edema. The patient states that she was hospitalized approximate 4 weeks ago with hypotension and discharged on 4/5/22. It appears that she was diagnosed with SBP at that time and treated. She is currently on ciprofloxacin as well as Bumex, metolazone, and Aldactone.   She reports worsening shortness of breath as well as orthopnea. She states that her legs are heavy and she has difficulty with ambulation. She ambulates with a walker at baseline. She denies having any chest pain or palpitations. At ambulatory care center, patient was noted to have severe hypokalemia with a potassium of 2.1. He was given oral potassium replacement transferred to [facility] for further management. Patient denies having any abdominal pain, fevers, chills, nausea, or vomiting. She denies any diarrhea, or constipation. Past Medical History:        Diagnosis Date    ISIDRA (acute kidney injury) ([facility] [address] 06/11/2016    Arthritis     Atrial fibrillation (HCC)     Bleeding stomach ulcer 2011    CAD (coronary artery disease)     Cirrhosis of liver with ascites (HCC)     Clostridium difficile infection     History of blood transfusion     Hyperlipidemia     Hypertension     PONV (postoperative nausea and vomiting)        Past Surgical History:        Procedure Laterality Date    AORTIC VALVE REPLACEMENT      CARDIAC SURGERY      DIAGNOSTIC CARDIAC CATH LAB PROCEDURE      DILATION AND CURETTAGE OF UTERUS      JOINT REPLACEMENT Right 2011    knee    IA OFFICE/OUTPT VISIT,PROCEDURE ONLY N/A 6/28/2018    BIOPROSTHETIC AORTIC VALVE REPLACEMENT WITH MAZE PROCEDURE performed by Ruthy Crigler, MD at [address] [place] [address] N/A 2/28/2019    EGD ESOPHAGOGASTRODUODENOSCOPY performed by Jeffry Mckeon MD at [facility]       Home Medications:   No current facility-administered medications on file prior to encounter.      Current Outpatient Medications on File Prior to Encounter   Medication Sig Dispense Refill    acetaminophen (TYLENOL) 650 MG extended release tablet Take 650 mg by mouth every 8 hours as needed      sodium chloride 0.9 % SOLN Infuse 10 mLs intravenously      sodium chloride 0.9 % SOLN 8.7 mL with perflutren lipid microspheres SUSP 1.43 mg Infuse intravenously      Multiple Vitamins-Minerals (CENTRUM SILVER 50+MEN) TABS Take by mouth      GLUCOSAMINE-CHONDROITIN-MSM PO Take 750 mg by mouth daily      Multiple Vitamins-Minerals (CENTRUM SILVER 50+MEN) TABS Centrum Silver Women 8 mg iron-400 mcg-300 mcg oral tablet Centrum Silver Women 8 mg iron-400 mcg-300 mcg oral tablet take 1 tablet by oral route daily   [facility] ([id number])      triamcinolone (KENALOG) 0.1 % cream Apply topically      Probiotic Product (ACIDOPHILUS PROBIOTIC) CAPS capsule Take by mouth 2 times daily      potassium chloride (KLOR-CON M) 20 MEQ extended release tablet Take 20 mEq by mouth 2 times daily      potassium chloride (KLOR-CON) 10 MEQ extended release tablet TAKE 1 TABLET BY MOUTH ONCE DAILY WITH LASIX      metOLazone (ZAROXOLYN) 2.5 MG tablet 5 mg Once a day on Mon, Wed and Friday      metOLazone (ZAROXOLYN) 2.5 MG tablet Take by mouth      ondansetron (ZOFRAN) 4 MG tablet TAKE 1 TABLET BY MOUTH EVERY 8 HOURS      Glucosamine-Chondroitin 250-200 MG TABS Take by mouth      fluocinonide (LIDEX) 0.05 % cream Fluocinonide fluocinonide 0.05 % topical cream 7/6/2020 Apply thin layer to affected areas twice daily as needed for itching.  07-  [facility] ([id number])      doxycycline hyclate (VIBRA-TABS) 100 MG tablet TAKE 1 TABLET BY MOUTH TWICE DAILY FOR 7 DAYS      atorvastatin (LIPITOR) 20 MG tablet Take 20 mg by mouth daily      rifaximin (XIFAXAN) 550 MG tablet Take 1 tablet by mouth 2 times daily 42 tablet     bumetanide (BUMEX) 1 MG tablet Take 1 tablet by mouth 2 times daily 30 tablet 3    midodrine (PROAMATINE) 10 MG tablet Take 1 tablet by mouth 3 times daily (with meals)      levothyroxine (SYNTHROID) 50 MCG tablet Take 1 tablet by mouth every morning (before breakfast) 30 tablet 3    potassium bicarb-citric acid (EFFER-K) 20 MEQ TBEF effervescent tablet Take 1 tablet by mouth in the morning, at noon, and at bedtime 120 tablet     ciprofloxacin (CIPRO) 500 MG tablet Take 1 tablet by mouth daily  3    aspirin 325 MG tablet Take 325 mg by mouth daily      vitamin C (ASCORBIC ACID) 500 MG tablet Take 500 mg by mouth daily      omeprazole (PRILOSEC) 20 MG delayed release capsule Take 20 mg by mouth in the morning and at bedtime       Multiple Vitamins-Minerals (CENTRUM SILVER ADULT 50+ PO) Take 1 tablet by mouth daily      Calcium Carb-Cholecalciferol (CALTRATE 600+D3 PO) Take 1 tablet by mouth 2 times daily      sucralfate (CARAFATE) 1 GM tablet Take 1 tablet by mouth 4 times daily (before meals and nightly) (Patient taking differently: Take 1 g by mouth daily ) 120 tablet 3    Misc Natural Products (OSTEO BI-FLEX TRIPLE STRENGTH PO) Take 750 mg by mouth daily      Multiple Vitamins-Minerals (PRESERVISION AREDS) CAPS Take 1 capsule by mouth 2 times daily       fluticasone (FLONASE) 50 MCG/ACT nasal spray 1-2 sprays by Nasal route daily      Pyridoxine HCl (VITAMIN B-6) 100 MG tablet Take 100 mg by mouth daily      loratadine (CLARITIN) 10 MG tablet Take 10 mg by mouth daily       PARoxetine (PAXIL) 30 MG tablet Take 30 mg by mouth every morning      traZODone (DESYREL) 50 MG tablet Take 50 mg by mouth nightly as needed          Allergies: Other and Ace inhibitors    Social History:    reports that she has never smoked. She has never used smokeless tobacco. She reports that she does not drink alcohol and does not use drugs. Family History:       Problem Relation Age of Onset    Diabetes Father     Stroke Father     High Blood Pressure Mother     Cancer Sister         blood    Diabetes Brother     Cancer Brother         lymphoma    High Blood Pressure Maternal Grandmother     High Blood Pressure Maternal Grandfather     Diabetes Paternal Grandmother     Diabetes Paternal Grandfather     Diabetes Brother     Heart Disease Neg Hx        Diet:  ADULT DIET; Regular;  No Added Salt (3-4 gm)    Review of systems:   Pertinent positives as noted in the HPI. All other systems reviewed and negative. PHYSICAL EXAM:  /64   Pulse 91   Temp 98 °F (36.7 °C) (Oral)   Resp 22   Ht 5' 4\" (1.626 m)   Wt 189 lb (85.7 kg)   SpO2 95%   BMI 32.44 kg/m²   General appearance: Elderly, chronically ill-appearing female. No acute distress. HEENT: Normal cephalic, atraumatic without obvious deformity. Pupils equal, round, and reactive to light. Extra ocular muscles intact. Conjunctivae/corneas clear. Neck: Supple, with full range of motion. No jugular venous distention. Trachea midline. Respiratory:  Normal respiratory effort. Clear to auscultation, bilaterally without Rales/Wheezes/Rhonchi. Cardiovascular: Regular rate and rhythm with normal S1/S2 without murmurs, rubs or gallops. Abdomen: Abdomen is soft with mild distention. Mildly tense. Hypoactive bowel sounds. No tenderness to palpation. Musculoskeletal: Diffuse anasarca with 4+ pitting edema to the bilateral lower extremities extending up to the thighs. Skin: Stasis dermatitis noted to the bilateral lower extremities distal to the knee. No palmar erythema. Minimal spider angioma on the upper chest.  Neurologic:  Neurovascularly intact without any focal sensory/motor deficits.  Cranial nerves: II-XII intact, grossly non-focal.  Psychiatric: Alert and oriented, thought content appropriate, normal insight  Capillary Refill: Brisk,< 3 seconds   Peripheral Pulses: +2 palpable, equal bilaterally     Labs:   Recent Labs     04/22/22  1245   WBC 5.3   HGB 10.5*   HCT 33.0*        Recent Labs     04/22/22  1245 04/22/22  1657    137   K 2.1* 3.2*   CL 97* 91*   CO2 35* 32   BUN 54* 57*   CREATININE 1.8* 1.6*   CALCIUM  --  8.1*     Recent Labs     04/22/22  1245   AST 17   ALT 6*   BILITOT 1.0   ALKPHOS 99     Recent Labs     04/22/22  1245   INR 1.33*     No results for input(s): Clarence Eugene in the last 72 hours.    Urinalysis:    Lab Results   Component Value Date    NITRU NEGATIVE 03/26/2022    WBCUA 2-4 03/26/2022    BACTERIA FEW 03/26/2022    RBCUA 3-5 03/26/2022    BLOODU NEGATIVE 03/26/2022    SPECGRAV 1.017 03/11/2022    GLUCOSEU NEGATIVE 03/26/2022       Radiology:   XR CHEST (2 VW)   Final Result   1. Mild groundglass opacities in both lungs can reflect underlying interstitial pulmonary edema. 2. Small bilateral pleural effusions. 3. Moderate cardiomegaly. **This report has been created using voice recognition software. It may contain minor errors which are inherent in voice recognition technology. **      Final report electronically signed by Dr Paris Cristobal on 4/22/2022 1:09 PM      [id number] [employer]    (Results Pending)     XR CHEST (2 VW)    Result Date: 4/22/2022  PROCEDURE: XR CHEST (2 VW) CLINICAL INFORMATION: shortness of breath COMPARISON: Chest radiograph 3/26/2022, 3/24/2022, 3/15/2022, and 3/11/2022. TECHNIQUE: PA and lateral views of the chest performed. FINDINGS: Moderate cardiomegaly. Small bilateral pleural effusions. Mild groundglass opacification of both lungs. Prosthetic aortic valve is seen. Left atrial appendage clip is noted. No pneumothorax. No acute bony abnormality. Median sternotomy has been performed. 1. Mild groundglass opacities in both lungs can reflect underlying interstitial pulmonary edema. 2. Small bilateral pleural effusions. 3. Moderate cardiomegaly. **This report has been created using voice recognition software. It may contain minor errors which are inherent in voice recognition technology. ** Final report electronically signed by Dr Paris Cristobal on 4/22/2022 1:09 PM    Electronically signed by Susie Cantu DO on 4/22/2022 at 6:23 PM

## 2022-04-22 NOTE — PROGRESS NOTES
Pt admitted to  5K5 via via cart/stretcher from [facility] ED. Complaints: Shortness of breath. IV none infusing into the forearm right, condition patent and no rednessIV site free of s/s of infection or infiltration. Vital signs obtained. Assessment and data collection initiated. Two nurse skin assessment performed by Zulema Benitez RN and Katrina RN. Oriented to room. Policies and procedures for 5K explained. All questions answered with no further questions at this time. Fall prevention and safety brochure discussed with patient. Bed alarm on. Call light in reach. Oriented to room. Kerri Menezes, RN, RN 4/22/2022 4:40 PM     Explained patients right to have family, representative or physician notified of their admission. Patient has Declined for physician to be notified. Patient has Declined for family/representative to be notified.

## 2022-04-22 NOTE — ED PROVIDER NOTES
[facility]  eMERGENCY dEPARTMENT eNCOUnter          CHIEF COMPLAINT       Chief Complaint   Patient presents with    Leg Swelling       Nurses Notes reviewed and I agree except as noted in the HPI. HISTORY OF PRESENT ILLNESS    Jordan Boyd is a 81 y.o. female who presents from GI clinic. Patient ascites has recurred and patient notes diffuse body swelling. Patient medical history is significant for decompensated liver cirrhosis,acute on chronic HF,Severe pulmonary hypertension,severe tricuspid regurgitation,acute on chronic respiratory failure. REVIEW OF SYSTEMS     Review of Systems   Constitutional: Negative for chills and fever. Respiratory: Positive for shortness of breath. Negative for cough. Cardiovascular: Positive for leg swelling. Negative for chest pain and palpitations. Gastrointestinal: Positive for abdominal distention. Negative for nausea and vomiting. Genitourinary: Negative for difficulty urinating and dysuria. Skin: Negative for color change and rash. Neurological: Negative for dizziness and light-headedness. All other systems reviewed and are negative. PAST MEDICAL HISTORY    has a past medical history of ISIDRA (acute kidney injury) ([address] Arthritis, Atrial fibrillation ([address] Bleeding stomach ulcer, CAD (coronary artery disease), Cirrhosis of liver with ascites ([address] Clostridium difficile infection, History of blood transfusion, Hyperlipidemia, Hypertension, and PONV (postoperative nausea and vomiting). SURGICAL HISTORY      has a past surgical history that includes joint replacement (Right, 2011); Dilation and curettage of uterus; pr office/outpt visit,procedure only (N/A, 6/28/2018); Aortic valve replacement; Cardiac surgery; Tonsillectomy; Upper gastrointestinal endoscopy (N/A, 2/28/2019); and Diagnostic Cardiac Cath Lab Procedure.     CURRENT MEDICATIONS       Previous Medications    ACETAMINOPHEN (TYLENOL) 650 MG EXTENDED RELEASE TABLET    Take 650 mg by mouth every 8 hours as needed    ASPIRIN 325 MG TABLET    Take 325 mg by mouth daily    ATORVASTATIN (LIPITOR) 20 MG TABLET    Take 20 mg by mouth daily    BUMETANIDE (BUMEX) 1 MG TABLET    Take 1 tablet by mouth 2 times daily    CALCIUM CARB-CHOLECALCIFEROL (CALTRATE 600+D3 PO)    Take 1 tablet by mouth 2 times daily    CIPROFLOXACIN (CIPRO) 500 MG TABLET    Take 1 tablet by mouth daily    DOXYCYCLINE HYCLATE (VIBRA-TABS) 100 MG TABLET    TAKE 1 TABLET BY MOUTH TWICE DAILY FOR 7 DAYS    FLUOCINONIDE (LIDEX) 0.05 % CREAM    Fluocinonide fluocinonide 0.05 % topical cream 7/6/2020 Apply thin layer to affected areas twice daily as needed for itching.  07-  [facility] ([id number])    FLUTICASONE (FLONASE) 50 MCG/ACT NASAL SPRAY    1-2 sprays by Nasal route daily    GLUCOSAMINE-CHONDROITIN 250-200 MG TABS    Take by mouth    GLUCOSAMINE-CHONDROITIN-MSM PO    Take 750 mg by mouth daily    LEVOTHYROXINE (SYNTHROID) 50 MCG TABLET    Take 1 tablet by mouth every morning (before breakfast)    LORATADINE (CLARITIN) 10 MG TABLET    Take 10 mg by mouth daily     METOLAZONE (ZAROXOLYN) 2.5 MG TABLET    5 mg Once a day on Mon, Wed and Friday    METOLAZONE (ZAROXOLYN) 2.5 MG TABLET    Take by mouth    MIDODRINE (PROAMATINE) 10 MG TABLET    Take 1 tablet by mouth 3 times daily (with meals)    MISC NATURAL PRODUCTS (OSTEO BI-FLEX TRIPLE STRENGTH PO)    Take 750 mg by mouth daily    MULTIPLE VITAMINS-MINERALS (CENTRUM SILVER 50+MEN) TABS    Take by mouth    MULTIPLE VITAMINS-MINERALS (CENTRUM SILVER 50+MEN) TABS    Centrum Silver Women 8 mg iron-400 mcg-300 mcg oral tablet Centrum Silver Women 8 mg iron-400 mcg-300 mcg oral tablet take 1 tablet by oral route daily   [facility] ([id number])    MULTIPLE VITAMINS-MINERALS (CENTRUM SILVER ADULT 50+ PO)    Take 1 tablet by mouth daily    MULTIPLE VITAMINS-MINERALS (PRESERVISION AREDS) CAPS    Take 1 capsule by mouth 2 times daily     OMEPRAZOLE (PRILOSEC) 20 MG DELAYED RELEASE CAPSULE    Take 20 mg by mouth in the morning and at bedtime     ONDANSETRON (ZOFRAN) 4 MG TABLET    TAKE 1 TABLET BY MOUTH EVERY 8 HOURS    PAROXETINE (PAXIL) 30 MG TABLET    Take 30 mg by mouth every morning    POTASSIUM BICARB-CITRIC ACID (EFFER-K) 20 MEQ TBEF EFFERVESCENT TABLET    Take 1 tablet by mouth in the morning, at noon, and at bedtime    POTASSIUM CHLORIDE (KLOR-CON M) 20 MEQ EXTENDED RELEASE TABLET    Take 20 mEq by mouth 2 times daily    POTASSIUM CHLORIDE (KLOR-CON) 10 MEQ EXTENDED RELEASE TABLET    TAKE 1 TABLET BY MOUTH ONCE DAILY WITH LASIX    PROBIOTIC PRODUCT (ACIDOPHILUS PROBIOTIC) CAPS CAPSULE    Take by mouth 2 times daily    PYRIDOXINE HCL (VITAMIN B-6) 100 MG TABLET    Take 100 mg by mouth daily    RIFAXIMIN (XIFAXAN) 550 MG TABLET    Take 1 tablet by mouth 2 times daily    SODIUM CHLORIDE 0.9 % SOLN    Infuse 10 mLs intravenously    SODIUM CHLORIDE 0.9 % SOLN 8.7 ML WITH PERFLUTREN LIPID MICROSPHERES SUSP 1.43 MG    Infuse intravenously    SUCRALFATE (CARAFATE) 1 GM TABLET    Take 1 tablet by mouth 4 times daily (before meals and nightly)    TRAZODONE (DESYREL) 50 MG TABLET    Take 50 mg by mouth nightly as needed     TRIAMCINOLONE (KENALOG) 0.1 % CREAM    Apply topically    VITAMIN C (ASCORBIC ACID) 500 MG TABLET    Take 500 mg by mouth daily       ALLERGIES     is allergic to other and ace inhibitors. FAMILY HISTORY     She indicated that her mother is . She indicated that her father is . She indicated that her sister is alive. She indicated that both of her brothers are . She indicated that the status of her maternal grandmother is unknown. She indicated that the status of her maternal grandfather is unknown. She indicated that the status of her paternal grandmother is unknown. She indicated that the status of her paternal grandfather is unknown.  She indicated that the status of her neg hx is unknown.   family history includes Cancer in her brother and sister; Diabetes in her brother, brother, father, paternal grandfather, and paternal grandmother; High Blood Pressure in her maternal grandfather, maternal grandmother, and mother; Stroke in her father. SOCIAL HISTORY      reports that she has never smoked. She has never used smokeless tobacco. She reports that she does not drink alcohol and does not use drugs. PHYSICAL EXAM     INITIAL VITALS:  height is 5' 4\" (1.626 m) and weight is 189 lb (85.7 kg). Her oral temperature is 97.8 °F (36.6 °C). Her blood pressure is 112/51 (abnormal) and her pulse is 94. Her respiration is 19 and oxygen saturation is 95%. Physical Exam  Vitals and nursing note reviewed. Constitutional:       General: She is in acute distress. Cardiovascular:      Rate and Rhythm: Normal rate and regular rhythm. Pulses: Normal pulses. Heart sounds: Normal heart sounds. Pulmonary:      Effort: Respiratory distress present. Abdominal:      General: There is distension. Musculoskeletal:      Right lower leg: Edema present. Left lower leg: Edema present. Neurological:      Mental Status: She is alert.          DIFFERENTIAL DIAGNOSIS:   Ascites,peritonitis,respiratory failure nos,    DIAGNOSTIC RESULTS     EKG: All EKG's are interpreted by the Emergency Department Physician who either signs or Co-signs this chart in the absence of a cardiologist.  Undetermined rhythm  Low voltage QRS, consider pulmonary disease, pericardial effusion, or normal variant  Cannot rule out Anterior infarct (cited on or before 01-AUG-2018)  Abnormal ECG  When compared with ECG of 24-MAR-2022 22:44,  Significant changes have occurred    RADIOLOGY: non-plain film images(s) such as CT, Ultrasound and MRI are read by the radiologist.        XR CHEST (2 VW) (Final result)  Result time 04/22/22 13:09:14  Final result by Shreyas Ramon MD (04/22/22 13:09:14)                Impression: 1. Mild groundglass opacities in both lungs can reflect underlying interstitial pulmonary edema. 2. Small bilateral pleural effusions. 3. Moderate cardiomegaly. **This report has been created using voice recognition software.  It may contain minor errors which are inherent in voice recognition technology. **     Final report electronically signed by Dr Anastacio Runner on 4/22/2022 1:09 PM            Narrative:    PROCEDURE: XR CHEST (2 VW)     CLINICAL INFORMATION: shortness of breath     COMPARISON: Chest radiograph 3/26/2022, 3/24/2022, 3/15/2022, and 3/11/2022. TECHNIQUE: PA and lateral views of the chest performed. FINDINGS:       Moderate cardiomegaly. Small bilateral pleural effusions. Mild groundglass opacification of both lungs. Prosthetic aortic valve is seen. Left atrial appendage clip is noted. No pneumothorax. No acute bony abnormality. Median sternotomy has been performed.                    LABS:   Labs Reviewed   CBC WITH AUTO DIFFERENTIAL - Abnormal; Notable for the following components:       Result Value    RBC 3.50 (*)     Hemoglobin 10.5 (*)     Hematocrit 33.0 (*)     MCHC 31.8 (*)     RDW 16.5 (*)     All other components within normal limits   COMPREHENSIVE METABOLIC PANEL - Abnormal; Notable for the following components:    CREATININE 1.8 (*)     BUN 54 (*)     Potassium 2.1 (*)     Chloride 97 (*)     CO2 35 (*)     POC CALCIUM 8.1 (*)     Total Protein 6.1 (*)     Albumin 2.5 (*)     ALT 6 (*)     All other components within normal limits   MAGNESIUM - Abnormal; Notable for the following components:    Magnesium 1.5 (*)     All other components within normal limits   BRAIN NATRIURETIC PEPTIDE - Abnormal; Notable for the following components:    NT Pro-BNP 69877.0 (*)     All other components within normal limits   PROTIME-INR - Abnormal; Notable for the following components:    INR 1.33 (*)     All other components within normal limits   GLOMERULAR FILTRATION RATE, ESTIMATED - Abnormal; Notable for the following components:    GFR, Estimated 29 (*)     All other components within normal limits   TROPONIN   APTT   LACTIC ACID   ANION GAP   URINALYSIS WITH REFLEX TO CULTURE       EMERGENCY DEPARTMENT COURSE:   Vitals:    Vitals:    04/22/22 1309 04/22/22 1416 04/22/22 1505 04/22/22 1506   BP: 119/74 87/73 (!) 112/51    Pulse: 89 91 94    Resp: 21 14 19    Temp:       TempSrc:       SpO2: 93% (!) 88%  95%   Weight:       Height:         On exam the patient does have some decreased lung sounds bilaterally. Her vital signs are stable. She does demonstrate some diffuse abdominal distention which appears to have a fluid shift consistent with ascites. She is afebrile currently. She does have diffuse 2+ pitting edema to her lower extremities up to her thighs. Pulse ox initially 88% on RA. Nasal cannula 2 liters oxygen applied which brought up pulse ox to 95%. I did review the patient's chart it is extensive as far as chronic issues. The patient is a DNR CC. She does reside at a local nursing home. I did discuss with her issues of recurrence especially with her chronic liver cirrhosis could be contributing to her recurrent ascites. At this time she is hesitant about utilizing hospice. She would like to be admitted for withdrawal of ascites fluid. Chest x ray shows ground glass opacities, small bilateral pleural effusions,moderate cardiomegaly. Labs CMP potassium 2.1. magnesium 1.5. 1 gm magnesium given IVBP. Potassium 40 MEQ PO given. CRITICAL CARE:       CONSULTS:      PROCEDURES:  None    FINAL IMPRESSION      1. Other ascites    2. Bilateral leg edema    3. Hypokalemia          DISPOSITION/PLAN   Admit. PATIENT REFERRED TO:  No follow-up provider specified.     DISCHARGE MEDICATIONS:  New Prescriptions    No medications on file       (Please note that portions of this note were completed with a voice recognition program.  Efforts were made to edit the dictations but occasionally words are mis-transcribed.)    MD Betsey Buckley MD  04/22/22 [id number]

## 2022-04-22 NOTE — ED NOTES
Pt and son informed of admission and delay in getting room, both verbalize understanding.      Vicky Short RN  04/22/22 [id number]

## 2022-04-23 LAB
ALBUMIN FLUID: 1.5 GM/DL
AMYLASE FLUID: 13 U/L
ANION GAP SERPL CALCULATED.3IONS-SCNC: 15 MEQ/L (ref 8–16)
BASOPHILS # BLD: 0.4 %
BASOPHILS ABSOLUTE: 0 THOU/MM3 (ref 0–0.1)
BUN BLDV-MCNC: 57 MG/DL (ref 7–22)
CALCIUM SERPL-MCNC: 7.9 MG/DL (ref 8.5–10.5)
CHLORIDE BLD-SCNC: 91 MEQ/L (ref 98–111)
CO2: 28 MEQ/L (ref 23–33)
CREAT SERPL-MCNC: 1.8 MG/DL (ref 0.4–1.2)
EKG Q-T INTERVAL: 406 MS
EKG QRS DURATION: 102 MS
EKG QTC CALCULATION (BAZETT): 507 MS
EKG R AXIS: -29 DEGREES
EKG T AXIS: 106 DEGREES
EKG VENTRICULAR RATE: 94 BPM
EOSINOPHIL # BLD: 1.2 %
EOSINOPHILS ABSOLUTE: 0.1 THOU/MM3 (ref 0–0.4)
ERYTHROCYTE [DISTWIDTH] IN BLOOD BY AUTOMATED COUNT: 17 % (ref 11.5–14.5)
ERYTHROCYTE [DISTWIDTH] IN BLOOD BY AUTOMATED COUNT: 60.4 FL (ref 35–45)
GFR SERPL CREATININE-BSD FRML MDRD: 27 ML/MIN/1.73M2
GLUCOSE BLD-MCNC: 101 MG/DL (ref 70–108)
HCT VFR BLD CALC: 32.3 % (ref 37–47)
HEMOGLOBIN: 9.9 GM/DL (ref 12–16)
IMMATURE GRANS (ABS): 0.03 THOU/MM3 (ref 0–0.07)
IMMATURE GRANULOCYTES: 0.6 %
LYMPHOCYTES # BLD: 19.6 %
LYMPHOCYTES ABSOLUTE: 1 THOU/MM3 (ref 1–4.8)
MCH RBC QN AUTO: 29.8 PG (ref 26–33)
MCHC RBC AUTO-ENTMCNC: 30.7 GM/DL (ref 32.2–35.5)
MCV RBC AUTO: 97.3 FL (ref 81–99)
MONOCYTES # BLD: 8.4 %
MONOCYTES ABSOLUTE: 0.4 THOU/MM3 (ref 0.4–1.3)
NUCLEATED RED BLOOD CELLS: 0 /100 WBC
PLATELET # BLD: 215 THOU/MM3 (ref 130–400)
PMV BLD AUTO: 9.8 FL (ref 9.4–12.4)
POTASSIUM SERPL-SCNC: 3 MEQ/L (ref 3.5–5.2)
POTASSIUM SERPL-SCNC: 3 MEQ/L (ref 3.5–5.2)
RBC # BLD: 3.32 MILL/MM3 (ref 4.2–5.4)
SEG NEUTROPHILS: 69.8 %
SEGMENTED NEUTROPHILS ABSOLUTE COUNT: 3.6 THOU/MM3 (ref 1.8–7.7)
SODIUM BLD-SCNC: 134 MEQ/L (ref 135–145)
WBC # BLD: 5.1 THOU/MM3 (ref 4.8–10.8)

## 2022-04-23 PROCEDURE — 0W9G3ZZ DRAINAGE OF PERITONEAL CAVITY, PERCUTANEOUS APPROACH: ICD-10-PCS | Performed by: STUDENT IN AN ORGANIZED HEALTH CARE EDUCATION/TRAINING PROGRAM

## 2022-04-23 PROCEDURE — 2500000003 HC RX 250 WO HCPCS: Performed by: STUDENT IN AN ORGANIZED HEALTH CARE EDUCATION/TRAINING PROGRAM

## 2022-04-23 PROCEDURE — 49083 ABD PARACENTESIS W/IMAGING: CPT | Performed by: STUDENT IN AN ORGANIZED HEALTH CARE EDUCATION/TRAINING PROGRAM

## 2022-04-23 PROCEDURE — 85025 COMPLETE CBC W/AUTO DIFF WBC: CPT

## 2022-04-23 PROCEDURE — 2580000003 HC RX 258: Performed by: STUDENT IN AN ORGANIZED HEALTH CARE EDUCATION/TRAINING PROGRAM

## 2022-04-23 PROCEDURE — 1200000000 HC SEMI PRIVATE

## 2022-04-23 PROCEDURE — 88112 CYTOPATH CELL ENHANCE TECH: CPT

## 2022-04-23 PROCEDURE — 6370000000 HC RX 637 (ALT 250 FOR IP): Performed by: STUDENT IN AN ORGANIZED HEALTH CARE EDUCATION/TRAINING PROGRAM

## 2022-04-23 PROCEDURE — 88305 TISSUE EXAM BY PATHOLOGIST: CPT

## 2022-04-23 PROCEDURE — 2500000003 HC RX 250 WO HCPCS

## 2022-04-23 PROCEDURE — 93005 ELECTROCARDIOGRAM TRACING: CPT | Performed by: STUDENT IN AN ORGANIZED HEALTH CARE EDUCATION/TRAINING PROGRAM

## 2022-04-23 PROCEDURE — 99233 SBSQ HOSP IP/OBS HIGH 50: CPT | Performed by: STUDENT IN AN ORGANIZED HEALTH CARE EDUCATION/TRAINING PROGRAM

## 2022-04-23 PROCEDURE — 89050 BODY FLUID CELL COUNT: CPT

## 2022-04-23 PROCEDURE — 36415 COLL VENOUS BLD VENIPUNCTURE: CPT

## 2022-04-23 PROCEDURE — 6360000002 HC RX W HCPCS: Performed by: STUDENT IN AN ORGANIZED HEALTH CARE EDUCATION/TRAINING PROGRAM

## 2022-04-23 PROCEDURE — 84132 ASSAY OF SERUM POTASSIUM: CPT

## 2022-04-23 PROCEDURE — 93010 ELECTROCARDIOGRAM REPORT: CPT | Performed by: INTERNAL MEDICINE

## 2022-04-23 PROCEDURE — 87070 CULTURE OTHR SPECIMN AEROBIC: CPT

## 2022-04-23 PROCEDURE — 87075 CULTR BACTERIA EXCEPT BLOOD: CPT

## 2022-04-23 PROCEDURE — 82042 OTHER SOURCE ALBUMIN QUAN EA: CPT

## 2022-04-23 PROCEDURE — 82150 ASSAY OF AMYLASE: CPT

## 2022-04-23 PROCEDURE — 80048 BASIC METABOLIC PNL TOTAL CA: CPT

## 2022-04-23 RX ORDER — TRAZODONE HYDROCHLORIDE 50 MG/1
50 TABLET ORAL NIGHTLY
Status: DISCONTINUED | OUTPATIENT
Start: 2022-04-23 | End: 2022-04-27 | Stop reason: HOSPADM

## 2022-04-23 RX ORDER — LIDOCAINE HYDROCHLORIDE 10 MG/ML
20 INJECTION, SOLUTION EPIDURAL; INFILTRATION; INTRACAUDAL; PERINEURAL ONCE
Status: COMPLETED | OUTPATIENT
Start: 2022-04-23 | End: 2022-04-23

## 2022-04-23 RX ORDER — TRAZODONE HYDROCHLORIDE 50 MG/1
50 TABLET ORAL NIGHTLY PRN
Status: DISCONTINUED | OUTPATIENT
Start: 2022-04-23 | End: 2022-04-23

## 2022-04-23 RX ORDER — LIDOCAINE HYDROCHLORIDE 10 MG/ML
INJECTION, SOLUTION EPIDURAL; INFILTRATION; INTRACAUDAL; PERINEURAL
Status: COMPLETED
Start: 2022-04-23 | End: 2022-04-23

## 2022-04-23 RX ORDER — POTASSIUM CHLORIDE 20 MEQ/1
40 TABLET, EXTENDED RELEASE ORAL 2 TIMES DAILY WITH MEALS
Status: COMPLETED | OUTPATIENT
Start: 2022-04-23 | End: 2022-04-23

## 2022-04-23 RX ORDER — BUMETANIDE 0.25 MG/ML
2 INJECTION, SOLUTION INTRAMUSCULAR; INTRAVENOUS ONCE
Status: COMPLETED | OUTPATIENT
Start: 2022-04-23 | End: 2022-04-23

## 2022-04-23 RX ADMIN — POTASSIUM CHLORIDE 40 MEQ: 20 TABLET, EXTENDED RELEASE ORAL at 15:55

## 2022-04-23 RX ADMIN — ENOXAPARIN SODIUM 30 MG: 100 INJECTION SUBCUTANEOUS at 21:18

## 2022-04-23 RX ADMIN — PAROXETINE HYDROCHLORIDE 30 MG: 30 TABLET, FILM COATED ORAL at 08:23

## 2022-04-23 RX ADMIN — SODIUM CHLORIDE, PRESERVATIVE FREE 10 ML: 5 INJECTION INTRAVENOUS at 08:23

## 2022-04-23 RX ADMIN — POTASSIUM CHLORIDE 40 MEQ: 20 TABLET, EXTENDED RELEASE ORAL at 08:22

## 2022-04-23 RX ADMIN — POTASSIUM CHLORIDE 10 MEQ: 7.46 INJECTION, SOLUTION INTRAVENOUS at 02:23

## 2022-04-23 RX ADMIN — ONDANSETRON 4 MG: 2 INJECTION INTRAMUSCULAR; INTRAVENOUS at 09:15

## 2022-04-23 RX ADMIN — SODIUM CHLORIDE, PRESERVATIVE FREE 10 ML: 5 INJECTION INTRAVENOUS at 21:19

## 2022-04-23 RX ADMIN — ACETAMINOPHEN 650 MG: 325 TABLET ORAL at 00:03

## 2022-04-23 RX ADMIN — LIDOCAINE HYDROCHLORIDE 20 ML: 10 INJECTION, SOLUTION EPIDURAL; INFILTRATION; INTRACAUDAL; PERINEURAL at 11:07

## 2022-04-23 RX ADMIN — LEVOTHYROXINE SODIUM 50 MCG: 0.05 TABLET ORAL at 06:09

## 2022-04-23 RX ADMIN — ASPIRIN 325 MG: 325 TABLET ORAL at 08:22

## 2022-04-23 RX ADMIN — RIFAXIMIN 550 MG: 550 TABLET ORAL at 08:23

## 2022-04-23 RX ADMIN — RIFAXIMIN 550 MG: 550 TABLET ORAL at 21:18

## 2022-04-23 RX ADMIN — PANTOPRAZOLE SODIUM 40 MG: 40 TABLET, DELAYED RELEASE ORAL at 06:09

## 2022-04-23 RX ADMIN — CIPROFLOXACIN HYDROCHLORIDE 500 MG: 500 TABLET, FILM COATED ORAL at 08:23

## 2022-04-23 RX ADMIN — TRAZODONE HYDROCHLORIDE 50 MG: 50 TABLET ORAL at 21:18

## 2022-04-23 RX ADMIN — POTASSIUM CHLORIDE 10 MEQ: 7.46 INJECTION, SOLUTION INTRAVENOUS at 00:02

## 2022-04-23 RX ADMIN — BUMETANIDE 2 MG: 0.25 INJECTION, SOLUTION INTRAMUSCULAR; INTRAVENOUS at 15:53

## 2022-04-23 ASSESSMENT — PAIN SCALES - GENERAL
PAINLEVEL_OUTOF10: 3
PAINLEVEL_OUTOF10: 0

## 2022-04-23 ASSESSMENT — PAIN DESCRIPTION - LOCATION
LOCATION: BUTTOCKS
LOCATION: BUTTOCKS

## 2022-04-23 ASSESSMENT — PAIN DESCRIPTION - DESCRIPTORS
DESCRIPTORS: ACHING
DESCRIPTORS: ACHING

## 2022-04-23 NOTE — PLAN OF CARE
Problem: Skin/Tissue Integrity  Goal: Absence of new skin breakdown  Description: 1. Monitor for areas of redness and/or skin breakdown  2. Assess vascular access sites hourly  3. Every 4-6 hours minimum:  Change oxygen saturation probe site  4. Every 4-6 hours:  If on nasal continuous positive airway pressure, respiratory therapy assess nares and determine need for appliance change or resting period. Outcome: Progressing  Note: Patient repositions every 2 hours. Heels elevated off of bed. Skin kept clean and dry. Skin assessed with every head to toe. Yandel scale complete. Tegaderm dressing on LLQ from paracentesis. Hemorrhoids present and applying cream.      Problem: Pain  Goal: Verbalizes/displays adequate comfort level or baseline comfort level  Outcome: Adequate for Discharge  Flowsheets (Taken 4/23/2022 1419)  Verbalizes/displays adequate comfort level or baseline comfort level:   Encourage patient to monitor pain and request assistance   Assess pain using appropriate pain scale   Implement non-pharmacological measures as appropriate and evaluate response  Note: Patient pain goal is 2/10. Patient denying pain so far this shift. PRN Tylenol. Patient utilizes rest and repositioning for comfort. Pain assessment ongoing. Problem: ABCDS Injury Assessment  Goal: Absence of physical injury  Outcome: Progressing  Note: Patient bed in lowest position, wheels locked, 2/4 side rails up and alarm on. Call light and belongings within reach. Pathway clear. Nonskid footwear on. Patient rounded on hourly. Fall risk assessment complete. Patient remains free from falls this shift. Patient ambulates x1 assist with walker. Fall risk due to SOB and oxygen tubing.       Problem: Discharge Planning  Goal: Discharge to home or other facility with appropriate resources  Outcome: Progressing  Flowsheets (Taken 4/23/2022 1419)  Discharge to home or other facility with appropriate resources: Identify barriers to discharge with patient and caregiver  Note: Patient from [facility]. Patient plans to return there. Discharge planning ongoing. Problem: Nutrition Deficit:  Goal: Optimize nutritional status  Outcome: Progressing  Note: Patient has poor appetite. Eats about 50% of meals. Low salt and fluid restriction diet. Problem: Respiratory - Adult  Goal: Achieves optimal ventilation and oxygenation  Outcome: Progressing  Flowsheets (Taken 4/23/2022 1419)  Achieves optimal ventilation and oxygenation: Assess for changes in respiratory status  Note: Patient arrived on 2L yesterday. Weaned down to 1L and oxygen remains above 90%. Patient unsuccessfully weaned to room air, oxygen dropped below 90%. Patient educated on IS and encouraged to use. Problem: Gastrointestinal - Adult  Goal: Minimal or absence of nausea and vomiting  Outcome: Progressing  Flowsheets (Taken 4/23/2022 1419)  Minimal or absence of nausea and vomiting: Administer ordered antiemetic medications as needed  Note: Patient experiencing nausea. Zofran PRN. Nausea worsens with movement. Problem: Metabolic/Fluid and Electrolytes - Adult  Goal: Hemodynamic stability and optimal renal function maintained  Outcome: Progressing  Flowsheets (Taken 4/23/2022 1419)  Hemodynamic stability and optimal renal function maintained: Monitor labs and assess for signs and symptoms of volume excess or deficit  Note: Potassium is 3.0. Replaced IV yesterday. Scheduled oral dose. Patient on fluid restriction due to ascites and edema. Paracentesis preformed today. BP remains stable. Care plan reviewed with patient and son. Patient and son verbalize understanding of the plan of care and contribute to goal setting.

## 2022-04-23 NOTE — PROCEDURES
Consent for operation or procedure: Risks and benefits discussed with patient and consent obtained    Anesthesia: 4 cc of Lidocaine    Patient positioned supine. Abdomen examined with ultrasound for appropriate site placement. Site marked and prepared with swabs of chlorhexadine. Site draped with sterile dressing. Wheel of lidocaine placed. Lidocaine then introduced deep to the peritoneum. Skin punctured with blade of a scalpel. Paracentesis needle was placed through the skin into the abdominal cavity. The needle was withdrawn and the site cleaned and bandaged with gauze and tape. Samples were sent to the lab for analysis. Yellow colored was fluid removed    Amount of fluid removed: 2.6L    Estimated Blood Loss: minimal     Complications: The patient tolerated the procedure well without complications.

## 2022-04-23 NOTE — FLOWSHEET NOTE
04/23/22 0911   Treatment Team Notification   Reason for Communication Review case   Team Member Name Dr. Timo Fisher Team Role Attending Provider   Method of Communication Secure Message   Response See orders   Notification Time 0911     Notified QT is 406 ms on latest EKG. Asked if Zofran is okay to give. Dr. Aleks Bautista confirmed Beverley Ready is okay to give to patient at this time if she is nauseated.

## 2022-04-23 NOTE — PROGRESS NOTES
Hospitalist Progress Note      Patient:  Sergei Woods    Unit/Bed:5K-05/005-A  YOB: 1940  MRN: [id number]   Acct: [de-identified]   PCP: ARTHUR Ortiz CNP  Date of Admission: 4/22/2022    Assessment/Plan:    1. Acute on chronic decompensated cirrhosis  2. Anasarca  a. Unclear etiology of cirrhosis. Suspect possible fatty liver disease. Patient denies any alcohol history. b. MELD-Na: 16.  c. Last EGD appears to be 5/2021: Distal esophagitis, gastritis, portal hypertensive gastropathy, fundic polyps, no varices. d. Diuretics on hold pending paracentesis this morning. Will send studies to further evaluate given history of SBP. Currently on chronic cipro for prophylaxis  e. Diuretics on hold pending paracentesis as patient has a history of hypotension. Chronically on midodrine therapy. Likely resume this evening following paracentesis if HD stable vs tomorrow. f. Continue home midodrine. g. Patient does not appear to be on lactulose at baseline. Will confirm with accurate med rec.  h. Continue rifaximin. 3. Acute hypoxic respiratory failure  a. Suspect this is 2/2 to pulmonary edema  b. Holding diuretics pending paracentesis. Likely resume following paracentesis this evening vs. Tomorrow pending HD stability. 4. History of HFpEF  a. Unclear if in acute exacerbation or if worsening shortness of breath and edema is more related to cirrhosis. b. Echo 3/2022: EF 55%. Severe tricuspid regurgitation. Severe pulmonary hypertension. c. BNP elevated. We will hold on diuretics as patient appears volume overloaded on exam with planning paracentesis. d. Likely resume diuretics tomorrow/following paracentesis if patient is hemodynamically stable. 5. Severe hypokalemia  a. Improved to 3.0 this AM.  Will give an additional 80 mEq today PO. Anticipate resuming maintenance dose tomorrow.    b. This is likely due to home diuretic use.  Currently on hold pending paracentesis to avoid hypotension. 6. CKD stage IIIb  a. Renal function appears close to baseline with a creatinine of 1.6.  b. Diuretics on hold with plans to resume today v tomrrow. 7. CAD  a. Follows with Dr. Radha Rowell. analia. Ami Stone to continue ASA. Consider reducing to 81mg.   c. Holding ARB pending paracentesis due to hx of hypotension. d. Vieira snot appear to be on BB, likely due to cirrhosis/hx SBP  8. History of SBP  a. On chronic prophylactic therapy with ciprofloxacin. We will continue. b. Paracentesis pending. 9. History of atrial fibrillation  a. Patient reports not currently on anticoagulation. b. Currently rate controlled with a heart rate of 80-90. Not on BB therapy. Previously on Carvedilol. 10. Hypothyroidism  a. Continue synthroid. 11. Hx AS a/p TAVR now with AI.  12. Hx severe pulmonary HTN  a. Patient previously declined aggressive measure for management including transfer to tertiary care facility. Disposition:  Patient from SNF with plans to return at time of discharge, likely in 48-72 hours. Chief Complaint: SOB, swelling. Hospital Course:    4/22: H&P - \"Patient is a 82/F with medical  History of Cirrhosis, HFpEF, and SBP on ciprofloxacin prophylaxis presents to the hospital at the behest of her outpatient PCP due to worsening edema. The patient states that she was hospitalized approximate 4 weeks ago with hypotension and discharged on 4/5/22. It appears that she was diagnosed with SBP at that time and treated. She is currently on ciprofloxacin as well as Bumex, metolazone, and Aldactone. She reports worsening shortness of breath as well as orthopnea. She states that her legs are heavy and she has difficulty with ambulation. She ambulates with a walker at baseline. She denies having any chest pain or palpitations. \"    Subjective (past 24 hours):   Patient with a brief episode of nausea this morning, resolved without medications.   Denies any chest pains or palpitations. Admits to some mild SOB similar to yesterday. Denies fevers, chills, or abdominal pain. Past medical history, family history, social history and allergies reviewed again and is unchanged since admission. ROS (12 point review of systems completed. Pertinent positives noted. Otherwise ROS is negative)     Medications:  Reviewed    Infusion Medications    sodium chloride       Scheduled Medications    potassium chloride  40 mEq Oral BID WC    traZODone  50 mg Oral Nightly    sodium chloride flush  5-40 mL IntraVENous 2 times per day    enoxaparin  30 mg SubCUTAneous Daily    aspirin  325 mg Oral Daily    [Held by provider] atorvastatin  20 mg Oral Daily    [Held by provider] bumetanide  1 mg Oral BID    ciprofloxacin  500 mg Oral Daily    levothyroxine  50 mcg Oral QAM AC    [Held by provider] metOLazone  5 mg Oral Once per day on Mon Wed Fri    midodrine  10 mg Oral TID WC    pantoprazole  40 mg Oral QAM AC    PARoxetine  30 mg Oral QAM    rifAXIMin  550 mg Oral BID     PRN Meds: sodium chloride flush, sodium chloride, ondansetron **OR** ondansetron, polyethylene glycol, acetaminophen **OR** acetaminophen      Intake/Output Summary (Last 24 hours) at 4/23/2022 0942  Last data filed at 4/23/2022 9784  Gross per 24 hour   Intake 10 ml   Output --   Net 10 ml       Diet:  ADULT DIET; Regular; No Added Salt (3-4 gm)    Exam:  BP (!) 158/77   Pulse 91   Temp 97.1 °F (36.2 °C) (Oral)   Resp 18   Ht 5' 4\" (1.626 m)   Wt 189 lb (85.7 kg)   SpO2 91%   BMI 32.44 kg/m²   General appearance: Elderly, chronically ill-appearing female. No acute distress. HEENT: Normal cephalic, atraumatic without obvious deformity. Pupils equal, round, and reactive to light. Extra ocular muscles intact. Conjunctivae/corneas clear. Neck: Supple, with full range of motion. No jugular venous distention. Trachea midline. Respiratory:  Normal respiratory effort.  Clear to auscultation, bilaterally without Rales/Wheezes/Rhonchi. Cardiovascular: Regular rate and rhythm with normal S1/S2 without murmurs, rubs or gallops. Abdomen: Abdomen is soft with mild distention. Mildly tense. Hypoactive bowel sounds. No tenderness to palpation. Musculoskeletal: Diffuse anasarca with 4+ pitting edema to the bilateral lower extremities extending up to the thighs. Skin: Stasis dermatitis noted to the bilateral lower extremities distal to the knee. No palmar erythema. Minimal spider angioma on the upper chest.  Mild distal LE discoloration with blanching and. Mildly cool to the touch. Neurologic:  Neurovascularly intact without any focal sensory/motor deficits. Cranial nerves: II-XII intact, grossly non-focal.  Psychiatric: Alert and oriented, thought content appropriate, normal insight  Capillary Refill: Brisk,< 3 seconds   Peripheral Pulses: Distal LE pulses difficult to palpate due to edema. Labs:   Recent Labs     04/22/22  1245 04/23/22  0814   WBC 5.3 5.1   HGB 10.5* 9.9*   HCT 33.0* 32.3*    215     Recent Labs     04/22/22  1245 04/22/22  1245 04/22/22  1657 04/23/22  0714 04/23/22  0814     --  137  --  134*   K 2.1*   < > 3.2* 3.0* 3.0*   CL 97*  --  91*  --  91*   CO2 35*  --  32  --  28   BUN 54*  --  57*  --  57*   CREATININE 1.8*  --  1.6*  --  1.8*   CALCIUM  --   --  8.1*  --  7.9*    < > = values in this interval not displayed. Recent Labs     04/22/22  1245   AST 17   ALT 6*   BILITOT 1.0   ALKPHOS 99     Recent Labs     04/22/22  1245   INR 1.33*     No results for input(s): CKTOTAL, TROPONINI in the last 72 hours.     Microbiology:    Blood culture #1:   Lab Results   Component Value Date    BC No growth-preliminary  No growth   08/24/2018       Blood culture #2:No results found for: Ofelia Mena    Organism:  Lab Results   Component Value Date    ORG Enterococcus faecium - (Group D) 08/31/2018         Lab Results   Component Value Date    LABGRAM  04/03/2022 Moderate segmented neutrophils observed. No bacteria seen. performed on cytospun specimen        MRSA culture only:No results found for: [facility]    Urine culture: No results found for: LABURIN    Respiratory culture: No results found for: CULTRESP    Aerobic and Anaerobic :  No results found for: LABAERO  Lab Results   Component Value Date    LABANAE No growth-preliminary No growth  04/03/2022       Urinalysis:      Lab Results   Component Value Date    NITRU NEGATIVE 03/26/2022    WBCUA 2-4 03/26/2022    BACTERIA FEW 03/26/2022    RBCUA 3-5 03/26/2022    BLOODU NEGATIVE 03/26/2022    SPECGRAV 1.017 03/11/2022    GLUCOSEU NEGATIVE 03/26/2022       Radiology:  US ABDOMEN LIMITED   Final Result   Moderate cirrhotic ascites. This document has been electronically signed by: Bradly Atwood MD on    04/22/2022 11:21 PM      XR CHEST (2 VW)   Final Result   1. Mild groundglass opacities in both lungs can reflect underlying interstitial pulmonary edema. 2. Small bilateral pleural effusions. 3. Moderate cardiomegaly. **This report has been created using voice recognition software. It may contain minor errors which are inherent in voice recognition technology. **      Final report electronically signed by Dr Pablito Chaudhari on 4/22/2022 1:09 PM      [address]    (Results Pending)     XR CHEST (2 VW)    Result Date: 4/22/2022  PROCEDURE: XR CHEST (2 VW) CLINICAL INFORMATION: shortness of breath COMPARISON: Chest radiograph 3/26/2022, 3/24/2022, 3/15/2022, and 3/11/2022. TECHNIQUE: PA and lateral views of the chest performed. FINDINGS: Moderate cardiomegaly. Small bilateral pleural effusions. Mild groundglass opacification of both lungs. Prosthetic aortic valve is seen. Left atrial appendage clip is noted. No pneumothorax. No acute bony abnormality. Median sternotomy has been performed. 1. Mild groundglass opacities in both lungs can reflect underlying interstitial pulmonary edema.  2. Small bilateral pleural effusions. 3. Moderate cardiomegaly. **This report has been created using voice recognition software. It may contain minor errors which are inherent in voice recognition technology. ** Final report electronically signed by Dr Griselda Bobo on 4/22/2022 1:09 PM    US ABDOMEN LIMITED    Result Date: 4/22/2022  US Abdomen Limited, Liver COMPARISON: US,SR - US LIVER - 03/12/2022 12:04 AM EST FINDINGS: Nodular liver contours consistent with hepatic cirrhosis. Moderate ascites. Moderate cirrhotic ascites.  This document has been electronically signed by: Bandar Calix MD on 04/22/2022 11:21 PM      Electronically signed by Yuliya Almanza DO on 4/23/2022 at 9:42 AM

## 2022-04-23 NOTE — PROGRESS NOTES
Leaking at paracentesis site on LLQ of ABD. Small amount of clear yellow drainage leaking through gauze and tegaderm dressing. Hannah skin remains intact and without redness, swelling or warmth. New gauze and Tegaderm placed. Patient rolled onto right side.

## 2022-04-24 LAB
ANION GAP SERPL CALCULATED.3IONS-SCNC: 14 MEQ/L (ref 8–16)
BASOPHILS # BLD: 1.1 %
BASOPHILS ABSOLUTE: 0.1 THOU/MM3 (ref 0–0.1)
BUN BLDV-MCNC: 60 MG/DL (ref 7–22)
CALCIUM SERPL-MCNC: 7.8 MG/DL (ref 8.5–10.5)
CHLORIDE BLD-SCNC: 90 MEQ/L (ref 98–111)
CO2: 32 MEQ/L (ref 23–33)
CREAT SERPL-MCNC: 2.3 MG/DL (ref 0.4–1.2)
EOSINOPHIL # BLD: 1.8 %
EOSINOPHILS ABSOLUTE: 0.1 THOU/MM3 (ref 0–0.4)
ERYTHROCYTE [DISTWIDTH] IN BLOOD BY AUTOMATED COUNT: 16.8 % (ref 11.5–14.5)
ERYTHROCYTE [DISTWIDTH] IN BLOOD BY AUTOMATED COUNT: 59.7 FL (ref 35–45)
GFR SERPL CREATININE-BSD FRML MDRD: 20 ML/MIN/1.73M2
GLUCOSE BLD-MCNC: 89 MG/DL (ref 70–108)
HCT VFR BLD CALC: 34.2 % (ref 37–47)
HEMOGLOBIN: 10.2 GM/DL (ref 12–16)
IMMATURE GRANS (ABS): 0.02 THOU/MM3 (ref 0–0.07)
IMMATURE GRANULOCYTES: 0.4 %
LYMPHOCYTES # BLD: 22.6 %
LYMPHOCYTES ABSOLUTE: 1.2 THOU/MM3 (ref 1–4.8)
MCH RBC QN AUTO: 29.2 PG (ref 26–33)
MCHC RBC AUTO-ENTMCNC: 29.8 GM/DL (ref 32.2–35.5)
MCV RBC AUTO: 98 FL (ref 81–99)
MONOCYTES # BLD: 9.2 %
MONOCYTES ABSOLUTE: 0.5 THOU/MM3 (ref 0.4–1.3)
NUCLEATED RED BLOOD CELLS: 0 /100 WBC
PLATELET # BLD: 212 THOU/MM3 (ref 130–400)
PMV BLD AUTO: 10.1 FL (ref 9.4–12.4)
POTASSIUM SERPL-SCNC: 4.1 MEQ/L (ref 3.5–5.2)
RBC # BLD: 3.49 MILL/MM3 (ref 4.2–5.4)
SEG NEUTROPHILS: 64.9 %
SEGMENTED NEUTROPHILS ABSOLUTE COUNT: 3.5 THOU/MM3 (ref 1.8–7.7)
SODIUM BLD-SCNC: 136 MEQ/L (ref 135–145)
WBC # BLD: 5.4 THOU/MM3 (ref 4.8–10.8)

## 2022-04-24 PROCEDURE — 99233 SBSQ HOSP IP/OBS HIGH 50: CPT | Performed by: STUDENT IN AN ORGANIZED HEALTH CARE EDUCATION/TRAINING PROGRAM

## 2022-04-24 PROCEDURE — 2580000003 HC RX 258: Performed by: STUDENT IN AN ORGANIZED HEALTH CARE EDUCATION/TRAINING PROGRAM

## 2022-04-24 PROCEDURE — 85025 COMPLETE CBC W/AUTO DIFF WBC: CPT

## 2022-04-24 PROCEDURE — 84156 ASSAY OF PROTEIN URINE: CPT

## 2022-04-24 PROCEDURE — 82570 ASSAY OF URINE CREATININE: CPT

## 2022-04-24 PROCEDURE — 6370000000 HC RX 637 (ALT 250 FOR IP): Performed by: STUDENT IN AN ORGANIZED HEALTH CARE EDUCATION/TRAINING PROGRAM

## 2022-04-24 PROCEDURE — 2500000003 HC RX 250 WO HCPCS: Performed by: INTERNAL MEDICINE

## 2022-04-24 PROCEDURE — 36415 COLL VENOUS BLD VENIPUNCTURE: CPT

## 2022-04-24 PROCEDURE — 80048 BASIC METABOLIC PNL TOTAL CA: CPT

## 2022-04-24 PROCEDURE — 6360000002 HC RX W HCPCS: Performed by: STUDENT IN AN ORGANIZED HEALTH CARE EDUCATION/TRAINING PROGRAM

## 2022-04-24 PROCEDURE — 84300 ASSAY OF URINE SODIUM: CPT

## 2022-04-24 PROCEDURE — 1200000000 HC SEMI PRIVATE

## 2022-04-24 PROCEDURE — 2500000003 HC RX 250 WO HCPCS: Performed by: STUDENT IN AN ORGANIZED HEALTH CARE EDUCATION/TRAINING PROGRAM

## 2022-04-24 PROCEDURE — 81001 URINALYSIS AUTO W/SCOPE: CPT

## 2022-04-24 PROCEDURE — 99223 1ST HOSP IP/OBS HIGH 75: CPT | Performed by: INTERNAL MEDICINE

## 2022-04-24 PROCEDURE — 82436 ASSAY OF URINE CHLORIDE: CPT

## 2022-04-24 RX ORDER — BUMETANIDE 0.25 MG/ML
2 INJECTION, SOLUTION INTRAMUSCULAR; INTRAVENOUS 2 TIMES DAILY WITH MEALS
Status: DISCONTINUED | OUTPATIENT
Start: 2022-04-24 | End: 2022-04-24

## 2022-04-24 RX ORDER — BUMETANIDE 0.25 MG/ML
1 INJECTION, SOLUTION INTRAMUSCULAR; INTRAVENOUS 2 TIMES DAILY WITH MEALS
Status: DISCONTINUED | OUTPATIENT
Start: 2022-04-24 | End: 2022-04-27 | Stop reason: HOSPADM

## 2022-04-24 RX ADMIN — PAROXETINE HYDROCHLORIDE 30 MG: 30 TABLET, FILM COATED ORAL at 08:59

## 2022-04-24 RX ADMIN — CIPROFLOXACIN HYDROCHLORIDE 500 MG: 500 TABLET, FILM COATED ORAL at 08:59

## 2022-04-24 RX ADMIN — RIFAXIMIN 550 MG: 550 TABLET ORAL at 21:30

## 2022-04-24 RX ADMIN — SODIUM CHLORIDE, PRESERVATIVE FREE 10 ML: 5 INJECTION INTRAVENOUS at 09:00

## 2022-04-24 RX ADMIN — LEVOTHYROXINE SODIUM 50 MCG: 0.05 TABLET ORAL at 05:12

## 2022-04-24 RX ADMIN — TRAZODONE HYDROCHLORIDE 50 MG: 50 TABLET ORAL at 21:30

## 2022-04-24 RX ADMIN — BUMETANIDE 2 MG: 0.25 INJECTION, SOLUTION INTRAMUSCULAR; INTRAVENOUS at 08:58

## 2022-04-24 RX ADMIN — PANTOPRAZOLE SODIUM 40 MG: 40 TABLET, DELAYED RELEASE ORAL at 05:12

## 2022-04-24 RX ADMIN — ENOXAPARIN SODIUM 30 MG: 100 INJECTION SUBCUTANEOUS at 21:31

## 2022-04-24 RX ADMIN — ASPIRIN 325 MG: 325 TABLET ORAL at 08:58

## 2022-04-24 RX ADMIN — MIDODRINE HYDROCHLORIDE 10 MG: 10 TABLET ORAL at 08:59

## 2022-04-24 RX ADMIN — BUMETANIDE 1 MG: 0.25 INJECTION INTRAMUSCULAR; INTRAVENOUS at 17:31

## 2022-04-24 RX ADMIN — SODIUM CHLORIDE, PRESERVATIVE FREE 10 ML: 5 INJECTION INTRAVENOUS at 21:34

## 2022-04-24 RX ADMIN — RIFAXIMIN 550 MG: 550 TABLET ORAL at 09:00

## 2022-04-24 ASSESSMENT — PAIN SCALES - GENERAL: PAINLEVEL_OUTOF10: 0

## 2022-04-24 NOTE — PROGRESS NOTES
This RN notified Dr. Jeri Leonardo that this RN picked up patient from previous RN Blu Pedro and on my assessment this RN noticed that patient had a small amount of petechia across her left cheek on her face, as well as right under the tip of her nose and on her bottom lip. Dr. Luba Berrios said it was to be expected with liver disease and we will monitor it. I let patient and her son know who were both concerned about it. Will continue to monitor.

## 2022-04-24 NOTE — PROGRESS NOTES
Leaking at paracentesis site on LLQ of ABD. Moderate amount of clear yellow drainage leaking through gauze and tegaderm dressing. Hannah skin remains intact and without redness, swelling or warmth.      New gauze and Tegaderm placed. Patient rolled onto right side.

## 2022-04-24 NOTE — CONSULTS
[facility]    [address], [address]  [facility]  4/24/2022 3:31 PM    Pt Name:    Jason Weinberg  MRN:     [id number]   [id number]  YOB: 1940  Admit Date:    4/22/2022 11:31 AM  Primary Care Physician:  ARTHUR Stearns CNP    [facility] Number:   [id number]    Reason for Consult:  ISIDRA, CKD  Requesting provider:  Hospitalist services    History:   The patient is a 81 y.o. white female with history of CKD, chronic volume overload, moderate to severe pulmonary hypertension, chronic liver dysfunction, ascites requiring paracentesis, chronic hypotension who presented to the hospital 2 days ago with complaints of progressively worsening abdominal distention associated with weight gain and lower extremity edema. Patient was recently discharged from the hospital about 3 to 4 weeks ago. Patient reports that she has been taking her diuretics but continued to have worsening symptoms. She reports feeling weak and tired. Not a very good historian. .  Patient was noted to have severe hypokalemia. She was transferred here for further management. Nephrology has been consulted for evaluation of ISIDRA, CKD and diuretic management. Patient seen and examined. She reports abdominal distention with extremity edema but reports feeling better since admission. No dysuria. No gross hematuria.     Past Medical History:  Past Medical History:   Diagnosis Date    ISIDRA (acute kidney injury) ([facility] [address] 06/11/2016    Arthritis     Atrial fibrillation ([facility] [address]     Bleeding stomach ulcer 2011    CAD (coronary artery disease)     Cirrhosis of liver with ascites (HCC)     Clostridium difficile infection     History of blood transfusion     Hyperlipidemia     Hypertension     PONV (postoperative nausea and vomiting)        Past Surgical History:  Past Surgical History:   Procedure Laterality Date    AORTIC VALVE REPLACEMENT      CARDIAC SURGERY      DIAGNOSTIC CARDIAC CATH LAB PROCEDURE      DILATION AND CURETTAGE OF UTERUS      JOINT REPLACEMENT Right 2011    knee    DC OFFICE/OUTPT VISIT,PROCEDURE ONLY N/A 6/28/2018    BIOPROSTHETIC AORTIC VALVE REPLACEMENT WITH MAZE PROCEDURE performed by Antonella Waller MD at [facility] N/A 2/28/2019    EGD ESOPHAGOGASTRODUODENOSCOPY performed by Mariann Rico MD at [facility]       Family History:  Family History   Problem Relation Age of Onset    Diabetes Father     Stroke Father     High Blood Pressure Mother     Cancer Sister         blood    Diabetes Brother     Cancer Brother         lymphoma    High Blood Pressure Maternal Grandmother     High Blood Pressure Maternal Grandfather     Diabetes Paternal Grandmother     Diabetes Paternal Grandfather     Diabetes Brother     Heart Disease Neg Hx        Social History:  Social History     Socioeconomic History    Marital status:      Spouse name: Not on file    Number of children: 2    Years of education: Not on file    Highest education level: Not on file   Occupational History    Not on file   Tobacco Use    Smoking status: Never Smoker    Smokeless tobacco: Never Used   Vaping Use    Vaping Use: Never used   Substance and Sexual Activity    Alcohol use: No    Drug use: No    Sexual activity: Not Currently   Other Topics Concern    Not on file   Social History Narrative    Not on file     Social Determinants of Health     Financial Resource Strain:     Difficulty of Paying Living Expenses: Not on file   Food Insecurity:     Worried About Running Out of Food in the Last Year: Not on file    Krystle of Food in the Last Year: Not on file   Transportation Needs:     Lack of Transportation (Medical): Not on file    Lack of Transportation (Non-Medical):  Not on file   Physical Activity:     Days of Exercise per Week: Not on file    Minutes of Exercise per Session: Not on file Stress:     Feeling of Stress : Not on file   Social Connections:     Frequency of Communication with Friends and Family: Not on file    Frequency of Social Gatherings with Friends and Family: Not on file    Attends Tenriism Services: Not on file    Active Member of Clubs or Organizations: Not on file    Attends Club or Organization Meetings: Not on file    Marital Status: Not on file   Intimate Partner Violence:     Fear of Current or Ex-Partner: Not on file    Emotionally Abused: Not on file    Physically Abused: Not on file    Sexually Abused: Not on file   Housing Stability:     Unable to Pay for Housing in the Last Year: Not on file    Number of [place] in the Last Year: Not on file    Unstable Housing in the Last Year: Not on file       Home Meds:  Prior to Admission medications    Medication Sig Start Date End Date Taking?  Authorizing Provider   acetaminophen (TYLENOL) 650 MG extended release tablet Take 650 mg by mouth every 8 hours as needed    Historical Provider, MD   sodium chloride 0.9 % SOLN 8.7 mL with perflutren lipid microspheres SUSP 1.43 mg Infuse intravenously 2/16/22 5/18/23  Historical Provider, MD   Multiple Vitamins-Minerals (CENTRUM SILVER 50+MEN) TABS Take by mouth    Historical Provider, MD   GLUCOSAMINE-CHONDROITIN-MSM PO Take 750 mg by mouth daily    Historical Provider, MD   Multiple Vitamins-Minerals (CENTRUM SILVER 50+MEN) TABS Centrum Silver Women 8 mg iron-400 mcg-300 mcg oral tablet Centrum Silver Women 8 mg iron-400 mcg-300 mcg oral tablet take 1 tablet by oral route daily   [facility] ([id number])    Historical Provider, MD   triamcinolone (KENALOG) 0.1 % cream Apply topically 7/31/20   Historical Provider, MD   Probiotic Product (ACIDOPHILUS PROBIOTIC) CAPS capsule Take by mouth 2 times daily    Historical Provider, MD   potassium chloride (KLOR-CON M) 20 MEQ extended release tablet Take 20 mEq by mouth 2 times daily    Historical Provider, MD   potassium chloride (KLOR-CON) 10 MEQ extended release tablet TAKE 1 TABLET BY MOUTH ONCE DAILY WITH LASIX 1/10/22   Historical Provider, MD   metOLazone (ZAROXOLYN) 2.5 MG tablet 5 mg Once a day on Mon, Wed and Friday 1/6/22   Historical Provider, MD   metOLazone (ZAROXOLYN) 2.5 MG tablet Take by mouth    Historical Provider, MD   ondansetron (ZOFRAN) 4 MG tablet TAKE 1 TABLET BY MOUTH EVERY 8 HOURS 3/23/22   Historical Provider, MD   Glucosamine-Chondroitin 250-200 MG TABS Take by mouth    Historical Provider, MD   doxycycline hyclate (VIBRA-TABS) 100 MG tablet TAKE 1 TABLET BY MOUTH TWICE DAILY FOR 7 DAYS 1/10/22   Historical Provider, MD   rifaximin (XIFAXAN) 550 MG tablet Take 1 tablet by mouth 2 times daily 4/5/22   Sammie Joya,    bumetanide (BUMEX) 1 MG tablet Take 1 tablet by mouth 2 times daily 4/5/22   Sammie Joya,    midodrine (PROAMATINE) 10 MG tablet Take 1 tablet by mouth 3 times daily (with meals) 4/5/22   Sammie Joya, DO   levothyroxine (SYNTHROID) 50 MCG tablet Take 1 tablet by mouth every morning (before breakfast) 4/6/22   Sammie Joya, DO   potassium bicarb-citric acid (EFFER-K) 20 MEQ TBEF effervescent tablet Take 1 tablet by mouth in the morning, at noon, and at bedtime 4/5/22   Sammie Joya, DO   ciprofloxacin (CIPRO) 500 MG tablet Take 1 tablet by mouth daily 4/5/22 5/5/22  Sammie Joya,    aspirin 325 MG tablet Take 325 mg by mouth daily    Historical Provider, MD   vitamin C (ASCORBIC ACID) 500 MG tablet Take 500 mg by mouth daily    Historical Provider, MD   omeprazole (PRILOSEC) 20 MG delayed release capsule Take 20 mg by mouth in the morning and at bedtime     Historical Provider, MD   Calcium Carb-Cholecalciferol (CALTRATE 600+D3 PO) Take 1 tablet by mouth 2 times daily    Historical Provider, MD   sucralfate (CARAFATE) 1 GM tablet Take 1 tablet by mouth 4 times daily (before meals and nightly)  Patient taking differently: Take 1 g by mouth daily  8/26/18   Devorah Dougherty MD   [facility] Natural Products (OSTEO BI-FLEX TRIPLE STRENGTH PO) Take 750 mg by mouth daily    Historical Provider, MD   fluticasone (FLONASE) 50 MCG/ACT nasal spray 1-2 sprays by Nasal route daily    Historical Provider, MD   Pyridoxine HCl (VITAMIN B-6) 100 MG tablet Take 100 mg by mouth daily    Historical Provider, MD   loratadine (CLARITIN) 10 MG tablet Take 10 mg by mouth daily     Historical Provider, MD   PARoxetine (PAXIL) 30 MG tablet Take 30 mg by mouth every morning    Historical Provider, MD   traZODone (DESYREL) 50 MG tablet Take 50 mg by mouth nightly as needed     Historical Provider, MD       Review of Systems:  Constitutional: Positive for abdominal distention, shortness of breath, weight gain, generalized weakness and fatigue  Head: Negative for headaches  Eyes: Negative for blurry vision or discharge  Ears: Negative for ear pain or hearing changes  Nose: Negative for runny nose or epistaxis  Respiratory: Positive for shortness of breath. Negative for cough or sputum production.  Negative for hemoptysis  Cardiovascular: Negative for chest pain, +  extremity edema  GI: Positive for abdominal distention  : Negative for discharge, dysuria, or hematuria  Skin: Negative for rash  Musculoskeletal: Negative for joint pain, moves all ext  Neuro: Negative for numbness or tingling, negative for slurred speech  Psychiatric: Reports stable mood, negative for depression or insomnia    All other review of systems were reviewed and negative    Current Meds:  Infusion:    sodium chloride       Meds:    bumetanide  2 mg IntraVENous BID WC    traZODone  50 mg Oral Nightly    sodium chloride flush  5-40 mL IntraVENous 2 times per day    enoxaparin  30 mg SubCUTAneous Daily    aspirin  325 mg Oral Daily    [Held by provider] atorvastatin  20 mg Oral Daily    [Held by provider] bumetanide  1 mg Oral BID    ciprofloxacin  500 mg Oral Daily    levothyroxine  50 mcg Oral QAM AC    [Held by provider] metOLazone  5 mg Oral Once per day on Mon Wed Fri    midodrine  10 mg Oral TID WC    pantoprazole  40 mg Oral QAM AC    PARoxetine  30 mg Oral QAM    rifAXIMin  550 mg Oral BID     Meds prn: sodium chloride flush, sodium chloride, ondansetron **OR** ondansetron, polyethylene glycol, acetaminophen **OR** acetaminophen     Allergies/Intolerances: ALLERGIES: Other and Ace inhibitors    24HR INTAKE/OUTPUT:      Intake/Output Summary (Last 24 hours) at 4/24/2022 1531  Last data filed at 4/24/2022 1330  Gross per 24 hour   Intake 660 ml   Output 0 ml   Net 660 ml     I/O last 3 completed shifts: In: 1808.2 [P.O.:1000; I.V.:808.2]  Out: 400 [Urine:400]  I/O this shift:  In: 360 [P.O.:360]  Out: 0   Admission weight: 189 lb (85.7 kg)  Wt Readings from Last 3 Encounters:   04/23/22 186 lb 14.4 oz (84.8 kg)   04/14/22 180 lb (81.6 kg)   04/05/22 179 lb 6.4 oz (81.4 kg)     Body mass index is 32.08 kg/m². Physical Examination:  VITALS:   Vitals:    04/24/22 1145 04/24/22 1157 04/24/22 1200 04/24/22 1203   BP: 129/82      Pulse: 99      Resp: 16      Temp: 98.1 °F (36.7 °C)      TempSrc: Oral      SpO2: 100% 94% (!) 86% 92%   Weight:       Height:         Weight:   Wt Readings from Last 3 Encounters:   04/23/22 186 lb 14.4 oz (84.8 kg)   04/14/22 180 lb (81.6 kg)   04/05/22 179 lb 6.4 oz (81.4 kg)     Constitutional and General Appearance: alert and cooperative with exam, appears comfortable, no distress, not diaphoretic, +  chronically ill-appearing however  Eyes: no icteric sclera in left eye or right eye,  no pallor conjunctiva in left or right eye, no discharge seen from left eye or right eye  Ears and Nose: normal external appearance of left and right ear. Both ear lobules are nontender to palpation. Normal external appearance of nose. No active drainage from nose.    Oral: moist oral mucus membranes  Neck: No jugular venous distention  Lungs:no use of accessory muscles or labored breathing   Chest: No chest wall tenderness  Heart: S1, S2  Extremities: Bilateral LE edema, no tenderness  GI: soft, + distended  Skin: warm to touch  Musculo: moves all extremities   Neuro: no slurred speech, no facial drooping  Psychiatric: Normal mood and affect, Not agitated    Lab Data  CBC:   Recent Labs     04/22/22  1245 04/23/22  0814 04/24/22  0503   WBC 5.3 5.1 5.4   HGB 10.5* 9.9* 10.2*   HCT 33.0* 32.3* 34.2*    215 212     BMP:  Recent Labs     04/22/22  1245 04/22/22  1245 04/22/22  1657 04/22/22  1657 04/23/22  0714 04/23/22  0814 04/24/22  0503      < > 137  --   --  134* 136   K 2.1*   < > 3.2*   < > 3.0* 3.0* 4.1   CL 97*   < > 91*  --   --  91* 90*   CO2 35*   < > 32  --   --  28 32   BUN 54*   < > 57*  --   --  57* 60*   CREATININE 1.8*   < > 1.6*  --   --  1.8* 2.3*   GLUCOSE 91   < > 122*  --   --  101 89   CALCIUM  --   --  8.1*  --   --  7.9* 7.8*   MG 1.5*  --  1.7  --   --   --   --     < > = values in this interval not displayed. Hepatic:   Recent Labs     04/22/22  1245   LABALBU 2.5*   AST 17   ALT 6*   BILITOT 1.0   ALKPHOS 99       Chest x-ray shows interstitial edema, bilateral pleural effusions  Diagnostics:    Old tests reviewed. Echo: Echo EF 55%, severe pulmonary hypertension  Labs: Recent serum creatinine levels have been staying around 2.3-2.5. Creatinine was 1.2 back in March 2022      Impression and Plan:  1. ISIDRA on CKD 3 in setting of underlying decompensated liver cirrhosis with ascites. Challenging situation. Clinically patient is fluid overloaded. Need to optimize fluid status while monitoring renal function closely. Discussed with patient in detail. I feel in the long-term patient will need continued diuretics to optimize fluid status. We will continue with Bumex but will reduce dose for next 24 to 48 hours to allow renal function to improve a bit. Discussed with patient in detail.   We will need to accept some degree of renal dysfunction to optimize fluid status. Thought process reviewed with patient in detail. Strongly advised low-salt diet and fluid restriction as outpatient when discharged. 2. Mild azotemia without uremia due to diuretics. Will monitor. No need for renal replacement therapy at this time  3. Acute decompensated liver cirrhosis with ascites  4. History of portal hypertension  5. Ascites. Currently on loop diuretics. IV albumin as needed. 6. Hypotension. Continue with midodrine  7. Respiratory failure secondary to pulmonary edema. Continue with diuretics  8. Severe pulmonary hypertension  9. Diuretic induced hypokalemia. Replace as needed  10. History of CAD  11. Hx atrial fibrillation  12. Hx AS status post TAVR    Thank you for the consult. Please feel free to call me if you have any questions. Chani Herring MD  [facility]    This report has been created using voice recognition software. It may contain minor errors which are inherent in voice recognition technology.

## 2022-04-24 NOTE — PROGRESS NOTES
Hospitalist Progress Note      Patient:  Josephina Meckel    Unit/Bed:5K-05/005-A  YOB: 1940  MRN: [id number]   Acct: [de-identified]   PCP: ARTHUR Hurtado CNP  Date of Admission: 4/22/2022    Assessment/Plan:    1. Acute on chronic decompensated cirrhosis with ascities  2. Anasarca  a. Unclear etiology of cirrhosis. Suspect possible fatty liver disease. Patient denies any alcohol history. b. A1AT previously checked and normal.  c. MELD-Na: 16.  d. Last EGD appears to be 5/2021: Distal esophagitis, gastritis, portal hypertensive gastropathy, fundic polyps, no varices. e. Diuretics resumed yesterday afternoon after paracentesis in the AM.  Remained HD stable throughout the night. f. Will increase Bumex to 2mg BID today and consider the addition of metolazone if inadequate UOP after AM dose  g. Nephrology consult due to CKD and diuretic management as I suspect increased diuretic doses with result in poor renal function, but will need to try to optimize patient's volume status. h. Continue home midodrine. i. Patient does not appear to be on lactulose at baseline. No evidence of encephalopathy  j. Continue rifaximin. 3. Acute hypoxic respiratory failure  a. Suspect this is 2/2 to pulmonary edema  b. Increasing diuretics today given persistent volume overload. Consider the addition of metalazone if inadequate UOP   4. History of HFpEF  a. Unclear if in acute exacerbation or if worsening shortness of breath and edema is more related to cirrhosis. b. Echo 3/2022: EF 55%. Severe tricuspid regurgitation. Severe pulmonary hypertension. c. BNP elevated. We will hold on diuretics as patient appears volume overloaded on exam with planning paracentesis. d. Diuretics as above. 5. Severe hypokalemia, resolved. a. Stable. Will monitor daily given need for high dose diuretics. 6. CKD stage IIIb  a.  Creatinine close to baseline, although elevated from yesterday. 7. CAD  a. Follows with Dr. Cliff Hector. b. [id number] [address] to continue ASA. Consider reducing to 81mg.   c. Holding ARB pending paracentesis due to hx of hypotension. d. Dariel stevensot appear to be on BB, likely due to cirrhosis/hx SBP  8. History of SBP  a. Patient has been receiving ciprofloxacin since prior hospitalization. Will continue  b. Paracentesis PMN count 350 (improved from prior/recent hospitalization). Culture sterile. 9. History of atrial fibrillation  a. Patient reports not currently on anticoagulation. b. Currently rate controlled with a heart rate of 80-90. Not on BB therapy. Previously on Carvedilol. 10. Hypothyroidism  a. Continue synthroid. 11. Hx AS a/p TAVR now with AI.  12. Hx severe pulmonary HTN  a. Patient previously declined aggressive measure for management including transfer to tertiary care facility or outpatient follow up. Disposition:  Patient from SNF with plans to return at time of discharge, likely in 48-72 hours. Chief Complaint: SOB, swelling. Hospital Course:    4/22: H&P - \"Patient is a 82/F with medical  History of Cirrhosis, HFpEF, and SBP on ciprofloxacin prophylaxis presents to the hospital at the behest of her outpatient PCP due to worsening edema. The patient states that she was hospitalized approximate 4 weeks ago with hypotension and discharged on 4/5/22. It appears that she was diagnosed with SBP at that time and treated. She is currently on ciprofloxacin as well as Bumex, metolazone, and Aldactone. She reports worsening shortness of breath as well as orthopnea. She states that her legs are heavy and she has difficulty with ambulation. She ambulates with a walker at baseline. She denies having any chest pain or palpitations. \"    4/23: paracentesis completed at bedside. 2.6L dark yellow ascitic fluid removed. Tolerated well and remained HD stable.   Diuretics resumed in the afternoon after ensuring HD stability given her history    Subjective (past 24 hours):   Reports doing well overnight. Having some yellow/clear drainage from LLQ paracentesis sight. New dressing applied at bedside this AM.  Reports no abdominal pain, nausea, or vomiting. Abdomen less distended from yesterday. Past medical history, family history, social history and allergies reviewed again and is unchanged since admission. ROS (12 point review of systems completed. Pertinent positives noted. Otherwise ROS is negative)     Medications:  Reviewed    Infusion Medications    sodium chloride       Scheduled Medications    bumetanide  2 mg IntraVENous BID WC    traZODone  50 mg Oral Nightly    sodium chloride flush  5-40 mL IntraVENous 2 times per day    enoxaparin  30 mg SubCUTAneous Daily    aspirin  325 mg Oral Daily    [Held by provider] atorvastatin  20 mg Oral Daily    [Held by provider] bumetanide  1 mg Oral BID    ciprofloxacin  500 mg Oral Daily    levothyroxine  50 mcg Oral QAM AC    [Held by provider] metOLazone  5 mg Oral Once per day on Mon Wed Fri    midodrine  10 mg Oral TID WC    pantoprazole  40 mg Oral QAM AC    PARoxetine  30 mg Oral QAM    rifAXIMin  550 mg Oral BID     PRN Meds: sodium chloride flush, sodium chloride, ondansetron **OR** ondansetron, polyethylene glycol, acetaminophen **OR** acetaminophen      Intake/Output Summary (Last 24 hours) at 4/24/2022 1218  Last data filed at 4/24/2022 7163  Gross per 24 hour   Intake 1798.2 ml   Output 400 ml   Net 1398.2 ml       Diet:  ADULT DIET; Regular; No Added Salt (3-4 gm)    Exam:  /82   Pulse 99   Temp 98.1 °F (36.7 °C) (Oral)   Resp 16   Ht 5' 4\" (1.626 m)   Wt 186 lb 14.4 oz (84.8 kg)   SpO2 92%   BMI 32.08 kg/m²   General appearance: Elderly, chronically ill-appearing female. No acute distress. HEENT: Normal cephalic, atraumatic without obvious deformity. Pupils equal, round, and reactive to light. Extra ocular muscles intact.  Conjunctivae/corneas clear.  Neck: Supple, with full range of motion. No jugular venous distention. Trachea midline. Respiratory:  Normal respiratory effort. Clear to auscultation, bilaterally without Rales/Wheezes/Rhonchi. Cardiovascular: Regular rate and rhythm with normal S1/S2 without murmurs, rubs or gallops. Abdomen: Abdomen is soft with no distension. Mild fluid wave. Hypoactive bowel sounds. No tenderness to palpation. Paracentesis site dressing changed due to yellow/clear drainage overnight. Musculoskeletal: Diffuse anasarca with 4+ pitting edema to the bilateral lower extremities extending up to the thighs. Skin: Stasis dermatitis noted to the bilateral lower extremities distal to the knee. No palmar erythema. Minimal spider angioma on the upper chest.  Mild distal LE discoloration with blanching and. Mildly cool to the touch. Neurologic:  Neurovascularly intact without any focal sensory/motor deficits. Cranial nerves: II-XII intact, grossly non-focal.  Psychiatric: Alert and oriented, thought content appropriate, normal insight  Capillary Refill: Brisk,< 3 seconds   Peripheral Pulses: Distal LE pulses difficult to palpate due to edema. Labs:   Recent Labs     04/22/22  1245 04/23/22  0814 04/24/22  0503   WBC 5.3 5.1 5.4   HGB 10.5* 9.9* 10.2*   HCT 33.0* 32.3* 34.2*    215 212     Recent Labs     04/22/22  1657 04/22/22  1657 04/23/22  0714 04/23/22  0814 04/24/22  0503     --   --  134* 136   K 3.2*   < > 3.0* 3.0* 4.1   CL 91*  --   --  91* 90*   CO2 32  --   --  28 32   BUN 57*  --   --  57* 60*   CREATININE 1.6*  --   --  1.8* 2.3*   CALCIUM 8.1*  --   --  7.9* 7.8*    < > = values in this interval not displayed. Recent Labs     04/22/22  1245   AST 17   ALT 6*   BILITOT 1.0   ALKPHOS 99     Recent Labs     04/22/22  1245   INR 1.33*     No results for input(s): CKTOTAL, TROPONINI in the last 72 hours.     Microbiology:    Blood culture #1:   Lab Results   Component Value Date    BC No growth-preliminary  No growth   08/24/2018       Blood culture #2:No results found for: Adriel Ackerman    Organism:  Lab Results   Component Value Date    ORG Enterococcus faecium - (Group D) 08/31/2018         Lab Results   Component Value Date    LABGRAM  04/23/2022     Moderate segmented neutrophils observed. No bacteria seen. performed on cytospun specimen        MRSA culture only:No results found for: [facility]    Urine culture: No results found for: LABURIN    Respiratory culture: No results found for: CULTRESP    Aerobic and Anaerobic :  No results found for: LABAERO  Lab Results   Component Value Date    LABANAE No growth-preliminary No growth  04/03/2022       Urinalysis:      Lab Results   Component Value Date    NITRU NEGATIVE 03/26/2022    WBCUA 2-4 03/26/2022    BACTERIA FEW 03/26/2022    RBCUA 3-5 03/26/2022    BLOODU NEGATIVE 03/26/2022    SPECGRAV 1.017 03/11/2022    GLUCOSEU NEGATIVE 03/26/2022       Radiology:  US ABDOMEN LIMITED   Final Result   Moderate cirrhotic ascites. This document has been electronically signed by: Gaby Severino MD on    04/22/2022 11:21 PM      XR CHEST (2 VW)   Final Result   1. Mild groundglass opacities in both lungs can reflect underlying interstitial pulmonary edema. 2. Small bilateral pleural effusions. 3. Moderate cardiomegaly. **This report has been created using voice recognition software. It may contain minor errors which are inherent in voice recognition technology. **      Final report electronically signed by Dr Keily Muir on 4/22/2022 1:09 PM        XR CHEST (2 VW)    Result Date: 4/22/2022  PROCEDURE: XR CHEST (2 VW) CLINICAL INFORMATION: shortness of breath COMPARISON: Chest radiograph 3/26/2022, 3/24/2022, 3/15/2022, and 3/11/2022. TECHNIQUE: PA and lateral views of the chest performed. FINDINGS: Moderate cardiomegaly. Small bilateral pleural effusions. Mild groundglass opacification of both lungs. Prosthetic aortic valve is seen.  Left atrial appendage clip is noted. No pneumothorax. No acute bony abnormality. Median sternotomy has been performed. 1. Mild groundglass opacities in both lungs can reflect underlying interstitial pulmonary edema. 2. Small bilateral pleural effusions. 3. Moderate cardiomegaly. **This report has been created using voice recognition software. It may contain minor errors which are inherent in voice recognition technology. ** Final report electronically signed by Dr Gay Hyde on 4/22/2022 1:09 PM    US ABDOMEN LIMITED    Result Date: 4/22/2022  US Abdomen Limited, Liver COMPARISON: US,SR - US LIVER - 03/12/2022 12:04 AM EST FINDINGS: Nodular liver contours consistent with hepatic cirrhosis. Moderate ascites. Moderate cirrhotic ascites.  This document has been electronically signed by: Albert Maldonado MD on 04/22/2022 11:21 PM      Electronically signed by Hollie Gomes DO on 4/24/2022 at 12:18 PM

## 2022-04-25 LAB
ANION GAP SERPL CALCULATED.3IONS-SCNC: 17 MEQ/L (ref 8–16)
BACTERIA: ABNORMAL
BASOPHILS # BLD: 0.9 %
BASOPHILS ABSOLUTE: 0.1 THOU/MM3 (ref 0–0.1)
BILIRUBIN URINE: NEGATIVE
BLOOD, URINE: NEGATIVE
BODY FLUID RBC: 4000 /CUMM
BUN BLDV-MCNC: 63 MG/DL (ref 7–22)
CALCIUM SERPL-MCNC: 7.9 MG/DL (ref 8.5–10.5)
CASTS: ABNORMAL /LPF
CASTS: ABNORMAL /LPF
CHARACTER, BODY FLUID: ABNORMAL
CHARACTER, URINE: CLEAR
CHLORIDE BLD-SCNC: 91 MEQ/L (ref 98–111)
CHLORIDE, URINE: 41 MEQ/L
CO2: 26 MEQ/L (ref 23–33)
COLOR: ABNORMAL
COLOR: YELLOW
CREAT SERPL-MCNC: 2.3 MG/DL (ref 0.4–1.2)
CREATININE URINE: 57 MG/DL
CRYSTALS: ABNORMAL
EOSINOPHIL # BLD: 2.1 %
EOSINOPHILS ABSOLUTE: 0.1 THOU/MM3 (ref 0–0.4)
EPITHELIAL CELLS, UA: ABNORMAL /HPF
ERYTHROCYTE [DISTWIDTH] IN BLOOD BY AUTOMATED COUNT: 16.7 % (ref 11.5–14.5)
ERYTHROCYTE [DISTWIDTH] IN BLOOD BY AUTOMATED COUNT: 63.1 FL (ref 35–45)
GFR SERPL CREATININE-BSD FRML MDRD: 20 ML/MIN/1.73M2
GLUCOSE BLD-MCNC: 100 MG/DL (ref 70–108)
GLUCOSE, URINE: NEGATIVE MG/DL
HCT VFR BLD CALC: 38.4 % (ref 37–47)
HEMOGLOBIN: 11 GM/DL (ref 12–16)
HYPOCHROMIA: PRESENT
IMMATURE GRANS (ABS): 0.03 THOU/MM3 (ref 0–0.07)
IMMATURE GRANULOCYTES: 0.4 %
KETONES, URINE: NEGATIVE
LEUKOCYTE EST, POC: ABNORMAL
LYMPHOCYTES # BLD: 16.2 %
LYMPHOCYTES ABSOLUTE: 1.1 THOU/MM3 (ref 1–4.8)
MCH RBC QN AUTO: 29.7 PG (ref 26–33)
MCHC RBC AUTO-ENTMCNC: 28.6 GM/DL (ref 32.2–35.5)
MCV RBC AUTO: 103.8 FL (ref 81–99)
MESOTHELIAL CELLS BODY FLUID: ABNORMAL
MISCELLANEOUS LAB TEST RESULT: ABNORMAL
MONOCYTES # BLD: 9.1 %
MONOCYTES ABSOLUTE: 0.6 THOU/MM3 (ref 0.4–1.3)
MONONUCLEAR CELLS BODY FLUID: 54 %
NITRITE, URINE: NEGATIVE
NUCLEATED RED BLOOD CELLS: 0 /100 WBC
PATHOLOGIST REVIEW: ABNORMAL
PH UA: 5 (ref 5–9)
PLATELET # BLD: 168 THOU/MM3 (ref 130–400)
PMV BLD AUTO: 9.9 FL (ref 9.4–12.4)
POLYMORPHONUCLEAR CELLS BODY FLUID: 46 %
POTASSIUM SERPL-SCNC: 4.1 MEQ/L (ref 3.5–5.2)
PROT/CREAT RATIO, UR: 0.21
PROTEIN UA: NEGATIVE MG/DL
PROTEIN, URINE: 11.9 MG/DL
RBC # BLD: 3.7 MILL/MM3 (ref 4.2–5.4)
RBC URINE: ABNORMAL /HPF
RENAL EPITHELIAL, UA: ABNORMAL
SEG NEUTROPHILS: 71.3 %
SEGMENTED NEUTROPHILS ABSOLUTE COUNT: 5 THOU/MM3 (ref 1.8–7.7)
SODIUM BLD-SCNC: 134 MEQ/L (ref 135–145)
SODIUM URINE: < 20 MEQ/L
SPECIFIC GRAVITY UA: 1.01 (ref 1–1.03)
SPECIMEN: ABNORMAL
TOTAL NUCLEATED CELLS BODY FLUID: 707 /CUMM (ref 0–500)
TOTAL VOLUME RECEIVED BODY FLUID: 7 ML
UROBILINOGEN, URINE: 0.2 EU/DL (ref 0–1)
WBC # BLD: 7 THOU/MM3 (ref 4.8–10.8)
WBC UA: ABNORMAL /HPF
YEAST: ABNORMAL

## 2022-04-25 PROCEDURE — 97535 SELF CARE MNGMENT TRAINING: CPT

## 2022-04-25 PROCEDURE — 1200000000 HC SEMI PRIVATE

## 2022-04-25 PROCEDURE — 99233 SBSQ HOSP IP/OBS HIGH 50: CPT | Performed by: STUDENT IN AN ORGANIZED HEALTH CARE EDUCATION/TRAINING PROGRAM

## 2022-04-25 PROCEDURE — 6370000000 HC RX 637 (ALT 250 FOR IP): Performed by: STUDENT IN AN ORGANIZED HEALTH CARE EDUCATION/TRAINING PROGRAM

## 2022-04-25 PROCEDURE — 36415 COLL VENOUS BLD VENIPUNCTURE: CPT

## 2022-04-25 PROCEDURE — 80048 BASIC METABOLIC PNL TOTAL CA: CPT

## 2022-04-25 PROCEDURE — 97166 OT EVAL MOD COMPLEX 45 MIN: CPT

## 2022-04-25 PROCEDURE — 2580000003 HC RX 258: Performed by: STUDENT IN AN ORGANIZED HEALTH CARE EDUCATION/TRAINING PROGRAM

## 2022-04-25 PROCEDURE — 97162 PT EVAL MOD COMPLEX 30 MIN: CPT

## 2022-04-25 PROCEDURE — 97110 THERAPEUTIC EXERCISES: CPT

## 2022-04-25 PROCEDURE — 85025 COMPLETE CBC W/AUTO DIFF WBC: CPT

## 2022-04-25 PROCEDURE — 2500000003 HC RX 250 WO HCPCS: Performed by: INTERNAL MEDICINE

## 2022-04-25 PROCEDURE — 99232 SBSQ HOSP IP/OBS MODERATE 35: CPT | Performed by: INTERNAL MEDICINE

## 2022-04-25 RX ADMIN — PANTOPRAZOLE SODIUM 40 MG: 40 TABLET, DELAYED RELEASE ORAL at 05:00

## 2022-04-25 RX ADMIN — SODIUM CHLORIDE, PRESERVATIVE FREE 10 ML: 5 INJECTION INTRAVENOUS at 20:15

## 2022-04-25 RX ADMIN — RIFAXIMIN 550 MG: 550 TABLET ORAL at 20:15

## 2022-04-25 RX ADMIN — SODIUM CHLORIDE, PRESERVATIVE FREE 10 ML: 5 INJECTION INTRAVENOUS at 10:25

## 2022-04-25 RX ADMIN — BUMETANIDE 1 MG: 0.25 INJECTION INTRAMUSCULAR; INTRAVENOUS at 10:25

## 2022-04-25 RX ADMIN — PAROXETINE HYDROCHLORIDE 30 MG: 30 TABLET, FILM COATED ORAL at 08:24

## 2022-04-25 RX ADMIN — ASPIRIN 325 MG: 325 TABLET ORAL at 08:26

## 2022-04-25 RX ADMIN — LEVOTHYROXINE SODIUM 50 MCG: 0.05 TABLET ORAL at 05:00

## 2022-04-25 RX ADMIN — MIDODRINE HYDROCHLORIDE 10 MG: 10 TABLET ORAL at 08:25

## 2022-04-25 RX ADMIN — BUMETANIDE 1 MG: 0.25 INJECTION INTRAMUSCULAR; INTRAVENOUS at 16:44

## 2022-04-25 RX ADMIN — RIFAXIMIN 550 MG: 550 TABLET ORAL at 08:23

## 2022-04-25 RX ADMIN — MIDODRINE HYDROCHLORIDE 10 MG: 10 TABLET ORAL at 11:29

## 2022-04-25 RX ADMIN — CIPROFLOXACIN HYDROCHLORIDE 500 MG: 500 TABLET, FILM COATED ORAL at 08:26

## 2022-04-25 RX ADMIN — MIDODRINE HYDROCHLORIDE 10 MG: 10 TABLET ORAL at 16:44

## 2022-04-25 RX ADMIN — TRAZODONE HYDROCHLORIDE 50 MG: 50 TABLET ORAL at 20:15

## 2022-04-25 ASSESSMENT — PAIN SCALES - GENERAL
PAINLEVEL_OUTOF10: 0

## 2022-04-25 NOTE — PLAN OF CARE
Problem: Discharge Planning  Goal: Discharge to home or other facility with appropriate resources  Outcome: Progressing   SW consult received, see SW Note 4/25/22

## 2022-04-25 NOTE — CARE COORDINATION
04/25/22 0918   Readmission Assessment   Number of Days since last admission? 8-30 days   Previous Disposition SNF   Who is being Interviewed Patient   What was the patient's/caregiver's perception as to why they think they needed to return back to the hospital? Other (Comment)  (shortness of breath and generalized edema)   Did you visit your Primary Care Physician after you left the hospital, before you returned this time? No  (discharged to SNF)   Did you see a specialist, such as Cardiac, Pulmonary, Orthopedic Physician, etc. after you left the hospital? No   Who advised the patient to return to the hospital? Skilled Unit   Does the patient report anything that got in the way of taking their medications? No   In our efforts to provide the best possible care to you and others like you, can you think of anything that we could have done to help you after you left the hospital the first time, so that you might not have needed to return so soon?  Other (Comment)  (Ysabel Setting states she will return to [facility] [address] [place] [address])

## 2022-04-25 NOTE — FLOWSHEET NOTE
04/25/22 1517   Service Assessment   Patient Orientation Other (see comment)  (appropriate in conversation)   Cognition Alert   History Provided By Patient   Primary Caregiver   (current at SNF at [facility])   New Stephanie Family Members   Can patient return to prior living arrangement Yes   Ability to make needs known: Good   Financial Resources Medicare   Discharge Planning   Current Services Prior To Admission [address]   Patient expects to be discharged to: [facility] Discharge   Services At/After Discharge OT;PT;Nursing services; Aide services;Skilled Nursing Facility (SNF)  ([facility])   Condition of Participation: Discharge Planning   Freedom of Choice list was provided with basic dialogue that supports the patient's individualized plan of care/goals, treatment preferences, and shares the quality data associated with the providers? No  (declines, PTA was a [facility] and plans to return)     4/25/22, 3:20 PM EDT    DISCHARGE PLANNING EVALUATION    ANNAMARIA spoke with Emy Trujillo with [facility], pt there for rehab, SW did let her know pt's plans to return there at discharge, they will plan on her returning to them.

## 2022-04-25 NOTE — PLAN OF CARE
Problem: Pain  Goal: Verbalizes/displays adequate comfort level or baseline comfort level  4/25/2022 0834 by Nancy Coleman RN  Outcome: Progressing  Patient denied any pain     Problem: Safety - Adult  Goal: Free from fall injury  4/25/2022 0834 by Nancy Coleman RN  Outcome: Progressing   Call light within reach, bed alarm on, hourly rounding, patient free from falls

## 2022-04-25 NOTE — PLAN OF CARE
Patient progressing towards goals. Patient on 2L via NC, saturation remains in low-90%. Patient denies pain and/or pain medications. All appropriate fall protocols remain in place at this time. Patient turned and repositioned q2 and as tolerated. Patient in isolation r/t VRE and cdif r/o, signs posted and staff donning and doffing appropriate PPE's. Will continue to monitor. Problem: Skin/Tissue Integrity  Goal: Absence of new skin breakdown  Description: 1. Monitor for areas of redness and/or skin breakdown  2. Assess vascular access sites hourly  3. Every 4-6 hours minimum:  Change oxygen saturation probe site  4. Every 4-6 hours:  If on nasal continuous positive airway pressure, respiratory therapy assess nares and determine need for appliance change or resting period.   Outcome: Progressing     Problem: Pain  Goal: Verbalizes/displays adequate comfort level or baseline comfort level  Outcome: Progressing     Problem: Respiratory - Adult  Goal: Achieves optimal ventilation and oxygenation  Outcome: Progressing     Problem: Gastrointestinal - Adult  Goal: Minimal or absence of nausea and vomiting  Outcome: Progressing     Problem: Metabolic/Fluid and Electrolytes - Adult  Goal: Hemodynamic stability and optimal renal function maintained  Outcome: Progressing     Problem: Safety - Adult  Goal: Free from fall injury  Outcome: Progressing

## 2022-04-25 NOTE — PROGRESS NOTES
[facility]  INPATIENT PHYSICAL THERAPY  EVALUATION  [facility] ONC MED 5K - 5K-05/005-A    Time In: 0818  Time Out: 3042  Timed Code Treatment Minutes: 15 Minutes  Minutes: 23          Date: 2022  Patient Name: Garret Montiel,  Gender:  female        MRN: [id number]  : 1940  (81 y.o.)      Referring Practitioner: Leticia Banuelos DO  Diagnosis: Shortness of breath  Additional Pertinent Hx: per chart review; Patient is a 82/F with medical  History of Cirrhosis, HFpEF, and SBP on ciprofloxacin prophylaxis presents to the hospital at the behest of her outpatient PCP due to worsening edema. The patient states that she was hospitalized approximate 4 weeks ago with hypotension and discharged on 22. It appears that she was diagnosed with SBP at that time and treated. She is currently on ciprofloxacin as well as Bumex, metolazone, and Aldactone. She reports worsening shortness of breath as well as orthopnea. She states that her legs are heavy and she has difficulty with ambulation. She ambulates with a walker at baseline. She denies having any chest pain or palpitations. At ambulatory care center, patient was noted to have severe hypokalemia with a potassium of 2.1. He was given oral potassium replacement transferred to [facility] for further management. Restrictions/Precautions:  Restrictions/Precautions: Fall Risk,General Precautions,Contact Precautions    Subjective:  Chart Reviewed: Yes  Patient assessed for rehabilitation services?: Yes  Subjective: RN approved session. Pt resting in bed and agrees to therapy.     General:       Vision Exceptions: Wears glasses at all times    Hearing: Within functional limits         Pain: denies     Vitals: Vitals not assessed per clinical judgement, see nursing flowsheet    Social/Functional History:    Lives With: Alone  Type of Home: Facility  Home Layout: One level  Home Access: Stairs to enter with rails  Entrance Stairs - Number of Steps: 3-4 steps with 1 rail  Home Equipment: Walker, 4 wheeled,Cane,Oxygen             ADL Assistance: Independent  Homemaking Assistance: Independent  Ambulation Assistance: Independent  Transfer Assistance: Independent       Occupation: Retired  Additional Comments: Pt reports that she has been at [facility] for last couple months. states she plans to become a permanent resident of SNF; reports there is a walk in shower in SNF with grab bars. OBJECTIVE:  Range of Motion:  Bilateral Lower Extremity: WFL    Strength:  Bilateral Lower Extremity: grossly 4-/5    Balance:  Static Sitting Balance:  Supervision  Dynamic Sitting Balance: Stand By Assistance  Static Standing Balance: Contact Guard Assistance  Dynamic Standing Balance: Contact Guard Assistance    Bed Mobility:  Supine to Sit: Minimal Assistance, with head of bed flat, with rail, with increased time for completion    Transfers:  Sit to Stand: Contact Guard Assistance  Stand to Sit:Contact Guard Assistance    Ambulation:  Contact Guard Assistance  Distance: 6 ft  Surface: Level Tile  Device:4 Wheeled Walker  Gait Deviations:  Decreased Step Length Bilaterally, Decreased Gait Speed and Decreased Heel Strike Bilaterally    Exercise:  Patient was guided in 1 set(s) 10 reps of exercise to both lower extremities. Ankle pumps, Hip abduction/adduction, Seated marches, Seated heel/toe raises and Long arc quads. Exercises were completed for increased independence with functional mobility. Functional Outcome Measures: Completed  AM-PAC Inpatient Mobility Raw Score : 17  AM-PAC Inpatient T-Scale Score : 42.13    ASSESSMENT:  Activity Tolerance:  Patient tolerance of  treatment: good. Treatment Initiated: Treatment and education initiated within context of evaluation.   Evaluation time included review of current medical information, gathering information related to past medical, social and functional history, completion of standardized testing, formal and informal observation of tasks, assessment of data and development of plan of care and goals. Treatment time included skilled education and facilitation of tasks to increase safety and independence with functional mobility for improved independence and quality of life. Assessment: Body Structures, Functions, Activity Limitations Requiring Skilled Therapeutic Intervention: Decreased functional mobility ,Decreased strength,Decreased endurance,Decreased balance  Assessment: Pt is an 82 yo female that is deconditioned and requires regular paracentesis procedures due to ascites/cirrhosis of liver. Pt participated well with mobility assessment and LE exercises. Pt would benefit from continued skilled PT to address endurance, balance, and functional mobility. Therapy Prognosis: Good    Requires PT Follow-Up: Yes    Discharge Recommendations:  Discharge Recommendations: Continue to assess pending progress,Subacute/Skilled Nursing Facility    Patient Education:   .     Patient Education  Education Given To: Patient  Education Provided: Role of Therapy,Plan of Care,Home Exercise Program  Education Method: Demonstration,Verbal  Education Outcome: Verbalized understanding,Demonstrated understanding      Equipment Recommendations:  Equipment Needed: No    Plan:  Current Treatment Recommendations: Strengthening,ROM,Gait training,Balance training,Functional mobility training,Transfer training,Endurance training,Patient/Caregiver education & training,Safety education & training,Home exercise program,Therapeutic activities  Plan:  (3-5x GM)    Goals:  Patient goals : get settled as resident at SNF/ECF  Short Term Goals  Time Frame for Short term goals: at discharge  Short term goal 1: Pt to be Mod I for supine <> sit to get in/out of bed  Short term goal 2: Pt to be Mod I for sit <> stand to get up to ambulate  Short term goal 3: Pt to ambulate >40 ft with RW with Supervision for household distances  Long Term Goals  Time Frame for Long term goals : not set due to short ELOS    Following session, patient left in safe position with all fall risk precautions in place. Janay Giles.  Karlee Johnson, [place] [address]

## 2022-04-25 NOTE — PROGRESS NOTES
Renal Progress Note    Assessment and Plan:   1. Stage IV chronic kidney disease stable  2. Volume overload  3. Macrocytic anemia  4. Hyponatremia multifactorial  5. Deconditioning    PLAN:  1. Labs reviewed  2. Serum creatinine is mostly stable  3. Chest x-ray report reviewed  4. Medications reviewed  5. No changes  6. Okay to continue bumetanide for now  7. Monitor electrolytes closely with Diuretic  8. Labs in the morning  9. We will follow      Patient Active Problem List:     Chronic atrial fibrillation (HCC)     Ascending cholangitis, possible     Elevated LFTs     Thrombocytopenia (HCC)     Leukopenia     ISIDRA (acute kidney injury) ([address]     SIRS (systemic inflammatory response syndrome) (HCC)     Cholecystitis, acute     Neutropenia (HCC)     Aortic stenosis, severe     Colitis     Nausea     Diarrhea     Essential hypertension     Anemia, normocytic normochromic     Hypocalcemia     Cirrhosis (HCC)     S/P AVR (aortic valve replacement)     Obesity (BMI 30-39. 9)     History of atrial fibrillation     Hypomagnesemia     Asymptomatic bacteriuria - no clinical indication for antimicrobial therapy     CKD (chronic kidney disease) stage 4, GFR 15-29 ml/min (HCC)     Acute renal failure superimposed on stage 3 chronic kidney disease (HCC)     VRE (vancomycin resistant enterococcus) culture positive     CHF (congestive heart failure), NYHA class I, acute on chronic, combined (HCC)     Shortness of breath     Acute kidney injury ([address]     Hypotension     SBP (spontaneous bacterial peritonitis) (HCC)     Other ascites     Stage 3b chronic kidney disease (HCC)     Nevus, non-neoplastic     Neoplasm of uncertain behavior of skin     Other melanin hyperpigmentation     Prurigo nodularis     Other seborrheic keratosis     Pure hypercholesterolemia     Pure hypercholesterolemia, unspecified     Macular degeneration     Exudative age-related macular degeneration of right eye ([address]     Personal history of other malignant neoplasm of skin     Encounter for screening for malignant neoplasm of skin     Dyschromia     Melanocytic nevi, unspecified     Lichenification and lichen simplex chronicus     Insomnia, unspecified     Encounter for removal of sutures     Atopic dermatitis     Carcinoma in situ, unspecified     Benign lipomatous neoplasm of skin and subcutaneous tissue of unspecified sites     Actinic keratosis     Decompensation of cirrhosis of liver (HCC)      Subjective:   Admit Date: 4/22/2022    Interval History:   Seen for chronic kidney disease. Awake and alert. Feels better. No shortness of breath. Updated by the staff. No issues  Mostly stable blood pressure  Urine output is not completely documented        Medications:   Scheduled Meds:   bumetanide  1 mg IntraVENous BID WC    traZODone  50 mg Oral Nightly    sodium chloride flush  5-40 mL IntraVENous 2 times per day    enoxaparin  30 mg SubCUTAneous Daily    aspirin  325 mg Oral Daily    [Held by provider] atorvastatin  20 mg Oral Daily    [Held by provider] bumetanide  1 mg Oral BID    ciprofloxacin  500 mg Oral Daily    levothyroxine  50 mcg Oral QAM AC    [Held by provider] metOLazone  5 mg Oral Once per day on Mon Wed Fri    midodrine  10 mg Oral TID WC    pantoprazole  40 mg Oral QAM AC    PARoxetine  30 mg Oral QAM    rifAXIMin  550 mg Oral BID     Continuous Infusions:   sodium chloride         CBC:   Recent Labs     04/23/22  0814 04/24/22  0503 04/25/22  0616   WBC 5.1 5.4 7.0   HGB 9.9* 10.2* 11.0*    212 168     CMP:    Recent Labs     04/23/22  0814 04/24/22  0503 04/25/22  0616   * 136 134*   K 3.0* 4.1 4.1   CL 91* 90* 91*   CO2 28 32 26   BUN 57* 60* 63*   CREATININE 1.8* 2.3* 2.3*   GLUCOSE 101 89 100   CALCIUM 7.9* 7.8* 7.9*   LABGLOM 27* 20* 20*     Troponin: No results for input(s): TROPONINI in the last 72 hours. BNP: No results for input(s): BNP in the last 72 hours.   INR:   Recent Labs     04/22/22  1245   INR 1.33*     Lipids: No results for input(s): CHOL, LDLDIRECT, TRIG, HDL, AMYLASE, LIPASE in the last 72 hours. Liver:   Recent Labs     04/22/22  1245   AST 17   ALT 6*   ALKPHOS 99   PROT 6.1*   LABALBU 2.5*   BILITOT 1.0     Iron:  No results for input(s): IRONS, FERRITIN in the last 72 hours. Invalid input(s): LABIRONS  US ABDOMEN LIMITED   Final Result   Moderate cirrhotic ascites. This document has been electronically signed by: Ashutosh Dorman MD on    04/22/2022 11:21 PM      XR CHEST (2 VW)   Final Result   1. Mild groundglass opacities in both lungs can reflect underlying interstitial pulmonary edema. 2. Small bilateral pleural effusions. 3. Moderate cardiomegaly. **This report has been created using voice recognition software. It may contain minor errors which are inherent in voice recognition technology. **      Final report electronically signed by Dr Sima Vital on 4/22/2022 1:09 PM            Objective:   Vitals: BP (!) 114/55   Pulse 88   Temp 97.7 °F (36.5 °C) (Oral)   Resp 18   Ht 5' 4\" (1.626 m)   Wt 186 lb 14.4 oz (84.8 kg)   SpO2 99%   BMI 32.08 kg/m²    Wt Readings from Last 3 Encounters:   04/23/22 186 lb 14.4 oz (84.8 kg)   04/14/22 180 lb (81.6 kg)   04/05/22 179 lb 6.4 oz (81.4 kg)      24HR INTAKE/OUTPUT:      Intake/Output Summary (Last 24 hours) at 4/25/2022 1144  Last data filed at 4/25/2022 0600  Gross per 24 hour   Intake 240 ml   Output 200 ml   Net 40 ml       Constitutional: Well-developed elderly lady alert, awake, no apparent distress   Skin:normal with no rash or lesions. HEENT:Pupils are reactive . Throat is clear . Oral mucosa is moist   Neck:supple with no thyromegaly or carotid bruit  Cardiovascular:  S1, S2 without murmur or rubs   Respiratory:  Clear to ausculation without wheezes, rhonchi or rales. Abdomen: +bs, soft, no tenderness to palpation and no palpable mass. No abdominal bruit   Ext: 2+ bilateral LE edema  Musculoskeletal:Intact  Neuro:Alert and awake. Well oriented  with no focal deficit. Speech is normal      Electronically signed by Alicia Lyon MD on 4/25/2022 at 11:44 AM  **This report has been created using voice recognition software. It maycontain minor  errors which are inherent in voice recognition technology. **

## 2022-04-25 NOTE — DISCHARGE INSTR - COC
Continuity of Care Form    Patient Name: Cait Kellogg   :  1940  MRN:  [id number]    Admit date:  2022  Discharge date:  2022    Code Status Order: Limited   Advance Directives:      Admitting Physician:  No admitting provider for patient encounter. PCP: ARTHUR Webster CNP    Discharging Nurse: [facility] Unit/Room#: 5K-05/005-A  Discharging Unit Phone Number: [phone]    Emergency Contact:   Extended Emergency Contact Information  Primary Emergency Contact:  Timi [address] Phone: [phone]  Relation: Child    Past Surgical History:  Past Surgical History:   Procedure Laterality Date    AORTIC VALVE REPLACEMENT      CARDIAC SURGERY      DIAGNOSTIC CARDIAC CATH LAB PROCEDURE      DILATION AND CURETTAGE OF UTERUS      JOINT REPLACEMENT Right 2011    knee    VT OFFICE/OUTPT VISIT,PROCEDURE ONLY N/A 2018    BIOPROSTHETIC AORTIC VALVE REPLACEMENT WITH MAZE PROCEDURE performed by Deep Holman MD at [facility]. [place] [address] N/A 2019    EGD ESOPHAGOGASTRODUODENOSCOPY performed by Keke Mercado MD at [facility]       Immunization History:   Immunization History   Administered Date(s) Administered    COVID-19, Yari Olga, Primary or Immunocompromised, PF, 100mcg/0.5mL 2021, 2021, 2021    Influenza Virus Vaccine 10/28/2015, 10/25/2016    Influenza, High Dose (Fluzone 65 yrs and older) 2018    Influenza, Quadv, IM, PF (6 mo and older Fluzone, Flulaval, Fluarix, and 3 yrs and older Afluria) 10/30/2017, 10/21/2019, 10/27/2020    Pneumococcal Conjugate 13-valent ([place] S[place]) 2014, 2017    Pneumococcal Polysaccharide ([id number]) 10/21/2019       Active Problems:  Patient Active Problem List   Diagnosis Code    Chronic atrial fibrillation (HCC) I48.20    Ascending cholangitis, possible K83.09    Elevated LFTs R79.89    Thrombocytopenia (HCC) D69.6 Leukopenia D72.819    ISIDRA (acute kidney injury) ([facility] N17.9    SIRS (systemic inflammatory response syndrome) ([facility]) R65.10    Cholecystitis, acute K81.0    Neutropenia ([facility]) D70.9    Aortic stenosis, severe I35.0    Colitis K52.9    Nausea R11.0    Diarrhea R19.7    Essential hypertension I10    Anemia, normocytic normochromic D64.9    Hypocalcemia E83.51    Cirrhosis ([facility]) K74.60    S/P AVR (aortic valve replacement) Z95.2    Obesity (BMI 30-39. 9) E66.9    History of atrial fibrillation Z86.79    Hypomagnesemia E83.42    Asymptomatic bacteriuria - no clinical indication for antimicrobial therapy R82.71    CKD (chronic kidney disease) stage 4, GFR 15-29 ml/min ([facility]) N18.4    Acute renal failure superimposed on stage 3 chronic kidney disease ([facility]) N17.9, N18.30    VRE (vancomycin resistant enterococcus) culture positive Z22.39    CHF (congestive heart failure), NYHA class I, acute on chronic, combined ([facility]) I50.43    Shortness of breath R06.02    Acute kidney injury ([facility] N17.9    Hypotension I95.9    SBP (spontaneous bacterial peritonitis) ([facility]) K65.2    Other ascites R18.8    Stage 3b chronic kidney disease ([facility]) N18.32    Nevus, non-neoplastic I78.1    Neoplasm of uncertain behavior of skin D48.5    Other melanin hyperpigmentation L81.4    Prurigo nodularis L28.1    Other seborrheic keratosis L82.1    Pure hypercholesterolemia E78.00    Pure hypercholesterolemia, unspecified E78.00    Macular degeneration H35.30    Exudative age-related macular degeneration of right eye ([facility] H35.3210    Personal history of other malignant neoplasm of skin Z85.828    Encounter for screening for malignant neoplasm of skin Z12.83    Dyschromia L81.9    Melanocytic nevi, unspecified U74.9    Lichenification and lichen simplex chronicus L28.0    Insomnia, unspecified G47.00    Encounter for removal of sutures Z48.02    Atopic dermatitis L20.9    Carcinoma in situ, unspecified D09.9    Benign lipomatous neoplasm of skin and subcutaneous tissue of unspecified sites D17.30    Actinic keratosis L57.0    Decompensation of cirrhosis of liver (HCC) K72.90, K74.60       Isolation/Infection:   Isolation            Contact          Patient Infection Status       Infection Onset Added Last Indicated Last Indicated By Review Planned Expiration Resolved Resolved By    VRE  09/05/18 03/25/22 VRE Screen by PCR        Urine 8/2018  PCR 3/2022    Resolved    C-diff Rule Out 04/22/22 04/22/22 04/22/22 C DIFF TOXIN/ANTIGEN (Ordered)   04/25/22 Malena Benites RN    C-diff Rule Out 03/25/22 03/25/22 03/26/22 Gastrointestinal Panel, Molecular (Ordered)   03/26/22 Rule-Out Test Resulted    C-diff Rule Out 03/25/22 03/25/22 03/25/22 Clostridium Difficile Toxin/Antigen (Ordered)   03/25/22 Rule-Out Test Resulted            Nurse Assessment:  Last Vital Signs: BP (!) 114/55   Pulse 88   Temp 97.7 °F (36.5 °C) (Oral)   Resp 18   Ht 5' 4\" (1.626 m)   Wt 186 lb 14.4 oz (84.8 kg)   SpO2 99%   BMI 32.08 kg/m²     Last documented pain score (0-10 scale): Pain Level: 0  Last Weight:   Wt Readings from Last 1 Encounters:   04/23/22 186 lb 14.4 oz (84.8 kg)     Mental Status:  oriented    IV Access:  - None    Nursing Mobility/ADLs:  Walking   Assisted  Transfer  Assisted  Bathing  Assisted  Dressing  Assisted  Toileting  Assisted  Feeding  Independent  Med Admin  Independent  Med Delivery   whole    Wound Care Documentation and Therapy:  Wound 03/06/19 Buttocks Mid (Active)   Wound Etiology Pressure Unstageable 04/23/22 0840   Dressing Status Clean;Dry; Intact 04/24/22 2012   Dressing/Treatment Zinc paste 04/24/22 2012   Wound Assessment Pink/red;Dry;Non-blanchable erythema 04/24/22 2012   Drainage Amount None 04/24/22 2012   Odor None 04/24/22 2012   Ahnnah-wound Assessment Non-blanchable erythema 04/23/22 0824   Number of days: 1145        Elimination:  Continence:    Bowel: Yes  Bladder: Yes  Urinary Catheter: None   Colostomy/Ileostomy/Ileal Conduit: No       Date of Last BM: 04/27/2022    Intake/Output Summary (Last 24 hours) at 4/25/2022 1339  Last data filed at 4/25/2022 0600  Gross per 24 hour   Intake --   Output 200 ml   Net -200 ml     I/O last 3 completed shifts: In: 65 [P.O.:660]  Out: 200 [Urine:200]    Safety Concerns: At Risk for Falls    Impairments/Disabilities:      Hearing    Nutrition Therapy:  Current Nutrition Therapy:   - Oral Diet:  General    Routes of Feeding: Oral  Liquids: No Restrictions  Daily Fluid Restriction: yes - amount 1500ml  Last Modified Barium Swallow with Video (Video Swallowing Test): not done    Treatments at the Time of Hospital Discharge:   Respiratory Treatments:   Oxygen Therapy:  is not on home oxygen therapy.   Ventilator:    - No ventilator support    Rehab Therapies:   Weight Bearing Status/Restrictions: No weight bearing restrictions  Other Medical Equipment (for information only, NOT a DME order):  walker  Other Treatments:     Patient's personal belongings (please select all that are sent with patient):  None    RN SIGNATURE:  Electronically signed by Yuliya Shah RN on 4/27/22 at 4:02 PM EDT    CASE MANAGEMENT/SOCIAL WORK SECTION    Inpatient Status Date: 4/22/22    Readmission Risk Assessment Score:  Readmission Risk              Risk of Unplanned Readmission:  40           Discharging to Facility/ Agency   Name: [place] [id number]  [address] [facility] (if applicable)   Name:  Address:  Dialysis Schedule:  Phone:  Fax:    / signature: Electronically signed by ROIN Black on 4/25/22 at 1:40 PM EDT    PHYSICIAN SECTION    Prognosis: Poor    Condition at Discharge: Terminal    Rehab Potential (if transferring to Rehab): Guarded    Recommended Labs or Other Treatments After Discharge:     Physician Certification: I certify the above information and transfer of Cletrevor Pearce  is necessary for the continuing treatment of the diagnosis listed and that she requires [place] for greater 30 days.      Update Admission H&P: No change in H&P    PHYSICIAN SIGNATURE:  Electronically signed by Magdalena Hoffmann MD on 4/27/22 at 4:18 PM EDT

## 2022-04-25 NOTE — PROGRESS NOTES
[address]  INPATIENT OCCUPATIONAL THERAPY  [facility] ONC MED 5K  EVALUATION    Time:   Time In: 1106  Time Out: 1135  Timed Code Treatment Minutes: 23 Minutes  Minutes: 29          Date: 2022  Patient Name: Hipolito Sanchez,   Gender: female      MRN: [id number]  : 1940  (81 y.o.)  Referring Practitioner: Tucker Tao DO  Diagnosis: Shortness of Breath  Additional Pertinent Hx: per chart review; Patient is a 82/F with medical  History of Cirrhosis, HFpEF, and SBP on ciprofloxacin prophylaxis presents to the hospital at the behest of her outpatient PCP due to worsening edema. The patient states that she was hospitalized approximate 4 weeks ago with hypotension and discharged on 22. It appears that she was diagnosed with SBP at that time and treated. She is currently on ciprofloxacin as well as Bumex, metolazone, and Aldactone. She reports worsening shortness of breath as well as orthopnea. She states that her legs are heavy and she has difficulty with ambulation. She ambulates with a walker at baseline. She denies having any chest pain or palpitations. At ambulatory care center, patient was noted to have severe hypokalemia with a potassium of 2.1. He was given oral potassium replacement transferred to [facility] for further management. Restrictions/Precautions:  Restrictions/Precautions: Fall Risk,General Precautions,Contact Precautions    Subjective  Chart Reviewed: Guillermo Olivo and Physical  Patient assessed for rehabilitation services?: Yes  Family / Caregiver Present: No    Subjective: RN approved session. , patient supine in bed with student RN present. patient agreeable to eval and getting OOB with MIN encouragement. patient provided contradicting information regarding PLOF/social hx t/o eval. A & O x 4.     Pain: 0/10:     Vitals: Oxygen: 2 L O2; no c/o or demo SOB t/o eval    Social/Functional History:  Lives With: Alone  Type of Home: Facility  Home Layout: One level  Home Access: Stairs to enter with rails  Entrance Stairs - Number of Steps: 3-4 steps with 1 rail  Home Equipment: Walker, 4 wheeled,Cane,Oxygen           ADL Assistance: Independent  Homemaking Assistance: Independent  Ambulation Assistance: Independent  Transfer Assistance: Independent       Occupation: Retired  Additional Comments: Pt reports that she has been at [facility] for last couple months. states she plans to become a permanent resident of SNF; reports there is a walk in shower in SNF with grab bars. VISION:WFL    HEARING:  WFL    COGNITION: Slow Processing, Decreased Recall, Decreased Insight, Impaired Memory, Decreased Problem Solving and Decreased Safety Awareness    RANGE OF MOTION:  Right Upper Extremity: WFL  Left Upper Extremity:  WFL    STRENGTH:  Right Upper Extremity: shldr 4/5 elbow flex and ext 4/5  (F)  Left Upper Extremity:  shldr 4/5 elbow flex and ext 4/5  (F)    SENSATION:   WFL    ADL:   Grooming: Stand By Assistance. stand at sink to wash hands; cues for sequencing  Lower Extremity Dressing: Maximum Assistance. to don slipper socks seated EOB with patient not wanting to attempt to try. MAX  A to change depend, SBA to pull up around waist  Toileting: Stand By Assistance. for clothing mgmt and toilet hygiene in standing with increased time to wipe and cues for cleanliness for good hygiene  Toilet Transfer: [employer]. with use of 4 w/w and grab bars. BALANCE:  Sitting Balance:  Supervision. seated EOB  Standing Balance: Contact Guard Assistance. with 4 w/w for support    BED MOBILITY:  Supine to Sit: Stand By Assistance with use of bedrails    TRANSFERS:  Sit to Stand:  Contact Guard Assistance. pulling on 4 w/w; cues for hand placement     FUNCTIONAL MOBILITY:  Assistive Device: 4 Wheeled Walker  Assist Level:  Contact Guard Assistance. Distance:  To and from bathroom  Encouragement to sit up in recliner     Activity Tolerance:  Patient tolerance of  treatment: fair. O2 dependent, encouragement for OOB activity, de-conditioned, weakness, variable cognition       Assessment:  Assessment: patient required encouragement to participate in OT eval and increase activity by getting OOB. patient demonstrates decreased activity tolerance for tasks and increased assist for LB dressing impacting her ability to return to PLOF. patient would benefit from continued, skilled OT to increase activity tolerance, UB strength and increase ease and (I) with ADLs and functional transfers, and decrease caregiver burden. Performance deficits / Impairments: Decreased functional mobility ,Decreased ADL status,Decreased endurance,Decreased strength  Prognosis: Good  REQUIRES OT FOLLOW-UP: Yes  Decision Making: Medium Complexity    Treatment Initiated: Treatment and education initiated within context of evaluation. Evaluation time included review of current medical information, gathering information related to past medical, social and functional history, completion of standardized testing, formal and informal observation of tasks, assessment of data and development of plan of care and goals. Treatment time included skilled education and facilitation of tasks to increase safety and independence with ADL's for improved functional independence and quality of life.     Discharge Recommendations:  Continue to assess pending progress,Subacute/Skilled Nursing [id number] [facility] with OT,24 hour supervision or assist,Patient would benefit from continued therapy after discharge    Patient Education:     Patient Education  Education Given To: Patient  Education Provided: Role of Therapy,Transfer Training,Plan of Care,Precautions,ADL Adaptive Strategies    Equipment Recommendations:  Equipment Needed: Yes  Mobility Devices: ADL Assistive Devices    Plan:  Times per Week: 5x  Times per Day: Daily  Current Treatment Recommendations: Rubin Charles mobility training,Endurance training,Neuromuscular re-education,Self-Care / ADL,Patient/Caregiver education & training. See long-term goal time frame for expected duration of plan of care. If no long-term goals established, a short length of stay is anticipated. Goals:  Patient goals : return home at [facility]  Short Term Goals  Time Frame for Short term goals: by discharge  Short Term Goal 1: patient will tolerate 4 min functional standing with two hand release with (S) to increase activity tolerance for grooming and toileting. Short Term Goal 2: patient will functionally ambulate house hold distances with (S) with 0-1 verbal cues for safety and sequencing. Short Term Goal 3: patient will participate in moderate resistive UB strengthening to increase UB strength for self care routine. Short Term Goal 4: patient will complete UB ADLs with s/u and LB ADLs with MIN  A and use of AE PRN. Short Term Goal 5: patient will completing with MOD I and 0-1 verbal cues for safety and sequencing. Following session, patient left in safe position with all fall risk precautions in place.

## 2022-04-25 NOTE — PROGRESS NOTES
Hospitalist Progress Note      Patient:  Tonie Dancer    Unit/Bed:5K-05/005-A  YOB: 1940  MRN: [id number]   Acct: [de-identified]   PCP: ARTHUR Luna CNP  Date of Admission: 4/22/2022    Assessment/Plan:    1. Acute on chronic decompensated cirrhosis with ascities  2. Anasarca  a. Unclear etiology of cirrhosis. Suspect possible fatty liver disease. Patient denies any alcohol history. b. A1AT previously checked and normal.  c. MELD-Na: 16.  d. Last EGD appears to be 5/2021: Distal esophagitis, gastritis, portal hypertensive gastropathy, fundic polyps, no varices. e. Poor UOP with diuretics. Renal function poor, but stable. Nephrology following. Will continue diuretics for now and monitor I&O and daily weights. f. Will need to consider increased diuretic use if no improvement in anasarca, but this might come at the expense of her renal function. g. Continue home midodrine  h. Patient does not appear to be on lactulose at baseline. No evidence of encephalopathy  i. Continue rifaximin. 3. Acute hypoxic respiratory failure  a. Suspect this is 2/2 to pulmonary edema  b. On 1-2L NC. Unable to wean without hypoxia. Continue diuresis. 4. History of HFpEF  a. Unclear if in acute exacerbation or if worsening shortness of breath and edema is more related to cirrhosis. b. Echo 3/2022: EF 55%. Severe tricuspid regurgitation. Severe pulmonary hypertension. c. BNP elevated. d. Diuretics as above. 5. Severe hypokalemia, resolved. a. Stable. Will monitor daily given need for high dose diuretics. 6. ISIDRA CKD stage IIIb  a. Renal function stable. b. Suspect pre-renal azotemia with intravascular volume depletion given cirrhosis and CHF. c. Continue diuretics  d. Nephrology on board for diuretic recommendations given CKD and anasarca. 7. CAD  a. Follows with Dr. Kyle Nicely. b. [id number] [address] to continue ASA. Consider reducing to 81mg. c. Holding ARB pending paracentesis due to hx of hypotension. d. Vieira snot appear to be on BB, likely due to cirrhosis/hx SBP  8. History of SBP  a. Patient has been receiving ciprofloxacin since prior hospitalization. Will continue  b. Paracentesis PMN count 350 (improved from prior/recent hospitalization). Culture sterile. 9. History of atrial fibrillation  a. Patient reports not currently on anticoagulation. b. Currently rate controlled with a heart rate of 80-90. Not on BB therapy. Previously on Carvedilol. 10. Hypothyroidism  a. Continue synthroid. 11. Hx AS a/p TAVR now with AI.  12. Hx severe pulmonary HTN  a. Patient previously declined aggressive measure for management including transfer to tertiary care facility or outpatient follow up. Disposition:  Patient from SNF with plans to return at time of discharge    Chief Complaint: SOB, swelling. Hospital Course:    4/22: H&P - \"Patient is a 82/F with medical  History of Cirrhosis, HFpEF, and SBP on ciprofloxacin prophylaxis presents to the hospital at the behest of her outpatient PCP due to worsening edema. The patient states that she was hospitalized approximate 4 weeks ago with hypotension and discharged on 4/5/22. It appears that she was diagnosed with SBP at that time and treated. She is currently on ciprofloxacin as well as Bumex, metolazone, and Aldactone. She reports worsening shortness of breath as well as orthopnea. She states that her legs are heavy and she has difficulty with ambulation. She ambulates with a walker at baseline. She denies having any chest pain or palpitations. \"    4/23: paracentesis completed at bedside. 2.6L dark yellow ascitic fluid removed. Tolerated well and remained HD stable. Diuretics resumed in the afternoon after ensuring HD stability given her history    4/24: Creatinine increased today. UOP poor with diuretics. Mild yellow/clear drainage from paracentesis site.   Patient reports improvement in her abdominal distention and breathing. Remains on 1-2L NC with hypoxia on RA. Subjective (past 24 hours):   Patient sitting in bedside chair. Still complaining of LE swelling, but improved from admission. She states he abdomen is less distended. Worked with PT this AM and states it went well. Past medical history, family history, social history and allergies reviewed again and is unchanged since admission. ROS (12 point review of systems completed. Pertinent positives noted. Otherwise ROS is negative)     Medications:  Reviewed    Infusion Medications    sodium chloride       Scheduled Medications    bumetanide  1 mg IntraVENous BID WC    traZODone  50 mg Oral Nightly    sodium chloride flush  5-40 mL IntraVENous 2 times per day    enoxaparin  30 mg SubCUTAneous Daily    aspirin  325 mg Oral Daily    [Held by provider] atorvastatin  20 mg Oral Daily    [Held by provider] bumetanide  1 mg Oral BID    ciprofloxacin  500 mg Oral Daily    levothyroxine  50 mcg Oral QAM AC    [Held by provider] metOLazone  5 mg Oral Once per day on Mon Wed Fri    midodrine  10 mg Oral TID WC    pantoprazole  40 mg Oral QAM AC    PARoxetine  30 mg Oral QAM    rifAXIMin  550 mg Oral BID     PRN Meds: sodium chloride flush, sodium chloride, ondansetron **OR** ondansetron, polyethylene glycol, acetaminophen **OR** acetaminophen      Intake/Output Summary (Last 24 hours) at 4/25/2022 1249  Last data filed at 4/25/2022 0600  Gross per 24 hour   Intake 240 ml   Output 200 ml   Net 40 ml       Diet:  ADULT DIET; Regular; Low Sodium (2 gm); 1500 ml    Exam:  BP (!) 114/55   Pulse 88   Temp 97.7 °F (36.5 °C) (Oral)   Resp 18   Ht 5' 4\" (1.626 m)   Wt 186 lb 14.4 oz (84.8 kg)   SpO2 99%   BMI 32.08 kg/m²   General appearance: Elderly, chronically ill-appearing female. No acute distress. HEENT: Normal cephalic, atraumatic without obvious deformity. Pupils equal, round, and reactive to light.   Extra ocular muscles intact. Conjunctivae/corneas clear. Neck: Supple, with full range of motion. No jugular venous distention. Trachea midline. Respiratory:  Normal respiratory effort. Clear to auscultation, bilaterally without Rales/Wheezes/Rhonchi. Cardiovascular: Regular rate and rhythm with normal S1/S2 without murmurs, rubs or gallops. Abdomen: Abdomen is soft with mild abdominal distention. No fluid wave. Paracentesis ariana in LLQ with dry dressing and no further oozing. Hypoactive bowel sounds. No tenderness to palpation. Musculoskeletal: Diffuse anasarca with 4+ pitting edema to the bilateral lower extremities extending up to the thighs. Skin: Stasis dermatitis noted to the bilateral lower extremities distal to the knee. No palmar erythema. Minimal spider angioma on the upper chest.  Mild distal LE discoloration with blanching and. Mildly cool to the touch. Neurologic:  Neurovascularly intact without any focal sensory/motor deficits. Cranial nerves: II-XII intact, grossly non-focal.  Psychiatric: Alert and oriented, thought content appropriate, normal insight  Capillary Refill: Brisk,< 3 seconds   Peripheral Pulses: Distal LE pulses difficult to palpate due to edema. Labs:   Recent Labs     04/23/22  0814 04/24/22  0503 04/25/22  0616   WBC 5.1 5.4 7.0   HGB 9.9* 10.2* 11.0*   HCT 32.3* 34.2* 38.4    212 168     Recent Labs     04/23/22  0814 04/24/22  0503 04/25/22  0616   * 136 134*   K 3.0* 4.1 4.1   CL 91* 90* 91*   CO2 28 32 26   BUN 57* 60* 63*   CREATININE 1.8* 2.3* 2.3*   CALCIUM 7.9* 7.8* 7.9*     No results for input(s): AST, ALT, BILIDIR, BILITOT, ALKPHOS in the last 72 hours. No results for input(s): INR in the last 72 hours. No results for input(s): Lana Pace in the last 72 hours.     Microbiology:    Blood culture #1:   Lab Results   Component Value Date    BC No growth-preliminary  No growth   08/24/2018       Blood culture #2:No results found for: BLOODCULT2    Organism:  Lab Results   Component Value Date    ORG Enterococcus faecium - (Group D) 08/31/2018         Lab Results   Component Value Date    LABGRAM  04/23/2022     Moderate segmented neutrophils observed. No bacteria seen. performed on cytospun specimen        MRSA culture only:No results found for: [facility]    Urine culture: No results found for: LABURIN    Respiratory culture: No results found for: CULTRESP    Aerobic and Anaerobic :  No results found for: LABAERO  Lab Results   Component Value Date    LABANAE No growth-preliminary  04/23/2022       Urinalysis:      Lab Results   Component Value Date    NITRU NEGATIVE 04/24/2022    WBCUA 2-4 04/24/2022    BACTERIA NONE SEEN 04/24/2022    RBCUA 0-2 04/24/2022    BLOODU NEGATIVE 04/24/2022    SPECGRAV 1.012 04/24/2022    GLUCOSEU NEGATIVE 03/26/2022       Radiology:  US ABDOMEN LIMITED   Final Result   Moderate cirrhotic ascites. This document has been electronically signed by: Naveen Alves MD on    04/22/2022 11:21 PM      XR CHEST (2 VW)   Final Result   1. Mild groundglass opacities in both lungs can reflect underlying interstitial pulmonary edema. 2. Small bilateral pleural effusions. 3. Moderate cardiomegaly. **This report has been created using voice recognition software. It may contain minor errors which are inherent in voice recognition technology. **      Final report electronically signed by Dr Daphine Snellen on 4/22/2022 1:09 PM        XR CHEST (2 VW)    Result Date: 4/22/2022  PROCEDURE: XR CHEST (2 VW) CLINICAL INFORMATION: shortness of breath COMPARISON: Chest radiograph 3/26/2022, 3/24/2022, 3/15/2022, and 3/11/2022. TECHNIQUE: PA and lateral views of the chest performed. FINDINGS: Moderate cardiomegaly. Small bilateral pleural effusions. Mild groundglass opacification of both lungs. Prosthetic aortic valve is seen. Left atrial appendage clip is noted. No pneumothorax. No acute bony abnormality.  Median sternotomy has been performed. 1. Mild groundglass opacities in both lungs can reflect underlying interstitial pulmonary edema. 2. Small bilateral pleural effusions. 3. Moderate cardiomegaly. **This report has been created using voice recognition software. It may contain minor errors which are inherent in voice recognition technology. ** Final report electronically signed by Dr Aziza Hawley on 4/22/2022 1:09 PM    US ABDOMEN LIMITED    Result Date: 4/22/2022  US Abdomen Limited, Liver COMPARISON: US,SR - US LIVER - 03/12/2022 12:04 AM EST FINDINGS: Nodular liver contours consistent with hepatic cirrhosis. Moderate ascites. Moderate cirrhotic ascites.  This document has been electronically signed by: Pierre Mejia MD on 04/22/2022 11:21 PM      Electronically signed by Alize Ackerman DO on 4/25/2022 at 12:49 PM

## 2022-04-26 LAB
ANION GAP SERPL CALCULATED.3IONS-SCNC: 15 MEQ/L (ref 8–16)
BASOPHILS # BLD: 0.6 %
BASOPHILS ABSOLUTE: 0 THOU/MM3 (ref 0–0.1)
BUN BLDV-MCNC: 59 MG/DL (ref 7–22)
CALCIUM SERPL-MCNC: 7.9 MG/DL (ref 8.5–10.5)
CHLORIDE BLD-SCNC: 90 MEQ/L (ref 98–111)
CO2: 29 MEQ/L (ref 23–33)
CREAT SERPL-MCNC: 2.2 MG/DL (ref 0.4–1.2)
EOSINOPHIL # BLD: 1.8 %
EOSINOPHILS ABSOLUTE: 0.1 THOU/MM3 (ref 0–0.4)
ERYTHROCYTE [DISTWIDTH] IN BLOOD BY AUTOMATED COUNT: 16.5 % (ref 11.5–14.5)
ERYTHROCYTE [DISTWIDTH] IN BLOOD BY AUTOMATED COUNT: 56.9 FL (ref 35–45)
GFR SERPL CREATININE-BSD FRML MDRD: 21 ML/MIN/1.73M2
GLUCOSE BLD-MCNC: 99 MG/DL (ref 70–108)
HCT VFR BLD CALC: 33.3 % (ref 37–47)
HEMOGLOBIN: 10.1 GM/DL (ref 12–16)
IMMATURE GRANS (ABS): 0.04 THOU/MM3 (ref 0–0.07)
IMMATURE GRANULOCYTES: 0.6 %
LYMPHOCYTES # BLD: 19.3 %
LYMPHOCYTES ABSOLUTE: 1.2 THOU/MM3 (ref 1–4.8)
MAGNESIUM: 1.5 MG/DL (ref 1.6–2.4)
MCH RBC QN AUTO: 29.5 PG (ref 26–33)
MCHC RBC AUTO-ENTMCNC: 30.3 GM/DL (ref 32.2–35.5)
MCV RBC AUTO: 97.4 FL (ref 81–99)
MONOCYTES # BLD: 7.8 %
MONOCYTES ABSOLUTE: 0.5 THOU/MM3 (ref 0.4–1.3)
NUCLEATED RED BLOOD CELLS: 0 /100 WBC
PLATELET # BLD: 206 THOU/MM3 (ref 130–400)
PMV BLD AUTO: 10.4 FL (ref 9.4–12.4)
POTASSIUM SERPL-SCNC: 3.1 MEQ/L (ref 3.5–5.2)
RBC # BLD: 3.42 MILL/MM3 (ref 4.2–5.4)
SEG NEUTROPHILS: 69.9 %
SEGMENTED NEUTROPHILS ABSOLUTE COUNT: 4.4 THOU/MM3 (ref 1.8–7.7)
SODIUM BLD-SCNC: 134 MEQ/L (ref 135–145)
WBC # BLD: 6.3 THOU/MM3 (ref 4.8–10.8)

## 2022-04-26 PROCEDURE — 2500000003 HC RX 250 WO HCPCS: Performed by: INTERNAL MEDICINE

## 2022-04-26 PROCEDURE — 6370000000 HC RX 637 (ALT 250 FOR IP): Performed by: INTERNAL MEDICINE

## 2022-04-26 PROCEDURE — 97110 THERAPEUTIC EXERCISES: CPT

## 2022-04-26 PROCEDURE — 1200000000 HC SEMI PRIVATE

## 2022-04-26 PROCEDURE — 36415 COLL VENOUS BLD VENIPUNCTURE: CPT

## 2022-04-26 PROCEDURE — 6370000000 HC RX 637 (ALT 250 FOR IP): Performed by: STUDENT IN AN ORGANIZED HEALTH CARE EDUCATION/TRAINING PROGRAM

## 2022-04-26 PROCEDURE — 99232 SBSQ HOSP IP/OBS MODERATE 35: CPT | Performed by: INTERNAL MEDICINE

## 2022-04-26 PROCEDURE — 97116 GAIT TRAINING THERAPY: CPT

## 2022-04-26 PROCEDURE — 99233 SBSQ HOSP IP/OBS HIGH 50: CPT | Performed by: STUDENT IN AN ORGANIZED HEALTH CARE EDUCATION/TRAINING PROGRAM

## 2022-04-26 PROCEDURE — 85025 COMPLETE CBC W/AUTO DIFF WBC: CPT

## 2022-04-26 PROCEDURE — 80048 BASIC METABOLIC PNL TOTAL CA: CPT

## 2022-04-26 PROCEDURE — 2580000003 HC RX 258: Performed by: STUDENT IN AN ORGANIZED HEALTH CARE EDUCATION/TRAINING PROGRAM

## 2022-04-26 PROCEDURE — 97535 SELF CARE MNGMENT TRAINING: CPT

## 2022-04-26 PROCEDURE — 83735 ASSAY OF MAGNESIUM: CPT

## 2022-04-26 RX ORDER — POTASSIUM CHLORIDE 20 MEQ/1
40 TABLET, EXTENDED RELEASE ORAL
Status: DISCONTINUED | OUTPATIENT
Start: 2022-04-26 | End: 2022-04-27 | Stop reason: HOSPADM

## 2022-04-26 RX ORDER — POTASSIUM CHLORIDE 20 MEQ/1
40 TABLET, EXTENDED RELEASE ORAL ONCE
Status: COMPLETED | OUTPATIENT
Start: 2022-04-26 | End: 2022-04-26

## 2022-04-26 RX ORDER — METOLAZONE 5 MG/1
5 TABLET ORAL ONCE
Status: COMPLETED | OUTPATIENT
Start: 2022-04-26 | End: 2022-04-26

## 2022-04-26 RX ADMIN — METOLAZONE 5 MG: 5 TABLET ORAL at 16:12

## 2022-04-26 RX ADMIN — TRAZODONE HYDROCHLORIDE 50 MG: 50 TABLET ORAL at 21:27

## 2022-04-26 RX ADMIN — PAROXETINE HYDROCHLORIDE 30 MG: 30 TABLET, FILM COATED ORAL at 08:33

## 2022-04-26 RX ADMIN — POTASSIUM CHLORIDE 40 MEQ: 1500 TABLET, EXTENDED RELEASE ORAL at 12:16

## 2022-04-26 RX ADMIN — RIFAXIMIN 550 MG: 550 TABLET ORAL at 21:27

## 2022-04-26 RX ADMIN — CIPROFLOXACIN HYDROCHLORIDE 500 MG: 500 TABLET, FILM COATED ORAL at 08:33

## 2022-04-26 RX ADMIN — POTASSIUM CHLORIDE 40 MEQ: 1500 TABLET, EXTENDED RELEASE ORAL at 09:56

## 2022-04-26 RX ADMIN — BUMETANIDE 1 MG: 0.25 INJECTION INTRAMUSCULAR; INTRAVENOUS at 16:12

## 2022-04-26 RX ADMIN — ASPIRIN 325 MG: 325 TABLET ORAL at 08:33

## 2022-04-26 RX ADMIN — LEVOTHYROXINE SODIUM 50 MCG: 0.05 TABLET ORAL at 05:23

## 2022-04-26 RX ADMIN — MIDODRINE HYDROCHLORIDE 10 MG: 10 TABLET ORAL at 12:16

## 2022-04-26 RX ADMIN — MIDODRINE HYDROCHLORIDE 10 MG: 10 TABLET ORAL at 16:12

## 2022-04-26 RX ADMIN — BUMETANIDE 1 MG: 0.25 INJECTION INTRAMUSCULAR; INTRAVENOUS at 08:34

## 2022-04-26 RX ADMIN — SODIUM CHLORIDE, PRESERVATIVE FREE 10 ML: 5 INJECTION INTRAVENOUS at 21:26

## 2022-04-26 RX ADMIN — PANTOPRAZOLE SODIUM 40 MG: 40 TABLET, DELAYED RELEASE ORAL at 05:23

## 2022-04-26 RX ADMIN — MIDODRINE HYDROCHLORIDE 10 MG: 10 TABLET ORAL at 08:33

## 2022-04-26 RX ADMIN — SODIUM CHLORIDE, PRESERVATIVE FREE 10 ML: 5 INJECTION INTRAVENOUS at 08:33

## 2022-04-26 RX ADMIN — RIFAXIMIN 550 MG: 550 TABLET ORAL at 08:33

## 2022-04-26 ASSESSMENT — PAIN SCALES - GENERAL
PAINLEVEL_OUTOF10: 0
PAINLEVEL_OUTOF10: 0

## 2022-04-26 NOTE — FLOWSHEET NOTE
[facility] PROGRESS NOTE      Patient: Josephina Meckel  Room #: 3P-37/306-H            YOB: 1940  Age: 81 y.o. Gender: female            Admit Date & Time: 2022 11:31 AM    Assessment:    Interventions:   Outcomes:        rounding on unit . Encountered patient in recliner in her room. Patient reported during much better. Patient expresses receiving good support from family; son and grandchildren from a  son. Patient speaks with a graciousness which the  commended. When asked what along with her health  might pray for, patient said \"that I might continue to live without pain, and to   Go graciously into God's presence\"  Patient reached for my gloved hand, and remarked on it being warm. We laughed a bit about that. Patient exhibits a strong spirit and a positive outlook despite her health challenges. Plan:    1. Continue to visit and support until her discharge to her previous care facility. Electronically signed by Sherryle Citizen, on 2022 at 1:41 PM.  [address] [facility] [place]  [phone]               22 1311   Encounter Summary   Encounter Overview/Reason  Initial Encounter;Spiritual/Emotional Needs   Service Provided For: Patient   Referral/Consult From: [address] [place] [address] Street Children;Family members   Last Encounter  22   Complexity of Encounter Low   Begin Time 1311   Encounter    Type Initial Screen/Assessment   Spiritual/Emotional needs   Type Spiritual Support   Assessment/Intervention/Outcome   Assessment Calm;Coping; Hopeful;Peaceful   Intervention Active listening;Discussed belief system/Protestant practices/jack;Discussed relationship with God;Explored/Affirmed feelings, thoughts, concerns;Nurtured Hope;Prayer (assurance of)/[place];Sustaining Presence/Ministry of presence

## 2022-04-26 NOTE — PROGRESS NOTES
[facility]  INPATIENT PHYSICAL THERAPY  DAILY NOTE  STR ONC MED 5K - 5K-05/005-A     Time In: 0845  Time Out: 5456  Timed Code Treatment Minutes: 32 Minutes  Minutes: 27          Date: 2022  Patient Name: Josephina Meckel,  Gender:  female        MRN: [id number]  : 1940  (81 y.o.)     Referring Practitioner: Chi Rosario DO  Diagnosis: Shortness of breath  Additional Pertinent Hx: per chart review; Patient is a 82/F with medical  History of Cirrhosis, HFpEF, and SBP on ciprofloxacin prophylaxis presents to the hospital at the behest of her outpatient PCP due to worsening edema. The patient states that she was hospitalized approximate 4 weeks ago with hypotension and discharged on 22. It appears that she was diagnosed with SBP at that time and treated. She is currently on ciprofloxacin as well as Bumex, metolazone, and Aldactone. She reports worsening shortness of breath as well as orthopnea. She states that her legs are heavy and she has difficulty with ambulation. She ambulates with a walker at baseline. She denies having any chest pain or palpitations. At ambulatory care center, patient was noted to have severe hypokalemia with a potassium of 2.1. He was given oral potassium replacement transferred to [facility] for further management. Prior Level of Function:  Lives With: Alone  Type of Home: Facility  Home Layout: One level  Home Access: Stairs to enter with rails  Entrance Stairs - Number of Steps: 3-4 steps with 1 rail  Home Equipment: Walker, 4 wheeled,Cane,Oxygen        ADL Assistance: Independent  Homemaking Assistance: Independent  Ambulation Assistance: Independent  Transfer Assistance: Independent  Additional Comments: Pt reports that she has been at [facility] for last couple months.  states she plans to become a permanent resident of SNF; reports there is a walk in shower in SNF with matt bars.    Restrictions/Precautions:  Restrictions/Precautions: Fall Risk,General Precautions,Contact Precautions      SUBJECTIVE: RN approved session. Pt sitting up in recliner and agrees to therapy. PAIN: not reported     Vitals: Vitals not assessed per clinical judgement, see nursing flowsheet    OBJECTIVE:  Bed Mobility:  Not Tested    Transfers:  Sit to Stand: Stand By Assistance  Stand to Sit:Stand By Assistance    Ambulation:  Stand By Assistance, [employer] Assistance  Distance: 30 ft  Surface: Level Tile  Device:4 Wheeled Walker  Gait Deviations: Forward Flexed Posture, Decreased Step Length Bilaterally, Decreased Gait Speed and on O2 with therapist managing O2 line    Balance:  Static Sitting Balance:  Independent  Dynamic Sitting Balance: Supervision  Static Standing Balance: Stand By Assistance, with walker  Dynamic Standing Balance: Stand By Assistance, Contact Guard Assistance, with walker    Exercise:  Patient was guided in 1 set(s) 10-12 reps of exercise to both lower extremities. Ankle pumps, Glut sets, Quad sets, Hip abduction/adduction, Seated marches, Seated heel/toe raises and Long arc quads. Exercises were completed for increased independence with functional mobility. Functional Outcome Measures: Completed  AM-PAC Inpatient Mobility Raw Score : 17  AM-PAC Inpatient T-Scale Score : 42.13    ASSESSMENT:  Assessment: Patient progressing toward established goals. Activity Tolerance:  Patient tolerance of  treatment: good.        Equipment Recommendations:Equipment Needed: No  Discharge Recommendations: Continue to assess pending progress  Plan: Current Treatment Recommendations: Strengthening,ROM,Gait training,Balance training,Functional mobility training,Transfer training,Endurance training,Patient/Caregiver education & training,Safety education & training,Home exercise program,Therapeutic activities  Plan:  (3-5x GM)    Patient Education  Patient Education: Plan of Care, Home Exercise Program    Goals:  Patient goals : get settled as resident at SNF/ECF  Short Term Goals  Time Frame for Short term goals: at discharge  Short term goal 1: Pt to be Mod I for supine <> sit to get in/out of bed  Short term goal 2: Pt to be Mod I for sit <> stand to get up to ambulate  Short term goal 3: Pt to ambulate >40 ft with RW with Supervision for household distances  Long Term Goals  Time Frame for Long term goals : not set due to short ELOS    Following session, patient left in safe position with all fall risk precautions in place. Nancy Bansal.  Jose Castillo, [place] 8

## 2022-04-26 NOTE — PLAN OF CARE
Patient's son called the floor nurse earlier today to discuss having hospice services involved in the patient's care. I confirmed with the patient that they had discussed hospice care and that she was wishing to pursue in the direction of speaking with the hospice team.  She confirmed this decision. I then spoke with the patient's son on the phone and confirmed that they had a lengthy discussion and at the patient has previously told him that \"to this would be the last time that she comes to the hospital\". They are opted for comfort care only. Hospice has been consulted. They will have a meeting tomorrow at around 1400 to discuss benefits and plans going forward.

## 2022-04-26 NOTE — PROGRESS NOTES
Hospitalist Progress Note      Patient:  Gilford Head    Unit/Bed:5K-05/005-A  YOB: 1940  MRN: [id number]   Acct: [de-identified]   PCP: ARTHUR Berrios CNP  Date of Admission: 4/22/2022    Assessment/Plan:    1. Acute on chronic decompensated cirrhosis with ascities  2. Anasarca  a. Unclear etiology of cirrhosis. Suspect possible fatty liver disease. Patient denies any alcohol history. b. A1AT previously checked and normal.  c. MELD-Na: 16.  d. Last EGD appears to be 5/2021: Distal esophagitis, gastritis, portal hypertensive gastropathy, fundic polyps, no varices. red. Esau Ambrosia documentation incomplete given incontinence yesterday. Documented 600cc output. Weight stable at 186lbs. f. Will discuss with nephrology potentially increasing patient's diuretics given anasarca and volume overload, although she is likely intravascularly volume depleted and this may worsen her renal function. Remains on O2 with desaturations on RA.  g. Continue home midodrine  h. Patient does not appear to be on lactulose at baseline. No evidence of encephalopathy  i. Continue rifaximin. 3. Acute hypoxic respiratory failure  a. Suspect this is 2/2 to pulmonary edema  b. On 1-2L NC. Unable to wean without hypoxia. Continue diuresis, with potentially increasing dose/adding metolazone. 4. History of HFpEF  a. Unclear if in acute exacerbation or if worsening shortness of breath and edema is more related to cirrhosis. b. Echo 3/2022: EF 55%. Severe tricuspid regurgitation. Severe pulmonary hypertension. c. BNP elevated. d. Diuretics as above. 5. Severe hypokalemia, improved. a. Will order 40 mEq PO K daily and will give an additional 40 today for repletion. 6. ISIDRA CKD stage IIIb  a. Renal function stable.    b. Suspect pre-renal azotemia with intravascular volume depletion given cirrhosis and CHF and significant third spacing.   c. Continue diuretics  d. Nephrology on board for diuretic recommendations given CKD and anasarca. 7. CAD  a[facility] Consider reducing to 81mg.   c. Holding ARB pending paracentesis due to hx of hypotension. Currently HD stable on home dose of midodrine. d. Does not appear to be on BB, likely due to cirrhosis/hx SBP  8. History of SBP  a. Patient has been receiving ciprofloxacin since prior hospitalization. Will continue  b. Paracentesis PMN count 350 (improved from prior/recent hospitalization). Culture sterile. 9. History of atrial fibrillation  a. Patient reports not currently on anticoagulation. b. Currently rate controlled with a heart rate of 80-90. Not on BB therapy. Previously on Carvedilol. 10. Hypothyroidism  a. Continue synthroid. 11. Hx AS a/p TAVR now with AI.  12. Hx severe pulmonary HTN  a. Patient previously declined aggressive measure for management including transfer to tertiary care facility or outpatient follow up. Disposition:  Patient from SNF with plans to return at time of discharge    Chief Complaint: SOB, swelling. Hospital Course:    4/22: H&P - \"Patient is a 82/F with medical  History of Cirrhosis, HFpEF, and SBP on ciprofloxacin prophylaxis presents to the hospital at the behest of her outpatient PCP due to worsening edema. The patient states that she was hospitalized approximate 4 weeks ago with hypotension and discharged on 4/5/22. It appears that she was diagnosed with SBP at that time and treated. She is currently on ciprofloxacin as well as Bumex, metolazone, and Aldactone. She reports worsening shortness of breath as well as orthopnea. She states that her legs are heavy and she has difficulty with ambulation. She ambulates with a walker at baseline. She denies having any chest pain or palpitations. \"    4/23: paracentesis completed at bedside. 2.6L dark yellow ascitic fluid removed. Tolerated well and remained HD stable. Diuretics resumed in the afternoon after ensuring HD stability given her history    4/24: Creatinine increased today. UOP poor with diuretics. Mild yellow/clear drainage from paracentesis site. Patient reports improvement in her abdominal distention and breathing. Remains on 1-2L NC with hypoxia on RA.    4/25: Remains hypoxic on room air. UOP incompletely documented due to incontinence. Still on IV bumex. Abdominal distention improved from presentation. Subjective (past 24 hours):   Respiratory status stable. Still with significant LE edema. States he legs feel \"heavy\". Denies wheezes or rales. Denies fevers, chills, or abdominal pain. Past medical history, family history, social history and allergies reviewed again and is unchanged since admission. ROS (12 point review of systems completed. Pertinent positives noted.  Otherwise ROS is negative)     Medications:  Reviewed    Infusion Medications    sodium chloride       Scheduled Medications    potassium chloride  40 mEq Oral Daily with breakfast    potassium chloride  40 mEq Oral Once    bumetanide  1 mg IntraVENous BID WC    traZODone  50 mg Oral Nightly    sodium chloride flush  5-40 mL IntraVENous 2 times per day    enoxaparin  30 mg SubCUTAneous Daily    aspirin  325 mg Oral Daily    [Held by provider] atorvastatin  20 mg Oral Daily    [Held by provider] bumetanide  1 mg Oral BID    ciprofloxacin  500 mg Oral Daily    levothyroxine  50 mcg Oral QAM AC    [Held by provider] metOLazone  5 mg Oral Once per day on Mon Wed Fri    midodrine  10 mg Oral TID WC    pantoprazole  40 mg Oral QAM AC    PARoxetine  30 mg Oral QAM    rifAXIMin  550 mg Oral BID     PRN Meds: sodium chloride flush, sodium chloride, ondansetron **OR** ondansetron, polyethylene glycol, acetaminophen **OR** acetaminophen      Intake/Output Summary (Last 24 hours) at 4/26/2022 1138  Last data filed at 4/26/2022 1104  Gross per 24 hour   Intake 90 ml   Output 750 ml   Net -660 ml       Diet:  ADULT DIET; Regular; Low Sodium (2 gm); 1500 ml    Exam:  BP (!) 124/58   Pulse 86   Temp 97.9 °F (36.6 °C) (Oral)   Resp 18   Ht 5' 4\" (1.626 m)   Wt 186 lb 14.4 oz (84.8 kg)   SpO2 95%   BMI 32.08 kg/m²   General appearance: Elderly, chronically ill-appearing female. No acute distress. HEENT: Normal cephalic, atraumatic without obvious deformity. Pupils equal, round, and reactive to light. Extra ocular muscles intact. Conjunctivae/corneas clear. Neck: Supple, with full range of motion. No jugular venous distention. Trachea midline. Respiratory:  Normal respiratory effort. Clear to auscultation, bilaterally without Rales/Wheezes/Rhonchi. Cardiovascular: Regular rate and rhythm with normal S1/S2 without murmurs, rubs or gallops. Abdomen: Abdomen is soft with mild abdominal distention. No fluid wave. Paracentesis ariana in LLQ with dry dressing. Jere Sean Hypoactive bowel sounds. No tenderness to palpation. Musculoskeletal: Diffuse anasarca with 4+ pitting edema to the bilateral lower extremities extending up to the thighs. Skin: Stasis dermatitis noted to the bilateral lower extremities distal to the knee. No palmar erythema. Minimal spider angioma on the upper chest.  Mild distal LE discoloration with blanching and. Neurologic:  Neurovascularly intact without any focal sensory/motor deficits. Cranial nerves: II-XII intact, grossly non-focal.  Psychiatric: Alert and oriented, thought content appropriate, normal insight  Capillary Refill: Brisk,< 3 seconds   Peripheral Pulses: Distal LE pulses difficult to palpate due to edema.     Labs:   Recent Labs     04/24/22  0503 04/25/22  0616 04/26/22  0450   WBC 5.4 7.0 6.3   HGB 10.2* 11.0* 10.1*   HCT 34.2* 38.4 33.3*    168 206     Recent Labs     04/24/22  0503 04/25/22  0616 04/26/22  0450    134* 134*   K 4.1 4.1 3.1*   CL 90* 91* 90*   CO2 32 26 29   BUN 60* 63* 59*   CREATININE 2.3* 2.3* 2.2*   CALCIUM 7.8* 7. 9* 7.9*     No results for input(s): AST, ALT, BILIDIR, BILITOT, ALKPHOS in the last 72 hours. No results for input(s): INR in the last 72 hours. No results for input(s): Cameron Seed in the last 72 hours. Microbiology:    Blood culture #1:   Lab Results   Component Value Date    BC No growth-preliminary  No growth   08/24/2018       Blood culture #2:No results found for: Jennifer Mckenzie    Organism:  Lab Results   Component Value Date    ORG Enterococcus faecium - (Group D) 08/31/2018         Lab Results   Component Value Date    LABGRAM  04/23/2022     Moderate segmented neutrophils observed. No bacteria seen. performed on cytospun specimen        MRSA culture only:No results found for: [facility]    Urine culture: No results found for: LABURIN    Respiratory culture: No results found for: CULTRESP    Aerobic and Anaerobic :  No results found for: LABAERO  Lab Results   Component Value Date    LABANAE No growth-preliminary  04/23/2022       Urinalysis:      Lab Results   Component Value Date    NITRU NEGATIVE 04/24/2022    WBCUA 2-4 04/24/2022    BACTERIA NONE SEEN 04/24/2022    RBCUA 0-2 04/24/2022    BLOODU NEGATIVE 04/24/2022    SPECGRAV 1.012 04/24/2022    GLUCOSEU NEGATIVE 03/26/2022       Radiology:  US ABDOMEN LIMITED   Final Result   Moderate cirrhotic ascites. This document has been electronically signed by: Ashutosh Dorman MD on    04/22/2022 11:21 PM      XR CHEST (2 VW)   Final Result   1. Mild groundglass opacities in both lungs can reflect underlying interstitial pulmonary edema. 2. Small bilateral pleural effusions. 3. Moderate cardiomegaly. **This report has been created using voice recognition software. It may contain minor errors which are inherent in voice recognition technology. **      Final report electronically signed by Dr Sima Vital on 4/22/2022 1:09 PM        XR CHEST (2 VW)    Result Date: 4/22/2022  PROCEDURE: XR CHEST (2 VW) CLINICAL INFORMATION: shortness of breath COMPARISON: Chest radiograph 3/26/2022, 3/24/2022, 3/15/2022, and 3/11/2022. TECHNIQUE: PA and lateral views of the chest performed. FINDINGS: Moderate cardiomegaly. Small bilateral pleural effusions. Mild groundglass opacification of both lungs. Prosthetic aortic valve is seen. Left atrial appendage clip is noted. No pneumothorax. No acute bony abnormality. Median sternotomy has been performed. 1. Mild groundglass opacities in both lungs can reflect underlying interstitial pulmonary edema. 2. Small bilateral pleural effusions. 3. Moderate cardiomegaly. **This report has been created using voice recognition software. It may contain minor errors which are inherent in voice recognition technology. ** Final report electronically signed by Dr Tereza Lockett on 4/22/2022 1:09 PM    US ABDOMEN LIMITED    Result Date: 4/22/2022  US Abdomen Limited, Liver COMPARISON: US,SR - US LIVER - 03/12/2022 12:04 AM EST FINDINGS: Nodular liver contours consistent with hepatic cirrhosis. Moderate ascites. Moderate cirrhotic ascites.  This document has been electronically signed by: Meet Mckenzie MD on 04/22/2022 11:21 PM      Electronically signed by Jeri Leonardo DO on 4/26/2022 at 11:38 AM

## 2022-04-26 NOTE — CARE COORDINATION
4/26/22, 12:39 PM EDT    DISCHARGE PLANNING EVALUATION      Call to JONATHAN with [employer], updated on pt and plans for possible discharge in 1-2 days. 3:10 PM  Call from nursing, reports [facility] called, reporting son had called them wanting Hospice. SW did call son Cheryle Haley, confirms he did call them, reports he wants to make sure pt is comfortable when she returns to nursing home. SW did ask if plan was still for rehab, reports it is, but unsure that she will be able to return home due to her medical condition. SW did encourage son keep in contact with NH about future plans for possible long term care and cost.    Son reports he did visit with them today and did discuss costs when pt not on skilled care. SW did ask if pt aware of talks with Hospice. Son reports he and pt have discussed Hospice. SW did let son know she would pass on to provider conversation. SW did talk with Dr. Alirio Dougherty, will clarify with pt/family wishes.

## 2022-04-26 NOTE — PROGRESS NOTES
Renal Progress Note    Assessment and Plan:   1. Stage IV chronic kidney disease stable serum creatinine slightly improved today  2. Volume overload  3. Deconditioning  4. Hyponatremia mild and stable   5. Normocytic anemia    PLAN:  1. Labs reviewed  2. Serum creatinine is slightly improved today  3. Medications reviewed  4. No changes  5. Continue bumetanide  6. May use the metolazone or chlorothiazide as needed  7. Monitor electrolytes   8. Labs in the morning  9. We will follow      Patient Active Problem List:     Chronic atrial fibrillation (HCC)     Ascending cholangitis, possible     Elevated LFTs     Thrombocytopenia (HCC)     Leukopenia     ISIDRA (acute kidney injury) ([address]     SIRS (systemic inflammatory response syndrome) (HCC)     Cholecystitis, acute     Neutropenia (HCC)     Aortic stenosis, severe     Colitis     Nausea     Diarrhea     Essential hypertension     Anemia, normocytic normochromic     Hypocalcemia     Cirrhosis (HCC)     S/P AVR (aortic valve replacement)     Obesity (BMI 30-39. 9)     History of atrial fibrillation     Hypomagnesemia     Asymptomatic bacteriuria - no clinical indication for antimicrobial therapy     CKD (chronic kidney disease) stage 4, GFR 15-29 ml/min (HCC)     Acute renal failure superimposed on stage 3 chronic kidney disease (HCC)     VRE (vancomycin resistant enterococcus) culture positive     CHF (congestive heart failure), NYHA class I, acute on chronic, combined (HCC)     Shortness of breath     Acute kidney injury ([address]     Hypotension     SBP (spontaneous bacterial peritonitis) (HCC)     Other ascites     Stage 3b chronic kidney disease (HCC)     Nevus, non-neoplastic     Neoplasm of uncertain behavior of skin     Other melanin hyperpigmentation     Prurigo nodularis     Other seborrheic keratosis     Pure hypercholesterolemia     Pure hypercholesterolemia, unspecified     Macular degeneration     Exudative age-related macular degeneration of right eye ([address] Personal history of other malignant neoplasm of skin     Encounter for screening for malignant neoplasm of skin     Dyschromia     Melanocytic nevi, unspecified     Lichenification and lichen simplex chronicus     Insomnia, unspecified     Encounter for removal of sutures     Atopic dermatitis     Carcinoma in situ, unspecified     Benign lipomatous neoplasm of skin and subcutaneous tissue of unspecified sites     Actinic keratosis     Decompensation of cirrhosis of liver (HCC)      Subjective:   Admit Date: 4/22/2022    Interval History:   Seen for chronic kidney disease. Sleeping during round    Updated by the staff. Mostly stable blood pressure for the most part  Urine output is not completely documented        Medications:   Scheduled Meds:   potassium chloride  40 mEq Oral Daily with breakfast    bumetanide  1 mg IntraVENous BID WC    traZODone  50 mg Oral Nightly    sodium chloride flush  5-40 mL IntraVENous 2 times per day    enoxaparin  30 mg SubCUTAneous Daily    aspirin  325 mg Oral Daily    [Held by provider] atorvastatin  20 mg Oral Daily    [Held by provider] bumetanide  1 mg Oral BID    ciprofloxacin  500 mg Oral Daily    levothyroxine  50 mcg Oral QAM AC    [Held by provider] metOLazone  5 mg Oral Once per day on Mon Wed Fri    midodrine  10 mg Oral TID WC    pantoprazole  40 mg Oral QAM AC    PARoxetine  30 mg Oral QAM    rifAXIMin  550 mg Oral BID     Continuous Infusions:   sodium chloride         CBC:   Recent Labs     04/24/22  0503 04/25/22  0616 04/26/22  0450   WBC 5.4 7.0 6.3   HGB 10.2* 11.0* 10.1*    168 206     CMP:    Recent Labs     04/24/22  0503 04/25/22  0616 04/26/22  0450    134* 134*   K 4.1 4.1 3.1*   CL 90* 91* 90*   CO2 32 26 29   BUN 60* 63* 59*   CREATININE 2.3* 2.3* 2.2*   GLUCOSE 89 100 99   CALCIUM 7.8* 7.9* 7.9*   LABGLOM 20* 20* 21*     Troponin: No results for input(s): TROPONINI in the last 72 hours.   BNP: No results for input(s): BNP in the last 72 hours. INR:   No results for input(s): INR in the last 72 hours. Lipids: No results for input(s): CHOL, LDLDIRECT, TRIG, HDL, AMYLASE, LIPASE in the last 72 hours. Liver:   No results for input(s): AST, ALT, ALKPHOS, PROT, LABALBU, BILITOT in the last 72 hours. Invalid input(s): BILDIR  Iron:  No results for input(s): IRONS, FERRITIN in the last 72 hours. Invalid input(s): LABIRONS  US ABDOMEN LIMITED   Final Result   Moderate cirrhotic ascites. This document has been electronically signed by: Naveen Alves MD on    04/22/2022 11:21 PM      XR CHEST (2 VW)   Final Result   1. Mild groundglass opacities in both lungs can reflect underlying interstitial pulmonary edema. 2. Small bilateral pleural effusions. 3. Moderate cardiomegaly. **This report has been created using voice recognition software. It may contain minor errors which are inherent in voice recognition technology. **      Final report electronically signed by Dr Daphine Snellen on 4/22/2022 1:09 PM            Objective:   Vitals: BP (!) 124/58   Pulse 86   Temp 97.9 °F (36.6 °C) (Oral)   Resp 18   Ht 5' 4\" (1.626 m)   Wt 186 lb 14.4 oz (84.8 kg)   SpO2 95%   BMI 32.08 kg/m²    Wt Readings from Last 3 Encounters:   04/26/22 186 lb 14.4 oz (84.8 kg)   04/14/22 180 lb (81.6 kg)   04/05/22 179 lb 6.4 oz (81.4 kg)      24HR INTAKE/OUTPUT:      Intake/Output Summary (Last 24 hours) at 4/26/2022 1437  Last data filed at 4/26/2022 1104  Gross per 24 hour   Intake 90 ml   Output 750 ml   Net -660 ml       Constitutional: Well-developed elderly lady alert resting comfortably  Skin:normal with no rash or lesions. HEENT: Head is normal.Throat is clear . Oral mucosa is moist   Neck:supple with no carotid bruit  Cardiovascular:  S1, S2 without murmur or rubs   Respiratory: Decreased breath sound  Abdomen: Soft. Good bowel sounds.   Ext: 2+ bilateral LE edema  Musculoskeletal:Intact  Neuro: Deferred      Electronically signed by Erlinda Frey MD on 4/26/2022 at 2:37 PM  **This report has been created using voice recognition software. It maycontain minor  errors which are inherent in voice recognition technology. **

## 2022-04-26 NOTE — PROGRESS NOTES
[address]  Occupational Therapy  Daily Note  Time:   Time In: 805  Time Out: 08  Timed Code Treatment Minutes: 26 Minutes  Minutes: 26          Date: 2022  Patient Name: Girish Villegas,   Gender: female      Room: [facility]005-A  MRN: [id number]  : 1940  (81 y.o.)  Referring Practitioner: Corwin Silverman DO  Diagnosis: Shortness of Breath  Additional Pertinent Hx: per chart review; Patient is a 82/F with medical  History of Cirrhosis, HFpEF, and SBP on ciprofloxacin prophylaxis presents to the hospital at the behest of her outpatient PCP due to worsening edema. The patient states that she was hospitalized approximate 4 weeks ago with hypotension and discharged on 22. It appears that she was diagnosed with SBP at that time and treated. She is currently on ciprofloxacin as well as Bumex, metolazone, and Aldactone. She reports worsening shortness of breath as well as orthopnea. She states that her legs are heavy and she has difficulty with ambulation. She ambulates with a walker at baseline. She denies having any chest pain or palpitations. At ambulatory care center, patient was noted to have severe hypokalemia with a potassium of 2.1. He was given oral potassium replacement transferred to [facility] for further management. Restrictions/Precautions:  Restrictions/Precautions: Fall Risk,General Precautions,Contact Precautions     SUBJECTIVE: Nurse ok'd session. Patient seated in bedside chair upon arrival. Agreeable to OT session    PAIN: Complains of pain in tailbone, discussed weight shifting/chaging positions while in chair to decrease chance of skin breakdown     Vitals: Vitals not assessed per clinical judgement, see nursing flowsheet    COGNITION: Slow Processing, Decreased Safety Awareness     ADL:   Feeding: with set-up. To open small containers for breakfast tray  Grooming: with set-up.   For hair care after washed by therapist seated routine. Short Term Goal 4: patient will complete UB ADLs with s/u and LB ADLs with MIN  A and use of AE PRN. Short Term Goal 5: patient will completing with MOD I and 0-1 verbal cues for safety and sequencing. Following session, patient left in safe position with all fall risk precautions in place.

## 2022-04-27 VITALS
TEMPERATURE: 98.1 F | RESPIRATION RATE: 18 BRPM | HEIGHT: 64 IN | HEART RATE: 85 BPM | WEIGHT: 186.9 LBS | OXYGEN SATURATION: 97 % | DIASTOLIC BLOOD PRESSURE: 54 MMHG | BODY MASS INDEX: 31.91 KG/M2 | SYSTOLIC BLOOD PRESSURE: 133 MMHG

## 2022-04-27 LAB
ANION GAP SERPL CALCULATED.3IONS-SCNC: 14 MEQ/L (ref 8–16)
BASOPHILS # BLD: 0.8 %
BASOPHILS ABSOLUTE: 0 THOU/MM3 (ref 0–0.1)
BUN BLDV-MCNC: 60 MG/DL (ref 7–22)
CALCIUM SERPL-MCNC: 8 MG/DL (ref 8.5–10.5)
CHLORIDE BLD-SCNC: 92 MEQ/L (ref 98–111)
CO2: 32 MEQ/L (ref 23–33)
CREAT SERPL-MCNC: 2 MG/DL (ref 0.4–1.2)
EOSINOPHIL # BLD: 1.4 %
EOSINOPHILS ABSOLUTE: 0.1 THOU/MM3 (ref 0–0.4)
ERYTHROCYTE [DISTWIDTH] IN BLOOD BY AUTOMATED COUNT: 16.9 % (ref 11.5–14.5)
ERYTHROCYTE [DISTWIDTH] IN BLOOD BY AUTOMATED COUNT: 58.1 FL (ref 35–45)
GFR SERPL CREATININE-BSD FRML MDRD: 24 ML/MIN/1.73M2
GLUCOSE BLD-MCNC: 94 MG/DL (ref 70–108)
HCT VFR BLD CALC: 33.5 % (ref 37–47)
HEMOGLOBIN: 10.3 GM/DL (ref 12–16)
IMMATURE GRANS (ABS): 0.03 THOU/MM3 (ref 0–0.07)
IMMATURE GRANULOCYTES: 0.6 %
LYMPHOCYTES # BLD: 17.6 %
LYMPHOCYTES ABSOLUTE: 0.9 THOU/MM3 (ref 1–4.8)
MAGNESIUM: 1.3 MG/DL (ref 1.6–2.4)
MCH RBC QN AUTO: 29.9 PG (ref 26–33)
MCHC RBC AUTO-ENTMCNC: 30.7 GM/DL (ref 32.2–35.5)
MCV RBC AUTO: 97.1 FL (ref 81–99)
MONOCYTES # BLD: 8.4 %
MONOCYTES ABSOLUTE: 0.4 THOU/MM3 (ref 0.4–1.3)
NUCLEATED RED BLOOD CELLS: 0 /100 WBC
PLATELET # BLD: 187 THOU/MM3 (ref 130–400)
PMV BLD AUTO: 10.2 FL (ref 9.4–12.4)
POTASSIUM SERPL-SCNC: 3.6 MEQ/L (ref 3.5–5.2)
RBC # BLD: 3.45 MILL/MM3 (ref 4.2–5.4)
SARS-COV-2, NAAT: NOT  DETECTED
SEG NEUTROPHILS: 71.2 %
SEGMENTED NEUTROPHILS ABSOLUTE COUNT: 3.6 THOU/MM3 (ref 1.8–7.7)
SODIUM BLD-SCNC: 138 MEQ/L (ref 135–145)
WBC # BLD: 5 THOU/MM3 (ref 4.8–10.8)

## 2022-04-27 PROCEDURE — 80048 BASIC METABOLIC PNL TOTAL CA: CPT

## 2022-04-27 PROCEDURE — 99232 SBSQ HOSP IP/OBS MODERATE 35: CPT | Performed by: NURSE PRACTITIONER

## 2022-04-27 PROCEDURE — 87635 SARS-COV-2 COVID-19 AMP PRB: CPT

## 2022-04-27 PROCEDURE — 83735 ASSAY OF MAGNESIUM: CPT

## 2022-04-27 PROCEDURE — 99239 HOSP IP/OBS DSCHRG MGMT >30: CPT | Performed by: INTERNAL MEDICINE

## 2022-04-27 PROCEDURE — 2500000003 HC RX 250 WO HCPCS: Performed by: INTERNAL MEDICINE

## 2022-04-27 PROCEDURE — 94760 N-INVAS EAR/PLS OXIMETRY 1: CPT

## 2022-04-27 PROCEDURE — 2580000003 HC RX 258: Performed by: STUDENT IN AN ORGANIZED HEALTH CARE EDUCATION/TRAINING PROGRAM

## 2022-04-27 PROCEDURE — 36415 COLL VENOUS BLD VENIPUNCTURE: CPT

## 2022-04-27 PROCEDURE — 6370000000 HC RX 637 (ALT 250 FOR IP): Performed by: STUDENT IN AN ORGANIZED HEALTH CARE EDUCATION/TRAINING PROGRAM

## 2022-04-27 PROCEDURE — 85025 COMPLETE CBC W/AUTO DIFF WBC: CPT

## 2022-04-27 RX ORDER — CIPROFLOXACIN 500 MG/1
250 TABLET, FILM COATED ORAL 2 TIMES DAILY
Qty: 5 TABLET | Refills: 0 | Status: SHIPPED | OUTPATIENT
Start: 2022-04-27 | End: 2022-05-02

## 2022-04-27 RX ORDER — LEVOTHYROXINE SODIUM 0.05 MG/1
50 TABLET ORAL
Qty: 30 TABLET | Refills: 3 | Status: SHIPPED | OUTPATIENT
Start: 2022-04-28

## 2022-04-27 RX ORDER — ONDANSETRON 4 MG/1
4 TABLET, ORALLY DISINTEGRATING ORAL EVERY 8 HOURS PRN
Qty: 10 TABLET | Refills: 0 | Status: SHIPPED | OUTPATIENT
Start: 2022-04-27

## 2022-04-27 RX ORDER — VIT A/VIT C/VIT E/ZINC/COPPER 4296-226
1 CAPSULE ORAL DAILY
Qty: 30 CAPSULE | Refills: 2 | Status: SHIPPED | OUTPATIENT
Start: 2022-04-27

## 2022-04-27 RX ORDER — ATORVASTATIN CALCIUM 20 MG/1
20 TABLET, FILM COATED ORAL DAILY
Qty: 30 TABLET | Refills: 3 | Status: SHIPPED | OUTPATIENT
Start: 2022-04-27

## 2022-04-27 RX ADMIN — LEVOTHYROXINE SODIUM 50 MCG: 0.05 TABLET ORAL at 06:02

## 2022-04-27 RX ADMIN — MIDODRINE HYDROCHLORIDE 10 MG: 10 TABLET ORAL at 08:05

## 2022-04-27 RX ADMIN — SODIUM CHLORIDE, PRESERVATIVE FREE 10 ML: 5 INJECTION INTRAVENOUS at 08:05

## 2022-04-27 RX ADMIN — MIDODRINE HYDROCHLORIDE 10 MG: 10 TABLET ORAL at 11:37

## 2022-04-27 RX ADMIN — POTASSIUM CHLORIDE 40 MEQ: 1500 TABLET, EXTENDED RELEASE ORAL at 08:05

## 2022-04-27 RX ADMIN — BUMETANIDE 1 MG: 0.25 INJECTION INTRAMUSCULAR; INTRAVENOUS at 08:05

## 2022-04-27 RX ADMIN — ASPIRIN 325 MG: 325 TABLET ORAL at 08:05

## 2022-04-27 RX ADMIN — CIPROFLOXACIN HYDROCHLORIDE 500 MG: 500 TABLET, FILM COATED ORAL at 08:05

## 2022-04-27 RX ADMIN — PAROXETINE HYDROCHLORIDE 30 MG: 30 TABLET, FILM COATED ORAL at 08:05

## 2022-04-27 RX ADMIN — RIFAXIMIN 550 MG: 550 TABLET ORAL at 08:05

## 2022-04-27 RX ADMIN — PANTOPRAZOLE SODIUM 40 MG: 40 TABLET, DELAYED RELEASE ORAL at 06:02

## 2022-04-27 ASSESSMENT — PAIN SCALES - GENERAL: PAINLEVEL_OUTOF10: 0

## 2022-04-27 NOTE — PROGRESS NOTES
Hospice referral completed with son Eduard Hernandez and family friend in [place]. Hospice concepts, philosophies, and services explained. Discussed hospice care at facility with team members available 24 hours a week with scheduled visits and prn visits that can be initiated by patient, family, or facility staff. Cost of room and board out of pocket if patient goes with hospice, also discussed. At this time plan is for patient to go to facility skilled and when unable to continue under medicare benefit for therapy, pt will transition to hospice care. Did let them know that patient does not have to return to hospital, hospice can be called anytime to admit patient. [facility] hospice numbers given. Went to room and gave [facility] a shortened version of hospice. She is agreeable to go skilled to facility and when unable to continue, then agreeable to transition to hospice. She did tell nurse that she DOES NOT want to return to hospital for treatment. Nurse and son asked her about paracentesis for comfort. [facility] would be agreeable to come for procedure, for comfort of abdomen distention due to pain and dyspnea. Updated Dr Vivian Zhu of patient wishes for [facility]. Nurse filled out DNR order form for provider to sign. Primary nurse Felicia Watkins updated, as well.

## 2022-04-27 NOTE — FLOWSHEET NOTE
Pt was anointed.       04/27/22 1645   Encounter Summary   Service Provided For: Patient   Referral/Consult From: [facility]   Support System Children   Last Encounter  04/27/22   Complexity of Encounter Low   Begin Time 1313   End Time  1320   Total Time Calculated 7 min   Encounter    Type Initial Screen/Assessment   Spiritual/Emotional needs   Type Spiritual Support   Rituals, Rites and Sacraments   Type Anointing

## 2022-04-27 NOTE — DISCHARGE SUMMARY
Hospitalist Discharge Summary        Patient: Miky Ortiz  YOB: 1940  MRN: [id number]   Acct: [de-identified]    Primary Care Physician: ARTHUR Rios - CNP    Admit date  4/22/2022    Discharge date:  4/27/2022 4:09 PM    Chief Complaint on presentation :-  Worsening SOB    Discharge Assessment and Plan:-     Acute on chronic decompensated cirrhosis with ascites  Anasarca secondary to fatty liver disease  History of HFp EF  Last echo was ejection fraction of 55%  Hypoxic respiratory failure  , acute likely secondary to pulmonary edema  ISIDRA on CKD stage III  CAD  History of TAVR now with aortic incompetence  Pulmonary hypertension    Initial H and P and Hospital course:-    Patient is a 82/F with medical  History of Cirrhosis, HFpEF, and SBP on ciprofloxacin prophylaxis presents to the hospital at the behest of her outpatient PCP due to worsening edema. The patient states that she was hospitalized approximate 4 weeks ago with hypotension and discharged on 4/5/22. It appears that she was diagnosed with SBP at that time and treated. She is currently on ciprofloxacin as well as Bumex, metolazone, and Aldactone. She reports worsening shortness of breath as well as orthopnea. She states that her legs are heavy and she has difficulty with ambulation. She ambulates with a walker at baseline. She denies having any chest pain or palpitations. At ambulatory care center, patient was noted to have severe hypokalemia with a potassium of 2.1. He was given oral potassium replacement transferred to [facility] for further [address]. She remains hypoxic along with urinary output but no  responding well to the diuretics,with underlying CKD stage III. And did have paracentesis 3 L were taken out for comfort family decided to change her to comfort care measures and is to hospice.   Was involved and spoke with the hospice team and he decided to take her back to nursing home and then transition to hospice. They are aware of the poor prognosis. Physical Exam:-  Vitals:   Patient Vitals for the past 24 hrs:   BP Temp Temp src Pulse Resp SpO2   04/27/22 1546 (!) 133/54 98.1 °F (36.7 °C) Oral 85 18 97 %   04/27/22 1112 119/61 97.9 °F (36.6 °C) Oral 81 18 93 %   04/27/22 0952 -- -- -- -- 18 95 %   04/27/22 0800 113/62 97.9 °F (36.6 °C) Oral 91 18 96 %   04/26/22 2115 135/60 97.6 °F (36.4 °C) Oral 79 18 94 %     Weight:   Weight: 186 lb 14.4 oz (84.8 kg)   24 hour intake/output:     Intake/Output Summary (Last 24 hours) at 4/27/2022 1609  Last data filed at 4/27/2022 0907  Gross per 24 hour   Intake 600 ml   Output 1100 ml   Net -500 ml       1. General appearance: Appears chronically ill   2. HEENT: Normal cephalic, atraumatic without obvious deformity. Pupils equal, round, and reactive to light. Extra ocular muscles intact. Conjunctivae/corneas clear. 3. Neck: Supple, with full range of motion. No jugular venous distention. Trachea midline. 4. Respiratory: few Crackles at the base  5.  cardiovascular: Regular rate and rhythm with normal S1/S2 without murmurs, rubs or gallops. 6. Abdomen: Soft, non-tender, non-distended with normal bowel sounds. 7. Musculoskeletal:  No clubbing, cyanosis or edema bilaterally. 8. Skin: Skin color, texture, turgor normal.  No rashes or lesions. 9. Neurologic:  Neurovascularly intact without any focal sensory/motor deficits. Cranial nerves: II-XII intact, grossly non-focal.  10. Psychiatric: Alert and oriented, thought content appropriate, normal insight  11. Capillary Refill: Brisk,< 3 seconds   12.  Peripheral Pulses: +2 palpable, equal bilaterally       Discharge Medications:-      Medication List      ASK your doctor about these medications    acetaminophen 650 MG extended release tablet  Commonly known as: TYLENOL     acidophilus probiotic Caps capsule     aspirin 325 MG tablet     bumetanide 1 MG tablet  Commonly known as: BUMEX  Take 1 tablet by mouth 2 times daily     CALTRATE 600+D3 PO     * Centrum Silver 50+Men Tabs  Ask about: Which instructions should I use?      * Centrum Silver 50+Men Tabs  Ask about: Which instructions should I use?     ciprofloxacin 500 MG tablet  Commonly known as: CIPRO  Take 1 tablet by mouth daily     doxycycline hyclate 100 MG tablet  Commonly known as: VIBRA-TABS     fluticasone 50 MCG/ACT nasal spray  Commonly known as: FLONASE     Glucosamine-Chondroitin 250-200 MG Tabs     GLUCOSAMINE-CHONDROITIN-MSM PO     levothyroxine 50 MCG tablet  Commonly known as: SYNTHROID  Take 1 tablet by mouth every morning (before breakfast)     loratadine 10 MG tablet  Commonly known as: CLARITIN     * metOLazone 2.5 MG tablet  Commonly known as: ZAROXOLYN     * metOLazone 2.5 MG tablet  Commonly known as: ZAROXOLYN     midodrine 10 MG tablet  Commonly known as: PROAMATINE  Take 1 tablet by mouth 3 times daily (with meals)     omeprazole 20 MG delayed release capsule  Commonly known as: PRILOSEC     ondansetron 4 MG tablet  Commonly known as: ZOFRAN     OSTEO BI-FLEX TRIPLE STRENGTH PO     PARoxetine 30 MG tablet  Commonly known as: PAXIL     potassium bicarb-citric acid 20 MEQ Tbef effervescent tablet  Commonly known as: EFFER-K  Take 1 tablet by mouth in the morning, at noon, and at bedtime     potassium chloride 10 MEQ extended release tablet  Commonly known as: KLOR-CON     potassium chloride 20 MEQ extended release tablet  Commonly known as: KLOR-CON M     rifAXIMin 550 MG tablet  Commonly known as: XIFAXAN  Take 1 tablet by mouth 2 times daily     sodium chloride 0.9 % SOLN 8.7 mL with perflutren lipid microspheres SUSP 1.43 mg     sucralfate 1 GM tablet  Commonly known as: CARAFATE  Take 1 tablet by mouth 4 times daily (before meals and nightly)     traZODone 50 MG tablet  Commonly known as: DESYREL     triamcinolone 0.1 % cream  Commonly known as: KENALOG     vitamin B-6 100 MG tablet  Commonly known as: PYRIDOXINE vitamin C 500 MG tablet  Commonly known as: ASCORBIC ACID         * This list has 4 medication(s) that are the same as other medications prescribed for you. Read the directions carefully, and ask your doctor or other care provider to review them with you.                  Labs :-  Recent Results (from the past 72 hour(s))   CBC with Auto Differential    Collection Time: 04/25/22  6:16 AM   Result Value Ref Range    WBC 7.0 4.8 - 10.8 thou/mm3    RBC 3.70 (L) 4.20 - 5.40 mill/mm3    Hemoglobin 11.0 (L) 12.0 - 16.0 gm/dl    Hematocrit 38.4 37.0 - 47.0 %    .8 (H) 81.0 - 99.0 fL    MCH 29.7 26.0 - 33.0 pg    MCHC 28.6 (L) 32.2 - 35.5 gm/dl    RDW-CV 16.7 (H) 11.5 - 14.5 %    RDW-SD 63.1 (H) 35.0 - 45.0 fL    Platelets 752 293 - 011 thou/mm3    MPV 9.9 9.4 - 12.4 fL    Seg Neutrophils 71.3 %    Lymphocytes 16.2 %    Monocytes 9.1 %    Eosinophils 2.1 %    Basophils 0.9 %    Immature Granulocytes 0.4 %    Segs Absolute 5.0 1.8 - 7.7 thou/mm3    Lymphocytes Absolute 1.1 1.0 - 4.8 thou/mm3    Monocytes Absolute 0.6 0.4 - 1.3 thou/mm3    Eosinophils Absolute 0.1 0.0 - 0.4 thou/mm3    Basophils Absolute 0.1 0.0 - 0.1 thou/mm3    Immature Grans (Abs) 0.03 0.00 - 0.07 thou/mm3    nRBC 0 /100 wbc    Hypochromia PRESENT Absent   Basic Metabolic Panel    Collection Time: 04/25/22  6:16 AM   Result Value Ref Range    Sodium 134 (L) 135 - 145 meq/L    Potassium 4.1 3.5 - 5.2 meq/L    Chloride 91 (L) 98 - 111 meq/L    CO2 26 23 - 33 meq/L    Glucose 100 70 - 108 mg/dL    BUN 63 (H) 7 - 22 mg/dL    CREATININE 2.3 (H) 0.4 - 1.2 mg/dL    Calcium 7.9 (L) 8.5 - 10.5 mg/dL   Anion Gap    Collection Time: 04/25/22  6:16 AM   Result Value Ref Range    Anion Gap 17.0 (H) 8.0 - 16.0 meq/L   Glomerular Filtration Rate, Estimated    Collection Time: 04/25/22  6:16 AM   Result Value Ref Range    Est, Glom Filt Rate 20 (A) ml/min/1.73m2   CBC with Auto Differential    Collection Time: 04/26/22  4:50 AM   Result Value Ref Range    WBC 6.3 4.8 - 10.8 thou/mm3    RBC 3.42 (L) 4.20 - 5.40 mill/mm3    Hemoglobin 10.1 (L) 12.0 - 16.0 gm/dl    Hematocrit 33.3 (L) 37.0 - 47.0 %    MCV 97.4 81.0 - 99.0 fL    MCH 29.5 26.0 - 33.0 pg    MCHC 30.3 (L) 32.2 - 35.5 gm/dl    RDW-CV 16.5 (H) 11.5 - 14.5 %    RDW-SD 56.9 (H) 35.0 - 45.0 fL    Platelets 017 959 - 569 thou/mm3    MPV 10.4 9.4 - 12.4 fL    Seg Neutrophils 69.9 %    Lymphocytes 19.3 %    Monocytes 7.8 %    Eosinophils 1.8 %    Basophils 0.6 %    Immature Granulocytes 0.6 %    Segs Absolute 4.4 1.8 - 7.7 thou/mm3    Lymphocytes Absolute 1.2 1.0 - 4.8 thou/mm3    Monocytes Absolute 0.5 0.4 - 1.3 thou/mm3    Eosinophils Absolute 0.1 0.0 - 0.4 thou/mm3    Basophils Absolute 0.0 0.0 - 0.1 thou/mm3    Immature Grans (Abs) 0.04 0.00 - 0.07 thou/mm3    nRBC 0 /100 wbc   Basic Metabolic Panel    Collection Time: 04/26/22  4:50 AM   Result Value Ref Range    Sodium 134 (L) 135 - 145 meq/L    Potassium 3.1 (L) 3.5 - 5.2 meq/L    Chloride 90 (L) 98 - 111 meq/L    CO2 29 23 - 33 meq/L    Glucose 99 70 - 108 mg/dL    BUN 59 (H) 7 - 22 mg/dL    CREATININE 2.2 (H) 0.4 - 1.2 mg/dL    Calcium 7.9 (L) 8.5 - 10.5 mg/dL   Magnesium    Collection Time: 04/26/22  4:50 AM   Result Value Ref Range    Magnesium 1.5 (L) 1.6 - 2.4 mg/dL   Anion Gap    Collection Time: 04/26/22  4:50 AM   Result Value Ref Range    Anion Gap 15.0 8.0 - 16.0 meq/L   Glomerular Filtration Rate, Estimated    Collection Time: 04/26/22  4:50 AM   Result Value Ref Range    Est, Glom Filt Rate 21 (A) ml/min/1.73m2   CBC with Auto Differential    Collection Time: 04/27/22  5:09 AM   Result Value Ref Range    WBC 5.0 4.8 - 10.8 thou/mm3    RBC 3.45 (L) 4.20 - 5.40 mill/mm3    Hemoglobin 10.3 (L) 12.0 - 16.0 gm/dl    Hematocrit 33.5 (L) 37.0 - 47.0 %    MCV 97.1 81.0 - 99.0 fL    MCH 29.9 26.0 - 33.0 pg    MCHC 30.7 (L) 32.2 - 35.5 gm/dl    RDW-CV 16.9 (H) 11.5 - 14.5 %    RDW-SD 58.1 (H) 35.0 - 45.0 fL    Platelets 675 299 - 430 thou/mm3    MPV 10.2 9.4 - 12.4 fL    Seg Neutrophils 71.2 %    Lymphocytes 17.6 %    Monocytes 8.4 %    Eosinophils 1.4 %    Basophils 0.8 %    Immature Granulocytes 0.6 %    Segs Absolute 3.6 1.8 - 7.7 thou/mm3    Lymphocytes Absolute 0.9 (L) 1.0 - 4.8 thou/mm3    Monocytes Absolute 0.4 0.4 - 1.3 thou/mm3    Eosinophils Absolute 0.1 0.0 - 0.4 thou/mm3    Basophils Absolute 0.0 0.0 - 0.1 thou/mm3    Immature Grans (Abs) 0.03 0.00 - 0.07 thou/mm3    nRBC 0 /100 wbc   Basic Metabolic Panel    Collection Time: 04/27/22  5:09 AM   Result Value Ref Range    Sodium 138 135 - 145 meq/L    Potassium 3.6 3.5 - 5.2 meq/L    Chloride 92 (L) 98 - 111 meq/L    CO2 32 23 - 33 meq/L    Glucose 94 70 - 108 mg/dL    BUN 60 (H) 7 - 22 mg/dL    CREATININE 2.0 (H) 0.4 - 1.2 mg/dL    Calcium 8.0 (L) 8.5 - 10.5 mg/dL   Magnesium    Collection Time: 04/27/22  5:09 AM   Result Value Ref Range    Magnesium 1.3 (L) 1.6 - 2.4 mg/dL   Anion Gap    Collection Time: 04/27/22  5:09 AM   Result Value Ref Range    Anion Gap 14.0 8.0 - 16.0 meq/L   Glomerular Filtration Rate, Estimated    Collection Time: 04/27/22  5:09 AM   Result Value Ref Range    Est, Glom Filt Rate 24 (A) ml/min/1.73m2   COVID-19, Rapid    Collection Time: 04/27/22 12:50 PM    Specimen: Nasopharyngeal Swab   Result Value Ref Range    SARS-CoV-2, NAAT NOT  DETECTED NOT DETECTED        Microbiology:    Blood culture #1:   Lab Results   Component Value Date    BC No growth-preliminary  No growth   08/24/2018       Blood culture #2:No results found for: BLOODCULT2    Organism:    Lab Results   Component Value Date    LABGRAM  04/23/2022     Moderate segmented neutrophils observed. No bacteria seen.  performed on cytospun specimen        MRSA culture only:No results found for: [address] [place] [address]    Urine culture: No results found for: [facility]  Lab Results   Component Value Date    ORG Enterococcus faecium - (Group D) 08/31/2018        Respiratory culture: No results found for: CULTRESP    Aerobic and Anaerobic :  No results found for: LABAERO  Lab Results   Component Value Date    LABANAE No growth-preliminary  04/23/2022       Urinalysis:      Lab Results   Component Value Date    NITRU NEGATIVE 04/24/2022    WBCUA 2-4 04/24/2022    BACTERIA NONE SEEN 04/24/2022    RBCUA 0-2 04/24/2022    BLOODU NEGATIVE 04/24/2022    SPECGRAV 1.012 04/24/2022    GLUCOSEU NEGATIVE 03/26/2022       Radiology:-  XR CHEST (2 VW)    Result Date: 4/22/2022  PROCEDURE: XR CHEST (2 VW) CLINICAL INFORMATION: shortness of breath COMPARISON: Chest radiograph 3/26/2022, 3/24/2022, 3/15/2022, and 3/11/2022. TECHNIQUE: PA and lateral views of the chest performed. FINDINGS: Moderate cardiomegaly. Small bilateral pleural effusions. Mild groundglass opacification of both lungs. Prosthetic aortic valve is seen. Left atrial appendage clip is noted. No pneumothorax. No acute bony abnormality. Median sternotomy has been performed. 1. Mild groundglass opacities in both lungs can reflect underlying interstitial pulmonary edema. 2. Small bilateral pleural effusions. 3. Moderate cardiomegaly. **This report has been created using voice recognition software. It may contain minor errors which are inherent in voice recognition technology. ** Final report electronically signed by Dr Angella Goncalves on 4/22/2022 1:09 PM    US ABDOMEN LIMITED    Result Date: 4/22/2022  US Abdomen Limited, Liver COMPARISON: US,SR - US LIVER - 03/12/2022 12:04 AM EST FINDINGS: Nodular liver contours consistent with hepatic cirrhosis. Moderate ascites. Moderate cirrhotic ascites.  This document has been electronically signed by: Jones Carmichael MD on 04/22/2022 11:21 PM       Follow-up scheduled after discharge :-    PCP or nursing home physician        Disposition: ECF with hospice care  Condition at Discharge: Stable    Time Spent:- 35 minutes    Electronically signed by Sari Tapia MD on 4/27/22 at 4:09 PM EDT   Discharging Hospitalist

## 2022-04-27 NOTE — PROGRESS NOTES
Nephrology Progress Note    Patient - Merlene Roblero   MRN -  [id number]   Derek # - [de-identified]      - 1940    81 y.o. Admit Date: 2022  Hospital Day: 5  Location: --A  Date of evaluation -  2022    Subjective:   CC: worsening edema  Denies shortness of breath  Hospice Consult, family mtg today  +  BP runs 113/-124/  BP Range: Systolic (69WSG), DLV:467 , Min:113 , ZWK:089      Diastolic (62WIL), HJR:69, Min:54, Max:62      Objective:   VITALS:  /62   Pulse 91   Temp 97.9 °F (36.6 °C) (Oral)   Resp 18   Ht 5' 4\" (1.626 m)   Wt 186 lb 14.4 oz (84.8 kg)   SpO2 96%   BMI 32.08 kg/m²    Patient Vitals for the past 24 hrs:   BP Temp Temp src Pulse Resp SpO2   22 0800 113/62 97.9 °F (36.6 °C) Oral 91 18 96 %   22 2115 135/60 97.6 °F (36.4 °C) Oral 79 18 94 %   22 1545 (!) 113/54 98 °F (36.7 °C) Oral 87 18 94 %   22 1126 (!) 124/58 97.9 °F (36.6 °C) Oral 86 18 95 %   22 1000 -- -- -- -- -- 97 %     1 L/min    Intake/Output Summary (Last 24 hours) at 2022 0914  Last data filed at 2022 6766  Gross per 24 hour   Intake 690 ml   Output 1250 ml   Net -560 ml       Admission weight: 189 lb (85.7 kg) Body mass index is 32.08 kg/m². Patient Vitals for the past 96 hrs (Last 3 readings):   Weight   22 0839 186 lb 14.4 oz (84.8 kg)   22 1500 186 lb 14.4 oz (84.8 kg)       EXAM:  CONSTITUTIONAL:  No acute distress. Pleasant  HEENT:  Head is normocephalic, Extraocular movement intact. Neck is supple. Voice is clear. CARDIOVASCULAR:  S1, S2  regular rate and rhythm. RESPIRATORY: Clear to ausculation bilaterally. Equal breath sounds. No wheezes. No shortness of breath noted at rest.  ABDOMEN: soft, non tender, rounded  NEUROLOGICAL: Patient is alert and oriented to person, place, and time. Recent and remote memory intact. Thought is coherant.   SKIN: no rash, No significant bruises on exposed surfaces  MUSCULOSKELETAL: Movement is coordinated. Moves all extremities   EXTREMITIES: Distal lower extremity temp is warm, bilateral lower extremity edema up into thighs . PSYCHIATRIC: mood and affect appropriate. Medications:   Med reviewed  Scheduled Meds:   potassium chloride  40 mEq Oral Daily with breakfast    bumetanide  1 mg IntraVENous BID WC    traZODone  50 mg Oral Nightly    sodium chloride flush  5-40 mL IntraVENous 2 times per day    enoxaparin  30 mg SubCUTAneous Daily    aspirin  325 mg Oral Daily    [Held by provider] atorvastatin  20 mg Oral Daily    [Held by provider] bumetanide  1 mg Oral BID    ciprofloxacin  500 mg Oral Daily    levothyroxine  50 mcg Oral QAM AC    [Held by provider] metOLazone  5 mg Oral Once per day on Mon Wed Fri    midodrine  10 mg Oral TID WC    pantoprazole  40 mg Oral QAM AC    PARoxetine  30 mg Oral QAM    rifAXIMin  550 mg Oral BID     Labs:   Labs reviewed    Recent Labs     04/25/22  0616 04/26/22  0450 04/27/22  0509   WBC 7.0 6.3 5.0   HGB 11.0* 10.1* 10.3*   HCT 38.4 33.3* 33.5*    206 187     Recent Labs     04/25/22  0616 04/26/22  0450 04/27/22  0509   * 134* 138   K 4.1 3.1* 3.6   CL 91* 90* 92*   CO2 26 29 32   BUN 63* 59* 60*   CREATININE 2.3* 2.2* 2.0*   CALCIUM 7.9* 7.9* 8.0*   ANIONGAP 17.0* 15.0 14.0      Summary 3/14/22   Normal left ventricle size and systolic function. Ejection fraction was   estimated at -55%. There were no regional left ventricular wall motion   abnormalities and wall thickness was within normal limits.   The right ventricular size was increasedl with normal systolic function   and increase wall thickness.   Right ventricular systolic pressure was elevated.   The tricuspid valve structure was normal with normal leaflet separation.   DOPPLER: There was no evidence of tricuspid stenosis. There was n severe   tricuspid reg   The pulmonic valve leaflets exhibited normal thickness, no calcification,   and normal cuspal separation.  DOPPLER: The transpulmonic velocity was   within the normal range mild reg   Pulmonary artery systolic pressure calculated from tricuspid regurgitation   jet is 65-70 . severe pulmonary htn    ASSESSMENT:  1. Chronic Kidney Disease Stage IV  2. Hyponatremia improved  3. Hypokalemia, continue K supplement   4. Acute on chronic cirrhosis with ascites on Bumex 1 mg IV 2x/d  5. Chronic HFpEF, Moderate-severe pulm HTN, severe tricuspid regurg, Right heart failure, s/p AVR, EF 60%,   6. Chronic Hypotension. Continue Midodrine 5 mg 3x/d. BMP in AM  Principal Problem:    Shortness of breath  Active Problems:    Decompensation of cirrhosis of liver (HCC)  Resolved Problems:    * No resolved hospital problems.  Radha Haynes, ARTHUR - CNP 9:14 AM 4/27/2022

## 2022-04-27 NOTE — CARE COORDINATION
4/27/22, 11:46 AM EDT    DISCHARGE PLANNING EVALUATION    Left a message for son to discuss transportation to [facility] and hospice consult. Awaiting call back.

## 2022-04-27 NOTE — PROGRESS NOTES
IV removed. All patient belongings collected and returned to patient. Instructed patient and patient's son on discharge instructions, along with blue packet for ECF and [facility] paperwork. All questions answered at this time. Report called to RN at [facility], papers faxed and told patient to be transported via son. Wheeled down to discharge doors. Chart broken down and placed in yellow bin.

## 2022-04-27 NOTE — CARE COORDINATION
4/27/22, 1:58 PM EDT    Patient goals/plan/ treatment preferences discussed by  and . Patient goals/plan/ treatment preferences reviewed with patient/ family. Patient/ family verbalize understanding of discharge plan and are in agreement with goal/plan/treatment preferences. Understanding was demonstrated using the teach back method. AVS provided by RN at time of discharge, which includes all necessary medical information pertaining to the patients current course of illness, treatment, post-discharge goals of care, and treatment preferences. Services At/After Discharge: [facility] (SNF)       IMM Letter  IMM Letter given to Patient/Family/Significant other/Guardian/POA/by[de-identified] Copy delivered to patient by Monse Bowen  IMM Letter date given[de-identified] 04/27/22  IMM Letter time given[de-identified] 1787     Patient to return to [address] [place] [address] after 2 pm hospice meeting. Spoke with son Idalia Aldana and he does not want to sign up for hospice he just wants to have a meeting for information. Spoke with Marion Hurley at [facility] and she is aware of plan and that patient will be discharged after meeting and son will transport her to facility. Rn has number to call report and fax orders.

## 2022-04-28 LAB
ANAEROBIC CULTURE: NORMAL
BODY FLUID CULTURE, STERILE: NORMAL
GRAM STAIN RESULT: NORMAL

## 2022-05-11 ENCOUNTER — TELEPHONE (OUTPATIENT)
Dept: NEPHROLOGY | Age: 82
End: 2022-05-11

## 2022-05-11 NOTE — TELEPHONE ENCOUNTER
Alessandro Burnett from [facility] called. Patient has gained 8 lbs in the last 3 days. On 5.9.22 she weighed 181 and today she is at 189.    Phone # for [employer] [phone]

## 2022-05-14 PROBLEM — Z12.83 ENCOUNTER FOR SCREENING FOR MALIGNANT NEOPLASM OF SKIN: Status: RESOLVED | Noted: 2020-07-06 | Resolved: 2022-05-14

## 2023-01-04 NOTE — CARE COORDINATION
04/25/22 0911   Service Assessment   Patient Orientation Alert and Oriented;Person;Place;Situation;Self   Cognition Alert   History Provided By Patient   Primary Caregiver Self   Accompanied By/Relationship No one present in room   Support Systems Children;Family Members   Patient's Healthcare Decision Maker is: Named in [address] [place] [address]   PCP Verified by CM Yes   Prior Functional Level Assistance with the following:   Current Functional Level Assistance with the following:   Can patient return to prior living arrangement Yes   Ability to make needs known: Fair   Would you like for me to discuss the discharge plan with any other family members/significant others, and if so, who? No   Social/Functional History   Occupation Retired   Discharge Planning   Patient expects to be discharged to: Skilled nursing facility   Condition of Participation: Discharge Planning   The Plan for Transition of Care is related to the following treatment goals: Patient is receiving IV Bumex, diuresing. Work on [email or web address] and 150 minutes of cardiovascular exercise weekly, as well as weight loss. Eme[employer] - low carb, heart healthy, weight loss programs. PATIENT INFORMATION      -- Fasting labwork has been ordered for you. General patient information when having a lab test:  Fasting - No caloric intake (WATER IS OKAY) for a minimum of 8-10 hours before your blood draw:  Tests that commonly require fasting are:  Cholesterol (lipid panel)  Basic chemistry panel  Comprehensive chemistry panel  Glucose (blood sugar)   Medicine - You can take any prescribed or routine medicine during your fast.  Brushing Teeth - You can brush your teeth even if you are fasting. Getting Your Results - Your doctorâs office will notify you of your results within 10 working days. -- Please go to X-ray now to have your test performed. Dr. Marci Spurling = [phone]    Follow-Up  -- You have been refered to :Cardiology at the [facility] or the [facility] - Phone # is [phone]. Please call if you have not heard from that department in 3 days. Additional Educational Resources: For additional resources regarding your symptoms, diagnosis, or further health information, please visit the Health Resources section on [email or web address] or the Online Health Resources section in [email or web address].

## (undated) DEVICE — CATHETER ETER IV 22GA L1IN POLYUR STR RADPQ INTROCAN SFTY

## (undated) DEVICE — FOGARTY - HYDRAGRIP SURGICAL - CLAMP INSERTS: Brand: FOGARTY SOFTJAW

## (undated) DEVICE — LINER SUCT CANSTR 1500CC SEMI RIG W/ POR HYDROPHOBIC SHUT

## (undated) DEVICE — CONNECTOR PERF 1/2X3/8X3/8IN REDUC WYE

## (undated) DEVICE — SUTURE SOFSILK SZ 1 L30IN NABSORBABLE BLK C-17 L39MM 3/8 SS646

## (undated) DEVICE — SET ADMIN 25ML L117IN PMP MOD CK VLV RLER CLMP 2 SMRTSITE

## (undated) DEVICE — GOWN,SIRUS,NONRNF,SETINSLV,XL,20/CS: Brand: MEDLINE

## (undated) DEVICE — GLOVE SURG SZ 65 L12IN THK75MIL DK GRN LTX FREE

## (undated) DEVICE — AGENT HEMSTAT W2XL14IN OXIDIZED REGENERATED CELOS ABSRB FOR

## (undated) DEVICE — SET LNR RED GRN W/ BASE CLEANASCOPE

## (undated) DEVICE — GLOVE ORANGE PI 8 1/2   MSG9085

## (undated) DEVICE — CLAMP ABLAT JAW L53MM L CRV 2 ELECTRD BPLR

## (undated) DEVICE — RETRACTOR SURG INSRT SUT HLD OCTOBASE

## (undated) DEVICE — BLADE LARYNSCP SZ 3 ENH DIR INTUB GLIDESCOPE MCGRATH MAC

## (undated) DEVICE — CONNECTOR TBNG AUX H2O JET DISP FOR OLY 160/180 SER

## (undated) DEVICE — CONMED SCOPE SAVER BITE BLOCK, 20X27 MM: Brand: SCOPE SAVER

## (undated) DEVICE — COR-KNOT® QUICK LOAD® SINGLES: Brand: COR-KNOT® QUICK LOAD®

## (undated) DEVICE — CANNULA PERF L5.5IN DIA9FR AORT ROOT AG STD TIP W/ VENT LN

## (undated) DEVICE — EXCEL 10FT (3.05 M) INSUFFLATION TUBING SET WITH 0.1 MICRON FILTER: Brand: EXCEL

## (undated) DEVICE — GLOVE ORANGE PI 8   MSG9080

## (undated) DEVICE — TUBING IV STOPCOCK 48 CM 3 W

## (undated) DEVICE — CANNULA PVC RCSP 15FR SLD STYL W/ HNDL OVERALL LEN 11 IN

## (undated) DEVICE — SPONGE LAP W18XL18IN WHT COT 4 PLY FLD STRUNG RADPQ DISP ST

## (undated) DEVICE — CLIP LIG M TI 6 SIL HNDL FOR OPN AND ENDOSCP SGL APPL

## (undated) DEVICE — SINGLE STAGE VENOUS RETURN CANNULA: Brand: THIN-FLEX SINGLE STAGE VENOUS DRAINAGE CANNULA

## (undated) DEVICE — DRESSING GERM DIA1IN CNTR H DIA7MM BLU CHG ANTIMIC PROTCT

## (undated) DEVICE — GLOVE ORANGE PI 7 1/2   MSG9075

## (undated) DEVICE — SOLUTION IV 1000ML 0.45% SOD CHL PH 5 INJ USP VIAFLX PLAS

## (undated) DEVICE — SET AUTOTRNS C175ML BOWL BTM OUTLT RESERVOIRXTRA

## (undated) DEVICE — WAX SURG 2.5GM HEMSTAT BNE BEESWAX PARAFFIN ISO PALMITATE

## (undated) DEVICE — SUTURE NONABSORBABLE MONOFILAMENT 4-0 RB-1 36 IN BLU PROLENE 8557H

## (undated) DEVICE — ENDO KIT: Brand: MEDLINE INDUSTRIES, INC.

## (undated) DEVICE — SUTURE SZ 7 L18IN NONABSORBABLE SIL CCS L48MM 1/2 CIR STRNM M655G

## (undated) DEVICE — DRESSING ALG W3XL4IN TRNSPAR ANTIMIC WND CNTCT LAYR W/

## (undated) DEVICE — THORACIC CATHETER,RIGHT ANGLE: Brand: ARGYLE

## (undated) DEVICE — COVER US PRB W5XL96IN LTX W/ GEL

## (undated) DEVICE — SURGICAL PROCEDURE PACK OXGNTR ST MARYS

## (undated) DEVICE — CONNECTOR PERF 3/8X3/8X3/8IN EQL WYE

## (undated) DEVICE — CONNECTOR STR 3/8IN ST 050506000 375STRCONN

## (undated) DEVICE — AGENT HEMSTAT W4XL8IN OXIDIZED REGENERATED CELOS ABSRB

## (undated) DEVICE — CANNULA PERF 17FR L10IN SIL COR ART OSTIAL SFT BLB SHP TIP

## (undated) DEVICE — Z INACTIVE USE 2662641 SOLUTION IV 1000ML 140MEQ/L SOD 5MEQ/L K 3MEQ/L MG 27MEQ/L

## (undated) DEVICE — GLOVE ORANGE PI 7   MSG9070

## (undated) DEVICE — 3M™ TEGADERM™ TRANSPARENT FILM DRESSING FRAME STYLE, 1626W, 4 IN X 4-3/4 IN (10 CM X 12 CM), 50/CT 4CT/CASE: Brand: 3M™ TEGADERM™

## (undated) DEVICE — CATHETER SUCT TR FL TIP 14FR W/ O CTRL

## (undated) DEVICE — Device: Brand: SUCTION TIP

## (undated) DEVICE — CONNECTOR PERF W3/8XL1/2IN STR REDUC

## (undated) DEVICE — KIT VEN DRNGE VAC ACCSRY PERF VAVD STOCK W/ SPEC TRAP

## (undated) DEVICE — SET TRNQT STD 12FR TB LEN 7 IN 2 RED 2 BLU 2 CLR 16FR 2 L

## (undated) DEVICE — SPONGE GZ W4XL4IN COT 12 PLY TYP VII WVN C FLD DSGN

## (undated) DEVICE — THORACIC CATHETER,STRAIGHT: Brand: ARGYLE

## (undated) DEVICE — CHLORAPREP 26ML CLEAR

## (undated) DEVICE — IV START KIT: Brand: MEDLINE INDUSTRIES, INC.

## (undated) DEVICE — Z DISCONTINUED USE 2717539 NO SUB IDED SUTURE ETHBND 2-0 L30IN NONABSORBABLE GRN WHT V-5 L17MM 1/2

## (undated) DEVICE — GLOVE SURG SZ 6 THK91MIL LTX FREE SYN POLYISOPRENE ANTI

## (undated) DEVICE — COR-KNOT MINI® COMBO KITBASE PACKAGE TYPE - KITEACH STERILE PACKAGE KIT CONTAINS (2) SINGLE PATIENT USE COR-KNOT MINI® DEVICES AND (12) COR-KNOT® QUICK LOADS®.: Brand: COR-KNOT MINI®

## (undated) DEVICE — CANNULA 1 PC TRI STG VEN RET POLYVI CHL S STL FLEX 29 FR SGL

## (undated) DEVICE — GLOVE SURG SZ 65 THK91MIL LTX FREE SYN POLYISOPRENE

## (undated) DEVICE — CLIP LIG SM TI 6 BLU HNDL FOR OPN AND ENDOSCP SGL APPL

## (undated) DEVICE — BANDAGE ADH W1XL3IN NAT FAB WVN FLX DURABLE N ADH PD SEAL

## (undated) DEVICE — TUBING PRESSURE MONITORING FIX M TO M LUER

## (undated) DEVICE — STRIP,CLOSURE,WOUND,MEDI-STRIP,1/2X4: Brand: MEDLINE

## (undated) DEVICE — SUTURE PROL SZ 4-0 L36IN NONABSORBABLE BLU L26MM SH 1/2 CIR 8521H

## (undated) DEVICE — SUTURE MCRYL SZ 4-0 L27IN ABSRB UD L19MM PS-2 1/2 CIR PRIM Y426H

## (undated) DEVICE — SOLUTION IV 250ML 0.9% SOD CHL PH 5 INJ USP VIAFLX PLAS

## (undated) DEVICE — EZ GLIDE AORTIC CANNULA: Brand: EDWARDS LIFESCIENCES EZ GLIDE AORTIC CANNULA

## (undated) DEVICE — BANDAGE,GAUZE,4.5"X4.1YD,STERILE,LF: Brand: MEDLINE

## (undated) DEVICE — Device

## (undated) DEVICE — PROBE CRYOABLATION L10CM ALUM SMOOTH MAL RETRCT HND FLX TB

## (undated) DEVICE — CATHETER,URETHRAL,REDRUBBER,STERILE,20FR: Brand: MEDLINE